# Patient Record
Sex: FEMALE | Race: WHITE | NOT HISPANIC OR LATINO
[De-identification: names, ages, dates, MRNs, and addresses within clinical notes are randomized per-mention and may not be internally consistent; named-entity substitution may affect disease eponyms.]

---

## 2017-01-09 ENCOUNTER — RX RENEWAL (OUTPATIENT)
Age: 62
End: 2017-01-09

## 2017-01-11 ENCOUNTER — RX RENEWAL (OUTPATIENT)
Age: 62
End: 2017-01-11

## 2017-01-19 ENCOUNTER — RX RENEWAL (OUTPATIENT)
Age: 62
End: 2017-01-19

## 2017-02-04 ENCOUNTER — APPOINTMENT (OUTPATIENT)
Dept: FAMILY MEDICINE | Facility: CLINIC | Age: 62
End: 2017-02-04

## 2017-02-04 VITALS
BODY MASS INDEX: 27 KG/M2 | HEART RATE: 78 BPM | WEIGHT: 172 LBS | SYSTOLIC BLOOD PRESSURE: 130 MMHG | RESPIRATION RATE: 20 BRPM | DIASTOLIC BLOOD PRESSURE: 78 MMHG | HEIGHT: 67 IN

## 2017-02-04 RX ORDER — OXYCODONE AND ACETAMINOPHEN 5; 325 MG/1; MG/1
5-325 TABLET ORAL
Qty: 12 | Refills: 0 | Status: DISCONTINUED | COMMUNITY
Start: 2016-12-12

## 2017-02-07 ENCOUNTER — OUTPATIENT (OUTPATIENT)
Dept: OUTPATIENT SERVICES | Facility: HOSPITAL | Age: 62
LOS: 1 days | End: 2017-02-07
Payer: MEDICAID

## 2017-02-07 ENCOUNTER — APPOINTMENT (OUTPATIENT)
Dept: ULTRASOUND IMAGING | Facility: HOSPITAL | Age: 62
End: 2017-02-07

## 2017-02-07 DIAGNOSIS — E89.0 POSTPROCEDURAL HYPOTHYROIDISM: ICD-10-CM

## 2017-02-07 DIAGNOSIS — E07.89 OTHER SPECIFIED DISORDERS OF THYROID: Chronic | ICD-10-CM

## 2017-02-07 PROCEDURE — 76536 US EXAM OF HEAD AND NECK: CPT

## 2017-02-21 ENCOUNTER — RX RENEWAL (OUTPATIENT)
Age: 62
End: 2017-02-21

## 2017-05-04 ENCOUNTER — RX RENEWAL (OUTPATIENT)
Age: 62
End: 2017-05-04

## 2017-05-13 ENCOUNTER — APPOINTMENT (OUTPATIENT)
Dept: FAMILY MEDICINE | Facility: CLINIC | Age: 62
End: 2017-05-13

## 2017-05-13 VITALS
HEART RATE: 76 BPM | SYSTOLIC BLOOD PRESSURE: 130 MMHG | WEIGHT: 173 LBS | BODY MASS INDEX: 27.15 KG/M2 | DIASTOLIC BLOOD PRESSURE: 78 MMHG | RESPIRATION RATE: 20 BRPM | HEIGHT: 67 IN

## 2017-05-21 ENCOUNTER — RX RENEWAL (OUTPATIENT)
Age: 62
End: 2017-05-21

## 2017-06-19 ENCOUNTER — RX RENEWAL (OUTPATIENT)
Age: 62
End: 2017-06-19

## 2017-08-19 ENCOUNTER — APPOINTMENT (OUTPATIENT)
Dept: FAMILY MEDICINE | Facility: CLINIC | Age: 62
End: 2017-08-19
Payer: MEDICAID

## 2017-08-19 VITALS
RESPIRATION RATE: 20 BRPM | SYSTOLIC BLOOD PRESSURE: 130 MMHG | HEIGHT: 67 IN | WEIGHT: 180 LBS | HEART RATE: 76 BPM | BODY MASS INDEX: 28.25 KG/M2 | DIASTOLIC BLOOD PRESSURE: 80 MMHG

## 2017-08-19 PROCEDURE — 99213 OFFICE O/P EST LOW 20 MIN: CPT

## 2017-08-26 LAB
ANION GAP SERPL CALC-SCNC: 18 MMOL/L
BUN SERPL-MCNC: 29 MG/DL
CALCIUM SERPL-MCNC: 9.8 MG/DL
CHLORIDE SERPL-SCNC: 101 MMOL/L
CO2 SERPL-SCNC: 28 MMOL/L
CREAT SERPL-MCNC: 1.02 MG/DL
GLUCOSE SERPL-MCNC: 101 MG/DL
POTASSIUM SERPL-SCNC: 3.4 MMOL/L
SODIUM SERPL-SCNC: 147 MMOL/L

## 2017-08-28 ENCOUNTER — MEDICATION RENEWAL (OUTPATIENT)
Age: 62
End: 2017-08-28

## 2017-08-28 ENCOUNTER — RX RENEWAL (OUTPATIENT)
Age: 62
End: 2017-08-28

## 2017-10-13 ENCOUNTER — APPOINTMENT (OUTPATIENT)
Dept: FAMILY MEDICINE | Facility: CLINIC | Age: 62
End: 2017-10-13
Payer: MEDICAID

## 2017-10-13 ENCOUNTER — LABORATORY RESULT (OUTPATIENT)
Age: 62
End: 2017-10-13

## 2017-10-13 VITALS
RESPIRATION RATE: 20 BRPM | DIASTOLIC BLOOD PRESSURE: 75 MMHG | HEART RATE: 78 BPM | HEIGHT: 67 IN | SYSTOLIC BLOOD PRESSURE: 128 MMHG | BODY MASS INDEX: 28.41 KG/M2 | WEIGHT: 181 LBS

## 2017-10-13 DIAGNOSIS — Z23 ENCOUNTER FOR IMMUNIZATION: ICD-10-CM

## 2017-10-13 PROCEDURE — 90688 IIV4 VACCINE SPLT 0.5 ML IM: CPT

## 2017-10-13 PROCEDURE — G0008: CPT

## 2017-10-13 PROCEDURE — 99213 OFFICE O/P EST LOW 20 MIN: CPT | Mod: 25

## 2017-10-13 PROCEDURE — 36415 COLL VENOUS BLD VENIPUNCTURE: CPT

## 2017-10-18 ENCOUNTER — FORM ENCOUNTER (OUTPATIENT)
Age: 62
End: 2017-10-18

## 2017-10-19 ENCOUNTER — APPOINTMENT (OUTPATIENT)
Dept: MAMMOGRAPHY | Facility: HOSPITAL | Age: 62
End: 2017-10-19
Payer: MEDICAID

## 2017-10-19 ENCOUNTER — OUTPATIENT (OUTPATIENT)
Dept: OUTPATIENT SERVICES | Facility: HOSPITAL | Age: 62
LOS: 1 days | End: 2017-10-19
Payer: MEDICAID

## 2017-10-19 DIAGNOSIS — Z12.31 ENCOUNTER FOR SCREENING MAMMOGRAM FOR MALIGNANT NEOPLASM OF BREAST: ICD-10-CM

## 2017-10-19 DIAGNOSIS — Z00.8 ENCOUNTER FOR OTHER GENERAL EXAMINATION: ICD-10-CM

## 2017-10-19 DIAGNOSIS — E07.89 OTHER SPECIFIED DISORDERS OF THYROID: Chronic | ICD-10-CM

## 2017-10-19 PROCEDURE — 77067 SCR MAMMO BI INCL CAD: CPT

## 2017-10-19 PROCEDURE — G0202: CPT | Mod: 26

## 2017-10-19 PROCEDURE — 77063 BREAST TOMOSYNTHESIS BI: CPT | Mod: 26

## 2017-10-19 PROCEDURE — 77063 BREAST TOMOSYNTHESIS BI: CPT

## 2017-10-22 LAB
ALBUMIN SERPL ELPH-MCNC: 4.7 G/DL
ALP BLD-CCNC: 107 U/L
ALT SERPL-CCNC: 15 U/L
ANION GAP SERPL CALC-SCNC: 17 MMOL/L
AST SERPL-CCNC: 15 U/L
BASOPHILS # BLD AUTO: 0.06 K/UL
BASOPHILS NFR BLD AUTO: 0.5 %
BILIRUB SERPL-MCNC: 0.7 MG/DL
BUN SERPL-MCNC: 20 MG/DL
CALCIUM SERPL-MCNC: 9.5 MG/DL
CHLORIDE SERPL-SCNC: 101 MMOL/L
CHOLEST SERPL-MCNC: 188 MG/DL
CHOLEST/HDLC SERPL: 3.9 RATIO
CO2 SERPL-SCNC: 27 MMOL/L
CREAT SERPL-MCNC: 1.06 MG/DL
EOSINOPHIL # BLD AUTO: 0.11 K/UL
EOSINOPHIL NFR BLD AUTO: 1 %
GLUCOSE SERPL-MCNC: 117 MG/DL
HBA1C MFR BLD HPLC: 5.9 %
HCT VFR BLD CALC: 38.7 %
HCV AB SER QL: NONREACTIVE
HCV S/CO RATIO: 0.12 S/CO
HDLC SERPL-MCNC: 48 MG/DL
HGB BLD-MCNC: 12.4 G/DL
IMM GRANULOCYTES NFR BLD AUTO: 0.8 %
LDLC SERPL CALC-MCNC: 92 MG/DL
LYMPHOCYTES # BLD AUTO: 1.76 K/UL
LYMPHOCYTES NFR BLD AUTO: 16.1 %
MAN DIFF?: NORMAL
MCHC RBC-ENTMCNC: 30.2 PG
MCHC RBC-ENTMCNC: 32 GM/DL
MCV RBC AUTO: 94.4 FL
MONOCYTES # BLD AUTO: 0.65 K/UL
MONOCYTES NFR BLD AUTO: 5.9 %
NEUTROPHILS # BLD AUTO: 8.29 K/UL
NEUTROPHILS NFR BLD AUTO: 75.7 %
PLATELET # BLD AUTO: 333 K/UL
POTASSIUM SERPL-SCNC: 3.6 MMOL/L
PROT SERPL-MCNC: 7.3 G/DL
RBC # BLD: 4.1 M/UL
RBC # FLD: 15.3 %
SODIUM SERPL-SCNC: 145 MMOL/L
T4 FREE SERPL-MCNC: 1.8 NG/DL
TRIGL SERPL-MCNC: 241 MG/DL
TSH SERPL-ACNC: 0.38 UIU/ML
WBC # FLD AUTO: 10.96 K/UL

## 2017-10-27 ENCOUNTER — RX RENEWAL (OUTPATIENT)
Age: 62
End: 2017-10-27

## 2017-11-07 ENCOUNTER — APPOINTMENT (OUTPATIENT)
Dept: FAMILY MEDICINE | Facility: CLINIC | Age: 62
End: 2017-11-07
Payer: MEDICAID

## 2017-11-07 VITALS — RESPIRATION RATE: 20 BRPM | HEART RATE: 76 BPM | SYSTOLIC BLOOD PRESSURE: 135 MMHG | DIASTOLIC BLOOD PRESSURE: 80 MMHG

## 2017-11-07 DIAGNOSIS — S80.11XA CONTUSION OF RIGHT LOWER LEG, INITIAL ENCOUNTER: ICD-10-CM

## 2017-11-07 PROCEDURE — 99213 OFFICE O/P EST LOW 20 MIN: CPT

## 2017-12-21 ENCOUNTER — RX RENEWAL (OUTPATIENT)
Age: 62
End: 2017-12-21

## 2018-01-20 ENCOUNTER — APPOINTMENT (OUTPATIENT)
Dept: FAMILY MEDICINE | Facility: CLINIC | Age: 63
End: 2018-01-20
Payer: MEDICAID

## 2018-01-20 VITALS
BODY MASS INDEX: 28.72 KG/M2 | DIASTOLIC BLOOD PRESSURE: 80 MMHG | SYSTOLIC BLOOD PRESSURE: 135 MMHG | RESPIRATION RATE: 20 BRPM | HEIGHT: 67 IN | HEART RATE: 68 BPM | WEIGHT: 183 LBS

## 2018-01-20 PROCEDURE — 36415 COLL VENOUS BLD VENIPUNCTURE: CPT

## 2018-01-20 PROCEDURE — 99214 OFFICE O/P EST MOD 30 MIN: CPT | Mod: 25

## 2018-01-22 LAB
ALBUMIN SERPL ELPH-MCNC: 4.6 G/DL
ALP BLD-CCNC: 110 U/L
ALT SERPL-CCNC: 19 U/L
ANION GAP SERPL CALC-SCNC: 16 MMOL/L
AST SERPL-CCNC: 18 U/L
BASOPHILS # BLD AUTO: 0.06 K/UL
BASOPHILS NFR BLD AUTO: 0.6 %
BILIRUB SERPL-MCNC: 0.6 MG/DL
BUN SERPL-MCNC: 31 MG/DL
CALCIUM SERPL-MCNC: 9.4 MG/DL
CHLORIDE SERPL-SCNC: 103 MMOL/L
CHOLEST SERPL-MCNC: 166 MG/DL
CHOLEST/HDLC SERPL: 3.3 RATIO
CO2 SERPL-SCNC: 27 MMOL/L
CREAT SERPL-MCNC: 1.08 MG/DL
EOSINOPHIL # BLD AUTO: 0.12 K/UL
EOSINOPHIL NFR BLD AUTO: 1.1 %
GLUCOSE SERPL-MCNC: 107 MG/DL
HBA1C MFR BLD HPLC: 6.2 %
HCT VFR BLD CALC: 43.1 %
HDLC SERPL-MCNC: 50 MG/DL
HGB BLD-MCNC: 13.7 G/DL
IMM GRANULOCYTES NFR BLD AUTO: 0.9 %
LDLC SERPL CALC-MCNC: 81 MG/DL
LYMPHOCYTES # BLD AUTO: 1.68 K/UL
LYMPHOCYTES NFR BLD AUTO: 15.6 %
MAN DIFF?: NORMAL
MCHC RBC-ENTMCNC: 29.1 PG
MCHC RBC-ENTMCNC: 31.8 GM/DL
MCV RBC AUTO: 91.5 FL
MONOCYTES # BLD AUTO: 0.69 K/UL
MONOCYTES NFR BLD AUTO: 6.4 %
NEUTROPHILS # BLD AUTO: 8.12 K/UL
NEUTROPHILS NFR BLD AUTO: 75.4 %
PLATELET # BLD AUTO: 286 K/UL
POTASSIUM SERPL-SCNC: 3.3 MMOL/L
PROT SERPL-MCNC: 7.1 G/DL
RBC # BLD: 4.71 M/UL
RBC # FLD: 16.3 %
SODIUM SERPL-SCNC: 146 MMOL/L
T4 FREE SERPL-MCNC: 1.8 NG/DL
TRIGL SERPL-MCNC: 177 MG/DL
TSH SERPL-ACNC: 0.49 UIU/ML
WBC # FLD AUTO: 10.77 K/UL

## 2018-01-23 ENCOUNTER — APPOINTMENT (OUTPATIENT)
Dept: OBGYN | Facility: CLINIC | Age: 63
End: 2018-01-23
Payer: MEDICAID

## 2018-01-23 VITALS
WEIGHT: 182.8 LBS | BODY MASS INDEX: 28.69 KG/M2 | DIASTOLIC BLOOD PRESSURE: 102 MMHG | SYSTOLIC BLOOD PRESSURE: 170 MMHG | OXYGEN SATURATION: 100 % | HEART RATE: 89 BPM | HEIGHT: 67 IN

## 2018-01-23 DIAGNOSIS — R35.0 FREQUENCY OF MICTURITION: ICD-10-CM

## 2018-01-23 PROCEDURE — 99396 PREV VISIT EST AGE 40-64: CPT

## 2018-01-25 LAB
BACTERIA UR CULT: NORMAL
HPV HIGH+LOW RISK DNA PNL CVX: NOT DETECTED

## 2018-01-26 ENCOUNTER — FORM ENCOUNTER (OUTPATIENT)
Age: 63
End: 2018-01-26

## 2018-01-27 ENCOUNTER — OUTPATIENT (OUTPATIENT)
Dept: OUTPATIENT SERVICES | Facility: HOSPITAL | Age: 63
LOS: 1 days | End: 2018-01-27
Payer: MEDICAID

## 2018-01-27 ENCOUNTER — APPOINTMENT (OUTPATIENT)
Dept: RADIOLOGY | Facility: HOSPITAL | Age: 63
End: 2018-01-27
Payer: MEDICAID

## 2018-01-27 DIAGNOSIS — Z01.419 ENCOUNTER FOR GYNECOLOGICAL EXAMINATION (GENERAL) (ROUTINE) WITHOUT ABNORMAL FINDINGS: ICD-10-CM

## 2018-01-27 DIAGNOSIS — M85.88 OTHER SPECIFIED DISORDERS OF BONE DENSITY AND STRUCTURE, OTHER SITE: ICD-10-CM

## 2018-01-27 DIAGNOSIS — E07.89 OTHER SPECIFIED DISORDERS OF THYROID: Chronic | ICD-10-CM

## 2018-01-27 PROCEDURE — 77080 DXA BONE DENSITY AXIAL: CPT | Mod: 26

## 2018-01-27 PROCEDURE — 77080 DXA BONE DENSITY AXIAL: CPT

## 2018-01-29 ENCOUNTER — APPOINTMENT (OUTPATIENT)
Dept: SURGERY | Facility: CLINIC | Age: 63
End: 2018-01-29
Payer: MEDICAID

## 2018-01-29 VITALS — WEIGHT: 183 LBS | BODY MASS INDEX: 28.72 KG/M2 | HEIGHT: 67 IN

## 2018-01-29 DIAGNOSIS — Z80.3 FAMILY HISTORY OF MALIGNANT NEOPLASM OF BREAST: ICD-10-CM

## 2018-01-29 DIAGNOSIS — Z80.2 FAMILY HISTORY OF MALIGNANT NEOPLASM OF OTHER RESPIRATORY AND INTRATHORACIC ORGANS: ICD-10-CM

## 2018-01-29 DIAGNOSIS — Z82.49 FAMILY HISTORY OF ISCHEMIC HEART DISEASE AND OTHER DISEASES OF THE CIRCULATORY SYSTEM: ICD-10-CM

## 2018-01-29 DIAGNOSIS — Z12.11 ENCOUNTER FOR SCREENING FOR MALIGNANT NEOPLASM OF COLON: ICD-10-CM

## 2018-01-29 LAB — CYTOLOGY CVX/VAG DOC THIN PREP: NORMAL

## 2018-01-29 PROCEDURE — 99203 OFFICE O/P NEW LOW 30 MIN: CPT

## 2018-02-15 ENCOUNTER — APPOINTMENT (OUTPATIENT)
Dept: ULTRASOUND IMAGING | Facility: HOSPITAL | Age: 63
End: 2018-02-15
Payer: MEDICAID

## 2018-02-15 ENCOUNTER — OUTPATIENT (OUTPATIENT)
Dept: OUTPATIENT SERVICES | Facility: HOSPITAL | Age: 63
LOS: 1 days | End: 2018-02-15
Payer: MEDICAID

## 2018-02-15 DIAGNOSIS — E89.0 POSTPROCEDURAL HYPOTHYROIDISM: ICD-10-CM

## 2018-02-15 DIAGNOSIS — E07.89 OTHER SPECIFIED DISORDERS OF THYROID: Chronic | ICD-10-CM

## 2018-02-15 PROCEDURE — 76536 US EXAM OF HEAD AND NECK: CPT

## 2018-02-15 PROCEDURE — 76536 US EXAM OF HEAD AND NECK: CPT | Mod: 26

## 2018-02-20 ENCOUNTER — OUTPATIENT (OUTPATIENT)
Dept: OUTPATIENT SERVICES | Facility: HOSPITAL | Age: 63
LOS: 1 days | End: 2018-02-20
Payer: MEDICAID

## 2018-02-20 ENCOUNTER — TRANSCRIPTION ENCOUNTER (OUTPATIENT)
Age: 63
End: 2018-02-20

## 2018-02-20 DIAGNOSIS — Z12.11 ENCOUNTER FOR SCREENING FOR MALIGNANT NEOPLASM OF COLON: ICD-10-CM

## 2018-02-20 DIAGNOSIS — K59.00 CONSTIPATION, UNSPECIFIED: ICD-10-CM

## 2018-02-20 DIAGNOSIS — K57.30 DIVERTICULOSIS OF LARGE INTESTINE WITHOUT PERFORATION OR ABSCESS WITHOUT BLEEDING: ICD-10-CM

## 2018-02-20 DIAGNOSIS — I10 ESSENTIAL (PRIMARY) HYPERTENSION: ICD-10-CM

## 2018-02-20 DIAGNOSIS — K62.89 OTHER SPECIFIED DISEASES OF ANUS AND RECTUM: ICD-10-CM

## 2018-02-20 DIAGNOSIS — Z53.09 PROCEDURE AND TREATMENT NOT CARRIED OUT BECAUSE OF OTHER CONTRAINDICATION: ICD-10-CM

## 2018-02-20 DIAGNOSIS — E07.89 OTHER SPECIFIED DISORDERS OF THYROID: Chronic | ICD-10-CM

## 2018-02-20 PROCEDURE — 45378 DIAGNOSTIC COLONOSCOPY: CPT | Mod: 33

## 2018-02-20 PROCEDURE — G0121: CPT | Mod: 74

## 2018-02-22 ENCOUNTER — RX RENEWAL (OUTPATIENT)
Age: 63
End: 2018-02-22

## 2018-02-26 ENCOUNTER — RX RENEWAL (OUTPATIENT)
Age: 63
End: 2018-02-26

## 2018-04-19 ENCOUNTER — RX RENEWAL (OUTPATIENT)
Age: 63
End: 2018-04-19

## 2018-04-28 ENCOUNTER — APPOINTMENT (OUTPATIENT)
Dept: FAMILY MEDICINE | Facility: CLINIC | Age: 63
End: 2018-04-28

## 2018-05-03 ENCOUNTER — OUTPATIENT (OUTPATIENT)
Dept: OUTPATIENT SERVICES | Facility: HOSPITAL | Age: 63
LOS: 1 days | End: 2018-05-03
Payer: MEDICAID

## 2018-05-03 DIAGNOSIS — E07.89 OTHER SPECIFIED DISORDERS OF THYROID: Chronic | ICD-10-CM

## 2018-05-03 DIAGNOSIS — R73.03 PREDIABETES: ICD-10-CM

## 2018-05-03 PROCEDURE — G0463: CPT

## 2018-05-08 ENCOUNTER — APPOINTMENT (OUTPATIENT)
Dept: FAMILY MEDICINE | Facility: CLINIC | Age: 63
End: 2018-05-08
Payer: COMMERCIAL

## 2018-05-08 VITALS
DIASTOLIC BLOOD PRESSURE: 78 MMHG | HEART RATE: 78 BPM | BODY MASS INDEX: 28.88 KG/M2 | HEIGHT: 67 IN | WEIGHT: 184 LBS | SYSTOLIC BLOOD PRESSURE: 135 MMHG | RESPIRATION RATE: 20 BRPM

## 2018-05-08 PROCEDURE — 99214 OFFICE O/P EST MOD 30 MIN: CPT | Mod: 25

## 2018-05-08 PROCEDURE — 36415 COLL VENOUS BLD VENIPUNCTURE: CPT

## 2018-05-08 RX ORDER — BLOOD SUGAR DIAGNOSTIC
STRIP MISCELLANEOUS
Qty: 100 | Refills: 0 | Status: ACTIVE | COMMUNITY
Start: 2018-04-05

## 2018-05-08 RX ORDER — BLOOD-GLUCOSE METER
W/DEVICE EACH MISCELLANEOUS
Qty: 1 | Refills: 0 | Status: ACTIVE | COMMUNITY
Start: 2018-04-05

## 2018-05-08 RX ORDER — LANCETS 33 GAUGE
EACH MISCELLANEOUS
Qty: 100 | Refills: 0 | Status: ACTIVE | COMMUNITY
Start: 2018-04-05

## 2018-05-10 LAB
25(OH)D3 SERPL-MCNC: 36.6 NG/ML
ALBUMIN SERPL ELPH-MCNC: 4.5 G/DL
ALP BLD-CCNC: 111 U/L
ALT SERPL-CCNC: 16 U/L
ANION GAP SERPL CALC-SCNC: 20 MMOL/L
AST SERPL-CCNC: 16 U/L
BASOPHILS # BLD AUTO: 0.05 K/UL
BASOPHILS NFR BLD AUTO: 0.4 %
BILIRUB SERPL-MCNC: 0.7 MG/DL
BUN SERPL-MCNC: 28 MG/DL
CALCIUM SERPL-MCNC: 9.7 MG/DL
CHLORIDE SERPL-SCNC: 102 MMOL/L
CHOLEST SERPL-MCNC: 187 MG/DL
CHOLEST/HDLC SERPL: 3.9 RATIO
CO2 SERPL-SCNC: 26 MMOL/L
CREAT SERPL-MCNC: 1.01 MG/DL
EOSINOPHIL # BLD AUTO: 0.1 K/UL
EOSINOPHIL NFR BLD AUTO: 0.9 %
FOLATE SERPL-MCNC: 13.3 NG/ML
GLUCOSE SERPL-MCNC: 117 MG/DL
HBA1C MFR BLD HPLC: 6.2 %
HCT VFR BLD CALC: 45.1 %
HDLC SERPL-MCNC: 48 MG/DL
HGB BLD-MCNC: 14.4 G/DL
IMM GRANULOCYTES NFR BLD AUTO: 0.6 %
LDLC SERPL CALC-MCNC: 84 MG/DL
LYMPHOCYTES # BLD AUTO: 1.78 K/UL
LYMPHOCYTES NFR BLD AUTO: 15.5 %
MAN DIFF?: NORMAL
MCHC RBC-ENTMCNC: 30.2 PG
MCHC RBC-ENTMCNC: 31.9 GM/DL
MCV RBC AUTO: 94.5 FL
MONOCYTES # BLD AUTO: 0.76 K/UL
MONOCYTES NFR BLD AUTO: 6.6 %
NEUTROPHILS # BLD AUTO: 8.72 K/UL
NEUTROPHILS NFR BLD AUTO: 76 %
PLATELET # BLD AUTO: 267 K/UL
POTASSIUM SERPL-SCNC: 3.3 MMOL/L
PROT SERPL-MCNC: 7.2 G/DL
RBC # BLD: 4.77 M/UL
RBC # FLD: 15.2 %
SODIUM SERPL-SCNC: 148 MMOL/L
T4 FREE SERPL-MCNC: 1.6 NG/DL
TRIGL SERPL-MCNC: 275 MG/DL
TSH SERPL-ACNC: 0.49 UIU/ML
VIT B12 SERPL-MCNC: 539 PG/ML
WBC # FLD AUTO: 11.48 K/UL

## 2018-05-17 ENCOUNTER — RX RENEWAL (OUTPATIENT)
Age: 63
End: 2018-05-17

## 2018-06-19 ENCOUNTER — RX RENEWAL (OUTPATIENT)
Age: 63
End: 2018-06-19

## 2018-08-07 ENCOUNTER — APPOINTMENT (OUTPATIENT)
Dept: OBGYN | Facility: CLINIC | Age: 63
End: 2018-08-07
Payer: COMMERCIAL

## 2018-08-07 VITALS
BODY MASS INDEX: 28.72 KG/M2 | HEIGHT: 67 IN | SYSTOLIC BLOOD PRESSURE: 162 MMHG | HEART RATE: 102 BPM | DIASTOLIC BLOOD PRESSURE: 98 MMHG | RESPIRATION RATE: 20 BRPM | WEIGHT: 183 LBS | OXYGEN SATURATION: 98 %

## 2018-08-07 DIAGNOSIS — K64.2 THIRD DEGREE HEMORRHOIDS: ICD-10-CM

## 2018-08-07 DIAGNOSIS — Z00.00 ENCOUNTER FOR GENERAL ADULT MEDICAL EXAMINATION W/OUT ABNORMAL FINDINGS: ICD-10-CM

## 2018-08-07 DIAGNOSIS — R15.9 FULL INCONTINENCE OF FECES: ICD-10-CM

## 2018-08-07 PROCEDURE — 99213 OFFICE O/P EST LOW 20 MIN: CPT

## 2018-08-17 ENCOUNTER — APPOINTMENT (OUTPATIENT)
Dept: SURGERY | Facility: CLINIC | Age: 63
End: 2018-08-17

## 2018-08-18 ENCOUNTER — APPOINTMENT (OUTPATIENT)
Dept: FAMILY MEDICINE | Facility: CLINIC | Age: 63
End: 2018-08-18
Payer: COMMERCIAL

## 2018-08-18 VITALS
RESPIRATION RATE: 20 BRPM | DIASTOLIC BLOOD PRESSURE: 80 MMHG | HEIGHT: 67 IN | SYSTOLIC BLOOD PRESSURE: 128 MMHG | WEIGHT: 177 LBS | BODY MASS INDEX: 27.78 KG/M2 | HEART RATE: 78 BPM

## 2018-08-18 PROCEDURE — 36415 COLL VENOUS BLD VENIPUNCTURE: CPT

## 2018-08-18 PROCEDURE — 99214 OFFICE O/P EST MOD 30 MIN: CPT | Mod: 25

## 2018-08-18 NOTE — HISTORY OF PRESENT ILLNESS
[de-identified] : Presents for BP check, labs, and general follow-up.  No new complaints.  States trying to watch diet and remain as active as possible.

## 2018-08-18 NOTE — PHYSICAL EXAM
[No Acute Distress] : no acute distress [No JVD] : no jugular venous distention [Supple] : supple [Thyroid Normal, No Nodules] : the thyroid was normal and there were no nodules present [No Respiratory Distress] : no respiratory distress  [Clear to Auscultation] : lungs were clear to auscultation bilaterally [No Accessory Muscle Use] : no accessory muscle use [Normal Rate] : normal rate  [Regular Rhythm] : with a regular rhythm [Normal S1, S2] : normal S1 and S2 [No Murmur] : no murmur heard [No Edema] : there was no peripheral edema [Soft] : abdomen soft [Non Tender] : non-tender [No Focal Deficits] : no focal deficits [Alert and Oriented x3] : oriented to person, place, and time

## 2018-08-18 NOTE — ASSESSMENT
[FreeTextEntry1] : Hemodynamically stable with acceptable BP and no clinical evidence of thyroid pathology\par Lab profiles sent

## 2018-08-19 LAB
ALBUMIN SERPL ELPH-MCNC: 4.4 G/DL
ALP BLD-CCNC: 113 U/L
ALT SERPL-CCNC: 19 U/L
ANION GAP SERPL CALC-SCNC: 13 MMOL/L
AST SERPL-CCNC: 14 U/L
BASOPHILS # BLD AUTO: 0.06 K/UL
BASOPHILS NFR BLD AUTO: 0.6 %
BILIRUB SERPL-MCNC: 0.7 MG/DL
BUN SERPL-MCNC: 22 MG/DL
CALCIUM SERPL-MCNC: 9.5 MG/DL
CHLORIDE SERPL-SCNC: 104 MMOL/L
CHOLEST SERPL-MCNC: 150 MG/DL
CHOLEST/HDLC SERPL: 3.3 RATIO
CO2 SERPL-SCNC: 29 MMOL/L
CREAT SERPL-MCNC: 0.98 MG/DL
EOSINOPHIL # BLD AUTO: 0.11 K/UL
EOSINOPHIL NFR BLD AUTO: 1.1 %
GLUCOSE SERPL-MCNC: 107 MG/DL
HBA1C MFR BLD HPLC: 6.3 %
HCT VFR BLD CALC: 40.6 %
HDLC SERPL-MCNC: 45 MG/DL
HGB BLD-MCNC: 12.8 G/DL
IMM GRANULOCYTES NFR BLD AUTO: 0.6 %
LDLC SERPL CALC-MCNC: 74 MG/DL
LYMPHOCYTES # BLD AUTO: 1.99 K/UL
LYMPHOCYTES NFR BLD AUTO: 20.4 %
MAN DIFF?: NORMAL
MCHC RBC-ENTMCNC: 29.8 PG
MCHC RBC-ENTMCNC: 31.5 GM/DL
MCV RBC AUTO: 94.6 FL
MONOCYTES # BLD AUTO: 0.48 K/UL
MONOCYTES NFR BLD AUTO: 4.9 %
NEUTROPHILS # BLD AUTO: 7.06 K/UL
NEUTROPHILS NFR BLD AUTO: 72.4 %
PLATELET # BLD AUTO: 267 K/UL
POTASSIUM SERPL-SCNC: 3.8 MMOL/L
PROT SERPL-MCNC: 6.8 G/DL
RBC # BLD: 4.29 M/UL
RBC # FLD: 15 %
SODIUM SERPL-SCNC: 146 MMOL/L
T4 FREE SERPL-MCNC: 2 NG/DL
TRIGL SERPL-MCNC: 156 MG/DL
TSH SERPL-ACNC: 0.12 UIU/ML
WBC # FLD AUTO: 9.76 K/UL

## 2018-09-06 ENCOUNTER — APPOINTMENT (OUTPATIENT)
Dept: FAMILY MEDICINE | Facility: CLINIC | Age: 63
End: 2018-09-06
Payer: COMMERCIAL

## 2018-09-06 VITALS
RESPIRATION RATE: 18 BRPM | HEIGHT: 67 IN | HEART RATE: 72 BPM | OXYGEN SATURATION: 98 % | SYSTOLIC BLOOD PRESSURE: 170 MMHG | DIASTOLIC BLOOD PRESSURE: 109 MMHG | TEMPERATURE: 98.6 F

## 2018-09-06 VITALS — DIASTOLIC BLOOD PRESSURE: 100 MMHG | SYSTOLIC BLOOD PRESSURE: 170 MMHG

## 2018-09-06 PROCEDURE — 99214 OFFICE O/P EST MOD 30 MIN: CPT

## 2018-09-13 ENCOUNTER — RX RENEWAL (OUTPATIENT)
Age: 63
End: 2018-09-13

## 2018-09-13 ENCOUNTER — APPOINTMENT (OUTPATIENT)
Dept: FAMILY MEDICINE | Facility: CLINIC | Age: 63
End: 2018-09-13
Payer: COMMERCIAL

## 2018-09-13 VITALS
DIASTOLIC BLOOD PRESSURE: 90 MMHG | SYSTOLIC BLOOD PRESSURE: 140 MMHG | HEIGHT: 67 IN | HEART RATE: 64 BPM | OXYGEN SATURATION: 98 % | RESPIRATION RATE: 16 BRPM | TEMPERATURE: 98 F

## 2018-09-13 PROCEDURE — 99214 OFFICE O/P EST MOD 30 MIN: CPT

## 2018-09-13 NOTE — HEALTH RISK ASSESSMENT
[] : No [No falls in past year] : Patient reported no falls in the past year [0] : 2) Feeling down, depressed, or hopeless: Not at all (0) [[id number]] : 0

## 2018-09-13 NOTE — HISTORY OF PRESENT ILLNESS
[FreeTextEntry1] : Presents for htn visit FU [de-identified] : Patient was instructed to increase morning dose of clonidine from one to two pills a day. Patient reports that she forgot, and continued to take her normal dose. Overall feels well at visit. No acute complaints at visit. Denies chest pain, LEON, or head aches.\par \par Has cardiology FU scheduled with Dr. Iverson on 9/25 and FU up with PCP with  Dr. Mckinney on 11/10

## 2018-09-13 NOTE — PLAN
[FreeTextEntry1] : Patient will take clonidine when she gets home.\par \par Patient will take clonidine 0.01 mg twice a day.\par \par FU in one week

## 2018-09-13 NOTE — PHYSICAL EXAM
[No Acute Distress] : no acute distress [Well Nourished] : well nourished [Well Developed] : well developed [Well-Appearing] : well-appearing [No JVD] : no jugular venous distention [Supple] : supple [No Lymphadenopathy] : no lymphadenopathy [Thyroid Normal, No Nodules] : the thyroid was normal and there were no nodules present [No Respiratory Distress] : no respiratory distress  [Clear to Auscultation] : lungs were clear to auscultation bilaterally [No Accessory Muscle Use] : no accessory muscle use [Normal Rate] : normal rate  [Regular Rhythm] : with a regular rhythm [Normal S1, S2] : normal S1 and S2 [No Murmur] : no murmur heard [No Carotid Bruits] : no carotid bruits [No Abdominal Bruit] : a ~M bruit was not heard ~T in the abdomen [No Varicosities] : no varicosities [Pedal Pulses Present] : the pedal pulses are present [No Edema] : there was no peripheral edema [No Extremity Clubbing/Cyanosis] : no extremity clubbing/cyanosis [No Palpable Aorta] : no palpable aorta [Soft] : abdomen soft [Non Tender] : non-tender [No HSM] : no HSM [Normal Bowel Sounds] : normal bowel sounds [Normal Gait] : normal gait [Coordination Grossly Intact] : coordination grossly intact [No Focal Deficits] : no focal deficits [Normal Affect] : the affect was normal [Normal Insight/Judgement] : insight and judgment were intact [de-identified] : conjunctival hemorrhage noted on left eye

## 2018-09-13 NOTE — REVIEW OF SYSTEMS
[Negative] : Heme/Lymph [FreeTextEntry3] : conjunctival hemorrhage note in left eye- Assessed by bhupinder vision

## 2018-09-13 NOTE — PLAN
[FreeTextEntry1] : Recommended increasing morning clonidine dose to 0.2mg.\par \par Patient reports that she prefers to keep her HTN regimen unchanged until she sees cardiologist and PCP.  \par \par

## 2018-09-13 NOTE — HISTORY OF PRESENT ILLNESS
[FreeTextEntry8] : Presents for htn reading at endocrinologist. \par \par  Dr. Hale's. endocrinologist decreased Synthroid from 150 mcg to 137 for 5 days and 150mcg for two days.\par \par Patient was hypertensive at visit and reports to office for blood pressure reading.\par \par Reports taking hypertensive medication this morning. Denies chest pain, Esquivel or head aches.\par \par Does not take Blood pressure at home \par \par Has history of anxiety. Patient notes that she has had increased  anxiety after talking to Dr. Hale. Denies pain

## 2018-09-25 ENCOUNTER — APPOINTMENT (OUTPATIENT)
Dept: CARDIOLOGY | Facility: CLINIC | Age: 63
End: 2018-09-25
Payer: COMMERCIAL

## 2018-09-25 ENCOUNTER — NON-APPOINTMENT (OUTPATIENT)
Age: 63
End: 2018-09-25

## 2018-09-25 VITALS — SYSTOLIC BLOOD PRESSURE: 140 MMHG | DIASTOLIC BLOOD PRESSURE: 90 MMHG

## 2018-09-25 VITALS — DIASTOLIC BLOOD PRESSURE: 110 MMHG | HEART RATE: 68 BPM | SYSTOLIC BLOOD PRESSURE: 160 MMHG

## 2018-09-25 VITALS
WEIGHT: 174 LBS | SYSTOLIC BLOOD PRESSURE: 189 MMHG | DIASTOLIC BLOOD PRESSURE: 111 MMHG | HEIGHT: 67 IN | HEART RATE: 64 BPM | RESPIRATION RATE: 17 BRPM | BODY MASS INDEX: 27.31 KG/M2 | OXYGEN SATURATION: 96 %

## 2018-09-25 PROCEDURE — 93000 ELECTROCARDIOGRAM COMPLETE: CPT

## 2018-09-25 PROCEDURE — 99204 OFFICE O/P NEW MOD 45 MIN: CPT

## 2018-09-25 RX ORDER — POLYETHYLENE GLYCOL 3350, SODIUM SULFATE, SODIUM CHLORIDE, POTASSIUM CHLORIDE, ASCORBIC ACID, SODIUM ASCORBATE 7.5-2.691G
100 KIT ORAL
Qty: 1 | Refills: 0 | Status: DISCONTINUED | COMMUNITY
Start: 2018-01-29 | End: 2018-09-25

## 2018-09-25 RX ORDER — POLYETHYLENE GLYCOL-3350 AND ELECTROLYTES 236; 6.74; 5.86; 2.97; 22.74 G/274.31G; G/274.31G; G/274.31G; G/274.31G; G/274.31G
236 POWDER, FOR SOLUTION ORAL
Qty: 4000 | Refills: 0 | Status: DISCONTINUED | COMMUNITY
Start: 2018-01-29 | End: 2018-09-25

## 2018-09-25 RX ORDER — POLYETHYLENE GLYCOL 3350, SODIUM SULFATE ANHYDROUS, SODIUM BICARBONATE, SODIUM CHLORIDE, POTASSIUM CHLORIDE 227.1; 21.5; 6.36; 5.53; .754 G/L; G/L; G/L; G/L; G/L
227.1 POWDER, FOR SOLUTION ORAL
Qty: 1 | Refills: 0 | Status: DISCONTINUED | COMMUNITY
Start: 2018-01-29 | End: 2018-09-25

## 2018-10-04 ENCOUNTER — APPOINTMENT (OUTPATIENT)
Dept: CARDIOLOGY | Facility: CLINIC | Age: 63
End: 2018-10-04
Payer: COMMERCIAL

## 2018-10-04 PROCEDURE — 93306 TTE W/DOPPLER COMPLETE: CPT

## 2018-10-19 ENCOUNTER — EMERGENCY (EMERGENCY)
Facility: HOSPITAL | Age: 63
LOS: 1 days | Discharge: HOME | End: 2018-10-19
Attending: INTERNAL MEDICINE | Admitting: INTERNAL MEDICINE
Payer: MEDICAID

## 2018-10-19 VITALS
OXYGEN SATURATION: 95 % | DIASTOLIC BLOOD PRESSURE: 92 MMHG | HEART RATE: 78 BPM | SYSTOLIC BLOOD PRESSURE: 156 MMHG | TEMPERATURE: 98 F | RESPIRATION RATE: 16 BRPM

## 2018-10-19 VITALS
DIASTOLIC BLOOD PRESSURE: 86 MMHG | RESPIRATION RATE: 16 BRPM | SYSTOLIC BLOOD PRESSURE: 182 MMHG | HEIGHT: 67 IN | TEMPERATURE: 97 F | HEART RATE: 82 BPM | WEIGHT: 169.98 LBS

## 2018-10-19 DIAGNOSIS — E07.89 OTHER SPECIFIED DISORDERS OF THYROID: Chronic | ICD-10-CM

## 2018-10-19 LAB
ALBUMIN SERPL ELPH-MCNC: 3.6 G/DL — SIGNIFICANT CHANGE UP (ref 3.3–5)
ALP SERPL-CCNC: 109 U/L — SIGNIFICANT CHANGE UP (ref 40–120)
ALT FLD-CCNC: 26 U/L DA — SIGNIFICANT CHANGE UP (ref 10–45)
ANION GAP SERPL CALC-SCNC: 10 MMOL/L — SIGNIFICANT CHANGE UP (ref 5–17)
AST SERPL-CCNC: 23 U/L — SIGNIFICANT CHANGE UP (ref 10–40)
BASOPHILS # BLD AUTO: 0.1 K/UL — SIGNIFICANT CHANGE UP (ref 0–0.2)
BASOPHILS NFR BLD AUTO: 0.9 % — SIGNIFICANT CHANGE UP (ref 0–2)
BILIRUB SERPL-MCNC: 0.7 MG/DL — SIGNIFICANT CHANGE UP (ref 0.2–1.2)
BUN SERPL-MCNC: 30 MG/DL — HIGH (ref 7–23)
CALCIUM SERPL-MCNC: 8.5 MG/DL — SIGNIFICANT CHANGE UP (ref 8.4–10.5)
CHLORIDE SERPL-SCNC: 105 MMOL/L — SIGNIFICANT CHANGE UP (ref 96–108)
CO2 SERPL-SCNC: 31 MMOL/L — SIGNIFICANT CHANGE UP (ref 22–31)
CREAT SERPL-MCNC: 1.26 MG/DL — SIGNIFICANT CHANGE UP (ref 0.5–1.3)
EOSINOPHIL # BLD AUTO: 0.1 K/UL — SIGNIFICANT CHANGE UP (ref 0–0.5)
EOSINOPHIL NFR BLD AUTO: 0.8 % — SIGNIFICANT CHANGE UP (ref 0–6)
GLUCOSE SERPL-MCNC: 168 MG/DL — HIGH (ref 70–99)
HCT VFR BLD CALC: 40.4 % — SIGNIFICANT CHANGE UP (ref 34.5–45)
HGB BLD-MCNC: 13.2 G/DL — SIGNIFICANT CHANGE UP (ref 11.5–15.5)
LYMPHOCYTES # BLD AUTO: 1.8 K/UL — SIGNIFICANT CHANGE UP (ref 1–3.3)
LYMPHOCYTES # BLD AUTO: 14.7 % — SIGNIFICANT CHANGE UP (ref 13–44)
MCHC RBC-ENTMCNC: 29.5 PG — SIGNIFICANT CHANGE UP (ref 27–34)
MCHC RBC-ENTMCNC: 32.6 GM/DL — SIGNIFICANT CHANGE UP (ref 32–36)
MCV RBC AUTO: 90.4 FL — SIGNIFICANT CHANGE UP (ref 80–100)
MONOCYTES # BLD AUTO: 0.6 K/UL — SIGNIFICANT CHANGE UP (ref 0–0.9)
MONOCYTES NFR BLD AUTO: 5.2 % — SIGNIFICANT CHANGE UP (ref 1–9)
NEUTROPHILS # BLD AUTO: 9.5 K/UL — HIGH (ref 1.8–7.4)
NEUTROPHILS NFR BLD AUTO: 78.4 % — HIGH (ref 43–77)
PLATELET # BLD AUTO: 284 K/UL — SIGNIFICANT CHANGE UP (ref 150–400)
POTASSIUM SERPL-MCNC: 2.7 MMOL/L — CRITICAL LOW (ref 3.5–5.3)
POTASSIUM SERPL-MCNC: 3 MMOL/L — LOW (ref 3.5–5.3)
POTASSIUM SERPL-SCNC: 2.7 MMOL/L — CRITICAL LOW (ref 3.5–5.3)
POTASSIUM SERPL-SCNC: 3 MMOL/L — LOW (ref 3.5–5.3)
PROT SERPL-MCNC: 7.1 G/DL — SIGNIFICANT CHANGE UP (ref 6–8.3)
RBC # BLD: 4.47 M/UL — SIGNIFICANT CHANGE UP (ref 3.8–5.2)
RBC # FLD: 13.5 % — SIGNIFICANT CHANGE UP (ref 10.3–14.5)
SODIUM SERPL-SCNC: 146 MMOL/L — HIGH (ref 135–145)
WBC # BLD: 12.1 K/UL — HIGH (ref 3.8–10.5)
WBC # FLD AUTO: 12.1 K/UL — HIGH (ref 3.8–10.5)

## 2018-10-19 PROCEDURE — 99284 EMERGENCY DEPT VISIT MOD MDM: CPT | Mod: 25

## 2018-10-19 PROCEDURE — 82088 ASSAY OF ALDOSTERONE: CPT

## 2018-10-19 PROCEDURE — 85027 COMPLETE CBC AUTOMATED: CPT

## 2018-10-19 PROCEDURE — 93005 ELECTROCARDIOGRAM TRACING: CPT

## 2018-10-19 PROCEDURE — 99284 EMERGENCY DEPT VISIT MOD MDM: CPT

## 2018-10-19 PROCEDURE — 96376 TX/PRO/DX INJ SAME DRUG ADON: CPT

## 2018-10-19 PROCEDURE — 84244 ASSAY OF RENIN: CPT

## 2018-10-19 PROCEDURE — 96365 THER/PROPH/DIAG IV INF INIT: CPT

## 2018-10-19 PROCEDURE — 84132 ASSAY OF SERUM POTASSIUM: CPT

## 2018-10-19 PROCEDURE — 96361 HYDRATE IV INFUSION ADD-ON: CPT

## 2018-10-19 PROCEDURE — 93010 ELECTROCARDIOGRAM REPORT: CPT

## 2018-10-19 PROCEDURE — 80053 COMPREHEN METABOLIC PANEL: CPT

## 2018-10-19 RX ORDER — POTASSIUM CHLORIDE 20 MEQ
40 PACKET (EA) ORAL ONCE
Qty: 0 | Refills: 0 | Status: COMPLETED | OUTPATIENT
Start: 2018-10-19 | End: 2018-10-19

## 2018-10-19 RX ORDER — SODIUM CHLORIDE 9 MG/ML
1000 INJECTION INTRAMUSCULAR; INTRAVENOUS; SUBCUTANEOUS ONCE
Qty: 0 | Refills: 0 | Status: COMPLETED | OUTPATIENT
Start: 2018-10-19 | End: 2018-10-19

## 2018-10-19 RX ORDER — POTASSIUM CHLORIDE 20 MEQ
10 PACKET (EA) ORAL
Qty: 0 | Refills: 0 | Status: COMPLETED | OUTPATIENT
Start: 2018-10-19 | End: 2018-10-19

## 2018-10-19 RX ADMIN — Medication 40 MILLIEQUIVALENT(S): at 15:11

## 2018-10-19 RX ADMIN — SODIUM CHLORIDE 1000 MILLILITER(S): 9 INJECTION INTRAMUSCULAR; INTRAVENOUS; SUBCUTANEOUS at 15:10

## 2018-10-19 RX ADMIN — Medication 100 MILLIEQUIVALENT(S): at 15:11

## 2018-10-19 RX ADMIN — SODIUM CHLORIDE 1000 MILLILITER(S): 9 INJECTION INTRAMUSCULAR; INTRAVENOUS; SUBCUTANEOUS at 14:10

## 2018-10-19 RX ADMIN — Medication 100 MILLIEQUIVALENT(S): at 16:13

## 2018-10-19 RX ADMIN — Medication 10 MILLIEQUIVALENT(S): at 16:20

## 2018-10-19 RX ADMIN — Medication 40 MILLIEQUIVALENT(S): at 20:27

## 2018-10-19 RX ADMIN — Medication 100 MILLIEQUIVALENT(S): at 17:20

## 2018-10-19 RX ADMIN — SODIUM CHLORIDE 1000 MILLILITER(S): 9 INJECTION INTRAMUSCULAR; INTRAVENOUS; SUBCUTANEOUS at 16:15

## 2018-10-19 RX ADMIN — SODIUM CHLORIDE 1000 MILLILITER(S): 9 INJECTION INTRAMUSCULAR; INTRAVENOUS; SUBCUTANEOUS at 15:07

## 2018-10-19 NOTE — CONSULT NOTE ADULT - SUBJECTIVE AND OBJECTIVE BOX
HPI: 63 yo F with hx papillary thyroid cancer (s/p staged thyroidectomy in  while under the care of former endocrinologist Dr. Sam Raphael, no recollection of receiving DIAZ ablation), postmenopausal since '05, HTN, dyslipidemia and prediabetes admitted to ED by me due to lab alert of hypokalemia of 2.7 discovered on routine labs this morning. Patient is asymptomatic and EKG w/out any abnormality. She reports that she was told by her PCP several years ago to eat "many bananas daily". While under my care (initial visit 9/17/15), patient has not exhibited hypokalemia. Her blood pressure has always been elevated and poorly controlled at my office visits -- although she reports that her BP is generally good at her PCP Dr. Mckinney's office. She is currently maintained on four anti-htn medications: hctz 12.5, clonidine 0.1mg, diltiazem er 240mg and fosinopril 40mg.      PAST MEDICAL & SURGICAL HISTORY:  HTN (hypertension)  HLD  Prediabetes (last HbA1c 6.4%)  H/O PTC s/p total thyroidectomy '  Postsurgical hypothyroidism  Postmenopausal      FAMILY HISTORY: father: heavy smoker,  d/t lymphoma at age 70s  mother: labile HTN, ?hx MI,  in her 80s d/t drug overdose      Social History: [place]    Outpatient Medications: fosinopril 40mg, diltiazem er 240mg, hctz 12.5mg, clonidine 0.1mg, levothyroxine 137mcg alt 150mcg, atorvastatin 20mg, ergocalciferol 50, 000 unit capsule, One Touch Ultra meter & testing supplies    MEDICATIONS  (STANDING):  potassium chloride  10 mEq/100 mL IVPB 10 milliEquivalent(s) IV Intermittent every 1 hour    MEDICATIONS  (PRN):      Allergies    No Known Allergies    Intolerances      Review of Systems:  Constitutional: No fever  Eyes: No blurry vision  Neuro: No tremors  HEENT: No pain  Cardiovascular: No chest pain, palpitations  Respiratory: No SOB, no cough  GI: No nausea, vomiting, abdominal pain  : No dysuria  Skin: no rash  Psych: no depression  Endocrine: no polyuria, polydipsia  Hem/lymph: no swelling  Osteoporosis: no fractures    ALL OTHER SYSTEMS REVIEWED AND NEGATIVE    UNABLE TO OBTAIN    PHYSICAL EXAM:  VITALS: T(C): 36.3 (10-19-18 @ 12:57)  T(F): 97.3 (10-19-18 @ 12:57), Max: 97.3 (10-19-18 @ 12:57)  HR: 82 (10-19-18 @ 12:57) (82 - 82)  BP: 182/86 (10-19-18 @ 12:57) (182/86 - 182/86)  RR:  (16 - 16)  SpO2: --  Wt(kg): --  GENERAL: NAD, well-groomed, well-developed  EYES: No proptosis, no lid lag, anicteric  HEENT:  Atraumatic, Normocephalic, moist mucous membranes  THYROID: Normal size, no palpable nodules  RESPIRATORY: Clear to auscultation bilaterally; No rales, rhonchi, wheezing, or rubs  CARDIOVASCULAR: Regular rate and rhythm; No murmurs; no peripheral edema  GI: Soft, nontender, non distended, normal bowel sounds  SKIN: Dry, intact, No rashes or lesions  MUSCULOSKELETAL: Full range of motion, normal strength  NEURO: sensation intact, extraocular movements intact, no tremor, normal reflexes  PSYCH: Alert and oriented x 3, normal affect, normal mood  CUSHING'S SIGNS: no striae                              13.2   12.1  )-----------( 284      ( 19 Oct 2018 13:35 )             40.4       10-19    146<H>  |  105  |  30<H>  ----------------------------<  168<H>  2.7<LL>   |  31  |  1.26    EGFR if : 53<L>  EGFR if non : 45<L>    Ca    8.5      10-19    TPro  7.1  /  Alb  3.6  /  TBili  0.7  /  DBili  x   /  AST  23  /  ALT  26  /  AlkPhos  109  10-19

## 2018-10-19 NOTE — ED PROVIDER NOTE - PROGRESS NOTE DETAILS
Pt receiving 2 dose of IV KCL, when done with 3 runs will repeat kcl. No complaints at this time. Pt endo seen pt at beside , labs were ordered based on her recommendations to r/o Conns syndrome. Pt receiving 3rd KCL run and will repeat K.

## 2018-10-19 NOTE — ED PROVIDER NOTE - MEDICAL DECISION MAKING DETAILS
62 yr old female with hx of thyroid ca, post operative hypothyroid, HTN, HLD, pre diabetes presents with low potassium. Pt went to private endocrinologist today for routine blood work, and K was 2.7. Pt was sent in for further eval. Pt denies any fever. chills, dizziness, chest pain or any other symptoms at this time.  Hx of low k? pt use to eat a lot of bananas.   Pt takes HCTZ for HTN.  EKG, cbc, cmp- will give po kcl and 3 runs of KCL and will repeat K

## 2018-10-19 NOTE — ED PROVIDER NOTE - ATTENDING CONTRIBUTION TO CARE
sent for low potassium   pe normal  Dr. Haider:  I have reviewed and discussed with the PA/ resident the case specifics, including the history, physical assessment, evaluation, conclusion, laboratory results, and medical plan. I agree with the contents, and conclusions. I have personally examined, and interviewed the patient.

## 2018-10-19 NOTE — ED PROVIDER NOTE - PLAN OF CARE
Follow up with your PMD within 48-72 hrs. Show copies of your reports given to you. Take all of your medications as previously prescribed. Increase your potassium intake at home- examples include bananas and yogurt. Worsening, continued or ANY new concerning symptoms return to the emergency department.

## 2018-10-19 NOTE — ED PROVIDER NOTE - CARE PLAN
Principal Discharge DX:	Hypokalemia  Assessment and plan of treatment:	Follow up with your PMD within 48-72 hrs. Show copies of your reports given to you. Take all of your medications as previously prescribed. Increase your potassium intake at home- examples include bananas and yogurt. Worsening, continued or ANY new concerning symptoms return to the emergency department.

## 2018-10-19 NOTE — CONSULT NOTE ADULT - ASSESSMENT
Asymptomatic Spontaneous Hypokalemia in setting of HTN -- difficult to control on 4 anti-htn medications    Replete K to greater than 4 with oral and IV potassium supplementation.  EKG to r/o hypoK - related abnormalities.  After potassium repletion, may discharge patient to home. Please tell patient to stop taking hydrochlorothiazide. I will start patient on aldactone and start at 25mg daily and uptitrate as needed.      Work up for Conn's syndrome as follows:  1) draw labs for plasma aldosterone level and plasma renin activity    2) Saline load test: Infuse 2L NS and then draw labs for serum aldosterone    If above tests return suggestive or confirmatory for Conn's syndrome, next step will be to order CT Adrenals -- but this can be done as an outpatient.    Case discussed at length with patient's PCP team.

## 2018-10-19 NOTE — ED PROVIDER NOTE - OBJECTIVE STATEMENT
62 yr old female with hx of thyroid ca, post operative hypothyroid, HTN, HLD, pre diabetes presents with low potassium. Pt went to private endocrinologist today for routine blood work, and K was 2.7. Pt was sent in for further eval. Pt denies any fever. chills, dizziness, chest pain or any other symptoms at this time.  Hx of low k? pt use to eat a lot of bananas.   Pt takes HCTZ for HTN.

## 2018-10-22 ENCOUNTER — FORM ENCOUNTER (OUTPATIENT)
Age: 63
End: 2018-10-22

## 2018-10-22 LAB
ALDOST SERPL-MCNC: 17.2 NG/DL — SIGNIFICANT CHANGE UP
ALDOST SERPL-MCNC: 18.2 NG/DL — SIGNIFICANT CHANGE UP

## 2018-10-23 ENCOUNTER — APPOINTMENT (OUTPATIENT)
Dept: MAMMOGRAPHY | Facility: HOSPITAL | Age: 63
End: 2018-10-23
Payer: COMMERCIAL

## 2018-10-23 ENCOUNTER — OUTPATIENT (OUTPATIENT)
Dept: OUTPATIENT SERVICES | Facility: HOSPITAL | Age: 63
LOS: 1 days | End: 2018-10-23
Payer: MEDICAID

## 2018-10-23 DIAGNOSIS — E07.89 OTHER SPECIFIED DISORDERS OF THYROID: Chronic | ICD-10-CM

## 2018-10-23 DIAGNOSIS — Z00.8 ENCOUNTER FOR OTHER GENERAL EXAMINATION: ICD-10-CM

## 2018-10-23 PROCEDURE — 77067 SCR MAMMO BI INCL CAD: CPT

## 2018-10-23 PROCEDURE — 77067 SCR MAMMO BI INCL CAD: CPT | Mod: 26

## 2018-10-23 PROCEDURE — 77063 BREAST TOMOSYNTHESIS BI: CPT

## 2018-10-23 PROCEDURE — 77063 BREAST TOMOSYNTHESIS BI: CPT | Mod: 26

## 2018-10-24 ENCOUNTER — APPOINTMENT (OUTPATIENT)
Dept: MAMMOGRAPHY | Facility: HOSPITAL | Age: 63
End: 2018-10-24

## 2018-10-24 ENCOUNTER — APPOINTMENT (OUTPATIENT)
Dept: ULTRASOUND IMAGING | Facility: HOSPITAL | Age: 63
End: 2018-10-24

## 2018-10-25 ENCOUNTER — APPOINTMENT (OUTPATIENT)
Dept: CARDIOLOGY | Facility: CLINIC | Age: 63
End: 2018-10-25
Payer: COMMERCIAL

## 2018-10-25 LAB — RENIN PLAS-CCNC: 1.45 NG/ML/HR — SIGNIFICANT CHANGE UP (ref 0.17–5.38)

## 2018-10-25 PROCEDURE — A9500: CPT

## 2018-10-25 PROCEDURE — 93015 CV STRESS TEST SUPVJ I&R: CPT

## 2018-10-25 PROCEDURE — 78452 HT MUSCLE IMAGE SPECT MULT: CPT

## 2018-10-29 ENCOUNTER — FORM ENCOUNTER (OUTPATIENT)
Age: 63
End: 2018-10-29

## 2018-10-30 ENCOUNTER — APPOINTMENT (OUTPATIENT)
Dept: MAMMOGRAPHY | Facility: HOSPITAL | Age: 63
End: 2018-10-30

## 2018-10-30 ENCOUNTER — APPOINTMENT (OUTPATIENT)
Dept: ULTRASOUND IMAGING | Facility: HOSPITAL | Age: 63
End: 2018-10-30

## 2018-10-30 ENCOUNTER — OUTPATIENT (OUTPATIENT)
Dept: OUTPATIENT SERVICES | Facility: HOSPITAL | Age: 63
LOS: 1 days | End: 2018-10-30
Payer: MEDICAID

## 2018-10-30 DIAGNOSIS — R92.8 OTHER ABNORMAL AND INCONCLUSIVE FINDINGS ON DIAGNOSTIC IMAGING OF BREAST: ICD-10-CM

## 2018-10-30 DIAGNOSIS — Z00.8 ENCOUNTER FOR OTHER GENERAL EXAMINATION: ICD-10-CM

## 2018-10-30 DIAGNOSIS — E07.89 OTHER SPECIFIED DISORDERS OF THYROID: Chronic | ICD-10-CM

## 2018-10-30 PROCEDURE — 76641 ULTRASOUND BREAST COMPLETE: CPT

## 2018-10-30 PROCEDURE — G0279: CPT

## 2018-10-30 PROCEDURE — G0279: CPT | Mod: 26

## 2018-10-30 PROCEDURE — 77065 DX MAMMO INCL CAD UNI: CPT

## 2018-10-30 PROCEDURE — 76641 ULTRASOUND BREAST COMPLETE: CPT | Mod: 26

## 2018-10-30 PROCEDURE — 77065 DX MAMMO INCL CAD UNI: CPT | Mod: 26,RT

## 2018-11-04 ENCOUNTER — FORM ENCOUNTER (OUTPATIENT)
Age: 63
End: 2018-11-04

## 2018-11-05 ENCOUNTER — RESULT REVIEW (OUTPATIENT)
Age: 63
End: 2018-11-05

## 2018-11-05 ENCOUNTER — OUTPATIENT (OUTPATIENT)
Dept: OUTPATIENT SERVICES | Facility: HOSPITAL | Age: 63
LOS: 1 days | End: 2018-11-05
Payer: MEDICAID

## 2018-11-05 DIAGNOSIS — N63.0 UNSPECIFIED LUMP IN UNSPECIFIED BREAST: ICD-10-CM

## 2018-11-05 DIAGNOSIS — E07.89 OTHER SPECIFIED DISORDERS OF THYROID: Chronic | ICD-10-CM

## 2018-11-05 DIAGNOSIS — R92.8 OTHER ABNORMAL AND INCONCLUSIVE FINDINGS ON DIAGNOSTIC IMAGING OF BREAST: ICD-10-CM

## 2018-11-05 PROCEDURE — 88305 TISSUE EXAM BY PATHOLOGIST: CPT

## 2018-11-05 PROCEDURE — 19084 BX BREAST ADD LESION US IMAG: CPT | Mod: RT

## 2018-11-05 PROCEDURE — 19083 BX BREAST 1ST LESION US IMAG: CPT | Mod: RT

## 2018-11-05 PROCEDURE — A4648: CPT

## 2018-11-05 PROCEDURE — 88360 TUMOR IMMUNOHISTOCHEM/MANUAL: CPT | Mod: 26

## 2018-11-05 PROCEDURE — 19084 BX BREAST ADD LESION US IMAG: CPT

## 2018-11-05 PROCEDURE — 19083 BX BREAST 1ST LESION US IMAG: CPT

## 2018-11-05 PROCEDURE — 88360 TUMOR IMMUNOHISTOCHEM/MANUAL: CPT

## 2018-11-05 PROCEDURE — 88305 TISSUE EXAM BY PATHOLOGIST: CPT | Mod: 26

## 2018-11-07 LAB — SURGICAL PATHOLOGY STUDY: SIGNIFICANT CHANGE UP

## 2018-11-10 ENCOUNTER — APPOINTMENT (OUTPATIENT)
Dept: FAMILY MEDICINE | Facility: CLINIC | Age: 63
End: 2018-11-10
Payer: COMMERCIAL

## 2018-11-10 VITALS
DIASTOLIC BLOOD PRESSURE: 80 MMHG | HEIGHT: 67 IN | BODY MASS INDEX: 27.15 KG/M2 | HEART RATE: 68 BPM | SYSTOLIC BLOOD PRESSURE: 140 MMHG | RESPIRATION RATE: 20 BRPM | WEIGHT: 173 LBS

## 2018-11-10 PROCEDURE — 99214 OFFICE O/P EST MOD 30 MIN: CPT | Mod: 25

## 2018-11-10 PROCEDURE — 36415 COLL VENOUS BLD VENIPUNCTURE: CPT

## 2018-11-10 NOTE — ASSESSMENT
[FreeTextEntry1] : Hemodynamically stable; feel BP acceptable for this patient\par No clinical evidence of thyroid pathology\par Lab profiles sent

## 2018-11-10 NOTE — HISTORY OF PRESENT ILLNESS
[de-identified] : Presents for BP check, labs, and general follow-up.  Also discussed recent events and reviewed pathology of recent breast biopsy - note positive for malignancy - advised consult with Heme-Onc - request placed and staff to assist.

## 2018-11-10 NOTE — PHYSICAL EXAM
[No Acute Distress] : no acute distress [No JVD] : no jugular venous distention [Supple] : supple [No Lymphadenopathy] : no lymphadenopathy [Thyroid Normal, No Nodules] : the thyroid was normal and there were no nodules present [No Respiratory Distress] : no respiratory distress  [Clear to Auscultation] : lungs were clear to auscultation bilaterally [No Accessory Muscle Use] : no accessory muscle use [Normal Rate] : normal rate  [Regular Rhythm] : with a regular rhythm [Normal S1, S2] : normal S1 and S2 [No Murmur] : no murmur heard [No Edema] : there was no peripheral edema [Soft] : abdomen soft [Non Tender] : non-tender [No Focal Deficits] : no focal deficits [Alert and Oriented x3] : oriented to person, place, and time

## 2018-11-12 LAB
ALBUMIN SERPL ELPH-MCNC: 4.8 G/DL
ALP BLD-CCNC: 96 U/L
ALT SERPL-CCNC: 18 U/L
ANION GAP SERPL CALC-SCNC: 14 MMOL/L
AST SERPL-CCNC: 15 U/L
BASOPHILS # BLD AUTO: 0.05 K/UL
BASOPHILS NFR BLD AUTO: 0.4 %
BILIRUB SERPL-MCNC: 0.4 MG/DL
BUN SERPL-MCNC: 34 MG/DL
CALCIUM SERPL-MCNC: 9.5 MG/DL
CHLORIDE SERPL-SCNC: 104 MMOL/L
CHOLEST SERPL-MCNC: 233 MG/DL
CHOLEST/HDLC SERPL: 5.3 RATIO
CO2 SERPL-SCNC: 25 MMOL/L
CREAT SERPL-MCNC: 1.13 MG/DL
EOSINOPHIL # BLD AUTO: 0.13 K/UL
EOSINOPHIL NFR BLD AUTO: 1.2 %
GLUCOSE SERPL-MCNC: 96 MG/DL
HBA1C MFR BLD HPLC: 6 %
HCT VFR BLD CALC: 43.9 %
HDLC SERPL-MCNC: 44 MG/DL
HGB BLD-MCNC: 14.2 G/DL
IMM GRANULOCYTES NFR BLD AUTO: 1.1 %
LDLC SERPL CALC-MCNC: 136 MG/DL
LYMPHOCYTES # BLD AUTO: 1.74 K/UL
LYMPHOCYTES NFR BLD AUTO: 15.6 %
MAN DIFF?: NORMAL
MCHC RBC-ENTMCNC: 30.4 PG
MCHC RBC-ENTMCNC: 32.3 GM/DL
MCV RBC AUTO: 94 FL
MONOCYTES # BLD AUTO: 0.88 K/UL
MONOCYTES NFR BLD AUTO: 7.9 %
NEUTROPHILS # BLD AUTO: 8.24 K/UL
NEUTROPHILS NFR BLD AUTO: 73.8 %
PLATELET # BLD AUTO: 269 K/UL
POTASSIUM SERPL-SCNC: 5 MMOL/L
PROT SERPL-MCNC: 7.2 G/DL
RBC # BLD: 4.67 M/UL
RBC # FLD: 16.6 %
SODIUM SERPL-SCNC: 143 MMOL/L
T3FREE SERPL-MCNC: 2 PG/ML
T4 FREE SERPL-MCNC: 1.7 NG/DL
TRIGL SERPL-MCNC: 266 MG/DL
TSH SERPL-ACNC: 0.16 UIU/ML
WBC # FLD AUTO: 11.16 K/UL

## 2018-11-13 ENCOUNTER — RX RENEWAL (OUTPATIENT)
Age: 63
End: 2018-11-13

## 2018-11-13 ENCOUNTER — APPOINTMENT (OUTPATIENT)
Dept: CT IMAGING | Facility: HOSPITAL | Age: 63
End: 2018-11-13
Payer: COMMERCIAL

## 2018-11-13 ENCOUNTER — OUTPATIENT (OUTPATIENT)
Dept: OUTPATIENT SERVICES | Facility: HOSPITAL | Age: 63
LOS: 1 days | End: 2018-11-13
Payer: MEDICAID

## 2018-11-13 DIAGNOSIS — I15.9 SECONDARY HYPERTENSION, UNSPECIFIED: ICD-10-CM

## 2018-11-13 DIAGNOSIS — E07.89 OTHER SPECIFIED DISORDERS OF THYROID: Chronic | ICD-10-CM

## 2018-11-13 PROCEDURE — 74170 CT ABD WO CNTRST FLWD CNTRST: CPT | Mod: 26

## 2018-11-13 PROCEDURE — 74170 CT ABD WO CNTRST FLWD CNTRST: CPT

## 2018-11-13 RX ORDER — ATORVASTATIN CALCIUM 20 MG/1
20 TABLET, FILM COATED ORAL
Qty: 90 | Refills: 3 | Status: ACTIVE | COMMUNITY
Start: 2018-02-22 | End: 1900-01-01

## 2018-11-14 ENCOUNTER — APPOINTMENT (OUTPATIENT)
Dept: OBGYN | Facility: CLINIC | Age: 63
End: 2018-11-14
Payer: COMMERCIAL

## 2018-11-14 PROCEDURE — 99214 OFFICE O/P EST MOD 30 MIN: CPT

## 2018-11-22 ENCOUNTER — FORM ENCOUNTER (OUTPATIENT)
Age: 63
End: 2018-11-22

## 2018-11-23 ENCOUNTER — OUTPATIENT (OUTPATIENT)
Dept: OUTPATIENT SERVICES | Facility: HOSPITAL | Age: 63
LOS: 1 days | End: 2018-11-23
Payer: MEDICAID

## 2018-11-23 ENCOUNTER — APPOINTMENT (OUTPATIENT)
Dept: MRI IMAGING | Facility: CLINIC | Age: 63
End: 2018-11-23
Payer: COMMERCIAL

## 2018-11-23 DIAGNOSIS — Z00.8 ENCOUNTER FOR OTHER GENERAL EXAMINATION: ICD-10-CM

## 2018-11-23 DIAGNOSIS — E07.89 OTHER SPECIFIED DISORDERS OF THYROID: Chronic | ICD-10-CM

## 2018-11-23 DIAGNOSIS — C50.911 MALIGNANT NEOPLASM OF UNSPECIFIED SITE OF RIGHT FEMALE BREAST: ICD-10-CM

## 2018-11-23 PROCEDURE — 77059 MRI BREAST BILATERAL: CPT | Mod: 26

## 2018-11-23 PROCEDURE — C8908: CPT

## 2018-11-23 PROCEDURE — A9585: CPT

## 2018-11-23 PROCEDURE — 0159T: CPT | Mod: 26

## 2018-11-23 PROCEDURE — C8937: CPT

## 2018-11-28 ENCOUNTER — FORM ENCOUNTER (OUTPATIENT)
Age: 63
End: 2018-11-28

## 2018-11-28 DIAGNOSIS — R92.8 OTHER ABNORMAL AND INCONCLUSIVE FINDINGS ON DIAGNOSTIC IMAGING OF BREAST: ICD-10-CM

## 2018-11-29 ENCOUNTER — OUTPATIENT (OUTPATIENT)
Dept: OUTPATIENT SERVICES | Facility: HOSPITAL | Age: 63
LOS: 1 days | End: 2018-11-29
Payer: SELF-PAY

## 2018-11-29 ENCOUNTER — APPOINTMENT (OUTPATIENT)
Dept: MRI IMAGING | Facility: CLINIC | Age: 63
End: 2018-11-29
Payer: SUBSIDIZED

## 2018-11-29 DIAGNOSIS — Z00.8 ENCOUNTER FOR OTHER GENERAL EXAMINATION: ICD-10-CM

## 2018-11-29 DIAGNOSIS — E07.89 OTHER SPECIFIED DISORDERS OF THYROID: Chronic | ICD-10-CM

## 2018-11-29 PROCEDURE — C8908: CPT

## 2018-11-29 PROCEDURE — 0159T: CPT | Mod: 26

## 2018-11-29 PROCEDURE — 77059 MRI BREAST BILATERAL: CPT | Mod: 26

## 2018-11-29 PROCEDURE — C8937: CPT

## 2018-12-05 ENCOUNTER — FORM ENCOUNTER (OUTPATIENT)
Age: 63
End: 2018-12-05

## 2018-12-06 ENCOUNTER — APPOINTMENT (OUTPATIENT)
Dept: ULTRASOUND IMAGING | Facility: HOSPITAL | Age: 63
End: 2018-12-06
Payer: COMMERCIAL

## 2018-12-06 ENCOUNTER — OUTPATIENT (OUTPATIENT)
Dept: OUTPATIENT SERVICES | Facility: HOSPITAL | Age: 63
LOS: 1 days | End: 2018-12-06
Payer: MEDICAID

## 2018-12-06 DIAGNOSIS — Z00.8 ENCOUNTER FOR OTHER GENERAL EXAMINATION: ICD-10-CM

## 2018-12-06 DIAGNOSIS — E07.89 OTHER SPECIFIED DISORDERS OF THYROID: Chronic | ICD-10-CM

## 2018-12-06 PROCEDURE — 76641 ULTRASOUND BREAST COMPLETE: CPT | Mod: 26,LT

## 2018-12-06 PROCEDURE — 76641 ULTRASOUND BREAST COMPLETE: CPT

## 2018-12-08 PROBLEM — R92.8 ABNORMAL MRI, BREAST: Status: ACTIVE | Noted: 2018-12-08

## 2018-12-20 ENCOUNTER — OUTPATIENT (OUTPATIENT)
Dept: OUTPATIENT SERVICES | Facility: HOSPITAL | Age: 63
LOS: 1 days | End: 2018-12-20
Payer: MEDICAID

## 2018-12-20 ENCOUNTER — RESULT REVIEW (OUTPATIENT)
Age: 63
End: 2018-12-20

## 2018-12-20 DIAGNOSIS — N63.0 UNSPECIFIED LUMP IN UNSPECIFIED BREAST: ICD-10-CM

## 2018-12-20 DIAGNOSIS — E07.89 OTHER SPECIFIED DISORDERS OF THYROID: Chronic | ICD-10-CM

## 2018-12-20 PROCEDURE — 88305 TISSUE EXAM BY PATHOLOGIST: CPT | Mod: 26

## 2018-12-20 PROCEDURE — 19083 BX BREAST 1ST LESION US IMAG: CPT | Mod: LT

## 2018-12-20 PROCEDURE — 19084 BX BREAST ADD LESION US IMAG: CPT

## 2018-12-20 PROCEDURE — 19084 BX BREAST ADD LESION US IMAG: CPT | Mod: LT

## 2018-12-20 PROCEDURE — 88305 TISSUE EXAM BY PATHOLOGIST: CPT

## 2018-12-20 PROCEDURE — 19083 BX BREAST 1ST LESION US IMAG: CPT

## 2018-12-20 PROCEDURE — A4648: CPT

## 2018-12-31 LAB — SURGICAL PATHOLOGY STUDY: SIGNIFICANT CHANGE UP

## 2019-01-03 ENCOUNTER — APPOINTMENT (OUTPATIENT)
Dept: INTERVENTIONAL RADIOLOGY/VASCULAR | Facility: CLINIC | Age: 64
End: 2019-01-03
Payer: COMMERCIAL

## 2019-01-03 VITALS
RESPIRATION RATE: 20 BRPM | BODY MASS INDEX: 27.31 KG/M2 | DIASTOLIC BLOOD PRESSURE: 92 MMHG | SYSTOLIC BLOOD PRESSURE: 147 MMHG | OXYGEN SATURATION: 95 % | HEIGHT: 67 IN | WEIGHT: 174 LBS | HEART RATE: 91 BPM

## 2019-01-03 PROCEDURE — 99244 OFF/OP CNSLTJ NEW/EST MOD 40: CPT

## 2019-01-07 ENCOUNTER — OUTPATIENT (OUTPATIENT)
Dept: OUTPATIENT SERVICES | Facility: HOSPITAL | Age: 64
LOS: 1 days | End: 2019-01-07
Payer: MEDICAID

## 2019-01-07 VITALS
TEMPERATURE: 98 F | SYSTOLIC BLOOD PRESSURE: 148 MMHG | HEIGHT: 67 IN | RESPIRATION RATE: 16 BRPM | HEART RATE: 75 BPM | WEIGHT: 171.96 LBS | DIASTOLIC BLOOD PRESSURE: 96 MMHG

## 2019-01-07 DIAGNOSIS — Z98.890 OTHER SPECIFIED POSTPROCEDURAL STATES: Chronic | ICD-10-CM

## 2019-01-07 DIAGNOSIS — D35.00 BENIGN NEOPLASM OF UNSPECIFIED ADRENAL GLAND: ICD-10-CM

## 2019-01-07 DIAGNOSIS — E07.89 OTHER SPECIFIED DISORDERS OF THYROID: Chronic | ICD-10-CM

## 2019-01-07 DIAGNOSIS — E27.9 DISORDER OF ADRENAL GLAND, UNSPECIFIED: ICD-10-CM

## 2019-01-07 DIAGNOSIS — I10 ESSENTIAL (PRIMARY) HYPERTENSION: ICD-10-CM

## 2019-01-07 LAB
APTT BLD: 25.2 SEC — LOW (ref 27.5–36.3)
BUN SERPL-MCNC: 31 MG/DL — HIGH (ref 7–23)
CALCIUM SERPL-MCNC: 9.5 MG/DL — SIGNIFICANT CHANGE UP (ref 8.4–10.5)
CHLORIDE SERPL-SCNC: 103 MMOL/L — SIGNIFICANT CHANGE UP (ref 98–107)
CO2 SERPL-SCNC: 26 MMOL/L — SIGNIFICANT CHANGE UP (ref 22–31)
CREAT SERPL-MCNC: 1.47 MG/DL — HIGH (ref 0.5–1.3)
GLUCOSE SERPL-MCNC: 91 MG/DL — SIGNIFICANT CHANGE UP (ref 70–99)
HCT VFR BLD CALC: 44.8 % — SIGNIFICANT CHANGE UP (ref 34.5–45)
HGB BLD-MCNC: 14.2 G/DL — SIGNIFICANT CHANGE UP (ref 11.5–15.5)
INR BLD: 1.03 — SIGNIFICANT CHANGE UP (ref 0.88–1.17)
MCHC RBC-ENTMCNC: 29.5 PG — SIGNIFICANT CHANGE UP (ref 27–34)
MCHC RBC-ENTMCNC: 31.7 % — LOW (ref 32–36)
MCV RBC AUTO: 92.9 FL — SIGNIFICANT CHANGE UP (ref 80–100)
NRBC # FLD: 0 K/UL — LOW (ref 25–125)
PLATELET # BLD AUTO: 255 K/UL — SIGNIFICANT CHANGE UP (ref 150–400)
PMV BLD: 12.2 FL — SIGNIFICANT CHANGE UP (ref 7–13)
POTASSIUM SERPL-MCNC: 4 MMOL/L — SIGNIFICANT CHANGE UP (ref 3.5–5.3)
POTASSIUM SERPL-SCNC: 4 MMOL/L — SIGNIFICANT CHANGE UP (ref 3.5–5.3)
PROTHROM AB SERPL-ACNC: 11.5 SEC — SIGNIFICANT CHANGE UP (ref 9.8–13.1)
RBC # BLD: 4.82 M/UL — SIGNIFICANT CHANGE UP (ref 3.8–5.2)
RBC # FLD: 15.5 % — HIGH (ref 10.3–14.5)
SODIUM SERPL-SCNC: 144 MMOL/L — SIGNIFICANT CHANGE UP (ref 135–145)
WBC # BLD: 11.44 K/UL — HIGH (ref 3.8–10.5)
WBC # FLD AUTO: 11.44 K/UL — HIGH (ref 3.8–10.5)

## 2019-01-07 PROCEDURE — 93010 ELECTROCARDIOGRAM REPORT: CPT

## 2019-01-07 RX ORDER — LEVOTHYROXINE SODIUM 125 MCG
0 TABLET ORAL
Qty: 0 | Refills: 0 | COMMUNITY

## 2019-01-07 RX ORDER — LEVOTHYROXINE SODIUM 125 MCG
1 TABLET ORAL
Qty: 0 | Refills: 0 | COMMUNITY

## 2019-01-07 NOTE — H&P PST ADULT - NSANTHOSAYNRD_GEN_A_CORE
No. MEKA screening performed.  STOP BANG Legend: 0-2 = LOW Risk; 3-4 = INTERMEDIATE Risk; 5-8 = HIGH Risk

## 2019-01-07 NOTE — H&P PST ADULT - PROBLEM SELECTOR PLAN 2
continue medications on the DOS continue medications on the DOS  medical consult requested scheduled blood pressure elevated 148/96 continue medications on the DOS  medical consult requested for elevated blood pressure 148/96 and EKG changes.  Patient going to see PMD today.

## 2019-01-07 NOTE — H&P PST ADULT - HISTORY OF PRESENT ILLNESS
63 yr old female with hx of thyroid ca, post operative hypothyroid, HTN, HLD presents to PST for schedule adrenal vein sampling on 1/23/19. Patients states incidental finding on diagnostic testing revealed "nodule near or around adrenal gland".

## 2019-01-07 NOTE — H&P PST ADULT - PROBLEM SELECTOR PLAN 1
Patient schedule adrenal vein sampling on 1/23/19.   Pre op instructions given and explained.  Avoid NSAIDs and OTC supplements.   Patient verbalized understanding  medical consult requested scheduled Patient schedule adrenal vein sampling on 1/23/19.   Pre op instructions given and explained.  Avoid NSAIDs and OTC supplements.   Patient verbalized understanding

## 2019-01-07 NOTE — H&P PST ADULT - ASSESSMENT
63 yr old female with hx of thyroid ca, post operative hypothyroid, HTN, HLD presents to PST for schedule adrenal vein sampling on 1/23/19.

## 2019-01-11 PROBLEM — F41.9 ANXIETY DISORDER, UNSPECIFIED: Chronic | Status: ACTIVE | Noted: 2019-01-07

## 2019-01-11 PROBLEM — E78.5 HYPERLIPIDEMIA, UNSPECIFIED: Chronic | Status: ACTIVE | Noted: 2019-01-07

## 2019-01-11 PROBLEM — E03.9 HYPOTHYROIDISM, UNSPECIFIED: Chronic | Status: ACTIVE | Noted: 2019-01-07

## 2019-01-15 ENCOUNTER — APPOINTMENT (OUTPATIENT)
Dept: FAMILY MEDICINE | Facility: CLINIC | Age: 64
End: 2019-01-15
Payer: COMMERCIAL

## 2019-01-15 VITALS
BODY MASS INDEX: 27.31 KG/M2 | SYSTOLIC BLOOD PRESSURE: 128 MMHG | WEIGHT: 174 LBS | HEIGHT: 67 IN | DIASTOLIC BLOOD PRESSURE: 85 MMHG | HEART RATE: 78 BPM | RESPIRATION RATE: 20 BRPM

## 2019-01-15 DIAGNOSIS — D35.00 BENIGN NEOPLASM OF UNSPECIFIED ADRENAL GLAND: ICD-10-CM

## 2019-01-15 PROCEDURE — 99243 OFF/OP CNSLTJ NEW/EST LOW 30: CPT

## 2019-01-15 NOTE — ASSESSMENT
[Patient Optimized for Surgery] : Patient optimized for surgery [No Further Testing Recommended] : no further testing recommended [FreeTextEntry4] : Clinically stable as of this encounter.  BP stable for this patient and is felt to be acceptable.  All PSTs acceptable - note renal function felt to be due to hydration status - pt advised to increase fluids; feel no impact on this procedure; EKG without change as compared to prior tracings and is felt to be stable for this patient

## 2019-01-15 NOTE — PHYSICAL EXAM
[No Acute Distress] : no acute distress [Normal Outer Ear/Nose] : the outer ears and nose were normal in appearance [Normal Oropharynx] : the oropharynx was normal [Normal TMs] : both tympanic membranes were normal [Normal Nasal Mucosa] : the nasal mucosa was normal [No JVD] : no jugular venous distention [Supple] : supple [No Lymphadenopathy] : no lymphadenopathy [Thyroid Normal, No Nodules] : the thyroid was normal and there were no nodules present [No Respiratory Distress] : no respiratory distress  [Clear to Auscultation] : lungs were clear to auscultation bilaterally [No Accessory Muscle Use] : no accessory muscle use [Normal Rate] : normal rate  [Regular Rhythm] : with a regular rhythm [Normal S1, S2] : normal S1 and S2 [No Murmur] : no murmur heard [No Edema] : there was no peripheral edema [Soft] : abdomen soft [Non Tender] : non-tender [Non-distended] : non-distended [No Masses] : no abdominal mass palpated [No HSM] : no HSM [Normal Bowel Sounds] : normal bowel sounds [Normal Posterior Cervical Nodes] : no posterior cervical lymphadenopathy [Normal Anterior Cervical Nodes] : no anterior cervical lymphadenopathy [No Rash] : no rash [No Skin Lesions] : no skin lesions [Normal Gait] : normal gait [Coordination Grossly Intact] : coordination grossly intact [No Focal Deficits] : no focal deficits [Alert and Oriented x3] : oriented to person, place, and time

## 2019-01-15 NOTE — HISTORY OF PRESENT ILLNESS
[No Pertinent Cardiac History] : no history of aortic stenosis, atrial fibrillation, coronary artery disease, recent myocardial infarction, or implantable device/pacemaker [No Pertinent Pulmonary History] : no history of asthma, COPD, sleep apnea, or smoking [No Adverse Anesthesia Reaction] : no adverse anesthesia reaction in self or family member [Chronic Anticoagulation] : no chronic anticoagulation [Chronic Kidney Disease] : no chronic kidney disease [Diabetes] : no diabetes [(Patient denies any chest pain, claudication, dyspnea on exertion, orthopnea, palpitations or syncope)] : Patient denies any chest pain, claudication, dyspnea on exertion, orthopnea, palpitations or syncope [FreeTextEntry1] : adrenal vein sampling [FreeTextEntry2] : 1/23/19 [FreeTextEntry3] : Dr. Fahad Brown [FreeTextEntry4] : Presents for clearance for procedure.  Denies recent illness, cough, temp elevation, urinary symptoms. [FreeTextEntry5] : Patient has H/O hypertension; S/P ca thyroid; hyperlipidemia; recent dx of breast ca

## 2019-01-15 NOTE — RESULTS/DATA
[] : results reviewed [de-identified] : acceptable [de-identified] : acceptable [de-identified] : acceptable [de-identified] : acceptable [de-identified] : see note below

## 2019-01-16 ENCOUNTER — RESULT REVIEW (OUTPATIENT)
Age: 64
End: 2019-01-16

## 2019-01-16 ENCOUNTER — APPOINTMENT (OUTPATIENT)
Dept: MRI IMAGING | Facility: IMAGING CENTER | Age: 64
End: 2019-01-16
Payer: COMMERCIAL

## 2019-01-16 ENCOUNTER — OUTPATIENT (OUTPATIENT)
Dept: OUTPATIENT SERVICES | Facility: HOSPITAL | Age: 64
LOS: 1 days | End: 2019-01-16
Payer: MEDICAID

## 2019-01-16 DIAGNOSIS — E07.89 OTHER SPECIFIED DISORDERS OF THYROID: Chronic | ICD-10-CM

## 2019-01-16 DIAGNOSIS — Z98.890 OTHER SPECIFIED POSTPROCEDURAL STATES: Chronic | ICD-10-CM

## 2019-01-16 DIAGNOSIS — Z00.8 ENCOUNTER FOR OTHER GENERAL EXAMINATION: ICD-10-CM

## 2019-01-16 PROCEDURE — 88305 TISSUE EXAM BY PATHOLOGIST: CPT | Mod: 26

## 2019-01-16 PROCEDURE — 19085 BX BREAST 1ST LESION MR IMAG: CPT

## 2019-01-16 PROCEDURE — 19086 BX BREAST ADD LESION MR IMAG: CPT | Mod: RT

## 2019-01-16 PROCEDURE — A9585: CPT

## 2019-01-16 PROCEDURE — 88305 TISSUE EXAM BY PATHOLOGIST: CPT

## 2019-01-16 PROCEDURE — 77065 DX MAMMO INCL CAD UNI: CPT

## 2019-01-16 PROCEDURE — 19086 BX BREAST ADD LESION MR IMAG: CPT

## 2019-01-16 PROCEDURE — 19085 BX BREAST 1ST LESION MR IMAG: CPT | Mod: RT

## 2019-01-16 PROCEDURE — 77065 DX MAMMO INCL CAD UNI: CPT | Mod: 26,RT

## 2019-01-18 LAB — SURGICAL PATHOLOGY STUDY: SIGNIFICANT CHANGE UP

## 2019-01-25 ENCOUNTER — OUTPATIENT (OUTPATIENT)
Dept: OUTPATIENT SERVICES | Facility: HOSPITAL | Age: 64
LOS: 1 days | End: 2019-01-25

## 2019-01-25 DIAGNOSIS — E07.89 OTHER SPECIFIED DISORDERS OF THYROID: Chronic | ICD-10-CM

## 2019-01-25 DIAGNOSIS — Z98.890 OTHER SPECIFIED POSTPROCEDURAL STATES: Chronic | ICD-10-CM

## 2019-01-25 DIAGNOSIS — D35.00 BENIGN NEOPLASM OF UNSPECIFIED ADRENAL GLAND: ICD-10-CM

## 2019-01-25 DIAGNOSIS — I10 ESSENTIAL (PRIMARY) HYPERTENSION: ICD-10-CM

## 2019-02-01 ENCOUNTER — OUTPATIENT (OUTPATIENT)
Dept: OUTPATIENT SERVICES | Facility: HOSPITAL | Age: 64
LOS: 1 days | End: 2019-02-01
Payer: MEDICAID

## 2019-02-01 DIAGNOSIS — E07.89 OTHER SPECIFIED DISORDERS OF THYROID: Chronic | ICD-10-CM

## 2019-02-01 DIAGNOSIS — Z98.890 OTHER SPECIFIED POSTPROCEDURAL STATES: Chronic | ICD-10-CM

## 2019-02-02 ENCOUNTER — LABORATORY RESULT (OUTPATIENT)
Age: 64
End: 2019-02-02

## 2019-02-02 ENCOUNTER — APPOINTMENT (OUTPATIENT)
Dept: FAMILY MEDICINE | Facility: CLINIC | Age: 64
End: 2019-02-02
Payer: COMMERCIAL

## 2019-02-02 VITALS
HEIGHT: 67 IN | WEIGHT: 173 LBS | DIASTOLIC BLOOD PRESSURE: 78 MMHG | SYSTOLIC BLOOD PRESSURE: 130 MMHG | HEART RATE: 76 BPM | BODY MASS INDEX: 27.15 KG/M2 | RESPIRATION RATE: 20 BRPM

## 2019-02-02 DIAGNOSIS — Z86.39 PERSONAL HISTORY OF OTHER ENDOCRINE, NUTRITIONAL AND METABOLIC DISEASE: ICD-10-CM

## 2019-02-02 PROCEDURE — 36415 COLL VENOUS BLD VENIPUNCTURE: CPT

## 2019-02-02 PROCEDURE — 99214 OFFICE O/P EST MOD 30 MIN: CPT | Mod: 25

## 2019-02-02 NOTE — ASSESSMENT
[FreeTextEntry1] : Hemodynamically stable with acceptable BP \par Thyroid status stable\par Lab profiles sent

## 2019-02-02 NOTE — HISTORY OF PRESENT ILLNESS
[de-identified] : Presents for BP check, labs, and general follow-up.  States feeling generally well.

## 2019-02-02 NOTE — PHYSICAL EXAM
[No Acute Distress] : no acute distress [Normal Sclera/Conjunctiva] : normal sclera/conjunctiva [PERRL] : pupils equal round and reactive to light [EOMI] : extraocular movements intact [Normal Outer Ear/Nose] : the outer ears and nose were normal in appearance [Normal Oropharynx] : the oropharynx was normal [Normal TMs] : both tympanic membranes were normal [Normal Nasal Mucosa] : the nasal mucosa was normal [No JVD] : no jugular venous distention [Supple] : supple [No Lymphadenopathy] : no lymphadenopathy [Thyroid Normal, No Nodules] : the thyroid was normal and there were no nodules present [No Respiratory Distress] : no respiratory distress  [Clear to Auscultation] : lungs were clear to auscultation bilaterally [No Accessory Muscle Use] : no accessory muscle use [Normal Rate] : normal rate  [Regular Rhythm] : with a regular rhythm [Normal S1, S2] : normal S1 and S2 [No Murmur] : no murmur heard [No Edema] : there was no peripheral edema [Soft] : abdomen soft [Non Tender] : non-tender [Normal Posterior Cervical Nodes] : no posterior cervical lymphadenopathy [Normal Anterior Cervical Nodes] : no anterior cervical lymphadenopathy [Normal Gait] : normal gait [Coordination Grossly Intact] : coordination grossly intact [No Focal Deficits] : no focal deficits [Alert and Oriented x3] : oriented to person, place, and time

## 2019-02-03 LAB
ALBUMIN SERPL ELPH-MCNC: 4.6 G/DL
ALP BLD-CCNC: 100 U/L
ALT SERPL-CCNC: 19 U/L
ANION GAP SERPL CALC-SCNC: 13 MMOL/L
AST SERPL-CCNC: 15 U/L
BASOPHILS # BLD AUTO: 0 K/UL
BASOPHILS NFR BLD AUTO: 0 %
BILIRUB SERPL-MCNC: 0.5 MG/DL
BUN SERPL-MCNC: 36 MG/DL
CALCIUM SERPL-MCNC: 9.9 MG/DL
CHLORIDE SERPL-SCNC: 105 MMOL/L
CHOLEST SERPL-MCNC: 152 MG/DL
CHOLEST/HDLC SERPL: 3.7 RATIO
CO2 SERPL-SCNC: 27 MMOL/L
CREAT SERPL-MCNC: 1.16 MG/DL
EOSINOPHIL # BLD AUTO: 0.29 K/UL
EOSINOPHIL NFR BLD AUTO: 2.6 %
GLUCOSE SERPL-MCNC: 107 MG/DL
HBA1C MFR BLD HPLC: 6.5 %
HCT VFR BLD CALC: 43.4 %
HDLC SERPL-MCNC: 41 MG/DL
HGB BLD-MCNC: 13.8 G/DL
LDLC SERPL CALC-MCNC: 81 MG/DL
LYMPHOCYTES # BLD AUTO: 2.1 K/UL
LYMPHOCYTES NFR BLD AUTO: 19.1 %
MAN DIFF?: NORMAL
MCHC RBC-ENTMCNC: 29.9 PG
MCHC RBC-ENTMCNC: 31.8 GM/DL
MCV RBC AUTO: 93.9 FL
MONOCYTES # BLD AUTO: 0.77 K/UL
MONOCYTES NFR BLD AUTO: 7 %
NEUTROPHILS # BLD AUTO: 7.85 K/UL
NEUTROPHILS NFR BLD AUTO: 70.4 %
PLATELET # BLD AUTO: 258 K/UL
POTASSIUM SERPL-SCNC: 4.4 MMOL/L
PROT SERPL-MCNC: 7 G/DL
RBC # BLD: 4.62 M/UL
RBC # FLD: 16.1 %
SODIUM SERPL-SCNC: 145 MMOL/L
T3FREE SERPL-MCNC: 2.15 PG/ML
T4 FREE SERPL-MCNC: 1.9 NG/DL
TRIGL SERPL-MCNC: 148 MG/DL
TSH SERPL-ACNC: 0.21 UIU/ML
WBC # FLD AUTO: 11.01 K/UL

## 2019-02-06 ENCOUNTER — OUTPATIENT (OUTPATIENT)
Dept: OUTPATIENT SERVICES | Facility: HOSPITAL | Age: 64
LOS: 1 days | End: 2019-02-06
Payer: MEDICAID

## 2019-02-06 VITALS
TEMPERATURE: 98 F | DIASTOLIC BLOOD PRESSURE: 68 MMHG | RESPIRATION RATE: 16 BRPM | OXYGEN SATURATION: 98 % | SYSTOLIC BLOOD PRESSURE: 128 MMHG | HEIGHT: 67 IN | HEART RATE: 86 BPM | WEIGHT: 173.94 LBS

## 2019-02-06 DIAGNOSIS — E27.9 DISORDER OF ADRENAL GLAND, UNSPECIFIED: ICD-10-CM

## 2019-02-06 DIAGNOSIS — E07.89 OTHER SPECIFIED DISORDERS OF THYROID: Chronic | ICD-10-CM

## 2019-02-06 DIAGNOSIS — Z98.890 OTHER SPECIFIED POSTPROCEDURAL STATES: Chronic | ICD-10-CM

## 2019-02-06 DIAGNOSIS — D35.00 BENIGN NEOPLASM OF UNSPECIFIED ADRENAL GLAND: ICD-10-CM

## 2019-02-06 DIAGNOSIS — I10 ESSENTIAL (PRIMARY) HYPERTENSION: ICD-10-CM

## 2019-02-06 DIAGNOSIS — E03.9 HYPOTHYROIDISM, UNSPECIFIED: ICD-10-CM

## 2019-02-06 LAB
ANION GAP SERPL CALC-SCNC: 16 MMO/L — HIGH (ref 7–14)
APTT BLD: 25.8 SEC — LOW (ref 27.5–36.3)
BUN SERPL-MCNC: 30 MG/DL — HIGH (ref 7–23)
CALCIUM SERPL-MCNC: 9.7 MG/DL — SIGNIFICANT CHANGE UP (ref 8.4–10.5)
CHLORIDE SERPL-SCNC: 102 MMOL/L — SIGNIFICANT CHANGE UP (ref 98–107)
CO2 SERPL-SCNC: 26 MMOL/L — SIGNIFICANT CHANGE UP (ref 22–31)
CREAT SERPL-MCNC: 1.26 MG/DL — SIGNIFICANT CHANGE UP (ref 0.5–1.3)
GLUCOSE SERPL-MCNC: 97 MG/DL — SIGNIFICANT CHANGE UP (ref 70–99)
HCT VFR BLD CALC: 44.7 % — SIGNIFICANT CHANGE UP (ref 34.5–45)
HGB BLD-MCNC: 14.1 G/DL — SIGNIFICANT CHANGE UP (ref 11.5–15.5)
INR BLD: 1.02 — SIGNIFICANT CHANGE UP (ref 0.88–1.17)
MCHC RBC-ENTMCNC: 30.2 PG — SIGNIFICANT CHANGE UP (ref 27–34)
MCHC RBC-ENTMCNC: 31.5 % — LOW (ref 32–36)
MCV RBC AUTO: 95.7 FL — SIGNIFICANT CHANGE UP (ref 80–100)
NRBC # FLD: 0 K/UL — LOW (ref 25–125)
PLATELET # BLD AUTO: 277 K/UL — SIGNIFICANT CHANGE UP (ref 150–400)
PMV BLD: 11.9 FL — SIGNIFICANT CHANGE UP (ref 7–13)
POTASSIUM SERPL-MCNC: 4.1 MMOL/L — SIGNIFICANT CHANGE UP (ref 3.5–5.3)
POTASSIUM SERPL-SCNC: 4.1 MMOL/L — SIGNIFICANT CHANGE UP (ref 3.5–5.3)
PROTHROM AB SERPL-ACNC: 11.3 SEC — SIGNIFICANT CHANGE UP (ref 9.8–13.1)
RBC # BLD: 4.67 M/UL — SIGNIFICANT CHANGE UP (ref 3.8–5.2)
RBC # FLD: 15.4 % — HIGH (ref 10.3–14.5)
SODIUM SERPL-SCNC: 144 MMOL/L — SIGNIFICANT CHANGE UP (ref 135–145)
WBC # BLD: 12.9 K/UL — HIGH (ref 3.8–10.5)
WBC # FLD AUTO: 12.9 K/UL — HIGH (ref 3.8–10.5)

## 2019-02-06 PROCEDURE — 93010 ELECTROCARDIOGRAM REPORT: CPT

## 2019-02-06 RX ORDER — ERGOCALCIFEROL 1.25 MG/1
1 CAPSULE ORAL
Qty: 0 | Refills: 0 | COMMUNITY

## 2019-02-06 NOTE — H&P PST ADULT - MAMMOGRAM, RESULTS OF LAST, PROFILE
abnormal - breast biopsy awaiting for results abnormal - breast biopsy positive - pt to call referred surgeon

## 2019-02-06 NOTE — H&P PST ADULT - NEGATIVE ENMT SYMPTOMS
no hearing difficulty/no throat pain/no dysphagia/no ear pain/no nasal congestion/no sinus symptoms/no tinnitus

## 2019-02-06 NOTE — H&P PST ADULT - SKIN/BREAST COMMENTS
Pt had biopsy of right breast,,,,,,,,,,, mass 1-2 months ago - bruising still evident and able to palpated mass - pt has not followed up with referred surgeon. - To call brother and alert of diagnosis - pt agreed. Pt had MRI/ biopsy of right breast mass 1 month ago - bruising still evident and able to palpated mass - pt has not followed up with referred surgeon. -call placed to PCP and notified of same

## 2019-02-06 NOTE — H&P PST ADULT - RS GEN PE MLT RESP DETAILS PC
airway patent/breath sounds equal/clear to auscultation bilaterally/respirations non-labored/normal/good air movement

## 2019-02-06 NOTE — H&P PST ADULT - NEGATIVE NEUROLOGICAL SYMPTOMS
no syncope/no paresthesias/no transient paralysis/no weakness/no focal seizures/no generalized seizures

## 2019-02-06 NOTE — H&P PST ADULT - PMH
Adrenal nodule    Anxiety    HLD (hyperlipidemia)    HLD (hyperlipidemia)    HTN (hypertension)    Hypothyroid Adrenal nodule    Anxiety    Breast CA  right  HLD (hyperlipidemia)    HLD (hyperlipidemia)    HTN (hypertension)    Hypothyroid

## 2019-02-06 NOTE — H&P PST ADULT - HISTORY OF PRESENT ILLNESS
63 yr old female with hx of thyroid ca, post operative hypothyroid, HTN, HLD presents to PST for schedule adrenal vein sampling on 1/23/19. Patients states incidental finding on diagnostic testing revealed "nodule near or around adrenal gland". Pt is a 63 yr old female with hx of thyroid ca, post operative hypothyroid, HTN, HLD presents to PST for schedule adrenal vein sampling on 1/23/19. Patients states incidental finding on diagnostic testing revealed "nodule near or around adrenal gland". Pt is poor historian and lives alone with brother in [place]. Pt had biopsy 1/19 and noted right breast ca but has not called yet for surgical referral - Notified PCP of same. Pt has difficulty with giving MH. Pt is a 63 yr old female with hx of thyroid ca, post operative hypothyroid, HTN, HLD presents to PST for schedule adrenal vein sampling on 1/23/19. Patients states incidental finding on diagnostic testing revealed "nodule near or around adrenal gland". Pt is poor historian and lives alone with brother in [place]. Pt had biopsy 1/19 and noted right breast ca but has not called yet for surgical referral - Notified PCP of same. Pt has difficulty with giving MH. Pt postponed from same surgery 1/23/19 because she ate a yogurt on the DOS -

## 2019-02-06 NOTE — H&P PST ADULT - PROBLEM SELECTOR PLAN 1
Scheduled for surgery 2/20/19   Preop instructions provided and patient verbalizes understanding.  Labs done and results pending.   Famotidine provided with instructions.   OR Booking notified of MEKA precautions Scheduled for surgery 2/20/19   Preop instructions provided and patient verbalizes understanding.  Labs done and results pending.   Famotidine provided with instructions.   OR Booking notified of MEKA precautions  Stress and Echo from 10/18 printed and in chart - Requested copy of MC received from PCP for surgery scheduled in 1/23/19 but postponed. To send to [facility]

## 2019-02-06 NOTE — H&P PST ADULT - BREASTS COMMENTS
Right breast mass with ecchymosis on underside of breast and noted mass - pt is poor historian and states biopsy 11/5/18 but has not made appointment with referred surgeon Right breast mass with ecchymosis on underside of breast and noted mass - pt is poor historian and does not know date of biopsy but EMR shows MRI/Biopsy done 1/19 - pt  has not made appointment with referred surgeon

## 2019-02-07 ENCOUNTER — APPOINTMENT (OUTPATIENT)
Dept: FAMILY MEDICINE | Facility: CLINIC | Age: 64
End: 2019-02-07
Payer: COMMERCIAL

## 2019-02-07 VITALS — RESPIRATION RATE: 20 BRPM | DIASTOLIC BLOOD PRESSURE: 70 MMHG | SYSTOLIC BLOOD PRESSURE: 125 MMHG | HEART RATE: 76 BPM

## 2019-02-07 DIAGNOSIS — N64.89 OTHER SPECIFIED DISORDERS OF BREAST: ICD-10-CM

## 2019-02-07 PROCEDURE — 99213 OFFICE O/P EST LOW 20 MIN: CPT

## 2019-02-07 NOTE — HISTORY OF PRESENT ILLNESS
[FreeTextEntry8] : Presents on acute basis at my request - received notice from PST at [facility] that pt had a "bruise and mass" R breast.  Note pt does have H/O biopsy.  Patient is poor historian and is less-than-optimally aware of all her issues.  Denies trauma to area.

## 2019-02-07 NOTE — PHYSICAL EXAM
[No Acute Distress] : no acute distress [Supple] : supple [No Respiratory Distress] : no respiratory distress  [Clear to Auscultation] : lungs were clear to auscultation bilaterally [No Accessory Muscle Use] : no accessory muscle use [Normal Rate] : normal rate  [Regular Rhythm] : with a regular rhythm [Normal S1, S2] : normal S1 and S2 [No Murmur] : no murmur heard [Enlargement Of The Right Breast] : swelling [Soft] : abdomen soft [Non Tender] : non-tender [Skin Injury 1] : Skin injury: [Tissue Injury Abrasion] : ecchymosis was noted [No Focal Deficits] : no focal deficits [Alert and Oriented x3] : oriented to person, place, and time [de-identified] : mild induration with resolving ecchymosis noted; no discrete mass palpated [de-identified] : ecchymosis as noted above

## 2019-02-11 ENCOUNTER — RX RENEWAL (OUTPATIENT)
Age: 64
End: 2019-02-11

## 2019-02-11 ENCOUNTER — APPOINTMENT (OUTPATIENT)
Dept: OBGYN | Facility: CLINIC | Age: 64
End: 2019-02-11
Payer: COMMERCIAL

## 2019-02-11 VITALS
BODY MASS INDEX: 27 KG/M2 | HEART RATE: 111 BPM | DIASTOLIC BLOOD PRESSURE: 90 MMHG | OXYGEN SATURATION: 98 % | HEIGHT: 67 IN | WEIGHT: 172 LBS | SYSTOLIC BLOOD PRESSURE: 170 MMHG

## 2019-02-11 DIAGNOSIS — D25.9 LEIOMYOMA OF UTERUS, UNSPECIFIED: ICD-10-CM

## 2019-02-11 DIAGNOSIS — Z01.419 ENCOUNTER FOR GYNECOLOGICAL EXAMINATION (GENERAL) (ROUTINE) W/OUT ABNORMAL FINDINGS: ICD-10-CM

## 2019-02-11 PROCEDURE — 99396 PREV VISIT EST AGE 40-64: CPT

## 2019-02-13 ENCOUNTER — NON-APPOINTMENT (OUTPATIENT)
Age: 64
End: 2019-02-13

## 2019-02-13 ENCOUNTER — APPOINTMENT (OUTPATIENT)
Dept: CARDIOLOGY | Facility: CLINIC | Age: 64
End: 2019-02-13
Payer: COMMERCIAL

## 2019-02-13 VITALS
HEIGHT: 67 IN | RESPIRATION RATE: 17 BRPM | HEART RATE: 94 BPM | DIASTOLIC BLOOD PRESSURE: 83 MMHG | WEIGHT: 182 LBS | BODY MASS INDEX: 28.56 KG/M2 | OXYGEN SATURATION: 97 % | SYSTOLIC BLOOD PRESSURE: 157 MMHG

## 2019-02-13 VITALS — SYSTOLIC BLOOD PRESSURE: 120 MMHG | DIASTOLIC BLOOD PRESSURE: 90 MMHG

## 2019-02-13 VITALS — HEART RATE: 76 BPM

## 2019-02-13 VITALS — SYSTOLIC BLOOD PRESSURE: 120 MMHG | DIASTOLIC BLOOD PRESSURE: 84 MMHG

## 2019-02-13 DIAGNOSIS — R94.39 ABNORMAL RESULT OF OTHER CARDIOVASCULAR FUNCTION STUDY: ICD-10-CM

## 2019-02-13 PROBLEM — C50.919 MALIGNANT NEOPLASM OF UNSPECIFIED SITE OF UNSPECIFIED FEMALE BREAST: Chronic | Status: ACTIVE | Noted: 2019-02-06

## 2019-02-13 LAB — HPV HIGH+LOW RISK DNA PNL CVX: NOT DETECTED

## 2019-02-13 PROCEDURE — 93000 ELECTROCARDIOGRAM COMPLETE: CPT

## 2019-02-13 PROCEDURE — 99215 OFFICE O/P EST HI 40 MIN: CPT

## 2019-02-13 NOTE — PHYSICAL EXAM
[Acute Distress] : no acute distress [LAD] : no lymphadenopathy [Thyroid Nodule] : no thyroid nodule [Goiter] : no goiter [Mass] : no breast mass [Nipple Discharge] : no nipple discharge [Axillary LAD] : no axillary lymphadenopathy [Tender] : non tender [Distended] : not distended [H/Smegaly] : no hepatosplenomegaly [Depressed Mood] : not depressed [Flat Affect] : affect not flat [Vulvar Atrophy] : no vulvar atrophy [Vulvitis] : no vulvitis [Labia Majora Erythema] : no erythema of the labia majora [Labia Minora Erythema] : no erythema of the labia minora [Erythema] : no erythema [Cystocele] : no cystocele [Rectocele] : no rectocele [Uterine Prolapse] : no uterine prolapse [Discharge] : had no discharge [Erosion] : had no erosions [Motion Tenderness] : there was no cervical motion tenderness [Tenderness] : nontender [Mass ___ cm] : no uterine mass was palpated [Adnexa Tenderness] : were not tender [Ovarian Mass (___ Cm)] : there were no adnexal masses [Internal Hemorrhoid] : no internal hemorrhoids [External Hemorrhoid] : no external hemorrhoids

## 2019-02-13 NOTE — PHYSICAL EXAM
[General Appearance - Well Developed] : well developed [Normal Appearance] : normal appearance [Well Groomed] : well groomed [General Appearance - Well Nourished] : well nourished [No Deformities] : no deformities [General Appearance - In No Acute Distress] : no acute distress [Normal Oral Mucosa] : normal oral mucosa [No Oral Pallor] : no oral pallor [No Oral Cyanosis] : no oral cyanosis [Normal Jugular Venous A Waves Present] : normal jugular venous A waves present [Normal Jugular Venous V Waves Present] : normal jugular venous V waves present [No Jugular Venous Dumont A Waves] : no jugular venous dumont A waves [Respiration, Rhythm And Depth] : normal respiratory rhythm and effort [Exaggerated Use Of Accessory Muscles For Inspiration] : no accessory muscle use [Auscultation Breath Sounds / Voice Sounds] : lungs were clear to auscultation bilaterally [Abdomen Soft] : soft [Abdomen Tenderness] : non-tender [Abdomen Mass (___ Cm)] : no abdominal mass palpated [Abnormal Walk] : normal gait [Gait - Sufficient For Exercise Testing] : the gait was sufficient for exercise testing [Nail Clubbing] : no clubbing of the fingernails [Cyanosis, Localized] : no localized cyanosis [Petechial Hemorrhages (___cm)] : no petechial hemorrhages [Skin Color & Pigmentation] : normal skin color and pigmentation [] : no rash [No Venous Stasis] : no venous stasis [Skin Lesions] : no skin lesions [No Skin Ulcers] : no skin ulcer [No Xanthoma] : no  xanthoma was observed [Oriented To Time, Place, And Person] : oriented to person, place, and time [Affect] : the affect was normal [Mood] : the mood was normal [No Anxiety] : not feeling anxious [Normal Rate] : normal [Normal S1] : normal S1 [Normal S2] : normal S2 [No Murmur] : no murmurs heard [2+] : left 2+ [No Abnormalities] : the abdominal aorta was not enlarged and no bruit was heard [No Pitting Edema] : no pitting edema present [S3] : no S3 [S4] : no S4 [Right Carotid Bruit] : no bruit heard over the right carotid [Left Carotid Bruit] : no bruit heard over the left carotid [Right Femoral Bruit] : no bruit heard over the right femoral artery [Left Femoral Bruit] : no bruit heard over the left femoral artery

## 2019-02-13 NOTE — HISTORY OF PRESENT ILLNESS
[Hot Flashes] : no hot flashes [Night Sweats] : no night sweats [Vaginal Itching] : no vaginal itching [Dyspareunia] : no dyspareunia [Mood Changes] : no mood changes [Headache] : no headache [Fatigue] : no fatigue [Weight Change] : no weight change [Irritability] : no irritability [Forgetfulness] : no forgetfulness [Loss of Libido] : no loss of libido [Depression] : no depression [Anxiety] : no anxiety [Sexually Active] : is not sexually active

## 2019-02-13 NOTE — REASON FOR VISIT
[Consultation] : a consultation regarding [FreeTextEntry1] : She presents for cardiac clearance for "adrenal sampling". Essentially she is being worked up for hyperaldosteronism and if indeed there is evidence of an adenoma she will ultimately need surgery. The patient was referred to me in September for a markedly abnormal EKG and underwent vasodilator myocardial perfusion imaging which was consistent with ischemia in the  inferolateral wall. At that time the patient was advised to undergo cardiac catheterization and refused. She offers no new complaints of chest discomfort shortness of breath palpitations dizziness or syncope.

## 2019-02-13 NOTE — CHIEF COMPLAINT
[FreeTextEntry1] : Check up  +  Rt breast ca S/P Rt breast Bx X 2 Seen by Dr Knight Breast surgery pending  Adrenal gland biopsy pending

## 2019-02-13 NOTE — ASSESSMENT
[FreeTextEntry1] : In summary, the patient is a 63-year-old woman with probable ischemic heart disease scheduled for a diagnostic procedure and under consideration for probable surgical procedure.\par \par Given the recent abnormal EKG which has resolved, which could possibly indicate a NSTEMI at some point, along with evidence of myocardial ischemia, I believe that cardiac catheterization should be undertaken prior to embarking on the diagnostic workup and treatment of her adenoma.\par \par I have discussed the case with the patient's primary physician Dr. Mckinney and he is in agreement.\par \par I have of course discussed the case with the patient and she is agreeable to the postponement and to undergo cardiac catheterization.

## 2019-02-16 LAB — CYTOLOGY CVX/VAG DOC THIN PREP: NORMAL

## 2019-02-20 ENCOUNTER — INPATIENT (INPATIENT)
Facility: HOSPITAL | Age: 64
LOS: 1 days | Discharge: HOME | DRG: 247 | End: 2019-02-22
Attending: INTERNAL MEDICINE | Admitting: INTERNAL MEDICINE
Payer: MEDICAID

## 2019-02-20 VITALS
OXYGEN SATURATION: 98 % | SYSTOLIC BLOOD PRESSURE: 153 MMHG | DIASTOLIC BLOOD PRESSURE: 81 MMHG | RESPIRATION RATE: 18 BRPM | HEIGHT: 67 IN | TEMPERATURE: 98 F | WEIGHT: 169.98 LBS | HEART RATE: 93 BPM

## 2019-02-20 DIAGNOSIS — E07.89 OTHER SPECIFIED DISORDERS OF THYROID: Chronic | ICD-10-CM

## 2019-02-20 DIAGNOSIS — R94.39 ABNORMAL RESULT OF OTHER CARDIOVASCULAR FUNCTION STUDY: ICD-10-CM

## 2019-02-20 DIAGNOSIS — Z98.890 OTHER SPECIFIED POSTPROCEDURAL STATES: Chronic | ICD-10-CM

## 2019-02-20 LAB
ALBUMIN SERPL ELPH-MCNC: 4.7 G/DL — SIGNIFICANT CHANGE UP (ref 3.3–5)
ALP SERPL-CCNC: 97 U/L — SIGNIFICANT CHANGE UP (ref 40–120)
ALT FLD-CCNC: 15 U/L — SIGNIFICANT CHANGE UP (ref 10–45)
ANION GAP SERPL CALC-SCNC: 16 MMOL/L — SIGNIFICANT CHANGE UP (ref 5–17)
AST SERPL-CCNC: 16 U/L — SIGNIFICANT CHANGE UP (ref 10–40)
BILIRUB SERPL-MCNC: 1.4 MG/DL — HIGH (ref 0.2–1.2)
BUN SERPL-MCNC: 28 MG/DL — HIGH (ref 7–23)
CALCIUM SERPL-MCNC: 9.9 MG/DL — SIGNIFICANT CHANGE UP (ref 8.4–10.5)
CHLORIDE SERPL-SCNC: 101 MMOL/L — SIGNIFICANT CHANGE UP (ref 96–108)
CO2 SERPL-SCNC: 23 MMOL/L — SIGNIFICANT CHANGE UP (ref 22–31)
CREAT SERPL-MCNC: 1.34 MG/DL — HIGH (ref 0.5–1.3)
GLUCOSE SERPL-MCNC: 108 MG/DL — HIGH (ref 70–99)
HCT VFR BLD CALC: 44.1 % — SIGNIFICANT CHANGE UP (ref 34.5–45)
HGB BLD-MCNC: 15.1 G/DL — SIGNIFICANT CHANGE UP (ref 11.5–15.5)
MCHC RBC-ENTMCNC: 31.5 PG — SIGNIFICANT CHANGE UP (ref 27–34)
MCHC RBC-ENTMCNC: 34.2 GM/DL — SIGNIFICANT CHANGE UP (ref 32–36)
MCV RBC AUTO: 92.1 FL — SIGNIFICANT CHANGE UP (ref 80–100)
PLATELET # BLD AUTO: 226 K/UL — SIGNIFICANT CHANGE UP (ref 150–400)
POTASSIUM SERPL-MCNC: 3.9 MMOL/L — SIGNIFICANT CHANGE UP (ref 3.5–5.3)
POTASSIUM SERPL-SCNC: 3.9 MMOL/L — SIGNIFICANT CHANGE UP (ref 3.5–5.3)
PROT SERPL-MCNC: 7.9 G/DL — SIGNIFICANT CHANGE UP (ref 6–8.3)
RBC # BLD: 4.79 M/UL — SIGNIFICANT CHANGE UP (ref 3.8–5.2)
RBC # FLD: 13.7 % — SIGNIFICANT CHANGE UP (ref 10.3–14.5)
SODIUM SERPL-SCNC: 140 MMOL/L — SIGNIFICANT CHANGE UP (ref 135–145)
WBC # BLD: 10.7 K/UL — HIGH (ref 3.8–10.5)
WBC # FLD AUTO: 10.7 K/UL — HIGH (ref 3.8–10.5)

## 2019-02-20 PROCEDURE — 76937 US GUIDE VASCULAR ACCESS: CPT | Mod: 26,GC

## 2019-02-20 PROCEDURE — 99152 MOD SED SAME PHYS/QHP 5/>YRS: CPT | Mod: GC

## 2019-02-20 PROCEDURE — 93010 ELECTROCARDIOGRAM REPORT: CPT

## 2019-02-20 PROCEDURE — 93454 CORONARY ARTERY ANGIO S&I: CPT | Mod: 26,59,GC

## 2019-02-20 PROCEDURE — 92928 PRQ TCAT PLMT NTRAC ST 1 LES: CPT | Mod: RC,GC

## 2019-02-20 RX ORDER — LEVOTHYROXINE SODIUM 125 MCG
137 TABLET ORAL
Qty: 0 | Refills: 0 | Status: DISCONTINUED | OUTPATIENT
Start: 2019-02-20 | End: 2019-02-22

## 2019-02-20 RX ORDER — DILTIAZEM HCL 120 MG
240 CAPSULE, EXT RELEASE 24 HR ORAL DAILY
Qty: 0 | Refills: 0 | Status: DISCONTINUED | OUTPATIENT
Start: 2019-02-20 | End: 2019-02-22

## 2019-02-20 RX ORDER — CLOPIDOGREL BISULFATE 75 MG/1
75 TABLET, FILM COATED ORAL DAILY
Qty: 0 | Refills: 0 | Status: DISCONTINUED | OUTPATIENT
Start: 2019-02-20 | End: 2019-02-22

## 2019-02-20 RX ORDER — LEVOTHYROXINE SODIUM 125 MCG
150 TABLET ORAL
Qty: 0 | Refills: 0 | Status: DISCONTINUED | OUTPATIENT
Start: 2019-02-20 | End: 2019-02-22

## 2019-02-20 RX ORDER — ASPIRIN/CALCIUM CARB/MAGNESIUM 324 MG
81 TABLET ORAL DAILY
Qty: 0 | Refills: 0 | Status: DISCONTINUED | OUTPATIENT
Start: 2019-02-20 | End: 2019-02-22

## 2019-02-20 RX ORDER — LISINOPRIL 2.5 MG/1
40 TABLET ORAL DAILY
Qty: 0 | Refills: 0 | Status: DISCONTINUED | OUTPATIENT
Start: 2019-02-20 | End: 2019-02-22

## 2019-02-20 RX ORDER — ATORVASTATIN CALCIUM 80 MG/1
20 TABLET, FILM COATED ORAL AT BEDTIME
Qty: 0 | Refills: 0 | Status: DISCONTINUED | OUTPATIENT
Start: 2019-02-20 | End: 2019-02-22

## 2019-02-20 RX ORDER — SPIRONOLACTONE 25 MG/1
25 TABLET, FILM COATED ORAL DAILY
Qty: 0 | Refills: 0 | Status: DISCONTINUED | OUTPATIENT
Start: 2019-02-20 | End: 2019-02-22

## 2019-02-20 RX ORDER — HYDROCHLOROTHIAZIDE 25 MG
12.5 TABLET ORAL DAILY
Qty: 0 | Refills: 0 | Status: DISCONTINUED | OUTPATIENT
Start: 2019-02-20 | End: 2019-02-22

## 2019-02-20 RX ADMIN — ATORVASTATIN CALCIUM 20 MILLIGRAM(S): 80 TABLET, FILM COATED ORAL at 22:35

## 2019-02-20 RX ADMIN — SPIRONOLACTONE 25 MILLIGRAM(S): 25 TABLET, FILM COATED ORAL at 22:42

## 2019-02-20 NOTE — CHART NOTE - NSCHARTNOTEFT_GEN_A_CORE
Patient underwent a PCI procedure and is being admitted as they are at increased risk for major adverse cardiac and vascular events if discharged due to the following high risk characteristics:      need for staged intervention    Davi Gan, NP  x [phone]

## 2019-02-20 NOTE — PROGRESS NOTE ADULT - SUBJECTIVE AND OBJECTIVE BOX
Removal of Right Femoral Sheath    Pulses in the right lower extremity are palpable. The patient was placed in the supine position. The insertion site was identified and the sutures were removed per protocol.  The 6 Lao femoral sheath was then removed. Direct pressure was applied for 20 minutes.     Monitoring of the right groin and both lower extremities including neuro-vascular checks and vital signs every 15 minutes x 4, then every 30 minutes x 2, then every 1 hour was ordered.    Complications: None    Comments:

## 2019-02-20 NOTE — H&P CARDIOLOGY - HISTORY OF PRESENT ILLNESS
Pt is a 63 yr old female poor historian with hx of thyroid CA post operative hypothyroid, HTN, HLD presented to cardiology, Dr. Cross , for cardiac clearance for adrenal sampling/workup hyperaldosteronism.  Pt. had abnormal EKG.  NST 10/2019 revealing ischemia inferolateral walls and pt was advised to proceed with cardiac cath and declined at that time. Pt. went to PST for scheduled adrenal vein sampling on 1/23/19. Patients states incidental finding on diagnostic testing revealed "nodule near or around adrenal gland". Pt is poor historian and lives alone with brother in [place]. Pt had biopsy 1/19 and noted right breast ca but has not called yet for surgical referral - Notified PCP of same. Pt has difficulty with giving MH. Pt postponed from same surgery 1/23/19 because she ate a yogurt on the DOS -   Right breast biopsy   1. Patient has a recent diagnosis of right breast for cancer for which   appropriate clinical, surgical and oncological management. Note the   suspicious enhancement around the index malignancy in the right upper   breast measuring approximately 7 cm in AP dimension per 11/30/2018   report. As such, a wide surgical excision is recommended to include the   cork shaped biopsy marker (marking site of recent discordant pathology).    2. 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    3. Patient also has a recent diagnosis of left breast intraductal   papilloma for which appropriate surgical excision is also recommended.    RECOMMENDATION:  Surgical or oncologic management Pt is a 63 yr old female poor historian with hx of thyroid CA post operative hypothyroid, HTN, HLD presented to cardiology, Dr. Cross , for cardiac clearance for adrenal sampling/workup hyperaldosteronism.  Pt. had abnormal EKG.  NST 10/2019 revealing ischemia inferolateral walls and pt was advised to proceed with cardiac cath and declined at that time. Pt. went to PST for scheduled adrenal vein sampling on 1/23/19. Patients states incidental finding on diagnostic testing revealed "nodule near or around adrenal gland".  Pt had breast biopsy 1/19/19 and noted right breast ca and was referred to breast surgeon and has not followed up. Pt. presents for further evaluation and cardiac cath.  Denies chest pain/pressure, SOB/LEON, dizziness, diaphoresis, palpitations, nausea, vomiting, peripheral edema, recent weight gain, or syncope.  Right breast biopsy 1/2019  1. Patient has a recent diagnosis of right breast for cancer for which   appropriate clinical, surgical and oncological management. Note the   suspicious enhancement around the index malignancy in the right upper   breast measuring approximately 7 cm in AP dimension per 11/30/2018   report. As such, a wide surgical excision is recommended to include the   cork shaped biopsy marker (marking site of recent discordant pathology).    2. 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    3. Patient also has a recent diagnosis of left breast intraductal   papilloma for which appropriate surgical excision is also recommended.    RECOMMENDATION:  Surgical or oncologic management

## 2019-02-20 NOTE — H&P CARDIOLOGY - PMH
Adrenal nodule    Anxiety    Breast CA  right  HLD (hyperlipidemia)    HLD (hyperlipidemia)    HTN (hypertension)    Hypothyroid Adrenal nodule    Anxiety    Breast CA  right  HLD (hyperlipidemia)    HTN (hypertension)    Hypothyroid

## 2019-02-21 ENCOUNTER — TRANSCRIPTION ENCOUNTER (OUTPATIENT)
Age: 64
End: 2019-02-21

## 2019-02-21 DIAGNOSIS — E78.5 HYPERLIPIDEMIA, UNSPECIFIED: ICD-10-CM

## 2019-02-21 DIAGNOSIS — I10 ESSENTIAL (PRIMARY) HYPERTENSION: ICD-10-CM

## 2019-02-21 DIAGNOSIS — I25.118 ATHEROSCLEROTIC HEART DISEASE OF NATIVE CORONARY ARTERY WITH OTHER FORMS OF ANGINA PECTORIS: ICD-10-CM

## 2019-02-21 LAB
ANION GAP SERPL CALC-SCNC: 16 MMOL/L — SIGNIFICANT CHANGE UP (ref 5–17)
BUN SERPL-MCNC: 26 MG/DL — HIGH (ref 7–23)
CALCIUM SERPL-MCNC: 9.5 MG/DL — SIGNIFICANT CHANGE UP (ref 8.4–10.5)
CHLORIDE SERPL-SCNC: 102 MMOL/L — SIGNIFICANT CHANGE UP (ref 96–108)
CO2 SERPL-SCNC: 23 MMOL/L — SIGNIFICANT CHANGE UP (ref 22–31)
CREAT SERPL-MCNC: 1.32 MG/DL — HIGH (ref 0.5–1.3)
GLUCOSE SERPL-MCNC: 143 MG/DL — HIGH (ref 70–99)
HCT VFR BLD CALC: 41.8 % — SIGNIFICANT CHANGE UP (ref 34.5–45)
HGB BLD-MCNC: 14.1 G/DL — SIGNIFICANT CHANGE UP (ref 11.5–15.5)
MCHC RBC-ENTMCNC: 31 PG — SIGNIFICANT CHANGE UP (ref 27–34)
MCHC RBC-ENTMCNC: 33.8 GM/DL — SIGNIFICANT CHANGE UP (ref 32–36)
MCV RBC AUTO: 91.7 FL — SIGNIFICANT CHANGE UP (ref 80–100)
PLATELET # BLD AUTO: 217 K/UL — SIGNIFICANT CHANGE UP (ref 150–400)
POTASSIUM SERPL-MCNC: 3.7 MMOL/L — SIGNIFICANT CHANGE UP (ref 3.5–5.3)
POTASSIUM SERPL-SCNC: 3.7 MMOL/L — SIGNIFICANT CHANGE UP (ref 3.5–5.3)
RBC # BLD: 4.56 M/UL — SIGNIFICANT CHANGE UP (ref 3.8–5.2)
RBC # FLD: 13.5 % — SIGNIFICANT CHANGE UP (ref 10.3–14.5)
SODIUM SERPL-SCNC: 141 MMOL/L — SIGNIFICANT CHANGE UP (ref 135–145)
WBC # BLD: 10.3 K/UL — SIGNIFICANT CHANGE UP (ref 3.8–10.5)
WBC # FLD AUTO: 10.3 K/UL — SIGNIFICANT CHANGE UP (ref 3.8–10.5)

## 2019-02-21 PROCEDURE — 93010 ELECTROCARDIOGRAM REPORT: CPT

## 2019-02-21 PROCEDURE — 99231 SBSQ HOSP IP/OBS SF/LOW 25: CPT

## 2019-02-21 PROCEDURE — 99152 MOD SED SAME PHYS/QHP 5/>YRS: CPT | Mod: GC

## 2019-02-21 PROCEDURE — 76937 US GUIDE VASCULAR ACCESS: CPT | Mod: 26,GC

## 2019-02-21 PROCEDURE — 92928 PRQ TCAT PLMT NTRAC ST 1 LES: CPT | Mod: LD,GC

## 2019-02-21 RX ORDER — ACETAMINOPHEN 500 MG
650 TABLET ORAL EVERY 6 HOURS
Qty: 0 | Refills: 0 | Status: DISCONTINUED | OUTPATIENT
Start: 2019-02-21 | End: 2019-02-22

## 2019-02-21 RX ORDER — ASPIRIN/CALCIUM CARB/MAGNESIUM 324 MG
1 TABLET ORAL
Qty: 90 | Refills: 3
Start: 2019-02-21 | End: 2020-02-15

## 2019-02-21 RX ORDER — CLOPIDOGREL BISULFATE 75 MG/1
1 TABLET, FILM COATED ORAL
Qty: 90 | Refills: 3
Start: 2019-02-21 | End: 2020-02-15

## 2019-02-21 RX ORDER — SODIUM CHLORIDE 9 MG/ML
1000 INJECTION INTRAMUSCULAR; INTRAVENOUS; SUBCUTANEOUS
Qty: 0 | Refills: 0 | Status: DISCONTINUED | OUTPATIENT
Start: 2019-02-21 | End: 2019-02-22

## 2019-02-21 RX ADMIN — Medication 650 MILLIGRAM(S): at 16:07

## 2019-02-21 RX ADMIN — Medication 12.5 MILLIGRAM(S): at 05:13

## 2019-02-21 RX ADMIN — LISINOPRIL 40 MILLIGRAM(S): 2.5 TABLET ORAL at 05:13

## 2019-02-21 RX ADMIN — ATORVASTATIN CALCIUM 20 MILLIGRAM(S): 80 TABLET, FILM COATED ORAL at 05:13

## 2019-02-21 RX ADMIN — SODIUM CHLORIDE 75 MILLILITER(S): 9 INJECTION INTRAMUSCULAR; INTRAVENOUS; SUBCUTANEOUS at 09:30

## 2019-02-21 RX ADMIN — Medication 650 MILLIGRAM(S): at 17:08

## 2019-02-21 RX ADMIN — Medication 240 MILLIGRAM(S): at 05:13

## 2019-02-21 RX ADMIN — Medication 137 MICROGRAM(S): at 05:13

## 2019-02-21 RX ADMIN — Medication 81 MILLIGRAM(S): at 05:13

## 2019-02-21 RX ADMIN — Medication 0.1 MILLIGRAM(S): at 05:14

## 2019-02-21 RX ADMIN — SPIRONOLACTONE 25 MILLIGRAM(S): 25 TABLET, FILM COATED ORAL at 17:52

## 2019-02-21 RX ADMIN — CLOPIDOGREL BISULFATE 75 MILLIGRAM(S): 75 TABLET, FILM COATED ORAL at 05:13

## 2019-02-21 NOTE — DISCHARGE NOTE ADULT - ADDITIONAL INSTRUCTIONS
No heavy lifting or pushing/pulling with procedure arm for 2 weeks. No driving for 2 days. You may shower 24 hours following the procedure but avoid baths/swimming for 1 week. Check your wrist site for bleeding and/or swelling daily following procedure and call your doctor immediately if it occurs or if you experience increased pain at the site. Follow up with your cardiologist in 1-2 weeks. You may call [facility] if you have any questions/concerns regarding your procedure [phone]. No heavy lifting, strenuous activity, bending, straining, or unnecessary stair climbing for 2 weeks. No driving for 2 days. You may shower 24 hours following the procedure but avoid baths/swimming for 1 week. Check your groin site for bleeding and/or swelling daily following procedure and call your doctor immediately if it occurs or if you experience increased pain at the site. Follow up with your cardiologist in 1-2 weeks. You may call [facility] if you have any questions/concerns regarding your procedure [phone]. DO NOT STOP ASPIRIN OR PLAVIX UNLESS INSTRUCTED BY CARDIOLOGIST

## 2019-02-21 NOTE — PROGRESS NOTE ADULT - ASSESSMENT
HPI:  Pt is a 63 yr old female poor historian with hx of thyroid CA post operative hypothyroid, HTN, HLD presented to cardiology, Dr. Cross , for cardiac clearance for adrenal sampling/workup hyperaldosteronism.  Pt. had abnormal EKG.  NST 10/2019 revealing ischemia inferolateral walls and pt was advised to proceed with cardiac cath and declined at that time. Pt. went to PST for scheduled adrenal vein sampling on 1/23/19. Patients states incidental finding on diagnostic testing revealed "nodule near or around adrenal gland".  Pt had breast biopsy 1/19/19 and noted right breast ca and was referred to breast surgeon and has not followed up. Pt. presents for further evaluation and cardiac cath.  Denies chest pain/pressure, SOB/LENO, dizziness, diaphoresis, palpitations, nausea, vomiting, peripheral edema, recent weight gain, or syncope.  Right breast biopsy 1/2019  1. Patient has a recent diagnosis of right breast for cancer for which   appropriate clinical, surgical and oncological management. Note the   suspicious enhancement around the index malignancy in the right upper   breast measuring approximately 7 cm in AP dimension per 11/30/2018   report. As such, a wide surgical excision is recommended to include the   cork shaped biopsy marker (marking site of recent discordant pathology).    2. 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    3. Patient also has a recent diagnosis of left breast intraductal   papilloma for which appropriate surgical excision is also recommended.    RECOMMENDATION:  Surgical or oncologic management (20 Feb 2019 15:36)

## 2019-02-21 NOTE — DISCHARGE NOTE ADULT - CARE PROVIDER_API CALL
Nick Iverson)  Cardiology; Internal Medicine  [address], [address]  [place] [address]  Phone: [phone]  Fax: [phone]  Follow Up Time:

## 2019-02-21 NOTE — DISCHARGE NOTE ADULT - HOSPITAL COURSE
Pt is a 63 yr old female poor historian with hx of thyroid CA post operative hypothyroid, HTN, HLD presented to cardiology, Dr. Cross , for cardiac clearance for adrenal sampling/workup hyperaldosteronism.  Pt. had abnormal EKG.  NST 10/2019 revealing ischemia inferolateral walls and pt was advised to proceed with cardiac cath and declined at that time. Pt. went to PST for scheduled adrenal vein sampling on 1/23/19. Patients states incidental finding on diagnostic testing revealed "nodule near or around adrenal gland".  Pt had breast biopsy 1/19/19 and noted right breast ca and was referred to breast surgeon and has not followed up. Pt. presents for further evaluation and cardiac cath.    s/p dRCA stent on 2/20, staged stent in Diag. Bilateral groin site

## 2019-02-21 NOTE — DISCHARGE NOTE ADULT - CARE PLAN
Principal Discharge DX:	Coronary artery disease of native artery of native heart with stable angina pectoris  Goal:	free of angina, keep stent open  Assessment and plan of treatment:	Low salt, low fat diet.   Weight management.   Take medications as prescribed.    No smoking.  Follow up appointments with your doctor(s)  as instructed.  Secondary Diagnosis:	HTN (hypertension)  Goal:	Your blood pressure will be controlled.  Assessment and plan of treatment:	Continue with your blood pressure medications; eat a heart healthy diet with low salt diet; exercise regularly (consult with your physician or cardiologist first); maintain a heart healthy weight; if you smoke - quit (A resource to help you stop smoking is the [facility] [employer] – phone number [phone].); include healthy ways to manage stress. Continue to follow with your primary care physician or cardiologist.  Secondary Diagnosis:	HLD (hyperlipidemia)  Goal:	Your LDL cholesterol will be less than 70mg/dL  Assessment and plan of treatment:	Continue with your cholesterol medications. Eat a heart healthy diet that is low in saturated fats and salt, and includes whole grains, fruits, vegetables and lean protein; exercise regularly (consult with your physician or cardiologist first); maintain a heart healthy weight; if you smoke - quit (A resource to help you stop smoking is the [facility] [employer] [facility] – phone number [phone].). Continue to follow with your primary physician or cardiologist.  Secondary Diagnosis:	Breast CA  Goal:	tumor is controlled with proper treatment  Assessment and plan of treatment:	Follow up with your breast surgeon as advised by your PCP and oncologist if recommended. Healthy diet and exercise. Principal Discharge DX:	Coronary artery disease of native artery of native heart with stable angina pectoris  Goal:	free of angina, keep stent open  Assessment and plan of treatment:	Do not stop your aspirin or Plavix unless instructed to do so by your cardiologist, they help keep your stented arteries open.   No heavy lifting, strenuous activity, bending, straining, or unnecessary stair climbing for 2 weeks. No driving for 2 days. You may shower 24 hours following the procedure but avoid baths/swimming for 1 week. Check your groin site for bleeding and/or swelling daily following procedure and call your doctor immediately if it occurs or if you experience increased pain at the site. Follow up with your cardiologist in 1-2 weeks. You may call [facility] if you have any questions/concerns regarding your procedure [phone].  Secondary Diagnosis:	HTN (hypertension)  Goal:	Your blood pressure will be controlled.  Assessment and plan of treatment:	Continue with your blood pressure medications; eat a heart healthy diet with low salt diet; exercise regularly (consult with your physician or cardiologist first); maintain a heart healthy weight; if you smoke - quit (A resource to help you stop smoking is the [facility] [employer] [facility] – phone number [phone].); include healthy ways to manage stress. Continue to follow with your primary care physician or cardiologist.  Secondary Diagnosis:	HLD (hyperlipidemia)  Goal:	Your LDL cholesterol will be less than 70mg/dL  Assessment and plan of treatment:	Continue with your cholesterol medications. Eat a heart healthy diet that is low in saturated fats and salt, and includes whole grains, fruits, vegetables and lean protein; exercise regularly (consult with your physician or cardiologist first); maintain a heart healthy weight; if you smoke - quit (A resource to help you stop smoking is the [facility] [employer] Control – phone number [phone].). Continue to follow with your primary physician or cardiologist.  Secondary Diagnosis:	Breast CA  Goal:	tumor is controlled with proper treatment  Assessment and plan of treatment:	Follow up with your breast surgeon as advised by your PCP and oncologist if recommended. Healthy diet and exercise.

## 2019-02-21 NOTE — PROGRESS NOTE ADULT - SUBJECTIVE AND OBJECTIVE BOX
Patient is a 63y old  Female who presents with a chief complaint of         Allergies    No Known Allergies    Intolerances        Medications:  aspirin enteric coated 81 milliGRAM(s) Oral daily  atorvastatin 20 milliGRAM(s) Oral at bedtime  cloNIDine 0.1 milliGRAM(s) Oral daily  clopidogrel Tablet 75 milliGRAM(s) Oral daily  diltiazem    milliGRAM(s) Oral daily  hydrochlorothiazide 12.5 milliGRAM(s) Oral daily  levothyroxine 137 MICROGram(s) Oral <User Schedule>  levothyroxine 150 MICROGram(s) Oral <User Schedule>  lisinopril 40 milliGRAM(s) Oral daily  spironolactone 25 milliGRAM(s) Oral daily      Vitals:  T(C): 36.6 (19 @ 21:45), Max: 36.7 (19 @ 15:36)  HR: 72 (19 @ 00:15) (71 - 93)  BP: 141/84 (19 @ 00:15) (124/81 - 166/90)  BP(mean): 105 (19 @ 15:36) (105 - 105)  RR: 17 (19 @ 00:15) (16 - 18)  SpO2: 97% (19 @ 00:15) (95% - 100%)  Wt(kg): --  Daily Height in cm: 170.18 (2019 15:36)    Daily Weight in k.1 (2019 15:36)  I&O's Summary        Physical Exam:  Appearance: Normal  Eyes: PERRL, EOMI  HENT: Normal oral muscosa, NC/AT  Cardiovascular: S1S2, RRR, No M/R/G, no JVD, No Lower extremity edema  Procedural Access Site: No hematoma, Non-tender to palpation, 2+ pulse, No bruit, No Ecchymosis  Respiratory: Clear to auscultation bilaterally  Gastrointestinal: Soft, Non tender, Normal Bowel Sounds  Musculoskeletal: No clubbing, No joint deformity   Neurologic: Non-focal  Lymphatic: No lymphadenopathy  Psychiatry: AAOx3, Mood & affect appropriate  Skin: No rashes, No ecchymoses, No cyanosis        141  |  102  |  26<H>  ----------------------------<  143<H>  3.7   |  23  |  1.32<H>    Ca    9.5      2019 00:08    TPro  7.9  /  Alb  4.7  /  TBili  1.4<H>  /  DBili  x   /  AST  16  /  ALT  15  /  AlkPhos  97  02-20            Lipid panel   Hgb A1c                         14.1   10.3  )-----------( 217      ( 2019 00:08 )             41.8         ECG:    Cath: one distal RCA stent    Imaging:    Interpretation of Telemetry: Patient is a 63y old  Female who presents with a chief complaint of pre-surgical work up        Allergies    No Known Allergies    Intolerances        Medications:  aspirin enteric coated 81 milliGRAM(s) Oral daily  atorvastatin 20 milliGRAM(s) Oral at bedtime  cloNIDine 0.1 milliGRAM(s) Oral daily  clopidogrel Tablet 75 milliGRAM(s) Oral daily  diltiazem    milliGRAM(s) Oral daily  hydrochlorothiazide 12.5 milliGRAM(s) Oral daily  levothyroxine 137 MICROGram(s) Oral <User Schedule>  levothyroxine 150 MICROGram(s) Oral <User Schedule>  lisinopril 40 milliGRAM(s) Oral daily  spironolactone 25 milliGRAM(s) Oral daily      Vitals:  T(C): 36.6 (19 @ 21:45), Max: 36.7 (19 @ 15:36)  HR: 72 (19 @ 00:15) (71 - 93)  BP: 141/84 (19 @ 00:15) (124/81 - 166/90)  BP(mean): 105 (19 @ 15:36) (105 - 105)  RR: 17 (19 @ 00:15) (16 - 18)  SpO2: 97% (19 @ 00:15) (95% - 100%)  Wt(kg): --  Daily Height in cm: 170.18 (2019 15:36)    Daily Weight in k.1 (2019 15:36)  I&O's Summary        Physical Exam:  Appearance: Normal  Eyes: PERRL, EOMI  HENT: Normal oral mus[place]a, NC/AT  Cardiovascular: S1S2, RRR, No M/R/G, no JVD, No Lower extremity edema  Procedural Access Site: No hematoma, Non-tender to palpation, 2+ pulse, No bruit, No Ecchymosis  Respiratory: Clear to auscultation bilaterally  Gastrointestinal: Soft, Non tender, Normal Bowel Sounds  Musculoskeletal: No clubbing, No joint deformity   Neurologic: Non-focal  Lymphatic: No lymphadenopathy  Psychiatry: AAOx3, Mood & affect appropriate  Skin: No rashes, No ecchymoses, No cyanosis        141  |  102  |  26<H>  ----------------------------<  143<H>  3.7   |  23  |  1.32<H>    Ca    9.5      2019 00:08    TPro  7.9  /  Alb  4.7  /  TBili  1.4<H>  /  DBili  x   /  AST  16  /  ALT  15  /  AlkPhos  97  02-20            Lipid panel   Hgb A1c                         14.1   10.3  )-----------( 217      ( 2019 00:08 )             41.8         ECG:    Cath: one distal RCA stent    Imaging:    Interpretation of Telemetry:

## 2019-02-21 NOTE — CHART NOTE - NSCHARTNOTEFT_GEN_A_CORE
Removal of Femoral Sheath    Pulses in the left lower extremity are palpable.  The patient was placed in the supine position. The insertion site was identified and the sutures were removed per protocol.    The __6__ [place] femoral sheath was then removed.   Direct pressure was applied for  __20____ minutes.   Complications: None, VSS, Good Hemostasis.     Monitoring of the right/left groin and both lower extremities including neuro-vascular checks and vital signs every 15 minutes x 4, then every 30 minutes x 2, then every 1 hour was ordered.    Discharge Instruction discussed with patient: ASA, Plavix/Brilinta, statin, diet, activities, access site care, follow up care, reportable signs and symptoms.     A/P   63  year old female Hx of HTN, HLD, hypothyroid, newly dx with breast cancer, s/p Stent in dRCA on 2/20 and Diag today, right radial unsuccessful access site, band removed by pt near to removal time, no hematoma or further bleeding. Left groin site benign.      Plan: continue to monitor, Continue ASA, Plavix, Statin,   discharge home in am if stable.  Pt will follow up with her cardiologist in 1-2weeks.

## 2019-02-21 NOTE — DISCHARGE NOTE ADULT - MEDICATION SUMMARY - MEDICATIONS TO TAKE
I will START or STAY ON the medications listed below when I get home from the hospital:    spironolactone 25 mg oral tablet  -- 1 tab(s) by mouth once a day  -- Indication: For Hypertension, unspecified type    Aspirin Enteric Coated 81 mg oral delayed release tablet  -- 1 tab(s) by mouth once a day  -- Indication: For Coronary artery disease of native artery of native heart with stable angina pectoris    fosinopril 40 mg oral tablet  -- 1 tab(s) by mouth once a day  -- Indication: For Hypertension, unspecified type    cloNIDine 0.1 mg oral tablet  -- 1 tab(s) by mouth once a day  -- Indication: For Hypertension, unspecified type    diltiazem 240 mg/24 hours oral capsule, extended release  -- 1 cap(s) by mouth once a day  -- Indication: For Hypertension, unspecified type    atorvastatin 20 mg oral tablet  -- 1 tab(s) by mouth once a day  -- Indication: For Hyperlipidemia, unspecified hyperlipidemia type    Plavix 75 mg oral tablet  -- 1 tab(s) by mouth once a day  -- Indication: For Coronary artery disease of native artery of native heart with stable angina pectoris    hydrochlorothiazide 12.5 mg oral capsule  -- 1 cap(s) by mouth once a day  -- Indication: For Hypertension, unspecified type    levothyroxine 137 mcg (0.137 mg) oral tablet  -- 1 tab(s) by mouth 5 times a week (M-F)  -- Indication: For Hypothyroidism    levothyroxine 150 mcg (0.15 mg) oral tablet  -- orally 2 times a week (Sat & Sunday)  -- Indication: For Hypothyroidism    Vitamin D2 50,000 intl units (1.25 mg) oral capsule  -- 1 cap(s) by mouth once a week on Mondays  -- Indication: For vitamin D defficiency

## 2019-02-21 NOTE — DISCHARGE NOTE ADULT - PLAN OF CARE
free of angina, keep stent open Low salt, low fat diet.   Weight management.   Take medications as prescribed.    No smoking.  Follow up appointments with your doctor(s)  as instructed. Your blood pressure will be controlled. Continue with your blood pressure medications; eat a heart healthy diet with low salt diet; exercise regularly (consult with your physician or cardiologist first); maintain a heart healthy weight; if you smoke - quit (A resource to help you stop smoking is the [facility] – phone number [phone].); include healthy ways to manage stress. Continue to follow with your primary care physician or cardiologist. Your LDL cholesterol will be less than 70mg/dL Continue with your cholesterol medications. Eat a heart healthy diet that is low in saturated fats and salt, and includes whole grains, fruits, vegetables and lean protein; exercise regularly (consult with your physician or cardiologist first); maintain a heart healthy weight; if you smoke - quit (A resource to help you stop smoking is the [facility] – phone number [phone].). Continue to follow with your primary physician or cardiologist. tumor is controlled with proper treatment Follow up with your breast surgeon as advised by your PCP and oncologist if recommended. Healthy diet and exercise. Do not stop your aspirin or Plavix unless instructed to do so by your cardiologist, they help keep your stented arteries open.   No heavy lifting, strenuous activity, bending, straining, or unnecessary stair climbing for 2 weeks. No driving for 2 days. You may shower 24 hours following the procedure but avoid baths/swimming for 1 week. Check your groin site for bleeding and/or swelling daily following procedure and call your doctor immediately if it occurs or if you experience increased pain at the site. Follow up with your cardiologist in 1-2 weeks. You may call [facility] if you have any questions/concerns regarding your procedure [phone].

## 2019-02-21 NOTE — DISCHARGE NOTE ADULT - PATIENT PORTAL LINK FT
You can access the [facility], offered by [facility], by registering with the following website: [email or web address]

## 2019-02-22 VITALS
RESPIRATION RATE: 17 BRPM | DIASTOLIC BLOOD PRESSURE: 74 MMHG | SYSTOLIC BLOOD PRESSURE: 109 MMHG | HEART RATE: 80 BPM | OXYGEN SATURATION: 96 % | TEMPERATURE: 99 F

## 2019-02-22 LAB
ANION GAP SERPL CALC-SCNC: 15 MMOL/L — SIGNIFICANT CHANGE UP (ref 5–17)
BUN SERPL-MCNC: 24 MG/DL — HIGH (ref 7–23)
CALCIUM SERPL-MCNC: 9.3 MG/DL — SIGNIFICANT CHANGE UP (ref 8.4–10.5)
CHLORIDE SERPL-SCNC: 104 MMOL/L — SIGNIFICANT CHANGE UP (ref 96–108)
CO2 SERPL-SCNC: 24 MMOL/L — SIGNIFICANT CHANGE UP (ref 22–31)
CREAT SERPL-MCNC: 1.39 MG/DL — HIGH (ref 0.5–1.3)
GLUCOSE SERPL-MCNC: 126 MG/DL — HIGH (ref 70–99)
HCT VFR BLD CALC: 40.1 % — SIGNIFICANT CHANGE UP (ref 34.5–45)
HGB BLD-MCNC: 14 G/DL — SIGNIFICANT CHANGE UP (ref 11.5–15.5)
MCHC RBC-ENTMCNC: 32.2 PG — SIGNIFICANT CHANGE UP (ref 27–34)
MCHC RBC-ENTMCNC: 35 GM/DL — SIGNIFICANT CHANGE UP (ref 32–36)
MCV RBC AUTO: 92.2 FL — SIGNIFICANT CHANGE UP (ref 80–100)
PLATELET # BLD AUTO: 188 K/UL — SIGNIFICANT CHANGE UP (ref 150–400)
POTASSIUM SERPL-MCNC: 3.5 MMOL/L — SIGNIFICANT CHANGE UP (ref 3.5–5.3)
POTASSIUM SERPL-SCNC: 3.5 MMOL/L — SIGNIFICANT CHANGE UP (ref 3.5–5.3)
RBC # BLD: 4.35 M/UL — SIGNIFICANT CHANGE UP (ref 3.8–5.2)
RBC # FLD: 13.8 % — SIGNIFICANT CHANGE UP (ref 10.3–14.5)
SODIUM SERPL-SCNC: 143 MMOL/L — SIGNIFICANT CHANGE UP (ref 135–145)
WBC # BLD: 11.2 K/UL — HIGH (ref 3.8–10.5)
WBC # FLD AUTO: 11.2 K/UL — HIGH (ref 3.8–10.5)

## 2019-02-22 RX ADMIN — Medication 81 MILLIGRAM(S): at 06:10

## 2019-02-22 RX ADMIN — Medication 0.1 MILLIGRAM(S): at 06:10

## 2019-02-22 RX ADMIN — Medication 137 MICROGRAM(S): at 06:10

## 2019-02-22 RX ADMIN — ATORVASTATIN CALCIUM 20 MILLIGRAM(S): 80 TABLET, FILM COATED ORAL at 06:10

## 2019-02-22 RX ADMIN — LISINOPRIL 40 MILLIGRAM(S): 2.5 TABLET ORAL at 06:10

## 2019-02-22 RX ADMIN — Medication 240 MILLIGRAM(S): at 06:10

## 2019-02-22 RX ADMIN — CLOPIDOGREL BISULFATE 75 MILLIGRAM(S): 75 TABLET, FILM COATED ORAL at 06:10

## 2019-02-22 RX ADMIN — Medication 12.5 MILLIGRAM(S): at 06:10

## 2019-02-22 NOTE — PROGRESS NOTE ADULT - ASSESSMENT
HPI:  Pt is a 63 yr old female poor historian with hx of thyroid CA post operative hypothyroid, HTN, HLD presented to cardiology, Dr. Cross , for cardiac clearance for adrenal sampling/workup hyperaldosteronism.  Pt. had abnormal EKG.  NST 10/2019 revealing ischemia inferolateral walls and pt was advised to proceed with cardiac cath and declined at that time. Pt. went to PST for scheduled adrenal vein sampling on 1/23/19. Patients states incidental finding on diagnostic testing revealed "nodule near or around adrenal gland".  Pt had breast biopsy 1/19/19 and noted right breast ca and was referred to breast surgeon and has not followed up. Pt. presents for further evaluation and cardiac cath.  Denies chest pain/pressure, SOB/LEON, dizziness, diaphoresis, palpitations, nausea, vomiting, peripheral edema, recent weight gain, or syncope.  Right breast biopsy 1/2019  1. Patient has a recent diagnosis of right breast for cancer for which   appropriate clinical, surgical and oncological management. Note the   suspicious enhancement around the index malignancy in the right upper   breast measuring approximately 7 cm in AP dimension per 11/30/2018   report. As such, a wide surgical excision is recommended to include the   cork shaped biopsy marker (marking site of recent discordant pathology).    2. 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    3. Patient also has a recent diagnosis of left breast intraductal   papilloma for which appropriate surgical excision is also recommended.    RECOMMENDATION:  Surgical or oncologic management (20 Feb 2019 15:36)

## 2019-02-22 NOTE — PROGRESS NOTE ADULT - ATTENDING COMMENTS
Jp Ovalles, [facility]    [phone]
Jp Ovalles, [facility]    [phone]
Jp Ovalles, [facility]    [phone]
Jp Ovalles, [facility]    [phone]

## 2019-02-22 NOTE — PROGRESS NOTE ADULT - SUBJECTIVE AND OBJECTIVE BOX
Patient is a 63y old  Female who presents with a chief complaint of cardiac stent (21 Feb 2019 16:47)          Allergies    No Known Allergies    Intolerances        Medications:  acetaminophen   Tablet .. 650 milliGRAM(s) Oral every 6 hours PRN  aspirin enteric coated 81 milliGRAM(s) Oral daily  atorvastatin 20 milliGRAM(s) Oral at bedtime  cloNIDine 0.1 milliGRAM(s) Oral daily  clopidogrel Tablet 75 milliGRAM(s) Oral daily  diltiazem    milliGRAM(s) Oral daily  hydrochlorothiazide 12.5 milliGRAM(s) Oral daily  levothyroxine 137 MICROGram(s) Oral <User Schedule>  levothyroxine 150 MICROGram(s) Oral <User Schedule>  lisinopril 40 milliGRAM(s) Oral daily  sodium chloride 0.9%. 1000 milliLiter(s) IV Continuous <Continuous>  spironolactone 25 milliGRAM(s) Oral daily      Vitals:  T(C): 36.9 (02-21-19 @ 21:25), Max: 36.9 (02-21-19 @ 15:10)  HR: 109 (02-21-19 @ 21:25) (72 - 109)  BP: 149/79 (02-21-19 @ 21:25) (124/72 - 171/98)  BP(mean): --  RR: 16 (02-21-19 @ 21:25) (16 - 17)  SpO2: 94% (02-21-19 @ 21:25) (93% - 100%)  Wt(kg): --  Daily     Daily   I&O's Summary    20 Feb 2019 07:01  -  21 Feb 2019 07:00  --------------------------------------------------------  IN: 360 mL / OUT: 300 mL / NET: 60 mL    21 Feb 2019 07:01  -  22 Feb 2019 01:35  --------------------------------------------------------  IN: 480 mL / OUT: 0 mL / NET: 480 mL          Physical Exam:  Appearance: Normal  Eyes: PERRL, EOMI  HENT: Normal oral mus[place]a, NC/AT  Cardiovascular: S1S2, RRR, No M/R/G, no JVD, No Lower extremity edema  Procedural Access Site: No hematoma, Non-tender to palpation, 2+ pulse, No bruit, No Ecchymosis  Respiratory: Clear to auscultation bilaterally  Gastrointestinal: Soft, Non tender, Normal Bowel Sounds  Musculoskeletal: No clubbing, No joint deformity   Neurologic: Non-focal  Lymphatic: No lymphadenopathy  Psychiatry: AAOx3, Mood & affect appropriate  Skin: No rashes, No ecchymoses, No cyanosis    02-22    143  |  104  |  24<H>  ----------------------------<  126<H>  3.5   |  24  |  1.39<H>    Ca    9.3      22 Feb 2019 00:57    TPro  7.9  /  Alb  4.7  /  TBili  1.4<H>  /  DBili  x   /  AST  16  /  ALT  15  /  AlkPhos  97  02-20            Lipid panel   Hgb A1c                         14.0   11.2  )-----------( 188      ( 22 Feb 2019 00:57 )             40.1         ECG:    Cath: stents to the distal RCA and diagonal    Imaging:    Interpretation of Telemetry:

## 2019-02-23 ENCOUNTER — EMERGENCY (EMERGENCY)
Facility: HOSPITAL | Age: 64
LOS: 1 days | Discharge: HOME | End: 2019-02-23
Attending: EMERGENCY MEDICINE | Admitting: EMERGENCY MEDICINE
Payer: MEDICAID

## 2019-02-23 VITALS
SYSTOLIC BLOOD PRESSURE: 157 MMHG | HEART RATE: 105 BPM | OXYGEN SATURATION: 97 % | DIASTOLIC BLOOD PRESSURE: 103 MMHG | HEIGHT: 67 IN | TEMPERATURE: 98 F | WEIGHT: 177.91 LBS | RESPIRATION RATE: 18 BRPM

## 2019-02-23 DIAGNOSIS — Z98.890 OTHER SPECIFIED POSTPROCEDURAL STATES: Chronic | ICD-10-CM

## 2019-02-23 DIAGNOSIS — E07.89 OTHER SPECIFIED DISORDERS OF THYROID: Chronic | ICD-10-CM

## 2019-02-23 DIAGNOSIS — M79.603 PAIN IN ARM, UNSPECIFIED: ICD-10-CM

## 2019-02-23 PROCEDURE — 99282 EMERGENCY DEPT VISIT SF MDM: CPT

## 2019-02-23 NOTE — ED PROVIDER NOTE - UPPER EXTREMITY EXAM, RIGHT
bruising noted to volar surface of distal forearm- no hematoma. (+)2 radial pulse. Hand perfusing well. Strength and sensation intact.

## 2019-02-23 NOTE — ED PROVIDER NOTE - CLINICAL SUMMARY MEDICAL DECISION MAKING FREE TEXT BOX
s/p 2 cardiac cath's through RUE and RLE presenting with bruising, no hematoma, neuro vascularly intact without compromise- has appt on march 5th with Dr. David- reassurance, return precautions discussed.

## 2019-02-23 NOTE — ED ADULT NURSE NOTE - CHIEF COMPLAINT QUOTE
I was discharged from [facility] yesterday after an angiogram and cardiac stents.  My right arm was concerning me.  Right lower arm mild bluish discoloration and pain 5/10.

## 2019-02-23 NOTE — ED PROVIDER NOTE - ATTENDING CONTRIBUTION TO CARE
Chanelle with [facility]. Patient presents with bruising to the arm. Pt had cardiac cath performed in stages. One entry point was through the right radial artery. She is here c/o bruising to the forarm. It is flat in appearance. No neurovasc compromise.  She has approaching cardio appt on March 5. No acute concerns at this time. Pt reassured. I performed a face to face bedside interview with patient regarding history of present illness, review of symptoms and past medical history. I completed an independent physical exam.  I have discussed the patient's plan of care with Physician Assistant (PA). I agree with note as stated above, having amended the EMR as needed to reflect my findings.   This includes History of Present Illness, HIV, Past Medical/Surgical/Family/Social History, Allergies and Home Medications, Review of Systems, Physical Exam, and any Progress Notes during the time I functioned as the attending physician for this patient.

## 2019-02-23 NOTE — ED ADULT NURSE NOTE - NSIMPLEMENTINTERV_GEN_ALL_ED
Implemented All Universal Safety Interventions:  [place] to call system. Call bell, personal items and telephone within reach. Instruct patient to call for assistance. Room bathroom lighting operational. Non-slip footwear when patient is off stretcher. Physically safe environment: no spills, clutter or unnecessary equipment. Stretcher in lowest position, wheels locked, appropriate side rails in place.

## 2019-02-23 NOTE — ED PROVIDER NOTE - CHPI ED SYMPTOMS NEG
no purulent drainage/no redness/no vomiting/no bleeding/no fever/no chills/no drainage/no red streaks

## 2019-02-23 NOTE — ED PROVIDER NOTE - PMH
Adrenal nodule    Anxiety    Breast CA  right  HLD (hyperlipidemia)    HTN (hypertension)    Hypothyroid

## 2019-02-23 NOTE — ED PROVIDER NOTE - OBJECTIVE STATEMENT
64 y/o F s/p cardiac cath at [facility] with Dr. Nick David presents with RUE bruising. Unsure of details of procedure- poor historian, affect off. Currently denies chest pain, shortness of breath, nausea, vomiting, weakness, numbness, tingling, fever, chills. Appears well. Ambulating without difficulty. Presenting with bruising to both the RUE and RLE groin area.     Spoke with [facility] Cardiology fellow Reece to clarify history: patient had cardiac cath done on 2/20 through the right femoral groin and then went back to the OR on 2/21 where she had another cardiac cath performed through the right radial artery. Patient had a total of 2 stents placed.

## 2019-02-23 NOTE — ED ADULT TRIAGE NOTE - CHIEF COMPLAINT QUOTE
I was discharged from [facility] yesterday after an angiogram and cardiac stents.  My right arm was concerning me.  Right lower arm mild bluish discoloration and pain 5/10.

## 2019-02-23 NOTE — ED PROVIDER NOTE - NSFOLLOWUPINSTRUCTIONS_ED_ALL_ED_FT
Follow up with your PCP within 1-2 days. Follow up with cardiologist Dr. David as scheduled on March 5th. Apply warm compresses to your arm and groin area as needed for the bruising. Take all of your medications as previously prescribed. Take Tylenol 650mg every 4-6 hours as needed for pain. Worsening, continued or ANY new concerning symptoms return to the emergency department.

## 2019-02-23 NOTE — ED ADULT NURSE NOTE - OBJECTIVE STATEMENT
63 year old female presents with right arm pain s/p angiogram 1 day ago. Describes pain as "itchy." Arm noted with bruising and discoloration. Able to move arm without difficulty. Denies SOB or chest pain.

## 2019-02-25 DIAGNOSIS — Z71.89 OTHER SPECIFIED COUNSELING: ICD-10-CM

## 2019-02-25 PROBLEM — E78.5 HYPERLIPIDEMIA, UNSPECIFIED: Chronic | Status: INACTIVE | Noted: 2019-01-07 | Resolved: 2019-02-20

## 2019-03-01 PROCEDURE — G9005: CPT

## 2019-03-05 ENCOUNTER — APPOINTMENT (OUTPATIENT)
Dept: CARDIOLOGY | Facility: CLINIC | Age: 64
End: 2019-03-05
Payer: COMMERCIAL

## 2019-03-05 ENCOUNTER — NON-APPOINTMENT (OUTPATIENT)
Age: 64
End: 2019-03-05

## 2019-03-05 VITALS
RESPIRATION RATE: 16 BRPM | HEART RATE: 87 BPM | OXYGEN SATURATION: 97 % | DIASTOLIC BLOOD PRESSURE: 75 MMHG | WEIGHT: 179 LBS | HEIGHT: 67 IN | BODY MASS INDEX: 28.09 KG/M2 | SYSTOLIC BLOOD PRESSURE: 107 MMHG

## 2019-03-05 VITALS — DIASTOLIC BLOOD PRESSURE: 80 MMHG | SYSTOLIC BLOOD PRESSURE: 104 MMHG | HEART RATE: 96 BPM

## 2019-03-05 PROCEDURE — 93000 ELECTROCARDIOGRAM COMPLETE: CPT

## 2019-03-05 PROCEDURE — 99214 OFFICE O/P EST MOD 30 MIN: CPT

## 2019-03-05 NOTE — PHYSICAL EXAM
[General Appearance - Well Developed] : well developed [Normal Appearance] : normal appearance [Well Groomed] : well groomed [General Appearance - Well Nourished] : well nourished [No Deformities] : no deformities [General Appearance - In No Acute Distress] : no acute distress [Normal Oral Mucosa] : normal oral mucosa [No Oral Pallor] : no oral pallor [No Oral Cyanosis] : no oral cyanosis [Normal Jugular Venous A Waves Present] : normal jugular venous A waves present [Normal Jugular Venous V Waves Present] : normal jugular venous V waves present [No Jugular Venous Dumont A Waves] : no jugular venous dumont A waves [Respiration, Rhythm And Depth] : normal respiratory rhythm and effort [Exaggerated Use Of Accessory Muscles For Inspiration] : no accessory muscle use [Auscultation Breath Sounds / Voice Sounds] : lungs were clear to auscultation bilaterally [Abdomen Soft] : soft [Abdomen Tenderness] : non-tender [Abdomen Mass (___ Cm)] : no abdominal mass palpated [Abnormal Walk] : normal gait [Gait - Sufficient For Exercise Testing] : the gait was sufficient for exercise testing [Nail Clubbing] : no clubbing of the fingernails [Cyanosis, Localized] : no localized cyanosis [Petechial Hemorrhages (___cm)] : no petechial hemorrhages [Skin Color & Pigmentation] : normal skin color and pigmentation [] : no rash [No Venous Stasis] : no venous stasis [Skin Lesions] : no skin lesions [No Skin Ulcers] : no skin ulcer [No Xanthoma] : no  xanthoma was observed [Oriented To Time, Place, And Person] : oriented to person, place, and time [Affect] : the affect was normal [Mood] : the mood was normal [No Anxiety] : not feeling anxious [Normal Rate] : normal [Normal S1] : normal S1 [Normal S2] : normal S2 [S3] : no S3 [S4] : no S4 [No Murmur] : no murmurs heard [Right Carotid Bruit] : no bruit heard over the right carotid [Left Carotid Bruit] : no bruit heard over the left carotid [Right Femoral Bruit] : no bruit heard over the right femoral artery [Left Femoral Bruit] : no bruit heard over the left femoral artery [2+] : left 2+ [No Abnormalities] : the abdominal aorta was not enlarged and no bruit was heard [No Pitting Edema] : no pitting edema present

## 2019-03-05 NOTE — REASON FOR VISIT
[Follow-Up - From Hospitalization] : follow-up of a recent hospitalization for [Coronary Artery Disease] : coronary artery disease [FreeTextEntry1] : Patient returns for followup. Feeling well. Offers no complaints of chest discomfort shortness of breath palpitations dizziness or syncope.She is now status post 2 stents. She offers no complaints of chest discomfort shortness of breath palpitations dizziness or syncope. She has not filled her prescription for Plavix. According to my discussions with the patient's invasive cardiologist she will only need Plavix for one months time due to the type of stent placed\par

## 2019-03-05 NOTE — ASSESSMENT
[FreeTextEntry1] : Patient:\par 1. Coronary disease now status post stenting\par 2. Hypertension well controlled\par \par Plan:\par 1. Continue current medical regimen

## 2019-03-07 ENCOUNTER — NON-APPOINTMENT (OUTPATIENT)
Age: 64
End: 2019-03-07

## 2019-03-07 ENCOUNTER — APPOINTMENT (OUTPATIENT)
Dept: CARDIOLOGY | Facility: CLINIC | Age: 64
End: 2019-03-07
Payer: COMMERCIAL

## 2019-03-07 VITALS
SYSTOLIC BLOOD PRESSURE: 119 MMHG | HEART RATE: 85 BPM | RESPIRATION RATE: 16 BRPM | DIASTOLIC BLOOD PRESSURE: 80 MMHG | HEIGHT: 67 IN | OXYGEN SATURATION: 98 % | BODY MASS INDEX: 26.68 KG/M2 | WEIGHT: 170 LBS

## 2019-03-07 VITALS — SYSTOLIC BLOOD PRESSURE: 120 MMHG | DIASTOLIC BLOOD PRESSURE: 80 MMHG

## 2019-03-07 PROCEDURE — 99214 OFFICE O/P EST MOD 30 MIN: CPT

## 2019-03-07 PROCEDURE — 93000 ELECTROCARDIOGRAM COMPLETE: CPT

## 2019-03-14 ENCOUNTER — OUTPATIENT (OUTPATIENT)
Dept: OUTPATIENT SERVICES | Facility: HOSPITAL | Age: 64
LOS: 1 days | End: 2019-03-14
Payer: MEDICAID

## 2019-03-14 VITALS
SYSTOLIC BLOOD PRESSURE: 130 MMHG | DIASTOLIC BLOOD PRESSURE: 91 MMHG | HEIGHT: 67 IN | RESPIRATION RATE: 14 BRPM | OXYGEN SATURATION: 96 % | WEIGHT: 169.76 LBS | HEART RATE: 89 BPM | TEMPERATURE: 98 F

## 2019-03-14 VITALS — WEIGHT: 169.76 LBS | HEIGHT: 67 IN

## 2019-03-14 DIAGNOSIS — E07.89 OTHER SPECIFIED DISORDERS OF THYROID: Chronic | ICD-10-CM

## 2019-03-14 DIAGNOSIS — Z98.890 OTHER SPECIFIED POSTPROCEDURAL STATES: Chronic | ICD-10-CM

## 2019-03-14 DIAGNOSIS — Z95.820 PERIPHERAL VASCULAR ANGIOPLASTY STATUS WITH IMPLANTS AND GRAFTS: Chronic | ICD-10-CM

## 2019-03-14 DIAGNOSIS — Z17.0 ESTROGEN RECEPTOR POSITIVE STATUS [ER+]: ICD-10-CM

## 2019-03-14 DIAGNOSIS — D05.01 LOBULAR CARCINOMA IN SITU OF RIGHT BREAST: ICD-10-CM

## 2019-03-14 DIAGNOSIS — D48.62 NEOPLASM OF UNCERTAIN BEHAVIOR OF LEFT BREAST: ICD-10-CM

## 2019-03-14 DIAGNOSIS — Z95.820 PERIPHERAL VASCULAR ANGIOPLASTY STATUS WITH IMPLANTS AND GRAFTS: ICD-10-CM

## 2019-03-14 DIAGNOSIS — Z01.818 ENCOUNTER FOR OTHER PREPROCEDURAL EXAMINATION: ICD-10-CM

## 2019-03-14 DIAGNOSIS — C50.411 MALIGNANT NEOPLASM OF UPPER-OUTER QUADRANT OF RIGHT FEMALE BREAST: ICD-10-CM

## 2019-03-14 DIAGNOSIS — C50.919 MALIGNANT NEOPLASM OF UNSPECIFIED SITE OF UNSPECIFIED FEMALE BREAST: ICD-10-CM

## 2019-03-14 LAB
ANION GAP SERPL CALC-SCNC: 13 MMOL/L — SIGNIFICANT CHANGE UP (ref 5–17)
BUN SERPL-MCNC: 34 MG/DL — HIGH (ref 7–23)
CALCIUM SERPL-MCNC: 9.8 MG/DL — SIGNIFICANT CHANGE UP (ref 8.4–10.5)
CHLORIDE SERPL-SCNC: 104 MMOL/L — SIGNIFICANT CHANGE UP (ref 96–108)
CO2 SERPL-SCNC: 27 MMOL/L — SIGNIFICANT CHANGE UP (ref 22–31)
CREAT SERPL-MCNC: 1.43 MG/DL — HIGH (ref 0.5–1.3)
GLUCOSE SERPL-MCNC: 110 MG/DL — HIGH (ref 70–99)
HCT VFR BLD CALC: 41.7 % — SIGNIFICANT CHANGE UP (ref 34.5–45)
HGB BLD-MCNC: 14 G/DL — SIGNIFICANT CHANGE UP (ref 11.5–15.5)
MCHC RBC-ENTMCNC: 32.7 PG — SIGNIFICANT CHANGE UP (ref 27–34)
MCHC RBC-ENTMCNC: 33.6 GM/DL — SIGNIFICANT CHANGE UP (ref 32–36)
MCV RBC AUTO: 97.3 FL — SIGNIFICANT CHANGE UP (ref 80–100)
PLATELET # BLD AUTO: 247 K/UL — SIGNIFICANT CHANGE UP (ref 150–400)
POTASSIUM SERPL-MCNC: 4.5 MMOL/L — SIGNIFICANT CHANGE UP (ref 3.5–5.3)
POTASSIUM SERPL-SCNC: 4.5 MMOL/L — SIGNIFICANT CHANGE UP (ref 3.5–5.3)
RBC # BLD: 4.28 M/UL — SIGNIFICANT CHANGE UP (ref 3.8–5.2)
RBC # FLD: 12.9 % — SIGNIFICANT CHANGE UP (ref 10.3–14.5)
SODIUM SERPL-SCNC: 144 MMOL/L — SIGNIFICANT CHANGE UP (ref 135–145)
WBC # BLD: 11.4 K/UL — HIGH (ref 3.8–10.5)
WBC # FLD AUTO: 11.4 K/UL — HIGH (ref 3.8–10.5)

## 2019-03-14 PROCEDURE — 36415 COLL VENOUS BLD VENIPUNCTURE: CPT

## 2019-03-14 PROCEDURE — G0463: CPT

## 2019-03-14 PROCEDURE — 80048 BASIC METABOLIC PNL TOTAL CA: CPT

## 2019-03-14 PROCEDURE — 85027 COMPLETE CBC AUTOMATED: CPT

## 2019-03-14 RX ORDER — LEVOTHYROXINE SODIUM 125 MCG
0 TABLET ORAL
Qty: 0 | Refills: 0 | COMMUNITY

## 2019-03-14 RX ORDER — LEVOTHYROXINE SODIUM 125 MCG
1 TABLET ORAL
Qty: 0 | Refills: 0 | COMMUNITY

## 2019-03-14 NOTE — H&P PST ADULT - NSICDXPASTMEDICALHX_GEN_ALL_CORE_FT
PAST MEDICAL HISTORY:  Anxiety     Breast CA right    Cancer of thyroid unsure of dates but prior to 2005    Cognitive impairment     Coronary artery disease     History of adrenal adenoma     HLD (hyperlipidemia)     HTN (hypertension)     Hypothyroid     Osteoarthritis     Poor historian     Type 2 diabetes mellitus     Urinary frequency

## 2019-03-14 NOTE — H&P PST ADULT - NSANTHOSAYNRD_GEN_A_CORE
neck 12 inches/No. MEKA screening performed.  STOP BANG Legend: 0-2 = LOW Risk; 3-4 = INTERMEDIATE Risk; 5-8 = HIGH Risk

## 2019-03-14 NOTE — H&P PST ADULT - RS GEN PE MLT RESP DETAILS PC
breath sounds equal/no rales/airway patent/respirations non-labored/no rhonchi/no wheezes/good air movement/clear to auscultation bilaterally

## 2019-03-14 NOTE — H&P PST ADULT - HISTORY OF PRESENT ILLNESS
62 yo female presents s/p bilateral breast biopsies in November and December positive for a malignancy on the right. During the time of her breast evaluation, she was found to have an adrenal adenoma. She was asked to get cardiac clearance for adrenal surgery and needed to undergo a cardiac catheterization after an abnormal stress test. She had a drug eluting stent  placed in the 1st diagonal for a 90% blockage.

## 2019-03-14 NOTE — H&P PST ADULT - NSICDXFAMILYHX_GEN_ALL_CORE_FT
FAMILY HISTORY:  Father  Still living? No  FHx: lymphoma, Age at diagnosis: Age Unknown    Mother  Still living? No  FHx: suicide, Age at diagnosis: Age Unknown    Sibling  Still living? Yes, Estimated age: 51-60  Family history of brain damage, Age at diagnosis: Age Unknown

## 2019-03-14 NOTE — H&P PST ADULT - NSICDXPROBLEM_GEN_ALL_CORE_FT
PROBLEM DIAGNOSES  Problem: S/P angioplasty with stent  Assessment and Plan: spoke to Dr. Iverson regarding recent drug eluting stent placement. He stated that he was in touch with the interventional cardiologist and patient is safe to stop plavix after 1 month. He will contact patient regarding stopping. Patient was also instructed to follow up with him for instructions and she verbalized an understanding. Oliva in Dr. Knight's office stated that Dr. Knight is aware of all of the above.    Problem: Breast cancer  Assessment and Plan: right breast wide resection with mammo localization (2 sites), left breast wide resection with mammo localization, right sentinel node biopsy. medical and cardiac clearance requested. instructed to stop clopidogrel and aspirin at the direction of Dr. Iverson. pepcid and surgical wash instructions reviewed and verbalized understanding. medication AM of surgery as directed

## 2019-03-15 ENCOUNTER — APPOINTMENT (OUTPATIENT)
Dept: FAMILY MEDICINE | Facility: CLINIC | Age: 64
End: 2019-03-15
Payer: COMMERCIAL

## 2019-03-15 VITALS
SYSTOLIC BLOOD PRESSURE: 126 MMHG | BODY MASS INDEX: 26.21 KG/M2 | RESPIRATION RATE: 20 BRPM | WEIGHT: 167 LBS | DIASTOLIC BLOOD PRESSURE: 80 MMHG | HEART RATE: 78 BPM | HEIGHT: 67 IN

## 2019-03-15 DIAGNOSIS — Z01.818 ENCOUNTER FOR OTHER PREPROCEDURAL EXAMINATION: ICD-10-CM

## 2019-03-15 PROBLEM — E27.9 DISORDER OF ADRENAL GLAND, UNSPECIFIED: Chronic | Status: INACTIVE | Noted: 2019-02-06 | Resolved: 2019-03-14

## 2019-03-15 PROBLEM — C73 MALIGNANT NEOPLASM OF THYROID GLAND: Chronic | Status: ACTIVE | Noted: 2019-03-14

## 2019-03-15 PROCEDURE — 99244 OFF/OP CNSLTJ NEW/EST MOD 40: CPT

## 2019-03-15 RX ORDER — METFORMIN HYDROCHLORIDE 500 MG/1
500 TABLET, FILM COATED, EXTENDED RELEASE ORAL DAILY
Refills: 0 | Status: ACTIVE | COMMUNITY
Start: 2019-03-15

## 2019-03-15 NOTE — ASSESSMENT
[Patient Optimized for Surgery] : Patient optimized for surgery [No Further Testing Recommended] : no further testing recommended [FreeTextEntry4] : Clinically stable as of this encounter.  PSTs reviewed - feel WBC not clinically significant; note BUN/Creatinine - advised patient to maintain hydration; glucose consistent with history; EKG reveals no acute changes\par This patient is well known to me for an extended period of time.  While she is intellectually challenged, she is fully functional (note she drove herself to this appointment), maintaining herself in her own apartment, and has been employed.  She is competent to sign all consents.

## 2019-03-15 NOTE — HISTORY OF PRESENT ILLNESS
[Coronary Artery Disease] : coronary artery disease [No Pertinent Pulmonary History] : no history of asthma, COPD, sleep apnea, or smoking [No Adverse Anesthesia Reaction] : no adverse anesthesia reaction in self or family member [Chronic Anticoagulation] : no chronic anticoagulation [Chronic Kidney Disease] : no chronic kidney disease [Diabetes] : no diabetes [(Patient denies any chest pain, claudication, dyspnea on exertion, orthopnea, palpitations or syncope)] : Patient denies any chest pain, claudication, dyspnea on exertion, orthopnea, palpitations or syncope [FreeTextEntry1] : bilateral breast resection [FreeTextEntry2] : 3/22/19 [FreeTextEntry3] : Dr. Knight [FreeTextEntry4] : Presents for presurgical clearance.  Denies recent illness, cough, temp elevation, urinary symptoms. [FreeTextEntry5] : Note recent coronary stent placement; controlled hypertension; carbohydrate intolerance; S/P thyroidectomy for malignancy; hyperlipidemia; adrenal adenoma

## 2019-03-15 NOTE — PHYSICAL EXAM
[No Acute Distress] : no acute distress [Normal Sclera/Conjunctiva] : normal sclera/conjunctiva [PERRL] : pupils equal round and reactive to light [EOMI] : extraocular movements intact [Normal Outer Ear/Nose] : the outer ears and nose were normal in appearance [Normal Oropharynx] : the oropharynx was normal [Normal TMs] : both tympanic membranes were normal [Normal Nasal Mucosa] : the nasal mucosa was normal [No JVD] : no jugular venous distention [Supple] : supple [No Lymphadenopathy] : no lymphadenopathy [Thyroid Normal, No Nodules] : the thyroid was normal and there were no nodules present [No Respiratory Distress] : no respiratory distress  [Clear to Auscultation] : lungs were clear to auscultation bilaterally [No Accessory Muscle Use] : no accessory muscle use [Normal Rate] : normal rate  [Regular Rhythm] : with a regular rhythm [Normal S1, S2] : normal S1 and S2 [No Edema] : there was no peripheral edema [Soft] : abdomen soft [Non Tender] : non-tender [Non-distended] : non-distended [No Masses] : no abdominal mass palpated [No HSM] : no HSM [Normal Bowel Sounds] : normal bowel sounds [Normal Posterior Cervical Nodes] : no posterior cervical lymphadenopathy [Normal Anterior Cervical Nodes] : no anterior cervical lymphadenopathy [Normal Gait] : normal gait [Coordination Grossly Intact] : coordination grossly intact [No Focal Deficits] : no focal deficits [Alert and Oriented x3] : oriented to person, place, and time [de-identified] : 1-2/6 mid systolic murmur at LSB

## 2019-03-15 NOTE — RESULTS/DATA
[] : results reviewed [de-identified] : see below [de-identified] : see below [de-identified] : no acute changes

## 2019-03-18 NOTE — ASSESSMENT
[FreeTextEntry1] : In summary, the patient is a 63-year-old woman with a history of breast cancer who is status post recent coronary stenting.\par \par There is no cardiac contraindication to breast surgery.\par \par ADDENDUM:AS PER MY DISCUSSION WITH NP AND AS PER DR. GUO'S REPORT , PATIENT MAY BE OFF PLAVIX SINCE HER DRUG ELUTING STENT ONLY REQUIRES 1 MONTH OF PLAVIX.

## 2019-03-18 NOTE — REASON FOR VISIT
[Consultation] : a consultation regarding [FreeTextEntry1] : Patient presents for cardiac clearance for breast surgery. She is uncertain what exactly the surgery entails. She states it is to be done by . She is now exactly one month status post double vessel stenting. She has no complaints of chest discomfort shortness of breath palpitations dizziness or syncope.

## 2019-03-18 NOTE — PHYSICAL EXAM
[General Appearance - Well Developed] : well developed [Normal Appearance] : normal appearance [Well Groomed] : well groomed [General Appearance - Well Nourished] : well nourished [No Deformities] : no deformities [General Appearance - In No Acute Distress] : no acute distress [Normal Oral Mucosa] : normal oral mucosa [No Oral Pallor] : no oral pallor [No Oral Cyanosis] : no oral cyanosis [Normal Jugular Venous A Waves Present] : normal jugular venous A waves present [Normal Jugular Venous V Waves Present] : normal jugular venous V waves present [No Jugular Venous Dumont A Waves] : no jugular venous dumont A waves [Respiration, Rhythm And Depth] : normal respiratory rhythm and effort [Exaggerated Use Of Accessory Muscles For Inspiration] : no accessory muscle use [Auscultation Breath Sounds / Voice Sounds] : lungs were clear to auscultation bilaterally [Abdomen Soft] : soft [Abdomen Tenderness] : non-tender [Abdomen Mass (___ Cm)] : no abdominal mass palpated [Abnormal Walk] : normal gait [Gait - Sufficient For Exercise Testing] : the gait was sufficient for exercise testing [Nail Clubbing] : no clubbing of the fingernails [Cyanosis, Localized] : no localized cyanosis [Petechial Hemorrhages (___cm)] : no petechial hemorrhages [Skin Color & Pigmentation] : normal skin color and pigmentation [] : no rash [No Venous Stasis] : no venous stasis [Skin Lesions] : no skin lesions [No Skin Ulcers] : no skin ulcer [No Xanthoma] : no  xanthoma was observed [Oriented To Time, Place, And Person] : oriented to person, place, and time [Affect] : the affect was normal [Mood] : the mood was normal [No Anxiety] : not feeling anxious [Normal Rate] : normal [Normal S1] : normal S1 [Normal S2] : normal S2 [No Murmur] : no murmurs heard [2+] : left 2+ [No Abnormalities] : the abdominal aorta was not enlarged and no bruit was heard [No Pitting Edema] : no pitting edema present [S3] : no S3 [S4] : no S4 [Right Carotid Bruit] : no bruit heard over the right carotid [Right Femoral Bruit] : no bruit heard over the right femoral artery [Left Carotid Bruit] : no bruit heard over the left carotid [Left Femoral Bruit] : no bruit heard over the left femoral artery

## 2019-03-22 ENCOUNTER — INPATIENT (INPATIENT)
Facility: HOSPITAL | Age: 64
LOS: 0 days | Discharge: HOME | DRG: 580 | End: 2019-03-23
Attending: SURGERY | Admitting: SURGERY
Payer: MEDICAID

## 2019-03-22 ENCOUNTER — RESULT REVIEW (OUTPATIENT)
Age: 64
End: 2019-03-22

## 2019-03-22 VITALS
HEIGHT: 67 IN | HEART RATE: 71 BPM | SYSTOLIC BLOOD PRESSURE: 126 MMHG | WEIGHT: 169.76 LBS | OXYGEN SATURATION: 97 % | TEMPERATURE: 97 F | DIASTOLIC BLOOD PRESSURE: 85 MMHG | RESPIRATION RATE: 16 BRPM

## 2019-03-22 DIAGNOSIS — C50.919 MALIGNANT NEOPLASM OF UNSPECIFIED SITE OF UNSPECIFIED FEMALE BREAST: ICD-10-CM

## 2019-03-22 DIAGNOSIS — E07.89 OTHER SPECIFIED DISORDERS OF THYROID: Chronic | ICD-10-CM

## 2019-03-22 DIAGNOSIS — Z98.890 OTHER SPECIFIED POSTPROCEDURAL STATES: Chronic | ICD-10-CM

## 2019-03-22 DIAGNOSIS — D48.62 NEOPLASM OF UNCERTAIN BEHAVIOR OF LEFT BREAST: ICD-10-CM

## 2019-03-22 DIAGNOSIS — D05.01 LOBULAR CARCINOMA IN SITU OF RIGHT BREAST: ICD-10-CM

## 2019-03-22 DIAGNOSIS — C50.411 MALIGNANT NEOPLASM OF UPPER-OUTER QUADRANT OF RIGHT FEMALE BREAST: ICD-10-CM

## 2019-03-22 DIAGNOSIS — Z95.820 PERIPHERAL VASCULAR ANGIOPLASTY STATUS WITH IMPLANTS AND GRAFTS: Chronic | ICD-10-CM

## 2019-03-22 DIAGNOSIS — Z17.0 ESTROGEN RECEPTOR POSITIVE STATUS [ER+]: ICD-10-CM

## 2019-03-22 LAB
GLUCOSE BLDC GLUCOMTR-MCNC: 127 MG/DL — HIGH (ref 70–99)
GLUCOSE BLDC GLUCOMTR-MCNC: 247 MG/DL — HIGH (ref 70–99)
GLUCOSE BLDC GLUCOMTR-MCNC: 91 MG/DL — SIGNIFICANT CHANGE UP (ref 70–99)

## 2019-03-22 PROCEDURE — 88334 PATH CONSLTJ SURG CYTO XM EA: CPT | Mod: 26

## 2019-03-22 PROCEDURE — 88305 TISSUE EXAM BY PATHOLOGIST: CPT | Mod: 26

## 2019-03-22 PROCEDURE — 88307 TISSUE EXAM BY PATHOLOGIST: CPT | Mod: 26

## 2019-03-22 PROCEDURE — 88333 PATH CONSLTJ SURG CYTO XM 1: CPT | Mod: 26

## 2019-03-22 RX ORDER — SODIUM CHLORIDE 9 MG/ML
1000 INJECTION, SOLUTION INTRAVENOUS
Qty: 0 | Refills: 0 | Status: DISCONTINUED | OUTPATIENT
Start: 2019-03-22 | End: 2019-03-22

## 2019-03-22 RX ORDER — GLUCAGON INJECTION, SOLUTION 0.5 MG/.1ML
1 INJECTION, SOLUTION SUBCUTANEOUS ONCE
Qty: 0 | Refills: 0 | Status: DISCONTINUED | OUTPATIENT
Start: 2019-03-22 | End: 2019-03-23

## 2019-03-22 RX ORDER — SODIUM CHLORIDE 9 MG/ML
1000 INJECTION, SOLUTION INTRAVENOUS
Qty: 0 | Refills: 0 | Status: DISCONTINUED | OUTPATIENT
Start: 2019-03-22 | End: 2019-03-23

## 2019-03-22 RX ORDER — DILTIAZEM HCL 120 MG
240 CAPSULE, EXT RELEASE 24 HR ORAL DAILY
Qty: 0 | Refills: 0 | Status: DISCONTINUED | OUTPATIENT
Start: 2019-03-22 | End: 2019-03-23

## 2019-03-22 RX ORDER — LEVOTHYROXINE SODIUM 125 MCG
137 TABLET ORAL DAILY
Qty: 0 | Refills: 0 | Status: DISCONTINUED | OUTPATIENT
Start: 2019-03-22 | End: 2019-03-23

## 2019-03-22 RX ORDER — DEXTROSE 50 % IN WATER 50 %
12.5 SYRINGE (ML) INTRAVENOUS ONCE
Qty: 0 | Refills: 0 | Status: DISCONTINUED | OUTPATIENT
Start: 2019-03-22 | End: 2019-03-23

## 2019-03-22 RX ORDER — DEXTROSE 50 % IN WATER 50 %
25 SYRINGE (ML) INTRAVENOUS ONCE
Qty: 0 | Refills: 0 | Status: DISCONTINUED | OUTPATIENT
Start: 2019-03-22 | End: 2019-03-23

## 2019-03-22 RX ORDER — OXYCODONE HYDROCHLORIDE 5 MG/1
5 TABLET ORAL EVERY 6 HOURS
Qty: 0 | Refills: 0 | Status: DISCONTINUED | OUTPATIENT
Start: 2019-03-22 | End: 2019-03-23

## 2019-03-22 RX ORDER — ONDANSETRON 8 MG/1
4 TABLET, FILM COATED ORAL ONCE
Qty: 0 | Refills: 0 | Status: DISCONTINUED | OUTPATIENT
Start: 2019-03-22 | End: 2019-03-22

## 2019-03-22 RX ORDER — OXYCODONE HYDROCHLORIDE 5 MG/1
10 TABLET ORAL EVERY 6 HOURS
Qty: 0 | Refills: 0 | Status: DISCONTINUED | OUTPATIENT
Start: 2019-03-22 | End: 2019-03-23

## 2019-03-22 RX ORDER — ATORVASTATIN CALCIUM 80 MG/1
20 TABLET, FILM COATED ORAL AT BEDTIME
Qty: 0 | Refills: 0 | Status: DISCONTINUED | OUTPATIENT
Start: 2019-03-22 | End: 2019-03-23

## 2019-03-22 RX ORDER — METFORMIN HYDROCHLORIDE 850 MG/1
1 TABLET ORAL
Qty: 0 | Refills: 0 | COMMUNITY

## 2019-03-22 RX ORDER — HYDROMORPHONE HYDROCHLORIDE 2 MG/ML
1 INJECTION INTRAMUSCULAR; INTRAVENOUS; SUBCUTANEOUS
Qty: 0 | Refills: 0 | Status: DISCONTINUED | OUTPATIENT
Start: 2019-03-22 | End: 2019-03-22

## 2019-03-22 RX ORDER — HYDROMORPHONE HYDROCHLORIDE 2 MG/ML
0.5 INJECTION INTRAMUSCULAR; INTRAVENOUS; SUBCUTANEOUS
Qty: 0 | Refills: 0 | Status: DISCONTINUED | OUTPATIENT
Start: 2019-03-22 | End: 2019-03-22

## 2019-03-22 RX ORDER — MORPHINE SULFATE 50 MG/1
2 CAPSULE, EXTENDED RELEASE ORAL EVERY 4 HOURS
Qty: 0 | Refills: 0 | Status: DISCONTINUED | OUTPATIENT
Start: 2019-03-22 | End: 2019-03-23

## 2019-03-22 RX ORDER — INSULIN LISPRO 100/ML
VIAL (ML) SUBCUTANEOUS
Qty: 0 | Refills: 0 | Status: DISCONTINUED | OUTPATIENT
Start: 2019-03-22 | End: 2019-03-23

## 2019-03-22 RX ORDER — CEFAZOLIN SODIUM 1 G
2000 VIAL (EA) INJECTION EVERY 8 HOURS
Qty: 0 | Refills: 0 | Status: COMPLETED | OUTPATIENT
Start: 2019-03-22 | End: 2019-03-23

## 2019-03-22 RX ORDER — SPIRONOLACTONE 25 MG/1
25 TABLET, FILM COATED ORAL DAILY
Qty: 0 | Refills: 0 | Status: DISCONTINUED | OUTPATIENT
Start: 2019-03-22 | End: 2019-03-23

## 2019-03-22 RX ORDER — DEXTROSE 50 % IN WATER 50 %
15 SYRINGE (ML) INTRAVENOUS ONCE
Qty: 0 | Refills: 0 | Status: DISCONTINUED | OUTPATIENT
Start: 2019-03-22 | End: 2019-03-23

## 2019-03-22 RX ORDER — METFORMIN HYDROCHLORIDE 850 MG/1
500 TABLET ORAL DAILY
Qty: 0 | Refills: 0 | Status: DISCONTINUED | OUTPATIENT
Start: 2019-03-22 | End: 2019-03-22

## 2019-03-22 RX ORDER — HYDROCHLOROTHIAZIDE 25 MG
12.5 TABLET ORAL DAILY
Qty: 0 | Refills: 0 | Status: DISCONTINUED | OUTPATIENT
Start: 2019-03-22 | End: 2019-03-23

## 2019-03-22 RX ORDER — ASPIRIN/CALCIUM CARB/MAGNESIUM 324 MG
81 TABLET ORAL DAILY
Qty: 0 | Refills: 0 | Status: DISCONTINUED | OUTPATIENT
Start: 2019-03-22 | End: 2019-03-23

## 2019-03-22 RX ORDER — DOCUSATE SODIUM 100 MG
100 CAPSULE ORAL THREE TIMES A DAY
Qty: 0 | Refills: 0 | Status: DISCONTINUED | OUTPATIENT
Start: 2019-03-22 | End: 2019-03-23

## 2019-03-22 RX ADMIN — SODIUM CHLORIDE 50 MILLILITER(S): 9 INJECTION, SOLUTION INTRAVENOUS at 10:39

## 2019-03-22 RX ADMIN — OXYCODONE HYDROCHLORIDE 5 MILLIGRAM(S): 5 TABLET ORAL at 23:05

## 2019-03-22 RX ADMIN — Medication 100 MILLIGRAM(S): at 22:03

## 2019-03-22 RX ADMIN — Medication 0.1 MILLIGRAM(S): at 22:01

## 2019-03-22 RX ADMIN — Medication 100 MILLIGRAM(S): at 22:02

## 2019-03-22 RX ADMIN — OXYCODONE HYDROCHLORIDE 5 MILLIGRAM(S): 5 TABLET ORAL at 22:06

## 2019-03-22 RX ADMIN — ATORVASTATIN CALCIUM 20 MILLIGRAM(S): 80 TABLET, FILM COATED ORAL at 22:00

## 2019-03-22 NOTE — ASU PATIENT PROFILE, ADULT - REASON FOR ADMISSION, PROFILE
"Right breast lump removed with lymph node biopsy" "Right breast lump removed with lymph node biopsy" and left breast biopsy

## 2019-03-22 NOTE — ASU DISCHARGE PLAN (ADULT/PEDIATRIC) - CARE PROVIDER_API CALL
Selina Knight)  [facility]  [address], [address]  [place] [address]  Phone: [phone]  Fax: [phone]  Follow Up Time: 1 week

## 2019-03-22 NOTE — BRIEF OPERATIVE NOTE - NSICDXBRIEFPROCEDURE_GEN_ALL_CORE_FT
PROCEDURES:  Biopsy of right axillary sentinel nodes 22-Mar-2019 10:47:37  Jazmyn Fu  Wide excision of mass of both breasts with wire guidance 22-Mar-2019 10:47:17  Jazmyn Fu

## 2019-03-22 NOTE — BRIEF OPERATIVE NOTE - OPERATION/FINDINGS
see operative report. pathology pending see operative report. pathology pending positive sentinel lymph node

## 2019-03-22 NOTE — PATIENT PROFILE ADULT - OVER THE PAST TWO WEEKS HAVE YOU FELT DOWN, DEPRESSED OR HOPELESS?
no/denies when asked question noted PNH of anxiety and depression and take meds for it prn as per patient.

## 2019-03-22 NOTE — ASU PATIENT PROFILE, ADULT - VISION (WITH CORRECTIVE LENSES IF THE PATIENT USUALLY WEARS THEM):
reading and driving  glasses/Normal vision: sees adequately in most situations; can see medication labels, newsprint

## 2019-03-22 NOTE — ASU DISCHARGE PLAN (ADULT/PEDIATRIC) - CALL YOUR DOCTOR IF YOU HAVE ANY OF THE FOLLOWING:
Numbness, tingling, color or temperature change to extremity/Pain not relieved by Medications/Fever greater than (need to indicate Fahrenheit or Celsius)/Wound/Surgical Site with redness, or foul smelling discharge or pus/Bleeding that does not stop/Swelling that gets worse

## 2019-03-22 NOTE — ASU PATIENT PROFILE, ADULT - PSH
H/O bilateral breast biopsy  November 2018 and December 2018  H/O total thyroidectomy    S/P angioplasty with stent  drug eluting stent in first diagonal on 2/21/19  S/P dilation and curettage  TOP x2

## 2019-03-23 ENCOUNTER — TRANSCRIPTION ENCOUNTER (OUTPATIENT)
Age: 64
End: 2019-03-23

## 2019-03-23 ENCOUNTER — EMERGENCY (EMERGENCY)
Facility: HOSPITAL | Age: 64
LOS: 1 days | Discharge: HOME | End: 2019-03-23
Attending: EMERGENCY MEDICINE | Admitting: EMERGENCY MEDICINE
Payer: MEDICAID

## 2019-03-23 VITALS
HEART RATE: 100 BPM | DIASTOLIC BLOOD PRESSURE: 104 MMHG | WEIGHT: 170.86 LBS | OXYGEN SATURATION: 96 % | HEIGHT: 67 IN | SYSTOLIC BLOOD PRESSURE: 155 MMHG | TEMPERATURE: 98 F | RESPIRATION RATE: 18 BRPM

## 2019-03-23 VITALS
WEIGHT: 175.49 LBS | DIASTOLIC BLOOD PRESSURE: 90 MMHG | TEMPERATURE: 98 F | HEART RATE: 81 BPM | SYSTOLIC BLOOD PRESSURE: 140 MMHG | OXYGEN SATURATION: 96 % | RESPIRATION RATE: 14 BRPM

## 2019-03-23 VITALS
OXYGEN SATURATION: 99 % | HEART RATE: 89 BPM | DIASTOLIC BLOOD PRESSURE: 98 MMHG | SYSTOLIC BLOOD PRESSURE: 159 MMHG | RESPIRATION RATE: 17 BRPM

## 2019-03-23 DIAGNOSIS — E07.89 OTHER SPECIFIED DISORDERS OF THYROID: Chronic | ICD-10-CM

## 2019-03-23 DIAGNOSIS — Z98.890 OTHER SPECIFIED POSTPROCEDURAL STATES: Chronic | ICD-10-CM

## 2019-03-23 DIAGNOSIS — Z48.03 ENCOUNTER FOR CHANGE OR REMOVAL OF DRAINS: ICD-10-CM

## 2019-03-23 DIAGNOSIS — Z95.820 PERIPHERAL VASCULAR ANGIOPLASTY STATUS WITH IMPLANTS AND GRAFTS: Chronic | ICD-10-CM

## 2019-03-23 LAB
GLUCOSE BLDC GLUCOMTR-MCNC: 149 MG/DL — HIGH (ref 70–99)
GLUCOSE BLDC GLUCOMTR-MCNC: 163 MG/DL — HIGH (ref 70–99)
HBA1C BLD-MCNC: 6.1 % — HIGH (ref 4–5.6)

## 2019-03-23 PROCEDURE — 99282 EMERGENCY DEPT VISIT SF MDM: CPT

## 2019-03-23 RX ORDER — TRAMADOL HYDROCHLORIDE 50 MG/1
1 TABLET ORAL
Qty: 18 | Refills: 0 | OUTPATIENT
Start: 2019-03-23 | End: 2019-03-25

## 2019-03-23 RX ORDER — TRAMADOL HYDROCHLORIDE 50 MG/1
1 TABLET ORAL
Qty: 18 | Refills: 0
Start: 2019-03-23 | End: 2019-03-25

## 2019-03-23 RX ADMIN — SODIUM CHLORIDE 70 MILLILITER(S): 9 INJECTION, SOLUTION INTRAVENOUS at 08:00

## 2019-03-23 RX ADMIN — Medication 137 MICROGRAM(S): at 05:19

## 2019-03-23 RX ADMIN — Medication 12.5 MILLIGRAM(S): at 05:19

## 2019-03-23 RX ADMIN — SPIRONOLACTONE 25 MILLIGRAM(S): 25 TABLET, FILM COATED ORAL at 05:20

## 2019-03-23 RX ADMIN — Medication 0.1 MILLIGRAM(S): at 05:18

## 2019-03-23 RX ADMIN — SODIUM CHLORIDE 70 MILLILITER(S): 9 INJECTION, SOLUTION INTRAVENOUS at 05:15

## 2019-03-23 RX ADMIN — Medication 240 MILLIGRAM(S): at 05:20

## 2019-03-23 RX ADMIN — Medication 100 MILLIGRAM(S): at 05:15

## 2019-03-23 RX ADMIN — Medication 1: at 12:31

## 2019-03-23 RX ADMIN — Medication 81 MILLIGRAM(S): at 12:31

## 2019-03-23 NOTE — ED PROVIDER NOTE - PHYSICAL EXAMINATION
b/l breast - incisiton site- s/p surgery c/d/- no signs of infection, DAVID drain in place, draining, with about 25 cc, no surrounding redness

## 2019-03-23 NOTE — PROGRESS NOTE ADULT - SUBJECTIVE AND OBJECTIVE BOX
Patient awake and alert; no complaints  Afebrile vital signs stable  PE: bilateral dressings intact and clean and dry; DAVID drainage serosanguinous  Imp: patient appears stable  Plan : patient may be discharged today. Patient requires home care for DAVID drain care and dry sterile dressing changes. When drainage is less than 30cc in 24 hours patient was advised to call the office for drain removal and follow up visit.

## 2019-03-23 NOTE — DISCHARGE NOTE PROVIDER - CARE PROVIDER_API CALL
Selina Knight)  [employer] [facility]  [address], [address]  [place] [address]  Phone: [phone]  Fax: [phone]  Follow Up Time: 1 week

## 2019-03-23 NOTE — ED PROVIDER NOTE - PROGRESS NOTE DETAILS
paged pt surgeon - Dr. Wilson- awaiting call back. Spoke to pt surgeon- Dr. Wilson- reports pt was discharged this morning, draining well. there will be intermittent drainage. If draining no more than 20- 30 cc in 24 hours then call surgeon. Pt understands and stable for discharge.

## 2019-03-23 NOTE — DISCHARGE NOTE NURSING/CASE MANAGEMENT/SOCIAL WORK - NSDCDPATPORTLINK_GEN_ALL_CORE
You can access the [facility] Patient Portal, offered by [facility], by registering with the following website: [email or web address]

## 2019-03-23 NOTE — ED PROVIDER NOTE - NS ED ROS FT
Except as otherwise indicated in HPI:  CONSTITUTIONAL: Neg  HEENT: neg  CV: neg  Resp: neg  GI: Neg  : Neg  MSK: Neg  SKIN: Neg  NEURO: Neg  PSYCHIATRIC: Neg  Heme/Onc: Neg

## 2019-03-23 NOTE — ED PROVIDER NOTE - OBJECTIVE STATEMENT
63 yr old female with hx of anxiety, HTN, HLD, breast cancer s/p right axillary dissection with DAVID drain placed yesterday, discharged this morning, presents concern DAVID drain is not draining. Pt denies any fever, chills, n/v/d, chest pain, sob or any other symptoms at this time.

## 2019-03-23 NOTE — ED ADULT NURSE NOTE - NSIMPLEMENTINTERV_GEN_ALL_ED
Implemented All Universal Safety Interventions:  [place] to call system. Call bell, personal items and telephone within reach. Instruct patient to call for assistance. Room bathroom lighting operational. Non-slip footwear when patient is off stretcher. Physically safe environment: no spills, clutter or unnecessary equipment. Stretcher in lowest position, wheels locked, appropriate side rails in place.

## 2019-03-23 NOTE — ED PROVIDER NOTE - PMH
Anxiety    Breast CA  right  Cancer of thyroid  unsure of dates but prior to 2005  Cognitive impairment    Coronary artery disease    History of adrenal adenoma    HLD (hyperlipidemia)    HTN (hypertension)    Hypothyroid    Osteoarthritis    Poor historian    Type 2 diabetes mellitus    Urinary frequency

## 2019-03-23 NOTE — ED PROVIDER NOTE - NSFOLLOWUPINSTRUCTIONS_ED_ALL_ED_FT
Follow up with your surgeon as scheduled.   If DAVID drain is not draining more than 20- 30 cc in 24 hours- then call your surgeon for appointment in office.  Return sooner if any signs of infection- redness, swelling, fever, chills, chest pain, sob.

## 2019-03-23 NOTE — DISCHARGE NOTE PROVIDER - NSDCCPCAREPLAN_GEN_ALL_CORE_FT
PRINCIPAL DISCHARGE DIAGNOSIS  Diagnosis: Breast cancer  Assessment and Plan of Treatment: s/p RIGHT wide breast resection with axillary dissection, LEFT excisional biopsy

## 2019-03-23 NOTE — ED ADULT NURSE NOTE - OBJECTIVE STATEMENT
Pt arrived to ER because she was concerned her DAVID drain was not draining enough after her breast surgery.  On exam pt with 30 cc serous sangonoues drainage in DAVID drain.  Pt very anxious.  Pt dressing to breast redone, pt educated on appropriate amount of drainage.  Pt denies any pain.

## 2019-03-23 NOTE — DISCHARGE NOTE PROVIDER - HOSPITAL COURSE
64 y/o female with h/o RIGHT breast cancer underwent needle localized excision of b/l breast masses and RIGHT sentinel node biopsy on 3/22/2019. Pt tolerated procedure well. Discharge on 3/23/2019 with home care for dressing and DAVID care.

## 2019-03-23 NOTE — ED PROVIDER NOTE - CLINICAL SUMMARY MEDICAL DECISION MAKING FREE TEXT BOX
63 yr old female with hx of anxiety, HTN, HLD, breast cancer s/p right axillary dissection with DAVID drain placed yesterday, discharged this morning, presents concern DAVID drain is not draining. Pt denies any fever, chills, n/v/d, chest pain, sob or any other symptoms at this time.- no signs of infection, DAVID draining well on exam, will contact surgeon and likely discharge

## 2019-03-23 NOTE — ED ADULT TRIAGE NOTE - CADM TRG TX PRIOR TO ARRIVAL
Reason For Visit  KATIE CORCORAN is an established patient here today for a consultation 2 week follow up.   :  services not used.   A chaperone is not applicable. She is unaccompanied.        Quality    Adult Wellness CI height documented, discussion of regular exercise, exercising regularly, printed information given for activities, discussion of nutritional quality of diet and patient education given about proper diet.      History of Present Illness  Patient is a 36 year old female who returns to the clinic for a follow-up.  She was seen as a new patient about 2 weeks ago.   She notes that her thrush is better, and she treated herself for the scabies.  Today patient would like to have her pap done. She thinks that her last one was done about 2-3 years ago. Patient notes that she has a history of HPV, and that she has had Cryo surgery for this in the past. Patient also notes that her Grandmother and Mother both had breast CA, and that she has had a \"whitish\" nipple discharge for years. Patient has 3 children. Patient notes regular periods, typically heavy for one day, then light for a few days. + sexually active, some discomfort with deep penetration. Patient also notes that she has stress incontinence, for which she wears pads, and she thinks she may have a \"rectocele\" because she can feel her intestines bulging into her vaginal area, and feels that she needs to \"press on them to have a BM\".      Review of Systems    Const: no fever.   ENT:. Per HPI.   Breast:. Per HPI.   :. Per HPI.       Allergies  Seasonale TABS    Current Meds   1. Clotrimazole 10 MG Mouth/Throat Lozenge; ALLOW 1 DENISE TO SLOWLY DISSOLVE   IN MOUTH ONCE DAILY;   Therapy: [id number] to (Evaluate:11Nov2018)  Requested for: 61Lsf6895; Last   Rx:[id number] Ordered   2. Nitrofurantoin Monohyd Macro 100 MG Oral Capsule; TAKE 1 CAPSULE EVERY 12   HOURS DAILY;   Therapy: 09Sbf6543 to (Evaluate:91Rss2756)  Requested for: 98Fmg1783;  Last   Rx:[id number] Ordered   3. Permethrin 5 % External Cream; MASSAGE INTO SKIN FROM HEAD TO SOLES OF   FEET. WASH OFF AFTER 8-14 HOURS;   Therapy: 40Dts7689 to (Last Rx:46Dhu2947)  Requested for: 95Odp5256 Ordered    Active Problems  Hematuria (R31.9)  Reflux gastritis (K29.60)  Scabies exposure (Z20.89)  Urinary frequency (R35.0)    Past Medical History  Dysuria (R30.0)  History of Pap smear abnormality of cervix/human papillomavirus (HPV) positive  (R87.89)    Surgical History  History of Cholecystectomy  History of Renal Lithotripsy    Family History  Mother   Family history of Alcohol abuse  Family history of cerebrovascular accident (CVA) (Z82.3)  Father   Family history of diabetes mellitus (Z83.3)  Family history of mitral valve prolapse (Z82.49)  Maternal Grandmother   Family history of malignant neoplasm of breast (Z80.3)    Vitals  Signs   Recorded: 42Buq8551 11:26AM   Weight: 226 lb 15.36 oz  BMI Calculated: 38.96  BSA Calculated: 2.06  Systolic: 126, LUE, Sitting  Diastolic: 88, LUE, Sitting  Temperature: 98.8 F  Heart Rate: 94  Respiration: 16  O2 Saturation: 95  LMP: [id number]    Physical Exam  Constitutional: alert, in no acute distress and current vital signs reviewed . obese.   Head and Face: atraumatic, no deformities, normocephalic, normal facies.   Eyes: no discharge. pupils equal, round and reactive to light and accommodation and extraocular movements were intact.   ENT: normal appearing outer ear, normal appearing nose. examination of the tympanic membrane showed normal landmarks, normal appearing external canal. no nasal discharge. normal lips. oral mucosa pink and moist, no oral lesions, tonsils not enlarged, normal appearing pharynx, normal appearing tongue.   Neck: normal appearing neck and supple neck. thyroid not enlarged.   Lymphatic: Lymph Nodes: no anterior cervical node enlargement and no posterior cervical node enlargement.   Chest: Nipple exam revealed a milky discharge, but  normal appearance. Right breast - No masses palpated in the right breast.Left breast - No masses palpated in the left breast. Right normal axillary node. Left normal axillary node.   Pulmonary: no respiratory distress, normal respiratory rate and effort and no accessory muscle use. breath sounds clear to auscultation bilaterally.   Cardiovascular: normal rate, no murmurs were heard, regular rhythm, normal S1 and normal S2. no thrill. edema was not present in the lower extremities.   Abdomen: soft, nontender, nondistended and normal bowel sounds . difficult exam due to patient habitus.   Musculoskeletal: normal gait.   Genitourinary: External genitalia: normal and normal hair distribution. Labia majora: normal. Labia minora: normal. Vagina: rectocele and cystocele, but normal. A Pap smear was performed. Bimanual exam findings include no cervical motion tenderness. difficult exam due to patient habitus. the adnexa was not tender.   Psychiatric: alert and awake, interactive and mood/affect were appropriate. judgement not impaired.   Skin, Hair, Nails: normal skin color and pigmentation and no rash.      Assessment  Encounter for preventive health examination (Z00.00)  Nipple discharge in female (N64.52)  Cervical cancer screening (Z12.4)  Well woman exam with routine gynecological exam (Z01.419)    Plan  MA FFDM DIAGNOSTIC URI W YESSY; Status:Active - Perform Order; Requested  for:62Pvf4210;   PAP TEST WITH HIGH RISK HPV; Status:Active; Requested for:64Cyv7870;   : CERVICAL  Plans:     Plan:   Diagnostic mammogram due to family history and bilateral nipple discharge  RT GYNE for questionable rectocele/cystocele  Pap done today  Discussed weight loss  Will discuss need for fasting labs when I call with results of pap.   Follow-up in the office as needed.   Medical compliance with plan discussed and risks of non-compliance reviewed.    Patient education completed on disease process, etiology & prognosis.    Patient  expresses understanding of the plan.    Refer to orders.    Return to clinic as clinically indicated as discussed with patient who verbalized understanding of & agreement with the plan.      Signatures   Electronically signed by : Patricia Rowley, ; Sep 26 2018 11:29AM CST    Electronically signed by : KELSEY SEVILLA; Sep 26 2018  4:16PM CST     none

## 2019-03-23 NOTE — ED ADULT NURSE NOTE - CHIEF COMPLAINT QUOTE
I had a surgery here a few days ago. My DAVID drain on my right breast stopped draining blood so I just want to come here to get checked out and know what I am supposed to do next.

## 2019-03-23 NOTE — ED ADULT TRIAGE NOTE - CHIEF COMPLAINT QUOTE
I had a surgery here a few days ago. My DAVID drain on my right breast stopped draining blood so I just want to come here to get checked out and know what I am supposed to do next. "I had a surgery here a few days ago. My DAVID drain on my right breast stopped draining blood so I just want to come here to get checked out and know what I am supposed to do next."

## 2019-03-24 PROBLEM — R35.0 FREQUENCY OF MICTURITION: Chronic | Status: ACTIVE | Noted: 2019-03-14

## 2019-03-24 PROBLEM — Z78.9 OTHER SPECIFIED HEALTH STATUS: Chronic | Status: ACTIVE | Noted: 2019-03-14

## 2019-03-24 PROBLEM — I25.10 ATHEROSCLEROTIC HEART DISEASE OF NATIVE CORONARY ARTERY WITHOUT ANGINA PECTORIS: Chronic | Status: ACTIVE | Noted: 2019-03-14

## 2019-03-24 PROBLEM — Z86.018 PERSONAL HISTORY OF OTHER BENIGN NEOPLASM: Chronic | Status: ACTIVE | Noted: 2019-03-14

## 2019-03-24 PROBLEM — E11.9 TYPE 2 DIABETES MELLITUS WITHOUT COMPLICATIONS: Chronic | Status: ACTIVE | Noted: 2019-03-14

## 2019-03-24 PROBLEM — R41.89 OTHER SYMPTOMS AND SIGNS INVOLVING COGNITIVE FUNCTIONS AND AWARENESS: Chronic | Status: ACTIVE | Noted: 2019-03-14

## 2019-03-24 PROBLEM — M19.90 UNSPECIFIED OSTEOARTHRITIS, UNSPECIFIED SITE: Chronic | Status: ACTIVE | Noted: 2019-03-14

## 2019-03-25 ENCOUNTER — CLINICAL ADVICE (OUTPATIENT)
Age: 64
End: 2019-03-25

## 2019-03-26 ENCOUNTER — CLINICAL ADVICE (OUTPATIENT)
Age: 64
End: 2019-03-26

## 2019-03-27 ENCOUNTER — CLINICAL ADVICE (OUTPATIENT)
Age: 64
End: 2019-03-27

## 2019-03-27 LAB — SURGICAL PATHOLOGY STUDY: SIGNIFICANT CHANGE UP

## 2019-04-05 ENCOUNTER — LABORATORY RESULT (OUTPATIENT)
Age: 64
End: 2019-04-05

## 2019-04-05 ENCOUNTER — APPOINTMENT (OUTPATIENT)
Dept: HEMATOLOGY ONCOLOGY | Facility: CLINIC | Age: 64
End: 2019-04-05
Payer: COMMERCIAL

## 2019-04-05 ENCOUNTER — OUTPATIENT (OUTPATIENT)
Dept: OUTPATIENT SERVICES | Facility: HOSPITAL | Age: 64
LOS: 1 days | Discharge: HOME | End: 2019-04-05

## 2019-04-05 ENCOUNTER — RESULT REVIEW (OUTPATIENT)
Age: 64
End: 2019-04-05

## 2019-04-05 VITALS
WEIGHT: 163.8 LBS | OXYGEN SATURATION: 99 % | RESPIRATION RATE: 20 BRPM | TEMPERATURE: 97.9 F | BODY MASS INDEX: 25.65 KG/M2 | HEART RATE: 85 BPM | SYSTOLIC BLOOD PRESSURE: 124 MMHG | DIASTOLIC BLOOD PRESSURE: 84 MMHG

## 2019-04-05 DIAGNOSIS — E07.89 OTHER SPECIFIED DISORDERS OF THYROID: Chronic | ICD-10-CM

## 2019-04-05 DIAGNOSIS — Z95.5 PRESENCE OF CORONARY ANGIOPLASTY IMPLANT AND GRAFT: ICD-10-CM

## 2019-04-05 DIAGNOSIS — Z95.820 PERIPHERAL VASCULAR ANGIOPLASTY STATUS WITH IMPLANTS AND GRAFTS: Chronic | ICD-10-CM

## 2019-04-05 DIAGNOSIS — Z98.890 OTHER SPECIFIED POSTPROCEDURAL STATES: Chronic | ICD-10-CM

## 2019-04-05 DIAGNOSIS — C50.919 MALIGNANT NEOPLASM OF UNSPECIFIED SITE OF UNSPECIFIED FEMALE BREAST: ICD-10-CM

## 2019-04-05 PROCEDURE — 99205 OFFICE O/P NEW HI 60 MIN: CPT

## 2019-04-05 NOTE — REASON FOR VISIT
[Initial Consultation] : an initial consultation [Other: _____] : [unfilled] [FreeTextEntry2] : breast cancer

## 2019-04-05 NOTE — PHYSICAL EXAM
[Normal] : normal spine exam without palpable tenderness, no kyphosis or scoliosis [de-identified] : non-toxic appearing [de-identified] : Surgical incisions clean, dry, intact. No discrete mass in either breast. No nipple discharge. [de-identified] : No gross focal motor deficits.

## 2019-04-05 NOTE — CONSULT LETTER
[Dear  ___] : Dear  [unfilled], [Consult Letter:] : I had the pleasure of evaluating your patient, [unfilled]. [Please see my note below.] : Please see my note below. [Consult Closing:] : Thank you very much for allowing me to participate in the care of this patient.  If you have any questions, please do not hesitate to contact me. [Sincerely,] : Sincerely, [DrLeanne  ___] : Dr. BINGHAM [FreeTextEntry3] : Caitlin Franco M.D. [DrLeanne ___] : Dr. BINGHAM

## 2019-04-05 NOTE — REVIEW OF SYSTEMS
[Patient Intake Form Reviewed] : Patient intake form was reviewed [Negative] : Allergic/Immunologic [de-identified] : breast wounds healing [de-identified] : poor memory

## 2019-04-05 NOTE — ASSESSMENT
[FreeTextEntry1] : Stage IIB right breast cancer (pending further EOD evaluation with PET/CT scan planned)-status post breast conservation surgery/axillary lymph node dissection. Appears to be healing well from her surgery. Discussed diagnosis/prognosis and treatment options, pending PET/CT results. \par Recommend chemotherapy (2 lymph nodes positive for metastatic disease, one 2.5 cm with extranodal extension). Discussed chemotherapy alternatives with their respective benefits, and side effects. Patient is status post coronary artery stents placement last month. Holding on Adriamycin in regimen at this time. Patient consents to Taxotere/Cytoxan-potential side effects explained including but not limited to, alopecia, nausea/vomiting, fluid retention, bone marrow toxicity. Following chemotherapy, recommend adjuvant radiation and then endocrine therapy.\par Patient was given the opportunity to ask questions. Her questions have been answered to her satisfaction at this time.\par I left a message for patient's brother Fahad to call me so that I may discuss her care plans with him.

## 2019-04-05 NOTE — HISTORY OF PRESENT ILLNESS
[Disease: _____________________] : Disease: [unfilled] [T: ___] : T[unfilled] [N: ___] : N[unfilled] [AJCC Stage: ____] : AJCC Stage: [unfilled] [de-identified] : 10/2018-Patient had her routine annual breast screening, which showed a focal area of asymmetric density noted mammographically in the right breast corresponding to a 2.7 cm, lobulated, slightly ill-defined, hypoechoic lesion with an area of echogenicity on ultrasound. Additional 4 mm irregular, hypoechoic lesion was noted in the right breast. \par 11/2018-Right breast core biopsy revealed invasive ductal carcinoma with papillary features-ER positive (100%, strong average intensity of staining), progesterone receptor positive (50% of cells with strong average intensity of staining), HER-2/yojana negative. Focus suspicious for lymphovascular invasion. Second right breast biopsy revealed stromal fibrosis.\par 11/2018-breast MRI showed the right upper outer breast irregular enhancing mass with adjacent non-mass enhancement. Grouped small masses and foci were seen inferior to the site of biopsy proven malignancy in the upper outer right breast, the largest of which measured 0.7 cm, suspicious for small satellite lesions. A 0.4 cm, area of non-mass enhancement was seen in the posterior left breast, indeterminate-biopsy favored sclerosing intraductal papilloma.\par 3/2019-Patient underwent a right breast lumpectomy (upper outer quadrant) which revealed invasive ductal carcinoma with focal papillary and mucinous features, multifocal with largest focus measuring 2.1 cm. The remaining foci ranged from less than 0.1-0.8 cm. Extensive ductal carcinoma in situ noted. Resection margins were negative. 2 sentinel lymph nodes were positive for metastatic carcinoma with the largest metastatic focus measuring approximately 2.5 cm with focal extranodal extension.6 additional lymph nodes were negative for metastatic carcinoma. Right lower inner quadrant mammo-localized wide resection revealed lobular carcinoma in situ. Left breast mammo-localized excisional biopsy revealed extensive complex sclerosing lesions, intraductal papilloma and sclerosis.\par Patient has healed well from her surgery. She sees breast surgeon Dr. Knight in followup again next week. No pulmonary/GI/ or bony complaints.\par Patient accompanied today by her friend Marisabel Juárez.\par  [de-identified] : Invasive ductal carcinoma with focal papillary and mucinous features

## 2019-04-08 ENCOUNTER — RX RENEWAL (OUTPATIENT)
Age: 64
End: 2019-04-08

## 2019-04-11 ENCOUNTER — OTHER (OUTPATIENT)
Age: 64
End: 2019-04-11

## 2019-04-16 NOTE — ED ADULT NURSE NOTE - CAS DISCH BELONGINGS RETURNED
PRE-SEDATION ASSESSMENT    CONSENT  Consent for procedure and sedation obtained: Yes    MEDICAL HISTORY  Significant medical/surgical history: No  Past Complications with Sedation/Anesthesia: No  Significant Family History: No  Smoking History: No  Alcohol/Drug abuse: No  Possible Pregnancy (LMP): No  Cardiac History: No  Respiratory History: No    PHYSICAL EXAM  History and Physical Reviewed: H&P completed today  Airway Risk History: No previous complications  Airway Anatomy : Class II  Heart : Normal  Lungs : Normal  LOC/Mental Status : Normal    OTHER FINDINGS  Reviewed current medications and allergies: Yes  Pertinent lab/diagnostic test reviewed: Yes    SEDATION RISK ASSESSMENT  Risk Status ASA: Class II - Normal patient with mild systemic disease  Plan for Sedation: Minimal Sedation  Indications for Procedure/Pre-Procedure Diagnosis and Planned Procedure: lumbar MBB  EKG Monitoring: Yes    NARRATIVE FINDINGS     
Not applicable

## 2019-04-17 ENCOUNTER — OTHER (OUTPATIENT)
Age: 64
End: 2019-04-17

## 2019-04-17 ENCOUNTER — MESSAGE (OUTPATIENT)
Age: 64
End: 2019-04-17

## 2019-04-18 ENCOUNTER — LABORATORY RESULT (OUTPATIENT)
Age: 64
End: 2019-04-18

## 2019-04-19 ENCOUNTER — APPOINTMENT (OUTPATIENT)
Dept: NUCLEAR MEDICINE | Facility: CLINIC | Age: 64
End: 2019-04-19
Payer: COMMERCIAL

## 2019-04-19 ENCOUNTER — OUTPATIENT (OUTPATIENT)
Dept: OUTPATIENT SERVICES | Facility: HOSPITAL | Age: 64
LOS: 1 days | End: 2019-04-19

## 2019-04-19 ENCOUNTER — APPOINTMENT (OUTPATIENT)
Dept: CT IMAGING | Facility: CLINIC | Age: 64
End: 2019-04-19
Payer: COMMERCIAL

## 2019-04-19 DIAGNOSIS — E07.89 OTHER SPECIFIED DISORDERS OF THYROID: Chronic | ICD-10-CM

## 2019-04-19 DIAGNOSIS — C50.911 MALIGNANT NEOPLASM OF UNSPECIFIED SITE OF RIGHT FEMALE BREAST: ICD-10-CM

## 2019-04-19 DIAGNOSIS — Z98.890 OTHER SPECIFIED POSTPROCEDURAL STATES: Chronic | ICD-10-CM

## 2019-04-19 DIAGNOSIS — Z95.820 PERIPHERAL VASCULAR ANGIOPLASTY STATUS WITH IMPLANTS AND GRAFTS: Chronic | ICD-10-CM

## 2019-04-19 PROCEDURE — 78306 BONE IMAGING WHOLE BODY: CPT | Mod: 26

## 2019-04-19 PROCEDURE — 71250 CT THORAX DX C-: CPT | Mod: 26

## 2019-04-19 PROCEDURE — 74176 CT ABD & PELVIS W/O CONTRAST: CPT | Mod: 26

## 2019-04-22 ENCOUNTER — APPOINTMENT (OUTPATIENT)
Dept: INFUSION THERAPY | Facility: HOSPITAL | Age: 64
End: 2019-04-22

## 2019-04-22 ENCOUNTER — RESULT REVIEW (OUTPATIENT)
Age: 64
End: 2019-04-22

## 2019-04-23 DIAGNOSIS — Z51.11 ENCOUNTER FOR ANTINEOPLASTIC CHEMOTHERAPY: ICD-10-CM

## 2019-04-23 DIAGNOSIS — E86.0 DEHYDRATION: ICD-10-CM

## 2019-04-23 DIAGNOSIS — Z51.89 ENCOUNTER FOR OTHER SPECIFIED AFTERCARE: ICD-10-CM

## 2019-04-23 DIAGNOSIS — R11.2 NAUSEA WITH VOMITING, UNSPECIFIED: ICD-10-CM

## 2019-04-30 ENCOUNTER — EMERGENCY (EMERGENCY)
Facility: HOSPITAL | Age: 64
LOS: 1 days | Discharge: HOME | End: 2019-04-30
Attending: INTERNAL MEDICINE | Admitting: INTERNAL MEDICINE
Payer: MEDICAID

## 2019-04-30 VITALS
HEART RATE: 96 BPM | DIASTOLIC BLOOD PRESSURE: 76 MMHG | OXYGEN SATURATION: 97 % | RESPIRATION RATE: 17 BRPM | SYSTOLIC BLOOD PRESSURE: 118 MMHG | TEMPERATURE: 98 F

## 2019-04-30 VITALS
RESPIRATION RATE: 17 BRPM | DIASTOLIC BLOOD PRESSURE: 78 MMHG | SYSTOLIC BLOOD PRESSURE: 113 MMHG | OXYGEN SATURATION: 96 % | HEIGHT: 67 IN | HEART RATE: 100 BPM | WEIGHT: 169.98 LBS | TEMPERATURE: 97 F

## 2019-04-30 DIAGNOSIS — R53.1 WEAKNESS: ICD-10-CM

## 2019-04-30 DIAGNOSIS — E07.89 OTHER SPECIFIED DISORDERS OF THYROID: Chronic | ICD-10-CM

## 2019-04-30 DIAGNOSIS — Z95.820 PERIPHERAL VASCULAR ANGIOPLASTY STATUS WITH IMPLANTS AND GRAFTS: Chronic | ICD-10-CM

## 2019-04-30 DIAGNOSIS — Z98.890 OTHER SPECIFIED POSTPROCEDURAL STATES: Chronic | ICD-10-CM

## 2019-04-30 LAB
ALBUMIN SERPL ELPH-MCNC: 2.9 G/DL — LOW (ref 3.3–5)
ALP SERPL-CCNC: 117 U/L — SIGNIFICANT CHANGE UP (ref 40–120)
ALT FLD-CCNC: 37 U/L DA — SIGNIFICANT CHANGE UP (ref 10–45)
ANION GAP SERPL CALC-SCNC: 11 MMOL/L — SIGNIFICANT CHANGE UP (ref 5–17)
AST SERPL-CCNC: 32 U/L — SIGNIFICANT CHANGE UP (ref 10–40)
BASOPHILS # BLD AUTO: 0.2 K/UL — SIGNIFICANT CHANGE UP (ref 0–0.2)
BASOPHILS NFR BLD AUTO: 0.7 % — SIGNIFICANT CHANGE UP (ref 0–2)
BILIRUB SERPL-MCNC: 0.3 MG/DL — SIGNIFICANT CHANGE UP (ref 0.2–1.2)
BUN SERPL-MCNC: 37 MG/DL — HIGH (ref 7–23)
CALCIUM SERPL-MCNC: 9.2 MG/DL — SIGNIFICANT CHANGE UP (ref 8.4–10.5)
CHLORIDE SERPL-SCNC: 102 MMOL/L — SIGNIFICANT CHANGE UP (ref 96–108)
CO2 SERPL-SCNC: 25 MMOL/L — SIGNIFICANT CHANGE UP (ref 22–31)
CREAT SERPL-MCNC: 1.99 MG/DL — HIGH (ref 0.5–1.3)
EOSINOPHIL # BLD AUTO: 0 K/UL — SIGNIFICANT CHANGE UP (ref 0–0.5)
EOSINOPHIL NFR BLD AUTO: 0.1 % — SIGNIFICANT CHANGE UP (ref 0–6)
GLUCOSE SERPL-MCNC: 116 MG/DL — HIGH (ref 70–99)
HCT VFR BLD CALC: 37.3 % — SIGNIFICANT CHANGE UP (ref 34.5–45)
HGB BLD-MCNC: 12.8 G/DL — SIGNIFICANT CHANGE UP (ref 11.5–15.5)
LYMPHOCYTES # BLD AUTO: 1.7 K/UL — SIGNIFICANT CHANGE UP (ref 1–3.3)
LYMPHOCYTES # BLD AUTO: 7.9 % — LOW (ref 13–44)
MCHC RBC-ENTMCNC: 32.5 PG — SIGNIFICANT CHANGE UP (ref 27–34)
MCHC RBC-ENTMCNC: 34.3 GM/DL — SIGNIFICANT CHANGE UP (ref 32–36)
MCV RBC AUTO: 94.7 FL — SIGNIFICANT CHANGE UP (ref 80–100)
MONOCYTES # BLD AUTO: 0.6 K/UL — SIGNIFICANT CHANGE UP (ref 0–0.9)
MONOCYTES NFR BLD AUTO: 3 % — SIGNIFICANT CHANGE UP (ref 2–14)
NEUTROPHILS # BLD AUTO: 19.3 K/UL — HIGH (ref 1.8–7.4)
NEUTROPHILS NFR BLD AUTO: 88.3 % — HIGH (ref 43–77)
PLATELET # BLD AUTO: 175 K/UL — SIGNIFICANT CHANGE UP (ref 150–400)
POTASSIUM SERPL-MCNC: 4.9 MMOL/L — SIGNIFICANT CHANGE UP (ref 3.5–5.3)
POTASSIUM SERPL-SCNC: 4.9 MMOL/L — SIGNIFICANT CHANGE UP (ref 3.5–5.3)
PROT SERPL-MCNC: 7 G/DL — SIGNIFICANT CHANGE UP (ref 6–8.3)
RBC # BLD: 3.94 M/UL — SIGNIFICANT CHANGE UP (ref 3.8–5.2)
RBC # FLD: 12.5 % — SIGNIFICANT CHANGE UP (ref 10.3–14.5)
SODIUM SERPL-SCNC: 138 MMOL/L — SIGNIFICANT CHANGE UP (ref 135–145)
WBC # BLD: 21.8 K/UL — HIGH (ref 3.8–10.5)
WBC # FLD AUTO: 21.8 K/UL — HIGH (ref 3.8–10.5)

## 2019-04-30 PROCEDURE — 99284 EMERGENCY DEPT VISIT MOD MDM: CPT

## 2019-04-30 PROCEDURE — 99284 EMERGENCY DEPT VISIT MOD MDM: CPT | Mod: 25

## 2019-04-30 PROCEDURE — 73502 X-RAY EXAM HIP UNI 2-3 VIEWS: CPT

## 2019-04-30 PROCEDURE — 73060 X-RAY EXAM OF HUMERUS: CPT | Mod: 26,RT

## 2019-04-30 PROCEDURE — 80053 COMPREHEN METABOLIC PANEL: CPT

## 2019-04-30 PROCEDURE — 96360 HYDRATION IV INFUSION INIT: CPT

## 2019-04-30 PROCEDURE — 73060 X-RAY EXAM OF HUMERUS: CPT

## 2019-04-30 PROCEDURE — 85027 COMPLETE CBC AUTOMATED: CPT

## 2019-04-30 PROCEDURE — 71046 X-RAY EXAM CHEST 2 VIEWS: CPT

## 2019-04-30 PROCEDURE — 71046 X-RAY EXAM CHEST 2 VIEWS: CPT | Mod: 26

## 2019-04-30 PROCEDURE — 36415 COLL VENOUS BLD VENIPUNCTURE: CPT

## 2019-04-30 PROCEDURE — 73502 X-RAY EXAM HIP UNI 2-3 VIEWS: CPT | Mod: 26,RT

## 2019-04-30 RX ORDER — SODIUM CHLORIDE 9 MG/ML
1000 INJECTION INTRAMUSCULAR; INTRAVENOUS; SUBCUTANEOUS ONCE
Qty: 0 | Refills: 0 | Status: COMPLETED | OUTPATIENT
Start: 2019-04-30 | End: 2019-04-30

## 2019-04-30 RX ADMIN — SODIUM CHLORIDE 2000 MILLILITER(S): 9 INJECTION INTRAMUSCULAR; INTRAVENOUS; SUBCUTANEOUS at 18:18

## 2019-04-30 RX ADMIN — SODIUM CHLORIDE 1000 MILLILITER(S): 9 INJECTION INTRAMUSCULAR; INTRAVENOUS; SUBCUTANEOUS at 19:27

## 2019-04-30 NOTE — ED ADULT NURSE NOTE - OBJECTIVE STATEMENT
pt reports she felt dizzy yesterday and fell. pt with bruises to right leg and thigh. pt complaining of hip pain 5/10. pt is a poor historian. pt denies chest pain, sob, fever or chills. pt reports she felt dizzy yesterday and fell. pt with bruises to right leg and thigh, pt thinks they might be old. pt complaining of hip and leg pain 5/10. pt is a poor historian. pt is currently on chemo for breast CA. pt denies chest pain, sob, fever or chills.

## 2019-04-30 NOTE — ED ADULT NURSE REASSESSMENT NOTE - NS ED NURSE REASSESS COMMENT FT1
Pt sitting on stretcher eating cookies. Pt denies any pain/complaints. family at bedside. Dr. Meliza raya.

## 2019-04-30 NOTE — ED PROVIDER NOTE - NSFOLLOWUPINSTRUCTIONS_ED_ALL_ED_FT
Please follow-up with your doctor(s) within the next 3 days, but see medical sooner if your symptoms persist or worsen.  Please call tomorrow for an appointment.    You were given a copy of your labs and/or imaging.  Please go-over these with your doctor(s).     If you have any worsening of symptoms or any other concerns please see your doctor or return to the ED immediately.    Please continue taking your home medications as directed.      Please use 650mg tylenol (or acetaminophen) every 6 hours. Make sure not to use more than 3500mg in any 24 hour period.

## 2019-04-30 NOTE — ED PROVIDER NOTE - PHYSICAL EXAMINATION
General:     NAD  Eyes: PERRL  Head:     NC/AT, EOMI, oral mucosa moist  Neck:     trachea midline  Lungs:     CTA b/l  CVS:     RRR  Abd:     +BS, s/nt/nd  Ext:   no deformities. FROM b/l upper and lower extremities. 2+pedal pulse b/l, scattered areas of old ecchymosis with minimal tenderness to palpation. strength 5/5 all 4 extremities  Neuro: AAOx3, no sensory/motor deficits, normal ambulation

## 2019-04-30 NOTE — ED PROVIDER NOTE - CLINICAL SUMMARY MEDICAL DECISION MAKING FREE TEXT BOX
63 yr old female with hx of anxiety, HTN, HLD, breast cancer and right axillary dissection c/o "weakness" to right lower extremity. pt had chemo 1 week ago and neulasta the day after. pt felt dizzy when she stood up quickly after her treatment and fell in her house. did not hit her head and denies LOC  able to ambulate. also c/o easy bruising since chemo. xrays show no fracture. WBC elevated from nulasta. pt not febrile. will dc home with onc f/u. Discussed with patient need to return to ED if symptoms don't continue to improve or recur or develops any new or worsening symptoms that are of concern.

## 2019-04-30 NOTE — ED PROVIDER NOTE - ATTENDING CONTRIBUTION TO CARE
· HPI Objective Statement: 63 yr old female with hx of anxiety, HTN, HLD, breast cancer and right axillary dissection c/o "weakness" to right lower extremity. pt had chemo 1 week ago and neulasta the day after. pt felt dizzy when she stood up quickly after her treatment and fell in her house. did not hit her head and denies LOC  	able to ambulate. also c/o easy bruising since chemo.   denies fever, chills, n/v/d, abd pain, chest pain, SOB, LEON, headache, dizziness      pe ecchymosuis R UL , no signs of head injury   labs wbc 21 pt rx with neulasta  xray humerus chest nl   pt felt better in ed discharged   Dr. Haider:  I have reviewed and discussed with the PA/ resident the case specifics, including the history, physical assessment, evaluation, conclusion, laboratory results, and medical plan. I agree with the contents, and conclusions. I have personally examined, and interviewed the patient.

## 2019-04-30 NOTE — ED PROVIDER NOTE - OBJECTIVE STATEMENT
63 yr old female with hx of anxiety, HTN, HLD, breast cancer and right axillary dissection c/o "weakness" to right lower extremity 63 yr old female with hx of anxiety, HTN, HLD, breast cancer and right axillary dissection c/o "weakness" to right lower extremity. pt had chemo 1 week ago and neulasta the day after. pt felt dizzy when she stood up quickly after her treatment and fell in her house. did not hit her head and denies LOC  able to ambulate. also c/o easy bruising since chemo.   denies fever, chills, n/v/d, abd pain, chest pain, SOB, LEON, headache, dizziness

## 2019-05-03 ENCOUNTER — APPOINTMENT (OUTPATIENT)
Dept: HEMATOLOGY ONCOLOGY | Facility: CLINIC | Age: 64
End: 2019-05-03
Payer: COMMERCIAL

## 2019-05-03 VITALS
RESPIRATION RATE: 16 BRPM | DIASTOLIC BLOOD PRESSURE: 78 MMHG | HEART RATE: 98 BPM | BODY MASS INDEX: 25.03 KG/M2 | SYSTOLIC BLOOD PRESSURE: 112 MMHG | WEIGHT: 159.83 LBS | TEMPERATURE: 97.6 F | OXYGEN SATURATION: 99 %

## 2019-05-03 DIAGNOSIS — N28.9 DISORDER OF KIDNEY AND URETER, UNSPECIFIED: ICD-10-CM

## 2019-05-03 PROCEDURE — 99214 OFFICE O/P EST MOD 30 MIN: CPT

## 2019-05-03 NOTE — PHYSICAL EXAM
[Normal] : affect appropriate [de-identified] : non-toxic appearing [de-identified] : No discrete mass in either breast. No nipple discharge. [de-identified] : No gross focal motor deficits.

## 2019-05-03 NOTE — ASSESSMENT
[FreeTextEntry1] : Breast cancer-status post first cycle of adjuvant Taxotere/Cytoxan chemotherapy, along with Neulasta. Patient will continue to take Decadron josemanuel-chemotherapy (potential side effect profile of Decadron reviewed).\par Patient advised to see nephrologist for renal insufficiency (recent, though predated chemotherapy). She is to increase p.o. fluid hydration as tolerated.\par Patient was given the opportunity to ask questions. Her questions have been answered to her satisfaction. Her friend Marisabel present, continues to assist patient with her care.

## 2019-05-03 NOTE — RESULTS/DATA
[FreeTextEntry1] : CT chest/abdomen/pelvis-2 mm pulmonary nodule left lower lobe. No evidence of metastatic disease in the abdomen or pelvis. Bone scan essentially normal.

## 2019-05-03 NOTE — HISTORY OF PRESENT ILLNESS
[T: ___] : T[unfilled] [Disease: _____________________] : Disease: [unfilled] [N: ___] : N[unfilled] [AJCC Stage: ____] : AJCC Stage: [unfilled] [5 - Distress Level] : Distress Level: 5 [de-identified] : 10/2018-Patient had her routine annual breast screening, which showed a focal area of asymmetric density noted mammographically in the right breast corresponding to a 2.7 cm, lobulated, slightly ill-defined, hypoechoic lesion with an area of echogenicity on ultrasound. Additional 4 mm irregular, hypoechoic lesion was noted in the right breast. \par 11/2018-Right breast core biopsy revealed invasive ductal carcinoma with papillary features-ER positive (100%, strong average intensity of staining), progesterone receptor positive (50% of cells with strong average intensity of staining), HER-2/yojana negative. Focus suspicious for lymphovascular invasion. Second right breast biopsy revealed stromal fibrosis.\par 11/2018-breast MRI showed the right upper outer breast irregular enhancing mass with adjacent non-mass enhancement. Grouped small masses and foci were seen inferior to the site of biopsy proven malignancy in the upper outer right breast, the largest of which measured 0.7 cm, suspicious for small satellite lesions. A 0.4 cm, area of non-mass enhancement was seen in the posterior left breast, indeterminate-biopsy favored sclerosing intraductal papilloma.\par 3/2019-Patient underwent a right breast lumpectomy (upper outer quadrant) which revealed invasive ductal carcinoma with focal papillary and mucinous features, multifocal with largest focus measuring 2.1 cm. The remaining foci ranged from less than 0.1-0.8 cm. Extensive ductal carcinoma in situ noted. Resection margins were negative. 2 sentinel lymph nodes were positive for metastatic carcinoma with the largest metastatic focus measuring approximately 2.5 cm with focal extranodal extension.6 additional lymph nodes were negative for metastatic carcinoma. Right lower inner quadrant mammo-localized wide resection revealed lobular carcinoma in situ. Left breast mammo-localized excisional biopsy revealed extensive complex sclerosing lesions, intraductal papilloma and sclerosis.\par Patient began Taxotere/Cytoxan chemotherapy 4/2019.\par \par  [de-identified] : Invasive ductal carcinoma with focal papillary and mucinous features [de-identified] : S/P slip and fall at home a couple of ays ago-went to ED and told evaluation unremarkable. No H/A or change in vision. No complaints of chest pain, shortness of breath, fevers. No bleeding. No N/V.\par Patient accompanied today by her friend Marisabel Shennell.

## 2019-05-08 ENCOUNTER — OUTPATIENT (OUTPATIENT)
Dept: OUTPATIENT SERVICES | Facility: HOSPITAL | Age: 64
LOS: 1 days | Discharge: HOME | End: 2019-05-08

## 2019-05-08 DIAGNOSIS — E07.89 OTHER SPECIFIED DISORDERS OF THYROID: Chronic | ICD-10-CM

## 2019-05-08 DIAGNOSIS — C50.919 MALIGNANT NEOPLASM OF UNSPECIFIED SITE OF UNSPECIFIED FEMALE BREAST: ICD-10-CM

## 2019-05-08 DIAGNOSIS — Z95.820 PERIPHERAL VASCULAR ANGIOPLASTY STATUS WITH IMPLANTS AND GRAFTS: Chronic | ICD-10-CM

## 2019-05-08 DIAGNOSIS — Z98.890 OTHER SPECIFIED POSTPROCEDURAL STATES: Chronic | ICD-10-CM

## 2019-05-13 ENCOUNTER — RX RENEWAL (OUTPATIENT)
Age: 64
End: 2019-05-13

## 2019-05-13 ENCOUNTER — LABORATORY RESULT (OUTPATIENT)
Age: 64
End: 2019-05-13

## 2019-05-13 ENCOUNTER — RESULT REVIEW (OUTPATIENT)
Age: 64
End: 2019-05-13

## 2019-05-13 ENCOUNTER — APPOINTMENT (OUTPATIENT)
Dept: INFUSION THERAPY | Facility: HOSPITAL | Age: 64
End: 2019-05-13

## 2019-05-13 LAB
BASOPHILS # BLD AUTO: 0 K/UL — SIGNIFICANT CHANGE UP (ref 0–0.2)
BASOPHILS NFR BLD AUTO: 0.1 % — SIGNIFICANT CHANGE UP (ref 0–2)
EOSINOPHIL # BLD AUTO: 0.4 K/UL — SIGNIFICANT CHANGE UP (ref 0–0.5)
EOSINOPHIL NFR BLD AUTO: 2 % — SIGNIFICANT CHANGE UP (ref 0–6)
HCT VFR BLD CALC: 34.8 % — SIGNIFICANT CHANGE UP (ref 34.5–45)
HGB BLD-MCNC: 12 G/DL — SIGNIFICANT CHANGE UP (ref 11.5–15.5)
LYMPHOCYTES # BLD AUTO: 1 K/UL — SIGNIFICANT CHANGE UP (ref 1–3.3)
LYMPHOCYTES # BLD AUTO: 5.5 % — LOW (ref 13–44)
MCHC RBC-ENTMCNC: 32.1 PG — SIGNIFICANT CHANGE UP (ref 27–34)
MCHC RBC-ENTMCNC: 34.4 G/DL — SIGNIFICANT CHANGE UP (ref 32–36)
MCV RBC AUTO: 93.1 FL — SIGNIFICANT CHANGE UP (ref 80–100)
MONOCYTES # BLD AUTO: 0.3 K/UL — SIGNIFICANT CHANGE UP (ref 0–0.9)
MONOCYTES NFR BLD AUTO: 1.5 % — LOW (ref 2–14)
NEUTROPHILS # BLD AUTO: 16.6 K/UL — HIGH (ref 1.8–7.4)
NEUTROPHILS NFR BLD AUTO: 90.9 % — HIGH (ref 43–77)
PLATELET # BLD AUTO: 390 K/UL — SIGNIFICANT CHANGE UP (ref 150–400)
RBC # BLD: 3.73 M/UL — LOW (ref 3.8–5.2)
RBC # FLD: 14.1 % — SIGNIFICANT CHANGE UP (ref 10.3–14.5)
WBC # BLD: 18.3 K/UL — HIGH (ref 3.8–10.5)
WBC # FLD AUTO: 18.3 K/UL — HIGH (ref 3.8–10.5)

## 2019-05-14 DIAGNOSIS — E86.0 DEHYDRATION: ICD-10-CM

## 2019-05-16 ENCOUNTER — APPOINTMENT (OUTPATIENT)
Dept: NEPHROLOGY | Facility: CLINIC | Age: 64
End: 2019-05-16
Payer: COMMERCIAL

## 2019-05-16 VITALS
HEIGHT: 67 IN | SYSTOLIC BLOOD PRESSURE: 118 MMHG | HEART RATE: 78 BPM | BODY MASS INDEX: 25.11 KG/M2 | WEIGHT: 160 LBS | DIASTOLIC BLOOD PRESSURE: 78 MMHG

## 2019-05-16 DIAGNOSIS — N17.9 ACUTE KIDNEY FAILURE, UNSPECIFIED: ICD-10-CM

## 2019-05-16 PROCEDURE — 99205 OFFICE O/P NEW HI 60 MIN: CPT

## 2019-05-16 RX ORDER — SPIRONOLACTONE 25 MG/1
25 TABLET ORAL
Qty: 90 | Refills: 0 | Status: ACTIVE | COMMUNITY
Start: 2018-11-01

## 2019-05-16 RX ORDER — TRAMADOL HYDROCHLORIDE 50 MG/1
50 TABLET, COATED ORAL
Qty: 20 | Refills: 0 | Status: DISCONTINUED | COMMUNITY
Start: 2019-03-21

## 2019-05-16 RX ORDER — METFORMIN HYDROCHLORIDE 500 MG/1
500 TABLET, COATED ORAL
Qty: 90 | Refills: 0 | Status: DISCONTINUED | COMMUNITY
Start: 2019-02-14

## 2019-05-16 RX ORDER — LEVOTHYROXINE SODIUM 0.14 MG/1
137 TABLET ORAL
Qty: 90 | Refills: 0 | Status: DISCONTINUED | COMMUNITY
Start: 2018-12-20

## 2019-05-16 RX ORDER — CEPHALEXIN 500 MG/1
500 CAPSULE ORAL
Qty: 28 | Refills: 0 | Status: DISCONTINUED | COMMUNITY
Start: 2019-01-17

## 2019-05-16 NOTE — PHYSICAL EXAM
[General Appearance - Alert] : alert [General Appearance - In No Acute Distress] : in no acute distress [Sclera] : the sclera and conjunctiva were normal [Outer Ear] : the ears and nose were normal in appearance [Neck Appearance] : the appearance of the neck was normal [Neck Cervical Mass (___cm)] : no neck mass was observed [Auscultation Breath Sounds / Voice Sounds] : lungs were clear to auscultation bilaterally [Heart Rate And Rhythm] : heart rate was normal and rhythm regular [Heart Sounds] : normal S1 and S2 [Murmurs] : no murmurs [Heart Sounds Pericardial Friction Rub] : no pericardial rub [Edema] : there was no peripheral edema [Bowel Sounds] : normal bowel sounds [Abdomen Tenderness] : non-tender [Abdomen Soft] : soft [No CVA Tenderness] : no ~M costovertebral angle tenderness [] : no rash [Involuntary Movements] : no involuntary movements were seen [No Focal Deficits] : no focal deficits [Oriented To Time, Place, And Person] : oriented to person, place, and time [Affect] : the affect was normal

## 2019-05-17 NOTE — HISTORY OF PRESENT ILLNESS
[FreeTextEntry1] : 63-year-old female with history of hypertension, hyperlipidemia, CAD status post stents, breast cancer here for evaluation for JOEL.  She is here with her friend.  Patient herself has some degree of cognitive issues.\par Patient is undergoing chemotherapy with Dr. Casarez.  Her creatinine has been in the 1.4 range, but more recently as high as 1.9 April 30.  She was initially continue continue her chemo regiment of Taxotere and Cytoxan.  Last chemo was 3 weeks ago.  However was put on hold until she sees nephrology for further evaluation.\par She had cardiac stents placed in March at [facility] with Dr. Ni.\par She has been having diarrhea, lightheadedness area and she evaluated for fall at [facility].\par She has been on several antihypertensive medications during this period of time.  Her blood pressure has been on the lower side.\par

## 2019-05-17 NOTE — ASSESSMENT
[FreeTextEntry1] : 63-year-old female with history of hypertension, CAD, breast cancer with JOEL.  Her JOEL is most likely secondary to hemodynamic factors related to diuretic and ACE therapy with dehydration and diarrhea.  I have advised patient to stop the fosinopril as well as HCTZ for the time being\par Patient was increase her fluid intake which she has been doing.\par She was advised to repeat her renal panel on Saturday.\par She is due to see Dr. Casarez next week at which time if her renal function improved she can go ahead with her chemo regimen.\par Her urinalysis is unremarkable.  I do not believe there is some glomerular process going on, with no proteinuria or hematuria.\par Hypertension: Blood pressure is controlled.  Currently with no orthostatic changes.\par Followup pending repeat bloodwork\par All questions were answered.

## 2019-05-17 NOTE — CONSULT LETTER
[Consult Letter:] : I had the pleasure of evaluating your patient, [unfilled]. [Dear  ___] : Dear  [unfilled], [Please see my note below.] : Please see my note below. [Consult Closing:] : Thank you very much for allowing me to participate in the care of this patient.  If you have any questions, please do not hesitate to contact me. [( Thank you for referring [unfilled] for consultation for _____ )] : Thank you for referring [unfilled] for consultation for [unfilled] [Sincerely,] : Sincerely, [FreeTextEntry3] : Samanta Terrell MD\par  [facility]\par Division of Nephrology and Hypertension

## 2019-05-20 ENCOUNTER — RESULT REVIEW (OUTPATIENT)
Age: 64
End: 2019-05-20

## 2019-05-20 ENCOUNTER — APPOINTMENT (OUTPATIENT)
Dept: INFUSION THERAPY | Facility: HOSPITAL | Age: 64
End: 2019-05-20

## 2019-05-20 ENCOUNTER — LABORATORY RESULT (OUTPATIENT)
Age: 64
End: 2019-05-20

## 2019-05-20 ENCOUNTER — APPOINTMENT (OUTPATIENT)
Dept: HEMATOLOGY ONCOLOGY | Facility: CLINIC | Age: 64
End: 2019-05-20
Payer: COMMERCIAL

## 2019-05-20 VITALS
DIASTOLIC BLOOD PRESSURE: 85 MMHG | TEMPERATURE: 97.9 F | SYSTOLIC BLOOD PRESSURE: 149 MMHG | WEIGHT: 165.12 LBS | HEART RATE: 77 BPM | BODY MASS INDEX: 25.86 KG/M2

## 2019-05-20 VITALS
SYSTOLIC BLOOD PRESSURE: 149 MMHG | HEART RATE: 77 BPM | RESPIRATION RATE: 16 BRPM | TEMPERATURE: 97.9 F | WEIGHT: 165.12 LBS | BODY MASS INDEX: 25.86 KG/M2 | OXYGEN SATURATION: 99 % | DIASTOLIC BLOOD PRESSURE: 85 MMHG

## 2019-05-20 LAB
ALBUMIN SERPL ELPH-MCNC: 4.4 G/DL
ANION GAP SERPL CALC-SCNC: 15 MMOL/L
APPEARANCE: CLEAR
BACTERIA: NEGATIVE
BASOPHILS # BLD AUTO: 0.1 K/UL — SIGNIFICANT CHANGE UP (ref 0–0.2)
BASOPHILS NFR BLD AUTO: 0.3 % — SIGNIFICANT CHANGE UP (ref 0–2)
BILIRUBIN URINE: NEGATIVE
BLOOD URINE: NEGATIVE
BUN SERPL-MCNC: 38 MG/DL
CALCIUM SERPL-MCNC: 9.3 MG/DL
CHLORIDE SERPL-SCNC: 103 MMOL/L
CO2 SERPL-SCNC: 26 MMOL/L
COLOR: YELLOW
CREAT SERPL-MCNC: 1.46 MG/DL
CREAT SPEC-SCNC: 83 MG/DL
EOSINOPHIL # BLD AUTO: 0.2 K/UL — SIGNIFICANT CHANGE UP (ref 0–0.5)
EOSINOPHIL NFR BLD AUTO: 1 % — SIGNIFICANT CHANGE UP (ref 0–6)
GLUCOSE QUALITATIVE U: NEGATIVE
GLUCOSE SERPL-MCNC: 96 MG/DL
HCT VFR BLD CALC: 38.1 % — SIGNIFICANT CHANGE UP (ref 34.5–45)
HGB BLD-MCNC: 13.4 G/DL — SIGNIFICANT CHANGE UP (ref 11.5–15.5)
HYALINE CASTS: 0 /LPF
KETONES URINE: NEGATIVE
LEUKOCYTE ESTERASE URINE: NEGATIVE
LYMPHOCYTES # BLD AUTO: 0.7 K/UL — LOW (ref 1–3.3)
LYMPHOCYTES # BLD AUTO: 4.3 % — LOW (ref 13–44)
MCHC RBC-ENTMCNC: 33.4 PG — SIGNIFICANT CHANGE UP (ref 27–34)
MCHC RBC-ENTMCNC: 35.3 G/DL — SIGNIFICANT CHANGE UP (ref 32–36)
MCV RBC AUTO: 94.7 FL — SIGNIFICANT CHANGE UP (ref 80–100)
MICROALBUMIN 24H UR DL<=1MG/L-MCNC: <1.2 MG/DL
MICROALBUMIN/CREAT 24H UR-RTO: NORMAL MG/G
MICROSCOPIC-UA: NORMAL
MONOCYTES # BLD AUTO: 0.2 K/UL — SIGNIFICANT CHANGE UP (ref 0–0.9)
MONOCYTES NFR BLD AUTO: 1.4 % — LOW (ref 2–14)
NEUTROPHILS # BLD AUTO: 15.9 K/UL — HIGH (ref 1.8–7.4)
NEUTROPHILS NFR BLD AUTO: 93 % — HIGH (ref 43–77)
NITRITE URINE: NEGATIVE
OSMOLALITY SERPL: 304 MOSM/KG
OSMOLALITY UR: 721 MOSM/KG
PH URINE: 6
PHOSPHATE SERPL-MCNC: 3.1 MG/DL
PLATELET # BLD AUTO: 246 K/UL — SIGNIFICANT CHANGE UP (ref 150–400)
POTASSIUM SERPL-SCNC: 5 MMOL/L
PROTEIN URINE: NORMAL
RBC # BLD: 4.02 M/UL — SIGNIFICANT CHANGE UP (ref 3.8–5.2)
RBC # FLD: 14.9 % — HIGH (ref 10.3–14.5)
RED BLOOD CELLS URINE: 1 /HPF
SODIUM ?TM SUB UR QN: 169 MMOL/L
SODIUM SERPL-SCNC: 144 MMOL/L
SPECIFIC GRAVITY URINE: 1.02
SQUAMOUS EPITHELIAL CELLS: 1 /HPF
URATE SERPL-MCNC: 6.5 MG/DL
UROBILINOGEN URINE: NORMAL
WBC # BLD: 17.1 K/UL — HIGH (ref 3.8–10.5)
WBC # FLD AUTO: 17.1 K/UL — HIGH (ref 3.8–10.5)
WHITE BLOOD CELLS URINE: 1 /HPF

## 2019-05-20 PROCEDURE — 99215 OFFICE O/P EST HI 40 MIN: CPT

## 2019-05-20 NOTE — PHYSICAL EXAM
[Normal] : affect appropriate [de-identified] : non-toxic appearing [de-identified] : No discrete mass in either breast. No nipple discharge. [de-identified] : No gross focal motor deficits.

## 2019-05-20 NOTE — ASSESSMENT
[FreeTextEntry1] : Breast cancer-clinically stable for chemotherapy today (potential side effects reviewed). Holding Neulasta in light of leukocytosis. Patient to decrease potassium containing foods in diet for now, and increase p.o. fluid hydration as tolerated.\par Patient's questions have been answered to her apparent satisfaction at this time. She concurs with plans of care.

## 2019-05-20 NOTE — HISTORY OF PRESENT ILLNESS
[Disease: _____________________] : Disease: [unfilled] [T: ___] : T[unfilled] [N: ___] : N[unfilled] [AJCC Stage: ____] : AJCC Stage: [unfilled] [de-identified] : 10/2018-Patient had her routine annual breast screening, which showed a focal area of asymmetric density noted mammographically in the right breast corresponding to a 2.7 cm, lobulated, slightly ill-defined, hypoechoic lesion with an area of echogenicity on ultrasound. Additional 4 mm irregular, hypoechoic lesion was noted in the right breast. \par 11/2018-Right breast core biopsy revealed invasive ductal carcinoma with papillary features-ER positive (100%, strong average intensity of staining), progesterone receptor positive (50% of cells with strong average intensity of staining), HER-2/yojana negative. Focus suspicious for lymphovascular invasion. Second right breast biopsy revealed stromal fibrosis.\par 11/2018-breast MRI showed the right upper outer breast irregular enhancing mass with adjacent non-mass enhancement. Grouped small masses and foci were seen inferior to the site of biopsy proven malignancy in the upper outer right breast, the largest of which measured 0.7 cm, suspicious for small satellite lesions. A 0.4 cm, area of non-mass enhancement was seen in the posterior left breast, indeterminate-biopsy favored sclerosing intraductal papilloma.\par 3/2019-Patient underwent a right breast lumpectomy (upper outer quadrant) which revealed invasive ductal carcinoma with focal papillary and mucinous features, multifocal with largest focus measuring 2.1 cm. The remaining foci ranged from less than 0.1-0.8 cm. Extensive ductal carcinoma in situ noted. Resection margins were negative. 2 sentinel lymph nodes were positive for metastatic carcinoma with the largest metastatic focus measuring approximately 2.5 cm with focal extranodal extension.6 additional lymph nodes were negative for metastatic carcinoma. Right lower inner quadrant mammo-localized wide resection revealed lobular carcinoma in situ. Left breast mammo-localized excisional biopsy revealed extensive complex sclerosing lesions, intraductal papilloma and sclerosis.\par Patient began Taxotere/Cytoxan chemotherapy 4/2019.\par \par  [de-identified] : Invasive ductal carcinoma with focal papillary and mucinous features [de-identified] : Diarrhea overall has improved. Has 1-2 loose bowel movements per day. No bleeding. No chest pain, shortness of breath, lightheadedness.\par Saw nephrologist (Dr. Terrell) for evaluation-blood pressure medications adjusted.\par Patient accompanied today by her friend Marisabel Juárez.

## 2019-05-21 DIAGNOSIS — Z51.11 ENCOUNTER FOR ANTINEOPLASTIC CHEMOTHERAPY: ICD-10-CM

## 2019-05-21 DIAGNOSIS — R11.2 NAUSEA WITH VOMITING, UNSPECIFIED: ICD-10-CM

## 2019-05-25 ENCOUNTER — APPOINTMENT (OUTPATIENT)
Dept: FAMILY MEDICINE | Facility: CLINIC | Age: 64
End: 2019-05-25
Payer: COMMERCIAL

## 2019-05-25 ENCOUNTER — LABORATORY RESULT (OUTPATIENT)
Age: 64
End: 2019-05-25

## 2019-05-25 VITALS
WEIGHT: 157 LBS | BODY MASS INDEX: 24.64 KG/M2 | HEART RATE: 88 BPM | SYSTOLIC BLOOD PRESSURE: 128 MMHG | RESPIRATION RATE: 20 BRPM | HEIGHT: 67 IN | DIASTOLIC BLOOD PRESSURE: 70 MMHG

## 2019-05-25 DIAGNOSIS — E73.9 LACTOSE INTOLERANCE, UNSPECIFIED: ICD-10-CM

## 2019-05-25 DIAGNOSIS — Z85.850 PERSONAL HISTORY OF MALIGNANT NEOPLASM OF THYROID: ICD-10-CM

## 2019-05-25 DIAGNOSIS — E78.00 PURE HYPERCHOLESTEROLEMIA, UNSPECIFIED: ICD-10-CM

## 2019-05-25 PROCEDURE — 36415 COLL VENOUS BLD VENIPUNCTURE: CPT

## 2019-05-25 PROCEDURE — 99214 OFFICE O/P EST MOD 30 MIN: CPT | Mod: 25

## 2019-05-25 NOTE — HISTORY OF PRESENT ILLNESS
[de-identified] : Presents for BP check, labs, and general follow-up.  In process of chemo per Dr. Casarez.  States "I'm working through this."  No specific complaints at this encounter.  Reviewed medication with patient.

## 2019-05-25 NOTE — PHYSICAL EXAM
[No Acute Distress] : no acute distress [No JVD] : no jugular venous distention [Supple] : supple [Thyroid Normal, No Nodules] : the thyroid was normal and there were no nodules present [No Lymphadenopathy] : no lymphadenopathy [Clear to Auscultation] : lungs were clear to auscultation bilaterally [No Respiratory Distress] : no respiratory distress  [No Accessory Muscle Use] : no accessory muscle use [Normal Rate] : normal rate  [Regular Rhythm] : with a regular rhythm [Normal S1, S2] : normal S1 and S2 [No Edema] : there was no peripheral edema [No Murmur] : no murmur heard [Non Tender] : non-tender [Normal Posterior Cervical Nodes] : no posterior cervical lymphadenopathy [Soft] : abdomen soft [Normal Gait] : normal gait [Coordination Grossly Intact] : coordination grossly intact [Normal Anterior Cervical Nodes] : no anterior cervical lymphadenopathy [No Focal Deficits] : no focal deficits [Alert and Oriented x3] : oriented to person, place, and time

## 2019-05-25 NOTE — ASSESSMENT
[FreeTextEntry1] : Hemodynamically stable with acceptable BP\par No gross thyroid pathology \par Lab profiles sent

## 2019-05-27 LAB
25(OH)D3 SERPL-MCNC: 28.6 NG/ML
ALBUMIN SERPL ELPH-MCNC: 4.4 G/DL
ALP BLD-CCNC: 73 U/L
ALT SERPL-CCNC: 20 U/L
ANION GAP SERPL CALC-SCNC: 15 MMOL/L
AST SERPL-CCNC: 19 U/L
BASOPHILS # BLD AUTO: 0.01 K/UL
BASOPHILS NFR BLD AUTO: 0.1 %
BILIRUB SERPL-MCNC: 0.4 MG/DL
BUN SERPL-MCNC: 43 MG/DL
CALCIUM SERPL-MCNC: 8.9 MG/DL
CHLORIDE SERPL-SCNC: 104 MMOL/L
CHOLEST SERPL-MCNC: 174 MG/DL
CHOLEST/HDLC SERPL: 3.4 RATIO
CO2 SERPL-SCNC: 22 MMOL/L
CREAT SERPL-MCNC: 1.19 MG/DL
EOSINOPHIL # BLD AUTO: 0.02 K/UL
EOSINOPHIL NFR BLD AUTO: 0.2 %
ESTIMATED AVERAGE GLUCOSE: 137 MG/DL
FOLATE SERPL-MCNC: 9 NG/ML
GLUCOSE SERPL-MCNC: 137 MG/DL
HBA1C MFR BLD HPLC: 6.4 %
HCT VFR BLD CALC: 41.4 %
HDLC SERPL-MCNC: 52 MG/DL
HGB BLD-MCNC: 12.9 G/DL
IMM GRANULOCYTES NFR BLD AUTO: 0.5 %
LDLC SERPL CALC-MCNC: 65 MG/DL
LYMPHOCYTES # BLD AUTO: 0.71 K/UL
LYMPHOCYTES NFR BLD AUTO: 7.4 %
MAN DIFF?: NORMAL
MCHC RBC-ENTMCNC: 31.2 GM/DL
MCHC RBC-ENTMCNC: 31.5 PG
MCV RBC AUTO: 101 FL
MONOCYTES # BLD AUTO: 0.05 K/UL
MONOCYTES NFR BLD AUTO: 0.5 %
NEUTROPHILS # BLD AUTO: 8.76 K/UL
NEUTROPHILS NFR BLD AUTO: 91.3 %
PLATELET # BLD AUTO: 169 K/UL
POTASSIUM SERPL-SCNC: 5 MMOL/L
PROT SERPL-MCNC: 6.5 G/DL
RBC # BLD: 4.1 M/UL
RBC # FLD: 16.9 %
SODIUM SERPL-SCNC: 140 MMOL/L
T3FREE SERPL-MCNC: 1.08 PG/ML
T4 FREE SERPL-MCNC: 1.4 NG/DL
TRIGL SERPL-MCNC: 287 MG/DL
TSH SERPL-ACNC: 1.92 UIU/ML
VIT B12 SERPL-MCNC: 1031 PG/ML
WBC # FLD AUTO: 9.6 K/UL

## 2019-06-04 ENCOUNTER — OUTPATIENT (OUTPATIENT)
Dept: OUTPATIENT SERVICES | Facility: HOSPITAL | Age: 64
LOS: 1 days | Discharge: HOME | End: 2019-06-04

## 2019-06-04 DIAGNOSIS — Z98.890 OTHER SPECIFIED POSTPROCEDURAL STATES: Chronic | ICD-10-CM

## 2019-06-04 DIAGNOSIS — C50.919 MALIGNANT NEOPLASM OF UNSPECIFIED SITE OF UNSPECIFIED FEMALE BREAST: ICD-10-CM

## 2019-06-04 DIAGNOSIS — E07.89 OTHER SPECIFIED DISORDERS OF THYROID: Chronic | ICD-10-CM

## 2019-06-04 DIAGNOSIS — Z95.820 PERIPHERAL VASCULAR ANGIOPLASTY STATUS WITH IMPLANTS AND GRAFTS: Chronic | ICD-10-CM

## 2019-06-06 ENCOUNTER — RX RENEWAL (OUTPATIENT)
Age: 64
End: 2019-06-06

## 2019-06-10 ENCOUNTER — APPOINTMENT (OUTPATIENT)
Dept: HEMATOLOGY ONCOLOGY | Facility: CLINIC | Age: 64
End: 2019-06-10
Payer: COMMERCIAL

## 2019-06-10 ENCOUNTER — RESULT REVIEW (OUTPATIENT)
Age: 64
End: 2019-06-10

## 2019-06-10 ENCOUNTER — APPOINTMENT (OUTPATIENT)
Dept: INFUSION THERAPY | Facility: HOSPITAL | Age: 64
End: 2019-06-10

## 2019-06-10 ENCOUNTER — LABORATORY RESULT (OUTPATIENT)
Age: 64
End: 2019-06-10

## 2019-06-10 VITALS
SYSTOLIC BLOOD PRESSURE: 142 MMHG | OXYGEN SATURATION: 96 % | TEMPERATURE: 98.2 F | HEART RATE: 85 BPM | WEIGHT: 162.7 LBS | BODY MASS INDEX: 25.48 KG/M2 | RESPIRATION RATE: 17 BRPM | DIASTOLIC BLOOD PRESSURE: 86 MMHG

## 2019-06-10 VITALS — HEART RATE: 97 BPM | TEMPERATURE: 98.2 F | SYSTOLIC BLOOD PRESSURE: 138 MMHG | DIASTOLIC BLOOD PRESSURE: 88 MMHG

## 2019-06-10 LAB
BASOPHILS # BLD AUTO: 0 K/UL — SIGNIFICANT CHANGE UP (ref 0–0.2)
BASOPHILS NFR BLD AUTO: 0.2 % — SIGNIFICANT CHANGE UP (ref 0–2)
EOSINOPHIL # BLD AUTO: 0.1 K/UL — SIGNIFICANT CHANGE UP (ref 0–0.5)
EOSINOPHIL NFR BLD AUTO: 0.8 % — SIGNIFICANT CHANGE UP (ref 0–6)
HCT VFR BLD CALC: 35.4 % — SIGNIFICANT CHANGE UP (ref 34.5–45)
HGB BLD-MCNC: 11.9 G/DL — SIGNIFICANT CHANGE UP (ref 11.5–15.5)
LYMPHOCYTES # BLD AUTO: 0.8 K/UL — LOW (ref 1–3.3)
LYMPHOCYTES # BLD AUTO: 5.7 % — LOW (ref 13–44)
MCHC RBC-ENTMCNC: 32.4 PG — SIGNIFICANT CHANGE UP (ref 27–34)
MCHC RBC-ENTMCNC: 33.7 G/DL — SIGNIFICANT CHANGE UP (ref 32–36)
MCV RBC AUTO: 96.2 FL — SIGNIFICANT CHANGE UP (ref 80–100)
MONOCYTES # BLD AUTO: 0.2 K/UL — SIGNIFICANT CHANGE UP (ref 0–0.9)
MONOCYTES NFR BLD AUTO: 1.2 % — LOW (ref 2–14)
NEUTROPHILS # BLD AUTO: 13.4 K/UL — HIGH (ref 1.8–7.4)
NEUTROPHILS NFR BLD AUTO: 92 % — HIGH (ref 43–77)
PLATELET # BLD AUTO: 387 K/UL — SIGNIFICANT CHANGE UP (ref 150–400)
RBC # BLD: 3.68 M/UL — LOW (ref 3.8–5.2)
RBC # FLD: 16.1 % — HIGH (ref 10.3–14.5)
WBC # BLD: 14.5 K/UL — HIGH (ref 3.8–10.5)
WBC # FLD AUTO: 14.5 K/UL — HIGH (ref 3.8–10.5)

## 2019-06-10 PROCEDURE — 99215 OFFICE O/P EST HI 40 MIN: CPT

## 2019-06-10 NOTE — ASSESSMENT
[FreeTextEntry1] : Breast cancer-clinically stable for chemotherapy today, to which patient consents-potential side effect profile reviewed.\par Patient was given the opportunity to ask questions. Her questions have been answered to her satisfaction at this time. She concurs with plans of care.

## 2019-06-10 NOTE — PHYSICAL EXAM
[Normal] : affect appropriate [de-identified] : non-toxic appearing [de-identified] : No discrete mass in either breast. No nipple discharge. [de-identified] : No gross focal motor deficits.

## 2019-06-10 NOTE — HISTORY OF PRESENT ILLNESS
[Disease: _____________________] : Disease: [unfilled] [T: ___] : T[unfilled] [N: ___] : N[unfilled] [AJCC Stage: ____] : AJCC Stage: [unfilled] [Therapy: ___] : Therapy: [unfilled] [Cycle: ___] : Cycle: [unfilled] [de-identified] : 10/2018-Patient had her routine annual breast screening, which showed a focal area of asymmetric density noted mammographically in the right breast corresponding to a 2.7 cm, lobulated, slightly ill-defined, hypoechoic lesion with an area of echogenicity on ultrasound. Additional 4 mm irregular, hypoechoic lesion was noted in the right breast. \par 11/2018-Right breast core biopsy revealed invasive ductal carcinoma with papillary features-ER positive (100%, strong average intensity of staining), progesterone receptor positive (50% of cells with strong average intensity of staining), HER-2/yojana negative. Focus suspicious for lymphovascular invasion. Second right breast biopsy revealed stromal fibrosis.\par 11/2018-breast MRI showed the right upper outer breast irregular enhancing mass with adjacent non-mass enhancement. Grouped small masses and foci were seen inferior to the site of biopsy proven malignancy in the upper outer right breast, the largest of which measured 0.7 cm, suspicious for small satellite lesions. A 0.4 cm, area of non-mass enhancement was seen in the posterior left breast, indeterminate-biopsy favored sclerosing intraductal papilloma.\par 3/2019-Patient underwent a right breast lumpectomy (upper outer quadrant) which revealed invasive ductal carcinoma with focal papillary and mucinous features, multifocal with largest focus measuring 2.1 cm. The remaining foci ranged from less than 0.1-0.8 cm. Extensive ductal carcinoma in situ noted. Resection margins were negative. 2 sentinel lymph nodes were positive for metastatic carcinoma with the largest metastatic focus measuring approximately 2.5 cm with focal extranodal extension.6 additional lymph nodes were negative for metastatic carcinoma. Right lower inner quadrant mammo-localized wide resection revealed lobular carcinoma in situ. Left breast mammo-localized excisional biopsy revealed extensive complex sclerosing lesions, intraductal papilloma and sclerosis.\par Patient began Taxotere/Cytoxan chemotherapy 4/2019.\par \par  [de-identified] : Invasive ductal carcinoma with focal papillary and mucinous features [de-identified] : Generally feeling well without new complaints. Some bowel irregularity/gasiness post chemo, which resolves on its own.\par No chest pain, shortness of breath, lightheadedness. No fevers. \par

## 2019-06-11 ENCOUNTER — RX RENEWAL (OUTPATIENT)
Age: 64
End: 2019-06-11

## 2019-06-11 DIAGNOSIS — E86.0 DEHYDRATION: ICD-10-CM

## 2019-06-11 DIAGNOSIS — Z51.11 ENCOUNTER FOR ANTINEOPLASTIC CHEMOTHERAPY: ICD-10-CM

## 2019-06-11 DIAGNOSIS — Z51.89 ENCOUNTER FOR OTHER SPECIFIED AFTERCARE: ICD-10-CM

## 2019-06-11 DIAGNOSIS — R11.2 NAUSEA WITH VOMITING, UNSPECIFIED: ICD-10-CM

## 2019-06-17 ENCOUNTER — RX RENEWAL (OUTPATIENT)
Age: 64
End: 2019-06-17

## 2019-06-18 ENCOUNTER — APPOINTMENT (OUTPATIENT)
Dept: CARDIOLOGY | Facility: CLINIC | Age: 64
End: 2019-06-18
Payer: COMMERCIAL

## 2019-06-18 ENCOUNTER — NON-APPOINTMENT (OUTPATIENT)
Age: 64
End: 2019-06-18

## 2019-06-18 VITALS
DIASTOLIC BLOOD PRESSURE: 86 MMHG | HEIGHT: 67 IN | SYSTOLIC BLOOD PRESSURE: 120 MMHG | WEIGHT: 156 LBS | OXYGEN SATURATION: 96 % | BODY MASS INDEX: 24.48 KG/M2 | HEART RATE: 110 BPM | RESPIRATION RATE: 16 BRPM

## 2019-06-18 VITALS — HEART RATE: 110 BPM

## 2019-06-18 DIAGNOSIS — I10 ESSENTIAL (PRIMARY) HYPERTENSION: ICD-10-CM

## 2019-06-18 DIAGNOSIS — I25.10 ATHEROSCLEROTIC HEART DISEASE OF NATIVE CORONARY ARTERY W/OUT ANGINA PECTORIS: ICD-10-CM

## 2019-06-18 DIAGNOSIS — R94.31 ABNORMAL ELECTROCARDIOGRAM [ECG] [EKG]: ICD-10-CM

## 2019-06-18 PROCEDURE — 93000 ELECTROCARDIOGRAM COMPLETE: CPT

## 2019-06-18 PROCEDURE — 99214 OFFICE O/P EST MOD 30 MIN: CPT

## 2019-06-18 NOTE — REASON FOR VISIT
[Follow-Up - Clinic] : a clinic follow-up of [Medication Management] : Medication management [Coronary Artery Disease] : coronary artery disease [FreeTextEntry1] : Patient returns for followup. Feeling well. Offers no complaints of chest discomfort shortness of breath palpitations dizziness or syncope.She is undergoing chemotherapy for breast cancer. She has not been taking her aspirin.\par

## 2019-06-18 NOTE — PHYSICAL EXAM
[General Appearance - Well Developed] : well developed [Normal Appearance] : normal appearance [Well Groomed] : well groomed [General Appearance - Well Nourished] : well nourished [No Deformities] : no deformities [General Appearance - In No Acute Distress] : no acute distress [Normal Oral Mucosa] : normal oral mucosa [No Oral Cyanosis] : no oral cyanosis [No Oral Pallor] : no oral pallor [Normal Jugular Venous A Waves Present] : normal jugular venous A waves present [Normal Jugular Venous V Waves Present] : normal jugular venous V waves present [No Jugular Venous Dumont A Waves] : no jugular venous dumont A waves [Respiration, Rhythm And Depth] : normal respiratory rhythm and effort [Abdomen Soft] : soft [Auscultation Breath Sounds / Voice Sounds] : lungs were clear to auscultation bilaterally [Exaggerated Use Of Accessory Muscles For Inspiration] : no accessory muscle use [Abdomen Tenderness] : non-tender [Abdomen Mass (___ Cm)] : no abdominal mass palpated [Abnormal Walk] : normal gait [Gait - Sufficient For Exercise Testing] : the gait was sufficient for exercise testing [Nail Clubbing] : no clubbing of the fingernails [Cyanosis, Localized] : no localized cyanosis [Petechial Hemorrhages (___cm)] : no petechial hemorrhages [] : no rash [Skin Color & Pigmentation] : normal skin color and pigmentation [No Skin Ulcers] : no skin ulcer [Skin Lesions] : no skin lesions [No Venous Stasis] : no venous stasis [No Xanthoma] : no  xanthoma was observed [Oriented To Time, Place, And Person] : oriented to person, place, and time [Affect] : the affect was normal [Mood] : the mood was normal [No Anxiety] : not feeling anxious [Normal S1] : normal S1 [Normal Rate] : normal [Normal S2] : normal S2 [S3] : no S3 [No Murmur] : no murmurs heard [S4] : no S4 [Right Carotid Bruit] : no bruit heard over the right carotid [Left Carotid Bruit] : no bruit heard over the left carotid [Left Femoral Bruit] : no bruit heard over the left femoral artery [Right Femoral Bruit] : no bruit heard over the right femoral artery [2+] : left 2+ [No Abnormalities] : the abdominal aorta was not enlarged and no bruit was heard [No Pitting Edema] : no pitting edema present

## 2019-06-18 NOTE — ASSESSMENT
[FreeTextEntry1] : Imp\par 1. Coronary disease status post stenting\par \par Plan:\par 1. Patient to resume aspirin.\par 2. Advised patient on compliance with medical regimen\par

## 2019-06-21 NOTE — H&P PST ADULT - [facility] (M2)
Prep Survey      Responses   Facility patient discharged from?  [facility]   Is patient eligible?  Yes   Discharge diagnosis  Anemia,Astrocytoma brain tumor, Status epilepticus, seizures, leukocytosis, MILES on CKD stage 3, Hyperkalemia    Does the patient have one of the following disease processes/diagnoses(primary or secondary)?  Other   Does the patient have Home health ordered?  No   Is there a DME ordered?  No   Prep survey completed?  Yes          Sari Heard RN        
1.91

## 2019-06-24 RX ORDER — CLOPIDOGREL BISULFATE 75 MG/1
75 TABLET, FILM COATED ORAL DAILY
Qty: 30 | Refills: 0 | Status: DISCONTINUED | COMMUNITY
Start: 2019-03-05 | End: 2019-06-18

## 2019-06-25 ENCOUNTER — RX RENEWAL (OUTPATIENT)
Age: 64
End: 2019-06-25

## 2019-06-26 ENCOUNTER — RX RENEWAL (OUTPATIENT)
Age: 64
End: 2019-06-26

## 2019-07-01 ENCOUNTER — APPOINTMENT (OUTPATIENT)
Dept: INFUSION THERAPY | Facility: HOSPITAL | Age: 64
End: 2019-07-01

## 2019-07-01 ENCOUNTER — LABORATORY RESULT (OUTPATIENT)
Age: 64
End: 2019-07-01

## 2019-07-01 ENCOUNTER — RESULT REVIEW (OUTPATIENT)
Age: 64
End: 2019-07-01

## 2019-07-01 ENCOUNTER — APPOINTMENT (OUTPATIENT)
Dept: HEMATOLOGY ONCOLOGY | Facility: CLINIC | Age: 64
End: 2019-07-01
Payer: COMMERCIAL

## 2019-07-01 VITALS — RESPIRATION RATE: 14 BRPM | HEART RATE: 80 BPM

## 2019-07-01 LAB
BASOPHILS # BLD AUTO: 0 K/UL — SIGNIFICANT CHANGE UP (ref 0–0.2)
BASOPHILS NFR BLD AUTO: 0.1 % — SIGNIFICANT CHANGE UP (ref 0–2)
EOSINOPHIL # BLD AUTO: 0 K/UL — SIGNIFICANT CHANGE UP (ref 0–0.5)
EOSINOPHIL NFR BLD AUTO: 0 % — SIGNIFICANT CHANGE UP (ref 0–6)
HCT VFR BLD CALC: 29.8 % — LOW (ref 34.5–45)
HGB BLD-MCNC: 10.1 G/DL — LOW (ref 11.5–15.5)
LYMPHOCYTES # BLD AUTO: 0.9 K/UL — LOW (ref 1–3.3)
LYMPHOCYTES # BLD AUTO: 5.4 % — LOW (ref 13–44)
MCHC RBC-ENTMCNC: 32.9 PG — SIGNIFICANT CHANGE UP (ref 27–34)
MCHC RBC-ENTMCNC: 33.8 G/DL — SIGNIFICANT CHANGE UP (ref 32–36)
MCV RBC AUTO: 97.4 FL — SIGNIFICANT CHANGE UP (ref 80–100)
MONOCYTES # BLD AUTO: 0.4 K/UL — SIGNIFICANT CHANGE UP (ref 0–0.9)
MONOCYTES NFR BLD AUTO: 2.5 % — SIGNIFICANT CHANGE UP (ref 2–14)
NEUTROPHILS # BLD AUTO: 14.7 K/UL — HIGH (ref 1.8–7.4)
NEUTROPHILS NFR BLD AUTO: 91.9 % — HIGH (ref 43–77)
PLATELET # BLD AUTO: 392 K/UL — SIGNIFICANT CHANGE UP (ref 150–400)
RBC # BLD: 3.06 M/UL — LOW (ref 3.8–5.2)
RBC # FLD: 17.1 % — HIGH (ref 10.3–14.5)
WBC # BLD: 16 K/UL — HIGH (ref 3.8–10.5)
WBC # FLD AUTO: 16 K/UL — HIGH (ref 3.8–10.5)

## 2019-07-01 PROCEDURE — 99215 OFFICE O/P EST HI 40 MIN: CPT

## 2019-07-01 NOTE — CONSULT LETTER
[Dear  ___] : Dear  [unfilled], [Courtesy Letter:] : I had the pleasure of seeing your patient, [unfilled], in my office today. [Please see my note below.] : Please see my note below. [Consult Closing:] : Thank you very much for allowing me to participate in the care of this patient.  If you have any questions, please do not hesitate to contact me. [Sincerely,] : Sincerely, [DrLeanne  ___] : Dr. BINGHAM [DrLeanne ___] : Dr. BINGHAM [FreeTextEntry3] : Caitlin Franco M.D.

## 2019-07-01 NOTE — PHYSICAL EXAM
[Normal] : affect appropriate [de-identified] : non-toxic appearing [de-identified] : No discrete mass in either breast. No nipple discharge. [de-identified] : No gross focal motor deficits.

## 2019-07-01 NOTE — ASSESSMENT
[FreeTextEntry1] : Breast cancer-clinically stable for chemotherapy today. Potential side effects reviewed.\par Suspect anemia secondary to chemo-no overt bleeding noted clinically at present. Continue to monitor CBC.\par Patient to make appointment with radiation oncology for consultation to discuss adjuvant radiation. \par Following RT, tentatively plan adjuvant endocrine therapy of which patient is aware.\par Patient was given the opportunity to ask questions. Her questions have been answered to her satisfaction at this time.

## 2019-07-01 NOTE — HISTORY OF PRESENT ILLNESS
[Disease: _____________________] : Disease: [unfilled] [T: ___] : T[unfilled] [N: ___] : N[unfilled] [AJCC Stage: ____] : AJCC Stage: [unfilled] [de-identified] : 10/2018-Patient had her routine annual breast screening, which showed a focal area of asymmetric density noted mammographically in the right breast corresponding to a 2.7 cm, lobulated, slightly ill-defined, hypoechoic lesion with an area of echogenicity on ultrasound. Additional 4 mm irregular, hypoechoic lesion was noted in the right breast. \par 11/2018-Right breast core biopsy revealed invasive ductal carcinoma with papillary features-ER positive (100%, strong average intensity of staining), progesterone receptor positive (50% of cells with strong average intensity of staining), HER-2/yojana negative. Focus suspicious for lymphovascular invasion. Second right breast biopsy revealed stromal fibrosis.\par 11/2018-breast MRI showed the right upper outer breast irregular enhancing mass with adjacent non-mass enhancement. Grouped small masses and foci were seen inferior to the site of biopsy proven malignancy in the upper outer right breast, the largest of which measured 0.7 cm, suspicious for small satellite lesions. A 0.4 cm, area of non-mass enhancement was seen in the posterior left breast, indeterminate-biopsy favored sclerosing intraductal papilloma.\par 3/2019-Patient underwent a right breast lumpectomy (upper outer quadrant) which revealed invasive ductal carcinoma with focal papillary and mucinous features, multifocal with largest focus measuring 2.1 cm. The remaining foci ranged from less than 0.1-0.8 cm. Extensive ductal carcinoma in situ noted. Resection margins were negative. 2 sentinel lymph nodes were positive for metastatic carcinoma with the largest metastatic focus measuring approximately 2.5 cm with focal extranodal extension.6 additional lymph nodes were negative for metastatic carcinoma. Right lower inner quadrant mammo-localized wide resection revealed lobular carcinoma in situ. Left breast mammo-localized excisional biopsy revealed extensive complex sclerosing lesions, intraductal papilloma and sclerosis.\par Patient began Taxotere/Cytoxan chemotherapy 4/2019, completing 7/2019.\par \par  [de-identified] : Invasive ductal carcinoma with focal papillary and mucinous features [de-identified] : Feels well. States VN came last week, though she doesn't think she needs VN at this point.\par Some bowel irregularity/gasiness post chemo, which resolves with OTC meds/prune juice.\par No chest pain, shortness of breath, lightheadedness. No fevers. \par Friend Pat present.\par

## 2019-07-10 PROBLEM — Z92.21 HISTORY OF CHEMOTHERAPY: Status: RESOLVED | Noted: 2019-07-10 | Resolved: 2019-07-10

## 2019-07-10 PROCEDURE — 88305 TISSUE EXAM BY PATHOLOGIST: CPT

## 2019-07-10 PROCEDURE — 82962 GLUCOSE BLOOD TEST: CPT

## 2019-07-10 PROCEDURE — 76098 X-RAY EXAM SURGICAL SPECIMEN: CPT

## 2019-07-10 PROCEDURE — C1889: CPT

## 2019-07-10 PROCEDURE — 19281 PERQ DEVICE BREAST 1ST IMAG: CPT

## 2019-07-10 PROCEDURE — 88307 TISSUE EXAM BY PATHOLOGIST: CPT

## 2019-07-10 PROCEDURE — 88334 PATH CONSLTJ SURG CYTO XM EA: CPT

## 2019-07-10 PROCEDURE — 83036 HEMOGLOBIN GLYCOSYLATED A1C: CPT

## 2019-07-10 PROCEDURE — 88333 PATH CONSLTJ SURG CYTO XM 1: CPT

## 2019-07-15 ENCOUNTER — APPOINTMENT (OUTPATIENT)
Dept: RADIATION ONCOLOGY | Facility: CLINIC | Age: 64
End: 2019-07-15

## 2019-07-15 DIAGNOSIS — Z92.21 PERSONAL HISTORY OF ANTINEOPLASTIC CHEMOTHERAPY: ICD-10-CM

## 2019-07-24 ENCOUNTER — INPATIENT (INPATIENT)
Facility: HOSPITAL | Age: 64
LOS: 5 days | Discharge: HOME | DRG: 871 | End: 2019-07-30
Attending: STUDENT IN AN ORGANIZED HEALTH CARE EDUCATION/TRAINING PROGRAM | Admitting: HOSPITALIST
Payer: MEDICAID

## 2019-07-24 VITALS
OXYGEN SATURATION: 95 % | HEIGHT: 67 IN | RESPIRATION RATE: 18 BRPM | TEMPERATURE: 100 F | DIASTOLIC BLOOD PRESSURE: 80 MMHG | WEIGHT: 149.91 LBS | SYSTOLIC BLOOD PRESSURE: 130 MMHG | HEART RATE: 128 BPM

## 2019-07-24 DIAGNOSIS — N39.0 URINARY TRACT INFECTION, SITE NOT SPECIFIED: ICD-10-CM

## 2019-07-24 DIAGNOSIS — E07.89 OTHER SPECIFIED DISORDERS OF THYROID: Chronic | ICD-10-CM

## 2019-07-24 DIAGNOSIS — E11.9 TYPE 2 DIABETES MELLITUS WITHOUT COMPLICATIONS: ICD-10-CM

## 2019-07-24 DIAGNOSIS — R41.82 ALTERED MENTAL STATUS, UNSPECIFIED: ICD-10-CM

## 2019-07-24 DIAGNOSIS — F41.9 ANXIETY DISORDER, UNSPECIFIED: ICD-10-CM

## 2019-07-24 DIAGNOSIS — E03.9 HYPOTHYROIDISM, UNSPECIFIED: ICD-10-CM

## 2019-07-24 DIAGNOSIS — A41.9 SEPSIS, UNSPECIFIED ORGANISM: ICD-10-CM

## 2019-07-24 DIAGNOSIS — I25.10 ATHEROSCLEROTIC HEART DISEASE OF NATIVE CORONARY ARTERY WITHOUT ANGINA PECTORIS: ICD-10-CM

## 2019-07-24 DIAGNOSIS — Z98.890 OTHER SPECIFIED POSTPROCEDURAL STATES: Chronic | ICD-10-CM

## 2019-07-24 DIAGNOSIS — E78.5 HYPERLIPIDEMIA, UNSPECIFIED: ICD-10-CM

## 2019-07-24 DIAGNOSIS — R41.89 OTHER SYMPTOMS AND SIGNS INVOLVING COGNITIVE FUNCTIONS AND AWARENESS: ICD-10-CM

## 2019-07-24 DIAGNOSIS — Z95.820 PERIPHERAL VASCULAR ANGIOPLASTY STATUS WITH IMPLANTS AND GRAFTS: Chronic | ICD-10-CM

## 2019-07-24 DIAGNOSIS — C50.919 MALIGNANT NEOPLASM OF UNSPECIFIED SITE OF UNSPECIFIED FEMALE BREAST: ICD-10-CM

## 2019-07-24 LAB
ALBUMIN SERPL ELPH-MCNC: 3.2 G/DL — LOW (ref 3.3–5)
ALP SERPL-CCNC: 66 U/L — SIGNIFICANT CHANGE UP (ref 40–120)
ALT FLD-CCNC: 13 U/L DA — SIGNIFICANT CHANGE UP (ref 10–45)
ANION GAP SERPL CALC-SCNC: 12 MMOL/L — SIGNIFICANT CHANGE UP (ref 5–17)
APPEARANCE UR: CLEAR — SIGNIFICANT CHANGE UP
APTT BLD: 21 SEC — LOW (ref 27.5–36.3)
AST SERPL-CCNC: 16 U/L — SIGNIFICANT CHANGE UP (ref 10–40)
BASOPHILS # BLD AUTO: 0.11 K/UL — SIGNIFICANT CHANGE UP (ref 0–0.2)
BASOPHILS NFR BLD AUTO: 0.6 % — SIGNIFICANT CHANGE UP (ref 0–2)
BILIRUB SERPL-MCNC: 1 MG/DL — SIGNIFICANT CHANGE UP (ref 0.2–1.2)
BILIRUB UR-MCNC: NEGATIVE — SIGNIFICANT CHANGE UP
BUN SERPL-MCNC: 37 MG/DL — HIGH (ref 7–23)
CALCIUM SERPL-MCNC: 9.2 MG/DL — SIGNIFICANT CHANGE UP (ref 8.4–10.5)
CHLORIDE SERPL-SCNC: 103 MMOL/L — SIGNIFICANT CHANGE UP (ref 96–108)
CO2 SERPL-SCNC: 23 MMOL/L — SIGNIFICANT CHANGE UP (ref 22–31)
COLOR SPEC: YELLOW — SIGNIFICANT CHANGE UP
CREAT SERPL-MCNC: 1.43 MG/DL — HIGH (ref 0.5–1.3)
DIFF PNL FLD: ABNORMAL
EOSINOPHIL # BLD AUTO: 0 K/UL — SIGNIFICANT CHANGE UP (ref 0–0.5)
EOSINOPHIL NFR BLD AUTO: 0 % — SIGNIFICANT CHANGE UP (ref 0–6)
GLUCOSE BLDC GLUCOMTR-MCNC: 105 MG/DL — HIGH (ref 70–99)
GLUCOSE BLDC GLUCOMTR-MCNC: 172 MG/DL — HIGH (ref 70–99)
GLUCOSE SERPL-MCNC: 120 MG/DL — HIGH (ref 70–99)
GLUCOSE UR QL: NEGATIVE — SIGNIFICANT CHANGE UP
HCT VFR BLD CALC: 29.7 % — LOW (ref 34.5–45)
HGB BLD-MCNC: 9.4 G/DL — LOW (ref 11.5–15.5)
IMM GRANULOCYTES NFR BLD AUTO: 1.4 % — SIGNIFICANT CHANGE UP (ref 0–1.5)
INR BLD: 1.21 RATIO — HIGH (ref 0.88–1.16)
KETONES UR-MCNC: ABNORMAL
LACTATE SERPL-SCNC: 1.9 MMOL/L — SIGNIFICANT CHANGE UP (ref 0.7–2)
LEUKOCYTE ESTERASE UR-ACNC: ABNORMAL
LYMPHOCYTES # BLD AUTO: 0.64 K/UL — LOW (ref 1–3.3)
LYMPHOCYTES # BLD AUTO: 3.6 % — LOW (ref 13–44)
MCHC RBC-ENTMCNC: 31.3 PG — SIGNIFICANT CHANGE UP (ref 27–34)
MCHC RBC-ENTMCNC: 31.6 GM/DL — LOW (ref 32–36)
MCV RBC AUTO: 99 FL — SIGNIFICANT CHANGE UP (ref 80–100)
MONOCYTES # BLD AUTO: 1.64 K/UL — HIGH (ref 0–0.9)
MONOCYTES NFR BLD AUTO: 9.3 % — SIGNIFICANT CHANGE UP (ref 2–14)
NEUTROPHILS # BLD AUTO: 15.08 K/UL — HIGH (ref 1.8–7.4)
NEUTROPHILS NFR BLD AUTO: 85.1 % — HIGH (ref 43–77)
NITRITE UR-MCNC: POSITIVE
NRBC # BLD: 0 /100 WBCS — SIGNIFICANT CHANGE UP (ref 0–0)
PH UR: 5 — SIGNIFICANT CHANGE UP (ref 5–8)
PLATELET # BLD AUTO: 267 K/UL — SIGNIFICANT CHANGE UP (ref 150–400)
POTASSIUM SERPL-MCNC: 4.5 MMOL/L — SIGNIFICANT CHANGE UP (ref 3.5–5.3)
POTASSIUM SERPL-SCNC: 4.5 MMOL/L — SIGNIFICANT CHANGE UP (ref 3.5–5.3)
PROT SERPL-MCNC: 7 G/DL — SIGNIFICANT CHANGE UP (ref 6–8.3)
PROT UR-MCNC: 500 MG/DL
PROTHROM AB SERPL-ACNC: 13.6 SEC — HIGH (ref 10–12.9)
RBC # BLD: 3 M/UL — LOW (ref 3.8–5.2)
RBC # FLD: 19.8 % — HIGH (ref 10.3–14.5)
SODIUM SERPL-SCNC: 138 MMOL/L — SIGNIFICANT CHANGE UP (ref 135–145)
SP GR SPEC: 1.02 — SIGNIFICANT CHANGE UP (ref 1.01–1.02)
UROBILINOGEN FLD QL: NEGATIVE — SIGNIFICANT CHANGE UP
WBC # BLD: 17.72 K/UL — HIGH (ref 3.8–10.5)
WBC # FLD AUTO: 17.72 K/UL — HIGH (ref 3.8–10.5)

## 2019-07-24 PROCEDURE — 76642 ULTRASOUND BREAST LIMITED: CPT | Mod: 26,RT

## 2019-07-24 PROCEDURE — 99285 EMERGENCY DEPT VISIT HI MDM: CPT

## 2019-07-24 PROCEDURE — 70450 CT HEAD/BRAIN W/O DYE: CPT | Mod: 26

## 2019-07-24 PROCEDURE — 71045 X-RAY EXAM CHEST 1 VIEW: CPT | Mod: 26

## 2019-07-24 PROCEDURE — 93010 ELECTROCARDIOGRAM REPORT: CPT

## 2019-07-24 PROCEDURE — 99223 1ST HOSP IP/OBS HIGH 75: CPT

## 2019-07-24 PROCEDURE — 74176 CT ABD & PELVIS W/O CONTRAST: CPT | Mod: 26

## 2019-07-24 PROCEDURE — 93970 EXTREMITY STUDY: CPT | Mod: 26

## 2019-07-24 RX ORDER — ERGOCALCIFEROL 1.25 MG/1
50000 CAPSULE ORAL
Refills: 0 | Status: DISCONTINUED | OUTPATIENT
Start: 2019-07-24 | End: 2019-07-30

## 2019-07-24 RX ORDER — CIPROFLOXACIN LACTATE 400MG/40ML
400 VIAL (ML) INTRAVENOUS ONCE
Refills: 0 | Status: COMPLETED | OUTPATIENT
Start: 2019-07-24 | End: 2019-07-24

## 2019-07-24 RX ORDER — INSULIN GLARGINE 100 [IU]/ML
10 INJECTION, SOLUTION SUBCUTANEOUS AT BEDTIME
Refills: 0 | Status: DISCONTINUED | OUTPATIENT
Start: 2019-07-24 | End: 2019-07-30

## 2019-07-24 RX ORDER — ACETAMINOPHEN 500 MG
650 TABLET ORAL EVERY 6 HOURS
Refills: 0 | Status: DISCONTINUED | OUTPATIENT
Start: 2019-07-24 | End: 2019-07-30

## 2019-07-24 RX ORDER — HEPARIN SODIUM 5000 [USP'U]/ML
5000 INJECTION INTRAVENOUS; SUBCUTANEOUS EVERY 8 HOURS
Refills: 0 | Status: DISCONTINUED | OUTPATIENT
Start: 2019-07-24 | End: 2019-07-25

## 2019-07-24 RX ORDER — CEFEPIME 1 G/1
1000 INJECTION, POWDER, FOR SOLUTION INTRAMUSCULAR; INTRAVENOUS EVERY 12 HOURS
Refills: 0 | Status: DISCONTINUED | OUTPATIENT
Start: 2019-07-24 | End: 2019-07-25

## 2019-07-24 RX ORDER — METRONIDAZOLE 500 MG
500 TABLET ORAL ONCE
Refills: 0 | Status: COMPLETED | OUTPATIENT
Start: 2019-07-24 | End: 2019-07-24

## 2019-07-24 RX ORDER — METFORMIN HYDROCHLORIDE 850 MG/1
1 TABLET ORAL
Qty: 0 | Refills: 0 | DISCHARGE

## 2019-07-24 RX ORDER — GLUCAGON INJECTION, SOLUTION 0.5 MG/.1ML
1 INJECTION, SOLUTION SUBCUTANEOUS ONCE
Refills: 0 | Status: DISCONTINUED | OUTPATIENT
Start: 2019-07-24 | End: 2019-07-30

## 2019-07-24 RX ORDER — ACETAMINOPHEN 500 MG
650 TABLET ORAL ONCE
Refills: 0 | Status: COMPLETED | OUTPATIENT
Start: 2019-07-24 | End: 2019-07-24

## 2019-07-24 RX ORDER — SODIUM CHLORIDE 9 MG/ML
2100 INJECTION INTRAMUSCULAR; INTRAVENOUS; SUBCUTANEOUS ONCE
Refills: 0 | Status: COMPLETED | OUTPATIENT
Start: 2019-07-24 | End: 2019-07-24

## 2019-07-24 RX ORDER — DEXTROSE 50 % IN WATER 50 %
25 SYRINGE (ML) INTRAVENOUS ONCE
Refills: 0 | Status: DISCONTINUED | OUTPATIENT
Start: 2019-07-24 | End: 2019-07-30

## 2019-07-24 RX ORDER — ATORVASTATIN CALCIUM 80 MG/1
20 TABLET, FILM COATED ORAL AT BEDTIME
Refills: 0 | Status: DISCONTINUED | OUTPATIENT
Start: 2019-07-24 | End: 2019-07-30

## 2019-07-24 RX ORDER — DEXTROSE 50 % IN WATER 50 %
12.5 SYRINGE (ML) INTRAVENOUS ONCE
Refills: 0 | Status: DISCONTINUED | OUTPATIENT
Start: 2019-07-24 | End: 2019-07-30

## 2019-07-24 RX ORDER — ASPIRIN/CALCIUM CARB/MAGNESIUM 324 MG
81 TABLET ORAL DAILY
Refills: 0 | Status: DISCONTINUED | OUTPATIENT
Start: 2019-07-24 | End: 2019-07-30

## 2019-07-24 RX ORDER — LEVOTHYROXINE SODIUM 125 MCG
1 TABLET ORAL
Qty: 0 | Refills: 0 | DISCHARGE

## 2019-07-24 RX ORDER — DEXTROSE 50 % IN WATER 50 %
15 SYRINGE (ML) INTRAVENOUS ONCE
Refills: 0 | Status: DISCONTINUED | OUTPATIENT
Start: 2019-07-24 | End: 2019-07-30

## 2019-07-24 RX ORDER — INSULIN LISPRO 100/ML
3 VIAL (ML) SUBCUTANEOUS
Refills: 0 | Status: DISCONTINUED | OUTPATIENT
Start: 2019-07-24 | End: 2019-07-30

## 2019-07-24 RX ORDER — SPIRONOLACTONE 25 MG/1
25 TABLET, FILM COATED ORAL DAILY
Refills: 0 | Status: DISCONTINUED | OUTPATIENT
Start: 2019-07-24 | End: 2019-07-30

## 2019-07-24 RX ORDER — CEFEPIME 1 G/1
2000 INJECTION, POWDER, FOR SOLUTION INTRAMUSCULAR; INTRAVENOUS ONCE
Refills: 0 | Status: COMPLETED | OUTPATIENT
Start: 2019-07-24 | End: 2019-07-24

## 2019-07-24 RX ORDER — LEVOTHYROXINE SODIUM 125 MCG
125 TABLET ORAL DAILY
Refills: 0 | Status: DISCONTINUED | OUTPATIENT
Start: 2019-07-24 | End: 2019-07-30

## 2019-07-24 RX ORDER — SODIUM CHLORIDE 9 MG/ML
1000 INJECTION, SOLUTION INTRAVENOUS
Refills: 0 | Status: DISCONTINUED | OUTPATIENT
Start: 2019-07-24 | End: 2019-07-30

## 2019-07-24 RX ADMIN — Medication 650 MILLIGRAM(S): at 22:30

## 2019-07-24 RX ADMIN — Medication 650 MILLIGRAM(S): at 12:30

## 2019-07-24 RX ADMIN — Medication 400 MILLIGRAM(S): at 12:05

## 2019-07-24 RX ADMIN — Medication 200 MILLIGRAM(S): at 11:05

## 2019-07-24 RX ADMIN — ATORVASTATIN CALCIUM 20 MILLIGRAM(S): 80 TABLET, FILM COATED ORAL at 21:05

## 2019-07-24 RX ADMIN — Medication 500 MILLIGRAM(S): at 13:36

## 2019-07-24 RX ADMIN — CEFEPIME 2000 MILLIGRAM(S): 1 INJECTION, POWDER, FOR SOLUTION INTRAMUSCULAR; INTRAVENOUS at 12:36

## 2019-07-24 RX ADMIN — SODIUM CHLORIDE 2100 MILLILITER(S): 9 INJECTION INTRAMUSCULAR; INTRAVENOUS; SUBCUTANEOUS at 12:30

## 2019-07-24 RX ADMIN — HEPARIN SODIUM 5000 UNIT(S): 5000 INJECTION INTRAVENOUS; SUBCUTANEOUS at 21:05

## 2019-07-24 RX ADMIN — INSULIN GLARGINE 10 UNIT(S): 100 INJECTION, SOLUTION SUBCUTANEOUS at 21:20

## 2019-07-24 RX ADMIN — Medication 100 MILLIGRAM(S): at 12:36

## 2019-07-24 RX ADMIN — Medication 650 MILLIGRAM(S): at 20:36

## 2019-07-24 RX ADMIN — CEFEPIME 100 MILLIGRAM(S): 1 INJECTION, POWDER, FOR SOLUTION INTRAMUSCULAR; INTRAVENOUS at 12:05

## 2019-07-24 RX ADMIN — Medication 650 MILLIGRAM(S): at 11:05

## 2019-07-24 RX ADMIN — SODIUM CHLORIDE 2100 MILLILITER(S): 9 INJECTION INTRAMUSCULAR; INTRAVENOUS; SUBCUTANEOUS at 10:55

## 2019-07-24 NOTE — H&P ADULT - ATTENDING COMMENTS
I have personally seen and examined patient on the above date.  I discussed the case with BARBRA Alvarez and I agree with findings and plan as detailed per note above, which I have amended where appropriate.    Urinary Tract Infection with metabolic encephalopathy   - cont IV antibiotics  - follow up urine cultures    Suspected Sepsis secondary to above - present on admission  No signs or symptoms of breast infection - suspected seroma based on ultrasound

## 2019-07-24 NOTE — H&P ADULT - HISTORY OF PRESENT ILLNESS
62 y/o F with h/o DM2 on metformin, HTN, currently not on any meds, CAD on asa, Hypothyroidism on synthroid, HLD on Lipitor Breast Ca (actively undergoing chemo last treatment July 1st), received a total of four sessions of chemo ( April 22, May 20, Nicole 3, July 1st) presented to ED with c/o generalized weakness, fever, chills, lower abdominal pain for few days. Pt is a poor historian. Pt has early dementia. pt's friend contributed to history. Denies chest pain, shortness of breath, numbness, tingling, headache, visual changes.  Denies recent travel, recent ABX use, known sick contacts.     PMD- Elmer (aware)  Onc- Hermelindo 62 y/o F with h/o DM2 on metformin, HTN, currently on aldactone, CAD on asa, Hypothyroidism on synthroid, HLD on Lipitor Breast Ca (actively undergoing chemo last treatment July 1st), received a total of four sessions of chemo ( April 22, May 20, Nicole 3, July 1st) presented to ED with c/o generalized weakness, fever, chills, lower abdominal pain for few days. Pt is a poor historian. Pt has early dementia. pt's friend contributed to history. Denies chest pain, shortness of breath, numbness, tingling, headache, visual changes.  Denies recent travel, recent ABX use, known sick contacts.     PMD- Elmer (aware)  Onc- [place]

## 2019-07-24 NOTE — H&P ADULT - NSHPLABSRESULTS_GEN_ALL_CORE
ALLERGIES:  No Known Allergies    MEDICATIONS  (STANDING):  aspirin enteric coated 81 milliGRAM(s) Oral daily  atorvastatin 20 milliGRAM(s) Oral at bedtime  dextrose 5%. 1000 milliLiter(s) (50 mL/Hr) IV Continuous <Continuous>  dextrose 50% Injectable 12.5 Gram(s) IV Push once  dextrose 50% Injectable 25 Gram(s) IV Push once  dextrose 50% Injectable 25 Gram(s) IV Push once  ergocalciferol 87926 Unit(s) Oral every week  heparin  Injectable 5000 Unit(s) SubCutaneous every 8 hours  insulin glargine Injectable (LANTUS) 10 Unit(s) SubCutaneous at bedtime  insulin lispro Injectable (HumaLOG) 3 Unit(s) SubCutaneous three times a day before meals  levothyroxine 125 MICROGram(s) Oral daily  spironolactone 25 milliGRAM(s) Oral daily    MEDICATIONS  (PRN):  acetaminophen   Tablet .. 650 milliGRAM(s) Oral every 6 hours PRN Temp greater or equal to 38C (100.4F), Moderate Pain (4 - 6)  dextrose 40% Gel 15 Gram(s) Oral once PRN Blood Glucose LESS THAN 70 milliGRAM(s)/deciliter  glucagon  Injectable 1 milliGRAM(s) IntraMuscular once PRN Glucose LESS THAN 70 milligrams/deciliter    Vital Signs Last 24 Hrs  T(F): 98.3 (2019 17:28), Max: 104 (2019 10:36)  HR: 98 (2019 17:28) (94 - 128)  BP: 152/78 (2019 17:28) (124/63 - 152/78)  RR: 18 (2019 17:28) (17 - 22)  SpO2: 98% (2019 17:28) (94% - 98%)  I&O's Summary    BMI (kg/m2): 23.5 (19 @ 17:28)    LABS:                        9.4    17.72 )-----------( 267      ( 2019 10:45 )             29.7           138  |  103  |  37  ----------------------------<  120  4.5   |  23  |  1.43    Ca    9.2      2019 10:45    TPro  7.0  /  Alb  3.2  /  TBili  1.0  /  DBili  x   /  AST  16  /  ALT  13  /  AlkPhos  66       eGFR if Non African American: 39 mL/min/1.73M2 (19 @ 10:45)  eGFR if African American: 45 mL/min/1.73M2 (19 @ 10:45)    PT/INR - ( 2019 10:45 )   PT: 13.6 sec;   INR: 1.21 ratio         PTT - ( 2019 10:45 )  PTT:21.0 sec   Lactate, Blood: 1.9 mmol/L ( @ 10:45)      POCT Blood Glucose.: 105 mg/dL (2019 17:55)  POCT Blood Glucose.: 142 mg/dL (2019 10:25)    Urinalysis Basic - ( 2019 10:45 )    Color: Yellow / Appearance: Clear / S.020 / pH: x  Gluc: x / Ketone: Trace  / Bili: Negative / Urobili: Negative   Blood: x / Protein: 500 mg/dL / Nitrite: Positive   Leuk Esterase: Small / RBC: 5-10 /HPF / WBC 11-25 /HPF   Sq Epi: x / Non Sq Epi: Neg.-Few / Bacteria: Negative /HPF      RADIOLOGY & ADDITIONAL TESTS: CT abd/pelv: Large right breast collection, decreased in size compared to the prior   study. Superinfection cannot be excluded. Right breast skin thickening. Normal appendix. Enlarged, fibroid uterus.    US right breast:  At the 11:00 position, 8 to 10 cm from the nipple, there is a 5.5 cm complex fluid collection containing simple fluid and   echogenic material. The patient did not complain of tenderness during the examination.    Impression: Right breast complex fluid collection likely indicating seroma or chronic blood products after prior surgery. Breast abscess is a less likely possibility. Clinical correlation is suggested.    CXR:   IMPRESSION: Slight atelectasis has appeared at left base.        Care Discussed with Consultants/Other Providers: Dr. Vásquez

## 2019-07-24 NOTE — H&P ADULT - PROBLEM SELECTOR PLAN 2
metabolic encephalopathy 2/2 UTI. improving diabetes type 2  c/w diabetic diet  c/w FS  c/w insulin correction scale. will hold metformin for now

## 2019-07-24 NOTE — H&P ADULT - PROBLEM SELECTOR PLAN 4
UA +  CXR: no obvious PNA. will check procal. repeat CXR in 24-48hrs  CT abd/pelv concern for seroma vs abscess in right breast  US right breast indicated that fluid in right breast is more likely seroma than an acute abscess.  No breast tenderness in physical exam. no open drainage noted (ED RN was present as chaperone)   c/w cefepime for now  May need ID consult pt is sort of mentally challenged since childhood per pt's friend, pat  supportive care

## 2019-07-24 NOTE — H&P ADULT - NSHPPHYSICALEXAM_GEN_ALL_CORE
alert, oriented x3. periods of confusion. NAD. VSS  ENT: MMM  Neck: Supple, No JVD  HEENT: PEERLA  CV: S1, S2. no new murmur noted. no pedal edema  Lungs: clear to auscultation   ABD: (+) tenderness in lower abdomen. BS x4. no CVA tenderness  : urinary incontinence, on diaper.  Extremity: muscle strength 4/5 in RLE, 5/5 in LLE. (pt states that this is chronic, not acute). less sensation on light touch on right LE than LLE (also chronic). sensation intact in other parts of body.  (+) right calf tenderness alert, oriented x3. periods of confusion. NAD. VSS  ENT: MMM  Neck: Supple, No JVD  HEENT: PEERLA  CV: S1, S2. no new murmur noted. no pedal edema  Lungs: clear to auscultation   Breast: No breast tenderness. no open drainage noted (ED RN was present as chaperone)   ABD: (+) tenderness in lower abdomen. BS x4. no CVA tenderness  : urinary incontinence, on diaper.  Extremity: muscle strength 4/5 in RLE, 5/5 in LLE. (pt states that this is chronic, not acute). less sensation on light touch on right LE than LLE (also chronic). sensation intact in other parts of body.  (+) right calf tenderness

## 2019-07-24 NOTE — H&P ADULT - NSICDXFAMILYHX_GEN_ALL_CORE_FT
FAMILY HISTORY:  FHx: lymphoma  FHx: suicide    Sibling  Still living? Yes, Estimated age: 51-60  Family history of brain damage, Age at diagnosis: Age Unknown

## 2019-07-24 NOTE — H&P ADULT - NSHPOUTPATIENTPROVIDERS_GEN_ALL_CORE
PMD Dr. Mckinney. (aware of admission)  Dr. Frank (heme-onc)  Dr. Landry (GYN) PMD Dr. Mckinney. (aware of admission)  Dr. Casarez (heme-onc)  Dr. Landry (GYN)

## 2019-07-24 NOTE — ED PROVIDER NOTE - CARE PLAN
Principal Discharge DX:	UTI (urinary tract infection)  Secondary Diagnosis:	Altered mental status, unspecified Principal Discharge DX:	UTI (urinary tract infection)  Secondary Diagnosis:	Altered mental status, unspecified  Secondary Diagnosis:	Sepsis, due to unspecified organism

## 2019-07-24 NOTE — PATIENT PROFILE ADULT - OVER THE PAST TWO WEEKS HAVE YOU FELT DOWN, DEPRESSED OR HOPELESS?
denies when asked question noted PNH of anxiety and depression and take meds for it prn as per patient./no

## 2019-07-24 NOTE — H&P ADULT - PROBLEM SELECTOR PLAN 5
pt is sort of mentally challenged since childhood per pt's friend, pat  supportive care c/w asa  tele monitoring  may need tte

## 2019-07-24 NOTE — ED ADULT TRIAGE NOTE - CHIEF COMPLAINT QUOTE
Pt BIB EMS from home with c/o right leg weakness and inability to ambulate this AM. Pt states weakness has been going on for weeks. Pt also c/o right foot pain x3-4 weeks. Pt hx includes breast cancer, diabetes, and HTN. Pt blood sugar en route 153.

## 2019-07-24 NOTE — ED ADULT NURSE NOTE - ED STAT RN HANDOFF DETAILS
Report given to DAMEON Chamberlain. RN made aware of sepsis protocol initiated. RN made aware pt right pink banded. IV in left AC clean, intact, and flushed without difficulty. Report given to DAMEON Chamberlain. RN made aware of sepsis protocol initiated. RN made aware of pt hx of diabetes and breast CA. Pt right pink banded. IV in left AC clean, intact, and flushed without difficulty.

## 2019-07-24 NOTE — H&P ADULT - ASSESSMENT
62 y/o F presented to ED with c/o generalized weakness, fever, chills, lower abdominal pain for few days.

## 2019-07-24 NOTE — ED PROVIDER NOTE - OBJECTIVE STATEMENT
64 y/o F with h/o DM, HTN, Hypothyroidism, HLD, Breast Ca (actively undergoing chemo last treatment early July) pw RLE and RLQ abdominal pain upon awakening this am with generalized malaise. No known fever, nausea, vomiting. Initially mentally altered, after fluids was able to give more of a history. Denies chest pain, shortness of breath, numbness, tingling, headache, visual changes.  Denies recent travel, recent ABX use, known sick contacts.   PMD- Shinder 62 y/o F with h/o DM, HTN, Hypothyroidism, HLD, Breast Ca (actively undergoing chemo last treatment early July) pw RLE and RLQ abdominal pain upon awakening this am with generalized malaise. No known fever, nausea, vomiting. Initially mentally altered, after fluids was able to give more of a history. Denies chest pain, shortness of breath, numbness, tingling, headache, visual changes.  Denies recent travel, recent ABX use, known sick contacts.   PMD- Shinder  Onc- [place]

## 2019-07-24 NOTE — H&P ADULT - PROBLEM SELECTOR PLAN 3
diabetes 2  diabetic diet  c/w insulin correction scale. will hold metformin for now UA +  CXR: no obvious PNA. will check procal. repeat CXR in 24-48hrs  CT abd/pelv concern for seroma vs abscess in right breast  US right breast indicated that fluid in right breast is more likely seroma than an acute abscess.  no breast tenderness in physical exam  c/w cefepime for now  May need ID consult

## 2019-07-24 NOTE — ED PROVIDER NOTE - PROGRESS NOTE DETAILS
BARBRA Tucker- patient mentation improved s/p fluids and ABX. UA (+) for inflection. CT Ab/pelvis noticing incidental fluid collection right breast. Pt s/p right breast lumpectomy 2 months ago. Incision noted right outer quad at 11'oclock- no fluctuance, no induration, no surrounding erythema- will check breast US. PA Tiberio- US revealing fluid collection likely a seroma or blood collection related to surgical procedure not likely abscess, which when clinically correlated is not likely an abscess. (+) UA with rectal temp 104. Case discussed with hospitalist Dr. Longoria- admit accepted.

## 2019-07-24 NOTE — H&P ADULT - PROBLEM SELECTOR PLAN 1
c/w cefepime for now  f/up ucx and tailer abx based on sensitivity  f/up blood cx metabolic encephalopathy 2/2 UTI. improving

## 2019-07-24 NOTE — ED ADULT NURSE NOTE - OBJECTIVE STATEMENT
Pt arrived to the ER c/o weakness. Pt states that last night she was lethargic and woke up this morning feeling weak and unable to ambulate. Pt denies any pain at this time. Pt denies any fevers, nausea, vomiting, or diarrhea.

## 2019-07-24 NOTE — ED PROVIDER NOTE - SKIN BREAST RIGHT
incision noted outer quadrant 11pm- no surrounding erythema, no drainage, no induration, no fluctuance/no tenderness/no masses/lumps

## 2019-07-24 NOTE — ED ADULT NURSE NOTE - NSIMPLEMENTINTERV_GEN_ALL_ED
Implemented All Universal Safety Interventions:  [place] to call system. Call bell, personal items and telephone within reach. Instruct patient to call for assistance. Room bathroom lighting operational. Non-slip footwear when patient is off stretcher. Physically safe environment: no spills, clutter or unnecessary equipment. Stretcher in lowest position, wheels locked, appropriate side rails in place. Implemented All Fall Risk Interventions:  [place] to call system. Call bell, personal items and telephone within reach. Instruct patient to call for assistance. Room bathroom lighting operational. Non-slip footwear when patient is off stretcher. Physically safe environment: no spills, clutter or unnecessary equipment. Stretcher in lowest position, wheels locked, appropriate side rails in place. Provide visual cue, wrist band, yellow gown, etc. Monitor gait and stability. Monitor for mental status changes and reorient to person, place, and time. Review medications for side effects contributing to fall risk. Reinforce activity limits and safety measures with patient and family.

## 2019-07-24 NOTE — H&P ADULT - NSICDXPASTSURGICALHX_GEN_ALL_CORE_FT
PAST SURGICAL HISTORY:  H/O bilateral breast biopsy November 2018 and December 2018    H/O total thyroidectomy     S/P angioplasty with stent drug eluting stent in first diagonal on 2/21/19    S/P dilation and curettage TOP x2

## 2019-07-24 NOTE — ED PROVIDER NOTE - CLINICAL SUMMARY MEDICAL DECISION MAKING FREE TEXT BOX
Dr. Richmond: 63F h/o DM, HTN, hypothyroidism, HLD, breast ca on chemo (last chemo early july last year), p/w 1 day of generalized weakness, feeling unwell. Today woke up with RLE weakness and inability to bear weight. Also c/o RLQ pain, subjective fevers. No nausea, vomiting. On exam pt is awake and alert but little slow to respond, NIHSS 0, dry mucosa, +RLQ TTP, no CVAT. Pt with last known nml yesterday with NIHSS 0, CT brain neg. Sepsis from UTI, will also get CT a/p

## 2019-07-24 NOTE — ED PROVIDER NOTE - ATTENDING CONTRIBUTION TO CARE
Dr. Richmond: I performed a face to face bedside interview with patient regarding history of present illness, review of symptoms and past medical history. I completed an independent physical exam.  I have discussed patient's plan of care with PA.   I agree with note as stated above, having amended the EMR as needed to reflect my findings.   This includes HISTORY OF PRESENT ILLNESS, HIV, PAST MEDICAL/SURGICAL/FAMILY/SOCIAL HISTORY, ALLERGIES AND HOME MEDICATIONS, REVIEW OF SYSTEMS, PHYSICAL EXAM, and any PROGRESS NOTES during the time I functioned as the attending physician for this patient.    see mdm

## 2019-07-24 NOTE — ED ADULT NURSE NOTE - CHPI ED NUR SYMPTOMS NEG
no confusion/no blurred vision/no vomiting/no dizziness/no nausea/no change in level of consciousness/no loss of consciousness

## 2019-07-24 NOTE — H&P ADULT - NSICDXPASTMEDICALHX_GEN_ALL_CORE_FT
PAST MEDICAL HISTORY:  Anxiety     Breast CA right    Cancer of thyroid unsure of dates but prior to 2005    Cognitive impairment     Coronary artery disease     Diabetes     History of adrenal adenoma     HLD (hyperlipidemia)     HTN (hypertension)     Hypothyroid     Osteoarthritis     Poor historian     Type 2 diabetes mellitus     Urinary frequency

## 2019-07-24 NOTE — ED ADULT NURSE NOTE - PMH
Anxiety    Breast CA  right  Cancer of thyroid  unsure of dates but prior to 2005  Cognitive impairment    Coronary artery disease    Diabetes    History of adrenal adenoma    HLD (hyperlipidemia)    HTN (hypertension)    Hypothyroid    Osteoarthritis    Poor historian    Type 2 diabetes mellitus    Urinary frequency

## 2019-07-24 NOTE — ED PROVIDER NOTE - NEUROLOGICAL LEVEL OF CONSCIOUSNESS
alert/follows commands/initially altered, able to answer questions and follow more commands s/p fluids and ABX

## 2019-07-25 LAB
-  K. PNEUMONIAE GROUP: SIGNIFICANT CHANGE UP
ALBUMIN SERPL ELPH-MCNC: 2.8 G/DL — LOW (ref 3.3–5)
ALP SERPL-CCNC: 56 U/L — SIGNIFICANT CHANGE UP (ref 40–120)
ALT FLD-CCNC: 17 U/L DA — SIGNIFICANT CHANGE UP (ref 10–45)
ANION GAP SERPL CALC-SCNC: 9 MMOL/L — SIGNIFICANT CHANGE UP (ref 5–17)
AST SERPL-CCNC: 15 U/L — SIGNIFICANT CHANGE UP (ref 10–40)
BILIRUB SERPL-MCNC: 1.1 MG/DL — SIGNIFICANT CHANGE UP (ref 0.2–1.2)
BUN SERPL-MCNC: 24 MG/DL — HIGH (ref 7–23)
CALCIUM SERPL-MCNC: 9 MG/DL — SIGNIFICANT CHANGE UP (ref 8.4–10.5)
CHLORIDE SERPL-SCNC: 105 MMOL/L — SIGNIFICANT CHANGE UP (ref 96–108)
CO2 SERPL-SCNC: 26 MMOL/L — SIGNIFICANT CHANGE UP (ref 22–31)
CREAT SERPL-MCNC: 1.16 MG/DL — SIGNIFICANT CHANGE UP (ref 0.5–1.3)
GLUCOSE BLDC GLUCOMTR-MCNC: 108 MG/DL — HIGH (ref 70–99)
GLUCOSE BLDC GLUCOMTR-MCNC: 128 MG/DL — HIGH (ref 70–99)
GLUCOSE BLDC GLUCOMTR-MCNC: 132 MG/DL — HIGH (ref 70–99)
GLUCOSE BLDC GLUCOMTR-MCNC: 133 MG/DL — HIGH (ref 70–99)
GLUCOSE SERPL-MCNC: 98 MG/DL — SIGNIFICANT CHANGE UP (ref 70–99)
GRAM STN FLD: SIGNIFICANT CHANGE UP
HBA1C BLD-MCNC: 5.4 % — SIGNIFICANT CHANGE UP (ref 4–5.6)
HCT VFR BLD CALC: 30.1 % — LOW (ref 34.5–45)
HCV AB S/CO SERPL IA: 0.08 S/CO — SIGNIFICANT CHANGE UP (ref 0–0.99)
HCV AB SERPL-IMP: SIGNIFICANT CHANGE UP
HGB BLD-MCNC: 9.3 G/DL — LOW (ref 11.5–15.5)
MCHC RBC-ENTMCNC: 30.9 GM/DL — LOW (ref 32–36)
MCHC RBC-ENTMCNC: 31.5 PG — SIGNIFICANT CHANGE UP (ref 27–34)
MCV RBC AUTO: 102 FL — HIGH (ref 80–100)
METHOD TYPE: SIGNIFICANT CHANGE UP
NRBC # BLD: 0 /100 WBCS — SIGNIFICANT CHANGE UP (ref 0–0)
NT-PROBNP SERPL-SCNC: 688 PG/ML — HIGH (ref 0–300)
PLATELET # BLD AUTO: 235 K/UL — SIGNIFICANT CHANGE UP (ref 150–400)
POTASSIUM SERPL-MCNC: 4.4 MMOL/L — SIGNIFICANT CHANGE UP (ref 3.5–5.3)
POTASSIUM SERPL-SCNC: 4.4 MMOL/L — SIGNIFICANT CHANGE UP (ref 3.5–5.3)
PROCALCITONIN SERPL-MCNC: 0.67 NG/ML — HIGH
PROT SERPL-MCNC: 6.7 G/DL — SIGNIFICANT CHANGE UP (ref 6–8.3)
RBC # BLD: 2.95 M/UL — LOW (ref 3.8–5.2)
RBC # FLD: 19.7 % — HIGH (ref 10.3–14.5)
SODIUM SERPL-SCNC: 140 MMOL/L — SIGNIFICANT CHANGE UP (ref 135–145)
SPECIMEN SOURCE: SIGNIFICANT CHANGE UP
SPECIMEN SOURCE: SIGNIFICANT CHANGE UP
WBC # BLD: 16 K/UL — HIGH (ref 3.8–10.5)
WBC # FLD AUTO: 16 K/UL — HIGH (ref 3.8–10.5)

## 2019-07-25 PROCEDURE — 99233 SBSQ HOSP IP/OBS HIGH 50: CPT

## 2019-07-25 PROCEDURE — 93307 TTE W/O DOPPLER COMPLETE: CPT | Mod: 26

## 2019-07-25 RX ORDER — INSULIN LISPRO 100/ML
VIAL (ML) SUBCUTANEOUS AT BEDTIME
Refills: 0 | Status: DISCONTINUED | OUTPATIENT
Start: 2019-07-25 | End: 2019-07-30

## 2019-07-25 RX ORDER — INSULIN LISPRO 100/ML
VIAL (ML) SUBCUTANEOUS
Refills: 0 | Status: DISCONTINUED | OUTPATIENT
Start: 2019-07-25 | End: 2019-07-30

## 2019-07-25 RX ORDER — CEFTRIAXONE 500 MG/1
1000 INJECTION, POWDER, FOR SOLUTION INTRAMUSCULAR; INTRAVENOUS EVERY 24 HOURS
Refills: 0 | Status: DISCONTINUED | OUTPATIENT
Start: 2019-07-25 | End: 2019-07-29

## 2019-07-25 RX ORDER — ENOXAPARIN SODIUM 100 MG/ML
40 INJECTION SUBCUTANEOUS DAILY
Refills: 0 | Status: DISCONTINUED | OUTPATIENT
Start: 2019-07-25 | End: 2019-07-30

## 2019-07-25 RX ADMIN — INSULIN GLARGINE 10 UNIT(S): 100 INJECTION, SOLUTION SUBCUTANEOUS at 21:41

## 2019-07-25 RX ADMIN — SPIRONOLACTONE 25 MILLIGRAM(S): 25 TABLET, FILM COATED ORAL at 05:33

## 2019-07-25 RX ADMIN — Medication 3 UNIT(S): at 17:14

## 2019-07-25 RX ADMIN — CEFTRIAXONE 100 MILLIGRAM(S): 500 INJECTION, POWDER, FOR SOLUTION INTRAMUSCULAR; INTRAVENOUS at 17:31

## 2019-07-25 RX ADMIN — HEPARIN SODIUM 5000 UNIT(S): 5000 INJECTION INTRAVENOUS; SUBCUTANEOUS at 05:34

## 2019-07-25 RX ADMIN — CEFEPIME 100 MILLIGRAM(S): 1 INJECTION, POWDER, FOR SOLUTION INTRAMUSCULAR; INTRAVENOUS at 05:35

## 2019-07-25 RX ADMIN — Medication 650 MILLIGRAM(S): at 17:15

## 2019-07-25 RX ADMIN — ATORVASTATIN CALCIUM 20 MILLIGRAM(S): 80 TABLET, FILM COATED ORAL at 21:41

## 2019-07-25 RX ADMIN — Medication 125 MICROGRAM(S): at 05:33

## 2019-07-25 RX ADMIN — ENOXAPARIN SODIUM 40 MILLIGRAM(S): 100 INJECTION SUBCUTANEOUS at 13:08

## 2019-07-25 RX ADMIN — Medication 81 MILLIGRAM(S): at 13:08

## 2019-07-25 RX ADMIN — Medication 650 MILLIGRAM(S): at 17:39

## 2019-07-25 RX ADMIN — Medication 3 UNIT(S): at 08:51

## 2019-07-25 RX ADMIN — Medication 3 UNIT(S): at 12:23

## 2019-07-25 NOTE — PROGRESS NOTE ADULT - SUBJECTIVE AND OBJECTIVE BOX
Patient is a 63y old  Female who presents with a chief complaint of fever, chills, sob, generalized malaise at home. (2019 17:42)      Patient seen and examined at bedside. No overnight events reported.     ALLERGIES:  No Known Allergies    MEDICATIONS  (STANDING):  aspirin enteric coated 81 milliGRAM(s) Oral daily  atorvastatin 20 milliGRAM(s) Oral at bedtime  cefepime   IVPB 1000 milliGRAM(s) IV Intermittent every 12 hours  dextrose 5%. 1000 milliLiter(s) (50 mL/Hr) IV Continuous <Continuous>  dextrose 50% Injectable 12.5 Gram(s) IV Push once  dextrose 50% Injectable 25 Gram(s) IV Push once  dextrose 50% Injectable 25 Gram(s) IV Push once  ergocalciferol 11763 Unit(s) Oral every week  heparin  Injectable 5000 Unit(s) SubCutaneous every 8 hours  insulin glargine Injectable (LANTUS) 10 Unit(s) SubCutaneous at bedtime  insulin lispro Injectable (HumaLOG) 3 Unit(s) SubCutaneous three times a day before meals  levothyroxine 125 MICROGram(s) Oral daily  spironolactone 25 milliGRAM(s) Oral daily    MEDICATIONS  (PRN):  acetaminophen   Tablet .. 650 milliGRAM(s) Oral every 6 hours PRN Temp greater or equal to 38C (100.4F), Moderate Pain (4 - 6)  dextrose 40% Gel 15 Gram(s) Oral once PRN Blood Glucose LESS THAN 70 milliGRAM(s)/deciliter  glucagon  Injectable 1 milliGRAM(s) IntraMuscular once PRN Glucose LESS THAN 70 milligrams/deciliter    Vital Signs Last 24 Hrs  T(F): 98.3 (2019 05:08), Max: 102 (2019 20:31)  HR: 86 (2019 05:08) (80 - 117)  BP: 143/77 (2019 05:08) (124/63 - 152/78)  RR: 16 (2019 05:08) (16 - 22)  SpO2: 96% (2019 05:08) (94% - 98%)  I&O's Summary    2019 07:01  -  2019 07:00  --------------------------------------------------------  IN: 50 mL / OUT: 0 mL / NET: 50 mL      PHYSICAL EXAM:  General: NAD, A/O x 3  ENT: MMM, no thrush  Neck: Supple, No JVD  Lungs: Right lower lobe crackles noted, good air entry, non-labored breathing  Cardio: RRR, S1/S2, No murmur  Breast Exam: Bilateral breasts examined, no palpable masses, no drainage - Chaperoned by DAMEON Meneses   Abdomen: Soft, Nontender, Nondistended; Bowel sounds present  Extremities: No calf tenderness, No pitting edema    LABS:                        9.3    16.00 )-----------( 235      ( 2019 05:05 )             30.1         140  |  105  |  24  ----------------------------<  98  4.4   |  26  |  1.16    Ca    9.0      2019 05:05    TPro  6.7  /  Alb  2.8  /  TBili  1.1  /  DBili  x   /  AST  15  /  ALT  17  /  AlkPhos  56          eGFR if Non African American: 50 mL/min/1.73M2 (19 @ 05:05)  eGFR if African American: 58 mL/min/1.73M2 (19 @ 05:05)    PT/INR - ( 2019 10:45 )   PT: 13.6 sec;   INR: 1.21 ratio         PTT - ( 2019 10:45 )  PTT:21.0 sec  Lactate, Blood: 1.9 mmol/L ( @ 10:45)    Procalcitonin, Serum: 0.67 ng/mL (19 @ 05:05)      POCT Blood Glucose.: 108 mg/dL (2019 07:52)  POCT Blood Glucose.: 172 mg/dL (2019 20:41)  POCT Blood Glucose.: 105 mg/dL (2019 17:55)      Urinalysis Basic - ( 2019 10:45 )    Color: Yellow / Appearance: Clear / S.020 / pH: x  Gluc: x / Ketone: Trace  / Bili: Negative / Urobili: Negative   Blood: x / Protein: 500 mg/dL / Nitrite: Positive   Leuk Esterase: Small / RBC: 5-10 /HPF / WBC 11-25 /HPF   Sq Epi: x / Non Sq Epi: Neg.-Few / Bacteria: Negative /HPF        Culture - Blood (collected 2019 10:45)  Source: .Blood Blood-Peripheral  Gram Stain (2019 08:28):    Growth in aerobic bottle:    Gram Negative Rods    Growth in anaerobic bottle:    Gram Negative Rods  Preliminary Report (2019 08:29):    Growth in aerobic bottle:    Gram Negative Rods    Growth in anaerobic bottle:    Gram Negative Rods    "Due to technical problems, Proteus sp. will Not be reported as part of    the BCID panel until further notice"    ***Blood Panel PCR results on this specimen are available    approximately 3 hours after the Gram stain result.***    Gram stain, PCR, and/or culture results may not always    correspond due to difference in methodologies.    ************************************************************    This PCR assay was performed using [employer].    The following targets are tested for: Enterococcus,    vancomycin resistant enterococci, Listeria monocytogenes,    coagulase negative staphylococci, S. aureus,    methicillin resistant S. aureus, Streptococcus agalactiae    (Group B), S. pneumoniae, S. pyogenes (Group A),    Acinetobacter baumannii, Enterobacter cloacae, E. coli,    Klebsiella oxytoca, K. pneumoniae, Proteus sp.,    Serratia marcescens, Haemophilus influenzae,    Neisseria meningitidis, Pseudomonas aeruginosa, Candida    albicans, C. glabrata, C krusei, C parapsilosis,    C. tropicalis and the KPC resistance gene.  Organism: Blood Culture PCR (2019 06:50)  Organism: Blood Culture PCR (2019 06:50)      -  Klebsiella pneumoniae: Detec      Method Type: PCR    Culture - Blood (collected 2019 10:45)  Source: .Blood Blood-Peripheral  Gram Stain (2019 09:02):    Growth in aerobic bottle: Gram Negative Rods    Growth in anaerobic bottle: Gram Negative Rods  Preliminary Report (2019 09:02):    Growth in aerobic bottle: Gram Negative Rods    Growth in anaerobic bottle: Gram Negative Rods      RADIOLOGY & ADDITIONAL TESTS:  < from: US Duplex Venous Lower Ext Complete, Bilateral (19 @ 19:36) >  No evidence of deep venous thrombosis in either lower extremity.    < end of copied text >    < from: US Breast Limited, Right (19 @ 14:18) >  Impression: Right breast complex fluid collection likely indicating   seroma or chronic blood products after prior surgery. Breast abscess is a   less likely possibility. Clinical correlation is suggested.    < end of copied text >    < from: CT Abdomen and Pelvis No Cont (19 @ 11:46) >  Large right breast collection, decreased in size compared to the prior   study. Superinfection cannot be excluded. Right breast skin thickening.    Normal appendix.    Enlarged, fibroid uterus.    < end of copied text >    < from: Xray Chest 1 View AP/PA (19 @ 11:24) >  IMPRESSION: Slight atelectasis has appeared at left base.    < end of copied text >    < from: 12 Lead ECG (19 @ 10:36) >  Diagnosis Line Sinus tachycardia  Moderate voltage criteria for LVH, may be normal variant  Counterclockwise rotation  T wave abnormality, consider lateral ischemia  Abnormal ECG  When compared with ECG of 19-OCT-2018 13:11,  Vent. rate has increased BY  46 BPM    < end of copied text >    Care Discussed with Consultants/Other Providers: Dr. Morales, ID

## 2019-07-25 NOTE — PROGRESS NOTE ADULT - ASSESSMENT
63F with h/o DM2 on metformin, HTN, currently not on any meds, CAD on asa, Hypothyroidism on synthroid, HLD on Lipitor, Breast Ca (actively undergoing chemo last treatment July 1st), chronic neurocognitive impairment since childhood (as noted in chart review, prior notes in EMR) presented to ED on 7/24 with c/o generalized weakness, fever, chills, lower abdominal pain for few days, found to have Klebsiella bacteremia secondary to UTI, meeting sepsis criteria on admission    #Sepsis secondary to Klebsiella bacteremia secondary to UTI  -ID consulted  -c/w cefepime for now  -Await sensitivities  -Repeat blood cx in AM    #DM2 on insulin and metformin  -hold Metformin while inpatient   -c/w insulin with ISS  -add FS coverage  -A1C pending    #Right breast fluid collection  -likely postoperative seroma  -await Urine Cx - if matches blood cx, then less concerning for infectious etiology of the breast collection    #CAD  -ASA, statin, no longer on plavix (stopped a few months ago)    #HTN  -on aldactone at home (unclear as to why, ? HTN, not clearly evident from outpatient note review either)    #Hypothyroidism  -synthroid    #Breast cancer  s/p multiple cycles of chemo, last on 7/1  Oncology consulted  Persistent leukocytosis, unclear if related to chemo, plus component of infection    #DVT ppx: Change HSQ to Lovenox due to malignancy

## 2019-07-25 NOTE — PHYSICAL THERAPY INITIAL EVALUATION ADULT - ADDITIONAL COMMENTS
pt lives alone in apartment, [address], elevator inside. pt Independent w/ambulation and most ADL's but does not drive. pt's friends assist w/shopping and appts

## 2019-07-26 ENCOUNTER — OTHER (OUTPATIENT)
Age: 64
End: 2019-07-26

## 2019-07-26 DIAGNOSIS — C50.911 MALIGNANT NEOPLASM OF UNSPECIFIED SITE OF RIGHT FEMALE BREAST: ICD-10-CM

## 2019-07-26 DIAGNOSIS — I10 ESSENTIAL (PRIMARY) HYPERTENSION: ICD-10-CM

## 2019-07-26 DIAGNOSIS — A41.9 SEPSIS, UNSPECIFIED ORGANISM: ICD-10-CM

## 2019-07-26 LAB
-  AMIKACIN: SIGNIFICANT CHANGE UP
-  AMPICILLIN/SULBACTAM: SIGNIFICANT CHANGE UP
-  AMPICILLIN: SIGNIFICANT CHANGE UP
-  AZTREONAM: SIGNIFICANT CHANGE UP
-  CEFEPIME: SIGNIFICANT CHANGE UP
-  CEFOXITIN: SIGNIFICANT CHANGE UP
-  CEFTRIAXONE: SIGNIFICANT CHANGE UP
-  CIPROFLOXACIN: SIGNIFICANT CHANGE UP
-  ERTAPENEM: SIGNIFICANT CHANGE UP
-  GENTAMICIN: SIGNIFICANT CHANGE UP
-  IMIPENEM: SIGNIFICANT CHANGE UP
-  LEVOFLOXACIN: SIGNIFICANT CHANGE UP
-  MEROPENEM: SIGNIFICANT CHANGE UP
-  NITROFURANTOIN: SIGNIFICANT CHANGE UP
-  PIPERACILLIN/TAZOBACTAM: SIGNIFICANT CHANGE UP
-  TIGECYCLINE: SIGNIFICANT CHANGE UP
-  TOBRAMYCIN: SIGNIFICANT CHANGE UP
-  TRIMETHOPRIM/SULFAMETHOXAZOLE: SIGNIFICANT CHANGE UP
ANION GAP SERPL CALC-SCNC: 12 MMOL/L — SIGNIFICANT CHANGE UP (ref 5–17)
BASOPHILS # BLD AUTO: 0.07 K/UL — SIGNIFICANT CHANGE UP (ref 0–0.2)
BASOPHILS NFR BLD AUTO: 0.8 % — SIGNIFICANT CHANGE UP (ref 0–2)
BUN SERPL-MCNC: 24 MG/DL — HIGH (ref 7–23)
CALCIUM SERPL-MCNC: 8.5 MG/DL — SIGNIFICANT CHANGE UP (ref 8.4–10.5)
CHLORIDE SERPL-SCNC: 104 MMOL/L — SIGNIFICANT CHANGE UP (ref 96–108)
CO2 SERPL-SCNC: 22 MMOL/L — SIGNIFICANT CHANGE UP (ref 22–31)
CREAT SERPL-MCNC: 1.08 MG/DL — SIGNIFICANT CHANGE UP (ref 0.5–1.3)
CULTURE RESULTS: SIGNIFICANT CHANGE UP
EOSINOPHIL # BLD AUTO: 0.03 K/UL — SIGNIFICANT CHANGE UP (ref 0–0.5)
EOSINOPHIL NFR BLD AUTO: 0.4 % — SIGNIFICANT CHANGE UP (ref 0–6)
GLUCOSE BLDC GLUCOMTR-MCNC: 104 MG/DL — HIGH (ref 70–99)
GLUCOSE BLDC GLUCOMTR-MCNC: 131 MG/DL — HIGH (ref 70–99)
GLUCOSE BLDC GLUCOMTR-MCNC: 140 MG/DL — HIGH (ref 70–99)
GLUCOSE BLDC GLUCOMTR-MCNC: 151 MG/DL — HIGH (ref 70–99)
GLUCOSE SERPL-MCNC: 92 MG/DL — SIGNIFICANT CHANGE UP (ref 70–99)
HCT VFR BLD CALC: 27.8 % — LOW (ref 34.5–45)
HGB BLD-MCNC: 8.7 G/DL — LOW (ref 11.5–15.5)
IMM GRANULOCYTES NFR BLD AUTO: 1.7 % — HIGH (ref 0–1.5)
LYMPHOCYTES # BLD AUTO: 0.6 K/UL — LOW (ref 1–3.3)
LYMPHOCYTES # BLD AUTO: 7.2 % — LOW (ref 13–44)
MAGNESIUM SERPL-MCNC: 1.9 MG/DL — SIGNIFICANT CHANGE UP (ref 1.6–2.6)
MCHC RBC-ENTMCNC: 30.7 PG — SIGNIFICANT CHANGE UP (ref 27–34)
MCHC RBC-ENTMCNC: 31.3 GM/DL — LOW (ref 32–36)
MCV RBC AUTO: 98.2 FL — SIGNIFICANT CHANGE UP (ref 80–100)
METHOD TYPE: SIGNIFICANT CHANGE UP
MONOCYTES # BLD AUTO: 0.71 K/UL — SIGNIFICANT CHANGE UP (ref 0–0.9)
MONOCYTES NFR BLD AUTO: 8.5 % — SIGNIFICANT CHANGE UP (ref 2–14)
NEUTROPHILS # BLD AUTO: 6.82 K/UL — SIGNIFICANT CHANGE UP (ref 1.8–7.4)
NEUTROPHILS NFR BLD AUTO: 81.4 % — HIGH (ref 43–77)
NRBC # BLD: 0 /100 WBCS — SIGNIFICANT CHANGE UP (ref 0–0)
ORGANISM # SPEC MICROSCOPIC CNT: SIGNIFICANT CHANGE UP
ORGANISM # SPEC MICROSCOPIC CNT: SIGNIFICANT CHANGE UP
PLATELET # BLD AUTO: 227 K/UL — SIGNIFICANT CHANGE UP (ref 150–400)
POTASSIUM SERPL-MCNC: 4 MMOL/L — SIGNIFICANT CHANGE UP (ref 3.5–5.3)
POTASSIUM SERPL-SCNC: 4 MMOL/L — SIGNIFICANT CHANGE UP (ref 3.5–5.3)
RBC # BLD: 2.83 M/UL — LOW (ref 3.8–5.2)
RBC # FLD: 19.3 % — HIGH (ref 10.3–14.5)
SODIUM SERPL-SCNC: 138 MMOL/L — SIGNIFICANT CHANGE UP (ref 135–145)
SPECIMEN SOURCE: SIGNIFICANT CHANGE UP
WBC # BLD: 8.37 K/UL — SIGNIFICANT CHANGE UP (ref 3.8–10.5)
WBC # FLD AUTO: 8.37 K/UL — SIGNIFICANT CHANGE UP (ref 3.8–10.5)

## 2019-07-26 PROCEDURE — 99233 SBSQ HOSP IP/OBS HIGH 50: CPT

## 2019-07-26 PROCEDURE — 99223 1ST HOSP IP/OBS HIGH 75: CPT

## 2019-07-26 RX ADMIN — Medication 650 MILLIGRAM(S): at 22:10

## 2019-07-26 RX ADMIN — ATORVASTATIN CALCIUM 20 MILLIGRAM(S): 80 TABLET, FILM COATED ORAL at 21:19

## 2019-07-26 RX ADMIN — INSULIN GLARGINE 10 UNIT(S): 100 INJECTION, SOLUTION SUBCUTANEOUS at 21:19

## 2019-07-26 RX ADMIN — Medication 3 UNIT(S): at 17:04

## 2019-07-26 RX ADMIN — CEFTRIAXONE 100 MILLIGRAM(S): 500 INJECTION, POWDER, FOR SOLUTION INTRAMUSCULAR; INTRAVENOUS at 18:20

## 2019-07-26 RX ADMIN — Medication 3 UNIT(S): at 12:38

## 2019-07-26 RX ADMIN — ENOXAPARIN SODIUM 40 MILLIGRAM(S): 100 INJECTION SUBCUTANEOUS at 14:23

## 2019-07-26 RX ADMIN — Medication 81 MILLIGRAM(S): at 14:23

## 2019-07-26 RX ADMIN — Medication 650 MILLIGRAM(S): at 21:18

## 2019-07-26 RX ADMIN — Medication 125 MICROGRAM(S): at 05:29

## 2019-07-26 RX ADMIN — Medication 2: at 17:04

## 2019-07-26 RX ADMIN — SPIRONOLACTONE 25 MILLIGRAM(S): 25 TABLET, FILM COATED ORAL at 05:29

## 2019-07-26 NOTE — PROGRESS NOTE ADULT - ASSESSMENT
63F with h/o DM2 on metformin, HTN, currently not on any meds, CAD on asa, Hypothyroidism on synthroid, HLD on Lipitor, Breast Ca (actively undergoing chemo last treatment July 1st), chronic neurocognitive impairment since childhood (as noted in chart review, prior notes in EMR) presented to ED on 7/24 with c/o generalized weakness, fever, chills, lower abdominal pain for few days, found to have Klebsiella bacteremia secondary to UTI, meeting sepsis criteria on admission      #DVT ppx: Change HSQ to Lovenox due to malignancy

## 2019-07-26 NOTE — PROGRESS NOTE ADULT - SUBJECTIVE AND OBJECTIVE BOX
CC: f/u for kleb bacteremia    Patient reports feels much better, no breast pain    REVIEW OF SYSTEMS:  All other review of systems negative (Comprehensive ROS)    Antimicrobials Day #  :3  cefTRIAXone   IVPB 1000 milliGRAM(s) IV Intermittent every 24 hours    Other Medications Reviewed    T(F): 98.1 (07-26-19 @ 15:34), Max: 99.2 (07-26-19 @ 09:02)  HR: 107 (07-26-19 @ 15:34)  BP: 157/91 (07-26-19 @ 15:34)  RR: 16 (07-26-19 @ 15:34)  SpO2: 97% (07-26-19 @ 15:34)  Wt(kg): --    PHYSICAL EXAM:  General: alert, no acute distress  Eyes:  anicteric, no conjunctival injection, no discharge  Oropharynx: no lesions or injection 	  Neck: supple, without adenopathy  Lungs: clear to auscultation  Heart: regular rate and rhythm; no murmur, rubs or gallops  Abdomen: soft, nondistended, nontender, without mass or organomegaly  Skin: no lesions  Extremities: no clubbing, cyanosis, or edema  Neurologic: alert, oriented, moves all extremities  breasts with no induration or redness  LAB RESULTS:                        8.7    8.37  )-----------( 227      ( 26 Jul 2019 05:10 )             27.8     07-26    138  |  104  |  24<H>  ----------------------------<  92  4.0   |  22  |  1.08    Ca    8.5      26 Jul 2019 05:10  Mg     1.9     07-26    TPro  6.7  /  Alb  2.8<L>  /  TBili  1.1  /  DBili  x   /  AST  15  /  ALT  17  /  AlkPhos  56  07-25    LIVER FUNCTIONS - ( 25 Jul 2019 05:05 )  Alb: 2.8 g/dL / Pro: 6.7 g/dL / ALK PHOS: 56 U/L / ALT: 17 U/L DA / AST: 15 U/L / GGT: x             MICROBIOLOGY:  RECENT CULTURES:  07-24 @ 10:45 .Blood Blood-Peripheral Blood Culture PCR    Growth in aerobic and anaerobic bottles: Gram Negative Rods  See previous culture [id number]    Growth in aerobic bottle: Gram Negative Rods  Growth in anaerobic bottle: Gram Negative Rods        RADIOLOGY REVIEWED:    < from: CT Abdomen and Pelvis No Cont (07.24.19 @ 11:46) >  XAM:  CT ABDOMEN AND PELVIS      PROCEDURE DATE:  07/24/2019        INTERPRETATION:  CLINICAL INFORMATION: Right lower quadrant pain anterior    COMPARISON: CT of the abdomen pelvis dated 04/19/2019.    PROCEDURE:   CT of the Abdomen and Pelvis was performed without intravenous contrast.   Intravenous contrast: None.  Oral contrast: None.  Sagittal and coronal reformats were performed.    FINDINGS:    LOWER CHEST: Lobulated rim-enhancing fluid collection within the   visualized right breast measuring 2.1 x 4.7 x 7.7 cm with right breast   skin thickening. Coronary artery stent.    LIVER: Within normal limits.  BILE DUCTS: Normal caliber.  GALLBLADDER: Within normal limits.  SPLEEN: Within normal limits.  PANCREAS: Within normal limits.  ADRENALS: Indeterminant 3 cm left adrenal nodule, unchanged in size   compared to the prior study.  KIDNEYS/URETERS: Punctate nonobstructing calculus in the lower pole of   the right kidney. 6 cm cyst in the lower pole of the left kidney.    BLADDER: Underdistended.  REPRODUCTIVE ORGANS: Enlarged, myomatous uterus.    BOWEL: No bowel obstruction. Appendix is normal. 8mm lymph node within   the right perirectal fat.  PERITONEUM: No ascites.  VESSELS: Atherosclerotic changes of the aorta and branchingvessels.  RETROPERITONEUM: No lymphadenopathy.    ABDOMINAL WALL: Within normal limits.  BONES: Within normal limits.    IMPRESSION:     Large right breast collection, decreased in size compared to the prior   study. Superinfection cannot be excluded. Right breast skin thickening.    Normal appendix.    Enlarged, fibroid uterus.      < end of copied text >  < from: US Breast Limited, Right (07.24.19 @ 14:18) >  EXAM:  US BREAST LIMITED RT      PROCEDURE DATE:  07/24/2019        INTERPRETATION:  Right breast ultrasound    History: Fluid collection. The patient had breast surgery 4 months ago.    Comparison: CT examination performed the same day.    Technique: Multiple sagittal and transverse images of the right breast   were obtained.    Interpretation: At the 11:00 position, 8 to 10 cm from the nipple, there   is a 5.5 cm complex fluid collection containing simple fluid and   echogenic material.     The patient did not complain of tenderness during the examination.    Impression: Right breast complex fluid collection likely indicating   seroma or chronic blood products after prior surgery. Breast abscess is a   less likely possibility. Clinical correlation is suggested.    Thank you for the courtesy of this referral.      < end of copied text >            Assessment: Patient with met breast ca, admitted with sepsis from klebsiella bactermia, presume uti. She has a collection in the right breast but the breast is totally nontender and soft so highly doubt it is infected.   She is responding to ceftriaxone  Plan:  continue ceftriaxone   await further culture data

## 2019-07-26 NOTE — PROGRESS NOTE ADULT - PROBLEM SELECTOR PLAN 1
-secondary to Klebsiella bacteremia secondary to UTI  -ID consulted  -cefepime down graded to ceftriaxone 7/25  -Await sensitivities  -wbc reduced significantly

## 2019-07-26 NOTE — PROGRESS NOTE ADULT - SUBJECTIVE AND OBJECTIVE BOX
Patient is a 63y old  Female who presents with a chief complaint of fever, chills, sob, generalized malaise at home. (2019 10:12)      Patient seen and examined at bedside.    ALLERGIES:  No Known Allergies    MEDICATIONS:  acetaminophen   Tablet .. 650 milliGRAM(s) Oral every 6 hours PRN  aspirin enteric coated 81 milliGRAM(s) Oral daily  atorvastatin 20 milliGRAM(s) Oral at bedtime  cefTRIAXone   IVPB 1000 milliGRAM(s) IV Intermittent every 24 hours  dextrose 40% Gel 15 Gram(s) Oral once PRN  dextrose 5%. 1000 milliLiter(s) IV Continuous <Continuous>  dextrose 50% Injectable 12.5 Gram(s) IV Push once  dextrose 50% Injectable 25 Gram(s) IV Push once  dextrose 50% Injectable 25 Gram(s) IV Push once  enoxaparin Injectable 40 milliGRAM(s) SubCutaneous daily  ergocalciferol 74262 Unit(s) Oral every week  glucagon  Injectable 1 milliGRAM(s) IntraMuscular once PRN  insulin glargine Injectable (LANTUS) 10 Unit(s) SubCutaneous at bedtime  insulin lispro (HumaLOG) corrective regimen sliding scale   SubCutaneous three times a day before meals  insulin lispro (HumaLOG) corrective regimen sliding scale   SubCutaneous at bedtime  insulin lispro Injectable (HumaLOG) 3 Unit(s) SubCutaneous three times a day before meals  levothyroxine 125 MICROGram(s) Oral daily  spironolactone 25 milliGRAM(s) Oral daily    Vital Signs Last 24 Hrs  T(F): 98.1 (2019 15:34), Max: 99.2 (2019 09:02)  HR: 107 (2019 15:34) (90 - 107)  BP: 157/91 (2019 15:34) (135/94 - 162/93)  RR: 16 (2019 15:34) (14 - 16)  SpO2: 97% (2019 15:34) (97% - 98%)  I&O's Summary    2019 07:01  -  2019 07:00  --------------------------------------------------------  IN: 250 mL / OUT: 0 mL / NET: 250 mL    2019 07:01  -  2019 16:31  --------------------------------------------------------  IN: 300 mL / OUT: 2 mL / NET: 298 mL        PHYSICAL EXAM:  General: NAD, A/O x 3, has difficulty concentrating and making plans   ENT: MMM  Neck: Supple, No JVD  Lungs: Clear to auscultation bilaterally  Cardio: RRR, S1/S2, No murmurs  Abdomen: Soft, Nontender, Nondistended; Bowel sounds present  Extremities: No cyanosis, No edema    LABS:                        8.7    8.37  )-----------( 227      ( 2019 05:10 )             27.8         138  |  104  |  24  ----------------------------<  92  4.0   |  22  |  1.08    Ca    8.5      2019 05:10  Mg     1.9         TPro  6.7  /  Alb  2.8  /  TBili  1.1  /  DBili  x   /  AST  15  /  ALT  17  /  AlkPhos  56      eGFR if Non African American: 54 mL/min/1.73M2 (19 @ 05:10)  eGFR if : 63 mL/min/1.73M2 (19 @ 05:10)    PT/INR - ( 2019 10:45 )   PT: 13.6 sec;   INR: 1.21 ratio         PTT - ( 2019 10:45 )  PTT:21.0 sec  Lactate, Blood: 1.9 mmol/L ( @ 10:45)                      POCT Blood Glucose.: 140 mg/dL (2019 12:37)  POCT Blood Glucose.: 104 mg/dL (2019 08:31)  POCT Blood Glucose.: 133 mg/dL (2019 20:53)  POCT Blood Glucose.: 128 mg/dL (2019 17:11)     [id number] 5.4    Urinalysis Basic - ( 2019 10:45 )    Color: Yellow / Appearance: Clear / S.020 / pH: x  Gluc: x / Ketone: Trace  / Bili: Negative / Urobili: Negative   Blood: x / Protein: 500 mg/dL / Nitrite: Positive   Leuk Esterase: Small / RBC: 5-10 /HPF / WBC 11-25 /HPF   Sq Epi: x / Non Sq Epi: Neg.-Few / Bacteria: Negative /HPF        Culture - Urine (collected 2019 10:45)  Source: .Urine Clean Catch (Midstream)  Preliminary Report (2019 19:34):    >100,000 CFU/ml Gram Negative Rods    Culture - Blood (collected 2019 10:45)  Source: .Blood Blood-Peripheral  Gram Stain (2019 08:28):    Growth in aerobic bottle:    Gram Negative Rods    Growth in anaerobic bottle:    Gram Negative Rods  Preliminary Report (2019 10:16):    Growth in aerobic and anaerobic bottles:    Gram Negative Rods Identification and susceptibility to follow.    "Due to technical problems, Proteus sp. will Not be reported as part of    the BCID panel until further notice"    ***Blood Panel PCR results on this specimen are available    approximately 3 hours after the Gram stain result.***    Gram stain, PCR, and/or culture results may not always    correspond due to difference in methodologies.    ************************************************************    This PCR assay was performed using [employer].    The following targets are tested for: Enterococcus,    vancomycin resistant enterococci, Listeria monocytogenes,    coagulase negative staphylococci, S. aureus,    methicillin resistant S. aureus, Streptococcus agalactiae    (Group B), S. pneumoniae, S. pyogenes (Group A),    Acinetobacter baumannii, Enterobacter cloacae, E. coli,    Klebsiella oxytoca, K. pneumoniae, Proteus sp.,    Serratia marcescens, Haemophilus influenzae,    Neisseria meningitidis, Pseudomonas aeruginosa, Candida    albicans, C. glabrata, C krusei, C parapsilosis,    C. tropicalis and the KPC resistance gene.  Organism: Blood Culture PCR (2019 06:50)  Organism: Blood Culture PCR (2019 06:50)      -  Klebsiella pneumoniae: Detec      Method Type: PCR    Culture - Blood (collected 2019 10:45)  Source: .Blood Blood-Peripheral  Gram Stain (2019 09:02):    Growth in aerobic bottle: Gram Negative Rods    Growth in anaerobic bottle: Gram Negative Rods  Preliminary Report (2019 11:47):    Growth in aerobic and anaerobic bottles: Gram Negative Rods    See previous culture [id number]        RADIOLOGY & ADDITIONAL TESTS:    Care Discussed with Consultants/Other Providers:

## 2019-07-26 NOTE — CONSULT NOTE ADULT - PROBLEM SELECTOR RECOMMENDATION 9
Patient with recent right breast cancer, status post adjuvant chemotherapy (no further chemotherapy planned). Following treatment of infectious process/medical stabilization, plan adjuvant radiation in ambulatory/endocrine therapy.  Supportive H/O care in hospital.  DVT prophylaxis.

## 2019-07-26 NOTE — PROVIDER CONTACT NOTE (OTHER) - RECOMMENDATIONS
Blood pressure medication to decrease BP, notified pt has no current orders for medication to address blood pressure

## 2019-07-26 NOTE — CONSULT NOTE ADULT - PROBLEM SELECTOR PROBLEM 1
Malignant neoplasm of right breast in female, estrogen receptor positive, unspecified site of breast

## 2019-07-27 DIAGNOSIS — N17.9 ACUTE KIDNEY FAILURE, UNSPECIFIED: ICD-10-CM

## 2019-07-27 DIAGNOSIS — R78.81 BACTEREMIA: ICD-10-CM

## 2019-07-27 LAB
-  AMIKACIN: SIGNIFICANT CHANGE UP
-  AMPICILLIN/SULBACTAM: SIGNIFICANT CHANGE UP
-  AMPICILLIN: SIGNIFICANT CHANGE UP
-  AZTREONAM: SIGNIFICANT CHANGE UP
-  CEFEPIME: SIGNIFICANT CHANGE UP
-  CEFOXITIN: SIGNIFICANT CHANGE UP
-  CEFTRIAXONE: SIGNIFICANT CHANGE UP
-  CIPROFLOXACIN: SIGNIFICANT CHANGE UP
-  ERTAPENEM: SIGNIFICANT CHANGE UP
-  GENTAMICIN: SIGNIFICANT CHANGE UP
-  IMIPENEM: SIGNIFICANT CHANGE UP
-  LEVOFLOXACIN: SIGNIFICANT CHANGE UP
-  MEROPENEM: SIGNIFICANT CHANGE UP
-  PIPERACILLIN/TAZOBACTAM: SIGNIFICANT CHANGE UP
-  TOBRAMYCIN: SIGNIFICANT CHANGE UP
-  TRIMETHOPRIM/SULFAMETHOXAZOLE: SIGNIFICANT CHANGE UP
ANION GAP SERPL CALC-SCNC: 10 MMOL/L — SIGNIFICANT CHANGE UP (ref 5–17)
BUN SERPL-MCNC: 19 MG/DL — SIGNIFICANT CHANGE UP (ref 7–23)
CALCIUM SERPL-MCNC: 8.5 MG/DL — SIGNIFICANT CHANGE UP (ref 8.4–10.5)
CHLORIDE SERPL-SCNC: 106 MMOL/L — SIGNIFICANT CHANGE UP (ref 96–108)
CO2 SERPL-SCNC: 26 MMOL/L — SIGNIFICANT CHANGE UP (ref 22–31)
CREAT SERPL-MCNC: 1.04 MG/DL — SIGNIFICANT CHANGE UP (ref 0.5–1.3)
CULTURE RESULTS: SIGNIFICANT CHANGE UP
CULTURE RESULTS: SIGNIFICANT CHANGE UP
GLUCOSE BLDC GLUCOMTR-MCNC: 112 MG/DL — HIGH (ref 70–99)
GLUCOSE BLDC GLUCOMTR-MCNC: 115 MG/DL — HIGH (ref 70–99)
GLUCOSE BLDC GLUCOMTR-MCNC: 127 MG/DL — HIGH (ref 70–99)
GLUCOSE BLDC GLUCOMTR-MCNC: 140 MG/DL — HIGH (ref 70–99)
GLUCOSE SERPL-MCNC: 102 MG/DL — HIGH (ref 70–99)
HCT VFR BLD CALC: 29.3 % — LOW (ref 34.5–45)
HGB BLD-MCNC: 9.1 G/DL — LOW (ref 11.5–15.5)
MCHC RBC-ENTMCNC: 30.4 PG — SIGNIFICANT CHANGE UP (ref 27–34)
MCHC RBC-ENTMCNC: 31.1 GM/DL — LOW (ref 32–36)
MCV RBC AUTO: 98 FL — SIGNIFICANT CHANGE UP (ref 80–100)
METHOD TYPE: SIGNIFICANT CHANGE UP
NRBC # BLD: 0 /100 WBCS — SIGNIFICANT CHANGE UP (ref 0–0)
ORGANISM # SPEC MICROSCOPIC CNT: SIGNIFICANT CHANGE UP
PLATELET # BLD AUTO: 263 K/UL — SIGNIFICANT CHANGE UP (ref 150–400)
POTASSIUM SERPL-MCNC: 4.2 MMOL/L — SIGNIFICANT CHANGE UP (ref 3.5–5.3)
POTASSIUM SERPL-SCNC: 4.2 MMOL/L — SIGNIFICANT CHANGE UP (ref 3.5–5.3)
RBC # BLD: 2.99 M/UL — LOW (ref 3.8–5.2)
RBC # FLD: 18.7 % — HIGH (ref 10.3–14.5)
SODIUM SERPL-SCNC: 142 MMOL/L — SIGNIFICANT CHANGE UP (ref 135–145)
SPECIMEN SOURCE: SIGNIFICANT CHANGE UP
SPECIMEN SOURCE: SIGNIFICANT CHANGE UP
WBC # BLD: 5.95 K/UL — SIGNIFICANT CHANGE UP (ref 3.8–10.5)
WBC # FLD AUTO: 5.95 K/UL — SIGNIFICANT CHANGE UP (ref 3.8–10.5)

## 2019-07-27 PROCEDURE — 99233 SBSQ HOSP IP/OBS HIGH 50: CPT

## 2019-07-27 RX ADMIN — Medication 81 MILLIGRAM(S): at 11:32

## 2019-07-27 RX ADMIN — Medication 3 UNIT(S): at 11:30

## 2019-07-27 RX ADMIN — Medication 0.1 MILLIGRAM(S): at 06:12

## 2019-07-27 RX ADMIN — INSULIN GLARGINE 10 UNIT(S): 100 INJECTION, SOLUTION SUBCUTANEOUS at 21:24

## 2019-07-27 RX ADMIN — CEFTRIAXONE 100 MILLIGRAM(S): 500 INJECTION, POWDER, FOR SOLUTION INTRAMUSCULAR; INTRAVENOUS at 16:31

## 2019-07-27 RX ADMIN — SPIRONOLACTONE 25 MILLIGRAM(S): 25 TABLET, FILM COATED ORAL at 05:55

## 2019-07-27 RX ADMIN — ATORVASTATIN CALCIUM 20 MILLIGRAM(S): 80 TABLET, FILM COATED ORAL at 21:24

## 2019-07-27 RX ADMIN — Medication 3 UNIT(S): at 17:11

## 2019-07-27 RX ADMIN — Medication 0.1 MILLIGRAM(S): at 17:12

## 2019-07-27 RX ADMIN — ENOXAPARIN SODIUM 40 MILLIGRAM(S): 100 INJECTION SUBCUTANEOUS at 11:32

## 2019-07-27 RX ADMIN — Medication 125 MICROGRAM(S): at 05:55

## 2019-07-27 RX ADMIN — Medication 3 UNIT(S): at 07:23

## 2019-07-27 NOTE — PROGRESS NOTE ADULT - SUBJECTIVE AND OBJECTIVE BOX
CC: f/u for Klebsiella bacteremia    Patient reports feeling better in general, confused at times    REVIEW OF SYSTEMS:  All other review of systems negative (Comprehensive ROS)    Antimicrobials Day # 4   cefTRIAXone   IVPB 1000 milliGRAM(s) IV Intermittent every 24 hours    Other Medications Reviewed    T(F): 97.9 (07-27-19 @ 09:42), Max: 100.4 (07-26-19 @ 21:14)  HR: 81 (07-27-19 @ 09:42)  BP: 135/86 (07-27-19 @ 09:42)  RR: 16 (07-27-19 @ 09:42)  SpO2: 98% (07-27-19 @ 09:42)  Wt(kg): --    PHYSICAL EXAM:  General: alert, no acute distress  Eyes:  anicteric, no conjunctival injection, no discharge  Oropharynx: no lesions or injection 	  Neck: supple, without adenopathy  Lungs: clear to auscultation  Heart: regular rate and rhythm; no murmur, rubs or gallops  Abdomen: soft, nondistended, nontender, without mass or organomegaly  Skin: no lesions  Extremities: no clubbing, cyanosis, or edema  Neurologic: moves all extremities    LAB RESULTS:                        9.1    5.95  )-----------( 263      ( 27 Jul 2019 06:53 )             29.3     07-27    142  |  106  |  19  ----------------------------<  102<H>  4.2   |  26  |  1.04    Ca    8.5      27 Jul 2019 06:53  Mg     1.9     07-26    MICROBIOLOGY:  RECENT CULTURES:  07-24 @ 10:45 .Blood Blood-Peripheral Blood Culture PCR  Klebsiella pneumoniae    Growth in aerobic and anaerobic bottles: Gram Negative Rods  See previous culture [id number]    Culture - Urine (07.24.19 @ 10:45)    >100,000 CFU/ml Klebsiella pneumoniae     RADIOLOGY REVIEWED:  CT Abdomen and Pelvis No Cont (07.24.19 @ 11:46) >  Large right breast collection, decreased in size compared to the prior   study. Superinfection cannot be excluded. Right breast skin thickening.    Normal appendix.    Enlarged, fibroid uterus.

## 2019-07-27 NOTE — PROGRESS NOTE ADULT - SUBJECTIVE AND OBJECTIVE BOX
Patient is a 63y old  Female who presents with a chief complaint of fever, chills, sob, generalized malaise at home. (26 Jul 2019 16:58)    Patient seen and examined at bedside.  offers no new complaints. feeling much better.     ALLERGIES:  No Known Allergies    MEDICATIONS  (STANDING):  aspirin enteric coated 81 milliGRAM(s) Oral daily  atorvastatin 20 milliGRAM(s) Oral at bedtime  cefTRIAXone   IVPB 1000 milliGRAM(s) IV Intermittent every 24 hours  cloNIDine 0.1 milliGRAM(s) Oral two times a day  dextrose 5%. 1000 milliLiter(s) (50 mL/Hr) IV Continuous <Continuous>  dextrose 50% Injectable 12.5 Gram(s) IV Push once  dextrose 50% Injectable 25 Gram(s) IV Push once  dextrose 50% Injectable 25 Gram(s) IV Push once  enoxaparin Injectable 40 milliGRAM(s) SubCutaneous daily  ergocalciferol 18659 Unit(s) Oral every week  insulin glargine Injectable (LANTUS) 10 Unit(s) SubCutaneous at bedtime  insulin lispro (HumaLOG) corrective regimen sliding scale   SubCutaneous three times a day before meals  insulin lispro (HumaLOG) corrective regimen sliding scale   SubCutaneous at bedtime  insulin lispro Injectable (HumaLOG) 3 Unit(s) SubCutaneous three times a day before meals  levothyroxine 125 MICROGram(s) Oral daily  spironolactone 25 milliGRAM(s) Oral daily    MEDICATIONS  (PRN):  acetaminophen   Tablet .. 650 milliGRAM(s) Oral every 6 hours PRN Temp greater or equal to 38C (100.4F), Moderate Pain (4 - 6)  dextrose 40% Gel 15 Gram(s) Oral once PRN Blood Glucose LESS THAN 70 milliGRAM(s)/deciliter  glucagon  Injectable 1 milliGRAM(s) IntraMuscular once PRN Glucose LESS THAN 70 milligrams/deciliter    Vital Signs Last 24 Hrs  T(F): 97.9 (27 Jul 2019 09:42), Max: 100.4 (26 Jul 2019 21:14)  HR: 81 (27 Jul 2019 09:42) (78 - 107)  BP: 135/86 (27 Jul 2019 09:42) (135/86 - 168/106)  RR: 16 (27 Jul 2019 09:42) (16 - 19)  SpO2: 98% (27 Jul 2019 09:42) (97% - 98%)  I&O's Summary    26 Jul 2019 07:01  -  27 Jul 2019 07:00  --------------------------------------------------------  IN: 600 mL / OUT: 2 mL / NET: 598 mL      BMI (kg/m2): 23.5 (07-24-19 @ 17:28)  PHYSICAL EXAM:  General: NAD, A/O x 3  ENT: MMM  Neck: Supple, No JVD  Lungs: Clear to auscultation bilaterally  Cardio: RRR, S1/S2, No murmurs  Abdomen: Soft, Nontender, Nondistended; Bowel sounds present  Extremities: No calf tenderness, No pitting edema    LABS:                        9.1    5.95  )-----------( 263      ( 27 Jul 2019 06:53 )             29.3       07-27    142  |  106  |  19  ----------------------------<  102  4.2   |  26  |  1.04    Ca    8.5      27 Jul 2019 06:53  Mg     1.9     07-26    TPro  6.7  /  Alb  2.8  /  TBili  1.1  /  DBili  x   /  AST  15  /  ALT  17  /  AlkPhos  56  07-25     eGFR if Non African American: 57 mL/min/1.73M2 (07-27-19 @ 06:53)  eGFR if African American: 66 mL/min/1.73M2 (07-27-19 @ 06:53)      POCT Blood Glucose.: 115 mg/dL (27 Jul 2019 11:23)  POCT Blood Glucose.: 112 mg/dL (27 Jul 2019 07:21)  POCT Blood Glucose.: 131 mg/dL (26 Jul 2019 21:13)  POCT Blood Glucose.: 151 mg/dL (26 Jul 2019 17:02)  POCT Blood Glucose.: 140 mg/dL (26 Jul 2019 12:37)    07-25 [id number] 5.4    Culture - Urine (collected 24 Jul 2019 10:45)  Source: .Urine Clean Catch (Midstream)  Final Report (26 Jul 2019 21:08):    >100,000 CFU/ml Klebsiella pneumoniae  Organism: Klebsiella pneumoniae (26 Jul 2019 21:08)  Organism: Klebsiella pneumoniae (26 Jul 2019 21:08)      -  Amikacin: S <=16      -  Ampicillin: R >16 These ampicillin results predict results for amoxicillin      -  Ampicillin/Sulbactam: S <=8/4 Enterobacter, Citrobacter, and Serratia may develop resistance during prolonged therapy (3-4 days)      -  Aztreonam: S <=4      -  Cefepime: S <=4      -  Cefoxitin: S <=8      -  Ceftriaxone: S <=1 Enterobacter, Citrobacter, and Serratia may develop resistance during prolonged therapy      -  Ciprofloxacin: S <=1      -  Ertapenem: S <=1      -  Gentamicin: S <=4      -  Imipenem: S <=1      -  Levofloxacin: S <=2      -  Meropenem: S <=1      -  Nitrofurantoin: I 64 Should not be used to treat pyelonephritis      -  Piperacillin/Tazobactam: S <=16      -  Tigecycline: S <=2      -  Tobramycin: S <=4      -  Trimethoprim/Sulfamethoxazole: S <=2/38      Method Type: ARELI    Culture - Blood (collected 24 Jul 2019 10:45)  Source: .Blood Blood-Peripheral  Gram Stain (25 Jul 2019 08:28):    Growth in aerobic bottle:    Gram Negative Rods    Growth in anaerobic bottle:    Gram Negative Rods  Final Report (27 Jul 2019 09:34):    Growth in aerobic and anaerobic bottles: Klebsiella pneumoniae    "Due to technical problems, Proteus sp. will Not be reported as part of    the BCID panel until further notice"    ***Blood Panel PCR results on this specimen are available    approximately 3 hours after the Gram stain result.***    Gram stain, PCR, and/or culture results may not always    correspond due to difference in methodologies.    ************************************************************    This PCR assay was performed using e-channel.    The following targets are tested for: Enterococcus,    vancomycin resistant enterococci, Listeria monocytogenes,    coagulase negative staphylococci, S. aureus,    methicillin resistant S. aureus, Streptococcus agalactiae    (Group B), S. pneumoniae, S. pyogenes (Group A),    Acinetobacter baumannii, Enterobacter cloacae, E. coli,    Klebsiella oxytoca, K. pneumoniae, Proteus sp.,    Serratia marcescens, Haemophilus influenzae,    Neisseria meningitidis, Pseudomonas aeruginosa, Candida    albicans, C. glabrata, C krusei, C parapsilosis,    C. tropicalis and the KPC resistance gene.  Organism: Blood Culture PCR  Klebsiella pneumoniae (27 Jul 2019 09:34)  Organism: Klebsiella pneumoniae (27 Jul 2019 09:34)      -  Amikacin: S <=16      -  Ampicillin: R >16 These ampicillin results predict results for amoxicillin      -  Ampicillin/Sulbactam: S <=8/4 Enterobacter, Citrobacter, and Serratia may develop resistance during prolonged therapy (3-4 days)      -  Aztreonam: S <=4      -  Cefepime: S <=4      -  Cefoxitin: S <=8      -  Ceftriaxone: S <=1 Enterobacter, Citrobacter, and Serratia may develop resistance during prolonged therapy      -  Ciprofloxacin: S <=1      -  Ertapenem: S <=1      -  Gentamicin: S <=4      -  Imipenem: S <=1      -  Levofloxacin: S <=2      -  Meropenem: S <=1      -  Piperacillin/Tazobactam: S <=16      -  Tobramycin: S <=4      -  Trimethoprim/Sulfamethoxazole: S <=2/38      Method Type: ARELI  Organism: Blood Culture PCR (27 Jul 2019 09:34)      -  Klebsiella pneumoniae: Detec      Method Type: PCR    Culture - Blood (collected 24 Jul 2019 10:45)  Source: .Blood Blood-Peripheral  Gram Stain (25 Jul 2019 09:02): Growth in aerobic bottle: Gram Negative Rods. Growth in anaerobic bottle: Gram Negative Rods  Preliminary Report (26 Jul 2019 11:47): Growth in aerobic and anaerobic bottles: Gram Negative Rods. See previous culture [id number]      RADIOLOGY & ADDITIONAL TESTS: none    Care Discussed with Consultants/Other Providers: Dr. pardo

## 2019-07-27 NOTE — PROGRESS NOTE ADULT - PROBLEM SELECTOR PLAN 1
c/w ceftriaxone 1g. please, consider to increase to 2g daily for bacteremia  ID following  pt is improving clinically  TTE w/o vegetation  UA +, Ucx grew klebsiella, sensitive to ceftriaxone   PT recs PAUL, CM working on placement  please, place midline when PAUL placement confirmed as pt may need another 5-10 days of IV abx. (abx dose and duration per ID) c/w ceftriaxone 1g daily per ID  ID following. (abx dose and duration per ID)  pt is improving clinically. wbc wnl. pt is afebrile.   TTE w/o vegetation  UA +, Ucx grew klebsiella, sensitive to ceftriaxone   PT recs SULEMA MILLER working on placement

## 2019-07-27 NOTE — PROGRESS NOTE ADULT - PROBLEM SELECTOR PLAN 3
c/w clonidine, aldactone c/w clonidine, aldactone  BP is much controlled today c/w clonidine, aldactone  BP is much controlled today  called pt's pharmacy: [facility]. pt picked clonidine on July 2nd,  Lisinopril on 6/16, Diltiazem on 6/16 and HCTZ on 4/13  cardiology consulted. will see her tomorrow.

## 2019-07-27 NOTE — PROGRESS NOTE ADULT - ASSESSMENT
62 yo F, DM, early dementia, R breast ca, ongoing chemo, here with severe sepsis and Klebsiella bacteremia.    Pyuria, and >100k Kleb- UTI is source; previously tender L flank.    No support for R breast infection  Remains afebrile, WBC normal    Plan:  Continue Ceftriaxone IV for now  Several po options, eg, Ceftin, can switch soon  Follow temps and CBC/diff

## 2019-07-28 DIAGNOSIS — E03.9 HYPOTHYROIDISM, UNSPECIFIED: ICD-10-CM

## 2019-07-28 DIAGNOSIS — I10 ESSENTIAL (PRIMARY) HYPERTENSION: ICD-10-CM

## 2019-07-28 DIAGNOSIS — A41.9 SEPSIS, UNSPECIFIED ORGANISM: ICD-10-CM

## 2019-07-28 DIAGNOSIS — R78.81 BACTEREMIA: ICD-10-CM

## 2019-07-28 DIAGNOSIS — C50.911 MALIGNANT NEOPLASM OF UNSPECIFIED SITE OF RIGHT FEMALE BREAST: ICD-10-CM

## 2019-07-28 DIAGNOSIS — E11.69 TYPE 2 DIABETES MELLITUS WITH OTHER SPECIFIED COMPLICATION: ICD-10-CM

## 2019-07-28 LAB
ALBUMIN SERPL ELPH-MCNC: 2.7 G/DL — LOW (ref 3.3–5)
ALP SERPL-CCNC: 58 U/L — SIGNIFICANT CHANGE UP (ref 40–120)
ALT FLD-CCNC: 20 U/L DA — SIGNIFICANT CHANGE UP (ref 10–45)
ANION GAP SERPL CALC-SCNC: 8 MMOL/L — SIGNIFICANT CHANGE UP (ref 5–17)
AST SERPL-CCNC: 25 U/L — SIGNIFICANT CHANGE UP (ref 10–40)
BILIRUB SERPL-MCNC: 0.2 MG/DL — SIGNIFICANT CHANGE UP (ref 0.2–1.2)
BUN SERPL-MCNC: 22 MG/DL — SIGNIFICANT CHANGE UP (ref 7–23)
CALCIUM SERPL-MCNC: 8.4 MG/DL — SIGNIFICANT CHANGE UP (ref 8.4–10.5)
CHLORIDE SERPL-SCNC: 106 MMOL/L — SIGNIFICANT CHANGE UP (ref 96–108)
CO2 SERPL-SCNC: 27 MMOL/L — SIGNIFICANT CHANGE UP (ref 22–31)
CREAT SERPL-MCNC: 1.04 MG/DL — SIGNIFICANT CHANGE UP (ref 0.5–1.3)
GLUCOSE BLDC GLUCOMTR-MCNC: 102 MG/DL — HIGH (ref 70–99)
GLUCOSE BLDC GLUCOMTR-MCNC: 108 MG/DL — HIGH (ref 70–99)
GLUCOSE BLDC GLUCOMTR-MCNC: 118 MG/DL — HIGH (ref 70–99)
GLUCOSE BLDC GLUCOMTR-MCNC: 155 MG/DL — HIGH (ref 70–99)
GLUCOSE SERPL-MCNC: 100 MG/DL — HIGH (ref 70–99)
HCT VFR BLD CALC: 28.9 % — LOW (ref 34.5–45)
HGB BLD-MCNC: 9 G/DL — LOW (ref 11.5–15.5)
MCHC RBC-ENTMCNC: 31.1 GM/DL — LOW (ref 32–36)
MCHC RBC-ENTMCNC: 31.1 PG — SIGNIFICANT CHANGE UP (ref 27–34)
MCV RBC AUTO: 100 FL — SIGNIFICANT CHANGE UP (ref 80–100)
NRBC # BLD: 0 /100 WBCS — SIGNIFICANT CHANGE UP (ref 0–0)
PLATELET # BLD AUTO: 269 K/UL — SIGNIFICANT CHANGE UP (ref 150–400)
POTASSIUM SERPL-MCNC: 4.4 MMOL/L — SIGNIFICANT CHANGE UP (ref 3.5–5.3)
POTASSIUM SERPL-SCNC: 4.4 MMOL/L — SIGNIFICANT CHANGE UP (ref 3.5–5.3)
PROT SERPL-MCNC: 6.5 G/DL — SIGNIFICANT CHANGE UP (ref 6–8.3)
RBC # BLD: 2.89 M/UL — LOW (ref 3.8–5.2)
RBC # FLD: 18.8 % — HIGH (ref 10.3–14.5)
SODIUM SERPL-SCNC: 141 MMOL/L — SIGNIFICANT CHANGE UP (ref 135–145)
WBC # BLD: 6.63 K/UL — SIGNIFICANT CHANGE UP (ref 3.8–10.5)
WBC # FLD AUTO: 6.63 K/UL — SIGNIFICANT CHANGE UP (ref 3.8–10.5)

## 2019-07-28 PROCEDURE — 99222 1ST HOSP IP/OBS MODERATE 55: CPT

## 2019-07-28 PROCEDURE — 99233 SBSQ HOSP IP/OBS HIGH 50: CPT

## 2019-07-28 RX ORDER — DILTIAZEM HCL 120 MG
240 CAPSULE, EXT RELEASE 24 HR ORAL DAILY
Refills: 0 | Status: DISCONTINUED | OUTPATIENT
Start: 2019-07-28 | End: 2019-07-30

## 2019-07-28 RX ADMIN — CEFTRIAXONE 100 MILLIGRAM(S): 500 INJECTION, POWDER, FOR SOLUTION INTRAMUSCULAR; INTRAVENOUS at 16:50

## 2019-07-28 RX ADMIN — SPIRONOLACTONE 25 MILLIGRAM(S): 25 TABLET, FILM COATED ORAL at 05:06

## 2019-07-28 RX ADMIN — Medication 0.1 MILLIGRAM(S): at 17:02

## 2019-07-28 RX ADMIN — ATORVASTATIN CALCIUM 20 MILLIGRAM(S): 80 TABLET, FILM COATED ORAL at 21:13

## 2019-07-28 RX ADMIN — Medication 0.1 MILLIGRAM(S): at 05:06

## 2019-07-28 RX ADMIN — ENOXAPARIN SODIUM 40 MILLIGRAM(S): 100 INJECTION SUBCUTANEOUS at 15:12

## 2019-07-28 RX ADMIN — Medication 3 UNIT(S): at 16:50

## 2019-07-28 RX ADMIN — Medication 81 MILLIGRAM(S): at 12:25

## 2019-07-28 RX ADMIN — Medication 125 MICROGRAM(S): at 05:06

## 2019-07-28 RX ADMIN — INSULIN GLARGINE 10 UNIT(S): 100 INJECTION, SOLUTION SUBCUTANEOUS at 21:13

## 2019-07-28 RX ADMIN — Medication 3 UNIT(S): at 12:26

## 2019-07-28 NOTE — CONSULT NOTE ADULT - ASSESSMENT
Imp  patient admitted for sepsis  suggest resuming pre morbid anti hypertensive regimen as detailed above
64 yo F, DM, early dementia, R breast ca, ongoing chemo, here with severe sepsis and Klebsiella bacteremia.  Rapid growth.  Pt a poor informant, but with pyuria, most concerned abt UTI; tender L flank.  No clues to alternative source as yet.  No pneumonia.  Not sure we're faced with secondary infection of R breast collection.  Now afebrile, WBC on decline.    Plan:  Will switch to Ceftriaxone IV  Await final Micro data, including urine Cx results  Follow temps and CBC/diff   Check post void residual  D/w Dr Wilkinson
63-year-old female with history of breast cancer, diabetes, hypertension, and coronary artery disease, who was admitted with weakness/fever/chills. Being treated for GNR bacteremia/urosepsis.  Patient is status post recent diagnosis of Stage IIB right breast cancer (T2N1a), ER positive/MN positive/HER-2/yojaan negative. She is status post 4 cycles of Taxotere/Cytoxan adjuvant chemotherapy-last dose given 7/1/19, along with Neulasta Onpro.

## 2019-07-28 NOTE — CONSULT NOTE ADULT - REASON FOR ADMISSION
fever, chills, sob, generalized malaise at home.

## 2019-07-28 NOTE — PROGRESS NOTE ADULT - ASSESSMENT
63 female with DM, early dementia, Right breast malignancy on chemo admitted with severe sepsis and klebsiella bacteremia likely from UTI

## 2019-07-28 NOTE — PROGRESS NOTE ADULT - PROBLEM SELECTOR PLAN 7
continue accuchecks with correction insulin continue accuchecks with correction insulin  currently well controlled

## 2019-07-28 NOTE — CONSULT NOTE ADULT - SUBJECTIVE AND OBJECTIVE BOX
HPI:   Patient is a 63y female with DM, HTN, CAD, early dementia, and breast ca on chemo, presented to ED last night with weakness, fever, chills, lower abdom pain.  She is calm and cooperative, but confused at times.  She can't recall recent events, denies focal Sxs at present.  Febrile to 102, tachycardia, leukocytosis, elevated Creat, severe sepsis on arrival- covered with Cipro/Flagyl, now Cefepime.  No port in place.    REVIEW OF SYSTEMS:  All other review of systems negative (Comprehensive ROS)    PAST MEDICAL & SURGICAL HISTORY:  Diabetes  History of adrenal adenoma  Urinary frequency  Type 2 diabetes mellitus  Osteoarthritis  Cognitive impairment  Coronary artery disease  Cancer of thyroid: unsure of dates but prior to   Poor historian  Breast CA: right  Hypothyroid  HLD (hyperlipidemia)  Anxiety  HTN (hypertension)  S/P dilation and curettage: TOP x2  S/P angioplasty with stent: drug eluting stent in first diagonal on 19  H/O bilateral breast biopsy: 2018 and 2018  H/O total thyroidectomy      Allergies  No Known Allergies    Antimicrobials Day # 1  cefepime   IVPB 1000 milliGRAM(s) IV Intermittent every 12 hours    Other Medications:  acetaminophen   Tablet .. 650 milliGRAM(s) Oral every 6 hours PRN  aspirin enteric coated 81 milliGRAM(s) Oral daily  atorvastatin 20 milliGRAM(s) Oral at bedtime  dextrose 40% Gel 15 Gram(s) Oral once PRN  dextrose 5%. 1000 milliLiter(s) IV Continuous <Continuous>  dextrose 50% Injectable 12.5 Gram(s) IV Push once  dextrose 50% Injectable 25 Gram(s) IV Push once  dextrose 50% Injectable 25 Gram(s) IV Push once  enoxaparin Injectable 40 milliGRAM(s) SubCutaneous daily  ergocalciferol 62518 Unit(s) Oral every week  glucagon  Injectable 1 milliGRAM(s) IntraMuscular once PRN  insulin glargine Injectable (LANTUS) 10 Unit(s) SubCutaneous at bedtime  insulin lispro (HumaLOG) corrective regimen sliding scale   SubCutaneous three times a day before meals  insulin lispro (HumaLOG) corrective regimen sliding scale   SubCutaneous at bedtime  insulin lispro Injectable (HumaLOG) 3 Unit(s) SubCutaneous three times a day before meals  levothyroxine 125 MICROGram(s) Oral daily  spironolactone 25 milliGRAM(s) Oral daily      FAMILY HISTORY:  Family history of brain damage (Sibling): at birth  FHx: lymphoma  FHx: suicide      SOCIAL HISTORY:  Smoking: no    ETOH: no      Single     T(F): 101.1 (19 @ 15:49), Max: 102 (19 @ 20:31)  HR: 106 (19 @ 15:49)  BP: 142/91 (19 @ 15:49)  RR: 16 (19 @ 15:49)  SpO2: 95% (19 @ 15:49)  Wt(kg): --    PHYSICAL EXAM:  General: alert, no acute distress, alopecia  Eyes: anicteric, no conjunctival injection, no discharge  Oropharynx: no lesions or injection 	  Neck: supple, without adenopathy  Lungs: clear to auscultation  Heart: regular rate and rhythm; no murmur, rubs or gallops  Abdomen: soft, nondistended, L flank mildly tender, without mass or organomegaly  Skin: no lesions  Extremities: no edema  Neurologic: alert, moves all extremities    LAB RESULTS:                        9.3    16.00 )-----------( 235      ( 2019 05:05 )             30.1         140  |  105  |  24<H>  ----------------------------<  98  4.4   |  26  |  1.16    Ca    9.0      2019 05:05    TPro  6.7  /  Alb  2.8<L>  /  TBili  1.1  /  DBili  x   /  AST  15  /  ALT  17  /  AlkPhos  56          Urinalysis Basic - ( 2019 10:45 )    Color: Yellow / Appearance: Clear / S.020 / pH: x  Gluc: x / Ketone: Trace  / Bili: Negative / Urobili: Negative   Blood: x / Protein: 500 mg/dL / Nitrite: Positive   Leuk Esterase: Small / RBC: 5-10 /HPF / WBC 11-25 /HPF   Sq Epi: x / Non Sq Epi: Neg.-Few / Bacteria: Negative /HPF        MICROBIOLOGY:  RECENT CULTURES:  Culture - Blood (19 @ 10:45)    Gram Stain:   Growth in aerobic bottle:  Gram Negative Rods  Growth in anaerobic bottle:  Gram Negative Rods    -  Klebsiella pneumoniae: Detec      Culture - Blood (19 @ 10:45)    Gram Stain:   Growth in aerobic bottle: Gram Negative Rods  Growth in anaerobic bottle: Gram Negative Rods        RADIOLOGY REVIEWED:  CT Abdomen and Pelvis No Cont (19 @ 11:46) >  Large right breast collection, decreased in size compared to the prior   study. Superinfection cannot be excluded. Right breast skin thickening.    Normal appendix.    Enlarged, fibroid uterus.
YOSI CORRAL  63y  Female  Admitting: JIMMY Longoria    HPI:  63-year-old female with history of breast cancer, diabetes, hypertension, and coronary artery disease, who was admitted with weakness/fever/chills. Being treated for GNR bacteremia/urosepsis.  Patient is status post recent diagnosis of Stage IIB right breast cancer (T2N1a), ER positive/WA positive/HER-2/yojana negative. She is status post 4 cycles of Taxotere/Cytoxan adjuvant chemotherapy-last dose given 7/1/19, along with Neulasta Onpro.     PMD- Elmer ([place])   Onc [place] (24 Jul 2019 17:42)    PAST MEDICAL & SURGICAL HISTORY:  Diabetes  History of adrenal adenoma  Urinary frequency  Type 2 diabetes mellitus  Osteoarthritis  Cognitive impairment  Coronary artery disease  Cancer of thyroid: unsure of dates but prior to 2005  Poor historian  Breast CA: right  Hypothyroid  HLD (hyperlipidemia)  Anxiety  HTN (hypertension)  S/P dilation and curettage: TOP x2  S/P angioplasty with stent: drug eluting stent in first diagonal on 2/21/19  H/O bilateral breast biopsy: November 2018 and December 2018  H/O total thyroidectomy    HEALTH ISSUES - PROBLEM Dx:  Anxiety: Anxiety  HLD (hyperlipidemia): HLD (hyperlipidemia)  Hypothyroid: Hypothyroid  Breast CA: Breast CA  Coronary artery disease: Coronary artery disease  Cognitive impairment: Cognitive impairment  Sepsis, due to unspecified organism: Sepsis, due to unspecified organism  Diabetes: Diabetes  Altered mental status, unspecified: Altered mental status, unspecified  UTI (urinary tract infection): UTI (urinary tract infection)    MEDICATIONS  (STANDING):  aspirin enteric coated 81 milliGRAM(s) Oral daily  atorvastatin 20 milliGRAM(s) Oral at bedtime  cefTRIAXone   IVPB 1000 milliGRAM(s) IV Intermittent every 24 hours  dextrose 5%. 1000 milliLiter(s) (50 mL/Hr) IV Continuous <Continuous>  dextrose 50% Injectable 12.5 Gram(s) IV Push once  dextrose 50% Injectable 25 Gram(s) IV Push once  dextrose 50% Injectable 25 Gram(s) IV Push once  enoxaparin Injectable 40 milliGRAM(s) SubCutaneous daily  ergocalciferol 63129 Unit(s) Oral every week  insulin glargine Injectable (LANTUS) 10 Unit(s) SubCutaneous at bedtime  insulin lispro (HumaLOG) corrective regimen sliding scale   SubCutaneous three times a day before meals  insulin lispro (HumaLOG) corrective regimen sliding scale   SubCutaneous at bedtime  insulin lispro Injectable (HumaLOG) 3 Unit(s) SubCutaneous three times a day before meals  levothyroxine 125 MICROGram(s) Oral daily  spironolactone 25 milliGRAM(s) Oral daily    MEDICATIONS  (PRN):  acetaminophen   Tablet .. 650 milliGRAM(s) Oral every 6 hours PRN Temp greater or equal to 38C (100.4F), Moderate Pain (4 - 6)  dextrose 40% Gel 15 Gram(s) Oral once PRN Blood Glucose LESS THAN 70 milliGRAM(s)/deciliter  glucagon  Injectable 1 milliGRAM(s) IntraMuscular once PRN Glucose LESS THAN 70 milligrams/deciliter    Allergies    No Known Allergies    FAMILY HISTORY:  Family history of brain damage (Sibling): at birth  FHx: lymphoma-father; breast cancer-mother    SOCIAL HISTORY: No EtOH, no tobacco    REVIEW OF SYSTEMS:    CONSTITUTIONAL: +weakness  EYES/ENT: No visual changes;  No vertigo or throat pain   NECK: No pain or stiffness  RESPIRATORY: No cough, wheezing, hemoptysis; No shortness of breath  CARDIOVASCULAR: No chest pain or palpitations  GASTROINTESTINAL: No nausea, vomiting, or hematemesis; No diarrhea or constipation. No melena or hematochezia.  GENITOURINARY: No hematuria  NEUROLOGICAL: Numbness resolved.  SKIN: No itching, burning, rashes, or lesions   All other review of systems is negative unless indicated above.    T(F): 99.2 (07-26-19 @ 09:02), Max: 101.1 (07-25-19 @ 15:49)  HR: 106 (07-26-19 @ 09:02)  BP: 135/94 (07-26-19 @ 09:02)  RR: 14 (07-26-19 @ 09:02)  SpO2: 98% (07-26-19 @ 09:02)    GENERAL: NAD, well-developed  HEAD:  Atraumatic, Normocephalic; +alopecia  EYES: EOMI, PERRLA, conjunctiva and sclera clear  NECK: Supple, No JVD  Oropharynx-no ulcers  CHEST/LUNG: Clear to auscultation ant.  HEART: Regular rate and rhythm; No murmurs, rubs, or gallops  ABDOMEN: Soft, Nontender, Bowel sounds present, unable to appreciate hepatosplenomegaly  EXTREMITIES:  no calf tenderness  NEUROLOGY: awake, alert  SKIN: No rashes or lesions    Labs:             8.7    8.37  )-----------( 227      ( 07-26 @ 05:10 )             27.8                9.3    16.00 )-----------( 235      ( 07-25 @ 05:05 )             30.1                9.4    17.72 )-----------( 267      ( 07-24 @ 10:45 )             29.7       07-26    138  |  104  |  24<H>  ----------------------------<  92  4.0   |  22  |  1.08    Ca    8.5      26 Jul 2019 05:10  Mg     1.9     07-26    TPro  6.7  /  Alb  2.8<L>  /  TBili  1.1  /  DBili  x   /  AST  15  /  ALT  17  /  AlkPhos  56  07-25    Magnesium, Serum: 1.9 mg/dL [1.6 - 2.6] (07-26 @ 05:10)    PT/INR - ( 24 Jul 2019 10:45 )   PT: 13.6 sec;   INR: 1.21 ratio    PTT - ( 24 Jul 2019 10:45 )  PTT:21.0 sec    Culture - Urine (collected 24 Jul 2019 10:45)  Source: .Urine Clean Catch (Midstream)  Preliminary Report (25 Jul 2019 19:34):    >100,000 CFU/ml Gram Negative Rods    Culture - Blood (collected 24 Jul 2019 10:45)  Source: .Blood Blood-Peripheral  Gram Stain (25 Jul 2019 08:28):    Growth in aerobic bottle:    Gram Negative Rods    Growth in anaerobic bottle:    Gram Negative Rods  Preliminary Report (25 Jul 2019 08:29):    Growth in aerobic bottle:    Gram Negative Rods    Growth in anaerobic bottle:    Gram Negative Rods    "Due to technical problems, Proteus sp. will Not be reported as part of    the BCID panel until further notice"    ***Blood Panel PCR results on this specimen are available    approximately 3 hours after the Gram stain result.***    Gram stain, PCR, and/or culture results may not always    correspond due to difference in methodologies.    ************************************************************    This PCR assay was performed using [employer].    The following targets are tested for: Enterococcus,    vancomycin resistant enterococci, Listeria monocytogenes,    coagulase negative staphylococci, S. aureus,    methicillin resistant S. aureus, Streptococcus agalactiae    (Group B), S. pneumoniae, S. pyogenes (Group A),    Acinetobacter baumannii, Enterobacter cloacae, E. coli,    Klebsiella oxytoca, K. pneumoniae, Proteus sp.,    Serratia marcescens, Haemophilus influenzae,    Neisseria meningitidis, Pseudomonas aeruginosa, Candida    albicans, C. glabrata, C krusei, C parapsilosis,    C. tropicalis and the KPC resistance gene.  Organism: Blood Culture PCR (25 Jul 2019 06:50)  Organism: Blood Culture PCR (25 Jul 2019 06:50)    Culture - Blood (collected 24 Jul 2019 10:45)  Source: .Blood Blood-Peripheral  Gram Stain (25 Jul 2019 09:02):    Growth in aerobic bottle: Gram Negative Rods    Growth in anaerobic bottle: Gram Negative Rods  Preliminary Report (25 Jul 2019 09:02):    Growth in aerobic bottle: Gram Negative Rods    Growth in anaerobic bottle: Gram Negative Rods    Radiology and additional tests:  < from: US Duplex Venous Lower Ext Complete, Bilateral (07.24.19 @ 19:36) >  EXAM:  US DPLX LWR EXT VEINS COMPL BI      PROCEDURE DATE:  07/24/2019        INTERPRETATION:  CLINICAL INFORMATION: Dyspnea    COMPARISON: None available.    TECHNIQUE: Duplex sonography of the BILATERAL LOWER extremity veins with   color and spectral Doppler, with and without compression.      FINDINGS:    There is normal compressibility of the bilateral common femoral, femoral   and popliteal veins.     Doppler examination shows normal spontaneous and phasic flow.    No calf vein thrombosis is detected.    IMPRESSION:     No evidence of deep venous thrombosis in either lower extremity.    TRINITY MALAVE M.D., ATTENDING RADIOLOGIST  This document has been electronically signed. Jul 24 2019  7:38PM    < end of copied text >  < from: CT Abdomen and Pelvis No Cont (07.24.19 @ 11:46) >    EXAM:  CT ABDOMEN AND PELVIS      PROCEDURE DATE:  07/24/2019        INTERPRETATION:  CLINICAL INFORMATION: Right lower quadrant pain anterior    COMPARISON: CT of the abdomen pelvis dated 04/19/2019.    PROCEDURE:   CT of the Abdomen and Pelvis was performed without intravenous contrast.   Intravenous contrast: None.  Oral contrast: None.  Sagittal and coronal reformats were performed.    FINDINGS:    LOWER CHEST: Lobulated rim-enhancing fluid collection within the   visualized right breast measuring 2.1 x 4.7 x 7.7 cm with right breast   skin thickening. Coronary artery stent.    LIVER: Within normal limits.  BILE DUCTS: Normal caliber.  GALLBLADDER: Within normal limits.  SPLEEN: Within normal limits.  PANCREAS: Within normal limits.  ADRENALS: Indeterminant 3 cm left adrenal nodule, unchanged in size   compared to the prior study.  KIDNEYS/URETERS: Punctate nonobstructing calculus in the lower pole of   the right kidney. 6 cm cyst in the lower pole of the left kidney.    BLADDER: Underdistended.  REPRODUCTIVE ORGANS: Enlarged, myomatous uterus.    BOWEL: No bowel obstruction. Appendix is normal. 8mm lymph node within   the right perirectal fat.  PERITONEUM: No ascites.  VESSELS: Atherosclerotic changes of the aorta and branchingvessels.  RETROPERITONEUM: No lymphadenopathy.    ABDOMINAL WALL: Within normal limits.  BONES: Within normal limits.    IMPRESSION:     Large right breast collection, decreased in size compared to the prior   study. Superinfection cannot be excluded. Right breast skin thickening.    Normal appendix.    Enlarged, fibroid uterus.    OMEGA RODRIGUEZ   This document has been electronically signed. Jul 24 2019 12:26PM        < end of copied text >  < from: Xray Chest 1 View AP/PA (07.24.19 @ 11:24) >  EXAM:  XR CHEST AP OR PA 1V      PROCEDURE DATE:  07/24/2019        INTERPRETATION:  AP erect chest on July 24, 2019 at 11:04 AM. Patient has   sepsis and fever.    Poor inspiration crowds the chest.    The heart is magnified by technique.    Present film shows slight atelectasis at left base new since April 30   this year.    Otherwise studies are similar.    IMPRESSION: Slight atelectasis has appeared at left base.      VIVIANA CARDOZO M.D., ATTENDING RADIOLOGIST  This document has been electronically signed. Jul 24 2019 11:26AM    < end of copied text >
Chief Complaint: fever chills    HPI:63 yr old woman with hx of cad, hyn adrenal issues breast ca on chemo admitted for sepsis. i am consulted for htn. last saw pt several months ago at [facility] time her antihypertensive regimen included clonidine 0.1 mg in am and 0.2 mg in pm cardizem cd 240 mg per day and aldactone 25 mg per day. pt has no compalins of cp sob palps dizziness or syncope    PMH:   Diabetes  History of adrenal adenoma  Urinary frequency  Type 2 diabetes mellitus  Osteoarthritis  Cognitive impairment  Coronary artery disease  Cancer of thyroid  Poor historian  Breast CA  Adrenal nodule  Hypothyroid  HLD (hyperlipidemia)  Anxiety  HLD (hyperlipidemia)  HTN (hypertension)    PSH:   S/P dilation and curettage  S/P angioplasty with stent  H/O bilateral breast biopsy  H/O total thyroidectomy    Family History:  FAMILY HISTORY:  Family history of brain damage (Sibling): at birth  FHx: lymphoma  FHx: suicide  CAD (coronary artery disease)      Social History:  Smoking:no  Alcohol:no  Drugs:no    Allergies:  No Known Allergies      Medications:  acetaminophen   Tablet .. 650 milliGRAM(s) Oral every 6 hours PRN  aspirin enteric coated 81 milliGRAM(s) Oral daily  atorvastatin 20 milliGRAM(s) Oral at bedtime  cefTRIAXone   IVPB 1000 milliGRAM(s) IV Intermittent every 24 hours  cloNIDine 0.1 milliGRAM(s) Oral two times a day  dextrose 40% Gel 15 Gram(s) Oral once PRN  dextrose 5%. 1000 milliLiter(s) IV Continuous <Continuous>  dextrose 50% Injectable 12.5 Gram(s) IV Push once  dextrose 50% Injectable 25 Gram(s) IV Push once  dextrose 50% Injectable 25 Gram(s) IV Push once  enoxaparin Injectable 40 milliGRAM(s) SubCutaneous daily  ergocalciferol 51385 Unit(s) Oral every week  glucagon  Injectable 1 milliGRAM(s) IntraMuscular once PRN  insulin glargine Injectable (LANTUS) 10 Unit(s) SubCutaneous at bedtime  insulin lispro (HumaLOG) corrective regimen sliding scale   SubCutaneous three times a day before meals  insulin lispro (HumaLOG) corrective regimen sliding scale   SubCutaneous at bedtime  insulin lispro Injectable (HumaLOG) 3 Unit(s) SubCutaneous three times a day before meals  levothyroxine 125 MICROGram(s) Oral daily  spironolactone 25 milliGRAM(s) Oral daily      REVIEW OF SYSTEMS:  CONSTITUTIONAL: No fever, weight loss, or fatigue  EYES: No eye pain, visual disturbances, or discharge  ENMT:  No difficulty hearing, tinnitus, vertigo; No sinus or throat pain  NECK: No pain or stiffness  BREASTS: No pain, masses, or nipple discharge  RESPIRATORY: No cough, wheezing, chills or hemoptysis; No shortness of breath  CARDIOVASCULAR: No chest pain, palpitations, dizziness, or leg swelling  GASTROINTESTINAL: No abdominal or epigastric pain. No nausea, vomiting, or hematemesis; No diarrhea or constipation. No melena or hematochezia.  GENITOURINARY: No dysuria, frequency, hematuria, or incontinence  NEUROLOGICAL: No headaches, memory loss, loss of strength, numbness, or tremors  SKIN: No itching, burning, rashes, or lesions   LYMPH NODES: No enlarged glands  ENDOCRINE: No heat or cold intolerance; No hair loss  MUSCULOSKELETAL: No joint pain or swelling; No muscle, back, or extremity pain  PSYCHIATRIC: No depression, anxiety, mood swings, or difficulty sleeping  HEME/LYMPH: No easy bruising, or bleeding gums  ALLERY AND IMMUNOLOGIC: No hives or eczema    Physical Exam:  T(C): 36.4 (07-28-19 @ 08:13), Max: 36.7 (07-27-19 @ 20:04)  HR: 79 (07-28-19 @ 08:13) (79 - 100)  BP: 135/92 (07-28-19 @ 08:13) (135/92 - 154/97)  RR: 16 (07-28-19 @ 08:13) (16 - 18)  SpO2: 97% (07-28-19 @ 08:13) (96% - 99%)  Wt(kg): --    GENERAL: NAD, well-groomed, well-developed  HEAD:  Atraumatic, Normocephalic  EYES: EOMI, conjunctiva and sclera clear  ENT: Moist mucous membranes,  NECK: Supple, No JVD, no bruits  CHEST/LUNG: Clear to percussion bilaterally; No rales, rhonchi, wheezing, or rubs  HEART: Regular rate and rhythm; No murmurs, rubs, or gallops PMI non displaced.  ABDOMEN: Soft, Nontender, Nondistended; Bowel sounds present  EXTREMITIES:  2+ Peripheral Pulses, No clubbing, cyanosis, or edema  SKIN: No rashes or lesions  NERVOUS SYSTEM:  Alert & Oriented X3, Good concentration; Motor Strength 5/5 B/L upper and lower extremities; DTRs 2+ intact and symmetric    Cardiovascular Diagnostic Testing:  ECG:    Labs:                        9.0    6.63  )-----------( 269      ( 28 Jul 2019 05:50 )             28.9     07-28    141  |  106  |  22  ----------------------------<  100<H>  4.4   |  27  |  1.04    Ca    8.4      28 Jul 2019 05:50    TPro  6.5  /  Alb  2.7<L>  /  TBili  0.2  /  DBili  x   /  AST  25  /  ALT  20  /  AlkPhos  58  07-28          Serum Pro-Brain Natriuretic Peptide: 688 pg/mL (07-25 @ 05:05)      Hemoglobin A1C, Whole Blood: 5.4 % (07-25 @ 05:05)        Imaging:  cxr-atelectasis left base

## 2019-07-28 NOTE — PROGRESS NOTE ADULT - PROBLEM SELECTOR PLAN 1
severe sepsis and kelbsiella bacteremia, pyuria ucx > 100k klebsiella id following previously tender left flank no support for right breast infection, afebrile wbc normal, tte no vegetation, plan to continue iv rocephin for now, change to po and dc planning to [facility] pending id

## 2019-07-28 NOTE — PROGRESS NOTE ADULT - SUBJECTIVE AND OBJECTIVE BOX
Patient is a 63y old  Female who presents with a chief complaint of fever, chills, sob, generalized malaise at home. (27 Jul 2019 11:51)  No acute complaints          Patient seen and examined at bedside.    ALLERGIES:  No Known Allergies        Vital Signs Last 24 Hrs  T(F): 97.6 (28 Jul 2019 08:13), Max: 98.1 (27 Jul 2019 20:04)  HR: 79 (28 Jul 2019 08:13) (79 - 100)  BP: 135/92 (28 Jul 2019 08:13) (135/86 - 154/97)  RR: 16 (28 Jul 2019 08:13) (16 - 18)  SpO2: 97% (28 Jul 2019 08:13) (96% - 99%)  I&O's Summary    27 Jul 2019 07:01  -  28 Jul 2019 07:00  --------------------------------------------------------  IN: 120 mL / OUT: 2 mL / NET: 118 mL      MEDICATIONS:  acetaminophen   Tablet .. 650 milliGRAM(s) Oral every 6 hours PRN  aspirin enteric coated 81 milliGRAM(s) Oral daily  atorvastatin 20 milliGRAM(s) Oral at bedtime  cefTRIAXone   IVPB 1000 milliGRAM(s) IV Intermittent every 24 hours  cloNIDine 0.1 milliGRAM(s) Oral two times a day  dextrose 40% Gel 15 Gram(s) Oral once PRN  dextrose 5%. 1000 milliLiter(s) IV Continuous <Continuous>  dextrose 50% Injectable 12.5 Gram(s) IV Push once  dextrose 50% Injectable 25 Gram(s) IV Push once  dextrose 50% Injectable 25 Gram(s) IV Push once  enoxaparin Injectable 40 milliGRAM(s) SubCutaneous daily  ergocalciferol 31672 Unit(s) Oral every week  glucagon  Injectable 1 milliGRAM(s) IntraMuscular once PRN  insulin glargine Injectable (LANTUS) 10 Unit(s) SubCutaneous at bedtime  insulin lispro (HumaLOG) corrective regimen sliding scale   SubCutaneous three times a day before meals  insulin lispro (HumaLOG) corrective regimen sliding scale   SubCutaneous at bedtime  insulin lispro Injectable (HumaLOG) 3 Unit(s) SubCutaneous three times a day before meals  levothyroxine 125 MICROGram(s) Oral daily  spironolactone 25 milliGRAM(s) Oral daily      PHYSICAL EXAM:  General: NAD, A/O x 3  ENT: MMM  Neck: Supple, No JVD  Lungs: Clear to auscultation bilaterally, good air entry  Cardio: RRR, S1/S2, No murmurs  Abdomen: Soft, Nontender, Nondistended; Bowel sounds present  Extremities: No cyanosis, No edema    LABS:                        9.0    6.63  )-----------( 269      ( 28 Jul 2019 05:50 )             28.9     07-28    141  |  106  |  22  ----------------------------<  100  4.4   |  27  |  1.04    Ca    8.4      28 Jul 2019 05:50  Mg     1.9     07-26    TPro  6.5  /  Alb  2.7  /  TBili  0.2  /  DBili  x   /  AST  25  /  ALT  20  /  AlkPhos  58  07-28    eGFR if Non African American: 57 mL/min/1.73M2 (07-28-19 @ 05:50)  eGFR if African American: 66 mL/min/1.73M2 (07-28-19 @ 05:50)                          POCT Blood Glucose.: 102 mg/dL (28 Jul 2019 08:08)  POCT Blood Glucose.: 127 mg/dL (27 Jul 2019 21:21)  POCT Blood Glucose.: 140 mg/dL (27 Jul 2019 17:08)  POCT Blood Glucose.: 115 mg/dL (27 Jul 2019 11:23)    07-25 [id number] 5.4        Culture - Urine (collected 24 Jul 2019 10:45)  Source: .Urine Clean Catch (Midstream)  Final Report (26 Jul 2019 21:08):    >100,000 CFU/ml Klebsiella pneumoniae  Organism: Klebsiella pneumoniae (26 Jul 2019 21:08)  Organism: Klebsiella pneumoniae (26 Jul 2019 21:08)      -  Amikacin: S <=16      -  Ampicillin: R >16 These ampicillin results predict results for amoxicillin      -  Ampicillin/Sulbactam: S <=8/4 Enterobacter, Citrobacter, and Serratia may develop resistance during prolonged therapy (3-4 days)      -  Aztreonam: S <=4      -  Cefepime: S <=4      -  Cefoxitin: S <=8      -  Ceftriaxone: S <=1 Enterobacter, Citrobacter, and Serratia may develop resistance during prolonged therapy      -  Ciprofloxacin: S <=1      -  Ertapenem: S <=1      -  Gentamicin: S <=4      -  Imipenem: S <=1      -  Levofloxacin: S <=2      -  Meropenem: S <=1      -  Nitrofurantoin: I 64 Should not be used to treat pyelonephritis      -  Piperacillin/Tazobactam: S <=16      -  Tigecycline: S <=2      -  Tobramycin: S <=4      -  Trimethoprim/Sulfamethoxazole: S <=2/38      Method Type: ARELI    Culture - Blood (collected 24 Jul 2019 10:45)  Source: .Blood Blood-Peripheral  Gram Stain (25 Jul 2019 08:28):    Growth in aerobic bottle:    Gram Negative Rods    Growth in anaerobic bottle:    Gram Negative Rods  Final Report (27 Jul 2019 09:34):    Growth in aerobic and anaerobic bottles: Klebsiella pneumoniae    "Due to technical problems, Proteus sp. will Not be reported as part of    the BC[facility] panel until further notice"    ***Blood Panel PCR results on this specimen are available    approximately 3 hours after the Gram stain result.***    Gram stain, PCR, and/or culture results may not always    correspond due to difference in methodologies.    ************************************************************    This PCR assay was performed using [employer].    The following targets are tested for: Enterococcus,    vancomycin resistant enterococci, Listeria monocytogenes,    coagulase negative staphylococci, S. aureus,    methicillin resistant S. aureus, Streptococcus agalactiae    (Group B), S. pneumoniae, S. pyogenes (Group A),    Acinetobacter baumannii, Enterobacter cloacae, E. coli,    Klebsiella oxytoca, K. pneumoniae, Proteus sp.,    Serratia marcescens, Haemophilus influenzae,    Neisseria meningitidis, Pseudomonas aeruginosa, Candida    albicans, C. glabrata, C krusei, C parapsilosis,    C. tropicalis and the KPC resistance gene.  Organism: Blood Culture PCR  Klebsiella pneumoniae (27 Jul 2019 09:34)  Organism: Klebsiella pneumoniae (27 Jul 2019 09:34)      -  Amikacin: S <=16      -  Ampicillin: R >16 These ampicillin results predict results for amoxicillin      -  Ampicillin/Sulbactam: S <=8/4 Enterobacter, Citrobacter, and Serratia may develop resistance during prolonged therapy (3-4 days)      -  Aztreonam: S <=4      -  Cefepime: S <=4      -  Cefoxitin: S <=8      -  Ceftriaxone: S <=1 Enterobacter, Citrobacter, and Serratia may develop resistance during prolonged therapy      -  Ciprofloxacin: S <=1      -  Ertapenem: S <=1      -  Gentamicin: S <=4      -  Imipenem: S <=1      -  Levofloxacin: S <=2      -  Meropenem: S <=1      -  Piperacillin/Tazobactam: S <=16      -  Tobramycin: S <=4      -  Trimethoprim/Sulfamethoxazole: S <=2/38      Method Type: ARELI  Organism: Blood Culture PCR (27 Jul 2019 09:34)      -  Klebsiella pneumoniae: Detec      Method Type: PCR    Culture - Blood (collected 24 Jul 2019 10:45)  Source: .Blood Blood-Peripheral  Gram Stain (25 Jul 2019 09:02):    Growth in aerobic bottle: Gram Negative Rods    Growth in anaerobic bottle: Gram Negative Rods  Final Report (27 Jul 2019 14:23):    Growth in aerobic and anaerobic bottles: Klebsiella pneumoniae    See previous culture [id number]        RADIOLOGY & ADDITIONAL TESTS:    Care Discussed with Consultants/Other Providers:

## 2019-07-28 NOTE — PROGRESS NOTE ADULT - ATTENDING COMMENTS
I have personally seen and examined patient on the above date.  I discussed the case with  Everett and I agree with findings and plan as detailed per note above, which I have amended where appropriate.
I have personally seen and examined patient on the above date.  I discussed the case with Leanne Angelito and I agree with findings and plan as detailed per note above, which I have amended where appropriate.

## 2019-07-28 NOTE — PROGRESS NOTE ADULT - PROBLEM SELECTOR PLAN 5
continue clonidine, aldactone, cards following, continue to monitor bp closely continues to be elevated   appreciate cards rec  changes made per cards rec

## 2019-07-29 LAB
ALBUMIN SERPL ELPH-MCNC: 2.8 G/DL — LOW (ref 3.3–5)
ALP SERPL-CCNC: 62 U/L — SIGNIFICANT CHANGE UP (ref 40–120)
ALT FLD-CCNC: 56 U/L DA — HIGH (ref 10–45)
ANION GAP SERPL CALC-SCNC: 13 MMOL/L — SIGNIFICANT CHANGE UP (ref 5–17)
AST SERPL-CCNC: 58 U/L — HIGH (ref 10–40)
BILIRUB SERPL-MCNC: 0.3 MG/DL — SIGNIFICANT CHANGE UP (ref 0.2–1.2)
BUN SERPL-MCNC: 23 MG/DL — SIGNIFICANT CHANGE UP (ref 7–23)
CALCIUM SERPL-MCNC: 8.7 MG/DL — SIGNIFICANT CHANGE UP (ref 8.4–10.5)
CHLORIDE SERPL-SCNC: 106 MMOL/L — SIGNIFICANT CHANGE UP (ref 96–108)
CO2 SERPL-SCNC: 23 MMOL/L — SIGNIFICANT CHANGE UP (ref 22–31)
CREAT SERPL-MCNC: 1.06 MG/DL — SIGNIFICANT CHANGE UP (ref 0.5–1.3)
GLUCOSE BLDC GLUCOMTR-MCNC: 102 MG/DL — HIGH (ref 70–99)
GLUCOSE BLDC GLUCOMTR-MCNC: 106 MG/DL — HIGH (ref 70–99)
GLUCOSE BLDC GLUCOMTR-MCNC: 112 MG/DL — HIGH (ref 70–99)
GLUCOSE BLDC GLUCOMTR-MCNC: 140 MG/DL — HIGH (ref 70–99)
GLUCOSE SERPL-MCNC: 92 MG/DL — SIGNIFICANT CHANGE UP (ref 70–99)
HCT VFR BLD CALC: 30.2 % — LOW (ref 34.5–45)
HGB BLD-MCNC: 9.2 G/DL — LOW (ref 11.5–15.5)
MCHC RBC-ENTMCNC: 30.4 PG — SIGNIFICANT CHANGE UP (ref 27–34)
MCHC RBC-ENTMCNC: 30.5 GM/DL — LOW (ref 32–36)
MCV RBC AUTO: 99.7 FL — SIGNIFICANT CHANGE UP (ref 80–100)
NRBC # BLD: 0 /100 WBCS — SIGNIFICANT CHANGE UP (ref 0–0)
PLATELET # BLD AUTO: 312 K/UL — SIGNIFICANT CHANGE UP (ref 150–400)
POTASSIUM SERPL-MCNC: 4.5 MMOL/L — SIGNIFICANT CHANGE UP (ref 3.5–5.3)
POTASSIUM SERPL-SCNC: 4.5 MMOL/L — SIGNIFICANT CHANGE UP (ref 3.5–5.3)
PROT SERPL-MCNC: 6.6 G/DL — SIGNIFICANT CHANGE UP (ref 6–8.3)
RBC # BLD: 3.03 M/UL — LOW (ref 3.8–5.2)
RBC # FLD: 19.1 % — HIGH (ref 10.3–14.5)
SODIUM SERPL-SCNC: 142 MMOL/L — SIGNIFICANT CHANGE UP (ref 135–145)
WBC # BLD: 8.83 K/UL — SIGNIFICANT CHANGE UP (ref 3.8–10.5)
WBC # FLD AUTO: 8.83 K/UL — SIGNIFICANT CHANGE UP (ref 3.8–10.5)

## 2019-07-29 PROCEDURE — 99233 SBSQ HOSP IP/OBS HIGH 50: CPT

## 2019-07-29 PROCEDURE — 99232 SBSQ HOSP IP/OBS MODERATE 35: CPT

## 2019-07-29 RX ORDER — CEFUROXIME AXETIL 250 MG
500 TABLET ORAL EVERY 12 HOURS
Refills: 0 | Status: DISCONTINUED | OUTPATIENT
Start: 2019-07-29 | End: 2019-07-30

## 2019-07-29 RX ADMIN — INSULIN GLARGINE 10 UNIT(S): 100 INJECTION, SOLUTION SUBCUTANEOUS at 21:36

## 2019-07-29 RX ADMIN — Medication 125 MICROGRAM(S): at 04:53

## 2019-07-29 RX ADMIN — Medication 0.2 MILLIGRAM(S): at 21:36

## 2019-07-29 RX ADMIN — ENOXAPARIN SODIUM 40 MILLIGRAM(S): 100 INJECTION SUBCUTANEOUS at 12:38

## 2019-07-29 RX ADMIN — Medication 3 UNIT(S): at 12:38

## 2019-07-29 RX ADMIN — Medication 240 MILLIGRAM(S): at 04:53

## 2019-07-29 RX ADMIN — Medication 0.1 MILLIGRAM(S): at 08:21

## 2019-07-29 RX ADMIN — Medication 81 MILLIGRAM(S): at 12:02

## 2019-07-29 RX ADMIN — SPIRONOLACTONE 25 MILLIGRAM(S): 25 TABLET, FILM COATED ORAL at 04:53

## 2019-07-29 RX ADMIN — Medication 500 MILLIGRAM(S): at 17:34

## 2019-07-29 RX ADMIN — ATORVASTATIN CALCIUM 20 MILLIGRAM(S): 80 TABLET, FILM COATED ORAL at 21:36

## 2019-07-29 NOTE — PROGRESS NOTE ADULT - SUBJECTIVE AND OBJECTIVE BOX
Follow up for htn  SUBJ: patient offers no complaints  PMH  Diabetes  History of adrenal adenoma  Urinary frequency  Type 2 diabetes mellitus  Osteoarthritis  Cognitive impairment  Coronary artery disease  Cancer of thyroid  Poor historian  Breast CA  Adrenal nodule  Hypothyroid  HLD (hyperlipidemia)  Anxiety  HLD (hyperlipidemia)  HTN (hypertension)      MEDICATIONS  (STANDING):  aspirin enteric coated 81 milliGRAM(s) Oral daily  atorvastatin 20 milliGRAM(s) Oral at bedtime  cefuroxime   Tablet 500 milliGRAM(s) Oral every 12 hours  cloNIDine  Oral Tab/Cap - Peds 0.1 milliGRAM(s) Oral with breakfast  cloNIDine  Oral Tab/Cap - Peds 0.2 milliGRAM(s) Oral at bedtime  dextrose 5%. 1000 milliLiter(s) (50 mL/Hr) IV Continuous <Continuous>  dextrose 50% Injectable 12.5 Gram(s) IV Push once  dextrose 50% Injectable 25 Gram(s) IV Push once  dextrose 50% Injectable 25 Gram(s) IV Push once  diltiazem    milliGRAM(s) Oral daily  enoxaparin Injectable 40 milliGRAM(s) SubCutaneous daily  ergocalciferol 82977 Unit(s) Oral every week  insulin glargine Injectable (LANTUS) 10 Unit(s) SubCutaneous at bedtime  insulin lispro (HumaLOG) corrective regimen sliding scale   SubCutaneous three times a day before meals  insulin lispro (HumaLOG) corrective regimen sliding scale   SubCutaneous at bedtime  insulin lispro Injectable (HumaLOG) 3 Unit(s) SubCutaneous three times a day before meals  levothyroxine 125 MICROGram(s) Oral daily  spironolactone 25 milliGRAM(s) Oral daily    MEDICATIONS  (PRN):  acetaminophen   Tablet .. 650 milliGRAM(s) Oral every 6 hours PRN Temp greater or equal to 38C (100.4F), Moderate Pain (4 - 6)  dextrose 40% Gel 15 Gram(s) Oral once PRN Blood Glucose LESS THAN 70 milliGRAM(s)/deciliter  glucagon  Injectable 1 milliGRAM(s) IntraMuscular once PRN Glucose LESS THAN 70 milligrams/deciliter        PHYSICAL EXAM:  Vital Signs Last 24 Hrs  T(C): 36.9 (29 Jul 2019 06:15), Max: 36.9 (28 Jul 2019 16:47)  T(F): 98.4 (29 Jul 2019 06:15), Max: 98.5 (28 Jul 2019 16:47)  HR: 78 (29 Jul 2019 08:22) (78 - 93)  BP: 169/97 (29 Jul 2019 08:22) (134/90 - 169/100)  BP(mean): --  RR: 16 (29 Jul 2019 06:15) (15 - 16)  SpO2: 98% (29 Jul 2019 06:15) (95% - 98%)    GENERAL: NAD, well-groomed, well-developed  HEAD:  Atraumatic, Normocephalic  EYES: EOMI, PERRLA, conjunctiva and sclera clear  ENT: Moist mucous membranes,  NECK: Supple, No JVD, no bruits  CHEST/LUNG: Clear to percussion bilaterally; No rales, rhonchi, wheezing, or rubs  HEART: Regular rate and rhythm; No murmurs, rubs, or gallops PMI non displaced.  ABDOMEN: Soft, Nontender, Nondistended; Bowel sounds present  EXTREMITIES:  2+ Peripheral Pulses, No clubbing, cyanosis, or edema  SKIN: No rashes or lesions  NERVOUS SYSTEM:  Alert & Oriented X3, Good concentration; Motor Strength 5/5 B/L upper and lower extremities; DTRs 2+ intact and symmetric      TELEMETRY:off monitor    ECG:    LABS:                        9.2    8.83  )-----------( 312      ( 29 Jul 2019 06:15 )             30.2     07-29    142  |  106  |  23  ----------------------------<  92  4.5   |  23  |  1.06    Ca    8.7      29 Jul 2019 06:15    TPro  6.6  /  Alb  2.8<L>  /  TBili  0.3  /  DBili  x   /  AST  58<H>  /  ALT  56<H>  /  AlkPhos  62  07-29            I&O's Summary    29 Jul 2019 07:01  -  29 Jul 2019 16:07  --------------------------------------------------------  IN: 700 mL / OUT: 0 mL / NET: 700 mL      BNP    RADIOLOGY & ADDITIONAL STUDIES:    ECHO:

## 2019-07-29 NOTE — PROGRESS NOTE ADULT - ASSESSMENT
Imp    Patient with hx of hypertension and cad still with hypertension    suggest  add bblocker metoprolol succinate 25 mg per day to patients regimen

## 2019-07-29 NOTE — PROGRESS NOTE ADULT - PROBLEM SELECTOR PLAN 1
Patient with recent right breast cancer, status post adjuvant chemotherapy (no further chemotherapy planned). Following treatment of infectious process/medical stabilization, plan adjuvant radiation in ambulatory/endocrine therapy.    Recommend routine DVT prophylaxis on lovenox.

## 2019-07-29 NOTE — PROGRESS NOTE ADULT - PROBLEM SELECTOR PLAN 1
severe sepsis and kelbsiella bacteremia, pyuria ucx > 100k klebsiella id following   no support for right breast infection, afebrile wbc normal, tte no vegetation,   Plan to switch to po ceftin (ID to clear) and d/.c to [facility] pending

## 2019-07-29 NOTE — PROGRESS NOTE ADULT - SUBJECTIVE AND OBJECTIVE BOX
Patient is a 63y old  Female who presents with a chief complaint of fever, chills, sob, generalized malaise at home. (28 Jul 2019 14:48)      Patient seen and examined at bedside, no events overnight, in no acute distress.     ALLERGIES:  No Known Allergies    MEDICATIONS:  acetaminophen   Tablet .. 650 milliGRAM(s) Oral every 6 hours PRN  aspirin enteric coated 81 milliGRAM(s) Oral daily  atorvastatin 20 milliGRAM(s) Oral at bedtime  cefTRIAXone   IVPB 1000 milliGRAM(s) IV Intermittent every 24 hours  cloNIDine  Oral Tab/Cap - Peds 0.1 milliGRAM(s) Oral with breakfast  cloNIDine  Oral Tab/Cap - Peds 0.2 milliGRAM(s) Oral at bedtime  dextrose 40% Gel 15 Gram(s) Oral once PRN  dextrose 5%. 1000 milliLiter(s) IV Continuous <Continuous>  dextrose 50% Injectable 12.5 Gram(s) IV Push once  dextrose 50% Injectable 25 Gram(s) IV Push once  dextrose 50% Injectable 25 Gram(s) IV Push once  diltiazem    milliGRAM(s) Oral daily  enoxaparin Injectable 40 milliGRAM(s) SubCutaneous daily  ergocalciferol 08516 Unit(s) Oral every week  glucagon  Injectable 1 milliGRAM(s) IntraMuscular once PRN  insulin glargine Injectable (LANTUS) 10 Unit(s) SubCutaneous at bedtime  insulin lispro (HumaLOG) corrective regimen sliding scale   SubCutaneous three times a day before meals  insulin lispro (HumaLOG) corrective regimen sliding scale   SubCutaneous at bedtime  insulin lispro Injectable (HumaLOG) 3 Unit(s) SubCutaneous three times a day before meals  levothyroxine 125 MICROGram(s) Oral daily  spironolactone 25 milliGRAM(s) Oral daily    Vital Signs Last 24 Hrs  T(C): 36.9 (29 Jul 2019 06:15), Max: 36.9 (28 Jul 2019 16:47)  T(F): 98.4 (29 Jul 2019 06:15), Max: 98.5 (28 Jul 2019 16:47)  HR: 78 (29 Jul 2019 08:22) (78 - 93)  BP: 169/97 (29 Jul 2019 08:22) (134/90 - 169/100)  BP(mean): --  RR: 16 (29 Jul 2019 06:15) (15 - 16)  SpO2: 98% (29 Jul 2019 06:15) (95% - 98%)  I&O's Summary        PAST MEDICAL & SURGICAL HISTORY:  Diabetes  History of adrenal adenoma  Urinary frequency  Type 2 diabetes mellitus  Osteoarthritis  Cognitive impairment  Coronary artery disease  Cancer of thyroid: unsure of dates but prior to 2005  Poor historian  Breast CA: right  Hypothyroid  HLD (hyperlipidemia)  Anxiety  HTN (hypertension)  S/P dilation and curettage: TOP x2  S/P angioplasty with stent: drug eluting stent in first diagonal on 2/21/19  H/O bilateral breast biopsy: November 2018 and December 2018  H/O total thyroidectomy      Home Medications:  aspirin 81 mg oral tablet: 1 tab(s) orally once a day (24 Jul 2019 17:18)  atorvastatin 20 mg oral tablet: 1 tab(s) orally once a day (10 Sandro 2019 08:43)  levothyroxine 125 mcg (0.125 mg) oral tablet: 1 tab(s) orally once a day (24 Jul 2019 17:18)  metFORMIN 500 mg oral tablet, extended release: 1 tab(s) orally once a day (24 Jul 2019 17:17)  spironolactone 25 mg oral tablet: 1 tab(s) orally once a day (10 Sandro 2019 08:43)  Tylenol: 1 tab(s) orally , As Needed (10 Sandro 2019 08:43)  Vitamin D2 50,000 intl units (1.25 mg) oral capsule: 1 cap(s) orally once a week on Mondays (10 Asndro 2019 08:43)      PHYSICAL EXAM:  General: NAD, A/O x3  ENT: MMM  Neck: Supple, No JVD  Lungs: Clear to percussion bilaterally  Cardio: RRR, S1/S2, No murmurs  Abdomen: Soft, Nontender, Nondistended; Bowel sounds present  Extremities: No clubbing, cyanosis, or edema    LABS:                        9.2    8.83  )-----------( 312      ( 29 Jul 2019 06:15 )             30.2     07-29    142  |  106  |  23  ----------------------------<  92  4.5   |  23  |  1.06    Ca    8.7      29 Jul 2019 06:15    TPro  6.6  /  Alb  2.8  /  TBili  0.3  /  DBili  x   /  AST  58  /  ALT  56  /  AlkPhos  62  07-29      POCT Blood Glucose.: 102 mg/dL (29 Jul 2019 07:48)  POCT Blood Glucose.: 155 mg/dL (28 Jul 2019 21:10)  POCT Blood Glucose.: 108 mg/dL (28 Jul 2019 16:37)  POCT Blood Glucose.: 118 mg/dL (28 Jul 2019 11:47)    07-25 [id number] 5.4    RADIOLOGY & ADDITIONAL TESTS: < from: US Duplex Venous Lower Ext Complete, Bilateral (07.24.19 @ 19:36) >  IMPRESSION:     No evidence of deep venous thrombosis in either lower extremity.    < end of copied text >      Care Discussed with Consultants/Other Providers: Hospitalist

## 2019-07-29 NOTE — PROGRESS NOTE ADULT - SUBJECTIVE AND OBJECTIVE BOX
63-year-old female with history of breast cancer, diabetes, hypertension, and coronary artery disease, who was admitted with weakness/fever/chills. Being treated for GNR bacteremia/urosepsis.  Patient is status post recent diagnosis of Stage IIB right breast cancer (T2N1a), ER positive/TX positive/HER-2/yojana negative. She is status post 4 cycles of Taxotere/Cytoxan adjuvant chemotherapy-last dose given 7/1/19, along with Neulasta Onpro. Patient seeing Dr. Franco at [facility].    Today patient feels better, shes concerned about confusions over her follow up. Received some sort of phone call to confirm the appointments but she believes that they were on the wrong day. 63-year-old female with history of breast cancer, diabetes, hypertension, and coronary artery disease, who was admitted with weakness/fever/chills. Being treated for GNR bacteremia/urosepsis.  Patient is status post recent diagnosis of Stage IIB right breast cancer (T2N1a), ER positive/OH positive/HER-2/yojana negative. She is status post 4 cycles of Taxotere/Cytoxan adjuvant chemotherapy-last dose given 7/1/19, along with Neulasta Onpro. Patient seeing Dr. Franco at [facility].    Today patient feels better, shes concerned about confusions over her follow up. Received some sort of phone call to confirm the appointments but she believes that they were on the wrong day.                            9.2    8.83  )-----------( 312      ( 29 Jul 2019 06:15 )             30.2   07-29    142  |  106  |  23  ----------------------------<  92  4.5   |  23  |  1.06    Ca    8.7      29 Jul 2019 06:15    TPro  6.6  /  Alb  2.8<L>  /  TBili  0.3  /  DBili  x   /  AST  58<H>  /  ALT  56<H>  /  AlkPhos  62  07-29      MEDICATIONS  (STANDING):  aspirin enteric coated 81 milliGRAM(s) Oral daily  atorvastatin 20 milliGRAM(s) Oral at bedtime  cefuroxime   Tablet 500 milliGRAM(s) Oral every 12 hours  cloNIDine  Oral Tab/Cap - Peds 0.1 milliGRAM(s) Oral with breakfast  cloNIDine  Oral Tab/Cap - Peds 0.2 milliGRAM(s) Oral at bedtime  dextrose 5%. 1000 milliLiter(s) (50 mL/Hr) IV Continuous <Continuous>  dextrose 50% Injectable 12.5 Gram(s) IV Push once  dextrose 50% Injectable 25 Gram(s) IV Push once  dextrose 50% Injectable 25 Gram(s) IV Push once  diltiazem    milliGRAM(s) Oral daily  enoxaparin Injectable 40 milliGRAM(s) SubCutaneous daily  ergocalciferol 89698 Unit(s) Oral every week  insulin glargine Injectable (LANTUS) 10 Unit(s) SubCutaneous at bedtime  insulin lispro (HumaLOG) corrective regimen sliding scale   SubCutaneous three times a day before meals  insulin lispro (HumaLOG) corrective regimen sliding scale   SubCutaneous at bedtime  insulin lispro Injectable (HumaLOG) 3 Unit(s) SubCutaneous three times a day before meals  levothyroxine 125 MICROGram(s) Oral daily  spironolactone 25 milliGRAM(s) Oral daily    MEDICATIONS  (PRN):  acetaminophen   Tablet .. 650 milliGRAM(s) Oral every 6 hours PRN Temp greater or equal to 38C (100.4F), Moderate Pain (4 - 6)  dextrose 40% Gel 15 Gram(s) Oral once PRN Blood Glucose LESS THAN 70 milliGRAM(s)/deciliter  glucagon  Injectable 1 milliGRAM(s) IntraMuscular once PRN Glucose LESS THAN 70 milligrams/deciliter

## 2019-07-29 NOTE — PROGRESS NOTE ADULT - ASSESSMENT
This is a 63 year old woman with a history of Stage IIB T2N1a, ER/TN + Her-2/yojana negative breast cancer s/p 4 cycles of taxotere/cytoxan.  Patient in [facility] for treatment of GNR bacteremia/urosepsis.  Patient has some confusion over her follow up appointments  To clarify, I checked the outpatient schedule  She has several follow ups as follows:    08/12/2019 02:45 PM Dr. Caitlin Bill-Hermelindo - Oncology   08/07/2019 01:00 PM Dr. Rachel Schwartz - Radiation  08/06/2019 01:00 PM Dr. Nick Landry - GYN.  Please put into discharge summary upon discharge.

## 2019-07-29 NOTE — PROGRESS NOTE ADULT - PROBLEM SELECTOR PLAN 5
improved, however sill elevated  meds adjusted, clonidine dose increased as per cardiology recs, will monitor

## 2019-07-29 NOTE — PROGRESS NOTE ADULT - SUBJECTIVE AND OBJECTIVE BOX
CC: f/u for  klebsiella bacteremia from uti  Patient reports feels well    REVIEW OF SYSTEMS:  All other review of systems negative (Comprehensive ROS)    Antimicrobials Day #  :6/14  cefuroxime   Tablet 500 milliGRAM(s) Oral every 12 hours    Other Medications Reviewed    T(F): 98.4 (07-29-19 @ 06:15), Max: 98.5 (07-28-19 @ 16:47)  HR: 78 (07-29-19 @ 08:22)  BP: 169/97 (07-29-19 @ 08:22)  RR: 16 (07-29-19 @ 06:15)  SpO2: 98% (07-29-19 @ 06:15)  Wt(kg): --    PHYSICAL EXAM:  General: alert, no acute distress  Eyes:  anicteric, no conjunctival injection, no discharge  Oropharynx: no lesions or injection 	  Neck: supple, without adenopathy  Lungs: clear to auscultation  Heart: regular rate and rhythm; no murmur, rubs or gallops  Abdomen: soft, nondistended, nontender, without mass or organomegaly  Skin: no lesions  Extremities: no clubbing, cyanosis, or edema  Neurologic: alert, oriented, moves all extremities    LAB RESULTS:                        9.2    8.83  )-----------( 312      ( 29 Jul 2019 06:15 )             30.2     07-29    142  |  106  |  23  ----------------------------<  92  4.5   |  23  |  1.06    Ca    8.7      29 Jul 2019 06:15    TPro  6.6  /  Alb  2.8<L>  /  TBili  0.3  /  DBili  x   /  AST  58<H>  /  ALT  56<H>  /  AlkPhos  62  07-29    LIVER FUNCTIONS - ( 29 Jul 2019 06:15 )  Alb: 2.8 g/dL / Pro: 6.6 g/dL / ALK PHOS: 62 U/L / ALT: 56 U/L DA / AST: 58 U/L / GGT: x             MICROBIOLOGY:  RECENT CULTURES:  Culture - Urine (07.24.19 @ 10:45)    -  Trimethoprim/Sulfamethoxazole: S <=2/38    -  Amikacin: S <=16    -  Ampicillin: R >16 These ampicillin results predict results for amoxicillin    -  Ampicillin/Sulbactam: S <=8/4 Enterobacter, Citrobacter, and Serratia may develop resistance during prolonged therapy (3-4 days)    -  Aztreonam: S <=4    -  Cefepime: S <=4    -  Cefoxitin: S <=8    -  Ceftriaxone: S <=1 Enterobacter, Citrobacter, and Serratia may develop resistance during prolonged therapy    -  Ciprofloxacin: S <=1    -  Ertapenem: S <=1    -  Gentamicin: S <=4    -  Imipenem: S <=1    -  Levofloxacin: S <=2    -  Meropenem: S <=1    -  Nitrofurantoin: I 64 Should not be used to treat pyelonephritis    -  Piperacillin/Tazobactam: S <=16    -  Tigecycline: S <=2    -  Tobramycin: S <=4    Specimen Source: .Urine Clean Catch (Midstream)    Culture Results:   >100,000 CFU/ml Klebsiella pneumoniae    Organism Identification: Klebsiella pneumoniae    Organism: Klebsiella pneumoniae    Method Type: ARELI        RADIOLOGY REVIEWED:      < from: CT Abdomen and Pelvis No Cont (07.24.19 @ 11:46) >  IMPRESSION:     Large right breast collection, decreased in size compared to the prior   study. Superinfection cannot be excluded. Right breast skin thickening.    Normal appendix.    Enlarged, fibroid uterus.      < end of copied text >    Assessment:  Patient with metastatic breast ca on chemo admitted with bacteremia from gu source, doing well at present. Clinically breast not infected  Plan: 8 more days of antibiotics, can change to po ceftin  no ID objection to discharge to rehab

## 2019-07-30 ENCOUNTER — TRANSCRIPTION ENCOUNTER (OUTPATIENT)
Age: 64
End: 2019-07-30

## 2019-07-30 VITALS
TEMPERATURE: 98 F | RESPIRATION RATE: 16 BRPM | OXYGEN SATURATION: 99 % | SYSTOLIC BLOOD PRESSURE: 150 MMHG | HEART RATE: 81 BPM | DIASTOLIC BLOOD PRESSURE: 84 MMHG

## 2019-07-30 LAB
ANION GAP SERPL CALC-SCNC: 11 MMOL/L — SIGNIFICANT CHANGE UP (ref 5–17)
BUN SERPL-MCNC: 31 MG/DL — HIGH (ref 7–23)
CALCIUM SERPL-MCNC: 8.8 MG/DL — SIGNIFICANT CHANGE UP (ref 8.4–10.5)
CHLORIDE SERPL-SCNC: 106 MMOL/L — SIGNIFICANT CHANGE UP (ref 96–108)
CO2 SERPL-SCNC: 24 MMOL/L — SIGNIFICANT CHANGE UP (ref 22–31)
CREAT SERPL-MCNC: 1.16 MG/DL — SIGNIFICANT CHANGE UP (ref 0.5–1.3)
GLUCOSE BLDC GLUCOMTR-MCNC: 108 MG/DL — HIGH (ref 70–99)
GLUCOSE BLDC GLUCOMTR-MCNC: 110 MG/DL — HIGH (ref 70–99)
GLUCOSE BLDC GLUCOMTR-MCNC: 121 MG/DL — HIGH (ref 70–99)
GLUCOSE SERPL-MCNC: 113 MG/DL — HIGH (ref 70–99)
HCT VFR BLD CALC: 29.5 % — LOW (ref 34.5–45)
HGB BLD-MCNC: 9.1 G/DL — LOW (ref 11.5–15.5)
MCHC RBC-ENTMCNC: 30.8 GM/DL — LOW (ref 32–36)
MCHC RBC-ENTMCNC: 31.3 PG — SIGNIFICANT CHANGE UP (ref 27–34)
MCV RBC AUTO: 101.4 FL — HIGH (ref 80–100)
NRBC # BLD: 0 /100 WBCS — SIGNIFICANT CHANGE UP (ref 0–0)
PLATELET # BLD AUTO: 346 K/UL — SIGNIFICANT CHANGE UP (ref 150–400)
POTASSIUM SERPL-MCNC: 4.3 MMOL/L — SIGNIFICANT CHANGE UP (ref 3.5–5.3)
POTASSIUM SERPL-SCNC: 4.3 MMOL/L — SIGNIFICANT CHANGE UP (ref 3.5–5.3)
RBC # BLD: 2.91 M/UL — LOW (ref 3.8–5.2)
RBC # FLD: 19.4 % — HIGH (ref 10.3–14.5)
SODIUM SERPL-SCNC: 141 MMOL/L — SIGNIFICANT CHANGE UP (ref 135–145)
WBC # BLD: 11.26 K/UL — HIGH (ref 3.8–10.5)
WBC # FLD AUTO: 11.26 K/UL — HIGH (ref 3.8–10.5)

## 2019-07-30 PROCEDURE — 93005 ELECTROCARDIOGRAM TRACING: CPT

## 2019-07-30 PROCEDURE — 96365 THER/PROPH/DIAG IV INF INIT: CPT

## 2019-07-30 PROCEDURE — 70450 CT HEAD/BRAIN W/O DYE: CPT

## 2019-07-30 PROCEDURE — 87040 BLOOD CULTURE FOR BACTERIA: CPT

## 2019-07-30 PROCEDURE — 93970 EXTREMITY STUDY: CPT

## 2019-07-30 PROCEDURE — 74176 CT ABD & PELVIS W/O CONTRAST: CPT

## 2019-07-30 PROCEDURE — 97162 PT EVAL MOD COMPLEX 30 MIN: CPT

## 2019-07-30 PROCEDURE — 99285 EMERGENCY DEPT VISIT HI MDM: CPT

## 2019-07-30 PROCEDURE — 97116 GAIT TRAINING THERAPY: CPT

## 2019-07-30 PROCEDURE — 86803 HEPATITIS C AB TEST: CPT

## 2019-07-30 PROCEDURE — 87086 URINE CULTURE/COLONY COUNT: CPT

## 2019-07-30 PROCEDURE — 82962 GLUCOSE BLOOD TEST: CPT

## 2019-07-30 PROCEDURE — 83735 ASSAY OF MAGNESIUM: CPT

## 2019-07-30 PROCEDURE — 93307 TTE W/O DOPPLER COMPLETE: CPT

## 2019-07-30 PROCEDURE — 80053 COMPREHEN METABOLIC PANEL: CPT

## 2019-07-30 PROCEDURE — 36415 COLL VENOUS BLD VENIPUNCTURE: CPT

## 2019-07-30 PROCEDURE — 81001 URINALYSIS AUTO W/SCOPE: CPT

## 2019-07-30 PROCEDURE — 71045 X-RAY EXAM CHEST 1 VIEW: CPT

## 2019-07-30 PROCEDURE — 83880 ASSAY OF NATRIURETIC PEPTIDE: CPT

## 2019-07-30 PROCEDURE — 87186 SC STD MICRODIL/AGAR DIL: CPT

## 2019-07-30 PROCEDURE — 87150 DNA/RNA AMPLIFIED PROBE: CPT

## 2019-07-30 PROCEDURE — 85610 PROTHROMBIN TIME: CPT

## 2019-07-30 PROCEDURE — 83036 HEMOGLOBIN GLYCOSYLATED A1C: CPT

## 2019-07-30 PROCEDURE — 85730 THROMBOPLASTIN TIME PARTIAL: CPT

## 2019-07-30 PROCEDURE — 96367 TX/PROPH/DG ADDL SEQ IV INF: CPT

## 2019-07-30 PROCEDURE — 80048 BASIC METABOLIC PNL TOTAL CA: CPT

## 2019-07-30 PROCEDURE — 99239 HOSP IP/OBS DSCHRG MGMT >30: CPT

## 2019-07-30 PROCEDURE — 84145 PROCALCITONIN (PCT): CPT

## 2019-07-30 PROCEDURE — 83605 ASSAY OF LACTIC ACID: CPT

## 2019-07-30 PROCEDURE — 85027 COMPLETE CBC AUTOMATED: CPT

## 2019-07-30 PROCEDURE — 76642 ULTRASOUND BREAST LIMITED: CPT

## 2019-07-30 RX ORDER — CEFUROXIME AXETIL 250 MG
1 TABLET ORAL
Qty: 0 | Refills: 0 | DISCHARGE
Start: 2019-07-30

## 2019-07-30 RX ORDER — DILTIAZEM HCL 120 MG
1 CAPSULE, EXT RELEASE 24 HR ORAL
Qty: 0 | Refills: 0 | DISCHARGE
Start: 2019-07-30

## 2019-07-30 RX ADMIN — Medication 3 UNIT(S): at 11:13

## 2019-07-30 RX ADMIN — Medication 3 UNIT(S): at 07:47

## 2019-07-30 RX ADMIN — Medication 125 MICROGRAM(S): at 05:13

## 2019-07-30 RX ADMIN — ENOXAPARIN SODIUM 40 MILLIGRAM(S): 100 INJECTION SUBCUTANEOUS at 11:13

## 2019-07-30 RX ADMIN — Medication 3 UNIT(S): at 16:25

## 2019-07-30 RX ADMIN — Medication 240 MILLIGRAM(S): at 05:14

## 2019-07-30 RX ADMIN — Medication 500 MILLIGRAM(S): at 05:13

## 2019-07-30 RX ADMIN — SPIRONOLACTONE 25 MILLIGRAM(S): 25 TABLET, FILM COATED ORAL at 05:14

## 2019-07-30 RX ADMIN — Medication 0.1 MILLIGRAM(S): at 07:47

## 2019-07-30 RX ADMIN — Medication 500 MILLIGRAM(S): at 16:25

## 2019-07-30 RX ADMIN — Medication 81 MILLIGRAM(S): at 11:13

## 2019-07-30 NOTE — PROGRESS NOTE ADULT - PROBLEM SELECTOR PROBLEM 4
Diabetes
Hypothyroid
JOEL (acute kidney injury)

## 2019-07-30 NOTE — PROGRESS NOTE ADULT - PROBLEM SELECTOR PROBLEM 7
Type 2 diabetes mellitus with other specified complication, unspecified whether long term insulin use
HLD (hyperlipidemia)
Type 2 diabetes mellitus with other specified complication, unspecified whether long term insulin use
Type 2 diabetes mellitus with other specified complication, unspecified whether long term insulin use

## 2019-07-30 NOTE — PROGRESS NOTE ADULT - PROBLEM SELECTOR PLAN 4
-hold Metformin while inpatient   -c/w insulin with ISS  -add FS coverage  -A1C pending
Creatinine wnl afeter IV hydration  avoid nephrotoxic drugs, monitor creatinine
Creatinine wnl after IV hydration  avoid nephrotoxic drugs, monitor creatinine
c/w synthroid
improved with ivf, monitor bmp

## 2019-07-30 NOTE — PROGRESS NOTE ADULT - PROBLEM SELECTOR PROBLEM 6
Hypothyroidism, unspecified type
Breast CA
HTN (hypertension)
Hypothyroidism, unspecified type
Hypothyroidism, unspecified type

## 2019-07-30 NOTE — DISCHARGE NOTE NURSING/CASE MANAGEMENT/SOCIAL WORK - NSDCDPATPORTLINK_GEN_ALL_CORE
You can access the [facility] Portal, offered by [facility], by registering with the following website: [email or web address]

## 2019-07-30 NOTE — PROGRESS NOTE ADULT - PROBLEM SELECTOR PROBLEM 3
HTN (hypertension)
Malignant neoplasm of right breast in female, estrogen receptor positive, unspecified site of breast
Malignant neoplasm of right female breast, unspecified estrogen receptor status, unspecified site of breast

## 2019-07-30 NOTE — PROGRESS NOTE ADULT - PROBLEM SELECTOR PLAN 1
severe sepsis and kelbsiella bacteremia, pyuria ucx > 100k klebsiella    no support for right breast infection, afebrile wbc normal, tte no vegetation  Abx switched to po ceftin for 8 more days, as per ID recs.  D/C planning to [facility] pending auth

## 2019-07-30 NOTE — PROGRESS NOTE ADULT - REASON FOR ADMISSION
fever, chills, sob, generalized malaise at home.

## 2019-07-30 NOTE — DISCHARGE NOTE PROVIDER - NSDCFUSCHEDAPPT_GEN_ALL_CORE_FT
YOSI CORRAL ; 08/06/2019 ; NPP [address] [facility]  YOSI CORRAL ; 08/07/2019 ; NPP Rad Med [address] [facility]  YOSI CORRAL ; 08/12/2019 ; NPP Nena [facility]  YOSI CORRAL ; 08/31/2019 ; NPP [address] YOSI CORRAL ; 08/06/2019 ; NPP [address] [facility]  YOSI CORRAL ; 08/07/2019 ; NPP Rad Med [address] [facility]  YOSI CORRAL ; 08/12/2019 ; NPP Nena [facility]  YOSI CORRAL ; 08/31/2019 ; NPP [address] YOSI CORRAL ; 08/06/2019 ; NPP [address] [facility]  YOSI CORRAL ; 08/07/2019 ; NPP Rad Med [address] [facility]  YOSI CORRAL ; 08/12/2019 ; NPP Nena [facility]  YOSI CORRAL ; 08/31/2019 ; NPP [address]

## 2019-07-30 NOTE — PROGRESS NOTE ADULT - SUBJECTIVE AND OBJECTIVE BOX
Patient is a 63y old  Female who presents with a chief complaint of fever, chills, sob, generalized malaise at home. (29 Jul 2019 17:53)      Patient seen and examined at bedside, no events overnight, in no acute distress.     ALLERGIES:  No Known Allergies    MEDICATIONS:  acetaminophen   Tablet .. 650 milliGRAM(s) Oral every 6 hours PRN  aspirin enteric coated 81 milliGRAM(s) Oral daily  atorvastatin 20 milliGRAM(s) Oral at bedtime  cefuroxime   Tablet 500 milliGRAM(s) Oral every 12 hours  cloNIDine  Oral Tab/Cap - Peds 0.1 milliGRAM(s) Oral with breakfast  cloNIDine  Oral Tab/Cap - Peds 0.2 milliGRAM(s) Oral at bedtime  dextrose 40% Gel 15 Gram(s) Oral once PRN  dextrose 5%. 1000 milliLiter(s) IV Continuous <Continuous>  dextrose 50% Injectable 12.5 Gram(s) IV Push once  dextrose 50% Injectable 25 Gram(s) IV Push once  dextrose 50% Injectable 25 Gram(s) IV Push once  diltiazem    milliGRAM(s) Oral daily  enoxaparin Injectable 40 milliGRAM(s) SubCutaneous daily  ergocalciferol 85720 Unit(s) Oral every week  glucagon  Injectable 1 milliGRAM(s) IntraMuscular once PRN  insulin glargine Injectable (LANTUS) 10 Unit(s) SubCutaneous at bedtime  insulin lispro (HumaLOG) corrective regimen sliding scale   SubCutaneous three times a day before meals  insulin lispro (HumaLOG) corrective regimen sliding scale   SubCutaneous at bedtime  insulin lispro Injectable (HumaLOG) 3 Unit(s) SubCutaneous three times a day before meals  levothyroxine 125 MICROGram(s) Oral daily  spironolactone 25 milliGRAM(s) Oral daily    Vital Signs Last 24 Hrs  T(C): 36.4 (30 Jul 2019 10:14), Max: 36.7 (29 Jul 2019 21:26)  T(F): 97.5 (30 Jul 2019 10:14), Max: 98 (29 Jul 2019 21:26)  HR: 74 (30 Jul 2019 10:14) (74 - 95)  BP: 126/77 (30 Jul 2019 10:14) (126/77 - 155/92)  BP(mean): --  RR: 16 (30 Jul 2019 10:14) (15 - 17)  SpO2: 99% (30 Jul 2019 10:14) (94% - 99%)  I&O's Summary    29 Jul 2019 07:01  -  30 Jul 2019 07:00  --------------------------------------------------------  IN: 700 mL / OUT: 0 mL / NET: 700 mL          PAST MEDICAL & SURGICAL HISTORY:  Diabetes  History of adrenal adenoma  Urinary frequency  Type 2 diabetes mellitus  Osteoarthritis  Cognitive impairment  Coronary artery disease  Cancer of thyroid: unsure of dates but prior to 2005  Poor historian  Breast CA: right  Hypothyroid  HLD (hyperlipidemia)  Anxiety  HTN (hypertension)  S/P dilation and curettage: TOP x2  S/P angioplasty with stent: drug eluting stent in first diagonal on 2/21/19  H/O bilateral breast biopsy: November 2018 and December 2018  H/O total thyroidectomy      Home Medications:  aspirin 81 mg oral tablet: 1 tab(s) orally once a day (24 Jul 2019 17:18)  atorvastatin 20 mg oral tablet: 1 tab(s) orally once a day (10 Sandro 2019 08:43)  levothyroxine 125 mcg (0.125 mg) oral tablet: 1 tab(s) orally once a day (24 Jul 2019 17:18)  metFORMIN 500 mg oral tablet, extended release: 1 tab(s) orally once a day (24 Jul 2019 17:17)  spironolactone 25 mg oral tablet: 1 tab(s) orally once a day (10 Sandro 2019 08:43)  Tylenol: 1 tab(s) orally , As Needed (10 Sandro 2019 08:43)  Vitamin D2 50,000 intl units (1.25 mg) oral capsule: 1 cap(s) orally once a week on Mondays (10 Sandro 2019 08:43)      PHYSICAL EXAM:  General: NAD, A/O x3  ENT: MMM  Neck: Supple, No JVD  Lungs: Clear to percussion bilaterally  Cardio: RRR, S1/S2, No murmurs  Abdomen: Soft, Nontender, Nondistended; Bowel sounds present  Extremities: No clubbing, cyanosis, or edema    LABS:                        9.1    11.26 )-----------( 346      ( 30 Jul 2019 05:45 )             29.5     07-30    141  |  106  |  31  ----------------------------<  113  4.3   |  24  |  1.16    Ca    8.8      30 Jul 2019 05:45    TPro  6.6  /  Alb  2.8  /  TBili  0.3  /  DBili  x   /  AST  58  /  ALT  56  /  AlkPhos  62  07-29    POCT Blood Glucose.: 121 mg/dL (30 Jul 2019 11:15)  POCT Blood Glucose.: 108 mg/dL (30 Jul 2019 07:48)  POCT Blood Glucose.: 140 mg/dL (29 Jul 2019 21:30)  POCT Blood Glucose.: 106 mg/dL (29 Jul 2019 17:00)  POCT Blood Glucose.: 112 mg/dL (29 Jul 2019 11:51)    07-25 [id number] 5.4    RADIOLOGY & ADDITIONAL TESTS: no new tests     Care Discussed with Consultants/Other Providers: Hospitalist

## 2019-07-30 NOTE — PROGRESS NOTE ADULT - PROBLEM SELECTOR PLAN 6
continue synthroid
c/w supportive care  seen and eval by pt's private onc on this adm. following
continue synthroid
continue synthroid
on aldactone will continue to monitor

## 2019-07-30 NOTE — PROGRESS NOTE ADULT - PROBLEM SELECTOR PROBLEM 1
Bacteremia due to Klebsiella pneumoniae
Septicemia
Severe sepsis
Severe sepsis
Malignant neoplasm of right female breast, unspecified estrogen receptor status, unspecified site of breast
Severe sepsis

## 2019-07-30 NOTE — PROGRESS NOTE ADULT - PROBLEM SELECTOR PLAN 2
improved w/IV hydration
positive for klebsiella  continue with cefriaxone  appreciate ID consult
ucx with kelbsiella bacteremia,   afebrile, no leukocytosis,  Abx switched to po ceftin for 8 more days, as per ID recs.  D/C planning to [facility] pending auth
ucx with kelbsiella bacteremia,   afebrile, no leukocytosis,  Plan to switch to po ceftin (ID to clear) and d/.c to [facility] - auth pending
ucx with kelbsiella bacteremia, id following, antimicrobial management as per id

## 2019-07-30 NOTE — DISCHARGE NOTE PROVIDER - CARE PROVIDER_API CALL
Mikhail Mckinney)  Medicine  [address], [address]  [place] [address]  Phone: [phone]  Fax: [phone]  Follow Up Time:     Caitlin Castro)  Internal Medicine; Medical Oncology  [address] [facility], [place] [address]  Phone: [phone]  Fax: [phone]  Follow Up Time: Mikhail Mckinney)  Medicine  [facility]  [address], [address]  [place] [address]  Phone: [phone]  Fax: [phone]  Follow Up Time:     Caitlin Castro)  Internal Medicine; Medical Oncology  [address] [facility], [place] [address]  Phone: [phone]  Fax: [phone]  Follow Up Time:     Jose Brennan ()  Internal Medicine  [address] [facility], [address]  [place] [address]  Phone: [phone]  Fax: [phone]  Follow Up Time:

## 2019-07-30 NOTE — DISCHARGE NOTE PROVIDER - HOSPITAL COURSE
63 female with DM, early dementia, Right breast malignancy on chemo admitted with severe sepsis and klebsiella bacteremia likely from UTI        Problem/Plan - 1:    ·  Problem: Severe sepsis.  Plan: severe sepsis and kelbsiella bacteremia, pyuria ucx > 100k klebsiella      no support for right breast infection, afebrile wbc normal, tte no vegetation    Abx switched to po ceftin for 7 more days, as per ID recs.    Stable for discharge to [facility] today,          Problem/Plan - 2:    ·  Problem: Bacteremia.  Plan: ucx with kelbsiella bacteremia,     afebrile, no leukocytosis,    Abx switched to po ceftin for 7 more days, as per ID recs.    D/C planning to [facility] pending auth.         Problem/Plan - 3:    ·  Problem: Malignant neoplasm of right female breast, unspecified estrogen receptor status, unspecified site of breast.  Plan: pt currently on chemotherapy, continue supportive care, plan for outpatient oncology follow up.         Problem/Plan - 4:    ·  Problem: JOEL (acute kidney injury).  Plan: Creatinine wnl after IV hydration    avoid nephrotoxic drugs, monitor creatinine.         Problem/Plan - 5:    ·  Problem: Essential hypertension.  Plan: controlled after adjusting dose of clonidine.         Problem/Plan - 6:    Problem: Hypothyroidism, unspecified type. Plan: continue synthroid.        Problem/Plan - 7:    ·  Problem: Type 2 diabetes mellitus with other specified complication, unspecified whether long term insulin use.  Plan: continue accuchecks and resume metformin at home dose 63 female with T2DM, early dementia, Right breast malignancy on chemo admitted with severe sepsis and klebsiella bacteremia likely from UTI.    Pt. treated with IV ABx, switched to po ceftin for 7 more days, as per ID recs. JOEL noted on admission which was resolved after IV hydration.     [facility] NP called and gave report on the phone to PMD Dr. Brennan - who covers [place] [facility].     Message left on Selina's cell (pt's friend) regarding discharge.

## 2019-07-30 NOTE — DISCHARGE NOTE PROVIDER - PROVIDER TOKENS
PROVIDER:[TOKEN:[[id number]]],PROVIDER:[TOKEN:[[id number]]] PROVIDER:[TOKEN:[[id number]]],PROVIDER:[TOKEN:[[id number]]],PROVIDER:[TOKEN:[[id number]]]

## 2019-07-30 NOTE — PROGRESS NOTE ADULT - PROVIDER SPECIALTY LIST ADULT
Hospitalist
Infectious Disease
Internal Medicine
Cardiology
Hospitalist
Infectious Disease
Hospitalist
Heme/Onc

## 2019-07-30 NOTE — DISCHARGE NOTE PROVIDER - CARE PROVIDERS DIRECT ADDRESSES
,[email or web address]

## 2019-07-30 NOTE — DISCHARGE NOTE PROVIDER - NSDCCPCAREPLAN_GEN_ALL_CORE_FT
PRINCIPAL DISCHARGE DIAGNOSIS  Diagnosis: Bacteremia  Assessment and Plan of Treatment: Klebsiella bacteremia, afebrile, no leukocytosis, cont. ceftin po for 7 more days      SECONDARY DISCHARGE DIAGNOSES  Diagnosis: Breast CA  Assessment and Plan of Treatment: on chemo, follow up outpatient with heme-onc - Dr. Casarez    Diagnosis: T2DM (type 2 diabetes mellitus)  Assessment and Plan of Treatment: resume metformin at home dose    Diagnosis: Hypothyroidism  Assessment and Plan of Treatment: cont, synthroid    Diagnosis: HTN (hypertension)  Assessment and Plan of Treatment: controlled on current meds    Diagnosis: JOEL (acute kidney injury)  Assessment and Plan of Treatment: Resolved, avoid nephrotoxic drugs, monitor creatinine PRINCIPAL DISCHARGE DIAGNOSIS  Diagnosis: Bacteremia  Assessment and Plan of Treatment: Klebsiella bacteremia, afebrile, cont. ceftin po for 7 more days      SECONDARY DISCHARGE DIAGNOSES  Diagnosis: Breast CA  Assessment and Plan of Treatment: on chemo, follow up outpatient with heme-onc - Dr. Casarez    Diagnosis: T2DM (type 2 diabetes mellitus)  Assessment and Plan of Treatment: resume metformin at home dose    Diagnosis: Hypothyroidism  Assessment and Plan of Treatment: cont, synthroid    Diagnosis: HTN (hypertension)  Assessment and Plan of Treatment: controlled on current meds    Diagnosis: JOEL (acute kidney injury)  Assessment and Plan of Treatment: Resolved, avoid nephrotoxic drugs, monitor creatinine

## 2019-07-31 ENCOUNTER — INBOUND DOCUMENT (OUTPATIENT)
Age: 64
End: 2019-07-31

## 2019-08-05 ENCOUNTER — OUTPATIENT (OUTPATIENT)
Dept: OUTPATIENT SERVICES | Facility: HOSPITAL | Age: 64
LOS: 1 days | Discharge: HOME | End: 2019-08-05
Payer: MEDICAID

## 2019-08-05 DIAGNOSIS — Z95.820 PERIPHERAL VASCULAR ANGIOPLASTY STATUS WITH IMPLANTS AND GRAFTS: Chronic | ICD-10-CM

## 2019-08-05 DIAGNOSIS — Z98.890 OTHER SPECIFIED POSTPROCEDURAL STATES: Chronic | ICD-10-CM

## 2019-08-05 DIAGNOSIS — E07.89 OTHER SPECIFIED DISORDERS OF THYROID: Chronic | ICD-10-CM

## 2019-08-05 PROBLEM — E11.9 TYPE 2 DIABETES MELLITUS WITHOUT COMPLICATIONS: Chronic | Status: ACTIVE | Noted: 2019-07-24

## 2019-08-06 ENCOUNTER — APPOINTMENT (OUTPATIENT)
Dept: OBGYN | Facility: CLINIC | Age: 64
End: 2019-08-06
Payer: COMMERCIAL

## 2019-08-06 VITALS
HEART RATE: 78 BPM | SYSTOLIC BLOOD PRESSURE: 120 MMHG | OXYGEN SATURATION: 96 % | WEIGHT: 152 LBS | DIASTOLIC BLOOD PRESSURE: 80 MMHG | HEIGHT: 67 IN | BODY MASS INDEX: 23.86 KG/M2

## 2019-08-06 DIAGNOSIS — Z87.440 PERSONAL HISTORY OF URINARY (TRACT) INFECTIONS: ICD-10-CM

## 2019-08-06 PROCEDURE — 99214 OFFICE O/P EST MOD 30 MIN: CPT

## 2019-08-06 NOTE — CHIEF COMPLAINT
[Urgent Visit] : Urgent Visit [FreeTextEntry1] : Possible UTI + sweating  Lives at [facility] +urinary frequency, chronic CUrrently doing RT at [facility]  S/P Chemo  Hosp at  Hosp 7/24 to 7/30 and Tx'd for Klebsiella bacteremia.  Discharge home on Ceftin 500 BID X 7 days

## 2019-08-07 ENCOUNTER — APPOINTMENT (OUTPATIENT)
Dept: RADIATION ONCOLOGY | Facility: CLINIC | Age: 64
End: 2019-08-07
Payer: COMMERCIAL

## 2019-08-07 VITALS
HEART RATE: 82 BPM | SYSTOLIC BLOOD PRESSURE: 154 MMHG | RESPIRATION RATE: 16 BRPM | DIASTOLIC BLOOD PRESSURE: 90 MMHG | TEMPERATURE: 99.68 F | OXYGEN SATURATION: 98 %

## 2019-08-07 VITALS — WEIGHT: 160.39 LBS | BODY MASS INDEX: 25.12 KG/M2

## 2019-08-07 DIAGNOSIS — E11.9 TYPE 2 DIABETES MELLITUS W/OUT COMPLICATIONS: ICD-10-CM

## 2019-08-07 PROCEDURE — 99204 OFFICE O/P NEW MOD 45 MIN: CPT | Mod: 25,GC

## 2019-08-07 RX ORDER — ASPIRIN 81 MG/1
81 TABLET ORAL DAILY
Refills: 0 | Status: ACTIVE | COMMUNITY
Start: 2019-08-07

## 2019-08-07 RX ORDER — HYDROCHLOROTHIAZIDE 12.5 MG/1
12.5 TABLET ORAL DAILY
Qty: 90 | Refills: 0 | Status: DISCONTINUED | COMMUNITY
Start: 2018-09-25 | End: 2019-08-07

## 2019-08-07 RX ORDER — CHOLECALCIFEROL (VITAMIN D3) 125 MCG
TABLET ORAL
Refills: 0 | Status: ACTIVE | COMMUNITY
Start: 2019-08-07

## 2019-08-07 RX ORDER — HYDROCORTISONE 25 MG/G
2.5 CREAM TOPICAL 4 TIMES DAILY
Qty: 2 | Refills: 4 | Status: DISCONTINUED | COMMUNITY
Start: 2018-08-07 | End: 2019-08-07

## 2019-08-07 RX ORDER — LEVOTHYROXINE SODIUM 125 UG/1
125 TABLET ORAL
Refills: 0 | Status: ACTIVE | COMMUNITY

## 2019-08-07 RX ORDER — DEXAMETHASONE 4 MG/1
4 TABLET ORAL
Qty: 48 | Refills: 0 | Status: DISCONTINUED | COMMUNITY
Start: 2019-06-11 | End: 2019-08-07

## 2019-08-07 RX ORDER — ERGOCALCIFEROL 1.25 MG/1
1.25 MG CAPSULE, LIQUID FILLED ORAL
Qty: 4 | Refills: 0 | Status: DISCONTINUED | COMMUNITY
Start: 2017-12-19 | End: 2019-08-07

## 2019-08-07 RX ORDER — DEXAMETHASONE 4 MG/1
4 TABLET ORAL
Qty: 48 | Refills: 0 | Status: DISCONTINUED | COMMUNITY
Start: 2019-04-05 | End: 2019-08-07

## 2019-08-07 RX ORDER — CLONIDINE HYDROCHLORIDE 0.2 MG/1
0.2 TABLET ORAL
Refills: 0 | Status: ACTIVE | COMMUNITY

## 2019-08-07 RX ORDER — PROCHLORPERAZINE MALEATE 10 MG/1
10 TABLET ORAL
Qty: 30 | Refills: 1 | Status: DISCONTINUED | COMMUNITY
Start: 2019-04-05 | End: 2019-08-07

## 2019-08-07 RX ORDER — DOCETAXEL 80 MG/4ML
80 INJECTION, SOLUTION, CONCENTRATE INTRAVENOUS
Refills: 0 | Status: DISCONTINUED | COMMUNITY
Start: 2019-05-20 | End: 2019-08-07

## 2019-08-08 ENCOUNTER — OUTPATIENT (OUTPATIENT)
Dept: OUTPATIENT SERVICES | Facility: HOSPITAL | Age: 64
LOS: 1 days | Discharge: HOME | End: 2019-08-08

## 2019-08-08 DIAGNOSIS — E07.89 OTHER SPECIFIED DISORDERS OF THYROID: Chronic | ICD-10-CM

## 2019-08-08 DIAGNOSIS — Z98.890 OTHER SPECIFIED POSTPROCEDURAL STATES: Chronic | ICD-10-CM

## 2019-08-08 DIAGNOSIS — C50.919 MALIGNANT NEOPLASM OF UNSPECIFIED SITE OF UNSPECIFIED FEMALE BREAST: ICD-10-CM

## 2019-08-08 DIAGNOSIS — Z95.820 PERIPHERAL VASCULAR ANGIOPLASTY STATUS WITH IMPLANTS AND GRAFTS: Chronic | ICD-10-CM

## 2019-08-08 LAB
BACTERIA UR CULT: NORMAL
BILIRUB UR QL STRIP: NORMAL
CLARITY UR: NORMAL
COLLECTION METHOD: NORMAL
GLUCOSE UR-MCNC: NEGATIVE
HCG UR QL: 0.2 EU/DL
HGB UR QL STRIP.AUTO: NEGATIVE
KETONES UR-MCNC: NORMAL
LEUKOCYTE ESTERASE UR QL STRIP: NEGATIVE
NITRITE UR QL STRIP: NEGATIVE
PH UR STRIP: 5
PROT UR STRIP-MCNC: NORMAL
SP GR UR STRIP: 1.03

## 2019-08-10 NOTE — VITALS
[70: Cares for self; unalbe to carry on normal activity or do active work.] : 70: Cares for self; unable to carry on normal activity or do active work. [Maximal Pain Intensity: 0/10] : 0/10 [5 - Distress Level] : Distress Level: 5 [Least Pain Intensity: 0/10] : 0/10

## 2019-08-12 ENCOUNTER — APPOINTMENT (OUTPATIENT)
Dept: HEMATOLOGY ONCOLOGY | Facility: CLINIC | Age: 64
End: 2019-08-12
Payer: MEDICAID

## 2019-08-12 VITALS
DIASTOLIC BLOOD PRESSURE: 89 MMHG | SYSTOLIC BLOOD PRESSURE: 145 MMHG | HEART RATE: 80 BPM | WEIGHT: 163.14 LBS | TEMPERATURE: 209.48 F | OXYGEN SATURATION: 97 % | RESPIRATION RATE: 16 BRPM | BODY MASS INDEX: 25.55 KG/M2

## 2019-08-12 DIAGNOSIS — Z92.89 PERSONAL HISTORY OF OTHER MEDICAL TREATMENT: ICD-10-CM

## 2019-08-12 DIAGNOSIS — C50.911 MALIGNANT NEOPLASM OF UNSPECIFIED SITE OF RIGHT FEMALE BREAST: ICD-10-CM

## 2019-08-12 PROCEDURE — 99214 OFFICE O/P EST MOD 30 MIN: CPT

## 2019-08-12 NOTE — HISTORY OF PRESENT ILLNESS
[T: ___] : T[unfilled] [Disease: _____________________] : Disease: [unfilled] [N: ___] : N[unfilled] [AJCC Stage: ____] : AJCC Stage: [unfilled] [de-identified] : 10/2018-Patient had her routine annual breast screening, which showed a focal area of asymmetric density noted mammographically in the right breast corresponding to a 2.7 cm, lobulated, slightly ill-defined, hypoechoic lesion with an area of echogenicity on ultrasound. Additional 4 mm irregular, hypoechoic lesion was noted in the right breast. \par 11/2018-Right breast core biopsy revealed invasive ductal carcinoma with papillary features-ER positive (100%, strong average intensity of staining), progesterone receptor positive (50% of cells with strong average intensity of staining), HER-2/yojana negative. Focus suspicious for lymphovascular invasion. Second right breast biopsy revealed stromal fibrosis.\par 11/2018-breast MRI showed the right upper outer breast irregular enhancing mass with adjacent non-mass enhancement. Grouped small masses and foci were seen inferior to the site of biopsy proven malignancy in the upper outer right breast, the largest of which measured 0.7 cm, suspicious for small satellite lesions. A 0.4 cm, area of non-mass enhancement was seen in the posterior left breast, indeterminate-biopsy favored sclerosing intraductal papilloma.\par 3/2019-Patient underwent a right breast lumpectomy (upper outer quadrant) which revealed invasive ductal carcinoma with focal papillary and mucinous features, multifocal with largest focus measuring 2.1 cm. The remaining foci ranged from less than 0.1-0.8 cm. Extensive ductal carcinoma in situ noted. Resection margins were negative. 2 sentinel lymph nodes were positive for metastatic carcinoma with the largest metastatic focus measuring approximately 2.5 cm with focal extranodal extension.6 additional lymph nodes were negative for metastatic carcinoma. Right lower inner quadrant mammo-localized wide resection revealed lobular carcinoma in situ. Left breast mammo-localized excisional biopsy revealed extensive complex sclerosing lesions, intraductal papilloma and sclerosis.\par Patient began Taxotere/Cytoxan chemotherapy 4/2019, completing 7/2019.\par \par  [de-identified] : Invasive ductal carcinoma with focal papillary and mucinous features [de-identified] : S/P hospitalizatioon for urosepsis-now feeling much better in this regard. No fevers.\par In [facility] rehab. facility in [place]- #[phone] ([facility])-working on placement for patient as she has difficulty caring for herself at home at this point.\par Saw RT MD and has radiation planning in progress (6 weeks planned).\par No chest pain, shortness of breath, lightheadedness.  \par Friend Pat present.\par

## 2019-08-12 NOTE — ASSESSMENT
[FreeTextEntry1] : Breast cancer-clinically CATERINA, status post adjuvant chemotherapy. Patient now proceeding with adjuvant radiation. Following radiation, plan adjuvant endocrine therapy-favoring aromatase inhibitor in this postmenopausal female-potential side effects reviewed including but not limited to arthralgias, osteoporosis.\par Patient's questions have been answered to her satisfaction at this time.

## 2019-08-12 NOTE — PHYSICAL EXAM
[Normal] : full range of motion and no deformities appreciated [de-identified] : non-toxic appearing [de-identified] : No discrete mass in either breast. No nipple discharge. [de-identified] : No gross focal motor deficits.

## 2019-08-12 NOTE — OB HISTORY
[Post-Menopause, No Sxs] : post-menopausal, currently without symptoms [Menopause Age: ____] : age at menopause was [unfilled] [___] :  Induced: [unfilled]

## 2019-08-19 ENCOUNTER — EMERGENCY (EMERGENCY)
Facility: HOSPITAL | Age: 64
LOS: 1 days | Discharge: NURSING, REHAB OR LONG-TERM CARE | End: 2019-08-19
Attending: EMERGENCY MEDICINE | Admitting: EMERGENCY MEDICINE
Payer: MEDICAID

## 2019-08-19 VITALS
HEART RATE: 76 BPM | RESPIRATION RATE: 18 BRPM | SYSTOLIC BLOOD PRESSURE: 127 MMHG | OXYGEN SATURATION: 97 % | DIASTOLIC BLOOD PRESSURE: 80 MMHG | TEMPERATURE: 99 F

## 2019-08-19 VITALS
RESPIRATION RATE: 16 BRPM | SYSTOLIC BLOOD PRESSURE: 159 MMHG | TEMPERATURE: 99 F | HEART RATE: 78 BPM | OXYGEN SATURATION: 96 % | DIASTOLIC BLOOD PRESSURE: 92 MMHG

## 2019-08-19 DIAGNOSIS — Z98.890 OTHER SPECIFIED POSTPROCEDURAL STATES: Chronic | ICD-10-CM

## 2019-08-19 DIAGNOSIS — E07.89 OTHER SPECIFIED DISORDERS OF THYROID: Chronic | ICD-10-CM

## 2019-08-19 DIAGNOSIS — Z95.820 PERIPHERAL VASCULAR ANGIOPLASTY STATUS WITH IMPLANTS AND GRAFTS: Chronic | ICD-10-CM

## 2019-08-19 PROCEDURE — 73080 X-RAY EXAM OF ELBOW: CPT

## 2019-08-19 PROCEDURE — 73110 X-RAY EXAM OF WRIST: CPT | Mod: 26,LT

## 2019-08-19 PROCEDURE — 73110 X-RAY EXAM OF WRIST: CPT

## 2019-08-19 PROCEDURE — 99285 EMERGENCY DEPT VISIT HI MDM: CPT | Mod: 25

## 2019-08-19 PROCEDURE — 99283 EMERGENCY DEPT VISIT LOW MDM: CPT

## 2019-08-19 PROCEDURE — 73130 X-RAY EXAM OF HAND: CPT | Mod: 26,LT

## 2019-08-19 PROCEDURE — 73080 X-RAY EXAM OF ELBOW: CPT | Mod: 26,LT

## 2019-08-19 PROCEDURE — 25605 CLTX DST RDL FX/EPHYS SEP W/: CPT | Mod: LT

## 2019-08-19 PROCEDURE — 73130 X-RAY EXAM OF HAND: CPT

## 2019-08-19 PROCEDURE — 73110 X-RAY EXAM OF WRIST: CPT | Mod: 26,LT,77

## 2019-08-19 RX ORDER — ACETAMINOPHEN 500 MG
650 TABLET ORAL ONCE
Refills: 0 | Status: COMPLETED | OUTPATIENT
Start: 2019-08-19 | End: 2019-08-19

## 2019-08-19 RX ADMIN — Medication 650 MILLIGRAM(S): at 17:55

## 2019-08-19 RX ADMIN — Medication 650 MILLIGRAM(S): at 16:55

## 2019-08-19 NOTE — ED PROVIDER NOTE - PROGRESS NOTE DETAILS
Mitesh COPPOLA for ED attending, Dr. Lewis : 64 y/o female with a PMHx of presents to the ED BIBA from [facility] c/o left wrist pain. Today tripped and fell when she went to go to the bathroom, landing on her left wrist. Denies head injury, LOC, abd pain, arm pain, leg pain, neck pain, back pain, numbness, weakness.   PE: wd, wn, nad, nc/at, PERRL, mmm, s1, s2, rrr, nl pulses, abd soft, nontender, nondistended, no cvat, neck and back nontender, LE nontender, FROM, RUE FROM, LUE shoulder, elbow nontender, wrist tenderness with deformity, decreased ROM, hand and finger, nontender, distal neurovasc intact, cap refill <2 sec all fingers. _ colles fx, Dr Jn ayala paged Mitesh COPPOLA for ED attending, Dr. Lewis : 62 y/o female with a PMHx of presents to the ED BIBA from [facility] ER c/o left wrist pain. Today tripped and fell when she went to go to the bathroom, landing on her left wrist. Denies head injury, LOC, abd pain, arm pain, leg pain, neck pain, back pain, numbness, weakness.   PE: wd, wn, nad, nc/at, PERRL, mmm, s1, s2, rrr, nl pulses, abd soft, nontender, nondistended, no cvat, neck and back nontender, LE's nontender, FROM, RUE nontender, FROM, LUE shoulder, elbow nontender, wrist tenderness with deformity, decreased ROM, hand and finger, nontender, distal neurovasc intact, cap refill <2 sec all fingers. skin intact Dr Ragsdale to come reduce fx Fx reduced by Dr Ragsdale, post reduction films improved, pt to follow up in 2 weeks

## 2019-08-19 NOTE — ED PROVIDER NOTE - PHYSICAL EXAMINATION
L wrist - swollen and deformed, ecchymotic, tender medial and lateral wrist  NO tenderness proximal ulna and radius  no bony tenderness of digits  + radial pulses  + sensation intact all digits, palmar and dorsal surfaces

## 2019-08-19 NOTE — ED ADULT NURSE REASSESSMENT NOTE - NS ED NURSE REASSESS COMMENT FT1
1740- ost reduction film done
1991- Dr Reid- ortho- in attendance
Report received at change of shift, AAOx3, s/p fall today, denies hitting head, left arm splint in place, pt able to move fingers, denies numbness or tingling, skin warm and dry. pt discharged, awaiting EMS transport.

## 2019-08-19 NOTE — ED PROVIDER NOTE - MUSCULOSKELETAL [+], MLM
JOINT PAIN/L wrist pain, no neck pain, no back pain, no chest pain, no hip pain, no leg pain, no ankle pain

## 2019-08-19 NOTE — ED PROVIDER NOTE - CARE PROVIDER_API CALL
Antonio Ragsdale (DO)  Orthopaedic Surgery  [address] [place] [address]  Phone: [phone]  Fax: [phone]  Follow Up Time:

## 2019-08-19 NOTE — ED PROVIDER NOTE - CLINICAL SUMMARY MEDICAL DECISION MAKING FREE TEXT BOX
64yo F with mechanical fall, likely wrist fx, will get xrays, splint and ortho follow up. Pain currently controlled except with palpation. neurovascularly intact

## 2019-08-19 NOTE — ED PROVIDER NOTE - OBJECTIVE STATEMENT
64yo F from [facility] p/w left wrist pain after mechanical fall. Pt usually ambulates with walker or wheelchair, this morning was walking to  bathroom without assistance and had mechanical fall. Denies LOC, denies feeling lightheaded, dizzy, chest pain, SOB prior to fall. Landed on left wrist. denies head injury, denies neck pain. Currently only pain in left wrist but improved after tylenol. HAd xr in rehab facility showing fracture of distal radius and ulna

## 2019-08-19 NOTE — ED ADULT NURSE NOTE - OBJECTIVE STATEMENT
Presents to ED via amb from [facility]. Pt states she fell this morning going to the bathroom. Denies LOC, nausea, dizziness. C/O pain in left wrist. Had film done at NH which revealed fx wrist. Denies motor or sensory deficit in digits.

## 2019-08-19 NOTE — ED PROVIDER NOTE - NSFOLLOWUPINSTRUCTIONS_ED_ALL_ED_FT
You were seen for wrist fracture  PLease follow up with your primary care doctor in 1-2 days and with orthopedics Dr Ragsdale in 2 weeks.  Keep arm elevated. Take tylenol 650mg every 4 hours as needed for pain  Return to ED for new or worsening symptoms      Fracture    A fracture is a break in one of your bones. This can occur from a variety of injuries, especially traumatic ones. Symptoms include pain, bruising, or swelling. Do not use the injured limb. If a fracture is in one of the bones below your waist, do not put weight on that limb unless instructed to do so by your healthcare provider. Crutches or a cane may have been provided. A splint or cast may have been applied by your health care provider. Make sure to keep it dry and follow up with an orthopedist as instructed.    SEEK IMMEDIATE MEDICAL CARE IF YOU HAVE ANY OF THE FOLLOWING SYMPTOMS: numbness, tingling, increasing pain, or weakness in any part of the injured limb.

## 2019-08-31 ENCOUNTER — APPOINTMENT (OUTPATIENT)
Dept: FAMILY MEDICINE | Facility: CLINIC | Age: 64
End: 2019-08-31

## 2019-09-09 ENCOUNTER — OTHER (OUTPATIENT)
Age: 64
End: 2019-09-09

## 2019-09-09 PROCEDURE — 77263 THER RADIOLOGY TX PLNG CPLX: CPT

## 2019-09-09 NOTE — REVIEW OF SYSTEMS
[Patient Intake Form Reviewed] : Patient intake form was reviewed [Fatigue] : fatigue [SOB on Exertion] : shortness of breath during exertion [Joint Pain] : joint pain [Gait Disturbance] : gait disturbance [Anxiety] : anxiety [Depression] : depression [Negative] : Heme/Lymph [Fever] : no fever [Chills] : no chills [Night Sweats] : no night sweats [Recent Change In Weight] : ~T no recent weight change [Chest Pain] : no chest pain [Hot Flashes] : no hot flashes [Palpitations] : no palpitations [FreeTextEntry5] : cardiac stents [FreeTextEntry9] : hx arthritis [de-identified] : peripheral neuropathy [de-identified] : healed right breast incision
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
## 2019-09-09 NOTE — PHYSICAL EXAM
[Sclera] : the sclera and conjunctiva were normal [Outer Ear] : the ears and nose were normal in appearance [Abdomen Soft] : soft [Extraocular Movements] : extraocular movements were intact [Breast Abnormal Lactation (Galactorrhea)] : no nipple discharge [Breast Palpation Mass] : no palpable masses [Abdomen Tenderness] : non-tender [] : no hepato-splenomegaly [No Focal Deficits] : no focal deficits [Normal] : no palpable adenopathy [Oriented To Time, Place, And Person] : oriented to person, place, and time [de-identified] : perseverance. [de-identified] : healed lumpecotmy scar and axillary surgery scar in right breast

## 2019-09-11 ENCOUNTER — CLINICAL ADVICE (OUTPATIENT)
Age: 64
End: 2019-09-11

## 2019-09-12 PROCEDURE — 77290 THER RAD SIMULAJ FIELD CPLX: CPT | Mod: 26

## 2019-09-12 PROCEDURE — 77333 RADIATION TREATMENT AID(S): CPT | Mod: 26

## 2019-09-24 PROCEDURE — 77295 3-D RADIOTHERAPY PLAN: CPT | Mod: 26

## 2019-09-24 PROCEDURE — 77334 RADIATION TREATMENT AID(S): CPT | Mod: 26

## 2019-09-24 PROCEDURE — 77300 RADIATION THERAPY DOSE PLAN: CPT | Mod: 26

## 2019-09-27 ENCOUNTER — OTHER (OUTPATIENT)
Age: 64
End: 2019-09-27

## 2019-09-30 PROCEDURE — 77280 THER RAD SIMULAJ FIELD SMPL: CPT | Mod: 26

## 2019-10-07 ENCOUNTER — OUTPATIENT (OUTPATIENT)
Dept: OUTPATIENT SERVICES | Facility: HOSPITAL | Age: 64
LOS: 1 days | Discharge: HOME | End: 2019-10-07

## 2019-10-07 DIAGNOSIS — Z98.890 OTHER SPECIFIED POSTPROCEDURAL STATES: Chronic | ICD-10-CM

## 2019-10-07 DIAGNOSIS — Z95.820 PERIPHERAL VASCULAR ANGIOPLASTY STATUS WITH IMPLANTS AND GRAFTS: Chronic | ICD-10-CM

## 2019-10-07 DIAGNOSIS — C50.919 MALIGNANT NEOPLASM OF UNSPECIFIED SITE OF UNSPECIFIED FEMALE BREAST: ICD-10-CM

## 2019-10-07 DIAGNOSIS — E07.89 OTHER SPECIFIED DISORDERS OF THYROID: Chronic | ICD-10-CM

## 2019-10-07 PROCEDURE — 77427 RADIATION TX MANAGEMENT X5: CPT

## 2019-10-07 NOTE — REVIEW OF SYSTEMS
[Fatigue: Grade 1 - Fatigue relieved by rest] : Fatigue: Grade 1 - Fatigue relieved by rest [Breast Pain: Grade 0] : Breast Pain: Grade 0 [Dermatitis Radiation: Grade 1 - Faint erythema or dry desquamation] : Dermatitis Radiation: Grade 1 - Faint erythema or dry desquamation

## 2019-10-07 NOTE — VITALS
Prior Authorization Retail Medication Request    Medication/Dose: Fluticasone-Salmeterol 232-14 MCG/ACT  ICD code (if different than what is on RX):    Previously Tried and Failed:    Rationale:      Insurance Name:  Medicaid [place]  Insurance ID:  [id number]      Pharmacy Information (if different than what is on RX)  Name:  [facility]  Phone:  [phone]    Prior authorization started in [facility].   KEY:[id number]    Katlin Alexis CMA on 5/22/2019 at 2:12 PM     [Maximal Pain Intensity: 0/10] : 0/10 [Least Pain Intensity: 0/10] : 0/10 [70: Cares for self; unalbe to carry on normal activity or do active work.] : 70: Cares for self; unable to carry on normal activity or do active work.

## 2019-10-09 ENCOUNTER — APPOINTMENT (OUTPATIENT)
Dept: HEMATOLOGY ONCOLOGY | Facility: CLINIC | Age: 64
End: 2019-10-09

## 2019-10-13 NOTE — PHYSICAL EXAM
[General Appearance - In No Acute Distress] : in no acute distress [General Appearance - Alert] : alert [Oriented To Time, Place, And Person] : oriented to person, place, and time [de-identified] : mild erythema to treated  site

## 2019-10-13 NOTE — DISEASE MANAGEMENT
[Pathological] : TNM Stage: p [IIB] : IIB [TTNM] : 2 [NTNM] : 1a [MTNM] : 0 [de-identified] : Right breast/ right regional nodes

## 2019-10-13 NOTE — HISTORY OF PRESENT ILLNESS
[FreeTextEntry1] : Patient is a 62 yo woman with Stage IIB [id number] invasive ductal carcinoma of right breast, s/p lumpectomy w negative margins but 2 positive sentinel nodes largest measuring 2.5 cm, ER/CO + HER2-. She completed 4 cycles of CT in July 2019.\par \par 10/7/19- 5/30 fx\par Notes slight pinkness treated breast. Denies pain.

## 2019-10-13 NOTE — REASON FOR VISIT
[Breast Cancer] : breast cancer [Other: _____] : [unfilled] [Routine On-Treatment] : a routine on-treatment visit for

## 2019-10-14 PROCEDURE — 77427 RADIATION TX MANAGEMENT X5: CPT

## 2019-10-14 NOTE — REASON FOR VISIT
[Routine On-Treatment] : a routine on-treatment visit for [Breast Cancer] : breast cancer [Other: _____] : [unfilled]

## 2019-10-16 ENCOUNTER — OTHER (OUTPATIENT)
Age: 64
End: 2019-10-16

## 2019-10-16 NOTE — DISCUSSION/SUMMARY
[Cancer Type / Location / Histology Subtype: ________] : Cancer Type / Location / Histology Subtype: [unfilled] [Diagnosis Date (year): ____] : Diagnosis Date (year): [unfilled] [II] : II [Surgery] : Surgery: Yes [Surgery Date(s) (year): ____] : Surgery Date(s) (year): [unfilled] [Surgical Procedure / Location / Findings: _________] : Surgical Procedure / Location / Findings: [unfilled] [Radiation] : Radiation: Yes [End Date (year): ____] : End Date (year): [unfilled] [Systemic Therapy (chemotherapy, hormonal therapy, other)] : Systemic Therapy (chemotherapy, hormonal therapy, other): Yes [Genetic / hereditary risk factor(s) or predisposing conditions: __________] : Genetic / hereditary risk factor(s) or predisposing conditions: [unfilled] [Need for onging (adjuvant) treatment for cancer?] : Need for onging (adjuvant) treatment for cancer? Yes [Primary care physician] : primary care physician [Colonoscopy] : colonoscopy [Mammogram] : Mammogram [Skin Checks] : skin checks [PAP Test] : PAP test [Bone Density Test] : bone density test [Cholesterol Test] : cholesterol test [Annual Flu Shot] : annual flu shot [Other: _____] : [unfilled] [Anxiety and Depression] : anxiety and depression [Cognitive Function] : cognitive function [Sexual Function] : sexual function [Emotional and mental health] : Emotional and mental health [Memory or Concentration Loss] : Memory or concentration loss [Fatigue] : Fatigue [Insurance] : Insurance [Weight Changes] : Weight changes [Alcohol use] : Alcohol use [Weigh Management (loss / gain)] : Weight management (loss / gain) [Diet] : Diet [Sun screen use] : Sun screen use [Physical activity] : Physical activity [Path to Wellness Survivorship Program] : Path to Wellness Survivorship Program [Bridge to Survivorship Breast Cancer] : Bridge to Survivorship Breast Cancer [Psycho-social Emotional Support ([place])] : Psycho-social Emotional Support  (please call SW at [phone]) [Nutritional and Wellness Counseling ([place])] : Nutritional and Wellness Counseling (please call Nutrition office at [phone]) [[facility] Breast Cancer Hotline] : [facility] Breast Cancer Hotline [Cancer Care] : Cancer Care [American Cancer Society] : the American Cancer Society [Body Area Treated: _________] : Body Area Treated: [unfilled] [FreeTextEntry1] : Taxotere/Cytoxan q 3 weeks x 4 cycles   4/2019  - 7/2019 [FreeTextEntry2] : anastrazole (aromatase inhibitor)\par Potential side effects include: joint pain, vaginal dryness, hot flashes, sleep disturbance,mood changes,\par thinning of hair and bones, increased blood pressure and cholesterol level, fatigue

## 2019-10-20 NOTE — DISEASE MANAGEMENT
[Pathological] : TNM Stage: p [IIB] : IIB [TTNM] : 2 [NTNM] : 1a [MTNM] : 0 [de-identified] : 2000 [de-identified] : 1655 [de-identified] : Right breast/ right regional nodes

## 2019-10-20 NOTE — PHYSICAL EXAM
[General Appearance - Alert] : alert [General Appearance - In No Acute Distress] : in no acute distress [Oriented To Time, Place, And Person] : oriented to person, place, and time [de-identified] : mild erythema to treated  site

## 2019-10-20 NOTE — HISTORY OF PRESENT ILLNESS
[FreeTextEntry1] : Patient is a 62 yo woman with Stage IIB [id number] invasive ductal carcinoma of right breast, s/p lumpectomy w negative margins but 2 positive sentinel nodes largest measuring 2.5 cm, ER/IN + HER2-. She completed 4 cycles of CT in July 2019.\par \par 10/14/19 -  Pt's skin on right breast with mild erythema .  Pt applies Aquaphor 2x a day. No complaints of pain noted.\par \par 10/7/19- 5/30 fx\par Notes slight pinkness treated breast. Denies pain.

## 2019-10-20 NOTE — REVIEW OF SYSTEMS
[Fatigue: Grade 1 - Fatigue relieved by rest] : Fatigue: Grade 1 - Fatigue relieved by rest [Breast Pain: Grade 0] : Breast Pain: Grade 0 [Dermatitis Radiation: Grade 1 - Faint erythema or dry desquamation] : Dermatitis Radiation: Grade 1 - Faint erythema or dry desquamation [Skin Induration: Grade 0] : Skin Induration: Grade 0 [Skin Hyperpigmentation: Grade 0] : Skin Hyperpigmentation: Grade 0

## 2019-10-22 PROCEDURE — 77427 RADIATION TX MANAGEMENT X5: CPT

## 2019-10-28 ENCOUNTER — OTHER (OUTPATIENT)
Age: 64
End: 2019-10-28

## 2019-10-28 NOTE — REVIEW OF SYSTEMS
[Fatigue: Grade 1 - Fatigue relieved by rest] : Fatigue: Grade 1 - Fatigue relieved by rest [Breast Pain: Grade 0] : Breast Pain: Grade 0 [Dermatitis Radiation: Grade 1 - Faint erythema or dry desquamation] : Dermatitis Radiation: Grade 1 - Faint erythema or dry desquamation [Pruritus: Grade 0] : Pruritus: Grade 0

## 2019-10-29 PROCEDURE — 77427 RADIATION TX MANAGEMENT X5: CPT

## 2019-10-30 PROCEDURE — 77280 THER RAD SIMULAJ FIELD SMPL: CPT | Mod: 26

## 2019-10-31 PROCEDURE — C1725: CPT

## 2019-10-31 PROCEDURE — C1874: CPT

## 2019-10-31 PROCEDURE — C9600: CPT | Mod: LD

## 2019-10-31 PROCEDURE — C1769: CPT

## 2019-10-31 PROCEDURE — 85027 COMPLETE CBC AUTOMATED: CPT

## 2019-10-31 PROCEDURE — 93005 ELECTROCARDIOGRAM TRACING: CPT

## 2019-10-31 PROCEDURE — 93454 CORONARY ARTERY ANGIO S&I: CPT | Mod: 59

## 2019-10-31 PROCEDURE — 80048 BASIC METABOLIC PNL TOTAL CA: CPT

## 2019-10-31 PROCEDURE — 80053 COMPREHEN METABOLIC PANEL: CPT

## 2019-10-31 PROCEDURE — C1887: CPT

## 2019-10-31 PROCEDURE — 99153 MOD SED SAME PHYS/QHP EA: CPT

## 2019-10-31 PROCEDURE — C1894: CPT

## 2019-10-31 PROCEDURE — 99152 MOD SED SAME PHYS/QHP 5/>YRS: CPT

## 2019-10-31 PROCEDURE — 76937 US GUIDE VASCULAR ACCESS: CPT

## 2019-11-03 NOTE — DISEASE MANAGEMENT
[Pathological] : TNM Stage: p [IIB] : IIB [TTNM] : 2 [NTNM] : 1a [MTNM] : 0 [de-identified] : 2000 [de-identified] : 9726 [de-identified] : Right breast/ right regional nodes

## 2019-11-03 NOTE — PHYSICAL EXAM
[General Appearance - Alert] : alert [General Appearance - In No Acute Distress] : in no acute distress [Oriented To Time, Place, And Person] : oriented to person, place, and time [de-identified] : moderate erythema and swelling to treated  site

## 2019-11-03 NOTE — PHYSICAL EXAM
[General Appearance - Alert] : alert [Oriented To Time, Place, And Person] : oriented to person, place, and time [General Appearance - In No Acute Distress] : in no acute distress [de-identified] : moderate erythema and swelling to treated  site

## 2019-11-03 NOTE — REVIEW OF SYSTEMS
[Fatigue: Grade 1 - Fatigue relieved by rest] : Fatigue: Grade 1 - Fatigue relieved by rest [Breast Pain: Grade 0] : Breast Pain: Grade 0 [Pruritus: Grade 0] : Pruritus: Grade 0 [Dermatitis Radiation: Grade 1 - Faint erythema or dry desquamation] : Dermatitis Radiation: Grade 1 - Faint erythema or dry desquamation [Skin Hyperpigmentation: Grade 0] : Skin Hyperpigmentation: Grade 0 [Skin Induration: Grade 0] : Skin Induration: Grade 0

## 2019-11-03 NOTE — HISTORY OF PRESENT ILLNESS
[FreeTextEntry1] : Patient is a 62 yo woman with Stage IIB [id number] invasive ductal carcinoma of right breast, s/p lumpectomy w negative margins but 2 positive sentinel nodes largest measuring 2.5 cm, ER/UT + HER2-. She completed 4 cycles of CT in July 2019.\par \par 10/28/19- 20/30 fx\par Notes pinkness of treated breast.  Denies pain. Aquaphor applied TID per RN at rehab facility([facility]). She states that she has an appointment for cast removal tomorrow.\par \par 10/22/19- 16/30 fx\par No new complaints.  Using aquaphor with relief.\par \par 10/14/19 -  Pt's skin on right breast with mild erythema .  Pt applies Aquaphor 2x a day. No complaints of pain noted.\par \par 10/7/19- 5/30 fx\par Notes slight pinkness treated breast. Denies pain.

## 2019-11-03 NOTE — HISTORY OF PRESENT ILLNESS
[FreeTextEntry1] : Patient is a 62 yo woman with Stage IIB [id number] invasive ductal carcinoma of right breast, s/p lumpectomy w negative margins but 2 positive sentinel nodes largest measuring 2.5 cm, ER/[place] + HER2-. She completed 4 cycles of CT in July 2019.\par \par 10/22/19- 16/30 fx\par No new complaints.  Using aquaphor with relief.\par \par 10/14/19 -  Pt's skin on right breast with mild erythema .  Pt applies Aquaphor 2x a day. No complaints of pain noted.\par \par 10/7/19- 5/30 fx\par Notes slight pinkness treated breast. Denies pain.

## 2019-11-04 RX ORDER — SILVER SULFADIAZINE 10 MG/G
1 CREAM TOPICAL TWICE DAILY
Qty: 1 | Refills: 1 | Status: ACTIVE | OUTPATIENT
Start: 2019-11-04

## 2019-11-04 NOTE — REVIEW OF SYSTEMS
[Fatigue: Grade 1 - Fatigue relieved by rest] : Fatigue: Grade 1 - Fatigue relieved by rest [Breast Pain: Grade 0] : Breast Pain: Grade 0 [Pruritus: Grade 0] : Pruritus: Grade 0 [Skin Hyperpigmentation: Grade 0] : Skin Hyperpigmentation: Grade 0 [Skin Induration: Grade 0] : Skin Induration: Grade 0 [Dermatitis Radiation: Grade 1 - Faint erythema or dry desquamation] : Dermatitis Radiation: Grade 1 - Faint erythema or dry desquamation

## 2019-11-05 PROCEDURE — 77427 RADIATION TX MANAGEMENT X5: CPT

## 2019-11-10 NOTE — DISEASE MANAGEMENT
[Pathological] : TNM Stage: p [IIB] : IIB [TTNM] : 2 [NTNM] : 1a [MTNM] : 0 [de-identified] : 9510 [de-identified] : 1183 [de-identified] : Right breast/ right regional nodes

## 2019-11-10 NOTE — HISTORY OF PRESENT ILLNESS
[FreeTextEntry1] : Patient is a 64 yo woman with Stage IIB [id number] invasive ductal carcinoma of right breast, s/p lumpectomy w negative margins but 2 positive sentinel nodes largest measuring 2.5 cm, ER/MS + HER2-. She completed 4 cycles of CT in July 2019.\par \par 11/4/19 - Pt's skin on right breast with moderate erythema.  Pt has small area of moist desquamation in area of inframammary fold with some discomfort.  Pt applys Aquaphor 2x a day.  \par \par 10/28/19- 20/30 fx\par Notes pinkness of treated breast.  Denies pain. Aquaphor applied TID per RN at rehab facility([facility]). She states that she has an appointment for cast removal tomorrow.\par \par 10/22/19- 16/30 fx\par No new complaints.  Using aquaphor with relief.\par \par 10/14/19 -  Pt's skin on right breast with mild erythema .  Pt applies Aquaphor 2x a day. No complaints of pain noted.\par \par 10/7/19- 5/30 fx\par Notes slight pinkness treated breast. Denies pain.

## 2019-11-10 NOTE — PHYSICAL EXAM
[General Appearance - Alert] : alert [General Appearance - In No Acute Distress] : in no acute distress [Oriented To Time, Place, And Person] : oriented to person, place, and time [Breast Palpation Mass] : no palpable masses [de-identified] : moderate erythema and swelling to treated  site; peeling in IMF

## 2019-11-11 PROCEDURE — 77427 RADIATION TX MANAGEMENT X5: CPT

## 2019-11-17 NOTE — VITALS
[80: Normal activity with effort; some signs or symptoms of disease.] : 80: Normal activity with effort; some signs or symptoms of disease.  [ECOG Performance Status: 2 - Ambulatory and capable of all self care but unable to carry out any work activities] : Performance Status: 2 - Ambulatory and capable of all self care but unable to carry out any work activities. Up and about more than 50% of waking hours [Maximal Pain Intensity: 1/10] : 1/10 [Least Pain Intensity: 0/10] : 0/10

## 2019-11-17 NOTE — PHYSICAL EXAM
[General Appearance - Alert] : alert [General Appearance - In No Acute Distress] : in no acute distress [Oriented To Time, Place, And Person] : oriented to person, place, and time [] : no respiratory distress [de-identified] : moderate erythema and swelling to treated  site; healed skin inframammary fold

## 2019-11-17 NOTE — DISEASE MANAGEMENT
[Pathological] : TNM Stage: p [IIB] : IIB [TTNM] : 2 [NTNM] : 1a [MTNM] : 0 [de-identified] : 3165 [de-identified] : 8224 [de-identified] : Right breast/ right regional nodes

## 2019-11-17 NOTE — HISTORY OF PRESENT ILLNESS
[FreeTextEntry1] : Patient is a 64 yo woman with Stage IIB [id number] invasive ductal carcinoma of right breast, s/p lumpectomy w negative margins but 2 positive sentinel nodes largest measuring 2.5 cm, ER/VT + HER2-. She completed 4 cycles of CT in July 2019.\par \par 11/11/19- 30/30 fx\par Notes skin healing in inframammary fold. States that she is using aquaphor and silvadene to treated area.\par \par 11/4/19 - Pt's skin on right breast with moderate erythema.  Pt has small area of moist desquamation in area of inframammary fold with some discomfort.  Pt applys Aquaphor 2x a day.  \par \par 10/28/19- 20/30 fx\par Notes pinkness of treated breast.  Denies pain. Aquaphor applied TID per RN at rehab facility([facility]). She states that she has an appointment for cast removal tomorrow.\par \par 10/22/19- 16/30 fx\par No new complaints.  Using aquaphor with relief.\par \par 10/14/19 -  Pt's skin on right breast with mild erythema .  Pt applies Aquaphor 2x a day. No complaints of pain noted.\par \par 10/7/19- 5/30 fx\par Notes slight pinkness treated breast. Denies pain.

## 2019-11-17 NOTE — REVIEW OF SYSTEMS
[Fatigue: Grade 1 - Fatigue relieved by rest] : Fatigue: Grade 1 - Fatigue relieved by rest [Breast Pain: Grade 0] : Breast Pain: Grade 0 [Pruritus: Grade 0] : Pruritus: Grade 0 [Dermatitis Radiation: Grade 1 - Faint erythema or dry desquamation] : Dermatitis Radiation: Grade 1 - Faint erythema or dry desquamation [Skin Hyperpigmentation: Grade 1 - Hyperpigmentation covering <10% BSA; no psychosocial impact] : Skin Hyperpigmentation: Grade 1 - Hyperpigmentation covering <10% BSA; no psychosocial impact [Skin Induration: Grade 0] : Skin Induration: Grade 0

## 2019-12-02 ENCOUNTER — OUTPATIENT (OUTPATIENT)
Dept: OUTPATIENT SERVICES | Facility: HOSPITAL | Age: 64
LOS: 1 days | Discharge: HOME | End: 2019-12-02

## 2019-12-02 DIAGNOSIS — E07.89 OTHER SPECIFIED DISORDERS OF THYROID: Chronic | ICD-10-CM

## 2019-12-02 DIAGNOSIS — Z98.890 OTHER SPECIFIED POSTPROCEDURAL STATES: Chronic | ICD-10-CM

## 2019-12-02 DIAGNOSIS — C50.919 MALIGNANT NEOPLASM OF UNSPECIFIED SITE OF UNSPECIFIED FEMALE BREAST: ICD-10-CM

## 2019-12-02 DIAGNOSIS — Z95.820 PERIPHERAL VASCULAR ANGIOPLASTY STATUS WITH IMPLANTS AND GRAFTS: Chronic | ICD-10-CM

## 2019-12-09 ENCOUNTER — APPOINTMENT (OUTPATIENT)
Dept: HEMATOLOGY ONCOLOGY | Facility: CLINIC | Age: 64
End: 2019-12-09
Payer: MEDICARE

## 2019-12-09 VITALS
OXYGEN SATURATION: 96 % | DIASTOLIC BLOOD PRESSURE: 95 MMHG | TEMPERATURE: 98.1 F | RESPIRATION RATE: 18 BRPM | SYSTOLIC BLOOD PRESSURE: 152 MMHG | HEART RATE: 78 BPM | BODY MASS INDEX: 28.87 KG/M2 | WEIGHT: 184.3 LBS

## 2019-12-09 DIAGNOSIS — D64.9 ANEMIA, UNSPECIFIED: ICD-10-CM

## 2019-12-09 PROCEDURE — 99214 OFFICE O/P EST MOD 30 MIN: CPT

## 2019-12-09 RX ORDER — ANASTROZOLE TABLETS 1 MG/1
1 TABLET ORAL DAILY
Qty: 1 | Refills: 0 | Status: ACTIVE | COMMUNITY
Start: 2019-12-09 | End: 1900-01-01

## 2019-12-09 NOTE — ASSESSMENT
[FreeTextEntry1] : Breast cancer-clinically CATER[place], status post surgery/adjuvant chemotherapy/adjuvant radiation. Discussed endocrine therapy pros/cons. Patient consents to Arimidex-potential side effects explained including but not limited to, arthralgia/osteoporosis. Patient stated she will have her bone densitometry updated through her gynecologist who has ordered her bone densitometry in the past.\par Patient was given the opportunity to ask questions. Her questions have been answered to her satisfaction at this time.

## 2019-12-09 NOTE — CONSULT LETTER
[Dear  ___] : Dear  [unfilled], [Courtesy Letter:] : I had the pleasure of seeing your patient, [unfilled], in my office today. [Please see my note below.] : Please see my note below. [Consult Closing:] : Thank you very much for allowing me to participate in the care of this patient.  If you have any questions, please do not hesitate to contact me. [Sincerely,] : Sincerely, [DrLeanne  ___] : Dr. BIGNHAM [DrLeanne ___] : Dr. BINGHAM [FreeTextEntry3] : Caitlin Franco M.D.

## 2019-12-09 NOTE — REVIEW OF SYSTEMS
[Negative] : Allergic/Immunologic [Fainting] : no fainting [FreeTextEntry2] : eating well at facility resulting in weight gain

## 2019-12-09 NOTE — PHYSICAL EXAM
[Normal] : affect appropriate [de-identified] : non-toxic appearing [de-identified] : No discrete mass in either breast. No nipple discharge. [de-identified] : No gross focal motor deficits.

## 2019-12-09 NOTE — HISTORY OF PRESENT ILLNESS
[Disease: _____________________] : Disease: [unfilled] [T: ___] : T[unfilled] [N: ___] : N[unfilled] [AJCC Stage: ____] : AJCC Stage: [unfilled] [de-identified] : 10/2018-Patient had her routine annual breast screening, which showed a focal area of asymmetric density noted mammographically in the right breast corresponding to a 2.7 cm, lobulated, slightly ill-defined, hypoechoic lesion with an area of echogenicity on ultrasound. Additional 4 mm irregular, hypoechoic lesion was noted in the right breast. \par 11/2018-Right breast core biopsy revealed invasive ductal carcinoma with papillary features-ER positive (100%, strong average intensity of staining), progesterone receptor positive (50% of cells with strong average intensity of staining), HER-2/yojana negative. Focus suspicious for lymphovascular invasion. Second right breast biopsy revealed stromal fibrosis.\par 11/2018-breast MRI showed the right upper outer breast irregular enhancing mass with adjacent non-mass enhancement. Grouped small masses and foci were seen inferior to the site of biopsy proven malignancy in the upper outer right breast, the largest of which measured 0.7 cm, suspicious for small satellite lesions. A 0.4 cm, area of non-mass enhancement was seen in the posterior left breast, indeterminate-biopsy favored sclerosing intraductal papilloma.\par 3/2019-Patient underwent a right breast lumpectomy (upper outer quadrant) which revealed invasive ductal carcinoma with focal papillary and mucinous features, multifocal with largest focus measuring 2.1 cm. The remaining foci ranged from less than 0.1-0.8 cm. Extensive ductal carcinoma in situ noted. Resection margins were negative. 2 sentinel lymph nodes were positive for metastatic carcinoma with the largest metastatic focus measuring approximately 2.5 cm with focal extranodal extension.6 additional lymph nodes were negative for metastatic carcinoma. Right lower inner quadrant mammo-localized wide resection revealed lobular carcinoma in situ. Left breast mammo-localized excisional biopsy revealed extensive complex sclerosing lesions, intraductal papilloma and sclerosis.\par Patient completed Taxotere/Cytoxan chemotherapy 7/2019.\par Completed RT 11/2019.\par 12/2019-Beginning Arimidex.\par  [de-identified] : Completed RT last month.\par Remains in [facility] rehab. facility in [place]-feels she is doing well there. Eating well.\par No chest pain, shortness of breath, lightheadedness.  \par Friend Pat present.\par  [de-identified] : Invasive ductal carcinoma with focal papillary and mucinous features

## 2019-12-12 DIAGNOSIS — Z79.811 LONG TERM (CURRENT) USE OF AROMATASE INHIBITORS: ICD-10-CM

## 2019-12-12 DIAGNOSIS — M85.80 OTHER SPECIFIED DISORDERS OF BONE DENSITY AND STRUCTURE, UNSPECIFIED SITE: ICD-10-CM

## 2020-02-12 ENCOUNTER — FORM ENCOUNTER (OUTPATIENT)
Age: 65
End: 2020-02-12

## 2020-02-13 ENCOUNTER — APPOINTMENT (OUTPATIENT)
Dept: RADIOLOGY | Facility: HOSPITAL | Age: 65
End: 2020-02-13
Payer: MEDICARE

## 2020-02-13 ENCOUNTER — OUTPATIENT (OUTPATIENT)
Dept: OUTPATIENT SERVICES | Facility: HOSPITAL | Age: 65
LOS: 1 days | End: 2020-02-13
Payer: MEDICARE

## 2020-02-13 DIAGNOSIS — Z79.811 LONG TERM (CURRENT) USE OF AROMATASE INHIBITORS: ICD-10-CM

## 2020-02-13 DIAGNOSIS — Z98.890 OTHER SPECIFIED POSTPROCEDURAL STATES: Chronic | ICD-10-CM

## 2020-02-13 DIAGNOSIS — Z95.820 PERIPHERAL VASCULAR ANGIOPLASTY STATUS WITH IMPLANTS AND GRAFTS: Chronic | ICD-10-CM

## 2020-02-13 DIAGNOSIS — E07.89 OTHER SPECIFIED DISORDERS OF THYROID: Chronic | ICD-10-CM

## 2020-02-13 PROCEDURE — 77080 DXA BONE DENSITY AXIAL: CPT | Mod: 26

## 2020-02-13 PROCEDURE — 77080 DXA BONE DENSITY AXIAL: CPT

## 2020-03-06 ENCOUNTER — OUTPATIENT (OUTPATIENT)
Dept: OUTPATIENT SERVICES | Facility: HOSPITAL | Age: 65
LOS: 1 days | Discharge: HOME | End: 2020-03-06

## 2020-03-06 DIAGNOSIS — E07.89 OTHER SPECIFIED DISORDERS OF THYROID: Chronic | ICD-10-CM

## 2020-03-06 DIAGNOSIS — Z98.890 OTHER SPECIFIED POSTPROCEDURAL STATES: Chronic | ICD-10-CM

## 2020-03-06 DIAGNOSIS — C50.919 MALIGNANT NEOPLASM OF UNSPECIFIED SITE OF UNSPECIFIED FEMALE BREAST: ICD-10-CM

## 2020-03-06 DIAGNOSIS — Z95.820 PERIPHERAL VASCULAR ANGIOPLASTY STATUS WITH IMPLANTS AND GRAFTS: Chronic | ICD-10-CM

## 2020-03-10 ENCOUNTER — APPOINTMENT (OUTPATIENT)
Dept: HEMATOLOGY ONCOLOGY | Facility: CLINIC | Age: 65
End: 2020-03-10

## 2020-03-31 NOTE — CONSULT NOTE ADULT - CONSULT REQUESTED DATE/TIME
Mri dept [facility] calling had questions about appointment for pt that was scheduled please call [phone]  
26-Jul-2019 10:12
25-Jul-2019 15:51
28-Jul-2019 14:48

## 2020-04-07 ENCOUNTER — APPOINTMENT (OUTPATIENT)
Dept: RADIATION ONCOLOGY | Facility: CLINIC | Age: 65
End: 2020-04-07
Payer: SELF-PAY

## 2020-04-07 DIAGNOSIS — C50.919 MALIGNANT NEOPLASM OF UNSPECIFIED SITE OF UNSPECIFIED FEMALE BREAST: ICD-10-CM

## 2020-04-07 PROCEDURE — 99024 POSTOP FOLLOW-UP VISIT: CPT

## 2020-04-07 NOTE — PHYSICAL EXAM
[General Appearance - Alert] : alert [Oriented To Time, Place, And Person] : oriented to person, place, and time

## 2020-04-07 NOTE — REVIEW OF SYSTEMS
[Fatigue: Grade 0] : Fatigue: Grade 0 [Dermatitis Radiation: Grade 0] : Dermatitis Radiation: Grade 0 [Breast Pain: Grade 0] : Breast Pain: Grade 0

## 2020-04-07 NOTE — REASON FOR VISIT
[Breast Cancer] : breast cancer [Other: _____] : [unfilled] [Post-Treatment Evaluation] : post-treatment evaluation for

## 2020-04-12 PROBLEM — C50.919 BREAST CANCER: Status: ACTIVE | Noted: 2018-11-10

## 2020-04-13 NOTE — HISTORY OF PRESENT ILLNESS
[Home] : at home, [unfilled] , at the time of the visit. [Other Location: e.g. Home (Enter Location, City,State)___] : at [unfilled] [Patient] : the patient [Self] : self [FreeTextEntry4] : Dr.May Schwartz, Vani Eden, NP [FreeTextEntry1] : Verbal consent given by patient for telehealth telephone encounter on 4/7/ 2020 at approximately 2:55 PM.\par \par 63 yo woman with Stage IIB [id number] invasive ductal carcinoma of right breast, s/p lumpectomy w negative margins but 2 positive sentinel nodes largest measuring 2.5 cm, ER/MA + HER2-. She completed 4 cycles of CT in July 2019.\par She received RT treatment to a dose of 6,000 cGy to the right breast from 10/1/2019 - 11/11/2019. She tolerated her radiation well with expected acute side effects.  \par \par She saw Dr. Casarez on 12/9/2019 and was started on anastrazole.\par \par She presents for a PTE via Telehealth telephone encounter.\par Today she notes that she feels well. Denies pain or skin irritation of treated area. She has not followed up with Dr. Yap to date.\par

## 2020-04-13 NOTE — HISTORY OF PRESENT ILLNESS
[Home] : at home, [unfilled] , at the time of the visit. [Other Location: e.g. Home (Enter Location, City,State)___] : at [unfilled] [Patient] : the patient [Self] : self [FreeTextEntry4] : Dr.May Schwartz, Vani Eden, NP [FreeTextEntry1] : Verbal consent given by patient for telehealth telephone encounter on 4/7/ 2020 at approximately 2:55 PM.\par \par 63 yo woman with Stage IIB [id number] invasive ductal carcinoma of right breast, s/p lumpectomy w negative margins but 2 positive sentinel nodes largest measuring 2.5 cm, ER/TX + HER2-. She completed 4 cycles of CT in July 2019.\par She received RT treatment to a dose of 6,000 cGy to the right breast from 10/1/2019 - 11/11/2019. She tolerated her radiation well with expected acute side effects.  \par \par She saw Dr. Casarez on 12/9/2019 and was started on anastrazole.\par \par She presents for a PTE via Telehealth telephone encounter.\par Today she notes that she feels well. Denies pain or skin irritation of treated area. She has not followed up with Dr. Yap to date.\par

## 2020-10-02 NOTE — ED PROVIDER NOTE - ATTENDING CONTRIBUTION TO CARE
no
Dr. Calles: I performed a face to face bedside interview with patient regarding history of present illness, review of symptoms and past medical history. I completed an independent physical exam.  I have discussed patient's plan of care with PA.   I agree with note as stated above, having amended the EMR as needed to reflect my findings.   This includes HISTORY OF PRESENT ILLNESS, HIV, PAST MEDICAL/SURGICAL/FAMILY/SOCIAL HISTORY, ALLERGIES AND HOME MEDICATIONS, REVIEW OF SYSTEMS, PHYSICAL EXAM, and any PROGRESS NOTES during the time I functioned as the attending physician for this patient.    dr calles: 63 yr old female with hx of anxiety, HTN, HLD, breast cancer s/p right axillary dissection with DAVID drain placed yesterday, discharged this morning, presents concern DAVID drain is not draining. Pt denies any fever, chills, n/v/d, chest pain, sob or any other symptoms at this time.- no signs of infection, DAVID draining well on exam, will contact surgeon and likely discharge

## 2020-10-03 ENCOUNTER — EMERGENCY (EMERGENCY)
Facility: HOSPITAL | Age: 65
LOS: 1 days | Discharge: NURSING, REHAB OR LONG-TERM CARE | End: 2020-10-03
Attending: EMERGENCY MEDICINE | Admitting: EMERGENCY MEDICINE
Payer: MEDICARE

## 2020-10-03 VITALS
DIASTOLIC BLOOD PRESSURE: 65 MMHG | HEIGHT: 67 IN | TEMPERATURE: 98 F | SYSTOLIC BLOOD PRESSURE: 105 MMHG | OXYGEN SATURATION: 99 % | HEART RATE: 68 BPM | RESPIRATION RATE: 14 BRPM | WEIGHT: 205.91 LBS

## 2020-10-03 VITALS
TEMPERATURE: 97 F | OXYGEN SATURATION: 98 % | HEART RATE: 79 BPM | RESPIRATION RATE: 16 BRPM | DIASTOLIC BLOOD PRESSURE: 80 MMHG | SYSTOLIC BLOOD PRESSURE: 149 MMHG

## 2020-10-03 DIAGNOSIS — Z98.890 OTHER SPECIFIED POSTPROCEDURAL STATES: Chronic | ICD-10-CM

## 2020-10-03 DIAGNOSIS — Z95.820 PERIPHERAL VASCULAR ANGIOPLASTY STATUS WITH IMPLANTS AND GRAFTS: Chronic | ICD-10-CM

## 2020-10-03 DIAGNOSIS — E07.89 OTHER SPECIFIED DISORDERS OF THYROID: Chronic | ICD-10-CM

## 2020-10-03 LAB — SARS-COV-2 RNA SPEC QL NAA+PROBE: SIGNIFICANT CHANGE UP

## 2020-10-03 PROCEDURE — 70450 CT HEAD/BRAIN W/O DYE: CPT

## 2020-10-03 PROCEDURE — 99284 EMERGENCY DEPT VISIT MOD MDM: CPT | Mod: 25

## 2020-10-03 PROCEDURE — 70486 CT MAXILLOFACIAL W/O DYE: CPT

## 2020-10-03 PROCEDURE — U0003: CPT

## 2020-10-03 PROCEDURE — 12011 RPR F/E/E/N/L/M 2.5 CM/<: CPT

## 2020-10-03 PROCEDURE — 99285 EMERGENCY DEPT VISIT HI MDM: CPT

## 2020-10-03 PROCEDURE — 70450 CT HEAD/BRAIN W/O DYE: CPT | Mod: 26

## 2020-10-03 PROCEDURE — 72125 CT NECK SPINE W/O DYE: CPT | Mod: 26

## 2020-10-03 PROCEDURE — 90715 TDAP VACCINE 7 YRS/> IM: CPT

## 2020-10-03 PROCEDURE — 72125 CT NECK SPINE W/O DYE: CPT

## 2020-10-03 PROCEDURE — 70486 CT MAXILLOFACIAL W/O DYE: CPT | Mod: 26

## 2020-10-03 PROCEDURE — 90471 IMMUNIZATION ADMIN: CPT

## 2020-10-03 RX ORDER — TETANUS TOXOID, REDUCED DIPHTHERIA TOXOID AND ACELLULAR PERTUSSIS VACCINE, ADSORBED 5; 2.5; 8; 8; 2.5 [IU]/.5ML; [IU]/.5ML; UG/.5ML; UG/.5ML; UG/.5ML
0.5 SUSPENSION INTRAMUSCULAR ONCE
Refills: 0 | Status: COMPLETED | OUTPATIENT
Start: 2020-10-03 | End: 2020-10-03

## 2020-10-03 RX ORDER — ACETAMINOPHEN 500 MG
650 TABLET ORAL ONCE
Refills: 0 | Status: COMPLETED | OUTPATIENT
Start: 2020-10-03 | End: 2020-10-03

## 2020-10-03 RX ADMIN — Medication 1 TABLET(S): at 20:06

## 2020-10-03 RX ADMIN — Medication 650 MILLIGRAM(S): at 15:42

## 2020-10-03 RX ADMIN — TETANUS TOXOID, REDUCED DIPHTHERIA TOXOID AND ACELLULAR PERTUSSIS VACCINE, ADSORBED 0.5 MILLILITER(S): 5; 2.5; 8; 8; 2.5 SUSPENSION INTRAMUSCULAR at 15:40

## 2020-10-03 RX ADMIN — Medication 650 MILLIGRAM(S): at 16:45

## 2020-10-03 NOTE — ED ADULT NURSE NOTE - OBJECTIVE STATEMENT
Tripped on the edge of the couch in the rec room at [facility]. Fell onto her face, laceration to bridge of nose , was wearing her glasses.  Hematoma noted to mid forehead. Denies LOC. Bleeding minimally.

## 2020-10-03 NOTE — ED PROVIDER NOTE - PHYSICAL EXAMINATION
Constitutional: Awake, Alert, non-toxic. NAD. Well appearing, well nourished.   HEAD: Normocephalic, atraumatic.   EYES: PERRL, EOM intact, conjunctiva and sclera are clear bilaterally. No raccoon eyes.   ENT: No louie sign. No rhinorrhea, normal pharynx, patent, no tonsillar exudate or enlargement, mucous membranes pink/moist, no erythema, no drooling or stridor. (+) nasal bone TTP, swelling, ecchymosis, (+) nasal bone 1.5 cm vertical laceration.   NECK: Supple, non-tender  BACK: No midline or paraspinal TTP of cervical/thoracic/lumbar spine, FROM. No ecchymosis or hematomas.   CARDIOVASCULAR: Normal S1, S2; regular rate and rhythm.  RESPIRATORY: Normal respiratory effort; breath sounds CTAB, no wheezes, rhonchi, or rales. Speaking in full sentences. No accessory muscle use.   ABDOMEN: Soft; non-tender, non-distended.  EXTREMITIES: Full passive and active ROM in all extremities; non-tender to palpation; distal pulses palpable and symmetric, no edema, no crepitus or step off  SKIN: Warm, dry; good skin turgor, no apparent lesions or rashes, no ecchymosis, brisk capillary refill.  NEURO: A&O x3. Sensory and motor functions are grossly intact. Speech is normal. Appearance and judgement seem appropriate for gender and age. No neurological deficits. Neurovascular sensation intact motor function 5/5 of upper and lower extremities, CN II-XII grossly intact, no ataxia, absent romberg and pronator drift, intact cerebellar function. Speech clear, without articulation or word-finding difficulties. Eyes- PERRL bilaterally. EOMs in tact. No nystagmus. No facial droop. Constitutional: Awake, Alert, non-toxic. NAD. Well appearing, well nourished.   HEAD: Normocephalic, atraumatic.   EYES: PERRL, EOM intact, conjunctiva and sclera are clear bilaterally. No raccoon eyes.   ENT: No louie sign. No rhinorrhea, normal pharynx, patent, no tonsillar exudate or enlargement, mucous membranes pink/moist, no erythema, no drooling or stridor. (+) nasal bone TTP, swelling, ecchymosis, (+) nasal bone 1.5 cm vertical laceration. no septal hematoma  NECK: Supple, non-tender  BACK: No midline or paraspinal TTP of cervical/thoracic/lumbar spine, FROM. No ecchymosis or hematomas.   CARDIOVASCULAR: Normal S1, S2; regular rate and rhythm.  RESPIRATORY: Normal respiratory effort; breath sounds CTAB, no wheezes, rhonchi, or rales. Speaking in full sentences. No accessory muscle use.   ABDOMEN: Soft; non-tender, non-distended.  EXTREMITIES: Full passive and active ROM in all extremities; non-tender to palpation; distal pulses palpable and symmetric, no edema, no crepitus or step off  SKIN: Warm, dry; good skin turgor, no apparent lesions or rashes, no ecchymosis, brisk capillary refill.  NEURO: A&O x3. Sensory and motor functions are grossly intact. Speech is normal. Appearance and judgement seem appropriate for gender and age. No neurological deficits. Neurovascular sensation intact motor function 5/5 of upper and lower extremities, CN II-XII grossly intact, no ataxia, absent romberg and pronator drift, intact cerebellar function. Speech clear, without articulation or word-finding difficulties. Eyes- PERRL bilaterally. EOMs in tact. No nystagmus. No facial droop.

## 2020-10-03 NOTE — ED PROVIDER NOTE - CLINICAL SUMMARY MEDICAL DECISION MAKING FREE TEXT BOX
BIBA due to mechanical fall. BIBA due to mechanical fall. injury to nasal bone. plan includes ct head r/o CVA, Ct cervival/maxillofacial r/o fx, adacel, re-assess

## 2020-10-03 NOTE — ED PROVIDER NOTE - PATIENT PORTAL LINK FT
You can access the FollowMyHealth Patient Portal offered by [facility] by registering at the following website: [email or web address]. By joining [email or web address]’s FollowMyHealth portal, you will also be able to view your health information using other applications (apps) compatible with our system.

## 2020-10-03 NOTE — ED CLERICAL - CLERICAL COMMENTS
I called at 1522 for the CT Tech I called at 1522 for the CT Tech. I called at 1958 for pickup it will be 60-90 minutes I called at 1522 for the CT Tech. I called at 1958 for pickup it will be 60-90 minutes. Per Shania at [phone] they should be here within the hour

## 2020-10-03 NOTE — ED PROVIDER NOTE - NSFOLLOWUPCLINICS_GEN_ALL_ED_FT
[facility] - ENT  Otolaryngology (ENT)  [address] [place] [address]  Phone: [phone]  Fax:   Follow Up Time: 1-3 Days

## 2020-10-03 NOTE — ED ADULT NURSE REASSESSMENT NOTE - NS ED NURSE REASSESS COMMENT FT1
Antibiotic given as ordered. Patient Covid negative and to return to her Assisted Living. Arrangements made for transport.

## 2020-10-03 NOTE — ED PROVIDER NOTE - NSFOLLOWUPINSTRUCTIONS_ED_ALL_ED_FT
You have an open nasal bone fx. You should take antibiotics that were prescribed. You should follow up with ENT. Return for any signs of infection.     Nasal Fracture      A fracture is a break in a bone. A nasal fracture is a broken nose. Minor breaks do not need treatment. They often heal on their own in about one month. Serious breaks may need treatment. Sometimes surgery is needed.      What are the causes?  This condition is usually caused by a direct hit to the nose (blunt injury). This often occurs from:  •Playing a contact sport.       •Being in a car accident.      •Falling.       •Getting punched.        What are the signs or symptoms?    •Pain.       •Swelling of the nose.       •Bleeding from the nose.       •Bruising around the nose or bruising around the eyes (black eyes).      •The nose having a crooked shape.        How is this treated?  Treatment depends on how bad the injury is.  •Minor breaks often do not need treatment.    •For more serious breaks that have caused bones to move out of position, treatment may involve one of these:  •Moving the bones back into position without surgery. Your doctor may be able to do this in his or her office after you are given medicine to numb the nose area (local anesthetic).      •Surgery. If needed, this will be done after the swelling is gone.          Follow these instructions at home:    Activity     •Return to your normal activities as told by your doctor. Ask your doctor what activities are safe for you.      • Do not play contact sports for 3–4 weeks or as told by your doctor.        General instructions                 •If told, put ice on the injured area:  •Put ice in a plastic bag.      •Place a towel between your skin and the bag.      •Leave the ice on for 20 minutes, 2–3 times a day.        •Take over-the-counter and prescription medicines only as told by your doctor.      •If your nose bleeds, sit up while you gently squeeze your nose shut for 10 minutes.      •Try to not blow your nose.      •Keep all follow-up visits as told by your doctor. This is important.        Contact a doctor if:    •You have more pain or very bad pain.      •You keep having nosebleeds.      •The shape of your nose does not return to normal after 5 days.      •You have pus coming out of your nose.        Get help right away if:    •Your nose bleeds for more than 20 minutes.      •You have clear fluid draining out of your nose.      •You have a swelling on the inside of your nose that does not get better.      •You have trouble moving your eyes.      •You keep throwing up (vomiting).        Summary    •A nasal fracture is a broken nose.      •Symptoms include pain, swelling, and bruising.      •Minor breaks often do not require treatment. More serious breaks may require surgery or other treatments.      •If your nose bleeds, sit up while you gently squeeze your nose shut for 10 minutes.      This information is not intended to replace advice given to you by your health care provider. Make sure you discuss any questions you have with your health care provider. You have an open nasal bone fx. You should take antibiotics that were prescribed. You should follow up with ENT. Return for any signs of infection. You should have sutures removed in 5-7 days.     Nasal Fracture      A fracture is a break in a bone. A nasal fracture is a broken nose. Minor breaks do not need treatment. They often heal on their own in about one month. Serious breaks may need treatment. Sometimes surgery is needed.      What are the causes?  This condition is usually caused by a direct hit to the nose (blunt injury). This often occurs from:  •Playing a contact sport.       •Being in a car accident.      •Falling.       •Getting punched.        What are the signs or symptoms?    •Pain.       •Swelling of the nose.       •Bleeding from the nose.       •Bruising around the nose or bruising around the eyes (black eyes).      •The nose having a crooked shape.        How is this treated?  Treatment depends on how bad the injury is.  •Minor breaks often do not need treatment.    •For more serious breaks that have caused bones to move out of position, treatment may involve one of these:  •Moving the bones back into position without surgery. Your doctor may be able to do this in his or her office after you are given medicine to numb the nose area (local anesthetic).      •Surgery. If needed, this will be done after the swelling is gone.          Follow these instructions at home:    Activity     •Return to your normal activities as told by your doctor. Ask your doctor what activities are safe for you.      • Do not play contact sports for 3–4 weeks or as told by your doctor.        General instructions                 •If told, put ice on the injured area:  •Put ice in a plastic bag.      •Place a towel between your skin and the bag.      •Leave the ice on for 20 minutes, 2–3 times a day.        •Take over-the-counter and prescription medicines only as told by your doctor.      •If your nose bleeds, sit up while you gently squeeze your nose shut for 10 minutes.      •Try to not blow your nose.      •Keep all follow-up visits as told by your doctor. This is important.        Contact a doctor if:    •You have more pain or very bad pain.      •You keep having nosebleeds.      •The shape of your nose does not return to normal after 5 days.      •You have pus coming out of your nose.        Get help right away if:    •Your nose bleeds for more than 20 minutes.      •You have clear fluid draining out of your nose.      •You have a swelling on the inside of your nose that does not get better.      •You have trouble moving your eyes.      •You keep throwing up (vomiting).        Summary    •A nasal fracture is a broken nose.      •Symptoms include pain, swelling, and bruising.      •Minor breaks often do not require treatment. More serious breaks may require surgery or other treatments.      •If your nose bleeds, sit up while you gently squeeze your nose shut for 10 minutes.      This information is not intended to replace advice given to you by your health care provider. Make sure you discuss any questions you have with your health care provider. You have an open nasal bone fx. You should take antibiotics that were prescribed. You should follow up with ENT. Return for any signs of infection. You should have sutures removed in 5-7 days. Dose of Augmentin provided in ED. You should follow up with ENT specialist as soon as possible.     Nasal Fracture      A fracture is a break in a bone. A nasal fracture is a broken nose. Minor breaks do not need treatment. They often heal on their own in about one month. Serious breaks may need treatment. Sometimes surgery is needed.      What are the causes?  This condition is usually caused by a direct hit to the nose (blunt injury). This often occurs from:  •Playing a contact sport.       •Being in a car accident.      •Falling.       •Getting punched.        What are the signs or symptoms?    •Pain.       •Swelling of the nose.       •Bleeding from the nose.       •Bruising around the nose or bruising around the eyes (black eyes).      •The nose having a crooked shape.        How is this treated?  Treatment depends on how bad the injury is.  •Minor breaks often do not need treatment.    •For more serious breaks that have caused bones to move out of position, treatment may involve one of these:  •Moving the bones back into position without surgery. Your doctor may be able to do this in his or her office after you are given medicine to numb the nose area (local anesthetic).      •Surgery. If needed, this will be done after the swelling is gone.          Follow these instructions at home:    Activity     •Return to your normal activities as told by your doctor. Ask your doctor what activities are safe for you.      • Do not play contact sports for 3–4 weeks or as told by your doctor.        General instructions                 •If told, put ice on the injured area:  •Put ice in a plastic bag.      •Place a towel between your skin and the bag.      •Leave the ice on for 20 minutes, 2–3 times a day.        •Take over-the-counter and prescription medicines only as told by your doctor.      •If your nose bleeds, sit up while you gently squeeze your nose shut for 10 minutes.      •Try to not blow your nose.      •Keep all follow-up visits as told by your doctor. This is important.        Contact a doctor if:    •You have more pain or very bad pain.      •You keep having nosebleeds.      •The shape of your nose does not return to normal after 5 days.      •You have pus coming out of your nose.        Get help right away if:    •Your nose bleeds for more than 20 minutes.      •You have clear fluid draining out of your nose.      •You have a swelling on the inside of your nose that does not get better.      •You have trouble moving your eyes.      •You keep throwing up (vomiting).        Summary    •A nasal fracture is a broken nose.      •Symptoms include pain, swelling, and bruising.      •Minor breaks often do not require treatment. More serious breaks may require surgery or other treatments.      •If your nose bleeds, sit up while you gently squeeze your nose shut for 10 minutes.      This information is not intended to replace advice given to you by your health care provider. Make sure you discuss any questions you have with your health care provider.

## 2020-10-03 NOTE — ED PROVIDER NOTE - PROGRESS NOTE DETAILS
Mitesh TORREZ for ED attending, Dr. Gaspar: 63 y/o F with PMHx of DM2, CAD, thyroid CA, breast CA, HLD, HTN,  presents to the ED BIBEMS from [facility] s/p mechanical trip and fall over a couch. Pt landed hitting her face. Notes +laceration and +pain to bridge of nose. No LOC, tooth injury, or fever. NKDA.   Physical Exam: CONST: VSS, no distress. HENMT: +2 cm laceration to bridge of nose with TTP, no bony deformity. EYES: PERRL, EOMI. MSK: Neck supple, nontender. Extremities non traumatic, full ROM intact. NEURO: A&Ox3, no focal deficits.  Impression: Mechanical fall with nose laceration. Plan: CT brain and facial bones, suturing, and wound care instructions. lac repaired, 3 sutures. Tolerated Well. pending covid test for dc

## 2020-10-13 ENCOUNTER — APPOINTMENT (OUTPATIENT)
Dept: OTOLARYNGOLOGY | Facility: CLINIC | Age: 65
End: 2020-10-13
Payer: MEDICARE

## 2020-10-13 ENCOUNTER — APPOINTMENT (OUTPATIENT)
Dept: RADIATION ONCOLOGY | Facility: CLINIC | Age: 65
End: 2020-10-13

## 2020-10-13 VITALS
SYSTOLIC BLOOD PRESSURE: 186 MMHG | WEIGHT: 205 LBS | DIASTOLIC BLOOD PRESSURE: 108 MMHG | HEART RATE: 83 BPM | HEIGHT: 67 IN | TEMPERATURE: 98 F | BODY MASS INDEX: 32.18 KG/M2

## 2020-10-13 DIAGNOSIS — S00.33XA CONTUSION OF NOSE, INITIAL ENCOUNTER: ICD-10-CM

## 2020-10-13 DIAGNOSIS — S02.2XXA FRACTURE OF NASAL BONES, INITIAL ENCOUNTER FOR CLOSED FRACTURE: ICD-10-CM

## 2020-10-13 PROCEDURE — 99204 OFFICE O/P NEW MOD 45 MIN: CPT

## 2020-10-13 RX ORDER — AMLODIPINE BESYLATE 10 MG/1
10 TABLET ORAL
Refills: 0 | Status: ACTIVE | COMMUNITY

## 2020-10-13 RX ORDER — LOSARTAN POTASSIUM 100 MG/1
100 TABLET, FILM COATED ORAL
Refills: 0 | Status: ACTIVE | COMMUNITY

## 2020-10-13 RX ORDER — SENNOSIDES 8.6 MG TABLETS 8.6 MG/1
TABLET ORAL
Refills: 0 | Status: ACTIVE | COMMUNITY

## 2020-10-13 RX ORDER — CARVEDILOL 25 MG/1
25 TABLET, FILM COATED ORAL
Refills: 0 | Status: ACTIVE | COMMUNITY

## 2020-10-13 RX ORDER — TORSEMIDE 10 MG/1
10 TABLET ORAL
Refills: 0 | Status: ACTIVE | COMMUNITY

## 2020-10-13 RX ORDER — LEVOTHYROXINE SODIUM 0.12 MG/1
125 TABLET ORAL
Refills: 0 | Status: ACTIVE | COMMUNITY

## 2020-10-13 RX ORDER — HYDRALAZINE HYDROCHLORIDE 50 MG/1
50 TABLET ORAL
Refills: 0 | Status: ACTIVE | COMMUNITY

## 2020-10-13 NOTE — ASSESSMENT
[FreeTextEntry1] : Patient approx 10 days after fall with fracture of nose noted on imaging at hospital.  Doing well now and no breathing issues.  Nose straight with some mild edema of nasal dorsum.  No intranasal problems. Recommended conservative care and follow up as necessary

## 2020-10-13 NOTE — HISTORY OF PRESENT ILLNESS
[de-identified] : Fell on face - 10 days ago.  Did have nosebleed.   Went to [facility] .  Had ct and told of nasal fracture.  No problems breathing thru nose - no further bleeding.  ? unsure if nose is changed. Noted swelling of nose

## 2020-10-13 NOTE — PHYSICAL EXAM
[Midline] : trachea located in midline position [Normal] : no rashes [de-identified] : nose straight. sl swollen nasal dorsum - no septal hematoma  [de-identified] : No palpable facial fracture - nose straight with mild dorsum swelling - no intranasal issue and no septal hematoma

## 2020-11-18 ENCOUNTER — EMERGENCY (EMERGENCY)
Facility: HOSPITAL | Age: 65
LOS: 1 days | Discharge: NURSING, REHAB OR LONG-TERM CARE | End: 2020-11-18
Attending: EMERGENCY MEDICINE | Admitting: EMERGENCY MEDICINE
Payer: MEDICARE

## 2020-11-18 VITALS
TEMPERATURE: 98 F | DIASTOLIC BLOOD PRESSURE: 78 MMHG | WEIGHT: 220.02 LBS | RESPIRATION RATE: 18 BRPM | HEIGHT: 67 IN | OXYGEN SATURATION: 95 % | SYSTOLIC BLOOD PRESSURE: 115 MMHG | HEART RATE: 68 BPM

## 2020-11-18 VITALS
SYSTOLIC BLOOD PRESSURE: 143 MMHG | TEMPERATURE: 98 F | DIASTOLIC BLOOD PRESSURE: 91 MMHG | HEART RATE: 79 BPM | RESPIRATION RATE: 18 BRPM | OXYGEN SATURATION: 95 %

## 2020-11-18 DIAGNOSIS — E07.89 OTHER SPECIFIED DISORDERS OF THYROID: Chronic | ICD-10-CM

## 2020-11-18 DIAGNOSIS — Z98.890 OTHER SPECIFIED POSTPROCEDURAL STATES: Chronic | ICD-10-CM

## 2020-11-18 DIAGNOSIS — Z95.820 PERIPHERAL VASCULAR ANGIOPLASTY STATUS WITH IMPLANTS AND GRAFTS: Chronic | ICD-10-CM

## 2020-11-18 LAB — SARS-COV-2 RNA SPEC QL NAA+PROBE: SIGNIFICANT CHANGE UP

## 2020-11-18 PROCEDURE — 70486 CT MAXILLOFACIAL W/O DYE: CPT

## 2020-11-18 PROCEDURE — 72125 CT NECK SPINE W/O DYE: CPT

## 2020-11-18 PROCEDURE — 70450 CT HEAD/BRAIN W/O DYE: CPT

## 2020-11-18 PROCEDURE — 99284 EMERGENCY DEPT VISIT MOD MDM: CPT | Mod: 25

## 2020-11-18 PROCEDURE — 70486 CT MAXILLOFACIAL W/O DYE: CPT | Mod: 26

## 2020-11-18 PROCEDURE — 90471 IMMUNIZATION ADMIN: CPT

## 2020-11-18 PROCEDURE — 72125 CT NECK SPINE W/O DYE: CPT | Mod: 26

## 2020-11-18 PROCEDURE — 12011 RPR F/E/E/N/L/M 2.5 CM/<: CPT

## 2020-11-18 PROCEDURE — U0003: CPT

## 2020-11-18 PROCEDURE — 90715 TDAP VACCINE 7 YRS/> IM: CPT

## 2020-11-18 PROCEDURE — 70450 CT HEAD/BRAIN W/O DYE: CPT | Mod: 26

## 2020-11-18 PROCEDURE — 99284 EMERGENCY DEPT VISIT MOD MDM: CPT

## 2020-11-18 RX ORDER — ACETAMINOPHEN 500 MG
650 TABLET ORAL ONCE
Refills: 0 | Status: COMPLETED | OUTPATIENT
Start: 2020-11-18 | End: 2020-11-18

## 2020-11-18 RX ORDER — SPIRONOLACTONE 25 MG/1
1 TABLET, FILM COATED ORAL
Qty: 0 | Refills: 0 | DISCHARGE

## 2020-11-18 RX ORDER — TETANUS TOXOID, REDUCED DIPHTHERIA TOXOID AND ACELLULAR PERTUSSIS VACCINE, ADSORBED 5; 2.5; 8; 8; 2.5 [IU]/.5ML; [IU]/.5ML; UG/.5ML; UG/.5ML; UG/.5ML
0.5 SUSPENSION INTRAMUSCULAR ONCE
Refills: 0 | Status: COMPLETED | OUTPATIENT
Start: 2020-11-18 | End: 2020-11-18

## 2020-11-18 RX ORDER — ERGOCALCIFEROL 1.25 MG/1
1 CAPSULE ORAL
Qty: 0 | Refills: 0 | DISCHARGE

## 2020-11-18 RX ORDER — ACETAMINOPHEN 500 MG
1 TABLET ORAL
Qty: 0 | Refills: 0 | DISCHARGE

## 2020-11-18 RX ADMIN — Medication 650 MILLIGRAM(S): at 13:48

## 2020-11-18 RX ADMIN — Medication 650 MILLIGRAM(S): at 13:08

## 2020-11-18 RX ADMIN — TETANUS TOXOID, REDUCED DIPHTHERIA TOXOID AND ACELLULAR PERTUSSIS VACCINE, ADSORBED 0.5 MILLILITER(S): 5; 2.5; 8; 8; 2.5 SUSPENSION INTRAMUSCULAR at 13:08

## 2020-11-18 RX ADMIN — Medication 1 TABLET(S): at 17:37

## 2020-11-18 NOTE — ED ADULT TRIAGE NOTE - CHIEF COMPLAINT QUOTE
" I bent over to reach for something, but I kept going and hit my face on the floor, my nose is still bleeding "

## 2020-11-18 NOTE — ED PROVIDER NOTE - EYES, MLM
Clear bilaterally, pupils equal, round and reactive to light. Clear bilaterally, pupils equal, round and reactive to light. no entrapment

## 2020-11-18 NOTE — ED ADULT NURSE NOTE - OBJECTIVE STATEMENT
63 yo female BIBA from [facility], with bloody nose and laceration to right orbital area minimal bleeding noted. Pt reports reaching over to get the phone in the recreational area when she fell face forward to the floor, denies LOC, denies any dizziness, new onset weakness, nausea , vomiting at this time, reports 5/10 on pain scale. Will medicate pt for pain as per MD orders and continue to monitor pt . 63 yo female [facility], with bloody nose and laceration to right orbital area minimal bleeding noted. Pt reports reaching over to get the phone in the recreational area when she fell face forward to the floor, denies LOC, denies any dizziness, new onset weakness, nausea , vomiting at this time, reports 5/10 on pain scale. Will medicate pt for pain as per MD orders and continue to monitor pt .

## 2020-11-18 NOTE — ED PROVIDER NOTE - PATIENT PORTAL LINK FT
You can access the FollowMyHealth Patient Portal offered by [facility] by registering at the following website: [email or web address]. By joining [employer]’s FollowMyHealth portal, you will also be able to view your health information using other applications (apps) compatible with our system.

## 2020-11-18 NOTE — ED PROVIDER NOTE - ENMT, MLM
Airway patent, +nasal tenderness over bridge of nose. no hematoma. no louie sign or raccoon eyes. Airway patent, +nasal tenderness over bridge of nose. mild tenderness to inferior orbit of right eye. no hematoma

## 2020-11-18 NOTE — ED ADULT NURSE NOTE - NSIMPLEMENTINTERV_GEN_ALL_ED
Implemented All Fall Risk Interventions:  [place] to call system. Call bell, personal items and telephone within reach. Instruct patient to call for assistance. Room bathroom lighting operational. Non-slip footwear when patient is off stretcher. Physically safe environment: no spills, clutter or unnecessary equipment. Stretcher in lowest position, wheels locked, appropriate side rails in place. Provide visual cue, wrist band, yellow gown, etc. Monitor gait and stability. Monitor for mental status changes and reorient to person, place, and time. Review medications for side effects contributing to fall risk. Reinforce activity limits and safety measures with patient and family.

## 2020-11-18 NOTE — ED PROVIDER NOTE - MUSCULOSKELETAL, MLM
mild soft tissue tenderness to right upper trap. no vert tenderness or step off deformities. no ext tenderness. full ROM of all ext

## 2020-11-18 NOTE — ED PROVIDER NOTE - CARE PROVIDER_API CALL
Olayinka Jean-Baptiste)  Plastic Surgery  [address] [place] [address], [address]  [place], [place] [address]  Phone: [phone]  Fax: [phone]  Follow Up Time: 1-3 Days

## 2020-11-18 NOTE — ED PROVIDER NOTE - CLINICAL SUMMARY MEDICAL DECISION MAKING FREE TEXT BOX
mild HA and facial pain after fall PTA. was leaning forward and "felt going". denies any dizziness or chest pain prior to fall. will CT head, cervical spine, and maxillofacial bones to assess for fx or ICH. updates tetanus. repair lac. Tylenol for pain. will need to be swabbed for covid before returning to work

## 2020-11-18 NOTE — ED PROVIDER NOTE - NSFOLLOWUPINSTRUCTIONS_ED_ALL_ED_FT
keep laceration clean and dry  tylenol as needed for pain  augmentin twice a day, recommend to take with probiotic   follow up with Dr. Ponce 2 days in office

## 2020-11-18 NOTE — ED PROVIDER NOTE - PROGRESS NOTE DETAILS
Dr. Lewis spoke with Dr. Ponce. will see patient in office in 2 days. will start patient on augmentin. resting comfortably. eating and drinking. covid pending to return to assisted living Reevaluated patient at bedside.  resting comfortably. no complaints at this time.   Discussed the results of all diagnostic testing in ED and copies of all reports given.   An opportunity to ask questions was given.  Discussed the importance of prompt, close medical follow-up.  Patient will return with any changes, concerns or persistent / worsening symptoms.  Understanding of all instructions verbalized.

## 2020-11-18 NOTE — ED PROVIDER NOTE - CARE PLAN
Principal Discharge DX:	Orbital wall fracture  Secondary Diagnosis:	Facial laceration, initial encounter  Secondary Diagnosis:	Fall, initial encounter

## 2020-11-18 NOTE — ED PROVIDER NOTE - OBJECTIVE STATEMENT
64 year old female with history of HLD, HTN, DM, hypothyroid, OA, hypokalemia, and cognitive impairment presents with nasal pain, nasal bleeding, and HA after fall at home PTA. was leaning over to  phone and "felt going" and hit face on floor. no LOC. +bleeding to face and nose. does not take any blood thinners. slight upper right neck pain. no nausea, vomiting, or blurred vision. no chest or abd pain. unsure of tetanus status   Resident at [facility]  PCP Loida Potter 64 year old female with history of HLD, HTN, DM, hypothyroid, OA, hypokalemia, and cognitive impairment presents with nasal pain, nasal bleeding, and HA after fall at home PTA. was leaning over to  phone and "kept going" and hit face on floor. no LOC. +bleeding to face and nose. does not take any blood thinners. slight upper right neck pain. no nausea, vomiting, or blurred vision. no chest or abd pain. unsure of tetanus status   Resident at [facility]  PCP Loida Potter

## 2020-12-16 PROBLEM — Z12.11 ENCOUNTER FOR SCREENING COLONOSCOPY: Status: RESOLVED | Noted: 2018-01-29 | Resolved: 2020-12-16

## 2020-12-19 ENCOUNTER — EMERGENCY (EMERGENCY)
Facility: HOSPITAL | Age: 65
LOS: 1 days | Discharge: HOME | End: 2020-12-19
Attending: EMERGENCY MEDICINE | Admitting: EMERGENCY MEDICINE
Payer: MEDICARE

## 2020-12-19 VITALS
TEMPERATURE: 98 F | OXYGEN SATURATION: 97 % | HEIGHT: 67 IN | WEIGHT: 210.1 LBS | RESPIRATION RATE: 17 BRPM | SYSTOLIC BLOOD PRESSURE: 145 MMHG | HEART RATE: 80 BPM | DIASTOLIC BLOOD PRESSURE: 91 MMHG

## 2020-12-19 DIAGNOSIS — Z95.820 PERIPHERAL VASCULAR ANGIOPLASTY STATUS WITH IMPLANTS AND GRAFTS: Chronic | ICD-10-CM

## 2020-12-19 DIAGNOSIS — S05.11XA CONTUSION OF EYEBALL AND ORBITAL TISSUES, RIGHT EYE, INITIAL ENCOUNTER: ICD-10-CM

## 2020-12-19 DIAGNOSIS — Z98.890 OTHER SPECIFIED POSTPROCEDURAL STATES: Chronic | ICD-10-CM

## 2020-12-19 DIAGNOSIS — E07.89 OTHER SPECIFIED DISORDERS OF THYROID: Chronic | ICD-10-CM

## 2020-12-19 PROCEDURE — 70450 CT HEAD/BRAIN W/O DYE: CPT

## 2020-12-19 PROCEDURE — 99284 EMERGENCY DEPT VISIT MOD MDM: CPT | Mod: 25

## 2020-12-19 PROCEDURE — 70450 CT HEAD/BRAIN W/O DYE: CPT | Mod: 26,MA

## 2020-12-19 PROCEDURE — 99284 EMERGENCY DEPT VISIT MOD MDM: CPT

## 2020-12-19 PROCEDURE — 70486 CT MAXILLOFACIAL W/O DYE: CPT

## 2020-12-19 PROCEDURE — 70486 CT MAXILLOFACIAL W/O DYE: CPT | Mod: 26,MA

## 2020-12-19 RX ORDER — HYDRALAZINE HCL 50 MG
0 TABLET ORAL
Qty: 0 | Refills: 0 | DISCHARGE

## 2020-12-19 RX ORDER — ACETAMINOPHEN 500 MG
975 TABLET ORAL ONCE
Refills: 0 | Status: COMPLETED | OUTPATIENT
Start: 2020-12-19 | End: 2020-12-19

## 2020-12-19 RX ADMIN — Medication 975 MILLIGRAM(S): at 23:01

## 2020-12-19 NOTE — ED ADULT NURSE NOTE - OBJECTIVE STATEMENT
Pt BIB EMS from [facility] s/p fall with ecchymosis to right cheek.  Pt states "I had dropped my books and was reaching behind the drawer to pick them up when I fell."  Pt is AAOX3 upon arrival, speaking in full clear sentences.  Currently on ASA, denies any LOC.  Noted to have ecchymosis to right temple and below right eye.  Endorses pain to right cheek with palpation.  Freely moves all extremities.

## 2020-12-19 NOTE — ED ADULT TRIAGE NOTE - CHIEF COMPLAINT QUOTE
Pt BIBA from an Assisted Living facility , pt stated she was picking something up and lost her balance and fell on her face, denies LOC, laceration below right eye area, c/o nose pain, on ASa

## 2020-12-20 VITALS
DIASTOLIC BLOOD PRESSURE: 98 MMHG | OXYGEN SATURATION: 98 % | SYSTOLIC BLOOD PRESSURE: 158 MMHG | TEMPERATURE: 98 F | HEART RATE: 72 BPM | RESPIRATION RATE: 18 BRPM

## 2020-12-20 NOTE — ED PROVIDER NOTE - PATIENT PORTAL LINK FT
You can access the FollowMyHealth Patient Portal offered by [facility] by registering at the following website: [email or web address]. By joining [employer]’s FollowMyHealth portal, you will also be able to view your health information using other applications (apps) compatible with our system.

## 2020-12-20 NOTE — ED PROVIDER NOTE - CLINICAL SUMMARY MEDICAL DECISION MAKING FREE TEXT BOX
Pt BIB EMS from [facility] s/p fall with ecchymosis to right face. Pt states she was going to put two word search books in the drawer. Instead, they fell behind the drawer. She had to remove the drawer to get to it. She states the drawer is heavy and in trying to get it out, she fell forward, hitting her face. + nose bleed, spontaneously resolved. No LOC. she lives with others. She is ambulatory but came in via EMS. She needs  a walker to ambulate. She doesn't have it with her.  CT max fac demonstrates widening of the inferior orbital rim. + inferior rectus injury as well. Plan for tylneol PRN.

## 2020-12-20 NOTE — ED PROVIDER NOTE - CARE PLAN
Principal Discharge DX:	Closed fracture of right orbital floor, initial encounter  Secondary Diagnosis:	Head injury, closed, initial encounter

## 2020-12-20 NOTE — ED PROVIDER NOTE - OBJECTIVE STATEMENT
Pt BIB EMS from [facility] s/p fall with ecchymosis to right face. Pt states she was going to put two word search books in the drawer. Instead, they fell behind the drawer. She had to remove the drawer to get to it. She states the drawer is heavy and in trying to get it out, she fell forward, hitting her face. + nose bleed, spontaneously resolved. No LOC. she lives with others. She is ambulatory but came in via EMS. She needs  a walker to ambulate. She doesn't have it with her.

## 2020-12-20 NOTE — ED PROVIDER NOTE - CARE PROVIDER_API CALL
Demetrius Deleon  OPHTHALMOLOGY  [address] [place] [id number]  Phone: [phone]  Fax: [phone]  Follow Up Time:     Stevenson Workman  OPHTHALMOLOGY  [address] [place] [id number]  Phone: [phone]  Fax: [phone]  Follow Up Time:

## 2020-12-20 NOTE — ED PROVIDER NOTE - NSFOLLOWUPINSTRUCTIONS_ED_ALL_ED_FT
There is worsening of your fracture of the bottom of the right eye. You have no problems with movement of the eyes. They move together. You do not have a double vision. You have an appointment with facial surgery specialist as you have indicated to us. Please confirm your appointment. It is imperative that you be sure to follow up with them or with an eye specialist. If you need one, please see recommended doctors. Call for appointment. Worsening, continued or ANY new concerning symptoms return to the emergency department. You should expect increased bruising and possible swelling around the eye. Our examination today did not show any scratches to the eye ball or sign of eye ball injury. There is worsening of your fracture of the bottom of the right eye. You have no problems with movement of the eyes. They move together. You do not have a double vision. You have an appointment with facial surgery specialist as you have indicated to us. Please confirm your appointment.      Please be aware that the bruising will get worst. Take Augmentin (antibiotic) 875mg twice per day for 7 days. NO NOSE BLOWING.     It is imperative that you be sure to follow up with them but also with an eye specialist. We have discussed with an eye specialist Dr Cerna and have confirmed an appointment for:    MONDAY DECEMBER 21, 2020 at 11:30AM at [address] [facility] [place].       Worsening, continued or ANY new concerning symptoms return to the emergency department. You should expect increased bruising and possible swelling around the eye. Our examination today did not show any scratches to the eye ball or sign of eye ball injury.

## 2020-12-20 NOTE — ED PROVIDER NOTE - PHYSICAL EXAMINATION
Small bruising to the right inferior orbital rim.   Eye: right eye without corneal abrasion. No tear drop pupil. No sign of corneal injury.   EOM intact. Normal conjugate gaze.   MSK: ROM Intact.

## 2020-12-20 NOTE — ED ADULT NURSE REASSESSMENT NOTE - NS ED NURSE REASSESS COMMENT FT1
Pt vitals stable, denies pain at this time. Pt requesting ambulance for transport back to facility due to only able to ambulate with walker & pt does not currently have it with her. Currently awaiting ambulance for transport.

## 2020-12-20 NOTE — ED PROVIDER NOTE - NSFOLLOWUPCLINICS_GEN_ALL_ED_FT
[facility]  Ophthalmology  [address] [place] [address] [address]  [place] [address]  Phone: [phone]  Fax:   Follow Up Time:

## 2021-01-01 ENCOUNTER — TRANSCRIPTION ENCOUNTER (OUTPATIENT)
Age: 66
End: 2021-01-01

## 2021-01-01 ENCOUNTER — INPATIENT (INPATIENT)
Facility: HOSPITAL | Age: 66
LOS: 5 days | Discharge: NURSING, REHAB OR LONG-TERM CARE | DRG: 871 | End: 2021-10-12
Attending: INTERNAL MEDICINE | Admitting: INTERNAL MEDICINE
Payer: MEDICARE

## 2021-01-01 ENCOUNTER — EMERGENCY (EMERGENCY)
Facility: HOSPITAL | Age: 66
LOS: 1 days | Discharge: NURSING, REHAB OR LONG-TERM CARE | End: 2021-01-01
Attending: EMERGENCY MEDICINE | Admitting: EMERGENCY MEDICINE
Payer: MEDICARE

## 2021-01-01 ENCOUNTER — INPATIENT (INPATIENT)
Facility: HOSPITAL | Age: 66
LOS: 8 days | Discharge: NURSING, REHAB OR LONG-TERM CARE | DRG: 871 | End: 2021-09-15
Attending: INTERNAL MEDICINE | Admitting: INTERNAL MEDICINE
Payer: MEDICARE

## 2021-01-01 ENCOUNTER — EMERGENCY (EMERGENCY)
Facility: HOSPITAL | Age: 66
LOS: 1 days | Discharge: HOME | End: 2021-01-01
Attending: EMERGENCY MEDICINE | Admitting: EMERGENCY MEDICINE
Payer: MEDICARE

## 2021-01-01 VITALS
DIASTOLIC BLOOD PRESSURE: 74 MMHG | HEART RATE: 72 BPM | RESPIRATION RATE: 18 BRPM | TEMPERATURE: 98 F | SYSTOLIC BLOOD PRESSURE: 128 MMHG | OXYGEN SATURATION: 93 %

## 2021-01-01 VITALS
HEART RATE: 95 BPM | DIASTOLIC BLOOD PRESSURE: 83 MMHG | SYSTOLIC BLOOD PRESSURE: 159 MMHG | OXYGEN SATURATION: 93 % | RESPIRATION RATE: 16 BRPM | TEMPERATURE: 98 F

## 2021-01-01 VITALS
RESPIRATION RATE: 20 BRPM | OXYGEN SATURATION: 96 % | SYSTOLIC BLOOD PRESSURE: 110 MMHG | TEMPERATURE: 98 F | DIASTOLIC BLOOD PRESSURE: 69 MMHG | HEIGHT: 67 IN | WEIGHT: 199.96 LBS | HEART RATE: 80 BPM

## 2021-01-01 VITALS
SYSTOLIC BLOOD PRESSURE: 150 MMHG | OXYGEN SATURATION: 96 % | DIASTOLIC BLOOD PRESSURE: 83 MMHG | WEIGHT: 218.48 LBS | RESPIRATION RATE: 24 BRPM | TEMPERATURE: 99 F | HEART RATE: 96 BPM | HEIGHT: 67 IN

## 2021-01-01 VITALS
WEIGHT: 205.03 LBS | HEART RATE: 107 BPM | TEMPERATURE: 101 F | OXYGEN SATURATION: 89 % | HEIGHT: 67 IN | RESPIRATION RATE: 22 BRPM | DIASTOLIC BLOOD PRESSURE: 102 MMHG | SYSTOLIC BLOOD PRESSURE: 197 MMHG

## 2021-01-01 VITALS
OXYGEN SATURATION: 96 % | RESPIRATION RATE: 18 BRPM | HEART RATE: 80 BPM | SYSTOLIC BLOOD PRESSURE: 137 MMHG | TEMPERATURE: 98 F | DIASTOLIC BLOOD PRESSURE: 79 MMHG

## 2021-01-01 VITALS
TEMPERATURE: 98 F | SYSTOLIC BLOOD PRESSURE: 150 MMHG | HEIGHT: 67 IN | OXYGEN SATURATION: 99 % | RESPIRATION RATE: 18 BRPM | HEART RATE: 83 BPM | DIASTOLIC BLOOD PRESSURE: 83 MMHG | WEIGHT: 218.04 LBS

## 2021-01-01 VITALS
HEART RATE: 80 BPM | OXYGEN SATURATION: 99 % | RESPIRATION RATE: 18 BRPM | SYSTOLIC BLOOD PRESSURE: 144 MMHG | DIASTOLIC BLOOD PRESSURE: 80 MMHG

## 2021-01-01 DIAGNOSIS — E07.89 OTHER SPECIFIED DISORDERS OF THYROID: Chronic | ICD-10-CM

## 2021-01-01 DIAGNOSIS — Z95.820 PERIPHERAL VASCULAR ANGIOPLASTY STATUS WITH IMPLANTS AND GRAFTS: Chronic | ICD-10-CM

## 2021-01-01 DIAGNOSIS — Z98.890 OTHER SPECIFIED POSTPROCEDURAL STATES: Chronic | ICD-10-CM

## 2021-01-01 DIAGNOSIS — R41.89 OTHER SYMPTOMS AND SIGNS INVOLVING COGNITIVE FUNCTIONS AND AWARENESS: ICD-10-CM

## 2021-01-01 DIAGNOSIS — E03.9 HYPOTHYROIDISM, UNSPECIFIED: ICD-10-CM

## 2021-01-01 DIAGNOSIS — E11.9 TYPE 2 DIABETES MELLITUS WITHOUT COMPLICATIONS: ICD-10-CM

## 2021-01-01 DIAGNOSIS — J96.01 ACUTE RESPIRATORY FAILURE WITH HYPOXIA: ICD-10-CM

## 2021-01-01 DIAGNOSIS — J18.9 PNEUMONIA, UNSPECIFIED ORGANISM: ICD-10-CM

## 2021-01-01 DIAGNOSIS — C50.919 MALIGNANT NEOPLASM OF UNSPECIFIED SITE OF UNSPECIFIED FEMALE BREAST: ICD-10-CM

## 2021-01-01 DIAGNOSIS — I50.33 ACUTE ON CHRONIC DIASTOLIC (CONGESTIVE) HEART FAILURE: ICD-10-CM

## 2021-01-01 DIAGNOSIS — A41.51 SEPSIS DUE TO ESCHERICHIA COLI [E. COLI]: ICD-10-CM

## 2021-01-01 DIAGNOSIS — N39.0 URINARY TRACT INFECTION, SITE NOT SPECIFIED: ICD-10-CM

## 2021-01-01 DIAGNOSIS — I50.23 ACUTE ON CHRONIC SYSTOLIC (CONGESTIVE) HEART FAILURE: ICD-10-CM

## 2021-01-01 DIAGNOSIS — I10 ESSENTIAL (PRIMARY) HYPERTENSION: ICD-10-CM

## 2021-01-01 LAB
-  AMIKACIN: SIGNIFICANT CHANGE UP
-  AMOXICILLIN/CLAVULANIC ACID: SIGNIFICANT CHANGE UP
-  AMOXICILLIN/CLAVULANIC ACID: SIGNIFICANT CHANGE UP
-  AMPICILLIN/SULBACTAM: SIGNIFICANT CHANGE UP
-  AMPICILLIN: SIGNIFICANT CHANGE UP
-  AZTREONAM: SIGNIFICANT CHANGE UP
-  CEFAZOLIN: SIGNIFICANT CHANGE UP
-  CEFEPIME: SIGNIFICANT CHANGE UP
-  CEFOXITIN: SIGNIFICANT CHANGE UP
-  CEFTRIAXONE: SIGNIFICANT CHANGE UP
-  CIPROFLOXACIN: SIGNIFICANT CHANGE UP
-  ERTAPENEM: SIGNIFICANT CHANGE UP
-  GENTAMICIN: SIGNIFICANT CHANGE UP
-  IMIPENEM: SIGNIFICANT CHANGE UP
-  LEVOFLOXACIN: SIGNIFICANT CHANGE UP
-  MEROPENEM: SIGNIFICANT CHANGE UP
-  NITROFURANTOIN: SIGNIFICANT CHANGE UP
-  NITROFURANTOIN: SIGNIFICANT CHANGE UP
-  PIPERACILLIN/TAZOBACTAM: SIGNIFICANT CHANGE UP
-  TIGECYCLINE: SIGNIFICANT CHANGE UP
-  TIGECYCLINE: SIGNIFICANT CHANGE UP
-  TOBRAMYCIN: SIGNIFICANT CHANGE UP
-  TRIMETHOPRIM/SULFAMETHOXAZOLE: SIGNIFICANT CHANGE UP
A1C WITH ESTIMATED AVERAGE GLUCOSE RESULT: 6.4 % — HIGH (ref 4–5.6)
ALBUMIN SERPL ELPH-MCNC: 2.5 G/DL — LOW (ref 3.3–5)
ALBUMIN SERPL ELPH-MCNC: 2.6 G/DL — LOW (ref 3.3–5)
ALBUMIN SERPL ELPH-MCNC: 2.7 G/DL — LOW (ref 3.3–5)
ALBUMIN SERPL ELPH-MCNC: 2.7 G/DL — LOW (ref 3.3–5)
ALBUMIN SERPL ELPH-MCNC: 2.8 G/DL — LOW (ref 3.3–5)
ALBUMIN SERPL ELPH-MCNC: 2.8 G/DL — LOW (ref 3.3–5)
ALBUMIN SERPL ELPH-MCNC: 2.9 G/DL — LOW (ref 3.3–5)
ALBUMIN SERPL ELPH-MCNC: 2.9 G/DL — LOW (ref 3.3–5)
ALBUMIN SERPL ELPH-MCNC: 3 G/DL — LOW (ref 3.3–5)
ALBUMIN SERPL ELPH-MCNC: 3.3 G/DL — SIGNIFICANT CHANGE UP (ref 3.3–5)
ALP SERPL-CCNC: 54 U/L — SIGNIFICANT CHANGE UP (ref 30–120)
ALP SERPL-CCNC: 58 U/L — SIGNIFICANT CHANGE UP (ref 30–120)
ALP SERPL-CCNC: 60 U/L — SIGNIFICANT CHANGE UP (ref 30–120)
ALP SERPL-CCNC: 61 U/L — SIGNIFICANT CHANGE UP (ref 30–120)
ALP SERPL-CCNC: 67 U/L — SIGNIFICANT CHANGE UP (ref 30–120)
ALP SERPL-CCNC: 68 U/L — SIGNIFICANT CHANGE UP (ref 30–120)
ALP SERPL-CCNC: 68 U/L — SIGNIFICANT CHANGE UP (ref 30–120)
ALP SERPL-CCNC: 71 U/L — SIGNIFICANT CHANGE UP (ref 30–120)
ALP SERPL-CCNC: 72 U/L — SIGNIFICANT CHANGE UP (ref 30–120)
ALP SERPL-CCNC: 73 U/L — SIGNIFICANT CHANGE UP (ref 30–120)
ALP SERPL-CCNC: 74 U/L — SIGNIFICANT CHANGE UP (ref 30–120)
ALP SERPL-CCNC: 84 U/L — SIGNIFICANT CHANGE UP (ref 30–120)
ALT FLD-CCNC: 17 U/L DA — SIGNIFICANT CHANGE UP (ref 10–60)
ALT FLD-CCNC: 19 U/L DA — SIGNIFICANT CHANGE UP (ref 10–60)
ALT FLD-CCNC: 20 U/L DA — SIGNIFICANT CHANGE UP (ref 10–60)
ALT FLD-CCNC: 20 U/L DA — SIGNIFICANT CHANGE UP (ref 10–60)
ALT FLD-CCNC: 22 U/L DA — SIGNIFICANT CHANGE UP (ref 10–60)
ALT FLD-CCNC: 25 U/L DA — SIGNIFICANT CHANGE UP (ref 10–60)
ALT FLD-CCNC: 26 U/L DA — SIGNIFICANT CHANGE UP (ref 10–60)
ALT FLD-CCNC: 27 U/L DA — SIGNIFICANT CHANGE UP (ref 10–60)
ALT FLD-CCNC: 34 U/L DA — SIGNIFICANT CHANGE UP (ref 10–60)
ALT FLD-CCNC: 35 U/L DA — SIGNIFICANT CHANGE UP (ref 10–60)
ALT FLD-CCNC: 45 U/L DA — SIGNIFICANT CHANGE UP (ref 10–60)
ALT FLD-CCNC: 46 U/L DA — SIGNIFICANT CHANGE UP (ref 10–60)
ANION GAP SERPL CALC-SCNC: 11 MMOL/L — SIGNIFICANT CHANGE UP (ref 5–17)
ANION GAP SERPL CALC-SCNC: 11 MMOL/L — SIGNIFICANT CHANGE UP (ref 5–17)
ANION GAP SERPL CALC-SCNC: 5 MMOL/L — SIGNIFICANT CHANGE UP (ref 5–17)
ANION GAP SERPL CALC-SCNC: 5 MMOL/L — SIGNIFICANT CHANGE UP (ref 5–17)
ANION GAP SERPL CALC-SCNC: 6 MMOL/L — SIGNIFICANT CHANGE UP (ref 5–17)
ANION GAP SERPL CALC-SCNC: 8 MMOL/L — SIGNIFICANT CHANGE UP (ref 5–17)
ANION GAP SERPL CALC-SCNC: 9 MMOL/L — SIGNIFICANT CHANGE UP (ref 5–17)
ANION GAP SERPL CALC-SCNC: 9 MMOL/L — SIGNIFICANT CHANGE UP (ref 5–17)
APPEARANCE UR: CLEAR — SIGNIFICANT CHANGE UP
APPEARANCE UR: CLEAR — SIGNIFICANT CHANGE UP
APTT BLD: 23.1 SEC — LOW (ref 27.5–35.5)
APTT BLD: 25.3 SEC — LOW (ref 27.5–35.5)
AST SERPL-CCNC: 11 U/L — SIGNIFICANT CHANGE UP (ref 10–40)
AST SERPL-CCNC: 11 U/L — SIGNIFICANT CHANGE UP (ref 10–40)
AST SERPL-CCNC: 13 U/L — SIGNIFICANT CHANGE UP (ref 10–40)
AST SERPL-CCNC: 15 U/L — SIGNIFICANT CHANGE UP (ref 10–40)
AST SERPL-CCNC: 15 U/L — SIGNIFICANT CHANGE UP (ref 10–40)
AST SERPL-CCNC: 16 U/L — SIGNIFICANT CHANGE UP (ref 10–40)
AST SERPL-CCNC: 16 U/L — SIGNIFICANT CHANGE UP (ref 10–40)
AST SERPL-CCNC: 17 U/L — SIGNIFICANT CHANGE UP (ref 10–40)
AST SERPL-CCNC: 17 U/L — SIGNIFICANT CHANGE UP (ref 10–40)
AST SERPL-CCNC: 19 U/L — SIGNIFICANT CHANGE UP (ref 10–40)
AST SERPL-CCNC: 25 U/L — SIGNIFICANT CHANGE UP (ref 10–40)
AST SERPL-CCNC: 32 U/L — SIGNIFICANT CHANGE UP (ref 10–40)
BACTERIA # UR AUTO: ABNORMAL
BASE EXCESS BLDV CALC-SCNC: 5 MMOL/L — HIGH (ref -2–3)
BASOPHILS # BLD AUTO: 0.03 K/UL — SIGNIFICANT CHANGE UP (ref 0–0.2)
BASOPHILS # BLD AUTO: 0.04 K/UL — SIGNIFICANT CHANGE UP (ref 0–0.2)
BASOPHILS # BLD AUTO: 0.04 K/UL — SIGNIFICANT CHANGE UP (ref 0–0.2)
BASOPHILS # BLD AUTO: 0.05 K/UL — SIGNIFICANT CHANGE UP (ref 0–0.2)
BASOPHILS # BLD AUTO: 0.05 K/UL — SIGNIFICANT CHANGE UP (ref 0–0.2)
BASOPHILS # BLD AUTO: 0.06 K/UL — SIGNIFICANT CHANGE UP (ref 0–0.2)
BASOPHILS # BLD AUTO: 0.06 K/UL — SIGNIFICANT CHANGE UP (ref 0–0.2)
BASOPHILS # BLD AUTO: 0.07 K/UL — SIGNIFICANT CHANGE UP (ref 0–0.2)
BASOPHILS # BLD AUTO: 0.07 K/UL — SIGNIFICANT CHANGE UP (ref 0–0.2)
BASOPHILS # BLD AUTO: 0.08 K/UL — SIGNIFICANT CHANGE UP (ref 0–0.2)
BASOPHILS # BLD AUTO: 0.09 K/UL — SIGNIFICANT CHANGE UP (ref 0–0.2)
BASOPHILS # BLD AUTO: 0.09 K/UL — SIGNIFICANT CHANGE UP (ref 0–0.2)
BASOPHILS NFR BLD AUTO: 0.3 % — SIGNIFICANT CHANGE UP (ref 0–2)
BASOPHILS NFR BLD AUTO: 0.4 % — SIGNIFICANT CHANGE UP (ref 0–2)
BASOPHILS NFR BLD AUTO: 0.7 % — SIGNIFICANT CHANGE UP (ref 0–2)
BASOPHILS NFR BLD AUTO: 0.7 % — SIGNIFICANT CHANGE UP (ref 0–2)
BASOPHILS NFR BLD AUTO: 0.8 % — SIGNIFICANT CHANGE UP (ref 0–2)
BASOPHILS NFR BLD AUTO: 1 % — SIGNIFICANT CHANGE UP (ref 0–2)
BILIRUB SERPL-MCNC: 0.4 MG/DL — SIGNIFICANT CHANGE UP (ref 0.2–1.2)
BILIRUB SERPL-MCNC: 0.5 MG/DL — SIGNIFICANT CHANGE UP (ref 0.2–1.2)
BILIRUB SERPL-MCNC: 0.5 MG/DL — SIGNIFICANT CHANGE UP (ref 0.2–1.2)
BILIRUB SERPL-MCNC: 0.6 MG/DL — SIGNIFICANT CHANGE UP (ref 0.2–1.2)
BILIRUB SERPL-MCNC: 0.6 MG/DL — SIGNIFICANT CHANGE UP (ref 0.2–1.2)
BILIRUB SERPL-MCNC: 0.7 MG/DL — SIGNIFICANT CHANGE UP (ref 0.2–1.2)
BILIRUB SERPL-MCNC: 0.8 MG/DL — SIGNIFICANT CHANGE UP (ref 0.2–1.2)
BILIRUB SERPL-MCNC: 0.9 MG/DL — SIGNIFICANT CHANGE UP (ref 0.2–1.2)
BILIRUB SERPL-MCNC: 0.9 MG/DL — SIGNIFICANT CHANGE UP (ref 0.2–1.2)
BILIRUB SERPL-MCNC: 1 MG/DL — SIGNIFICANT CHANGE UP (ref 0.2–1.2)
BILIRUB SERPL-MCNC: 1.3 MG/DL — HIGH (ref 0.2–1.2)
BILIRUB SERPL-MCNC: 1.6 MG/DL — HIGH (ref 0.2–1.2)
BILIRUB UR-MCNC: NEGATIVE — SIGNIFICANT CHANGE UP
BILIRUB UR-MCNC: NEGATIVE — SIGNIFICANT CHANGE UP
BUN SERPL-MCNC: 16 MG/DL — SIGNIFICANT CHANGE UP (ref 7–23)
BUN SERPL-MCNC: 17 MG/DL — SIGNIFICANT CHANGE UP (ref 7–23)
BUN SERPL-MCNC: 17 MG/DL — SIGNIFICANT CHANGE UP (ref 7–23)
BUN SERPL-MCNC: 18 MG/DL — SIGNIFICANT CHANGE UP (ref 7–23)
BUN SERPL-MCNC: 19 MG/DL — SIGNIFICANT CHANGE UP (ref 7–23)
BUN SERPL-MCNC: 21 MG/DL — SIGNIFICANT CHANGE UP (ref 7–23)
BUN SERPL-MCNC: 22 MG/DL — SIGNIFICANT CHANGE UP (ref 7–23)
BUN SERPL-MCNC: 23 MG/DL — SIGNIFICANT CHANGE UP (ref 7–23)
BUN SERPL-MCNC: 24 MG/DL — HIGH (ref 7–23)
BUN SERPL-MCNC: 24 MG/DL — HIGH (ref 7–23)
CALCIUM SERPL-MCNC: 8 MG/DL — LOW (ref 8.4–10.5)
CALCIUM SERPL-MCNC: 8.1 MG/DL — LOW (ref 8.4–10.5)
CALCIUM SERPL-MCNC: 8.1 MG/DL — LOW (ref 8.4–10.5)
CALCIUM SERPL-MCNC: 8.2 MG/DL — LOW (ref 8.4–10.5)
CALCIUM SERPL-MCNC: 8.2 MG/DL — LOW (ref 8.4–10.5)
CALCIUM SERPL-MCNC: 8.3 MG/DL — LOW (ref 8.4–10.5)
CALCIUM SERPL-MCNC: 8.3 MG/DL — LOW (ref 8.4–10.5)
CALCIUM SERPL-MCNC: 8.4 MG/DL — SIGNIFICANT CHANGE UP (ref 8.4–10.5)
CALCIUM SERPL-MCNC: 8.4 MG/DL — SIGNIFICANT CHANGE UP (ref 8.4–10.5)
CALCIUM SERPL-MCNC: 8.5 MG/DL — SIGNIFICANT CHANGE UP (ref 8.4–10.5)
CALCIUM SERPL-MCNC: 8.6 MG/DL — SIGNIFICANT CHANGE UP (ref 8.4–10.5)
CALCIUM SERPL-MCNC: 8.6 MG/DL — SIGNIFICANT CHANGE UP (ref 8.4–10.5)
CHLORIDE SERPL-SCNC: 103 MMOL/L — SIGNIFICANT CHANGE UP (ref 96–108)
CHLORIDE SERPL-SCNC: 104 MMOL/L — SIGNIFICANT CHANGE UP (ref 96–108)
CHLORIDE SERPL-SCNC: 105 MMOL/L — SIGNIFICANT CHANGE UP (ref 96–108)
CHLORIDE SERPL-SCNC: 106 MMOL/L — SIGNIFICANT CHANGE UP (ref 96–108)
CO2 SERPL-SCNC: 28 MMOL/L — SIGNIFICANT CHANGE UP (ref 22–31)
CO2 SERPL-SCNC: 29 MMOL/L — SIGNIFICANT CHANGE UP (ref 22–31)
CO2 SERPL-SCNC: 30 MMOL/L — SIGNIFICANT CHANGE UP (ref 22–31)
CO2 SERPL-SCNC: 31 MMOL/L — SIGNIFICANT CHANGE UP (ref 22–31)
CO2 SERPL-SCNC: 32 MMOL/L — HIGH (ref 22–31)
CO2 SERPL-SCNC: 33 MMOL/L — HIGH (ref 22–31)
CO2 SERPL-SCNC: 34 MMOL/L — HIGH (ref 22–31)
COLOR SPEC: YELLOW — SIGNIFICANT CHANGE UP
COLOR SPEC: YELLOW — SIGNIFICANT CHANGE UP
COVID-19 SPIKE DOMAIN AB INTERP: POSITIVE
COVID-19 SPIKE DOMAIN AB INTERP: POSITIVE
COVID-19 SPIKE DOMAIN ANTIBODY RESULT: 0.94 U/ML — HIGH
COVID-19 SPIKE DOMAIN ANTIBODY RESULT: 1.37 U/ML — HIGH
CREAT SERPL-MCNC: 0.87 MG/DL — SIGNIFICANT CHANGE UP (ref 0.5–1.3)
CREAT SERPL-MCNC: 0.88 MG/DL — SIGNIFICANT CHANGE UP (ref 0.5–1.3)
CREAT SERPL-MCNC: 0.88 MG/DL — SIGNIFICANT CHANGE UP (ref 0.5–1.3)
CREAT SERPL-MCNC: 0.9 MG/DL — SIGNIFICANT CHANGE UP (ref 0.5–1.3)
CREAT SERPL-MCNC: 0.9 MG/DL — SIGNIFICANT CHANGE UP (ref 0.5–1.3)
CREAT SERPL-MCNC: 0.94 MG/DL — SIGNIFICANT CHANGE UP (ref 0.5–1.3)
CREAT SERPL-MCNC: 0.96 MG/DL — SIGNIFICANT CHANGE UP (ref 0.5–1.3)
CREAT SERPL-MCNC: 1 MG/DL — SIGNIFICANT CHANGE UP (ref 0.5–1.3)
CREAT SERPL-MCNC: 1.02 MG/DL — SIGNIFICANT CHANGE UP (ref 0.5–1.3)
CREAT SERPL-MCNC: 1.04 MG/DL — SIGNIFICANT CHANGE UP (ref 0.5–1.3)
CREAT SERPL-MCNC: 1.07 MG/DL — SIGNIFICANT CHANGE UP (ref 0.5–1.3)
CREAT SERPL-MCNC: 1.08 MG/DL — SIGNIFICANT CHANGE UP (ref 0.5–1.3)
CREAT SERPL-MCNC: 1.13 MG/DL — SIGNIFICANT CHANGE UP (ref 0.5–1.3)
CREAT SERPL-MCNC: 1.38 MG/DL — HIGH (ref 0.5–1.3)
CRP SERPL-MCNC: 106 MG/L — HIGH
CRP SERPL-MCNC: 6 MG/L — HIGH
CRP SERPL-MCNC: 97 MG/L — HIGH
CULTURE RESULTS: SIGNIFICANT CHANGE UP
D DIMER BLD IA.RAPID-MCNC: 2107 NG/ML DDU — HIGH
DIFF PNL FLD: ABNORMAL
DIFF PNL FLD: NEGATIVE — SIGNIFICANT CHANGE UP
E COLI DNA BLD POS QL NAA+NON-PROBE: SIGNIFICANT CHANGE UP
E COLI DNA BLD POS QL NAA+NON-PROBE: SIGNIFICANT CHANGE UP
EOSINOPHIL # BLD AUTO: 0 K/UL — SIGNIFICANT CHANGE UP (ref 0–0.5)
EOSINOPHIL # BLD AUTO: 0.03 K/UL — SIGNIFICANT CHANGE UP (ref 0–0.5)
EOSINOPHIL # BLD AUTO: 0.04 K/UL — SIGNIFICANT CHANGE UP (ref 0–0.5)
EOSINOPHIL # BLD AUTO: 0.06 K/UL — SIGNIFICANT CHANGE UP (ref 0–0.5)
EOSINOPHIL # BLD AUTO: 0.06 K/UL — SIGNIFICANT CHANGE UP (ref 0–0.5)
EOSINOPHIL # BLD AUTO: 0.07 K/UL — SIGNIFICANT CHANGE UP (ref 0–0.5)
EOSINOPHIL # BLD AUTO: 0.07 K/UL — SIGNIFICANT CHANGE UP (ref 0–0.5)
EOSINOPHIL # BLD AUTO: 0.08 K/UL — SIGNIFICANT CHANGE UP (ref 0–0.5)
EOSINOPHIL # BLD AUTO: 0.08 K/UL — SIGNIFICANT CHANGE UP (ref 0–0.5)
EOSINOPHIL # BLD AUTO: 0.09 K/UL — SIGNIFICANT CHANGE UP (ref 0–0.5)
EOSINOPHIL # BLD AUTO: 0.11 K/UL — SIGNIFICANT CHANGE UP (ref 0–0.5)
EOSINOPHIL # BLD AUTO: 0.12 K/UL — SIGNIFICANT CHANGE UP (ref 0–0.5)
EOSINOPHIL NFR BLD AUTO: 0 % — SIGNIFICANT CHANGE UP (ref 0–6)
EOSINOPHIL NFR BLD AUTO: 0.2 % — SIGNIFICANT CHANGE UP (ref 0–6)
EOSINOPHIL NFR BLD AUTO: 0.3 % — SIGNIFICANT CHANGE UP (ref 0–6)
EOSINOPHIL NFR BLD AUTO: 0.5 % — SIGNIFICANT CHANGE UP (ref 0–6)
EOSINOPHIL NFR BLD AUTO: 0.7 % — SIGNIFICANT CHANGE UP (ref 0–6)
EOSINOPHIL NFR BLD AUTO: 0.8 % — SIGNIFICANT CHANGE UP (ref 0–6)
EOSINOPHIL NFR BLD AUTO: 0.9 % — SIGNIFICANT CHANGE UP (ref 0–6)
EOSINOPHIL NFR BLD AUTO: 0.9 % — SIGNIFICANT CHANGE UP (ref 0–6)
EOSINOPHIL NFR BLD AUTO: 1 % — SIGNIFICANT CHANGE UP (ref 0–6)
EOSINOPHIL NFR BLD AUTO: 1.2 % — SIGNIFICANT CHANGE UP (ref 0–6)
EOSINOPHIL NFR BLD AUTO: 1.3 % — SIGNIFICANT CHANGE UP (ref 0–6)
EOSINOPHIL NFR BLD AUTO: 1.4 % — SIGNIFICANT CHANGE UP (ref 0–6)
EPI CELLS # UR: SIGNIFICANT CHANGE UP
ERYTHROCYTE [SEDIMENTATION RATE] IN BLOOD: 94 MM/HR — HIGH (ref 0–20)
ESTIMATED AVERAGE GLUCOSE: 137 MG/DL — HIGH (ref 68–114)
FERRITIN SERPL-MCNC: 90 NG/ML — SIGNIFICANT CHANGE UP (ref 15–150)
FOLATE SERPL-MCNC: 10.2 NG/ML — SIGNIFICANT CHANGE UP
GAS PNL BLDV: SIGNIFICANT CHANGE UP
GLUCOSE BLDC GLUCOMTR-MCNC: 108 MG/DL — HIGH (ref 70–99)
GLUCOSE BLDC GLUCOMTR-MCNC: 109 MG/DL — HIGH (ref 70–99)
GLUCOSE BLDC GLUCOMTR-MCNC: 110 MG/DL — HIGH (ref 70–99)
GLUCOSE BLDC GLUCOMTR-MCNC: 113 MG/DL — HIGH (ref 70–99)
GLUCOSE BLDC GLUCOMTR-MCNC: 115 MG/DL — HIGH (ref 70–99)
GLUCOSE BLDC GLUCOMTR-MCNC: 116 MG/DL — HIGH (ref 70–99)
GLUCOSE BLDC GLUCOMTR-MCNC: 119 MG/DL — HIGH (ref 70–99)
GLUCOSE BLDC GLUCOMTR-MCNC: 120 MG/DL — HIGH (ref 70–99)
GLUCOSE BLDC GLUCOMTR-MCNC: 122 MG/DL — HIGH (ref 70–99)
GLUCOSE BLDC GLUCOMTR-MCNC: 123 MG/DL — HIGH (ref 70–99)
GLUCOSE BLDC GLUCOMTR-MCNC: 124 MG/DL — HIGH (ref 70–99)
GLUCOSE BLDC GLUCOMTR-MCNC: 125 MG/DL — HIGH (ref 70–99)
GLUCOSE BLDC GLUCOMTR-MCNC: 126 MG/DL — HIGH (ref 70–99)
GLUCOSE BLDC GLUCOMTR-MCNC: 127 MG/DL — HIGH (ref 70–99)
GLUCOSE BLDC GLUCOMTR-MCNC: 128 MG/DL — HIGH (ref 70–99)
GLUCOSE BLDC GLUCOMTR-MCNC: 129 MG/DL — HIGH (ref 70–99)
GLUCOSE BLDC GLUCOMTR-MCNC: 130 MG/DL — HIGH (ref 70–99)
GLUCOSE BLDC GLUCOMTR-MCNC: 131 MG/DL — HIGH (ref 70–99)
GLUCOSE BLDC GLUCOMTR-MCNC: 133 MG/DL — HIGH (ref 70–99)
GLUCOSE BLDC GLUCOMTR-MCNC: 135 MG/DL — HIGH (ref 70–99)
GLUCOSE BLDC GLUCOMTR-MCNC: 135 MG/DL — HIGH (ref 70–99)
GLUCOSE BLDC GLUCOMTR-MCNC: 136 MG/DL — HIGH (ref 70–99)
GLUCOSE BLDC GLUCOMTR-MCNC: 136 MG/DL — HIGH (ref 70–99)
GLUCOSE BLDC GLUCOMTR-MCNC: 138 MG/DL — HIGH (ref 70–99)
GLUCOSE BLDC GLUCOMTR-MCNC: 140 MG/DL — HIGH (ref 70–99)
GLUCOSE BLDC GLUCOMTR-MCNC: 140 MG/DL — HIGH (ref 70–99)
GLUCOSE BLDC GLUCOMTR-MCNC: 141 MG/DL — HIGH (ref 70–99)
GLUCOSE BLDC GLUCOMTR-MCNC: 142 MG/DL — HIGH (ref 70–99)
GLUCOSE BLDC GLUCOMTR-MCNC: 142 MG/DL — HIGH (ref 70–99)
GLUCOSE BLDC GLUCOMTR-MCNC: 144 MG/DL — HIGH (ref 70–99)
GLUCOSE BLDC GLUCOMTR-MCNC: 145 MG/DL — HIGH (ref 70–99)
GLUCOSE BLDC GLUCOMTR-MCNC: 147 MG/DL — HIGH (ref 70–99)
GLUCOSE BLDC GLUCOMTR-MCNC: 149 MG/DL — HIGH (ref 70–99)
GLUCOSE BLDC GLUCOMTR-MCNC: 149 MG/DL — HIGH (ref 70–99)
GLUCOSE BLDC GLUCOMTR-MCNC: 150 MG/DL — HIGH (ref 70–99)
GLUCOSE BLDC GLUCOMTR-MCNC: 152 MG/DL — HIGH (ref 70–99)
GLUCOSE BLDC GLUCOMTR-MCNC: 157 MG/DL — HIGH (ref 70–99)
GLUCOSE BLDC GLUCOMTR-MCNC: 162 MG/DL — HIGH (ref 70–99)
GLUCOSE BLDC GLUCOMTR-MCNC: 167 MG/DL — HIGH (ref 70–99)
GLUCOSE BLDC GLUCOMTR-MCNC: 169 MG/DL — HIGH (ref 70–99)
GLUCOSE BLDC GLUCOMTR-MCNC: 182 MG/DL — HIGH (ref 70–99)
GLUCOSE BLDC GLUCOMTR-MCNC: 97 MG/DL — SIGNIFICANT CHANGE UP (ref 70–99)
GLUCOSE SERPL-MCNC: 123 MG/DL — HIGH (ref 70–99)
GLUCOSE SERPL-MCNC: 123 MG/DL — HIGH (ref 70–99)
GLUCOSE SERPL-MCNC: 124 MG/DL — HIGH (ref 70–99)
GLUCOSE SERPL-MCNC: 125 MG/DL — HIGH (ref 70–99)
GLUCOSE SERPL-MCNC: 126 MG/DL — HIGH (ref 70–99)
GLUCOSE SERPL-MCNC: 130 MG/DL — HIGH (ref 70–99)
GLUCOSE SERPL-MCNC: 131 MG/DL — HIGH (ref 70–99)
GLUCOSE SERPL-MCNC: 132 MG/DL — HIGH (ref 70–99)
GLUCOSE SERPL-MCNC: 133 MG/DL — HIGH (ref 70–99)
GLUCOSE SERPL-MCNC: 134 MG/DL — HIGH (ref 70–99)
GLUCOSE SERPL-MCNC: 136 MG/DL — HIGH (ref 70–99)
GLUCOSE SERPL-MCNC: 139 MG/DL — HIGH (ref 70–99)
GLUCOSE SERPL-MCNC: 160 MG/DL — HIGH (ref 70–99)
GLUCOSE SERPL-MCNC: 169 MG/DL — HIGH (ref 70–99)
GLUCOSE UR QL: NEGATIVE MG/DL — SIGNIFICANT CHANGE UP
GLUCOSE UR QL: NEGATIVE MG/DL — SIGNIFICANT CHANGE UP
GRAM STN FLD: SIGNIFICANT CHANGE UP
HCT VFR BLD CALC: 32.5 % — LOW (ref 34.5–45)
HCT VFR BLD CALC: 33.1 % — LOW (ref 34.5–45)
HCT VFR BLD CALC: 33.1 % — LOW (ref 34.5–45)
HCT VFR BLD CALC: 33.2 % — LOW (ref 34.5–45)
HCT VFR BLD CALC: 33.2 % — LOW (ref 34.5–45)
HCT VFR BLD CALC: 33.4 % — LOW (ref 34.5–45)
HCT VFR BLD CALC: 33.5 % — LOW (ref 34.5–45)
HCT VFR BLD CALC: 33.8 % — LOW (ref 34.5–45)
HCT VFR BLD CALC: 34.6 % — SIGNIFICANT CHANGE UP (ref 34.5–45)
HCT VFR BLD CALC: 34.7 % — SIGNIFICANT CHANGE UP (ref 34.5–45)
HCT VFR BLD CALC: 35.7 % — SIGNIFICANT CHANGE UP (ref 34.5–45)
HCT VFR BLD CALC: 36 % — SIGNIFICANT CHANGE UP (ref 34.5–45)
HCT VFR BLD CALC: 36.1 % — SIGNIFICANT CHANGE UP (ref 34.5–45)
HCT VFR BLD CALC: 37 % — SIGNIFICANT CHANGE UP (ref 34.5–45)
HGB BLD-MCNC: 10.1 G/DL — LOW (ref 11.5–15.5)
HGB BLD-MCNC: 10.2 G/DL — LOW (ref 11.5–15.5)
HGB BLD-MCNC: 10.2 G/DL — LOW (ref 11.5–15.5)
HGB BLD-MCNC: 10.3 G/DL — LOW (ref 11.5–15.5)
HGB BLD-MCNC: 10.4 G/DL — LOW (ref 11.5–15.5)
HGB BLD-MCNC: 10.4 G/DL — LOW (ref 11.5–15.5)
HGB BLD-MCNC: 10.8 G/DL — LOW (ref 11.5–15.5)
HGB BLD-MCNC: 10.8 G/DL — LOW (ref 11.5–15.5)
HGB BLD-MCNC: 11.1 G/DL — LOW (ref 11.5–15.5)
HGB BLD-MCNC: 11.7 G/DL — SIGNIFICANT CHANGE UP (ref 11.5–15.5)
IMM GRANULOCYTES NFR BLD AUTO: 1 % — SIGNIFICANT CHANGE UP (ref 0–1.5)
IMM GRANULOCYTES NFR BLD AUTO: 1.1 % — SIGNIFICANT CHANGE UP (ref 0–1.5)
IMM GRANULOCYTES NFR BLD AUTO: 1.2 % — SIGNIFICANT CHANGE UP (ref 0–1.5)
IMM GRANULOCYTES NFR BLD AUTO: 1.3 % — SIGNIFICANT CHANGE UP (ref 0–1.5)
IMM GRANULOCYTES NFR BLD AUTO: 1.4 % — SIGNIFICANT CHANGE UP (ref 0–1.5)
IMM GRANULOCYTES NFR BLD AUTO: 1.7 % — HIGH (ref 0–1.5)
IMM GRANULOCYTES NFR BLD AUTO: 2 % — HIGH (ref 0–1.5)
IMM GRANULOCYTES NFR BLD AUTO: 3 % — HIGH (ref 0–1.5)
IMM GRANULOCYTES NFR BLD AUTO: 3.3 % — HIGH (ref 0–1.5)
IMM GRANULOCYTES NFR BLD AUTO: 3.6 % — HIGH (ref 0–1.5)
IMM GRANULOCYTES NFR BLD AUTO: 5.2 % — HIGH (ref 0–1.5)
INR BLD: 0.99 RATIO — SIGNIFICANT CHANGE UP (ref 0.88–1.16)
INR BLD: 1.18 RATIO — HIGH (ref 0.88–1.16)
IRON SATN MFR SERPL: 15 % — SIGNIFICANT CHANGE UP (ref 14–50)
IRON SATN MFR SERPL: 35 UG/DL — SIGNIFICANT CHANGE UP (ref 30–160)
KETONES UR-MCNC: NEGATIVE — SIGNIFICANT CHANGE UP
KETONES UR-MCNC: NEGATIVE — SIGNIFICANT CHANGE UP
LACTATE SERPL-SCNC: 1.4 MMOL/L — SIGNIFICANT CHANGE UP (ref 0.7–2)
LACTATE SERPL-SCNC: 1.6 MMOL/L — SIGNIFICANT CHANGE UP (ref 0.7–2)
LEGIONELLA AG UR QL: NEGATIVE — SIGNIFICANT CHANGE UP
LEUKOCYTE ESTERASE UR-ACNC: NEGATIVE — SIGNIFICANT CHANGE UP
LEUKOCYTE ESTERASE UR-ACNC: NEGATIVE — SIGNIFICANT CHANGE UP
LYMPHOCYTES # BLD AUTO: 0.29 K/UL — LOW (ref 1–3.3)
LYMPHOCYTES # BLD AUTO: 0.33 K/UL — LOW (ref 1–3.3)
LYMPHOCYTES # BLD AUTO: 0.4 K/UL — LOW (ref 1–3.3)
LYMPHOCYTES # BLD AUTO: 0.44 K/UL — LOW (ref 1–3.3)
LYMPHOCYTES # BLD AUTO: 0.45 K/UL — LOW (ref 1–3.3)
LYMPHOCYTES # BLD AUTO: 0.45 K/UL — LOW (ref 1–3.3)
LYMPHOCYTES # BLD AUTO: 0.53 K/UL — LOW (ref 1–3.3)
LYMPHOCYTES # BLD AUTO: 0.54 K/UL — LOW (ref 1–3.3)
LYMPHOCYTES # BLD AUTO: 0.55 K/UL — LOW (ref 1–3.3)
LYMPHOCYTES # BLD AUTO: 0.57 K/UL — LOW (ref 1–3.3)
LYMPHOCYTES # BLD AUTO: 0.57 K/UL — LOW (ref 1–3.3)
LYMPHOCYTES # BLD AUTO: 0.61 K/UL — LOW (ref 1–3.3)
LYMPHOCYTES # BLD AUTO: 2.5 % — LOW (ref 13–44)
LYMPHOCYTES # BLD AUTO: 2.5 % — LOW (ref 13–44)
LYMPHOCYTES # BLD AUTO: 2.7 % — LOW (ref 13–44)
LYMPHOCYTES # BLD AUTO: 5 % — LOW (ref 13–44)
LYMPHOCYTES # BLD AUTO: 5.5 % — LOW (ref 13–44)
LYMPHOCYTES # BLD AUTO: 5.8 % — LOW (ref 13–44)
LYMPHOCYTES # BLD AUTO: 6.2 % — LOW (ref 13–44)
LYMPHOCYTES # BLD AUTO: 6.4 % — LOW (ref 13–44)
LYMPHOCYTES # BLD AUTO: 6.5 % — LOW (ref 13–44)
LYMPHOCYTES # BLD AUTO: 6.6 % — LOW (ref 13–44)
LYMPHOCYTES # BLD AUTO: 7 % — LOW (ref 13–44)
LYMPHOCYTES # BLD AUTO: 7.4 % — LOW (ref 13–44)
LYMPHOCYTES # SPEC AUTO: 6 % — HIGH (ref 0–0)
MAGNESIUM SERPL-MCNC: 2.4 MG/DL — SIGNIFICANT CHANGE UP (ref 1.6–2.6)
MANUAL SMEAR VERIFICATION: SIGNIFICANT CHANGE UP
MCHC RBC-ENTMCNC: 29.1 PG — SIGNIFICANT CHANGE UP (ref 27–34)
MCHC RBC-ENTMCNC: 29.3 PG — SIGNIFICANT CHANGE UP (ref 27–34)
MCHC RBC-ENTMCNC: 29.4 PG — SIGNIFICANT CHANGE UP (ref 27–34)
MCHC RBC-ENTMCNC: 29.4 PG — SIGNIFICANT CHANGE UP (ref 27–34)
MCHC RBC-ENTMCNC: 29.5 PG — SIGNIFICANT CHANGE UP (ref 27–34)
MCHC RBC-ENTMCNC: 29.6 PG — SIGNIFICANT CHANGE UP (ref 27–34)
MCHC RBC-ENTMCNC: 29.7 PG — SIGNIFICANT CHANGE UP (ref 27–34)
MCHC RBC-ENTMCNC: 29.8 PG — SIGNIFICANT CHANGE UP (ref 27–34)
MCHC RBC-ENTMCNC: 29.9 PG — SIGNIFICANT CHANGE UP (ref 27–34)
MCHC RBC-ENTMCNC: 30.1 PG — SIGNIFICANT CHANGE UP (ref 27–34)
MCHC RBC-ENTMCNC: 30.1 PG — SIGNIFICANT CHANGE UP (ref 27–34)
MCHC RBC-ENTMCNC: 30.3 PG — SIGNIFICANT CHANGE UP (ref 27–34)
MCHC RBC-ENTMCNC: 30.5 GM/DL — LOW (ref 32–36)
MCHC RBC-ENTMCNC: 30.7 GM/DL — LOW (ref 32–36)
MCHC RBC-ENTMCNC: 30.7 GM/DL — LOW (ref 32–36)
MCHC RBC-ENTMCNC: 30.8 GM/DL — LOW (ref 32–36)
MCHC RBC-ENTMCNC: 31 GM/DL — LOW (ref 32–36)
MCHC RBC-ENTMCNC: 31 GM/DL — LOW (ref 32–36)
MCHC RBC-ENTMCNC: 31.1 GM/DL — LOW (ref 32–36)
MCHC RBC-ENTMCNC: 31.2 GM/DL — LOW (ref 32–36)
MCHC RBC-ENTMCNC: 31.4 GM/DL — LOW (ref 32–36)
MCHC RBC-ENTMCNC: 31.6 GM/DL — LOW (ref 32–36)
MCV RBC AUTO: 94.8 FL — SIGNIFICANT CHANGE UP (ref 80–100)
MCV RBC AUTO: 94.9 FL — SIGNIFICANT CHANGE UP (ref 80–100)
MCV RBC AUTO: 95.2 FL — SIGNIFICANT CHANGE UP (ref 80–100)
MCV RBC AUTO: 95.5 FL — SIGNIFICANT CHANGE UP (ref 80–100)
MCV RBC AUTO: 95.6 FL — SIGNIFICANT CHANGE UP (ref 80–100)
MCV RBC AUTO: 95.8 FL — SIGNIFICANT CHANGE UP (ref 80–100)
MCV RBC AUTO: 95.9 FL — SIGNIFICANT CHANGE UP (ref 80–100)
MCV RBC AUTO: 95.9 FL — SIGNIFICANT CHANGE UP (ref 80–100)
MCV RBC AUTO: 96 FL — SIGNIFICANT CHANGE UP (ref 80–100)
MCV RBC AUTO: 96.2 FL — SIGNIFICANT CHANGE UP (ref 80–100)
MCV RBC AUTO: 96.2 FL — SIGNIFICANT CHANGE UP (ref 80–100)
MCV RBC AUTO: 96.4 FL — SIGNIFICANT CHANGE UP (ref 80–100)
MCV RBC AUTO: 96.5 FL — SIGNIFICANT CHANGE UP (ref 80–100)
MCV RBC AUTO: 97.7 FL — SIGNIFICANT CHANGE UP (ref 80–100)
METHOD TYPE: SIGNIFICANT CHANGE UP
MONOCYTES # BLD AUTO: 0.48 K/UL — SIGNIFICANT CHANGE UP (ref 0–0.9)
MONOCYTES # BLD AUTO: 0.57 K/UL — SIGNIFICANT CHANGE UP (ref 0–0.9)
MONOCYTES # BLD AUTO: 0.57 K/UL — SIGNIFICANT CHANGE UP (ref 0–0.9)
MONOCYTES # BLD AUTO: 0.59 K/UL — SIGNIFICANT CHANGE UP (ref 0–0.9)
MONOCYTES # BLD AUTO: 0.6 K/UL — SIGNIFICANT CHANGE UP (ref 0–0.9)
MONOCYTES # BLD AUTO: 0.61 K/UL — SIGNIFICANT CHANGE UP (ref 0–0.9)
MONOCYTES # BLD AUTO: 0.68 K/UL — SIGNIFICANT CHANGE UP (ref 0–0.9)
MONOCYTES # BLD AUTO: 0.69 K/UL — SIGNIFICANT CHANGE UP (ref 0–0.9)
MONOCYTES # BLD AUTO: 0.77 K/UL — SIGNIFICANT CHANGE UP (ref 0–0.9)
MONOCYTES # BLD AUTO: 0.79 K/UL — SIGNIFICANT CHANGE UP (ref 0–0.9)
MONOCYTES # BLD AUTO: 0.98 K/UL — HIGH (ref 0–0.9)
MONOCYTES # BLD AUTO: 1.38 K/UL — HIGH (ref 0–0.9)
MONOCYTES NFR BLD AUTO: 10 % — SIGNIFICANT CHANGE UP (ref 2–14)
MONOCYTES NFR BLD AUTO: 4.9 % — SIGNIFICANT CHANGE UP (ref 2–14)
MONOCYTES NFR BLD AUTO: 5.6 % — SIGNIFICANT CHANGE UP (ref 2–14)
MONOCYTES NFR BLD AUTO: 6.3 % — SIGNIFICANT CHANGE UP (ref 2–14)
MONOCYTES NFR BLD AUTO: 6.6 % — SIGNIFICANT CHANGE UP (ref 2–14)
MONOCYTES NFR BLD AUTO: 7.4 % — SIGNIFICANT CHANGE UP (ref 2–14)
MONOCYTES NFR BLD AUTO: 7.5 % — SIGNIFICANT CHANGE UP (ref 2–14)
MONOCYTES NFR BLD AUTO: 8 % — SIGNIFICANT CHANGE UP (ref 2–14)
MONOCYTES NFR BLD AUTO: 8.3 % — SIGNIFICANT CHANGE UP (ref 2–14)
MONOCYTES NFR BLD AUTO: 8.4 % — SIGNIFICANT CHANGE UP (ref 2–14)
MONOCYTES NFR BLD AUTO: 8.9 % — SIGNIFICANT CHANGE UP (ref 2–14)
MONOCYTES NFR BLD AUTO: 9.4 % — SIGNIFICANT CHANGE UP (ref 2–14)
NEUTROPHILS # BLD AUTO: 10.52 K/UL — HIGH (ref 1.8–7.4)
NEUTROPHILS # BLD AUTO: 11.65 K/UL — HIGH (ref 1.8–7.4)
NEUTROPHILS # BLD AUTO: 12.58 K/UL — HIGH (ref 1.8–7.4)
NEUTROPHILS # BLD AUTO: 5.67 K/UL — SIGNIFICANT CHANGE UP (ref 1.8–7.4)
NEUTROPHILS # BLD AUTO: 5.8 K/UL — SIGNIFICANT CHANGE UP (ref 1.8–7.4)
NEUTROPHILS # BLD AUTO: 6.25 K/UL — SIGNIFICANT CHANGE UP (ref 1.8–7.4)
NEUTROPHILS # BLD AUTO: 6.92 K/UL — SIGNIFICANT CHANGE UP (ref 1.8–7.4)
NEUTROPHILS # BLD AUTO: 6.97 K/UL — SIGNIFICANT CHANGE UP (ref 1.8–7.4)
NEUTROPHILS # BLD AUTO: 7.09 K/UL — SIGNIFICANT CHANGE UP (ref 1.8–7.4)
NEUTROPHILS # BLD AUTO: 7.31 K/UL — SIGNIFICANT CHANGE UP (ref 1.8–7.4)
NEUTROPHILS # BLD AUTO: 7.44 K/UL — HIGH (ref 1.8–7.4)
NEUTROPHILS # BLD AUTO: 7.63 K/UL — HIGH (ref 1.8–7.4)
NEUTROPHILS NFR BLD AUTO: 67 % — SIGNIFICANT CHANGE UP (ref 43–77)
NEUTROPHILS NFR BLD AUTO: 80.7 % — HIGH (ref 43–77)
NEUTROPHILS NFR BLD AUTO: 80.8 % — HIGH (ref 43–77)
NEUTROPHILS NFR BLD AUTO: 80.8 % — HIGH (ref 43–77)
NEUTROPHILS NFR BLD AUTO: 81.4 % — HIGH (ref 43–77)
NEUTROPHILS NFR BLD AUTO: 81.8 % — HIGH (ref 43–77)
NEUTROPHILS NFR BLD AUTO: 81.8 % — HIGH (ref 43–77)
NEUTROPHILS NFR BLD AUTO: 82.8 % — HIGH (ref 43–77)
NEUTROPHILS NFR BLD AUTO: 84.1 % — HIGH (ref 43–77)
NEUTROPHILS NFR BLD AUTO: 86 % — HIGH (ref 43–77)
NEUTROPHILS NFR BLD AUTO: 88.5 % — HIGH (ref 43–77)
NEUTROPHILS NFR BLD AUTO: 89.8 % — HIGH (ref 43–77)
NEUTS BAND # BLD: 7 % — SIGNIFICANT CHANGE UP (ref 0–8)
NITRITE UR-MCNC: NEGATIVE — SIGNIFICANT CHANGE UP
NITRITE UR-MCNC: POSITIVE
NRBC # BLD: 0 /100 WBCS — SIGNIFICANT CHANGE UP (ref 0–0)
NRBC # BLD: 0 — SIGNIFICANT CHANGE UP
NRBC # BLD: SIGNIFICANT CHANGE UP /100 WBCS (ref 0–0)
NT-PROBNP SERPL-SCNC: 1172 PG/ML — HIGH (ref 0–125)
NT-PROBNP SERPL-SCNC: 1644 PG/ML — HIGH (ref 0–125)
ORGANISM # SPEC MICROSCOPIC CNT: SIGNIFICANT CHANGE UP
PCO2 BLDV: 43 MMHG — HIGH (ref 39–42)
PH BLDV: 7.44 — HIGH (ref 7.32–7.43)
PH UR: 6 — SIGNIFICANT CHANGE UP (ref 5–8)
PH UR: 8 — SIGNIFICANT CHANGE UP (ref 5–8)
PLAT MORPH BLD: NORMAL — SIGNIFICANT CHANGE UP
PLATELET # BLD AUTO: 173 K/UL — SIGNIFICANT CHANGE UP (ref 150–400)
PLATELET # BLD AUTO: 183 K/UL — SIGNIFICANT CHANGE UP (ref 150–400)
PLATELET # BLD AUTO: 184 K/UL — SIGNIFICANT CHANGE UP (ref 150–400)
PLATELET # BLD AUTO: 191 K/UL — SIGNIFICANT CHANGE UP (ref 150–400)
PLATELET # BLD AUTO: 202 K/UL — SIGNIFICANT CHANGE UP (ref 150–400)
PLATELET # BLD AUTO: 205 K/UL — SIGNIFICANT CHANGE UP (ref 150–400)
PLATELET # BLD AUTO: 207 K/UL — SIGNIFICANT CHANGE UP (ref 150–400)
PLATELET # BLD AUTO: 211 K/UL — SIGNIFICANT CHANGE UP (ref 150–400)
PLATELET # BLD AUTO: 213 K/UL — SIGNIFICANT CHANGE UP (ref 150–400)
PLATELET # BLD AUTO: 228 K/UL — SIGNIFICANT CHANGE UP (ref 150–400)
PLATELET # BLD AUTO: 231 K/UL — SIGNIFICANT CHANGE UP (ref 150–400)
PLATELET # BLD AUTO: 237 K/UL — SIGNIFICANT CHANGE UP (ref 150–400)
PLATELET # BLD AUTO: 277 K/UL — SIGNIFICANT CHANGE UP (ref 150–400)
PLATELET # BLD AUTO: 293 K/UL — SIGNIFICANT CHANGE UP (ref 150–400)
PO2 BLDV: 46 MMHG — HIGH (ref 25–45)
POTASSIUM SERPL-MCNC: 2.7 MMOL/L — CRITICAL LOW (ref 3.5–5.3)
POTASSIUM SERPL-MCNC: 3.1 MMOL/L — LOW (ref 3.5–5.3)
POTASSIUM SERPL-MCNC: 3.2 MMOL/L — LOW (ref 3.5–5.3)
POTASSIUM SERPL-MCNC: 3.3 MMOL/L — LOW (ref 3.5–5.3)
POTASSIUM SERPL-MCNC: 3.4 MMOL/L — LOW (ref 3.5–5.3)
POTASSIUM SERPL-MCNC: 3.5 MMOL/L — SIGNIFICANT CHANGE UP (ref 3.5–5.3)
POTASSIUM SERPL-MCNC: 3.6 MMOL/L — SIGNIFICANT CHANGE UP (ref 3.5–5.3)
POTASSIUM SERPL-MCNC: 3.7 MMOL/L — SIGNIFICANT CHANGE UP (ref 3.5–5.3)
POTASSIUM SERPL-SCNC: 2.7 MMOL/L — CRITICAL LOW (ref 3.5–5.3)
POTASSIUM SERPL-SCNC: 3.1 MMOL/L — LOW (ref 3.5–5.3)
POTASSIUM SERPL-SCNC: 3.2 MMOL/L — LOW (ref 3.5–5.3)
POTASSIUM SERPL-SCNC: 3.3 MMOL/L — LOW (ref 3.5–5.3)
POTASSIUM SERPL-SCNC: 3.4 MMOL/L — LOW (ref 3.5–5.3)
POTASSIUM SERPL-SCNC: 3.5 MMOL/L — SIGNIFICANT CHANGE UP (ref 3.5–5.3)
POTASSIUM SERPL-SCNC: 3.6 MMOL/L — SIGNIFICANT CHANGE UP (ref 3.5–5.3)
POTASSIUM SERPL-SCNC: 3.7 MMOL/L — SIGNIFICANT CHANGE UP (ref 3.5–5.3)
PROCALCITONIN SERPL-MCNC: 0.21 NG/ML — HIGH (ref 0.02–0.1)
PROCALCITONIN SERPL-MCNC: 13.6 NG/ML — HIGH (ref 0.02–0.1)
PROT SERPL-MCNC: 5.6 G/DL — LOW (ref 6–8.3)
PROT SERPL-MCNC: 5.7 G/DL — LOW (ref 6–8.3)
PROT SERPL-MCNC: 5.8 G/DL — LOW (ref 6–8.3)
PROT SERPL-MCNC: 6 G/DL — SIGNIFICANT CHANGE UP (ref 6–8.3)
PROT SERPL-MCNC: 6.1 G/DL — SIGNIFICANT CHANGE UP (ref 6–8.3)
PROT SERPL-MCNC: 6.1 G/DL — SIGNIFICANT CHANGE UP (ref 6–8.3)
PROT SERPL-MCNC: 6.2 G/DL — SIGNIFICANT CHANGE UP (ref 6–8.3)
PROT SERPL-MCNC: 6.3 G/DL — SIGNIFICANT CHANGE UP (ref 6–8.3)
PROT SERPL-MCNC: 6.4 G/DL — SIGNIFICANT CHANGE UP (ref 6–8.3)
PROT SERPL-MCNC: 6.5 G/DL — SIGNIFICANT CHANGE UP (ref 6–8.3)
PROT SERPL-MCNC: 6.5 G/DL — SIGNIFICANT CHANGE UP (ref 6–8.3)
PROT SERPL-MCNC: 6.6 G/DL — SIGNIFICANT CHANGE UP (ref 6–8.3)
PROT UR-MCNC: 30 MG/DL
PROT UR-MCNC: NEGATIVE MG/DL — SIGNIFICANT CHANGE UP
PROTHROM AB SERPL-ACNC: 12 SEC — SIGNIFICANT CHANGE UP (ref 10.6–13.6)
PROTHROM AB SERPL-ACNC: 14.2 SEC — HIGH (ref 10.6–13.6)
RAPID RVP RESULT: SIGNIFICANT CHANGE UP
RAPID RVP RESULT: SIGNIFICANT CHANGE UP
RBC # BLD: 3.42 M/UL — LOW (ref 3.8–5.2)
RBC # BLD: 3.43 M/UL — LOW (ref 3.8–5.2)
RBC # BLD: 3.44 M/UL — LOW (ref 3.8–5.2)
RBC # BLD: 3.45 M/UL — LOW (ref 3.8–5.2)
RBC # BLD: 3.5 M/UL — LOW (ref 3.8–5.2)
RBC # BLD: 3.52 M/UL — LOW (ref 3.8–5.2)
RBC # BLD: 3.54 M/UL — LOW (ref 3.8–5.2)
RBC # BLD: 3.59 M/UL — LOW (ref 3.8–5.2)
RBC # BLD: 3.63 M/UL — LOW (ref 3.8–5.2)
RBC # BLD: 3.71 M/UL — LOW (ref 3.8–5.2)
RBC # BLD: 3.75 M/UL — LOW (ref 3.8–5.2)
RBC # BLD: 3.77 M/UL — LOW (ref 3.8–5.2)
RBC # BLD: 3.86 M/UL — SIGNIFICANT CHANGE UP (ref 3.8–5.2)
RBC # FLD: 15.9 % — HIGH (ref 10.3–14.5)
RBC # FLD: 16 % — HIGH (ref 10.3–14.5)
RBC # FLD: 16.1 % — HIGH (ref 10.3–14.5)
RBC # FLD: 16.2 % — HIGH (ref 10.3–14.5)
RBC # FLD: 16.2 % — HIGH (ref 10.3–14.5)
RBC # FLD: 16.4 % — HIGH (ref 10.3–14.5)
RBC # FLD: 16.5 % — HIGH (ref 10.3–14.5)
RBC # FLD: 16.5 % — HIGH (ref 10.3–14.5)
RBC BLD AUTO: NORMAL — SIGNIFICANT CHANGE UP
RBC CASTS # UR COMP ASSIST: SIGNIFICANT CHANGE UP /HPF (ref 0–4)
RETICS #: 83.2 K/UL — SIGNIFICANT CHANGE UP (ref 25–125)
RETICS/RBC NFR: 2.4 % — SIGNIFICANT CHANGE UP (ref 0.5–2.5)
S PNEUM AG UR QL: NEGATIVE — SIGNIFICANT CHANGE UP
SAO2 % BLDV: 79 % — SIGNIFICANT CHANGE UP (ref 67–88)
SARS-COV-2 IGG+IGM SERPL QL IA: 0.94 U/ML — HIGH
SARS-COV-2 IGG+IGM SERPL QL IA: 1.37 U/ML — HIGH
SARS-COV-2 IGG+IGM SERPL QL IA: POSITIVE
SARS-COV-2 IGG+IGM SERPL QL IA: POSITIVE
SARS-COV-2 RNA SPEC QL NAA+PROBE: SIGNIFICANT CHANGE UP
SODIUM SERPL-SCNC: 143 MMOL/L — SIGNIFICANT CHANGE UP (ref 135–145)
SODIUM SERPL-SCNC: 144 MMOL/L — SIGNIFICANT CHANGE UP (ref 135–145)
SODIUM SERPL-SCNC: 145 MMOL/L — SIGNIFICANT CHANGE UP (ref 135–145)
SP GR SPEC: 1.01 — SIGNIFICANT CHANGE UP (ref 1.01–1.02)
SP GR SPEC: 1.01 — SIGNIFICANT CHANGE UP (ref 1.01–1.02)
SPECIMEN SOURCE: SIGNIFICANT CHANGE UP
T3 SERPL-MCNC: 53 NG/DL — LOW (ref 80–200)
T4 AB SER-ACNC: 6.5 UG/DL — SIGNIFICANT CHANGE UP (ref 4.6–12)
TIBC SERPL-MCNC: 240 UG/DL — SIGNIFICANT CHANGE UP (ref 220–430)
TROPONIN I SERPL-MCNC: 0.02 NG/ML — SIGNIFICANT CHANGE UP (ref 0.02–0.06)
TROPONIN I SERPL-MCNC: 0.03 NG/ML — SIGNIFICANT CHANGE UP (ref 0.02–0.06)
TSH SERPL-MCNC: 0.7 UIU/ML — SIGNIFICANT CHANGE UP (ref 0.27–4.2)
UIBC SERPL-MCNC: 205 UG/DL — SIGNIFICANT CHANGE UP (ref 110–370)
UROBILINOGEN FLD QL: NEGATIVE MG/DL — SIGNIFICANT CHANGE UP
UROBILINOGEN FLD QL: NEGATIVE MG/DL — SIGNIFICANT CHANGE UP
VARIANT LYMPHS # BLD: 4 % — SIGNIFICANT CHANGE UP (ref 0–6)
VIT B12 SERPL-MCNC: 237 PG/ML — SIGNIFICANT CHANGE UP (ref 232–1245)
WBC # BLD: 11.72 K/UL — HIGH (ref 3.8–10.5)
WBC # BLD: 13.17 K/UL — HIGH (ref 3.8–10.5)
WBC # BLD: 14.64 K/UL — HIGH (ref 3.8–10.5)
WBC # BLD: 7.17 K/UL — SIGNIFICANT CHANGE UP (ref 3.8–10.5)
WBC # BLD: 7.49 K/UL — SIGNIFICANT CHANGE UP (ref 3.8–10.5)
WBC # BLD: 7.66 K/UL — SIGNIFICANT CHANGE UP (ref 3.8–10.5)
WBC # BLD: 7.73 K/UL — SIGNIFICANT CHANGE UP (ref 3.8–10.5)
WBC # BLD: 8.21 K/UL — SIGNIFICANT CHANGE UP (ref 3.8–10.5)
WBC # BLD: 8.22 K/UL — SIGNIFICANT CHANGE UP (ref 3.8–10.5)
WBC # BLD: 8.63 K/UL — SIGNIFICANT CHANGE UP (ref 3.8–10.5)
WBC # BLD: 8.67 K/UL — SIGNIFICANT CHANGE UP (ref 3.8–10.5)
WBC # BLD: 8.83 K/UL — SIGNIFICANT CHANGE UP (ref 3.8–10.5)
WBC # BLD: 9.14 K/UL — SIGNIFICANT CHANGE UP (ref 3.8–10.5)
WBC # BLD: 9.33 K/UL — SIGNIFICANT CHANGE UP (ref 3.8–10.5)
WBC # FLD AUTO: 11.72 K/UL — HIGH (ref 3.8–10.5)
WBC # FLD AUTO: 13.17 K/UL — HIGH (ref 3.8–10.5)
WBC # FLD AUTO: 14.64 K/UL — HIGH (ref 3.8–10.5)
WBC # FLD AUTO: 7.17 K/UL — SIGNIFICANT CHANGE UP (ref 3.8–10.5)
WBC # FLD AUTO: 7.49 K/UL — SIGNIFICANT CHANGE UP (ref 3.8–10.5)
WBC # FLD AUTO: 7.66 K/UL — SIGNIFICANT CHANGE UP (ref 3.8–10.5)
WBC # FLD AUTO: 7.73 K/UL — SIGNIFICANT CHANGE UP (ref 3.8–10.5)
WBC # FLD AUTO: 8.21 K/UL — SIGNIFICANT CHANGE UP (ref 3.8–10.5)
WBC # FLD AUTO: 8.22 K/UL — SIGNIFICANT CHANGE UP (ref 3.8–10.5)
WBC # FLD AUTO: 8.63 K/UL — SIGNIFICANT CHANGE UP (ref 3.8–10.5)
WBC # FLD AUTO: 8.67 K/UL — SIGNIFICANT CHANGE UP (ref 3.8–10.5)
WBC # FLD AUTO: 8.83 K/UL — SIGNIFICANT CHANGE UP (ref 3.8–10.5)
WBC # FLD AUTO: 9.14 K/UL — SIGNIFICANT CHANGE UP (ref 3.8–10.5)
WBC # FLD AUTO: 9.33 K/UL — SIGNIFICANT CHANGE UP (ref 3.8–10.5)
WBC UR QL: SIGNIFICANT CHANGE UP

## 2021-01-01 PROCEDURE — 70450 CT HEAD/BRAIN W/O DYE: CPT | Mod: 26,MA

## 2021-01-01 PROCEDURE — 87040 BLOOD CULTURE FOR BACTERIA: CPT

## 2021-01-01 PROCEDURE — 74177 CT ABD & PELVIS W/CONTRAST: CPT | Mod: 26,MA

## 2021-01-01 PROCEDURE — 85730 THROMBOPLASTIN TIME PARTIAL: CPT

## 2021-01-01 PROCEDURE — 80053 COMPREHEN METABOLIC PANEL: CPT

## 2021-01-01 PROCEDURE — 85025 COMPLETE CBC W/AUTO DIFF WBC: CPT

## 2021-01-01 PROCEDURE — 93005 ELECTROCARDIOGRAM TRACING: CPT

## 2021-01-01 PROCEDURE — 82803 BLOOD GASES ANY COMBINATION: CPT

## 2021-01-01 PROCEDURE — 82607 VITAMIN B-12: CPT

## 2021-01-01 PROCEDURE — 71045 X-RAY EXAM CHEST 1 VIEW: CPT | Mod: 26

## 2021-01-01 PROCEDURE — 84480 ASSAY TRIIODOTHYRONINE (T3): CPT

## 2021-01-01 PROCEDURE — 83550 IRON BINDING TEST: CPT

## 2021-01-01 PROCEDURE — 99284 EMERGENCY DEPT VISIT MOD MDM: CPT

## 2021-01-01 PROCEDURE — 82962 GLUCOSE BLOOD TEST: CPT

## 2021-01-01 PROCEDURE — U0005: CPT

## 2021-01-01 PROCEDURE — 84145 PROCALCITONIN (PCT): CPT

## 2021-01-01 PROCEDURE — U0003: CPT

## 2021-01-01 PROCEDURE — 93010 ELECTROCARDIOGRAM REPORT: CPT

## 2021-01-01 PROCEDURE — 84436 ASSAY OF TOTAL THYROXINE: CPT

## 2021-01-01 PROCEDURE — 0225U NFCT DS DNA&RNA 21 SARSCOV2: CPT

## 2021-01-01 PROCEDURE — 96374 THER/PROPH/DIAG INJ IV PUSH: CPT

## 2021-01-01 PROCEDURE — 71045 X-RAY EXAM CHEST 1 VIEW: CPT

## 2021-01-01 PROCEDURE — 87449 NOS EACH ORGANISM AG IA: CPT

## 2021-01-01 PROCEDURE — 90471 IMMUNIZATION ADMIN: CPT

## 2021-01-01 PROCEDURE — 97110 THERAPEUTIC EXERCISES: CPT

## 2021-01-01 PROCEDURE — 86769 SARS-COV-2 COVID-19 ANTIBODY: CPT

## 2021-01-01 PROCEDURE — 84443 ASSAY THYROID STIM HORMONE: CPT

## 2021-01-01 PROCEDURE — 83036 HEMOGLOBIN GLYCOSYLATED A1C: CPT

## 2021-01-01 PROCEDURE — 97116 GAIT TRAINING THERAPY: CPT

## 2021-01-01 PROCEDURE — 70450 CT HEAD/BRAIN W/O DYE: CPT | Mod: MG

## 2021-01-01 PROCEDURE — 97161 PT EVAL LOW COMPLEX 20 MIN: CPT

## 2021-01-01 PROCEDURE — 83735 ASSAY OF MAGNESIUM: CPT

## 2021-01-01 PROCEDURE — 74176 CT ABD & PELVIS W/O CONTRAST: CPT | Mod: 26

## 2021-01-01 PROCEDURE — 36415 COLL VENOUS BLD VENIPUNCTURE: CPT

## 2021-01-01 PROCEDURE — 71275 CT ANGIOGRAPHY CHEST: CPT

## 2021-01-01 PROCEDURE — 85045 AUTOMATED RETICULOCYTE COUNT: CPT

## 2021-01-01 PROCEDURE — 99284 EMERGENCY DEPT VISIT MOD MDM: CPT | Mod: 25

## 2021-01-01 PROCEDURE — 92610 EVALUATE SWALLOWING FUNCTION: CPT

## 2021-01-01 PROCEDURE — 83605 ASSAY OF LACTIC ACID: CPT

## 2021-01-01 PROCEDURE — 96375 TX/PRO/DX INJ NEW DRUG ADDON: CPT

## 2021-01-01 PROCEDURE — 86140 C-REACTIVE PROTEIN: CPT

## 2021-01-01 PROCEDURE — 71275 CT ANGIOGRAPHY CHEST: CPT | Mod: 26,MA

## 2021-01-01 PROCEDURE — 70450 CT HEAD/BRAIN W/O DYE: CPT

## 2021-01-01 PROCEDURE — 83880 ASSAY OF NATRIURETIC PEPTIDE: CPT

## 2021-01-01 PROCEDURE — 71250 CT THORAX DX C-: CPT

## 2021-01-01 PROCEDURE — 87150 DNA/RNA AMPLIFIED PROBE: CPT

## 2021-01-01 PROCEDURE — 81001 URINALYSIS AUTO W/SCOPE: CPT

## 2021-01-01 PROCEDURE — 85027 COMPLETE CBC AUTOMATED: CPT

## 2021-01-01 PROCEDURE — 99285 EMERGENCY DEPT VISIT HI MDM: CPT

## 2021-01-01 PROCEDURE — 12001 RPR S/N/AX/GEN/TRNK 2.5CM/<: CPT

## 2021-01-01 PROCEDURE — 70450 CT HEAD/BRAIN W/O DYE: CPT | Mod: 26,MG

## 2021-01-01 PROCEDURE — 82728 ASSAY OF FERRITIN: CPT

## 2021-01-01 PROCEDURE — 96365 THER/PROPH/DIAG IV INF INIT: CPT

## 2021-01-01 PROCEDURE — 74177 CT ABD & PELVIS W/CONTRAST: CPT

## 2021-01-01 PROCEDURE — G1004: CPT

## 2021-01-01 PROCEDURE — 87086 URINE CULTURE/COLONY COUNT: CPT

## 2021-01-01 PROCEDURE — 85652 RBC SED RATE AUTOMATED: CPT

## 2021-01-01 PROCEDURE — 99285 EMERGENCY DEPT VISIT HI MDM: CPT | Mod: 25

## 2021-01-01 PROCEDURE — 72125 CT NECK SPINE W/O DYE: CPT | Mod: 26,MG

## 2021-01-01 PROCEDURE — 87077 CULTURE AEROBIC IDENTIFY: CPT

## 2021-01-01 PROCEDURE — 87186 SC STD MICRODIL/AGAR DIL: CPT

## 2021-01-01 PROCEDURE — 84484 ASSAY OF TROPONIN QUANT: CPT

## 2021-01-01 PROCEDURE — 82746 ASSAY OF FOLIC ACID SERUM: CPT

## 2021-01-01 PROCEDURE — 73562 X-RAY EXAM OF KNEE 3: CPT

## 2021-01-01 PROCEDURE — 85610 PROTHROMBIN TIME: CPT

## 2021-01-01 PROCEDURE — 85379 FIBRIN DEGRADATION QUANT: CPT

## 2021-01-01 PROCEDURE — 92526 ORAL FUNCTION THERAPY: CPT

## 2021-01-01 PROCEDURE — 93306 TTE W/DOPPLER COMPLETE: CPT | Mod: 26

## 2021-01-01 PROCEDURE — 71250 CT THORAX DX C-: CPT | Mod: 26

## 2021-01-01 PROCEDURE — 94640 AIRWAY INHALATION TREATMENT: CPT

## 2021-01-01 PROCEDURE — 72125 CT NECK SPINE W/O DYE: CPT | Mod: MG

## 2021-01-01 PROCEDURE — 83540 ASSAY OF IRON: CPT

## 2021-01-01 PROCEDURE — 87635 SARS-COV-2 COVID-19 AMP PRB: CPT

## 2021-01-01 PROCEDURE — 73562 X-RAY EXAM OF KNEE 3: CPT | Mod: 26,RT

## 2021-01-01 PROCEDURE — 90715 TDAP VACCINE 7 YRS/> IM: CPT

## 2021-01-01 PROCEDURE — 93306 TTE W/DOPPLER COMPLETE: CPT

## 2021-01-01 PROCEDURE — 74176 CT ABD & PELVIS W/O CONTRAST: CPT

## 2021-01-01 RX ORDER — POTASSIUM CHLORIDE 20 MEQ
40 PACKET (EA) ORAL EVERY 4 HOURS
Refills: 0 | Status: COMPLETED | OUTPATIENT
Start: 2021-01-01 | End: 2021-01-01

## 2021-01-01 RX ORDER — DEXTROSE 50 % IN WATER 50 %
15 SYRINGE (ML) INTRAVENOUS ONCE
Refills: 0 | Status: DISCONTINUED | OUTPATIENT
Start: 2021-01-01 | End: 2021-01-01

## 2021-01-01 RX ORDER — ATORVASTATIN CALCIUM 80 MG/1
20 TABLET, FILM COATED ORAL AT BEDTIME
Refills: 0 | Status: DISCONTINUED | OUTPATIENT
Start: 2021-01-01 | End: 2021-01-01

## 2021-01-01 RX ORDER — PREGABALIN 225 MG/1
1 CAPSULE ORAL
Qty: 30 | Refills: 0
Start: 2021-01-01 | End: 2021-01-01

## 2021-01-01 RX ORDER — ALBUTEROL 90 UG/1
2 AEROSOL, METERED ORAL EVERY 6 HOURS
Refills: 0 | Status: DISCONTINUED | OUTPATIENT
Start: 2021-01-01 | End: 2021-01-01

## 2021-01-01 RX ORDER — METFORMIN HYDROCHLORIDE 850 MG/1
500 TABLET ORAL
Refills: 0 | Status: DISCONTINUED | OUTPATIENT
Start: 2021-01-01 | End: 2021-01-01

## 2021-01-01 RX ORDER — PHENAZOPYRIDINE HCL 100 MG
100 TABLET ORAL EVERY 8 HOURS
Refills: 0 | Status: DISCONTINUED | OUTPATIENT
Start: 2021-01-01 | End: 2021-01-01

## 2021-01-01 RX ORDER — ANASTROZOLE 1 MG/1
1 TABLET ORAL DAILY
Refills: 0 | Status: DISCONTINUED | OUTPATIENT
Start: 2021-01-01 | End: 2021-01-01

## 2021-01-01 RX ORDER — PIPERACILLIN AND TAZOBACTAM 4; .5 G/20ML; G/20ML
3.38 INJECTION, POWDER, LYOPHILIZED, FOR SOLUTION INTRAVENOUS EVERY 8 HOURS
Refills: 0 | Status: DISCONTINUED | OUTPATIENT
Start: 2021-01-01 | End: 2021-01-01

## 2021-01-01 RX ORDER — IRON SUCROSE 20 MG/ML
100 INJECTION, SOLUTION INTRAVENOUS EVERY 24 HOURS
Refills: 0 | Status: COMPLETED | OUTPATIENT
Start: 2021-01-01 | End: 2021-01-01

## 2021-01-01 RX ORDER — ACETAMINOPHEN 500 MG
650 TABLET ORAL ONCE
Refills: 0 | Status: COMPLETED | OUTPATIENT
Start: 2021-01-01 | End: 2021-01-01

## 2021-01-01 RX ORDER — VANCOMYCIN HCL 1 G
1500 VIAL (EA) INTRAVENOUS EVERY 12 HOURS
Refills: 0 | Status: COMPLETED | OUTPATIENT
Start: 2021-01-01 | End: 2021-01-01

## 2021-01-01 RX ORDER — INSULIN LISPRO 100/ML
VIAL (ML) SUBCUTANEOUS
Refills: 0 | Status: DISCONTINUED | OUTPATIENT
Start: 2021-01-01 | End: 2021-01-01

## 2021-01-01 RX ORDER — DEXTROSE 50 % IN WATER 50 %
12.5 SYRINGE (ML) INTRAVENOUS ONCE
Refills: 0 | Status: DISCONTINUED | OUTPATIENT
Start: 2021-01-01 | End: 2021-01-01

## 2021-01-01 RX ORDER — ASPIRIN/CALCIUM CARB/MAGNESIUM 324 MG
81 TABLET ORAL DAILY
Refills: 0 | Status: DISCONTINUED | OUTPATIENT
Start: 2021-01-01 | End: 2021-01-01

## 2021-01-01 RX ORDER — TRAMADOL HYDROCHLORIDE 50 MG/1
50 TABLET ORAL EVERY 6 HOURS
Refills: 0 | Status: DISCONTINUED | OUTPATIENT
Start: 2021-01-01 | End: 2021-01-01

## 2021-01-01 RX ORDER — ANASTROZOLE 1 MG/1
1 TABLET ORAL
Qty: 0 | Refills: 0 | DISCHARGE

## 2021-01-01 RX ORDER — FERROUS SULFATE 325(65) MG
1 TABLET ORAL
Qty: 30 | Refills: 0
Start: 2021-01-01 | End: 2021-01-01

## 2021-01-01 RX ORDER — CEFTRIAXONE 500 MG/1
1000 INJECTION, POWDER, FOR SOLUTION INTRAMUSCULAR; INTRAVENOUS EVERY 24 HOURS
Refills: 0 | Status: COMPLETED | OUTPATIENT
Start: 2021-01-01 | End: 2021-01-01

## 2021-01-01 RX ORDER — HYDRALAZINE HCL 50 MG
0 TABLET ORAL
Qty: 0 | Refills: 0 | DISCHARGE

## 2021-01-01 RX ORDER — FUROSEMIDE 40 MG
80 TABLET ORAL DAILY
Refills: 0 | Status: DISCONTINUED | OUTPATIENT
Start: 2021-01-01 | End: 2021-01-01

## 2021-01-01 RX ORDER — VANCOMYCIN HCL 1 G
1000 VIAL (EA) INTRAVENOUS ONCE
Refills: 0 | Status: COMPLETED | OUTPATIENT
Start: 2021-01-01 | End: 2021-01-01

## 2021-01-01 RX ORDER — METFORMIN HYDROCHLORIDE 850 MG/1
1 TABLET ORAL
Qty: 0 | Refills: 0 | DISCHARGE

## 2021-01-01 RX ORDER — CARVEDILOL PHOSPHATE 80 MG/1
25 CAPSULE, EXTENDED RELEASE ORAL EVERY 12 HOURS
Refills: 0 | Status: DISCONTINUED | OUTPATIENT
Start: 2021-01-01 | End: 2021-01-01

## 2021-01-01 RX ORDER — INSULIN LISPRO 100/ML
VIAL (ML) SUBCUTANEOUS AT BEDTIME
Refills: 0 | Status: DISCONTINUED | OUTPATIENT
Start: 2021-01-01 | End: 2021-01-01

## 2021-01-01 RX ORDER — POTASSIUM CHLORIDE 20 MEQ
40 PACKET (EA) ORAL ONCE
Refills: 0 | Status: COMPLETED | OUTPATIENT
Start: 2021-01-01 | End: 2021-01-01

## 2021-01-01 RX ORDER — FUROSEMIDE 40 MG
40 TABLET ORAL DAILY
Refills: 0 | Status: DISCONTINUED | OUTPATIENT
Start: 2021-01-01 | End: 2021-01-01

## 2021-01-01 RX ORDER — ALBUTEROL 90 UG/1
2 AEROSOL, METERED ORAL
Qty: 2 | Refills: 0
Start: 2021-01-01 | End: 2021-01-01

## 2021-01-01 RX ORDER — SODIUM CHLORIDE 9 MG/ML
1000 INJECTION, SOLUTION INTRAVENOUS
Refills: 0 | Status: DISCONTINUED | OUTPATIENT
Start: 2021-01-01 | End: 2021-01-01

## 2021-01-01 RX ORDER — PHENAZOPYRIDINE HCL 100 MG
1 TABLET ORAL
Qty: 30 | Refills: 0
Start: 2021-01-01 | End: 2021-01-01

## 2021-01-01 RX ORDER — ACETAMINOPHEN 500 MG
650 TABLET ORAL EVERY 6 HOURS
Refills: 0 | Status: DISCONTINUED | OUTPATIENT
Start: 2021-01-01 | End: 2021-01-01

## 2021-01-01 RX ORDER — PREGABALIN 225 MG/1
1000 CAPSULE ORAL DAILY
Refills: 0 | Status: DISCONTINUED | OUTPATIENT
Start: 2021-01-01 | End: 2021-01-01

## 2021-01-01 RX ORDER — LEVOTHYROXINE SODIUM 125 MCG
150 TABLET ORAL DAILY
Refills: 0 | Status: DISCONTINUED | OUTPATIENT
Start: 2021-01-01 | End: 2021-01-01

## 2021-01-01 RX ORDER — HYDRALAZINE HCL 50 MG
100 TABLET ORAL THREE TIMES A DAY
Refills: 0 | Status: DISCONTINUED | OUTPATIENT
Start: 2021-01-01 | End: 2021-01-01

## 2021-01-01 RX ORDER — DEXTROSE 50 % IN WATER 50 %
25 SYRINGE (ML) INTRAVENOUS ONCE
Refills: 0 | Status: DISCONTINUED | OUTPATIENT
Start: 2021-01-01 | End: 2021-01-01

## 2021-01-01 RX ORDER — IPRATROPIUM/ALBUTEROL SULFATE 18-103MCG
3 AEROSOL WITH ADAPTER (GRAM) INHALATION EVERY 6 HOURS
Refills: 0 | Status: DISCONTINUED | OUTPATIENT
Start: 2021-01-01 | End: 2021-01-01

## 2021-01-01 RX ORDER — CEFUROXIME AXETIL 250 MG
1 TABLET ORAL
Qty: 6 | Refills: 0
Start: 2021-01-01 | End: 2021-01-01

## 2021-01-01 RX ORDER — POTASSIUM CHLORIDE 20 MEQ
0 PACKET (EA) ORAL
Qty: 0 | Refills: 0 | DISCHARGE

## 2021-01-01 RX ORDER — GLUCAGON INJECTION, SOLUTION 0.5 MG/.1ML
1 INJECTION, SOLUTION SUBCUTANEOUS ONCE
Refills: 0 | Status: DISCONTINUED | OUTPATIENT
Start: 2021-01-01 | End: 2021-01-01

## 2021-01-01 RX ORDER — CARVEDILOL PHOSPHATE 80 MG/1
1 CAPSULE, EXTENDED RELEASE ORAL
Qty: 0 | Refills: 0 | DISCHARGE

## 2021-01-01 RX ORDER — SENNA PLUS 8.6 MG/1
2 TABLET ORAL
Qty: 0 | Refills: 0 | DISCHARGE

## 2021-01-01 RX ORDER — CEPHALEXIN 500 MG
500 CAPSULE ORAL
Refills: 0 | Status: DISCONTINUED | OUTPATIENT
Start: 2021-01-01 | End: 2021-01-01

## 2021-01-01 RX ORDER — LACTOBACILLUS ACIDOPHILUS 100MM CELL
1 CAPSULE ORAL
Qty: 0 | Refills: 0 | DISCHARGE

## 2021-01-01 RX ORDER — ENOXAPARIN SODIUM 100 MG/ML
40 INJECTION SUBCUTANEOUS DAILY
Refills: 0 | Status: DISCONTINUED | OUTPATIENT
Start: 2021-01-01 | End: 2021-01-01

## 2021-01-01 RX ORDER — LACTOBACILLUS ACIDOPHILUS 100MM CELL
1 CAPSULE ORAL THREE TIMES A DAY
Refills: 0 | Status: DISCONTINUED | OUTPATIENT
Start: 2021-01-01 | End: 2021-01-01

## 2021-01-01 RX ORDER — AMLODIPINE BESYLATE 2.5 MG/1
10 TABLET ORAL DAILY
Refills: 0 | Status: DISCONTINUED | OUTPATIENT
Start: 2021-01-01 | End: 2021-01-01

## 2021-01-01 RX ORDER — MORPHINE SULFATE 50 MG/1
2 CAPSULE, EXTENDED RELEASE ORAL EVERY 6 HOURS
Refills: 0 | Status: DISCONTINUED | OUTPATIENT
Start: 2021-01-01 | End: 2021-01-01

## 2021-01-01 RX ORDER — LOSARTAN POTASSIUM 100 MG/1
100 TABLET, FILM COATED ORAL DAILY
Refills: 0 | Status: DISCONTINUED | OUTPATIENT
Start: 2021-01-01 | End: 2021-01-01

## 2021-01-01 RX ORDER — PIPERACILLIN AND TAZOBACTAM 4; .5 G/20ML; G/20ML
3.38 INJECTION, POWDER, LYOPHILIZED, FOR SOLUTION INTRAVENOUS ONCE
Refills: 0 | Status: COMPLETED | OUTPATIENT
Start: 2021-01-01 | End: 2021-01-01

## 2021-01-01 RX ORDER — FUROSEMIDE 40 MG
1 TABLET ORAL
Qty: 0 | Refills: 0 | DISCHARGE

## 2021-01-01 RX ORDER — LOSARTAN POTASSIUM 100 MG/1
1 TABLET, FILM COATED ORAL
Qty: 0 | Refills: 0 | DISCHARGE

## 2021-01-01 RX ORDER — TETANUS TOXOID, REDUCED DIPHTHERIA TOXOID AND ACELLULAR PERTUSSIS VACCINE, ADSORBED 5; 2.5; 8; 8; 2.5 [IU]/.5ML; [IU]/.5ML; UG/.5ML; UG/.5ML; UG/.5ML
0.5 SUSPENSION INTRAMUSCULAR ONCE
Refills: 0 | Status: COMPLETED | OUTPATIENT
Start: 2021-01-01 | End: 2021-01-01

## 2021-01-01 RX ORDER — CEFUROXIME AXETIL 250 MG
500 TABLET ORAL EVERY 12 HOURS
Refills: 0 | Status: DISCONTINUED | OUTPATIENT
Start: 2021-01-01 | End: 2021-01-01

## 2021-01-01 RX ORDER — LACTOBACILLUS ACIDOPHILUS 100MM CELL
1 CAPSULE ORAL
Qty: 20 | Refills: 0
Start: 2021-01-01 | End: 2021-01-01

## 2021-01-01 RX ORDER — POTASSIUM CHLORIDE 20 MEQ
20 PACKET (EA) ORAL
Refills: 0 | Status: COMPLETED | OUTPATIENT
Start: 2021-01-01 | End: 2021-01-01

## 2021-01-01 RX ORDER — POTASSIUM CHLORIDE 20 MEQ
10 PACKET (EA) ORAL
Refills: 0 | Status: COMPLETED | OUTPATIENT
Start: 2021-01-01 | End: 2021-01-01

## 2021-01-01 RX ORDER — POTASSIUM CHLORIDE 20 MEQ
20 PACKET (EA) ORAL DAILY
Refills: 0 | Status: DISCONTINUED | OUTPATIENT
Start: 2021-01-01 | End: 2021-01-01

## 2021-01-01 RX ORDER — AMLODIPINE BESYLATE 2.5 MG/1
1 TABLET ORAL
Qty: 0 | Refills: 0 | DISCHARGE

## 2021-01-01 RX ORDER — SENNA PLUS 8.6 MG/1
2 TABLET ORAL AT BEDTIME
Refills: 0 | Status: DISCONTINUED | OUTPATIENT
Start: 2021-01-01 | End: 2021-01-01

## 2021-01-01 RX ORDER — ASPIRIN/CALCIUM CARB/MAGNESIUM 324 MG
1 TABLET ORAL
Qty: 0 | Refills: 0 | DISCHARGE

## 2021-01-01 RX ORDER — POTASSIUM CHLORIDE 20 MEQ
20 PACKET (EA) ORAL ONCE
Refills: 0 | Status: COMPLETED | OUTPATIENT
Start: 2021-01-01 | End: 2021-01-01

## 2021-01-01 RX ORDER — AMLODIPINE BESYLATE 2.5 MG/1
1 TABLET ORAL
Qty: 15 | Refills: 0
Start: 2021-01-01 | End: 2021-01-01

## 2021-01-01 RX ORDER — CEFTRIAXONE 500 MG/1
1000 INJECTION, POWDER, FOR SOLUTION INTRAMUSCULAR; INTRAVENOUS ONCE
Refills: 0 | Status: COMPLETED | OUTPATIENT
Start: 2021-01-01 | End: 2021-01-01

## 2021-01-01 RX ORDER — ATORVASTATIN CALCIUM 80 MG/1
1 TABLET, FILM COATED ORAL
Qty: 0 | Refills: 0 | DISCHARGE

## 2021-01-01 RX ORDER — CEPHALEXIN 500 MG
1 CAPSULE ORAL
Qty: 32 | Refills: 0
Start: 2021-01-01 | End: 2021-01-01

## 2021-01-01 RX ORDER — PHENAZOPYRIDINE HCL 100 MG
200 TABLET ORAL ONCE
Refills: 0 | Status: COMPLETED | OUTPATIENT
Start: 2021-01-01 | End: 2021-01-01

## 2021-01-01 RX ADMIN — Medication 1 TABLET(S): at 14:09

## 2021-01-01 RX ADMIN — Medication 20 MILLIEQUIVALENT(S): at 05:13

## 2021-01-01 RX ADMIN — Medication 100 MILLIGRAM(S): at 05:07

## 2021-01-01 RX ADMIN — Medication 1 TABLET(S): at 05:49

## 2021-01-01 RX ADMIN — Medication 150 MICROGRAM(S): at 05:35

## 2021-01-01 RX ADMIN — Medication 40 MILLIGRAM(S): at 05:26

## 2021-01-01 RX ADMIN — Medication 20 MILLIEQUIVALENT(S): at 11:57

## 2021-01-01 RX ADMIN — Medication 1 TABLET(S): at 22:02

## 2021-01-01 RX ADMIN — METFORMIN HYDROCHLORIDE 500 MILLIGRAM(S): 850 TABLET ORAL at 17:31

## 2021-01-01 RX ADMIN — Medication 1 TABLET(S): at 07:05

## 2021-01-01 RX ADMIN — Medication 100 MILLIGRAM(S): at 06:09

## 2021-01-01 RX ADMIN — Medication 40 MILLIGRAM(S): at 13:05

## 2021-01-01 RX ADMIN — Medication 500 MILLIGRAM(S): at 17:33

## 2021-01-01 RX ADMIN — ANASTROZOLE 1 MILLIGRAM(S): 1 TABLET ORAL at 12:55

## 2021-01-01 RX ADMIN — Medication 100 MILLIGRAM(S): at 21:53

## 2021-01-01 RX ADMIN — Medication 1 TABLET(S): at 21:54

## 2021-01-01 RX ADMIN — LOSARTAN POTASSIUM 100 MILLIGRAM(S): 100 TABLET, FILM COATED ORAL at 05:33

## 2021-01-01 RX ADMIN — ENOXAPARIN SODIUM 40 MILLIGRAM(S): 100 INJECTION SUBCUTANEOUS at 11:28

## 2021-01-01 RX ADMIN — Medication 1 TABLET(S): at 21:10

## 2021-01-01 RX ADMIN — CARVEDILOL PHOSPHATE 25 MILLIGRAM(S): 80 CAPSULE, EXTENDED RELEASE ORAL at 17:47

## 2021-01-01 RX ADMIN — Medication 650 MILLIGRAM(S): at 17:35

## 2021-01-01 RX ADMIN — ENOXAPARIN SODIUM 40 MILLIGRAM(S): 100 INJECTION SUBCUTANEOUS at 12:47

## 2021-01-01 RX ADMIN — Medication 0.2 MILLIGRAM(S): at 06:20

## 2021-01-01 RX ADMIN — AMLODIPINE BESYLATE 10 MILLIGRAM(S): 2.5 TABLET ORAL at 11:04

## 2021-01-01 RX ADMIN — METFORMIN HYDROCHLORIDE 500 MILLIGRAM(S): 850 TABLET ORAL at 17:47

## 2021-01-01 RX ADMIN — Medication 150 MICROGRAM(S): at 05:49

## 2021-01-01 RX ADMIN — CARVEDILOL PHOSPHATE 25 MILLIGRAM(S): 80 CAPSULE, EXTENDED RELEASE ORAL at 05:21

## 2021-01-01 RX ADMIN — Medication 150 MICROGRAM(S): at 05:21

## 2021-01-01 RX ADMIN — Medication 0.2 MILLIGRAM(S): at 13:57

## 2021-01-01 RX ADMIN — CARVEDILOL PHOSPHATE 25 MILLIGRAM(S): 80 CAPSULE, EXTENDED RELEASE ORAL at 17:29

## 2021-01-01 RX ADMIN — Medication 150 MICROGRAM(S): at 07:05

## 2021-01-01 RX ADMIN — CEFTRIAXONE 100 MILLIGRAM(S): 500 INJECTION, POWDER, FOR SOLUTION INTRAMUSCULAR; INTRAVENOUS at 17:26

## 2021-01-01 RX ADMIN — AMLODIPINE BESYLATE 10 MILLIGRAM(S): 2.5 TABLET ORAL at 06:12

## 2021-01-01 RX ADMIN — Medication 1 TABLET(S): at 21:04

## 2021-01-01 RX ADMIN — Medication 100 MILLIGRAM(S): at 13:33

## 2021-01-01 RX ADMIN — ANASTROZOLE 1 MILLIGRAM(S): 1 TABLET ORAL at 12:47

## 2021-01-01 RX ADMIN — SENNA PLUS 2 TABLET(S): 8.6 TABLET ORAL at 21:22

## 2021-01-01 RX ADMIN — Medication 100 MILLIGRAM(S): at 06:20

## 2021-01-01 RX ADMIN — METFORMIN HYDROCHLORIDE 500 MILLIGRAM(S): 850 TABLET ORAL at 18:06

## 2021-01-01 RX ADMIN — Medication 40 MILLIEQUIVALENT(S): at 14:32

## 2021-01-01 RX ADMIN — Medication 1 TABLET(S): at 13:35

## 2021-01-01 RX ADMIN — Medication 3 MILLILITER(S): at 19:38

## 2021-01-01 RX ADMIN — ATORVASTATIN CALCIUM 20 MILLIGRAM(S): 80 TABLET, FILM COATED ORAL at 21:21

## 2021-01-01 RX ADMIN — PIPERACILLIN AND TAZOBACTAM 25 GRAM(S): 4; .5 INJECTION, POWDER, LYOPHILIZED, FOR SOLUTION INTRAVENOUS at 11:28

## 2021-01-01 RX ADMIN — PREGABALIN 1000 MICROGRAM(S): 225 CAPSULE ORAL at 11:23

## 2021-01-01 RX ADMIN — AMLODIPINE BESYLATE 10 MILLIGRAM(S): 2.5 TABLET ORAL at 17:16

## 2021-01-01 RX ADMIN — Medication 100 MILLIGRAM(S): at 21:18

## 2021-01-01 RX ADMIN — ENOXAPARIN SODIUM 40 MILLIGRAM(S): 100 INJECTION SUBCUTANEOUS at 12:22

## 2021-01-01 RX ADMIN — Medication 100 MILLIGRAM(S): at 13:59

## 2021-01-01 RX ADMIN — Medication 1 TABLET(S): at 05:39

## 2021-01-01 RX ADMIN — ENOXAPARIN SODIUM 40 MILLIGRAM(S): 100 INJECTION SUBCUTANEOUS at 11:57

## 2021-01-01 RX ADMIN — Medication 0.2 MILLIGRAM(S): at 13:52

## 2021-01-01 RX ADMIN — Medication 100 MILLIGRAM(S): at 14:23

## 2021-01-01 RX ADMIN — METFORMIN HYDROCHLORIDE 500 MILLIGRAM(S): 850 TABLET ORAL at 05:26

## 2021-01-01 RX ADMIN — Medication 150 MICROGRAM(S): at 05:05

## 2021-01-01 RX ADMIN — Medication 20 MILLIEQUIVALENT(S): at 14:21

## 2021-01-01 RX ADMIN — Medication 100 MILLIGRAM(S): at 13:52

## 2021-01-01 RX ADMIN — Medication 100 MILLIGRAM(S): at 05:33

## 2021-01-01 RX ADMIN — Medication 100 MILLIGRAM(S): at 13:57

## 2021-01-01 RX ADMIN — Medication 1 TABLET(S): at 05:20

## 2021-01-01 RX ADMIN — Medication 3 MILLILITER(S): at 13:34

## 2021-01-01 RX ADMIN — Medication 1: at 07:53

## 2021-01-01 RX ADMIN — Medication 150 MICROGRAM(S): at 06:20

## 2021-01-01 RX ADMIN — Medication 150 MICROGRAM(S): at 06:09

## 2021-01-01 RX ADMIN — Medication 100 MILLIGRAM(S): at 22:02

## 2021-01-01 RX ADMIN — AMLODIPINE BESYLATE 10 MILLIGRAM(S): 2.5 TABLET ORAL at 06:20

## 2021-01-01 RX ADMIN — Medication 0.2 MILLIGRAM(S): at 05:33

## 2021-01-01 RX ADMIN — CARVEDILOL PHOSPHATE 25 MILLIGRAM(S): 80 CAPSULE, EXTENDED RELEASE ORAL at 18:06

## 2021-01-01 RX ADMIN — METFORMIN HYDROCHLORIDE 500 MILLIGRAM(S): 850 TABLET ORAL at 06:59

## 2021-01-01 RX ADMIN — Medication 100 MILLIGRAM(S): at 05:21

## 2021-01-01 RX ADMIN — CARVEDILOL PHOSPHATE 25 MILLIGRAM(S): 80 CAPSULE, EXTENDED RELEASE ORAL at 05:53

## 2021-01-01 RX ADMIN — Medication 100 MILLIGRAM(S): at 05:22

## 2021-01-01 RX ADMIN — Medication 0.2 MILLIGRAM(S): at 14:22

## 2021-01-01 RX ADMIN — CARVEDILOL PHOSPHATE 25 MILLIGRAM(S): 80 CAPSULE, EXTENDED RELEASE ORAL at 18:32

## 2021-01-01 RX ADMIN — Medication 0.2 MILLIGRAM(S): at 13:59

## 2021-01-01 RX ADMIN — ENOXAPARIN SODIUM 40 MILLIGRAM(S): 100 INJECTION SUBCUTANEOUS at 11:37

## 2021-01-01 RX ADMIN — Medication 0.2 MILLIGRAM(S): at 22:07

## 2021-01-01 RX ADMIN — Medication 3 MILLILITER(S): at 13:23

## 2021-01-01 RX ADMIN — SENNA PLUS 2 TABLET(S): 8.6 TABLET ORAL at 21:10

## 2021-01-01 RX ADMIN — Medication 3 MILLILITER(S): at 19:42

## 2021-01-01 RX ADMIN — ATORVASTATIN CALCIUM 20 MILLIGRAM(S): 80 TABLET, FILM COATED ORAL at 21:44

## 2021-01-01 RX ADMIN — CARVEDILOL PHOSPHATE 25 MILLIGRAM(S): 80 CAPSULE, EXTENDED RELEASE ORAL at 05:06

## 2021-01-01 RX ADMIN — LOSARTAN POTASSIUM 100 MILLIGRAM(S): 100 TABLET, FILM COATED ORAL at 05:53

## 2021-01-01 RX ADMIN — PIPERACILLIN AND TAZOBACTAM 25 GRAM(S): 4; .5 INJECTION, POWDER, LYOPHILIZED, FOR SOLUTION INTRAVENOUS at 17:30

## 2021-01-01 RX ADMIN — Medication 0.2 MILLIGRAM(S): at 21:18

## 2021-01-01 RX ADMIN — Medication 40 MILLIEQUIVALENT(S): at 11:25

## 2021-01-01 RX ADMIN — CEFTRIAXONE 100 MILLIGRAM(S): 500 INJECTION, POWDER, FOR SOLUTION INTRAMUSCULAR; INTRAVENOUS at 08:28

## 2021-01-01 RX ADMIN — Medication 100 MILLIGRAM(S): at 13:09

## 2021-01-01 RX ADMIN — Medication 40 MILLIGRAM(S): at 05:07

## 2021-01-01 RX ADMIN — Medication 0.2 MILLIGRAM(S): at 05:05

## 2021-01-01 RX ADMIN — ANASTROZOLE 1 MILLIGRAM(S): 1 TABLET ORAL at 11:36

## 2021-01-01 RX ADMIN — Medication 0.2 MILLIGRAM(S): at 13:16

## 2021-01-01 RX ADMIN — PIPERACILLIN AND TAZOBACTAM 200 GRAM(S): 4; .5 INJECTION, POWDER, LYOPHILIZED, FOR SOLUTION INTRAVENOUS at 01:08

## 2021-01-01 RX ADMIN — PREGABALIN 1000 MICROGRAM(S): 225 CAPSULE ORAL at 22:06

## 2021-01-01 RX ADMIN — CEFTRIAXONE 100 MILLIGRAM(S): 500 INJECTION, POWDER, FOR SOLUTION INTRAMUSCULAR; INTRAVENOUS at 08:48

## 2021-01-01 RX ADMIN — SENNA PLUS 2 TABLET(S): 8.6 TABLET ORAL at 21:44

## 2021-01-01 RX ADMIN — CARVEDILOL PHOSPHATE 25 MILLIGRAM(S): 80 CAPSULE, EXTENDED RELEASE ORAL at 05:07

## 2021-01-01 RX ADMIN — Medication 20 MILLIEQUIVALENT(S): at 12:30

## 2021-01-01 RX ADMIN — Medication 1: at 16:47

## 2021-01-01 RX ADMIN — Medication 1 TABLET(S): at 13:05

## 2021-01-01 RX ADMIN — CARVEDILOL PHOSPHATE 25 MILLIGRAM(S): 80 CAPSULE, EXTENDED RELEASE ORAL at 17:25

## 2021-01-01 RX ADMIN — ATORVASTATIN CALCIUM 20 MILLIGRAM(S): 80 TABLET, FILM COATED ORAL at 21:36

## 2021-01-01 RX ADMIN — Medication 0.2 MILLIGRAM(S): at 22:02

## 2021-01-01 RX ADMIN — Medication 1 TABLET(S): at 14:26

## 2021-01-01 RX ADMIN — Medication 1 TABLET(S): at 05:07

## 2021-01-01 RX ADMIN — ATORVASTATIN CALCIUM 20 MILLIGRAM(S): 80 TABLET, FILM COATED ORAL at 21:23

## 2021-01-01 RX ADMIN — Medication 40 MILLIGRAM(S): at 05:21

## 2021-01-01 RX ADMIN — ATORVASTATIN CALCIUM 20 MILLIGRAM(S): 80 TABLET, FILM COATED ORAL at 21:53

## 2021-01-01 RX ADMIN — Medication 40 MILLIEQUIVALENT(S): at 14:50

## 2021-01-01 RX ADMIN — CARVEDILOL PHOSPHATE 25 MILLIGRAM(S): 80 CAPSULE, EXTENDED RELEASE ORAL at 17:30

## 2021-01-01 RX ADMIN — Medication 100 MILLIGRAM(S): at 05:49

## 2021-01-01 RX ADMIN — Medication 0.2 MILLIGRAM(S): at 05:22

## 2021-01-01 RX ADMIN — METFORMIN HYDROCHLORIDE 500 MILLIGRAM(S): 850 TABLET ORAL at 17:29

## 2021-01-01 RX ADMIN — Medication 0.2 MILLIGRAM(S): at 14:09

## 2021-01-01 RX ADMIN — LOSARTAN POTASSIUM 100 MILLIGRAM(S): 100 TABLET, FILM COATED ORAL at 06:20

## 2021-01-01 RX ADMIN — Medication 0.2 MILLIGRAM(S): at 05:39

## 2021-01-01 RX ADMIN — METFORMIN HYDROCHLORIDE 500 MILLIGRAM(S): 850 TABLET ORAL at 05:07

## 2021-01-01 RX ADMIN — Medication 3 MILLILITER(S): at 06:43

## 2021-01-01 RX ADMIN — Medication 81 MILLIGRAM(S): at 11:37

## 2021-01-01 RX ADMIN — CARVEDILOL PHOSPHATE 25 MILLIGRAM(S): 80 CAPSULE, EXTENDED RELEASE ORAL at 06:10

## 2021-01-01 RX ADMIN — CEFTRIAXONE 100 MILLIGRAM(S): 500 INJECTION, POWDER, FOR SOLUTION INTRAMUSCULAR; INTRAVENOUS at 17:42

## 2021-01-01 RX ADMIN — Medication 0.2 MILLIGRAM(S): at 21:23

## 2021-01-01 RX ADMIN — PREGABALIN 1000 MICROGRAM(S): 225 CAPSULE ORAL at 11:37

## 2021-01-01 RX ADMIN — Medication 3 MILLILITER(S): at 19:53

## 2021-01-01 RX ADMIN — Medication 0.2 MILLIGRAM(S): at 16:33

## 2021-01-01 RX ADMIN — PIPERACILLIN AND TAZOBACTAM 25 GRAM(S): 4; .5 INJECTION, POWDER, LYOPHILIZED, FOR SOLUTION INTRAVENOUS at 09:31

## 2021-01-01 RX ADMIN — Medication 100 MILLIGRAM(S): at 05:20

## 2021-01-01 RX ADMIN — Medication 80 MILLIGRAM(S): at 21:44

## 2021-01-01 RX ADMIN — ATORVASTATIN CALCIUM 20 MILLIGRAM(S): 80 TABLET, FILM COATED ORAL at 21:10

## 2021-01-01 RX ADMIN — Medication 100 MILLIGRAM(S): at 13:35

## 2021-01-01 RX ADMIN — AMLODIPINE BESYLATE 10 MILLIGRAM(S): 2.5 TABLET ORAL at 05:53

## 2021-01-01 RX ADMIN — Medication 100 MILLIGRAM(S): at 14:09

## 2021-01-01 RX ADMIN — Medication 100 MILLIGRAM(S): at 05:26

## 2021-01-01 RX ADMIN — CEFTRIAXONE 100 MILLIGRAM(S): 500 INJECTION, POWDER, FOR SOLUTION INTRAMUSCULAR; INTRAVENOUS at 02:12

## 2021-01-01 RX ADMIN — METFORMIN HYDROCHLORIDE 500 MILLIGRAM(S): 850 TABLET ORAL at 17:41

## 2021-01-01 RX ADMIN — Medication 1 TABLET(S): at 13:33

## 2021-01-01 RX ADMIN — ANASTROZOLE 1 MILLIGRAM(S): 1 TABLET ORAL at 12:49

## 2021-01-01 RX ADMIN — SENNA PLUS 2 TABLET(S): 8.6 TABLET ORAL at 21:24

## 2021-01-01 RX ADMIN — Medication 150 MICROGRAM(S): at 05:22

## 2021-01-01 RX ADMIN — ANASTROZOLE 1 MILLIGRAM(S): 1 TABLET ORAL at 11:57

## 2021-01-01 RX ADMIN — Medication 0.2 MILLIGRAM(S): at 21:24

## 2021-01-01 RX ADMIN — TETANUS TOXOID, REDUCED DIPHTHERIA TOXOID AND ACELLULAR PERTUSSIS VACCINE, ADSORBED 0.5 MILLILITER(S): 5; 2.5; 8; 8; 2.5 SUSPENSION INTRAMUSCULAR at 19:21

## 2021-01-01 RX ADMIN — Medication 200 MILLIGRAM(S): at 11:04

## 2021-01-01 RX ADMIN — Medication 81 MILLIGRAM(S): at 12:57

## 2021-01-01 RX ADMIN — Medication 20 MILLIEQUIVALENT(S): at 13:09

## 2021-01-01 RX ADMIN — METFORMIN HYDROCHLORIDE 500 MILLIGRAM(S): 850 TABLET ORAL at 07:27

## 2021-01-01 RX ADMIN — Medication 81 MILLIGRAM(S): at 12:22

## 2021-01-01 RX ADMIN — Medication 1 TABLET(S): at 14:22

## 2021-01-01 RX ADMIN — Medication 81 MILLIGRAM(S): at 12:30

## 2021-01-01 RX ADMIN — Medication 1 TABLET(S): at 13:51

## 2021-01-01 RX ADMIN — CARVEDILOL PHOSPHATE 25 MILLIGRAM(S): 80 CAPSULE, EXTENDED RELEASE ORAL at 05:39

## 2021-01-01 RX ADMIN — ATORVASTATIN CALCIUM 20 MILLIGRAM(S): 80 TABLET, FILM COATED ORAL at 21:05

## 2021-01-01 RX ADMIN — Medication 1 TABLET(S): at 06:09

## 2021-01-01 RX ADMIN — Medication 0.2 MILLIGRAM(S): at 07:03

## 2021-01-01 RX ADMIN — Medication 500 MILLIGRAM(S): at 11:26

## 2021-01-01 RX ADMIN — Medication 0.2 MILLIGRAM(S): at 05:08

## 2021-01-01 RX ADMIN — Medication 500 MILLIGRAM(S): at 05:21

## 2021-01-01 RX ADMIN — Medication 500 MILLIGRAM(S): at 06:09

## 2021-01-01 RX ADMIN — METFORMIN HYDROCHLORIDE 500 MILLIGRAM(S): 850 TABLET ORAL at 08:38

## 2021-01-01 RX ADMIN — Medication 1 TABLET(S): at 05:33

## 2021-01-01 RX ADMIN — Medication 100 MILLIGRAM(S): at 13:16

## 2021-01-01 RX ADMIN — Medication 3 MILLILITER(S): at 19:03

## 2021-01-01 RX ADMIN — CARVEDILOL PHOSPHATE 25 MILLIGRAM(S): 80 CAPSULE, EXTENDED RELEASE ORAL at 17:26

## 2021-01-01 RX ADMIN — CARVEDILOL PHOSPHATE 25 MILLIGRAM(S): 80 CAPSULE, EXTENDED RELEASE ORAL at 05:49

## 2021-01-01 RX ADMIN — Medication 40 MILLIGRAM(S): at 06:20

## 2021-01-01 RX ADMIN — ENOXAPARIN SODIUM 40 MILLIGRAM(S): 100 INJECTION SUBCUTANEOUS at 12:30

## 2021-01-01 RX ADMIN — Medication 81 MILLIGRAM(S): at 11:57

## 2021-01-01 RX ADMIN — METFORMIN HYDROCHLORIDE 500 MILLIGRAM(S): 850 TABLET ORAL at 17:25

## 2021-01-01 RX ADMIN — Medication 40 MILLIGRAM(S): at 06:09

## 2021-01-01 RX ADMIN — Medication 3 MILLILITER(S): at 01:20

## 2021-01-01 RX ADMIN — Medication 100 MILLIGRAM(S): at 14:27

## 2021-01-01 RX ADMIN — ATORVASTATIN CALCIUM 20 MILLIGRAM(S): 80 TABLET, FILM COATED ORAL at 22:07

## 2021-01-01 RX ADMIN — Medication 40 MILLIGRAM(S): at 06:59

## 2021-01-01 RX ADMIN — Medication 100 MILLIGRAM(S): at 14:26

## 2021-01-01 RX ADMIN — ATORVASTATIN CALCIUM 20 MILLIGRAM(S): 80 TABLET, FILM COATED ORAL at 22:02

## 2021-01-01 RX ADMIN — Medication 1 TABLET(S): at 15:01

## 2021-01-01 RX ADMIN — IRON SUCROSE 100 MILLIGRAM(S): 20 INJECTION, SOLUTION INTRAVENOUS at 17:47

## 2021-01-01 RX ADMIN — PIPERACILLIN AND TAZOBACTAM 25 GRAM(S): 4; .5 INJECTION, POWDER, LYOPHILIZED, FOR SOLUTION INTRAVENOUS at 17:14

## 2021-01-01 RX ADMIN — Medication 3 MILLILITER(S): at 07:11

## 2021-01-01 RX ADMIN — Medication 1 TABLET(S): at 14:02

## 2021-01-01 RX ADMIN — CARVEDILOL PHOSPHATE 25 MILLIGRAM(S): 80 CAPSULE, EXTENDED RELEASE ORAL at 17:41

## 2021-01-01 RX ADMIN — Medication 3 MILLILITER(S): at 06:38

## 2021-01-01 RX ADMIN — PIPERACILLIN AND TAZOBACTAM 25 GRAM(S): 4; .5 INJECTION, POWDER, LYOPHILIZED, FOR SOLUTION INTRAVENOUS at 00:01

## 2021-01-01 RX ADMIN — ANASTROZOLE 1 MILLIGRAM(S): 1 TABLET ORAL at 13:52

## 2021-01-01 RX ADMIN — Medication 100 MILLIEQUIVALENT(S): at 12:31

## 2021-01-01 RX ADMIN — Medication 1: at 12:30

## 2021-01-01 RX ADMIN — Medication 1 TABLET(S): at 21:45

## 2021-01-01 RX ADMIN — CARVEDILOL PHOSPHATE 25 MILLIGRAM(S): 80 CAPSULE, EXTENDED RELEASE ORAL at 17:33

## 2021-01-01 RX ADMIN — LOSARTAN POTASSIUM 100 MILLIGRAM(S): 100 TABLET, FILM COATED ORAL at 05:05

## 2021-01-01 RX ADMIN — PREGABALIN 1000 MICROGRAM(S): 225 CAPSULE ORAL at 12:48

## 2021-01-01 RX ADMIN — METFORMIN HYDROCHLORIDE 500 MILLIGRAM(S): 850 TABLET ORAL at 05:49

## 2021-01-01 RX ADMIN — Medication 40 MILLIEQUIVALENT(S): at 12:22

## 2021-01-01 RX ADMIN — CEFTRIAXONE 100 MILLIGRAM(S): 500 INJECTION, POWDER, FOR SOLUTION INTRAMUSCULAR; INTRAVENOUS at 08:38

## 2021-01-01 RX ADMIN — Medication 1 TABLET(S): at 22:07

## 2021-01-01 RX ADMIN — Medication 1 TABLET(S): at 05:05

## 2021-01-01 RX ADMIN — Medication 0.2 MILLIGRAM(S): at 13:35

## 2021-01-01 RX ADMIN — Medication 100 MILLIGRAM(S): at 06:10

## 2021-01-01 RX ADMIN — Medication 1 TABLET(S): at 05:53

## 2021-01-01 RX ADMIN — LOSARTAN POTASSIUM 100 MILLIGRAM(S): 100 TABLET, FILM COATED ORAL at 05:22

## 2021-01-01 RX ADMIN — Medication 100 MILLIGRAM(S): at 21:45

## 2021-01-01 RX ADMIN — METFORMIN HYDROCHLORIDE 500 MILLIGRAM(S): 850 TABLET ORAL at 17:26

## 2021-01-01 RX ADMIN — Medication 150 MICROGRAM(S): at 05:20

## 2021-01-01 RX ADMIN — Medication 250 MILLIGRAM(S): at 01:45

## 2021-01-01 RX ADMIN — Medication 100 MILLIGRAM(S): at 15:30

## 2021-01-01 RX ADMIN — Medication 0.2 MILLIGRAM(S): at 21:56

## 2021-01-01 RX ADMIN — Medication 0.2 MILLIGRAM(S): at 13:09

## 2021-01-01 RX ADMIN — Medication 81 MILLIGRAM(S): at 13:08

## 2021-01-01 RX ADMIN — ATORVASTATIN CALCIUM 20 MILLIGRAM(S): 80 TABLET, FILM COATED ORAL at 21:19

## 2021-01-01 RX ADMIN — AMLODIPINE BESYLATE 10 MILLIGRAM(S): 2.5 TABLET ORAL at 05:22

## 2021-01-01 RX ADMIN — CEFTRIAXONE 100 MILLIGRAM(S): 500 INJECTION, POWDER, FOR SOLUTION INTRAMUSCULAR; INTRAVENOUS at 18:07

## 2021-01-01 RX ADMIN — ANASTROZOLE 1 MILLIGRAM(S): 1 TABLET ORAL at 14:09

## 2021-01-01 RX ADMIN — Medication 3 MILLILITER(S): at 14:35

## 2021-01-01 RX ADMIN — Medication 100 MILLIGRAM(S): at 21:05

## 2021-01-01 RX ADMIN — METFORMIN HYDROCHLORIDE 500 MILLIGRAM(S): 850 TABLET ORAL at 07:53

## 2021-01-01 RX ADMIN — LOSARTAN POTASSIUM 100 MILLIGRAM(S): 100 TABLET, FILM COATED ORAL at 06:08

## 2021-01-01 RX ADMIN — Medication 0.2 MILLIGRAM(S): at 06:09

## 2021-01-01 RX ADMIN — CARVEDILOL PHOSPHATE 25 MILLIGRAM(S): 80 CAPSULE, EXTENDED RELEASE ORAL at 05:34

## 2021-01-01 RX ADMIN — Medication 100 MILLIGRAM(S): at 05:08

## 2021-01-01 RX ADMIN — Medication 150 MICROGRAM(S): at 11:57

## 2021-01-01 RX ADMIN — Medication 3 MILLILITER(S): at 01:00

## 2021-01-01 RX ADMIN — Medication 3 MILLILITER(S): at 15:26

## 2021-01-01 RX ADMIN — ENOXAPARIN SODIUM 40 MILLIGRAM(S): 100 INJECTION SUBCUTANEOUS at 13:10

## 2021-01-01 RX ADMIN — LOSARTAN POTASSIUM 100 MILLIGRAM(S): 100 TABLET, FILM COATED ORAL at 05:07

## 2021-01-01 RX ADMIN — Medication 100 MILLIGRAM(S): at 21:21

## 2021-01-01 RX ADMIN — CEFTRIAXONE 100 MILLIGRAM(S): 500 INJECTION, POWDER, FOR SOLUTION INTRAMUSCULAR; INTRAVENOUS at 18:06

## 2021-01-01 RX ADMIN — METFORMIN HYDROCHLORIDE 500 MILLIGRAM(S): 850 TABLET ORAL at 18:08

## 2021-01-01 RX ADMIN — ANASTROZOLE 1 MILLIGRAM(S): 1 TABLET ORAL at 12:07

## 2021-01-01 RX ADMIN — Medication 100 MILLIEQUIVALENT(S): at 14:04

## 2021-01-01 RX ADMIN — Medication 40 MILLIGRAM(S): at 05:53

## 2021-01-01 RX ADMIN — Medication 1 TABLET(S): at 05:22

## 2021-01-01 RX ADMIN — Medication 1 TABLET(S): at 21:21

## 2021-01-01 RX ADMIN — Medication 150 MICROGRAM(S): at 05:08

## 2021-01-01 RX ADMIN — Medication 1 TABLET(S): at 06:10

## 2021-01-01 RX ADMIN — Medication 1 TABLET(S): at 14:34

## 2021-01-01 RX ADMIN — Medication 1 TABLET(S): at 13:57

## 2021-01-01 RX ADMIN — Medication 1 TABLET(S): at 21:36

## 2021-01-01 RX ADMIN — CEFTRIAXONE 100 MILLIGRAM(S): 500 INJECTION, POWDER, FOR SOLUTION INTRAMUSCULAR; INTRAVENOUS at 17:29

## 2021-01-01 RX ADMIN — Medication 81 MILLIGRAM(S): at 12:48

## 2021-01-01 RX ADMIN — CARVEDILOL PHOSPHATE 25 MILLIGRAM(S): 80 CAPSULE, EXTENDED RELEASE ORAL at 07:04

## 2021-01-01 RX ADMIN — Medication 3 MILLILITER(S): at 19:56

## 2021-01-01 RX ADMIN — Medication 0.2 MILLIGRAM(S): at 21:05

## 2021-01-01 RX ADMIN — Medication 0.2 MILLIGRAM(S): at 13:05

## 2021-01-01 RX ADMIN — METFORMIN HYDROCHLORIDE 500 MILLIGRAM(S): 850 TABLET ORAL at 08:14

## 2021-01-01 RX ADMIN — LOSARTAN POTASSIUM 100 MILLIGRAM(S): 100 TABLET, FILM COATED ORAL at 05:49

## 2021-01-01 RX ADMIN — Medication 100 MILLIGRAM(S): at 14:34

## 2021-01-01 RX ADMIN — SENNA PLUS 2 TABLET(S): 8.6 TABLET ORAL at 21:23

## 2021-01-01 RX ADMIN — LOSARTAN POTASSIUM 100 MILLIGRAM(S): 100 TABLET, FILM COATED ORAL at 05:26

## 2021-01-01 RX ADMIN — Medication 100 MILLIGRAM(S): at 13:05

## 2021-01-01 RX ADMIN — ATORVASTATIN CALCIUM 20 MILLIGRAM(S): 80 TABLET, FILM COATED ORAL at 21:55

## 2021-01-01 RX ADMIN — Medication 0.2 MILLIGRAM(S): at 05:53

## 2021-01-01 RX ADMIN — Medication 0.2 MILLIGRAM(S): at 05:21

## 2021-01-01 RX ADMIN — LOSARTAN POTASSIUM 100 MILLIGRAM(S): 100 TABLET, FILM COATED ORAL at 05:20

## 2021-01-01 RX ADMIN — Medication 0.2 MILLIGRAM(S): at 21:36

## 2021-01-01 RX ADMIN — Medication 0.2 MILLIGRAM(S): at 21:55

## 2021-01-01 RX ADMIN — Medication 650 MILLIGRAM(S): at 18:35

## 2021-01-01 RX ADMIN — Medication 81 MILLIGRAM(S): at 11:38

## 2021-01-01 RX ADMIN — ANASTROZOLE 1 MILLIGRAM(S): 1 TABLET ORAL at 12:11

## 2021-01-01 RX ADMIN — CARVEDILOL PHOSPHATE 25 MILLIGRAM(S): 80 CAPSULE, EXTENDED RELEASE ORAL at 06:20

## 2021-01-01 RX ADMIN — PIPERACILLIN AND TAZOBACTAM 3.38 GRAM(S): 4; .5 INJECTION, POWDER, LYOPHILIZED, FOR SOLUTION INTRAVENOUS at 01:39

## 2021-01-01 RX ADMIN — Medication 40 MILLIEQUIVALENT(S): at 21:49

## 2021-01-01 RX ADMIN — Medication 3 MILLILITER(S): at 06:48

## 2021-01-01 RX ADMIN — ANASTROZOLE 1 MILLIGRAM(S): 1 TABLET ORAL at 12:22

## 2021-01-01 RX ADMIN — ENOXAPARIN SODIUM 40 MILLIGRAM(S): 100 INJECTION SUBCUTANEOUS at 12:11

## 2021-01-01 RX ADMIN — ANASTROZOLE 1 MILLIGRAM(S): 1 TABLET ORAL at 12:17

## 2021-01-01 RX ADMIN — METFORMIN HYDROCHLORIDE 500 MILLIGRAM(S): 850 TABLET ORAL at 06:09

## 2021-01-01 RX ADMIN — Medication 500 MILLIGRAM(S): at 17:25

## 2021-01-01 RX ADMIN — Medication 81 MILLIGRAM(S): at 12:11

## 2021-01-01 RX ADMIN — PREGABALIN 1000 MICROGRAM(S): 225 CAPSULE ORAL at 12:00

## 2021-01-01 RX ADMIN — AMLODIPINE BESYLATE 10 MILLIGRAM(S): 2.5 TABLET ORAL at 07:04

## 2021-01-01 RX ADMIN — Medication 3 MILLILITER(S): at 14:19

## 2021-01-01 RX ADMIN — Medication 100 MILLIEQUIVALENT(S): at 14:10

## 2021-01-01 RX ADMIN — LOSARTAN POTASSIUM 100 MILLIGRAM(S): 100 TABLET, FILM COATED ORAL at 07:04

## 2021-01-01 RX ADMIN — IRON SUCROSE 100 MILLIGRAM(S): 20 INJECTION, SOLUTION INTRAVENOUS at 17:33

## 2021-01-01 RX ADMIN — Medication 40 MILLIGRAM(S): at 06:10

## 2021-01-01 RX ADMIN — Medication 1 TABLET(S): at 06:20

## 2021-01-01 RX ADMIN — LOSARTAN POTASSIUM 100 MILLIGRAM(S): 100 TABLET, FILM COATED ORAL at 05:39

## 2021-01-01 RX ADMIN — ANASTROZOLE 1 MILLIGRAM(S): 1 TABLET ORAL at 11:23

## 2021-01-01 RX ADMIN — METFORMIN HYDROCHLORIDE 500 MILLIGRAM(S): 850 TABLET ORAL at 17:14

## 2021-01-01 RX ADMIN — Medication 100 MILLIGRAM(S): at 05:53

## 2021-01-01 RX ADMIN — METFORMIN HYDROCHLORIDE 500 MILLIGRAM(S): 850 TABLET ORAL at 17:20

## 2021-01-01 RX ADMIN — Medication 0.2 MILLIGRAM(S): at 21:10

## 2021-01-01 RX ADMIN — Medication 0.2 MILLIGRAM(S): at 15:05

## 2021-01-01 RX ADMIN — Medication 100 MILLIGRAM(S): at 21:36

## 2021-01-01 RX ADMIN — Medication 81 MILLIGRAM(S): at 12:17

## 2021-01-01 RX ADMIN — Medication 100 MILLIGRAM(S): at 21:23

## 2021-01-01 RX ADMIN — Medication 40 MILLIEQUIVALENT(S): at 12:06

## 2021-01-01 RX ADMIN — ATORVASTATIN CALCIUM 20 MILLIGRAM(S): 80 TABLET, FILM COATED ORAL at 21:24

## 2021-01-01 RX ADMIN — Medication 0.2 MILLIGRAM(S): at 06:10

## 2021-01-01 RX ADMIN — Medication 0.2 MILLIGRAM(S): at 14:33

## 2021-01-01 RX ADMIN — Medication 100 MILLIGRAM(S): at 21:57

## 2021-01-01 RX ADMIN — Medication 40 MILLIGRAM(S): at 05:39

## 2021-01-01 RX ADMIN — Medication 0.2 MILLIGRAM(S): at 13:33

## 2021-01-01 RX ADMIN — METFORMIN HYDROCHLORIDE 500 MILLIGRAM(S): 850 TABLET ORAL at 05:05

## 2021-01-01 RX ADMIN — Medication 100 MILLIGRAM(S): at 21:54

## 2021-01-01 RX ADMIN — Medication 20 MILLIEQUIVALENT(S): at 13:33

## 2021-01-01 RX ADMIN — ENOXAPARIN SODIUM 40 MILLIGRAM(S): 100 INJECTION SUBCUTANEOUS at 11:38

## 2021-01-01 RX ADMIN — METFORMIN HYDROCHLORIDE 500 MILLIGRAM(S): 850 TABLET ORAL at 15:58

## 2021-01-01 RX ADMIN — Medication 40 MILLIGRAM(S): at 05:34

## 2021-01-01 RX ADMIN — Medication 650 MILLIGRAM(S): at 00:04

## 2021-01-01 RX ADMIN — Medication 81 MILLIGRAM(S): at 11:23

## 2021-01-01 RX ADMIN — CARVEDILOL PHOSPHATE 25 MILLIGRAM(S): 80 CAPSULE, EXTENDED RELEASE ORAL at 17:16

## 2021-01-01 RX ADMIN — Medication 3 MILLILITER(S): at 14:26

## 2021-01-01 RX ADMIN — Medication 81 MILLIGRAM(S): at 12:07

## 2021-01-01 RX ADMIN — SENNA PLUS 2 TABLET(S): 8.6 TABLET ORAL at 21:04

## 2021-01-01 RX ADMIN — Medication 100 MILLIGRAM(S): at 07:05

## 2021-01-01 RX ADMIN — Medication 1 TABLET(S): at 05:21

## 2021-01-01 RX ADMIN — Medication 0.2 MILLIGRAM(S): at 05:27

## 2021-01-01 RX ADMIN — Medication 100 MILLIGRAM(S): at 05:39

## 2021-01-01 RX ADMIN — LOSARTAN POTASSIUM 100 MILLIGRAM(S): 100 TABLET, FILM COATED ORAL at 11:04

## 2021-01-01 RX ADMIN — Medication 20 MILLIEQUIVALENT(S): at 19:04

## 2021-01-01 RX ADMIN — Medication 100 MILLIGRAM(S): at 14:50

## 2021-01-01 RX ADMIN — Medication 0.2 MILLIGRAM(S): at 14:50

## 2021-01-01 RX ADMIN — SENNA PLUS 2 TABLET(S): 8.6 TABLET ORAL at 21:57

## 2021-01-01 RX ADMIN — Medication 20 MILLIEQUIVALENT(S): at 11:37

## 2021-01-01 RX ADMIN — Medication 300 MILLIGRAM(S): at 12:47

## 2021-01-01 RX ADMIN — Medication 0.2 MILLIGRAM(S): at 05:49

## 2021-01-01 RX ADMIN — ENOXAPARIN SODIUM 40 MILLIGRAM(S): 100 INJECTION SUBCUTANEOUS at 12:17

## 2021-01-01 RX ADMIN — CARVEDILOL PHOSPHATE 25 MILLIGRAM(S): 80 CAPSULE, EXTENDED RELEASE ORAL at 05:22

## 2021-01-01 RX ADMIN — IRON SUCROSE 100 MILLIGRAM(S): 20 INJECTION, SOLUTION INTRAVENOUS at 16:35

## 2021-01-01 RX ADMIN — Medication 500 MILLIGRAM(S): at 23:57

## 2021-01-01 RX ADMIN — Medication 1: at 06:52

## 2021-01-01 RX ADMIN — Medication 0.2 MILLIGRAM(S): at 21:21

## 2021-01-01 RX ADMIN — Medication 81 MILLIGRAM(S): at 11:25

## 2021-01-01 RX ADMIN — ANASTROZOLE 1 MILLIGRAM(S): 1 TABLET ORAL at 13:08

## 2021-01-01 RX ADMIN — Medication 1 TABLET(S): at 21:24

## 2021-01-01 RX ADMIN — Medication 40 MILLIGRAM(S): at 05:22

## 2021-01-01 RX ADMIN — Medication 150 MICROGRAM(S): at 05:27

## 2021-01-01 RX ADMIN — Medication 20 MILLIEQUIVALENT(S): at 16:35

## 2021-01-01 RX ADMIN — METFORMIN HYDROCHLORIDE 500 MILLIGRAM(S): 850 TABLET ORAL at 07:02

## 2021-01-01 RX ADMIN — Medication 100 MILLIGRAM(S): at 15:05

## 2021-01-01 RX ADMIN — Medication 100 MILLIGRAM(S): at 22:06

## 2021-01-01 RX ADMIN — Medication 81 MILLIGRAM(S): at 13:31

## 2021-01-01 RX ADMIN — CARVEDILOL PHOSPHATE 25 MILLIGRAM(S): 80 CAPSULE, EXTENDED RELEASE ORAL at 06:08

## 2021-01-01 RX ADMIN — Medication 100 MILLIGRAM(S): at 07:03

## 2021-01-01 RX ADMIN — CEFTRIAXONE 100 MILLIGRAM(S): 500 INJECTION, POWDER, FOR SOLUTION INTRAMUSCULAR; INTRAVENOUS at 08:10

## 2021-01-01 RX ADMIN — Medication 1 TABLET(S): at 15:05

## 2021-01-01 RX ADMIN — ATORVASTATIN CALCIUM 20 MILLIGRAM(S): 80 TABLET, FILM COATED ORAL at 21:57

## 2021-01-01 RX ADMIN — Medication 3 MILLILITER(S): at 19:39

## 2021-01-01 RX ADMIN — Medication 150 MICROGRAM(S): at 06:10

## 2021-01-01 RX ADMIN — Medication 1 TABLET(S): at 15:02

## 2021-01-01 RX ADMIN — METFORMIN HYDROCHLORIDE 500 MILLIGRAM(S): 850 TABLET ORAL at 06:30

## 2021-01-01 RX ADMIN — LOSARTAN POTASSIUM 100 MILLIGRAM(S): 100 TABLET, FILM COATED ORAL at 06:12

## 2021-01-01 RX ADMIN — PIPERACILLIN AND TAZOBACTAM 25 GRAM(S): 4; .5 INJECTION, POWDER, LYOPHILIZED, FOR SOLUTION INTRAVENOUS at 02:04

## 2021-01-01 RX ADMIN — ANASTROZOLE 1 MILLIGRAM(S): 1 TABLET ORAL at 11:28

## 2021-01-01 RX ADMIN — Medication 1 TABLET(S): at 21:57

## 2021-01-01 RX ADMIN — Medication 150 MICROGRAM(S): at 05:39

## 2021-01-01 RX ADMIN — ENOXAPARIN SODIUM 40 MILLIGRAM(S): 100 INJECTION SUBCUTANEOUS at 12:07

## 2021-01-01 RX ADMIN — METFORMIN HYDROCHLORIDE 500 MILLIGRAM(S): 850 TABLET ORAL at 16:35

## 2021-01-01 RX ADMIN — Medication 0.2 MILLIGRAM(S): at 14:26

## 2021-01-01 RX ADMIN — Medication 100 MILLIGRAM(S): at 21:24

## 2021-01-01 RX ADMIN — Medication 40 MILLIGRAM(S): at 08:52

## 2021-01-01 RX ADMIN — Medication 500 MILLIGRAM(S): at 17:16

## 2021-01-01 RX ADMIN — Medication 150 MICROGRAM(S): at 05:53

## 2021-01-01 RX ADMIN — Medication 500 MILLIGRAM(S): at 12:48

## 2021-01-01 RX ADMIN — ENOXAPARIN SODIUM 40 MILLIGRAM(S): 100 INJECTION SUBCUTANEOUS at 13:08

## 2021-01-01 RX ADMIN — Medication 40 MILLIGRAM(S): at 05:49

## 2021-01-01 RX ADMIN — ENOXAPARIN SODIUM 40 MILLIGRAM(S): 100 INJECTION SUBCUTANEOUS at 11:25

## 2021-01-01 RX ADMIN — CARVEDILOL PHOSPHATE 25 MILLIGRAM(S): 80 CAPSULE, EXTENDED RELEASE ORAL at 17:14

## 2021-01-01 RX ADMIN — Medication 3 MILLILITER(S): at 07:01

## 2021-01-01 RX ADMIN — PIPERACILLIN AND TAZOBACTAM 25 GRAM(S): 4; .5 INJECTION, POWDER, LYOPHILIZED, FOR SOLUTION INTRAVENOUS at 10:15

## 2021-01-01 RX ADMIN — CEFTRIAXONE 100 MILLIGRAM(S): 500 INJECTION, POWDER, FOR SOLUTION INTRAMUSCULAR; INTRAVENOUS at 09:00

## 2021-01-01 RX ADMIN — Medication 20 MILLIEQUIVALENT(S): at 15:58

## 2021-01-01 RX ADMIN — Medication 100 MILLIGRAM(S): at 21:10

## 2021-01-01 RX ADMIN — METFORMIN HYDROCHLORIDE 500 MILLIGRAM(S): 850 TABLET ORAL at 05:21

## 2021-01-01 RX ADMIN — Medication 100 MILLIGRAM(S): at 21:56

## 2021-01-01 RX ADMIN — Medication 81 MILLIGRAM(S): at 11:28

## 2021-01-01 RX ADMIN — Medication 500 MILLIGRAM(S): at 05:07

## 2021-01-01 RX ADMIN — ANASTROZOLE 1 MILLIGRAM(S): 1 TABLET ORAL at 11:38

## 2021-01-01 RX ADMIN — ENOXAPARIN SODIUM 40 MILLIGRAM(S): 100 INJECTION SUBCUTANEOUS at 13:21

## 2021-01-01 RX ADMIN — Medication 0.2 MILLIGRAM(S): at 21:44

## 2021-01-01 RX ADMIN — ENOXAPARIN SODIUM 40 MILLIGRAM(S): 100 INJECTION SUBCUTANEOUS at 11:23

## 2021-01-01 RX ADMIN — Medication 1 TABLET(S): at 21:18

## 2021-01-01 RX ADMIN — Medication 1: at 12:25

## 2021-01-01 RX ADMIN — CARVEDILOL PHOSPHATE 25 MILLIGRAM(S): 80 CAPSULE, EXTENDED RELEASE ORAL at 05:26

## 2021-01-01 RX ADMIN — METFORMIN HYDROCHLORIDE 500 MILLIGRAM(S): 850 TABLET ORAL at 05:39

## 2021-01-01 RX ADMIN — Medication 3 MILLILITER(S): at 07:42

## 2021-01-01 RX ADMIN — CARVEDILOL PHOSPHATE 25 MILLIGRAM(S): 80 CAPSULE, EXTENDED RELEASE ORAL at 18:07

## 2021-01-01 RX ADMIN — Medication 0.2 MILLIGRAM(S): at 21:53

## 2021-01-01 RX ADMIN — Medication 100 MILLIGRAM(S): at 13:08

## 2021-01-01 RX ADMIN — Medication 20 MILLIEQUIVALENT(S): at 12:48

## 2021-01-01 RX ADMIN — Medication 3 MILLILITER(S): at 07:27

## 2021-01-01 RX ADMIN — Medication 1 TABLET(S): at 17:19

## 2021-01-01 RX ADMIN — METFORMIN HYDROCHLORIDE 500 MILLIGRAM(S): 850 TABLET ORAL at 17:23

## 2021-01-01 RX ADMIN — Medication 1 TABLET(S): at 21:23

## 2021-01-01 RX ADMIN — Medication 1 TABLET(S): at 05:26

## 2021-01-05 ENCOUNTER — APPOINTMENT (OUTPATIENT)
Dept: OTOLARYNGOLOGY | Facility: CLINIC | Age: 66
End: 2021-01-05
Payer: MEDICARE

## 2021-01-20 ENCOUNTER — APPOINTMENT (OUTPATIENT)
Dept: OTOLARYNGOLOGY | Facility: CLINIC | Age: 66
End: 2021-01-20
Payer: MEDICARE

## 2021-01-20 VITALS
HEIGHT: 67 IN | SYSTOLIC BLOOD PRESSURE: 159 MMHG | WEIGHT: 205 LBS | HEART RATE: 78 BPM | TEMPERATURE: 97.9 F | DIASTOLIC BLOOD PRESSURE: 89 MMHG | BODY MASS INDEX: 32.18 KG/M2

## 2021-01-20 DIAGNOSIS — S02.85XS: ICD-10-CM

## 2021-01-20 PROCEDURE — 99213 OFFICE O/P EST LOW 20 MIN: CPT

## 2021-01-20 NOTE — PHYSICAL EXAM
[Midline] : trachea located in midline position [Normal] : extraocular movements are normal [de-identified] : no periorbital ecchymosis or edema

## 2021-01-20 NOTE — REASON FOR VISIT
[Subsequent Evaluation] : a subsequent evaluation for [FreeTextEntry2] : s/p ED visit on 12/20/2020 for closed orbital wall fracture

## 2021-01-20 NOTE — END OF VISIT
[FreeTextEntry3] : I personally saw and examined YOSI CORRAL in detail.  I spoke to CORRINE Diaz regarding the assessment and plan of care. I performed the procedures and relevant physical exam.  I have reviewed the above assessment and plan of care and I agree.  I have made changes to the body of the note wherever necessary and appropriate.

## 2021-01-20 NOTE — HISTORY OF PRESENT ILLNESS
[de-identified] : Patient is a 65 year old female who presents s/p fall on 12/20/2020 with closed fracture of right orbital floor. She denies any headaches, changes in vision, and nosebleeds. She reports the numbness is slowly resolving. \par \par She initially had a fall in November which resulted in a small orbital floor fracture on the right.  She had another fall in December (as above) which demonstrated a widening of the previous orbital floor fracture from 4 mm to 7 mm (seen on CT in PACS).  She has some generalized blurry vision, but not new since the accident.  No diplopia.  No periorbital ecchymosis/edema

## 2021-01-20 NOTE — ASSESSMENT
[FreeTextEntry1] : Patient is a 65 year old female who presents s/p ED visit for closed orbital wall fracture, sequelae. Physical examination is notable for extraocular motility and gaze are intact.  She denies diplopia but does appear to me to have some enophthalmos but I suspect this is baseline.  Her orbital floor fracture widened between the two falls. I think it is best she is evaluate by oculoplastics to determine if any intervention is needed.  \par \par Plan\par - referral to Dr. Edna Mcintyre\par - f/u with me as needed.

## 2021-01-20 NOTE — CONSULT LETTER
[Dear  ___] : Dear  [unfilled], [Consult Letter:] : I had the pleasure of evaluating your patient, [unfilled]. [Please see my note below.] : Please see my note below. [Consult Closing:] : Thank you very much for allowing me to participate in the care of this patient.  If you have any questions, please do not hesitate to contact me. [Sincerely,] : Sincerely, [FreeTextEntry3] : Sincerely, \par \par Fariha Zapata MD\par Otolaryngology- Facial Plastics \par [address] [facility] [address]\[place] [address]\par (P) - [phone]\par (F) - [phone]\par \par

## 2021-02-03 ENCOUNTER — INPATIENT (INPATIENT)
Facility: HOSPITAL | Age: 66
LOS: 4 days | Discharge: NURSING, REHAB OR LONG-TERM CARE | DRG: 872 | End: 2021-02-08
Attending: STUDENT IN AN ORGANIZED HEALTH CARE EDUCATION/TRAINING PROGRAM | Admitting: HOSPITALIST
Payer: MEDICARE

## 2021-02-03 VITALS
OXYGEN SATURATION: 94 % | WEIGHT: 179.9 LBS | HEIGHT: 67 IN | TEMPERATURE: 99 F | RESPIRATION RATE: 16 BRPM | DIASTOLIC BLOOD PRESSURE: 81 MMHG | HEART RATE: 97 BPM | SYSTOLIC BLOOD PRESSURE: 134 MMHG

## 2021-02-03 DIAGNOSIS — E07.89 OTHER SPECIFIED DISORDERS OF THYROID: Chronic | ICD-10-CM

## 2021-02-03 DIAGNOSIS — Z98.890 OTHER SPECIFIED POSTPROCEDURAL STATES: Chronic | ICD-10-CM

## 2021-02-03 DIAGNOSIS — Z95.820 PERIPHERAL VASCULAR ANGIOPLASTY STATUS WITH IMPLANTS AND GRAFTS: Chronic | ICD-10-CM

## 2021-02-03 DIAGNOSIS — S09.90XA UNSPECIFIED INJURY OF HEAD, INITIAL ENCOUNTER: ICD-10-CM

## 2021-02-03 LAB
ALBUMIN SERPL ELPH-MCNC: 3.4 G/DL — SIGNIFICANT CHANGE UP (ref 3.3–5)
ALP SERPL-CCNC: 74 U/L — SIGNIFICANT CHANGE UP (ref 40–120)
ALT FLD-CCNC: 31 U/L — SIGNIFICANT CHANGE UP (ref 10–45)
ANION GAP SERPL CALC-SCNC: 7 MMOL/L — SIGNIFICANT CHANGE UP (ref 5–17)
APPEARANCE UR: ABNORMAL
APPEARANCE UR: CLEAR — SIGNIFICANT CHANGE UP
AST SERPL-CCNC: 20 U/L — SIGNIFICANT CHANGE UP (ref 10–40)
BASOPHILS # BLD AUTO: 0 K/UL — SIGNIFICANT CHANGE UP (ref 0–0.2)
BASOPHILS NFR BLD AUTO: 0 % — SIGNIFICANT CHANGE UP (ref 0–2)
BILIRUB SERPL-MCNC: 1 MG/DL — SIGNIFICANT CHANGE UP (ref 0.2–1.2)
BILIRUB UR-MCNC: NEGATIVE — SIGNIFICANT CHANGE UP
BILIRUB UR-MCNC: NEGATIVE — SIGNIFICANT CHANGE UP
BUN SERPL-MCNC: 24 MG/DL — HIGH (ref 7–23)
CALCIUM SERPL-MCNC: 8.5 MG/DL — SIGNIFICANT CHANGE UP (ref 8.4–10.5)
CHLORIDE SERPL-SCNC: 108 MMOL/L — SIGNIFICANT CHANGE UP (ref 96–108)
CK SERPL-CCNC: 280 U/L — HIGH (ref 25–170)
CO2 SERPL-SCNC: 32 MMOL/L — HIGH (ref 22–31)
COLOR SPEC: YELLOW — SIGNIFICANT CHANGE UP
COLOR SPEC: YELLOW — SIGNIFICANT CHANGE UP
CREAT SERPL-MCNC: 1.42 MG/DL — HIGH (ref 0.5–1.3)
DIFF PNL FLD: ABNORMAL
DIFF PNL FLD: ABNORMAL
EOSINOPHIL # BLD AUTO: 0 K/UL — SIGNIFICANT CHANGE UP (ref 0–0.5)
EOSINOPHIL NFR BLD AUTO: 0 % — SIGNIFICANT CHANGE UP (ref 0–6)
GLUCOSE BLDC GLUCOMTR-MCNC: 115 MG/DL — HIGH (ref 70–99)
GLUCOSE BLDC GLUCOMTR-MCNC: 119 MG/DL — HIGH (ref 70–99)
GLUCOSE SERPL-MCNC: 186 MG/DL — HIGH (ref 70–99)
GLUCOSE UR QL: 50 MG/DL
GLUCOSE UR QL: 50 MG/DL
HCT VFR BLD CALC: 36 % — SIGNIFICANT CHANGE UP (ref 34.5–45)
HGB BLD-MCNC: 11.8 G/DL — SIGNIFICANT CHANGE UP (ref 11.5–15.5)
INR BLD: 1.09 RATIO — SIGNIFICANT CHANGE UP (ref 0.88–1.16)
KETONES UR-MCNC: NEGATIVE — SIGNIFICANT CHANGE UP
KETONES UR-MCNC: NEGATIVE — SIGNIFICANT CHANGE UP
LACTATE SERPL-SCNC: 1.2 MMOL/L — SIGNIFICANT CHANGE UP (ref 0.7–2)
LACTATE SERPL-SCNC: 2.4 MMOL/L — HIGH (ref 0.7–2)
LEUKOCYTE ESTERASE UR-ACNC: ABNORMAL
LEUKOCYTE ESTERASE UR-ACNC: ABNORMAL
LYMPHOCYTES # BLD AUTO: 0.51 K/UL — LOW (ref 1–3.3)
LYMPHOCYTES # BLD AUTO: 3 % — LOW (ref 13–44)
MCHC RBC-ENTMCNC: 32.8 GM/DL — SIGNIFICANT CHANGE UP (ref 32–36)
MCHC RBC-ENTMCNC: 32.9 PG — SIGNIFICANT CHANGE UP (ref 27–34)
MCV RBC AUTO: 100.3 FL — HIGH (ref 80–100)
MONOCYTES # BLD AUTO: 0.51 K/UL — SIGNIFICANT CHANGE UP (ref 0–0.9)
MONOCYTES NFR BLD AUTO: 3 % — SIGNIFICANT CHANGE UP (ref 2–14)
NEUTROPHILS # BLD AUTO: 15.75 K/UL — HIGH (ref 1.8–7.4)
NEUTROPHILS NFR BLD AUTO: 77 % — SIGNIFICANT CHANGE UP (ref 43–77)
NITRITE UR-MCNC: POSITIVE
NITRITE UR-MCNC: POSITIVE
PH UR: 7 — SIGNIFICANT CHANGE UP (ref 5–8)
PH UR: 7 — SIGNIFICANT CHANGE UP (ref 5–8)
PLATELET # BLD AUTO: 186 K/UL — SIGNIFICANT CHANGE UP (ref 150–400)
POTASSIUM SERPL-MCNC: 3.5 MMOL/L — SIGNIFICANT CHANGE UP (ref 3.5–5.3)
POTASSIUM SERPL-SCNC: 3.5 MMOL/L — SIGNIFICANT CHANGE UP (ref 3.5–5.3)
PROT SERPL-MCNC: 6.3 G/DL — SIGNIFICANT CHANGE UP (ref 6–8.3)
PROT UR-MCNC: 100
PROT UR-MCNC: 100
PROTHROM AB SERPL-ACNC: 13.1 SEC — SIGNIFICANT CHANGE UP (ref 10.6–13.6)
RBC # BLD: 3.59 M/UL — LOW (ref 3.8–5.2)
RBC # FLD: 16 % — HIGH (ref 10.3–14.5)
SARS-COV-2 RNA SPEC QL NAA+PROBE: SIGNIFICANT CHANGE UP
SODIUM SERPL-SCNC: 147 MMOL/L — HIGH (ref 135–145)
SP GR SPEC: 1 — LOW (ref 1.01–1.02)
SP GR SPEC: 1 — LOW (ref 1.01–1.02)
TROPONIN I SERPL-MCNC: 0.03 NG/ML — SIGNIFICANT CHANGE UP (ref 0.02–0.06)
UROBILINOGEN FLD QL: NEGATIVE — SIGNIFICANT CHANGE UP
UROBILINOGEN FLD QL: NEGATIVE — SIGNIFICANT CHANGE UP
WBC # BLD: 17.12 K/UL — HIGH (ref 3.8–10.5)
WBC # FLD AUTO: 17.12 K/UL — HIGH (ref 3.8–10.5)

## 2021-02-03 PROCEDURE — 99223 1ST HOSP IP/OBS HIGH 75: CPT

## 2021-02-03 PROCEDURE — 72170 X-RAY EXAM OF PELVIS: CPT | Mod: 26

## 2021-02-03 PROCEDURE — 71045 X-RAY EXAM CHEST 1 VIEW: CPT | Mod: 26

## 2021-02-03 PROCEDURE — 70450 CT HEAD/BRAIN W/O DYE: CPT | Mod: 26,MA

## 2021-02-03 PROCEDURE — 93010 ELECTROCARDIOGRAM REPORT: CPT

## 2021-02-03 PROCEDURE — 99285 EMERGENCY DEPT VISIT HI MDM: CPT

## 2021-02-03 RX ORDER — CEFTRIAXONE 500 MG/1
1000 INJECTION, POWDER, FOR SOLUTION INTRAMUSCULAR; INTRAVENOUS EVERY 24 HOURS
Refills: 0 | Status: COMPLETED | OUTPATIENT
Start: 2021-02-04 | End: 2021-02-06

## 2021-02-03 RX ORDER — SODIUM CHLORIDE 9 MG/ML
2000 INJECTION INTRAMUSCULAR; INTRAVENOUS; SUBCUTANEOUS ONCE
Refills: 0 | Status: COMPLETED | OUTPATIENT
Start: 2021-02-03 | End: 2021-02-03

## 2021-02-03 RX ORDER — SENNA PLUS 8.6 MG/1
2 TABLET ORAL AT BEDTIME
Refills: 0 | Status: DISCONTINUED | OUTPATIENT
Start: 2021-02-03 | End: 2021-02-08

## 2021-02-03 RX ORDER — CARVEDILOL PHOSPHATE 80 MG/1
25 CAPSULE, EXTENDED RELEASE ORAL EVERY 12 HOURS
Refills: 0 | Status: DISCONTINUED | OUTPATIENT
Start: 2021-02-03 | End: 2021-02-08

## 2021-02-03 RX ORDER — HEPARIN SODIUM 5000 [USP'U]/ML
5000 INJECTION INTRAVENOUS; SUBCUTANEOUS EVERY 8 HOURS
Refills: 0 | Status: DISCONTINUED | OUTPATIENT
Start: 2021-02-03 | End: 2021-02-08

## 2021-02-03 RX ORDER — DEXTROSE 50 % IN WATER 50 %
15 SYRINGE (ML) INTRAVENOUS ONCE
Refills: 0 | Status: DISCONTINUED | OUTPATIENT
Start: 2021-02-03 | End: 2021-02-08

## 2021-02-03 RX ORDER — ATORVASTATIN CALCIUM 80 MG/1
20 TABLET, FILM COATED ORAL AT BEDTIME
Refills: 0 | Status: DISCONTINUED | OUTPATIENT
Start: 2021-02-03 | End: 2021-02-08

## 2021-02-03 RX ORDER — INSULIN LISPRO 100/ML
VIAL (ML) SUBCUTANEOUS AT BEDTIME
Refills: 0 | Status: DISCONTINUED | OUTPATIENT
Start: 2021-02-03 | End: 2021-02-05

## 2021-02-03 RX ORDER — HYDRALAZINE HCL 50 MG
50 TABLET ORAL THREE TIMES A DAY
Refills: 0 | Status: DISCONTINUED | OUTPATIENT
Start: 2021-02-03 | End: 2021-02-08

## 2021-02-03 RX ORDER — DEXTROSE 50 % IN WATER 50 %
25 SYRINGE (ML) INTRAVENOUS ONCE
Refills: 0 | Status: DISCONTINUED | OUTPATIENT
Start: 2021-02-03 | End: 2021-02-08

## 2021-02-03 RX ORDER — DEXTROSE 50 % IN WATER 50 %
12.5 SYRINGE (ML) INTRAVENOUS ONCE
Refills: 0 | Status: DISCONTINUED | OUTPATIENT
Start: 2021-02-03 | End: 2021-02-08

## 2021-02-03 RX ORDER — ACETAMINOPHEN 500 MG
650 TABLET ORAL ONCE
Refills: 0 | Status: COMPLETED | OUTPATIENT
Start: 2021-02-03 | End: 2021-02-03

## 2021-02-03 RX ORDER — GLUCAGON INJECTION, SOLUTION 0.5 MG/.1ML
1 INJECTION, SOLUTION SUBCUTANEOUS ONCE
Refills: 0 | Status: DISCONTINUED | OUTPATIENT
Start: 2021-02-03 | End: 2021-02-08

## 2021-02-03 RX ORDER — ASPIRIN/CALCIUM CARB/MAGNESIUM 324 MG
81 TABLET ORAL DAILY
Refills: 0 | Status: DISCONTINUED | OUTPATIENT
Start: 2021-02-04 | End: 2021-02-08

## 2021-02-03 RX ORDER — LIDOCAINE 4 G/100G
1 CREAM TOPICAL DAILY
Refills: 0 | Status: DISCONTINUED | OUTPATIENT
Start: 2021-02-03 | End: 2021-02-08

## 2021-02-03 RX ORDER — SODIUM CHLORIDE 9 MG/ML
1000 INJECTION, SOLUTION INTRAVENOUS
Refills: 0 | Status: DISCONTINUED | OUTPATIENT
Start: 2021-02-03 | End: 2021-02-08

## 2021-02-03 RX ORDER — ANASTROZOLE 1 MG/1
1 TABLET ORAL DAILY
Refills: 0 | Status: DISCONTINUED | OUTPATIENT
Start: 2021-02-04 | End: 2021-02-08

## 2021-02-03 RX ORDER — POTASSIUM CHLORIDE 20 MEQ
20 PACKET (EA) ORAL DAILY
Refills: 0 | Status: DISCONTINUED | OUTPATIENT
Start: 2021-02-03 | End: 2021-02-08

## 2021-02-03 RX ORDER — SODIUM CHLORIDE 9 MG/ML
500 INJECTION INTRAMUSCULAR; INTRAVENOUS; SUBCUTANEOUS ONCE
Refills: 0 | Status: DISCONTINUED | OUTPATIENT
Start: 2021-02-03 | End: 2021-02-08

## 2021-02-03 RX ORDER — LEVOTHYROXINE SODIUM 125 MCG
150 TABLET ORAL DAILY
Refills: 0 | Status: DISCONTINUED | OUTPATIENT
Start: 2021-02-03 | End: 2021-02-08

## 2021-02-03 RX ORDER — INSULIN LISPRO 100/ML
VIAL (ML) SUBCUTANEOUS
Refills: 0 | Status: DISCONTINUED | OUTPATIENT
Start: 2021-02-03 | End: 2021-02-05

## 2021-02-03 RX ORDER — AMLODIPINE BESYLATE 2.5 MG/1
10 TABLET ORAL DAILY
Refills: 0 | Status: DISCONTINUED | OUTPATIENT
Start: 2021-02-04 | End: 2021-02-08

## 2021-02-03 RX ORDER — CEFTRIAXONE 500 MG/1
1000 INJECTION, POWDER, FOR SOLUTION INTRAMUSCULAR; INTRAVENOUS ONCE
Refills: 0 | Status: COMPLETED | OUTPATIENT
Start: 2021-02-03 | End: 2021-02-03

## 2021-02-03 RX ORDER — SODIUM CHLORIDE 9 MG/ML
1000 INJECTION INTRAMUSCULAR; INTRAVENOUS; SUBCUTANEOUS ONCE
Refills: 0 | Status: DISCONTINUED | OUTPATIENT
Start: 2021-02-03 | End: 2021-02-03

## 2021-02-03 RX ORDER — ACETAMINOPHEN 500 MG
650 TABLET ORAL EVERY 6 HOURS
Refills: 0 | Status: DISCONTINUED | OUTPATIENT
Start: 2021-02-03 | End: 2021-02-08

## 2021-02-03 RX ORDER — LOSARTAN POTASSIUM 100 MG/1
100 TABLET, FILM COATED ORAL DAILY
Refills: 0 | Status: DISCONTINUED | OUTPATIENT
Start: 2021-02-04 | End: 2021-02-08

## 2021-02-03 RX ADMIN — ATORVASTATIN CALCIUM 20 MILLIGRAM(S): 80 TABLET, FILM COATED ORAL at 21:38

## 2021-02-03 RX ADMIN — Medication 650 MILLIGRAM(S): at 15:58

## 2021-02-03 RX ADMIN — SODIUM CHLORIDE 2000 MILLILITER(S): 9 INJECTION INTRAMUSCULAR; INTRAVENOUS; SUBCUTANEOUS at 11:11

## 2021-02-03 RX ADMIN — HEPARIN SODIUM 5000 UNIT(S): 5000 INJECTION INTRAVENOUS; SUBCUTANEOUS at 21:38

## 2021-02-03 RX ADMIN — Medication 650 MILLIGRAM(S): at 12:23

## 2021-02-03 RX ADMIN — CARVEDILOL PHOSPHATE 25 MILLIGRAM(S): 80 CAPSULE, EXTENDED RELEASE ORAL at 17:09

## 2021-02-03 RX ADMIN — Medication 0.2 MILLIGRAM(S): at 21:38

## 2021-02-03 RX ADMIN — CEFTRIAXONE 100 MILLIGRAM(S): 500 INJECTION, POWDER, FOR SOLUTION INTRAMUSCULAR; INTRAVENOUS at 12:01

## 2021-02-03 RX ADMIN — Medication 50 MILLIGRAM(S): at 21:38

## 2021-02-03 NOTE — ED PROVIDER NOTE - CARE PLAN
Principal Discharge DX:	CHI (closed head injury), initial encounter  Secondary Diagnosis:	Generalized weakness   Principal Discharge DX:	CHI (closed head injury), initial encounter  Secondary Diagnosis:	Generalized weakness  Secondary Diagnosis:	Dehydration  Secondary Diagnosis:	JOEL (acute kidney injury)   Principal Discharge DX:	CHI (closed head injury), initial encounter  Secondary Diagnosis:	Generalized weakness  Secondary Diagnosis:	Dehydration  Secondary Diagnosis:	JOEL (acute kidney injury)  Secondary Diagnosis:	UTI (urinary tract infection)   Principal Discharge DX:	CHI (closed head injury), initial encounter  Secondary Diagnosis:	Generalized weakness  Secondary Diagnosis:	Dehydration  Secondary Diagnosis:	JOEL (acute kidney injury)  Secondary Diagnosis:	UTI (urinary tract infection)  Secondary Diagnosis:	Sepsis  Secondary Diagnosis:	Bandemia

## 2021-02-03 NOTE — H&P ADULT - ATTENDING COMMENTS
Home Medications	  amLODIPine 10 mg oral tablet 1 tab(s) orally once a day	  aspirin 81 mg oral tablet 1 tab(s) orally once a day	  atorvastatin 20 mg oral tablet 1 tab(s) orally once a day	  Augmentin 875 mg-125 mg oral tablet 1 tab(s) orally every 12 hours 	  carvedilol 25 mg oral tablet 1 tab(s) orally 2 times a day	  cloNIDine 0.2 mg oral tablet 1 tab(s) orally 3 times a day	  furosemide 40 mg oral tablet 1 tab(s) orally once a day	  hydrALAZINE 50 mg oral tablet orally 3 times a day	  levothyroxine 125 mcg (0.125 mg) oral tablet 1 tab(s) orally once a day	  losartan 100 mg oral tablet 1 tab(s) orally once a day	  metFORMIN 500 mg oral tablet, extended release 1 tab(s) orally once a day	  potassium chloride 20 mEq oral tablet, extended release orally 4 times a day	  Senna 8.6 mg oral tablet 2 tab(s) orally once a day (at bedtime)	   torsemide 10 mg oral tablet 1 tab(s) orally once a day

## 2021-02-03 NOTE — H&P ADULT - NSHPREVIEWOFSYSTEMS_GEN_ALL_CORE
CONSTITUTIONAL: No fever, weight loss, or fatigue  EYES: No eye pain, visual disturbances, or discharge  ENMT:  No difficulty hearing, tinnitus, vertigo; No sinus or throat pain  NECK: No pain or stiffness  RESPIRATORY: No cough, wheezing, chills or hemoptysis; No shortness of breath  CARDIOVASCULAR: No chest pain, palpitations, dizziness, or leg swelling  GASTROINTESTINAL: No abdominal or epigastric pain. No nausea, vomiting, or hematemesis; No diarrhea or constipation. No melena or hematochezia.  GENITOURINARY: No dysuria, frequency, hematuria, or incontinence  NEUROLOGICAL: No headaches, memory loss, loss of strength, numbness, or tremors  SKIN: No itching, burning, rashes, or lesions   MUSCULOSKELETAL: Upper back ppain; No joint pain or swelling; No muscle, extremity pain  PSYCHIATRIC: No depression, anxiety, mood swings, or difficulty sleeping  ALLERGY AND IMMUNOLOGIC: No hives or eczema    ALL ROS REVIEWED AND NORMAL EXCEPT AS STATED ABOVE

## 2021-02-03 NOTE — ED PROVIDER NOTE - ATTENDING CONTRIBUTION TO CARE
Dr. Richmond: I performed a face to face bedside interview with patient regarding history of present illness, review of symptoms and past medical history. I completed an independent physical exam.  I have discussed patient's plan of care with PA.   I agree with note as stated above, having amended the EMR as needed to reflect my findings.   This includes HISTORY OF PRESENT ILLNESS, HIV, PAST MEDICAL/SURGICAL/FAMILY/SOCIAL HISTORY, ALLERGIES AND HOME MEDICATIONS, REVIEW OF SYSTEMS, PHYSICAL EXAM, and any PROGRESS NOTES during the time I functioned as the attending physician for this patient.    Dr. Richmond: This H&P has been written by myself in its entirety

## 2021-02-03 NOTE — ED PROVIDER NOTE - CLINICAL SUMMARY MEDICAL DECISION MAKING FREE TEXT BOX
Pt with 3 falls, including head injury. Pt appears well and atraumatic, will get CT head, basic labs, reassess Pt with 3 falls, including head injury. Pt appears well and atraumatic, will get CT head, basic labs, reassess  Pt's WBC returned elevated - rectal temp checked and found to be 100.6. Sepsis fluids and abx ordered at this time, as well as sepsis labs. Will admit

## 2021-02-03 NOTE — ED ADULT TRIAGE NOTE - CHIEF COMPLAINT QUOTE
As per EMS, pt fall x2 at assisted living. Pt states her legs gave out and she couldn't stand up. Pt hit her head, but denies LOC. Now c/o posterior head pain. Pt takes ASA daily.

## 2021-02-03 NOTE — ED PROVIDER NOTE - SECONDARY DIAGNOSIS.
Generalized weakness Dehydration JOEL (acute kidney injury) UTI (urinary tract infection) Sepsis Bandemia

## 2021-02-03 NOTE — ED PROVIDER NOTE - OBJECTIVE STATEMENT
Dr. Richmond: 65F PMHx as below sent from assisted living [address] [facility] for generalized weakness and 2 falls. Usually ambulates with walker. During one of the falls pt hit her head, no LOC, unlikely prolonged down time. The other 2 times pt lowered herself to the floor. [facility]  for Assisted Living requesting a work up with labs for the falls.  Pt has had 1st dose of covid vaccine.

## 2021-02-03 NOTE — H&P ADULT - NSHPLABSRESULTS_GEN_ALL_CORE
LABS:                        11.8   17.12 )-----------( 186      ( 2021 10:13 )             36.0     -    147<H>  |  108  |  24<H>  ----------------------------<  186<H>  3.5   |  32<H>  |  1.42<H>    Ca    8.5      2021 10:13    TPro  6.3  /  Alb  3.4  /  TBili  1.0  /  DBili  x   /  AST  20  /  ALT  31  /  AlkPhos  74  02-03    PT/INR - ( 2021 11:38 )   PT: 13.1 sec;   INR: 1.09 ratio           Urinalysis Basic - ( 2021 11:38 )    Color: Yellow / Appearance: Clear / S.005 / pH: x  Gluc: x / Ketone: Negative  / Bili: Negative / Urobili: Negative   Blood: x / Protein: 100 / Nitrite: Positive   Leuk Esterase: Moderate / RBC: 5-10 /HPF / WBC 26-50 /HPF   Sq Epi: x / Non Sq Epi: Neg.-Few / Bacteria: Many /HPF       CAPILLARY BLOOD GLUCOSE      POCT Blood Glucose.: 115 mg/dL (2021 13:20)        Urinalysis Basic - ( 2021 11:38 )    Color: Yellow / Appearance: Clear / S.005 / pH: x  Gluc: x / Ketone: Negative  / Bili: Negative / Urobili: Negative   Blood: x / Protein: 100 / Nitrite: Positive   Leuk Esterase: Moderate / RBC: 5-10 /HPF / WBC 26-50 /HPF   Sq Epi: x / Non Sq Epi: Neg.-Few / Bacteria: Many /HPF        RADIOLOGY & ADDITIONAL TESTS:  Xray Pelvis (21)  Evaluation of the sacrum is limited due to osteopenia and overlying bowel gas.    Xray Chest (21)  No evidence for lobar consolidation or pulmonary edema. No pleural effusion. No pneumothorax. No mediastinal shift. No acute osseous fractures.      CT Head No Cont (21)  Mild right parietal scalp soft tissue swelling.  No acute intracranial hemorrhage or acute territorial infarction.      EKG - NSR, TWI I, aVL, V6,V6; qTC 417 (Last EKG in 2019 reviewed without TWIs)    Care Discussed with Consultants/Other Providers [ x] YES  [ ] NO  Imaging Personally Reviewed:  [X ] YES  [ ] NO

## 2021-02-03 NOTE — H&P ADULT - HISTORY OF PRESENT ILLNESS
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 Patient noted to be weak and have fallen 3 times today then sent to ED. She has had falls before but not so many in a day. Patient fell and hit her head at one point during one of the falls. She was c/o a headache and back pain. She received the COVID vaccine last week. She uses a walker to ambulate.

## 2021-02-03 NOTE — H&P ADULT - NSHPPHYSICALEXAM_GEN_ALL_CORE
T(C): 38.1 (02-03-21 @ 11:08), Max: 38.1 (02-03-21 @ 11:08)  HR: 104 (02-03-21 @ 14:00) (97 - 104)  BP: 177/87 (02-03-21 @ 14:00) (134/81 - 180/84)  RR: 19 (02-03-21 @ 14:00) (16 - 20)  SpO2: 92% (02-03-21 @ 14:00) (92% - 94%)  Wt(kg): --Vital Signs Last 24 Hrs  T(C): 38.1 (03 Feb 2021 11:08), Max: 38.1 (03 Feb 2021 11:08)  T(F): 100.6 (03 Feb 2021 11:08), Max: 100.6 (03 Feb 2021 11:08)  HR: 104 (03 Feb 2021 14:00) (97 - 104)  BP: 177/87 (03 Feb 2021 14:00) (134/81 - 180/84)  BP(mean): 117 (03 Feb 2021 14:00) (11 - 117)  RR: 19 (03 Feb 2021 14:00) (16 - 20)  SpO2: 92% (03 Feb 2021 14:00) (92% - 94%)    PHYSICAL EXAM:  GENERAL: NAD  HENT:  Mild swelling over right side of head, Normocephalic; No tonsillar erythema, exudates, or enlargement; Moist mucous membranes;   EYES: EOMI, PERRLA, conjunctiva and sclera clear, no lid-lag  NECK: Supple, No JVD, Normal thyroid  NERVOUS SYSTEM:  CN II - XII intact; Sensation intact  CHEST/LUNG: Clear to percussion bilaterally; No rales, rhonchi, wheezing, or rubs; normal respiratory effort, no intercostal retractions  HEART: Regular rate and rhythm; No murmurs, rubs, or gallops  ABDOMEN: Soft, Nontender, Nondistended; Bowel sounds present; No HSM  MUSCULOSKELETAL/EXTREMITIES: 1+ B/L pitting edema; 2+ Peripheral Pulses, No clubbing, cyanosis, or peripheral edema; No digital cyanosis  SKIN: No rashes or lesions; normal texture and temperature  PSYCH: Appropriate affect, Alert & Oriented x 2-3

## 2021-02-03 NOTE — ED ADULT NURSE NOTE - OBJECTIVE STATEMENT
As per EMS, pt fall x2 at assisted living. Pt states her legs gave out and she couldn't stand up. Pt hit her head, but denies LOC. Now c/o posterior head pain. Pt on ASA

## 2021-02-03 NOTE — H&P ADULT - ASSESSMENT
[facility] - fall  and hit head    HCP/POA - Fahad Lowe [phone] ([place])  Alternative Gissel Carrasquillo ([phone])  Living WIll for DNR/DNI [facility] - fall  and hit head    HCP/POA - Fahad Lowe [phone] ([place])  Alternative Gissel Carrasquillo ([phone])  Living WIll for DNR/DNI    Fahad Updated, and  PCP notified   65 female with history of DM, HTN, ?early dementia, cognitive impairment, Right breast malignancy on chemo sent from [facility] after 3 falls today. Admitted for falls like due to UTI      #Sepsis due to UTI (POA)  #Multiple Falls  - CT head neg for acute pathology  - UA + for infection  - Leukocytosis, tachycardic to 104, Tmax 100.6  - Given IVFs and Ceftriaxone in the ED  - continue with Ceftriaxone IV  - will hold on further IVFs due to LE swelling  - f/u BCx x 2 and UCx  - PT eval (HCP seems to think she may not need walker and may trip over it)    #Possible Acute Kidney Injury  #Hypernatremia  - given IVFs in ED  -spoke to PCP and patient has hypernatremia and elevated Cr at vaseline  - Monitor BUN/Creatinine levels daily.  - Avoid nephrotoxic medications    #Hypokalemia  - documents on potassium 20meq 4 times per day  - keeping 20meq daily and monitor lab work  - replete and monitor electrolytes as needed    #Macrocytosis  - f/u anemia profile    #DM type II  - holding metformin during admissions  - f/u A1c  - continue ISS    #HTN - uncontrolled  - EKG - with TWIs  - continue amlodipine  - continue aspirin  - continue carvedilol  - continue clonidine  - continue lasix  - continue hydralazine  - continue losartan  - f/u repeat EKGs  - f/u TTE    #HLD  -continue atorvastatin 20 mg oral tablet 1 tab(s) orally once a day	    #Hypothyroidism  - continue synthroid  - f/u TSH    #History of Right Breast Cancer  - continue anastrozole   - outpatient f/u    #Cognitive Impairment  - patient needs to be reoriented when agitated  - continue to monitor    CODE STATUS - DNR/DNI    HCP/POA - Fahad Lowe [phone] ([place])  Alternative Gissel Carrasquillo ([phone])  Living Will for DNR/DNI in Chart as well as HCP and POA    Fahad Updated, and PCP notified at [phone]   65 female with history of DM, HTN, ?early dementia, cognitive impairment, Right breast malignancy on chemo sent from [facility] after 3 falls today. Admitted for falls like due to UTI      #Sepsis due to UTI (POA)  #Multiple Falls  - CT head neg for acute pathology  - UA + for infection  - Leukocytosis, tachycardic to 104, Tmax 100.6  - Given IVFs and Ceftriaxone in the ED  - continue with Ceftriaxone IV  - will hold on further IVFs due to LE swelling  - f/u BCx x 2 and UCx  - PT eval (HCP seems to think she may not need walker and may trip over it)    #Possible Acute Kidney Injury  #Hypernatremia  - given IVFs in ED  -spoke to PCP and patient has hypernatremia and elevated Cr at vaseline  - Monitor BUN/Creatinine levels daily.  - Avoid nephrotoxic medications    #History Hypokalemia  - documents on potassium 20meq 4 times per day  - keeping 20meq daily and monitor lab work  - replete and monitor electrolytes as needed    #Macrocytosis  - f/u anemia profile    #DM type II  - holding metformin during admissions  - f/u A1c  - continue ISS    #HTN - uncontrolled  - EKG - with TWIs  - continue amlodipine  - continue aspirin  - continue carvedilol  - continue clonidine  - continue lasix  - continue hydralazine  - continue losartan  - f/u repeat EKGs  - f/u TTE    #HLD  -continue atorvastatin 20 mg oral tablet 1 tab(s) orally once a day	    #Hypothyroidism  - continue synthroid  - f/u TSH    #History of Right Breast Cancer  - continue anastrozole   - outpatient f/u    #Cognitive Impairment  - patient needs to be reoriented when agitated  - continue to monitor    CODE STATUS - DNR/DNI    HCP/POA - Fahad Lowe [phone] ([place])  Alternative Gissel Carrasquillo ([phone])  Living Will for DNR/DNI in Chart as well as HCP and POA    Fahad Updated, and PCP notified at [phone]    IMPROVE VTE Individual Risk Assessment    RISK                                                                Points    [  ] Previous VTE                                                  3    [  ] Thrombophilia                                               2    [  ] Lower limb paralysis                                      2        (unable to hold up >15 seconds)      [ x ] Current Cancer                                              2         (within 6 months)    [  ] Immobilization > 24 hrs                                1    [  ] ICU/CCU stay > 24 hours                              1    [ x ] Age > 60                                                      1    IMPROVE VTE Score ___3______    IMPROVE Score 2-3: At risk, pharmacologic VTE prophylaxis is indicated for most patients (in the absence of a contraindication) 65 female with history of DM, HTN, ?early dementia, cognitive impairment, Right breast malignancy on chemo sent from [facility] after 3 falls today. Admitted for falls like due to UTI      #Sepsis due to UTI (POA)  #Multiple Falls  - CT head neg for acute pathology  - UA + for infection  - Leukocytosis, tachycardic to 104, Tmax 100.6  - Given IVFs and Ceftriaxone in the ED  - continue with Ceftriaxone IV  - will hold on further IVFs due to LE swelling  - f/u BCx x 2 and UCx  - PT eval (HCP seems to think she may not need walker and may trip over it)    #Possible Acute Kidney Injury on CKD 3b  #Hypernatremia  - given IVFs in ED  -spoke to PCP and patient has hypernatremia and elevated Cr at vaseline  - Monitor BUN/Creatinine levels daily.  - Avoid nephrotoxic medications    #History Hypokalemia  - documents on potassium 20meq 4 times per day  - keeping 20meq daily and monitor lab work  - replete and monitor electrolytes as needed    #Macrocytosis  - f/u anemia profile    #DM type II  - holding metformin during admissions  - f/u A1c  - continue ISS    #HTN - uncontrolled  - EKG - with TWIs  - continue amlodipine  - continue aspirin  - continue carvedilol  - continue clonidine  - continue lasix  - continue hydralazine  - continue losartan  - f/u repeat EKGs  - f/u TTE    #HLD  -continue atorvastatin 20 mg oral tablet 1 tab(s) orally once a day	    #Hypothyroidism  - continue synthroid  - f/u TSH    #History of Right Breast Cancer  - continue anastrozole   - outpatient f/u    #Cognitive Impairment  - patient needs to be reoriented when agitated  - continue to monitor    CODE STATUS - DNR/DNI    HCP/POA - Fahad Lowe [phone] ([place])  Alternative Gissel Carrasquillo ([phone])  Living Will for DNR/DNI in Chart as well as HCP and POA    Fahad Updated, and PCP notified at [phone]    IMPROVE VTE Individual Risk Assessment    RISK                                                                Points    [  ] Previous VTE                                                  3    [  ] Thrombophilia                                               2    [  ] Lower limb paralysis                                      2        (unable to hold up >15 seconds)      [ x ] Current Cancer                                              2         (within 6 months)    [  ] Immobilization > 24 hrs                                1    [  ] ICU/CCU stay > 24 hours                              1    [ x ] Age > 60                                                      1    IMPROVE VTE Score ___3______    IMPROVE Score 2-3: At risk, pharmacologic VTE prophylaxis is indicated for most patients (in the absence of a contraindication) 65 female with history of DM, HTN, ?early dementia, cognitive impairment, Right breast malignancy on chemo sent from [facility] after 3 falls today. Admitted for falls like due to UTI      #Sepsis due to UTI (POA)  #Multiple Falls  - CT head neg for acute pathology  - UA + for infection  - Leukocytosis, tachycardic to 104, Tmax 100.6  - Given IVFs and Ceftriaxone in the ED  - continue with Ceftriaxone IV  - will hold on further IVFs due to LE swelling  - f/u BCx x 2 and UCx  - PT eval (HCP seems to think she may not need walker and may trip over it)    #Possible Acute Kidney Injury on CKD 3b  #Chronic Hypernatremia  - given IVFs in ED  -spoke to PCP and patient has hypernatremia and elevated Cr at vaseline  - Monitor BUN/Creatinine levels daily.  - Avoid nephrotoxic medications    #History Hypokalemia  - documents on potassium 20meq 4 times per day  - keeping 20meq daily and monitor lab work  - replete and monitor electrolytes as needed    #Macrocytosis  - f/u anemia profile    #DM type II  - holding metformin during admissions  - f/u A1c  - continue ISS    #HTN - uncontrolled  - EKG - with TWIs  - continue amlodipine  - continue aspirin  - continue carvedilol  - continue clonidine  - continue lasix  - continue hydralazine  - continue losartan  - f/u repeat EKGs  - f/u TTE    #HLD  -continue atorvastatin 20 mg oral tablet 1 tab(s) orally once a day	    #Hypothyroidism  - continue synthroid  - f/u TSH    #History of Right Breast Cancer  - continue anastrozole   - outpatient f/u    #Cognitive Impairment  - patient needs to be reoriented when agitated  - continue to monitor    CODE STATUS - DNR/DNI    HCP/POA - Fahad Lowe [phone] ([place])  Alternative Giss[facility] Carrasquillo ([phone])  Living Will for DNR/DNI in Chart as well as HCP and POA    Fahad Updated, and PCP notified at [phone]    IMPROVE VTE Individual Risk Assessment    RISK                                                                Points    [  ] Previous VTE                                                  3    [  ] Thrombophilia                                               2    [  ] Lower limb paralysis                                      2        (unable to hold up >15 seconds)      [ x ] Current Cancer                                              2         (within 6 months)    [  ] Immobilization > 24 hrs                                1    [  ] ICU/CCU stay > 24 hours                              1    [ x ] Age > 60                                                      1    IMPROVE VTE Score ___3______    IMPROVE Score 2-3: At risk, pharmacologic VTE prophylaxis is indicated for most patients (in the absence of a contraindication)

## 2021-02-03 NOTE — ED PROVIDER NOTE - PROGRESS NOTE DETAILS
BARBRA Salinas: 66 yo female with h/o breast cancer, thryoid cancer, CAD, DM, HTN, HLD, hypothryoidism, DM BIBA from [address] [facility] s/p fall x 2. Associated with generalized weakness. + head trauma, no LOC. Patient states she was brushing her teeth, her legs gave out and fell. PE: neuro exam unremarkable. no signs of trauma. A/P: ekg, labs, trop, CT head, cxr, reassess.

## 2021-02-03 NOTE — ED ADULT NURSE NOTE - NSIMPLEMENTINTERV_GEN_ALL_ED
Implemented All Fall with Harm Risk Interventions:  [place] to call system. Call bell, personal items and telephone within reach. Instruct patient to call for assistance. Room bathroom lighting operational. Non-slip footwear when patient is off stretcher. Physically safe environment: no spills, clutter or unnecessary equipment. Stretcher in lowest position, wheels locked, appropriate side rails in place. Provide visual cue, wrist band, yellow gown, etc. Monitor gait and stability. Monitor for mental status changes and reorient to person, place, and time. Review medications for side effects contributing to fall risk. Reinforce activity limits and safety measures with patient and family. Provide visual clues: red socks.

## 2021-02-04 LAB
A1C WITH ESTIMATED AVERAGE GLUCOSE RESULT: 6.3 % — HIGH (ref 4–5.6)
ALBUMIN SERPL ELPH-MCNC: 3 G/DL — LOW (ref 3.3–5)
ALP SERPL-CCNC: 75 U/L — SIGNIFICANT CHANGE UP (ref 40–120)
ALT FLD-CCNC: 25 U/L — SIGNIFICANT CHANGE UP (ref 10–45)
ANION GAP SERPL CALC-SCNC: 8 MMOL/L — SIGNIFICANT CHANGE UP (ref 5–17)
ANION GAP SERPL CALC-SCNC: 9 MMOL/L — SIGNIFICANT CHANGE UP (ref 5–17)
AST SERPL-CCNC: 24 U/L — SIGNIFICANT CHANGE UP (ref 10–40)
BILIRUB SERPL-MCNC: 0.8 MG/DL — SIGNIFICANT CHANGE UP (ref 0.2–1.2)
BUN SERPL-MCNC: 16 MG/DL — SIGNIFICANT CHANGE UP (ref 7–23)
BUN SERPL-MCNC: 23 MG/DL — SIGNIFICANT CHANGE UP (ref 7–23)
CALCIUM SERPL-MCNC: 8.6 MG/DL — SIGNIFICANT CHANGE UP (ref 8.4–10.5)
CALCIUM SERPL-MCNC: 8.9 MG/DL — SIGNIFICANT CHANGE UP (ref 8.4–10.5)
CHLORIDE SERPL-SCNC: 108 MMOL/L — SIGNIFICANT CHANGE UP (ref 96–108)
CHLORIDE SERPL-SCNC: 109 MMOL/L — HIGH (ref 96–108)
CHOLEST SERPL-MCNC: 126 MG/DL — SIGNIFICANT CHANGE UP
CK SERPL-CCNC: 323 U/L — HIGH (ref 25–170)
CO2 SERPL-SCNC: 29 MMOL/L — SIGNIFICANT CHANGE UP (ref 22–31)
CO2 SERPL-SCNC: 30 MMOL/L — SIGNIFICANT CHANGE UP (ref 22–31)
CREAT SERPL-MCNC: 0.94 MG/DL — SIGNIFICANT CHANGE UP (ref 0.5–1.3)
CREAT SERPL-MCNC: 1.34 MG/DL — HIGH (ref 0.5–1.3)
E COLI DNA BLD POS QL NAA+NON-PROBE: SIGNIFICANT CHANGE UP
ESTIMATED AVERAGE GLUCOSE: 134 MG/DL — HIGH (ref 68–114)
FERRITIN SERPL-MCNC: 135 NG/ML — SIGNIFICANT CHANGE UP (ref 15–150)
FOLATE SERPL-MCNC: 11.9 NG/ML — SIGNIFICANT CHANGE UP
GLUCOSE BLDC GLUCOMTR-MCNC: 125 MG/DL — HIGH (ref 70–99)
GLUCOSE BLDC GLUCOMTR-MCNC: 131 MG/DL — HIGH (ref 70–99)
GLUCOSE BLDC GLUCOMTR-MCNC: 141 MG/DL — HIGH (ref 70–99)
GLUCOSE BLDC GLUCOMTR-MCNC: 142 MG/DL — HIGH (ref 70–99)
GLUCOSE SERPL-MCNC: 112 MG/DL — HIGH (ref 70–99)
GLUCOSE SERPL-MCNC: 141 MG/DL — HIGH (ref 70–99)
GRAM STN FLD: SIGNIFICANT CHANGE UP
GRAM STN FLD: SIGNIFICANT CHANGE UP
HCT VFR BLD CALC: 37.5 % — SIGNIFICANT CHANGE UP (ref 34.5–45)
HDLC SERPL-MCNC: 41 MG/DL — LOW
HGB BLD-MCNC: 11.7 G/DL — SIGNIFICANT CHANGE UP (ref 11.5–15.5)
IRON SATN MFR SERPL: 17 UG/DL — LOW (ref 30–160)
IRON SATN MFR SERPL: 7 % — LOW (ref 14–50)
LDH SERPL L TO P-CCNC: 338 U/L — HIGH (ref 50–242)
LIPID PNL WITH DIRECT LDL SERPL: 55 MG/DL — SIGNIFICANT CHANGE UP
MAGNESIUM SERPL-MCNC: 2.3 MG/DL — SIGNIFICANT CHANGE UP (ref 1.6–2.6)
MAGNESIUM SERPL-MCNC: 2.3 MG/DL — SIGNIFICANT CHANGE UP (ref 1.6–2.6)
MCHC RBC-ENTMCNC: 31.1 PG — SIGNIFICANT CHANGE UP (ref 27–34)
MCHC RBC-ENTMCNC: 31.2 GM/DL — LOW (ref 32–36)
MCV RBC AUTO: 99.7 FL — SIGNIFICANT CHANGE UP (ref 80–100)
METHOD TYPE: SIGNIFICANT CHANGE UP
NON HDL CHOLESTEROL: 85 MG/DL — SIGNIFICANT CHANGE UP
NRBC # BLD: 0 /100 WBCS — SIGNIFICANT CHANGE UP (ref 0–0)
PHOSPHATE SERPL-MCNC: 2.9 MG/DL — SIGNIFICANT CHANGE UP (ref 2.5–4.5)
PLATELET # BLD AUTO: 155 K/UL — SIGNIFICANT CHANGE UP (ref 150–400)
POTASSIUM SERPL-MCNC: 2.8 MMOL/L — CRITICAL LOW (ref 3.5–5.3)
POTASSIUM SERPL-MCNC: 3.8 MMOL/L — SIGNIFICANT CHANGE UP (ref 3.5–5.3)
POTASSIUM SERPL-SCNC: 2.8 MMOL/L — CRITICAL LOW (ref 3.5–5.3)
POTASSIUM SERPL-SCNC: 3.8 MMOL/L — SIGNIFICANT CHANGE UP (ref 3.5–5.3)
PROT SERPL-MCNC: 6.4 G/DL — SIGNIFICANT CHANGE UP (ref 6–8.3)
RBC # BLD: 3.76 M/UL — LOW (ref 3.8–5.2)
RBC # FLD: 16.4 % — HIGH (ref 10.3–14.5)
SARS-COV-2 IGG SERPL QL IA: NEGATIVE — SIGNIFICANT CHANGE UP
SARS-COV-2 IGM SERPL IA-ACNC: <0.1 INDEX — SIGNIFICANT CHANGE UP
SODIUM SERPL-SCNC: 145 MMOL/L — SIGNIFICANT CHANGE UP (ref 135–145)
SODIUM SERPL-SCNC: 148 MMOL/L — HIGH (ref 135–145)
SPECIMEN SOURCE: SIGNIFICANT CHANGE UP
SPECIMEN SOURCE: SIGNIFICANT CHANGE UP
TIBC SERPL-MCNC: 245 UG/DL — SIGNIFICANT CHANGE UP (ref 220–430)
TRIGL SERPL-MCNC: 151 MG/DL — HIGH
TSH SERPL-MCNC: 4.22 UIU/ML — HIGH (ref 0.27–4.2)
UIBC SERPL-MCNC: 228 UG/DL — SIGNIFICANT CHANGE UP (ref 110–370)
VIT B12 SERPL-MCNC: 208 PG/ML — LOW (ref 232–1245)
WBC # BLD: 15.31 K/UL — HIGH (ref 3.8–10.5)
WBC # FLD AUTO: 15.31 K/UL — HIGH (ref 3.8–10.5)

## 2021-02-04 PROCEDURE — 99222 1ST HOSP IP/OBS MODERATE 55: CPT

## 2021-02-04 PROCEDURE — 99233 SBSQ HOSP IP/OBS HIGH 50: CPT

## 2021-02-04 PROCEDURE — 93306 TTE W/DOPPLER COMPLETE: CPT | Mod: 26

## 2021-02-04 PROCEDURE — 93010 ELECTROCARDIOGRAM REPORT: CPT

## 2021-02-04 RX ORDER — PREGABALIN 225 MG/1
1000 CAPSULE ORAL DAILY
Refills: 0 | Status: DISCONTINUED | OUTPATIENT
Start: 2021-02-04 | End: 2021-02-08

## 2021-02-04 RX ORDER — POTASSIUM CHLORIDE 20 MEQ
40 PACKET (EA) ORAL ONCE
Refills: 0 | Status: COMPLETED | OUTPATIENT
Start: 2021-02-04 | End: 2021-02-04

## 2021-02-04 RX ORDER — POTASSIUM CHLORIDE 20 MEQ
10 PACKET (EA) ORAL ONCE
Refills: 0 | Status: COMPLETED | OUTPATIENT
Start: 2021-02-04 | End: 2021-02-04

## 2021-02-04 RX ADMIN — Medication 0.2 MILLIGRAM(S): at 21:06

## 2021-02-04 RX ADMIN — HEPARIN SODIUM 5000 UNIT(S): 5000 INJECTION INTRAVENOUS; SUBCUTANEOUS at 06:20

## 2021-02-04 RX ADMIN — CEFTRIAXONE 100 MILLIGRAM(S): 500 INJECTION, POWDER, FOR SOLUTION INTRAMUSCULAR; INTRAVENOUS at 11:33

## 2021-02-04 RX ADMIN — Medication 40 MILLIEQUIVALENT(S): at 10:32

## 2021-02-04 RX ADMIN — Medication 50 MILLIGRAM(S): at 06:20

## 2021-02-04 RX ADMIN — Medication 50 MILLIGRAM(S): at 15:45

## 2021-02-04 RX ADMIN — Medication 81 MILLIGRAM(S): at 11:34

## 2021-02-04 RX ADMIN — ANASTROZOLE 1 MILLIGRAM(S): 1 TABLET ORAL at 11:34

## 2021-02-04 RX ADMIN — HEPARIN SODIUM 5000 UNIT(S): 5000 INJECTION INTRAVENOUS; SUBCUTANEOUS at 21:06

## 2021-02-04 RX ADMIN — CARVEDILOL PHOSPHATE 25 MILLIGRAM(S): 80 CAPSULE, EXTENDED RELEASE ORAL at 06:20

## 2021-02-04 RX ADMIN — Medication 100 MILLIEQUIVALENT(S): at 10:08

## 2021-02-04 RX ADMIN — Medication 150 MICROGRAM(S): at 06:20

## 2021-02-04 RX ADMIN — Medication 50 MILLIGRAM(S): at 21:06

## 2021-02-04 RX ADMIN — Medication 650 MILLIGRAM(S): at 15:45

## 2021-02-04 RX ADMIN — ATORVASTATIN CALCIUM 20 MILLIGRAM(S): 80 TABLET, FILM COATED ORAL at 21:06

## 2021-02-04 RX ADMIN — Medication 0.2 MILLIGRAM(S): at 06:20

## 2021-02-04 RX ADMIN — Medication 0.2 MILLIGRAM(S): at 15:45

## 2021-02-04 RX ADMIN — HEPARIN SODIUM 5000 UNIT(S): 5000 INJECTION INTRAVENOUS; SUBCUTANEOUS at 15:45

## 2021-02-04 RX ADMIN — CARVEDILOL PHOSPHATE 25 MILLIGRAM(S): 80 CAPSULE, EXTENDED RELEASE ORAL at 17:06

## 2021-02-04 RX ADMIN — LOSARTAN POTASSIUM 100 MILLIGRAM(S): 100 TABLET, FILM COATED ORAL at 06:20

## 2021-02-04 RX ADMIN — AMLODIPINE BESYLATE 10 MILLIGRAM(S): 2.5 TABLET ORAL at 06:20

## 2021-02-04 NOTE — PROGRESS NOTE ADULT - SUBJECTIVE AND OBJECTIVE BOX
Dr. Sigala Hospitalist Progress Note  YOSI CORRAL [id number]    Patient is a 65y old  Female who presents with a chief complaint of Falls (2021 12:19)    Interval: patient seen and evaluated at bedside. no overnight events.     HPI:  65 female with history of DM, HTN, HLD, ?early dementia, cognitive impairment, Right breast malignancy on chemo sent from [facility] after 3 falls today. Patient noted to be weak and have fallen 3 times today then sent to ED. She has had falls before but not so many in a day. Patient fell and hit her head at one point during one of the falls. She was c/o a headache and back pain. She received the COVID vaccine last week. She uses a walker to ambulate. (2021 12:19)      ROS:  CONSTITUTIONAL:  No distress.no fever/chills/fatigue/weight loss  HEENT:  Eyes:  No diplopia or blurred vision.   CARDIOVASCULAR:  No pressure, squeezing, tightness, heaviness or aching about the chest; no palpitations. + b/l leg swelling, no orthopnea or PND  RESPIRATORY:  no SOB. no wheezing.no cough or sputum.  No hemoptysis  GI: no nausea, no vomiting, no diarrhea, no constipation. No hematochezia or melena  EXT:No joint pain or joint swelling or redness  SKIN: no skin break or ulcer. No cellulitis.   CNS: No headaches. No weakness.no numbness. No depression or anxiety. No SI    T(C): 37.3 (21 @ 20:04), Max: 38.1 (21 @ 11:08)  HR: 98 (21 @ 20:04) (98 - 104)  BP: 142/88 (21 @ 20:04) (142/88 - 177/87)  RR: 20 (21 @ 20:04) (19 - 22)  SpO2: 92% (21 @ 20:04) (91% - 93%)    21 @ 07:01  -  21 @ 07:00  --------------------------------------------------------  IN: 360 mL / OUT: 0 mL / NET: 360 mL    CAPILLARY BLOOD GLUCOSE      POCT Blood Glucose.: 125 mg/dL (2021 07:25)  POCT Blood Glucose.: 119 mg/dL (2021 16:03)  POCT Blood Glucose.: 115 mg/dL (2021 13:20)      Physical Exam:  GENERAL: Not in distress. Alert    HEENT:  mild swelling over right posterior head. Normocephalic and atraumatic. PEARLA,EOMI  NECK: Supple.  No JVD.    CARDIOVASCULAR: RRR S1, S2. No murmur/rubs/gallop  LUNGS: BLAE+, no rales, no wheezing, no rhonchi.    ABDOMEN: ND. Soft,  NT, no guarding / rebound / rigidity. BS normoactive. No CVA tenderness.    BACK: No spine tenderness.  EXTREMITIES: no cyanosis, no clubbing, +1 b/l pitting edema  SKIN: no rash. No skin break or ulcer. No cellulitis.  NEUROLOGIC: AAO*3.strength is symmetric, sensation intact, speech fluent.    PSYCHIATRIC: Calm.  No agitation.    Labs                        11.7   15.31 )-----------( 155      ( 2021 06:10 )             37.5     02-04    148<H>  |  109<H>  |  16  ----------------------------<  112<H>  2.8<LL>   |  30  |  0.94    Ca    8.6      2021 06:10  Phos  2.9     02-04  Mg     2.3     02-04    TPro  6.4  /  Alb  3.0<L>  /  TBili  0.8  /  DBili  x   /  AST  24  /  ALT  25  /  AlkPhos  75  02-04   Urinalysis Basic - ( 2021 11:38 )    Color: Yellow / Appearance: Clear / S.005 / pH: x  Gluc: x / Ketone: Negative  / Bili: Negative / Urobili: Negative   Blood: x / Protein: 100 / Nitrite: Positive   Leuk Esterase: Moderate / RBC: 5-10 /HPF / WBC 26-50 /HPF   Sq Epi: x / Non Sq Epi: Neg.-Few / Bacteria: Many /HPF    PT/INR - ( 2021 11:38 )   PT: 13.1 sec;   INR: 1.09 ratio           MEDICATIONS  (STANDING):  amLODIPine   Tablet 10 milliGRAM(s) Oral daily  anastrozole 1 milliGRAM(s) Oral daily  aspirin  chewable 81 milliGRAM(s) Oral daily  atorvastatin 20 milliGRAM(s) Oral at bedtime  carvedilol 25 milliGRAM(s) Oral every 12 hours  cefTRIAXone   IVPB 1000 milliGRAM(s) IV Intermittent every 24 hours  cloNIDine 0.2 milliGRAM(s) Oral three times a day  dextrose 40% Gel 15 Gram(s) Oral once  dextrose 5%. 1000 milliLiter(s) (50 mL/Hr) IV Continuous <Continuous>  dextrose 5%. 1000 milliLiter(s) (100 mL/Hr) IV Continuous <Continuous>  dextrose 50% Injectable 25 Gram(s) IV Push once  dextrose 50% Injectable 12.5 Gram(s) IV Push once  dextrose 50% Injectable 25 Gram(s) IV Push once  glucagon  Injectable 1 milliGRAM(s) IntraMuscular once  heparin   Injectable 5000 Unit(s) SubCutaneous every 8 hours  hydrALAZINE 50 milliGRAM(s) Oral three times a day  insulin lispro (ADMELOG) corrective regimen sliding scale   SubCutaneous three times a day before meals  insulin lispro (ADMELOG) corrective regimen sliding scale   SubCutaneous at bedtime  levothyroxine 150 MICROGram(s) Oral daily  lidocaine   Patch 1 Patch Transdermal daily  losartan 100 milliGRAM(s) Oral daily  potassium chloride    Tablet ER 20 milliEquivalent(s) Oral daily  sodium chloride 0.9% Bolus 500 milliLiter(s) IV Bolus once    MEDICATIONS  (PRN):  acetaminophen   Tablet .. 650 milliGRAM(s) Oral every 6 hours PRN Temp greater or equal to 38C (100.4F), Mild Pain (1 - 3)  senna 2 Tablet(s) Oral at bedtime PRN Constipation          Culture - Blood (collected 21 @ 11:38)  Source: .Blood Blood-Peripheral  Gram Stain (21 @ 02:21):    Growth in aerobic and anaerobic bottles: Gram Negative Rods  Preliminary Report (21 @ 02:22):    Growth in aerobic and anaerobic bottles: Gram Negative Rods    Culture - Blood (collected 21 @ 11:38)  Source: .Blood Blood-Peripheral  Gram Stain (21 @ 02:21):    Growth in aerobic and anaerobic bottles: Gram Negative Rods  Preliminary Report (21 @ 02:21):    Growth in aerobic and anaerobic bottles: Gram Negative Rods    ***Blood Panel PCR results on this specimen are available    approximately 3 hours after the Gram stain result.***    Gram stain, PCR, and/or culture results may not always    correspond due todifference in methodologies.    ************************************************************    This PCR assay was performed by multiplex PCR. This    Assay tests for 66 bacterial and resistance gene targets.    Please refer to the [facility] testdirectory    at [email or web address] for details.  Organism: Blood Culture PCR (21 @ 04:22)  Organism: Blood Culture PCR (21 @ 04:22)      -  Escherichia coli: Detec      Method Type: PCR      Dr. Sigala Hospitalist Progress Note  YOSI CORRAL [id number]    Patient is a 65y old  Female who presents with a chief complaint of Falls (2021 12:19)    Interval: patient seen and evaluated at bedside. no overnight events. denied complaints    HPI:  65 female with history of DM, HTN, HLD, ?early dementia, cognitive impairment, Right breast malignancy on chemo sent from [facility] after 3 falls today. Patient noted to be weak and have fallen 3 times today then sent to ED. She has had falls before but not so many in a day. Patient fell and hit her head at one point during one of the falls. She was c/o a headache and back pain. She received the COVID vaccine last week. She uses a walker to ambulate. (2021 12:19)      ROS:  CONSTITUTIONAL:  No distress.no fever/chills/fatigue/weight loss  HEENT:  Eyes:  No diplopia or blurred vision.   CARDIOVASCULAR:  No pressure, squeezing, tightness, heaviness or aching about the chest; no palpitations. + b/l leg swelling, no orthopnea or PND  RESPIRATORY:  no SOB. no wheezing.no cough or sputum.  No hemoptysis  GI: no abd pain, no nausea, no vomiting, no diarrhea, no constipation. No hematochezia or melena  EXT:No leg or calf pain  SKIN: no skin break or ulcer. No rash   CNS: No headaches. No weakness. no numbness. No depression or anxiety. No SI    T(C): 37.3 (21 @ 20:04), Max: 38.1 (21 @ 11:08)  HR: 98 (21 @ 20:04) (98 - 104)  BP: 142/88 (21 @ 20:04) (142/88 - 177/87)  RR: 20 (21 @ 20:04) (19 - 22)  SpO2: 92% (21 @ 20:04) (91% - 93%)    21 @ 07:01  -  21 @ 07:00  --------------------------------------------------------  IN: 360 mL / OUT: 0 mL / NET: 360 mL    CAPILLARY BLOOD GLUCOSE      POCT Blood Glucose.: 125 mg/dL (2021 07:25)  POCT Blood Glucose.: 119 mg/dL (2021 16:03)  POCT Blood Glucose.: 115 mg/dL (2021 13:20)      Physical Exam:  GENERAL: Not in distress. Alert    HEENT:  mild swelling over right posterior head. Normocephalic and atraumatic. PEARLA,EOMI  NECK: Supple.  No JVD.    CARDIOVASCULAR: RRR S1, S2. No murmur/rubs/gallop  LUNGS: BLAE+, no rales, no wheezing, no rhonchi.    ABDOMEN: ND. Soft,  NT, no guarding / rebound / rigidity. BS normoactive. No CVA tenderness.    EXTREMITIES: no cyanosis, no clubbing, +1 b/l pitting edema  SKIN: no rash.   NEUROLOGIC: AAO*3.strength is symmetric, sensation intact, speech fluent.    PSYCHIATRIC: Calm.  No agitation.    Labs                        11.7   15.31 )-----------( 155      ( 2021 06:10 )             37.5     02-04    148<H>  |  109<H>  |  16  ----------------------------<  112<H>  2.8<LL>   |  30  |  0.94    Ca    8.6      2021 06:10  Phos  2.9     02-04  Mg     2.3     02-04    TPro  6.4  /  Alb  3.0<L>  /  TBili  0.8  /  DBili  x   /  AST  24  /  ALT  25  /  AlkPhos  75  02-04   Urinalysis Basic - ( 2021 11:38 )    Color: Yellow / Appearance: Clear / S.005 / pH: x  Gluc: x / Ketone: Negative  / Bili: Negative / Urobili: Negative   Blood: x / Protein: 100 / Nitrite: Positive   Leuk Esterase: Moderate / RBC: 5-10 /HPF / WBC 26-50 /HPF   Sq Epi: x / Non Sq Epi: Neg.-Few / Bacteria: Many /HPF    PT/INR - ( 2021 11:38 )   PT: 13.1 sec;   INR: 1.09 ratio           MEDICATIONS  (STANDING):  amLODIPine   Tablet 10 milliGRAM(s) Oral daily  anastrozole 1 milliGRAM(s) Oral daily  aspirin  chewable 81 milliGRAM(s) Oral daily  atorvastatin 20 milliGRAM(s) Oral at bedtime  carvedilol 25 milliGRAM(s) Oral every 12 hours  cefTRIAXone   IVPB 1000 milliGRAM(s) IV Intermittent every 24 hours  cloNIDine 0.2 milliGRAM(s) Oral three times a day  dextrose 40% Gel 15 Gram(s) Oral once  dextrose 5%. 1000 milliLiter(s) (50 mL/Hr) IV Continuous <Continuous>  dextrose 5%. 1000 milliLiter(s) (100 mL/Hr) IV Continuous <Continuous>  dextrose 50% Injectable 25 Gram(s) IV Push once  dextrose 50% Injectable 12.5 Gram(s) IV Push once  dextrose 50% Injectable 25 Gram(s) IV Push once  glucagon  Injectable 1 milliGRAM(s) IntraMuscular once  heparin   Injectable 5000 Unit(s) SubCutaneous every 8 hours  hydrALAZINE 50 milliGRAM(s) Oral three times a day  insulin lispro (ADMELOG) corrective regimen sliding scale   SubCutaneous three times a day before meals  insulin lispro (ADMELOG) corrective regimen sliding scale   SubCutaneous at bedtime  levothyroxine 150 MICROGram(s) Oral daily  lidocaine   Patch 1 Patch Transdermal daily  losartan 100 milliGRAM(s) Oral daily  potassium chloride    Tablet ER 20 milliEquivalent(s) Oral daily  sodium chloride 0.9% Bolus 500 milliLiter(s) IV Bolus once    MEDICATIONS  (PRN):  acetaminophen   Tablet .. 650 milliGRAM(s) Oral every 6 hours PRN Temp greater or equal to 38C (100.4F), Mild Pain (1 - 3)  senna 2 Tablet(s) Oral at bedtime PRN Constipation    Culture - Urine (21 @ 11:38)    Specimen Source: .Urine Clean Catch (Midstream)    Culture Results:   >100,000 CFU/ml Gram Negative Rods    Culture - Blood (collected 21 @ 11:38)  Source: .Blood Blood-Peripheral  Gram Stain (21 @ 02:21):    Growth in aerobic and anaerobic bottles: Gram Negative Rods  Preliminary Report (21 @ 02:22):    Growth in aerobic and anaerobic bottles: Gram Negative Rods    Culture - Blood (collected 21 @ 11:38)  Source: .Blood Blood-Peripheral  Gram Stain (21 @ 02:21):    Growth in aerobic and anaerobic bottles: Gram Negative Rods  Preliminary Report (21 @ 02:21):    Growth in aerobic and anaerobic bottles: Gram Negative Rods    ***Blood Panel PCR results on this specimen are available    approximately 3 hours after the Gram stain result.***    Gram stain, PCR, and/or culture results may not always    correspond due todifference in methodologies.    ************************************************************    This PCR assay was performed by multiplex PCR. This    Assay tests for 66 bacterial and resistance gene targets.    Please refer to the [facility] testdirectory    at [email or web address] for details.  Organism: Blood Culture PCR (21 @ 04:22)  Organism: Blood Culture PCR (21 @ 04:22)      -  Escherichia coli: Detec      Method Type: PCR

## 2021-02-04 NOTE — PROVIDER CONTACT NOTE (CRITICAL VALUE NOTIFICATION) - SITUATION
blood culture 2sets collected on feb.3. 2021   results, growth in aerobic and anaerobic bottles gram negative rods.

## 2021-02-04 NOTE — CONSULT NOTE ADULT - SUBJECTIVE AND OBJECTIVE BOX
Chief Complaint:   65 female with history of DM, HTN, HLD, ?early dementia, cognitive impairment, Right breast malignancy on chemo sent from [facility] after 3 falls today. Patient noted to be weak and have fallen 3 times today then sent to ED. She has had falls before but not so many in a day. Patient fell and hit her head at one point during one of the falls. She was c/o a headache and back pain. She received the COVID vaccine last week. She uses a walker to ambulate.     HPI:    PMH:   Diabetes    History of adrenal adenoma    Urinary frequency    Type 2 diabetes mellitus    Osteoarthritis    Cognitive impairment    Coronary artery disease    Cancer of thyroid    Poor historian    Breast CA    Adrenal nodule    Hypothyroid    HLD (hyperlipidemia)    Anxiety    HLD (hyperlipidemia)    HTN (hypertension)      PSH:   S/P dilation and curettage    S/P angioplasty with stent 2/19    H/O bilateral breast biopsy    H/O total thyroidectomy      Family History:  FAMILY HISTORY:  Family history of brain damage (Sibling)  at birth    FHx: lymphoma    FHx: suicide    CAD (coronary artery disease)        Social History:  Smoking:  Alcohol:  Drugs:    Allergies:  No Known Allergies      Medications:  acetaminophen   Tablet .. 650 milliGRAM(s) Oral every 6 hours PRN  amLODIPine   Tablet 10 milliGRAM(s) Oral daily  anastrozole 1 milliGRAM(s) Oral daily  aspirin  chewable 81 milliGRAM(s) Oral daily  atorvastatin 20 milliGRAM(s) Oral at bedtime  carvedilol 25 milliGRAM(s) Oral every 12 hours  cefTRIAXone   IVPB 1000 milliGRAM(s) IV Intermittent every 24 hours  cloNIDine 0.2 milliGRAM(s) Oral three times a day  cyanocobalamin 1000 MICROGram(s) Oral daily  dextrose 40% Gel 15 Gram(s) Oral once  dextrose 5%. 1000 milliLiter(s) IV Continuous <Continuous>  dextrose 5%. 1000 milliLiter(s) IV Continuous <Continuous>  dextrose 50% Injectable 25 Gram(s) IV Push once  dextrose 50% Injectable 12.5 Gram(s) IV Push once  dextrose 50% Injectable 25 Gram(s) IV Push once  glucagon  Injectable 1 milliGRAM(s) IntraMuscular once  heparin   Injectable 5000 Unit(s) SubCutaneous every 8 hours  hydrALAZINE 50 milliGRAM(s) Oral three times a day  insulin lispro (ADMELOG) corrective regimen sliding scale   SubCutaneous three times a day before meals  insulin lispro (ADMELOG) corrective regimen sliding scale   SubCutaneous at bedtime  levothyroxine 150 MICROGram(s) Oral daily  lidocaine   Patch 1 Patch Transdermal daily  losartan 100 milliGRAM(s) Oral daily  potassium chloride    Tablet ER 20 milliEquivalent(s) Oral daily  senna 2 Tablet(s) Oral at bedtime PRN  sodium chloride 0.9% Bolus 500 milliLiter(s) IV Bolus once      REVIEW OF SYSTEMS:  CONSTITUTIONAL: No fever, weight loss, or fatigue  EYES: No eye pain, visual disturbances, or discharge  ENMT:  No difficulty hearing, tinnitus, vertigo; No sinus or throat pain  NECK: No pain or stiffness  BREASTS: No pain, masses, or nipple discharge  RESPIRATORY: No cough, wheezing, chills or hemoptysis; No shortness of breath  CARDIOVASCULAR: No chest pain, palpitations, dizziness, or leg swelling  GASTROINTESTINAL: No abdominal or epigastric pain. No nausea, vomiting, or hematemesis; No diarrhea or constipation. No melena or hematochezia.  GENITOURINARY: No dysuria, frequency, hematuria, or incontinence  NEUROLOGICAL: No headaches, memory loss, loss of strength, numbness, or tremors  SKIN: No itching, burning, rashes, or lesions   LYMPH NODES: No enlarged glands  ENDOCRINE: No heat or cold intolerance; No hair loss  MUSCULOSKELETAL: No joint pain or swelling; No muscle, back, or extremity pain  PSYCHIATRIC: No depression, anxiety, mood swings, or difficulty sleeping  HEME/LYMPH: No easy bruising, or bleeding gums  ALLERY AND IMMUNOLOGIC: No hives or eczema    Physical Exam:  T(C): 37.3 (02-03-21 @ 20:04), Max: 38.1 (02-03-21 @ 15:47)  HR: 98 (02-03-21 @ 20:04) (98 - 99)  BP: 142/88 (02-03-21 @ 20:04) (142/88 - 150/90)  RR: 20 (02-03-21 @ 20:04) (20 - 22)  SpO2: 92% (02-03-21 @ 20:04) (91% - 92%)  Wt(kg): --    GENERAL: NAD, elderly woman  HEAD:  Atraumatic, Normocephalic  EYES: EOMI, conjunctiva and sclera clear  ENT: Moist mucous membranes,  NECK: Supple, No JVD, no bruits  CHEST/LUNG: Clear to percussion bilaterally; No rales, rhonchi, wheezing, or rubs  HEART: Regular rate and rhythm; No murmurs, rubs, or gallops PMI non displaced.  ABDOMEN: Soft, Nontender, Nondistended; Bowel sounds present  EXTREMITIES:  2+ Peripheral Pulses, No clubbing, cyanosis, or edema  SKIN: No rashes or lesions  NERVOUS SYSTEM:  Cranial Nerves II-XII intact     Cardiovascular Diagnostic Testing:  ECG:      < from: 12 Lead ECG (02.04.21 @ 08:17) >  Diagnosis Line Normal sinus rhythm  Left ventricular hypertrophy with repolarization abnormality  Poor R wave progression  Artifact is present.   Abnormal ECG  When compared with ECG of 03-FEB-2021 10:23,  No significant change was found    Confirmed by ROJAS BARNEY MD ([id number]) on 2/4/2021 8:34:20 AM    < end of copied text >    ECHO:    < from: TTE Echo Complete w/o Contrast w/ Doppler (02.04.21 @ 09:20) >  Summary:   1. Left ventricular ejection fraction, by visual estimation, is 65 to 70%.   2. Normal global left ventricular systolic function.   3. Moderately increased LV wall thickness.   4. Normal left ventricular internal cavity size.   5. Spectral Doppler shows impaired relaxation pattern of left ventricular myocardial filling (Grade I diastolic dysfunction).   6. Small pericardial effusion.   7. Degenerative mitral valve.   8. Mild mitral annular calcification.   9. Mild mitral valve regurgitation.  10. Mild thickening and calcification of the anterior and posterior mitral valve leaflets.  11. Mild tricuspid regurgitation.  12. Sclerotic aortic valve with normal opening.  13. Estimated pulmonary artery systolic pressure is 35.4 mmHg assuming a right atrial pressure of 10 mmHg, which is consistent with borderline pulmonary hypertension.  14. 2.3 m/sec subaortic gradient hocm is a consideratin however absent systolic obliteration or obvious CAROLE or a reverse dagger configuration of the left ventricular outflow tract, this is unclear.    [id number] Rojas Barney , Electronically signed on 2/4/2021 at 11:15:29 AM        *** Final ***      ROJAS BARNEY   This document has been electronically signed. Feb 4 2021  9:20AM    < end of copied text >      Labs:                        11.7   15.31 )-----------( 155      ( 04 Feb 2021 06:10 )             37.5     02-04    148<H>  |  109<H>  |  16  ----------------------------<  112<H>  2.8<LL>   |  30  |  0.94    Ca    8.6      04 Feb 2021 06:10  Phos  2.9     02-04  Mg     2.3     02-04    TPro  6.4  /  Alb  3.0<L>  /  TBili  0.8  /  DBili  x   /  AST  24  /  ALT  25  /  AlkPhos  75  02-04    PT/INR - ( 03 Feb 2021 11:38 )   PT: 13.1 sec;   INR: 1.09 ratio         CARDIAC MARKERS ( 04 Feb 2021 06:10 )  x     / x     / 323 U/L / x     / x      CARDIAC MARKERS ( 03 Feb 2021 10:13 )  .034 ng/mL / x     / 280 U/L / x     / x          Thyroid Stimulating Hormone, Serum: 4.22 uIU/mL (02-04 @ 06:10)      Imaging:

## 2021-02-04 NOTE — PHYSICAL THERAPY INITIAL EVALUATION ADULT - WORK/LEISURE ACTIVITY, REHAB EVAL
needs device and assist Cheek Interpolation Flap Text: A decision was made to reconstruct the defect utilizing an interpolation axial flap and a staged reconstruction.  A telfa template was made of the defect.  This telfa template was then used to outline the Cheek Interpolation flap.  The donor area for the pedicle flap was then injected with anesthesia.  The flap was excised through the skin and subcutaneous tissue down to the layer of the underlying musculature.  The interpolation flap was carefully excised within this deep plane to maintain its blood supply.  The edges of the donor site were undermined.   The donor site was closed in a primary fashion.  The pedicle was then rotated into position and sutured.  Once the tube was sutured into place, adequate blood supply was confirmed with blanching and refill.  The pedicle was then wrapped with xeroform gauze and dressed appropriately with a telfa and gauze bandage to ensure continued blood supply and protect the attached pedicle.

## 2021-02-04 NOTE — CONSULT NOTE ADULT - ATTENDING COMMENTS
subaortic gradient  the finding of a subaortic gradient is of questionable significance. HOCM is a consideration given hypertrophic given moderate increased wall thickness however other corroborating features are absent such as systolic obliteration definite CAROLE  or reverse dagger configuration to  the left ventricular outflow tract to suggest a dynamic obstruction. would treat medical illness. cardiac mr would be a consideration if patient deemed a candidate given ongoing medical illness and organic brain syndrome. would likely recommend medical therapy even if HOCM was diagnosed. halter may be helpful to screen for arrythmia.    cardiac stent  continue asa 81

## 2021-02-04 NOTE — PROGRESS NOTE ADULT - ASSESSMENT
65 female with history of DM, HTN, ?early dementia, cognitive impairment, Right breast malignancy on chemo sent from [facility] after 3 falls today. Admitted for falls like due to UTI      #Sepsis due to UTI (POA)  - UA + for infection  - f/u UCx  - Leukocytosis improving  - blood cultures show gram negative rods. will continue with Ceftriaxone IV  - elevated LDH  - will continue to hold on IVFs due to LE swelling    #Multiple Falls  - CT head neg for acute pathology  - PT eval (HCP seems to think she may not need walker and may trip over it)    #Possible Acute Kidney Injury on CKD 3b - improved   #Chronic Hypernatremia  - patient has hypernatremia and elevated Cr at baseline  - Avoid nephrotoxic medications    #History Hypokalemia  - K 2.8 in labs. replacement ordered    #Macrocytosis  - f/u anemia profile    #DM type II  - holding metformin during admissions  - f/u A1c  - continue ISS    #HTN - uncontrolled  - continue amlodipine  - continue aspirin  - continue carvedilol  - continue clonidine  - continue lasix  - continue hydralazine  - continue losartan  - elevated CK. will monitor  - f/u repeat EKGs  - f/u TTE  - EKG unchanged from prior    #HLD  -continue atorvastatin 20 mg oral tablet 1 tab(s) orally once a day	    #Hypothyroidism  - continue synthroid  - f/u TSH    #History of Right Breast Cancer  - continue anastrozole   - outpatient f/u    #Cognitive Impairment  - patient needs to be reoriented when agitated  - continue to monitor    CODE STATUS - DNR/DNI    HCP/POA - Fahad Lowe [phone] ([place])  Alternative Gissel Carrasquillo ([phone])  Living Will for DNR/DNI in Chart as well as HCP and POA    Fahad Updated, and PCP notified at [phone]    IMPROVE VTE Individual Risk Assessment    RISK                                                                Points    [  ] Previous VTE                                                  3    [  ] Thrombophilia                                               2    [  ] Lower limb paralysis                                      2        (unable to hold up >15 seconds)      [ x ] Current Cancer                                              2         (within 6 months)    [  ] Immobilization > 24 hrs                                1    [  ] ICU/CCU stay > 24 hours                              1    [ x ] Age > 60                                                      1    IMPROVE VTE Score ___3______    IMPROVE Score 2-3: At risk, pharmacologic VTE prophylaxis is indicated for most patients (in the absence of a contraindication) 65 female with history of DM, HTN, ?early dementia, cognitive impairment, Right breast malignancy on chemo sent from [facility] after 3 falls today. Admitted for falls like due to UTI      #Sepsis present on admission: now resolved  # UTI   # Gram negative Bacteremia  - UA + for infection  - UCx: GNR  - BC: GNR  - Leukocytosis improving  -  continue with Ceftriaxone IV  - elevated LDH  - off IVF  - f/u sensitivities and adjust Abx.     #Multiple Falls  - CT head neg for acute pathology  - PT eval (HCP seems to think she may not need walker and may trip over it)  - Echo: ?HOCM    # JOEL improved  #Chronic Hypernatremia  - patient has hypernatremia and elevated Cr at baseline  - Avoid nephrotoxic medications  - hydrate    #History Hypokalemia  - K 2.8 in labs. replacement ordered  - repeat BMP in pm and am    #Macrocytosis  - low B12, supplement.   - normal folate.  - Tsh 4.2 needs full panel    #DM type II  - holding metformin during admissions  - A1c 6.3  - continue ISS    #HTN - uncontrolled  - continue amlodipine  - continue aspirin  - continue carvedilol  - continue clonidine  - continue lasix  - continue hydralazine  - continue losartan  - elevated CK. will monitor  - TTE: normal EF. ?HOCM  - EKG unchanged from prior  - cardiology cx    #HLD  -continue atorvastatin 20 mg oral tablet 1 tab(s) orally once a day	    #Hypothyroidism  - continue synthroid  - TSH slightly up.   - repeat TSH by PCP in 4-6 weeks    #History of Right Breast Cancer  - continue anastrozole   - outpatient f/u    #Cognitive Impairment  - patient needs to be reoriented when agitated  - continue to monitor    CODE STATUS - DNR/DNI    HCP/POA - Fahad Lowe [phone] ([place])  Alternative Gissel Carrasquillo ([phone])  Living Will for DNR/DNI in Chart as well as HCP and POA    Fahad Updated, and PCP notified at [phone]

## 2021-02-05 LAB
-  AMIKACIN: SIGNIFICANT CHANGE UP
-  AMIKACIN: SIGNIFICANT CHANGE UP
-  AMOXICILLIN/CLAVULANIC ACID: SIGNIFICANT CHANGE UP
-  AMPICILLIN/SULBACTAM: SIGNIFICANT CHANGE UP
-  AMPICILLIN/SULBACTAM: SIGNIFICANT CHANGE UP
-  AMPICILLIN: SIGNIFICANT CHANGE UP
-  AMPICILLIN: SIGNIFICANT CHANGE UP
-  AZTREONAM: SIGNIFICANT CHANGE UP
-  AZTREONAM: SIGNIFICANT CHANGE UP
-  CEFAZOLIN: SIGNIFICANT CHANGE UP
-  CEFAZOLIN: SIGNIFICANT CHANGE UP
-  CEFEPIME: SIGNIFICANT CHANGE UP
-  CEFEPIME: SIGNIFICANT CHANGE UP
-  CEFOXITIN: SIGNIFICANT CHANGE UP
-  CEFOXITIN: SIGNIFICANT CHANGE UP
-  CEFTRIAXONE: SIGNIFICANT CHANGE UP
-  CEFTRIAXONE: SIGNIFICANT CHANGE UP
-  CIPROFLOXACIN: SIGNIFICANT CHANGE UP
-  CIPROFLOXACIN: SIGNIFICANT CHANGE UP
-  ERTAPENEM: SIGNIFICANT CHANGE UP
-  ERTAPENEM: SIGNIFICANT CHANGE UP
-  GENTAMICIN: SIGNIFICANT CHANGE UP
-  GENTAMICIN: SIGNIFICANT CHANGE UP
-  IMIPENEM: SIGNIFICANT CHANGE UP
-  IMIPENEM: SIGNIFICANT CHANGE UP
-  LEVOFLOXACIN: SIGNIFICANT CHANGE UP
-  LEVOFLOXACIN: SIGNIFICANT CHANGE UP
-  MEROPENEM: SIGNIFICANT CHANGE UP
-  MEROPENEM: SIGNIFICANT CHANGE UP
-  NITROFURANTOIN: SIGNIFICANT CHANGE UP
-  PIPERACILLIN/TAZOBACTAM: SIGNIFICANT CHANGE UP
-  PIPERACILLIN/TAZOBACTAM: SIGNIFICANT CHANGE UP
-  TIGECYCLINE: SIGNIFICANT CHANGE UP
-  TOBRAMYCIN: SIGNIFICANT CHANGE UP
-  TOBRAMYCIN: SIGNIFICANT CHANGE UP
-  TRIMETHOPRIM/SULFAMETHOXAZOLE: SIGNIFICANT CHANGE UP
-  TRIMETHOPRIM/SULFAMETHOXAZOLE: SIGNIFICANT CHANGE UP
ANION GAP SERPL CALC-SCNC: 10 MMOL/L — SIGNIFICANT CHANGE UP (ref 5–17)
BASOPHILS # BLD AUTO: 0.04 K/UL — SIGNIFICANT CHANGE UP (ref 0–0.2)
BASOPHILS NFR BLD AUTO: 0.4 % — SIGNIFICANT CHANGE UP (ref 0–2)
BUN SERPL-MCNC: 19 MG/DL — SIGNIFICANT CHANGE UP (ref 7–23)
CALCIUM SERPL-MCNC: 8 MG/DL — LOW (ref 8.4–10.5)
CHLORIDE SERPL-SCNC: 108 MMOL/L — SIGNIFICANT CHANGE UP (ref 96–108)
CO2 SERPL-SCNC: 27 MMOL/L — SIGNIFICANT CHANGE UP (ref 22–31)
CREAT SERPL-MCNC: 1.22 MG/DL — SIGNIFICANT CHANGE UP (ref 0.5–1.3)
CULTURE RESULTS: SIGNIFICANT CHANGE UP
EOSINOPHIL # BLD AUTO: 0.14 K/UL — SIGNIFICANT CHANGE UP (ref 0–0.5)
EOSINOPHIL NFR BLD AUTO: 1.6 % — SIGNIFICANT CHANGE UP (ref 0–6)
GLUCOSE BLDC GLUCOMTR-MCNC: 146 MG/DL — HIGH (ref 70–99)
GLUCOSE SERPL-MCNC: 121 MG/DL — HIGH (ref 70–99)
HCT VFR BLD CALC: 34.3 % — LOW (ref 34.5–45)
HGB BLD-MCNC: 10.8 G/DL — LOW (ref 11.5–15.5)
IMM GRANULOCYTES NFR BLD AUTO: 1.1 % — SIGNIFICANT CHANGE UP (ref 0–1.5)
LYMPHOCYTES # BLD AUTO: 0.35 K/UL — LOW (ref 1–3.3)
LYMPHOCYTES # BLD AUTO: 3.9 % — LOW (ref 13–44)
MAGNESIUM SERPL-MCNC: 2.1 MG/DL — SIGNIFICANT CHANGE UP (ref 1.6–2.6)
MCHC RBC-ENTMCNC: 31.5 GM/DL — LOW (ref 32–36)
MCHC RBC-ENTMCNC: 31.5 PG — SIGNIFICANT CHANGE UP (ref 27–34)
MCV RBC AUTO: 100 FL — SIGNIFICANT CHANGE UP (ref 80–100)
METHOD TYPE: SIGNIFICANT CHANGE UP
METHOD TYPE: SIGNIFICANT CHANGE UP
MONOCYTES # BLD AUTO: 0.62 K/UL — SIGNIFICANT CHANGE UP (ref 0–0.9)
MONOCYTES NFR BLD AUTO: 6.9 % — SIGNIFICANT CHANGE UP (ref 2–14)
NEUTROPHILS # BLD AUTO: 7.78 K/UL — HIGH (ref 1.8–7.4)
NEUTROPHILS NFR BLD AUTO: 86.1 % — HIGH (ref 43–77)
NRBC # BLD: 0 /100 WBCS — SIGNIFICANT CHANGE UP (ref 0–0)
ORGANISM # SPEC MICROSCOPIC CNT: SIGNIFICANT CHANGE UP
PLATELET # BLD AUTO: 137 K/UL — LOW (ref 150–400)
POTASSIUM SERPL-MCNC: 3.4 MMOL/L — LOW (ref 3.5–5.3)
POTASSIUM SERPL-SCNC: 3.4 MMOL/L — LOW (ref 3.5–5.3)
RBC # BLD: 3.43 M/UL — LOW (ref 3.8–5.2)
RBC # FLD: 16.1 % — HIGH (ref 10.3–14.5)
SODIUM SERPL-SCNC: 145 MMOL/L — SIGNIFICANT CHANGE UP (ref 135–145)
SPECIMEN SOURCE: SIGNIFICANT CHANGE UP
WBC # BLD: 9.03 K/UL — SIGNIFICANT CHANGE UP (ref 3.8–10.5)
WBC # FLD AUTO: 9.03 K/UL — SIGNIFICANT CHANGE UP (ref 3.8–10.5)

## 2021-02-05 PROCEDURE — 99233 SBSQ HOSP IP/OBS HIGH 50: CPT

## 2021-02-05 PROCEDURE — 93227 XTRNL ECG REC<48 HR R&I: CPT

## 2021-02-05 RX ORDER — POTASSIUM CHLORIDE 20 MEQ
20 PACKET (EA) ORAL ONCE
Refills: 0 | Status: COMPLETED | OUTPATIENT
Start: 2021-02-05 | End: 2021-02-05

## 2021-02-05 RX ORDER — FUROSEMIDE 40 MG
40 TABLET ORAL DAILY
Refills: 0 | Status: DISCONTINUED | OUTPATIENT
Start: 2021-02-05 | End: 2021-02-08

## 2021-02-05 RX ADMIN — HEPARIN SODIUM 5000 UNIT(S): 5000 INJECTION INTRAVENOUS; SUBCUTANEOUS at 15:06

## 2021-02-05 RX ADMIN — Medication 50 MILLIGRAM(S): at 06:00

## 2021-02-05 RX ADMIN — Medication 0.2 MILLIGRAM(S): at 06:00

## 2021-02-05 RX ADMIN — Medication 50 MILLIGRAM(S): at 15:06

## 2021-02-05 RX ADMIN — ANASTROZOLE 1 MILLIGRAM(S): 1 TABLET ORAL at 12:44

## 2021-02-05 RX ADMIN — Medication 81 MILLIGRAM(S): at 12:44

## 2021-02-05 RX ADMIN — ATORVASTATIN CALCIUM 20 MILLIGRAM(S): 80 TABLET, FILM COATED ORAL at 20:34

## 2021-02-05 RX ADMIN — Medication 0.2 MILLIGRAM(S): at 15:22

## 2021-02-05 RX ADMIN — CEFTRIAXONE 100 MILLIGRAM(S): 500 INJECTION, POWDER, FOR SOLUTION INTRAMUSCULAR; INTRAVENOUS at 12:44

## 2021-02-05 RX ADMIN — Medication 40 MILLIGRAM(S): at 15:07

## 2021-02-05 RX ADMIN — Medication 0.2 MILLIGRAM(S): at 20:34

## 2021-02-05 RX ADMIN — Medication 50 MILLIGRAM(S): at 20:34

## 2021-02-05 RX ADMIN — PREGABALIN 1000 MICROGRAM(S): 225 CAPSULE ORAL at 12:43

## 2021-02-05 RX ADMIN — HEPARIN SODIUM 5000 UNIT(S): 5000 INJECTION INTRAVENOUS; SUBCUTANEOUS at 06:01

## 2021-02-05 RX ADMIN — HEPARIN SODIUM 5000 UNIT(S): 5000 INJECTION INTRAVENOUS; SUBCUTANEOUS at 20:34

## 2021-02-05 RX ADMIN — Medication 150 MICROGRAM(S): at 06:00

## 2021-02-05 RX ADMIN — Medication 20 MILLIEQUIVALENT(S): at 15:07

## 2021-02-05 RX ADMIN — CARVEDILOL PHOSPHATE 25 MILLIGRAM(S): 80 CAPSULE, EXTENDED RELEASE ORAL at 06:00

## 2021-02-05 RX ADMIN — Medication 20 MILLIEQUIVALENT(S): at 12:43

## 2021-02-05 RX ADMIN — AMLODIPINE BESYLATE 10 MILLIGRAM(S): 2.5 TABLET ORAL at 06:00

## 2021-02-05 RX ADMIN — LOSARTAN POTASSIUM 100 MILLIGRAM(S): 100 TABLET, FILM COATED ORAL at 06:00

## 2021-02-05 NOTE — PROGRESS NOTE ADULT - SUBJECTIVE AND OBJECTIVE BOX
Dr. Sigala Hospitalist Progress Note  [place] [id number]    Patient is a 65y old  Female who presents with a chief complaint of Falls (2021 12:19)    Interval: patient seen and evaluated at bedside. no overnight events. denied complaints    HPI:  65 female with history of DM, HTN, HLD, ?early dementia, cognitive impairment, Right breast malignancy on chemo sent from [facility] after 3 falls today. Patient noted to be weak and have fallen 3 times today then sent to ED. She has had falls before but not so many in a day. Patient fell and hit her head at one point during one of the falls. She was c/o a headache and back pain. She received the COVID vaccine last week. She uses a walker to ambulate. (2021 12:19)      ROS:  CONSTITUTIONAL:  No distress.no fever/chills  CARDIOVASCULAR:  No pressure, squeezing, tightness, heaviness or aching about the chest; no palpitations. + b/l leg swelling, no orthopnoea  RESPIRATORY:  no SOB. no wheezing.no cough or sputum.  No hemoptysis  GI: no abd pain, no nausea, no vomiting, no diarrhea, no constipation. No hematochezia or melena  EXT:No leg or calf pain  SKIN: no skin break or ulcer. No rash   CNS: No headaches. No weakness. no numbness. No depression or anxiety. No SI    MEDICATIONS  (STANDING):  amLODIPine   Tablet 10 milliGRAM(s) Oral daily  anastrozole 1 milliGRAM(s) Oral daily  aspirin  chewable 81 milliGRAM(s) Oral daily  atorvastatin 20 milliGRAM(s) Oral at bedtime  carvedilol 25 milliGRAM(s) Oral every 12 hours  cefTRIAXone   IVPB 1000 milliGRAM(s) IV Intermittent every 24 hours  cloNIDine 0.2 milliGRAM(s) Oral three times a day  cyanocobalamin 1000 MICROGram(s) Oral daily  dextrose 40% Gel 15 Gram(s) Oral once  dextrose 5%. 1000 milliLiter(s) (50 mL/Hr) IV Continuous <Continuous>  dextrose 5%. 1000 milliLiter(s) (100 mL/Hr) IV Continuous <Continuous>  dextrose 50% Injectable 25 Gram(s) IV Push once  dextrose 50% Injectable 12.5 Gram(s) IV Push once  dextrose 50% Injectable 25 Gram(s) IV Push once  glucagon  Injectable 1 milliGRAM(s) IntraMuscular once  heparin   Injectable 5000 Unit(s) SubCutaneous every 8 hours  hydrALAZINE 50 milliGRAM(s) Oral three times a day  levothyroxine 150 MICROGram(s) Oral daily  lidocaine   Patch 1 Patch Transdermal daily  losartan 100 milliGRAM(s) Oral daily  potassium chloride    Tablet ER 20 milliEquivalent(s) Oral daily  sodium chloride 0.9% Bolus 500 milliLiter(s) IV Bolus once    MEDICATIONS  (PRN):  acetaminophen   Tablet .. 650 milliGRAM(s) Oral every 6 hours PRN Temp greater or equal to 38C (100.4F), Mild Pain (1 - 3)  senna 2 Tablet(s) Oral at bedtime PRN Constipation      Vital Signs Last 24 Hrs  T(C): 37.3 (2021 08:30), Max: 38.4 (2021 15:37)  T(F): 99.1 (2021 08:30), Max: 101.1 (2021 15:37)  HR: 71 (2021 08:30) (71 - 93)  BP: 120/75 (2021 08:30) (120/75 - 161/88)  BP(mean): --  RR: 22 (2021 08:30) (18 - 25)  SpO2: 92% (2021 08:30) (92% - 93%)    CAPILLARY BLOOD GLUCOSE      POCT Blood Glucose.: 146 mg/dL (2021 08:17)  POCT Blood Glucose.: 142 mg/dL (2021 21:05)  POCT Blood Glucose.: 131 mg/dL (2021 17:11)  POCT Blood Glucose.: 141 mg/dL (2021 11:39)      Physical Exam:  GENERAL: Not in distress. Alert . morbid obesity  HEENT:  MMM  NECK: Supple.  No JVD.    CARDIOVASCULAR: RRR S1, S2. No murmur/rubs/gallop  LUNGS: BLAE+, no rales, no wheezing, no rhonchi.    ABDOMEN: ND. Soft,  NT, no guarding / rebound / rigidity.   EXTREMITIES: no cyanosis, no clubbing, 1+ b/l pitting edema  SKIN: no rash.   NEUROLOGIC: AAO*3. grossly intact  PSYCHIATRIC: Calm.  No agitation.    Labs                                   10.8   9.03  )-----------( 137      ( 2021 06:44 )             34.3           145  |  108  |  19  ----------------------------<  121<H>  3.4<L>   |  27  |  1.22    Ca    8.0<L>      2021 06:44  Phos  2.9       Mg     2.1         TPro  6.4  /  Alb  3.0<L>  /  TBili  0.8  /  DBili  x   /  AST  24  /  ALT  25  /  AlkPhos  75             Urinalysis Basic - ( 2021 11:38 )    Color: Yellow / Appearance: Clear / S.005 / pH: x  Gluc: x / Ketone: Negative  / Bili: Negative / Urobili: Negative   Blood: x / Protein: 100 / Nitrite: Positive   Leuk Esterase: Moderate / RBC: 5-10 /HPF / WBC 26-50 /HPF   Sq Epi: x / Non Sq Epi: Neg.-Few / Bacteria: Many /HPF    PT/INR - ( 2021 11:38 )   PT: 13.1 sec;   INR: 1.09 ratio           Culture - Urine (21 @ 11:38)    Specimen Source: .Urine Clean Catch (Midstream)    Culture Results:   >100,000 CFU/ml Gram Negative Rods    Culture - Blood (collected 21 @ 11:38)  Source: .Blood Blood-Peripheral  Gram Stain (21 @ 02:21):    Growth in aerobic and anaerobic bottles: Gram Negative Rods  Preliminary Report (21 @ 02:22):    Growth in aerobic and anaerobic bottles: Gram Negative Rods    Culture - Blood (collected 21 @ 11:38)  Source: .Blood Blood-Peripheral  Gram Stain (21 @ 02:21):    Growth in aerobic and anaerobic bottles: Gram Negative Rods  Preliminary Report (21 @ 02:21):    Growth in aerobic and anaerobic bottles: Gram Negative Rods    ***Blood Panel PCR results on this specimen are available    approximately 3 hours after the Gram stain result.***    Gram stain, PCR, and/or culture results may not always    correspond due todifference in methodologies.    ************************************************************    This PCR assay was performed by multiplex PCR. This    Assay tests for 66 bacterial and resistance gene targets.    Please refer to the "[employer] testdirectory    at [email or web address] for details.  Organism: Blood Culture PCR (21 @ 04:22)  Organism: Blood Culture PCR (21 @ 04:22)      -  Escherichia coli: Detec      Method Type: PCR      < from: TTE Echo Complete w/o Contrast w/ Doppler (21 @ 09:20) >  Summary:   1. Left ventricular ejection fraction, by visual estimation, is 65 to 70%.   2. Normal global left ventricular systolic function.   3. Moderately increased LV wall thickness.   4. Normal left ventricular internal cavity size.   5. Spectral Doppler shows impaired relaxation pattern of left ventricular myocardial filling (Grade I diastolic dysfunction).   6. Small pericardial effusion.   7. Degenerative mitral valve.   8. Mild mitral annular calcification.   9. Mild mitral valve regurgitation.  10. Mild thickening and calcification of the anterior and posterior mitral valve leaflets.  11. Mild tricuspid regurgitation.  12. Sclerotic aortic valve with normal opening.  13. Estimated pulmonary artery systolic pressure is 35.4 mmHg assuming a right atrial pressure of 10 mmHg, which is consistent with borderline pulmonary hypertension.  14. 2.3 m/sec subaortic gradient hocm is a consideratin however absent systolic obliteration or obvious CAROLE or a reverse dagger configuration of the left ventricular outflow tract, this is unclear.    [id number] Ricardo Beavers , Electronically signed on 2021 at 11:15:29 AM    < end of copied text >

## 2021-02-05 NOTE — PROGRESS NOTE ADULT - ASSESSMENT
65 female with history of DM, HTN, ?early dementia, cognitive impairment, Right breast malignancy on chemo sent from [facility] after 3 falls today. Admitted for falls like due to UTI      #Sepsis present on admission: now resolved  # UTI   # Gram negative Bacteremia  - UA + for infection  - UCx: E.Coli  - BC: E.coli  - Leukocytosis improved  -  continue with Ceftriaxone IV  - elevated LDH  - off IVF  - f/u sensitivities and adjust Abx.     #Multiple Falls  - CT head neg for acute pathology  - PT eval (HCP seems to think she may not need walker and may trip over it)  - Echo: ?HOCM  - cardio consulted    # Abnormal Echo  - cardio suggested less likely HOCM. may need Holter to r/o arrythmia.  - will place ot tele monitor while inhouse for now    # JOEL likely pre-renal  #Chronic Hypernatremia  - patient has hypernatremia and elevated Cr at baseline  - Avoid nephrotoxic medications  - Encouraged PO hydration  - on lasix 40 and losartan. hold if worsening creatinine    # Hypokalemia  - replacement ordered  - added KCL 20 daily  - likely due to lasix  - repeat BMP in am  - normal Mg  - on losartan    #Macrocytosis  - low B12, supplement.   - normal folate.  - Tsh 4.2     #DM type II  - holding metformin during admissions  - A1c 6.3  - continue ISS    #HTN - uncontrolled  - continue amlodipine  - continue aspirin  - continue carvedilol  - continue clonidine  - continue lasix  - continue hydralazine  - continue losartan  - elevated CK. will monitor  - TTE: normal EF. ?HOCM  - EKG unchanged from prior    #HLD  -continue atorvastatin 20 mg oral tablet 1 tab(s) orally once a day	    #Hypothyroidism  - continue synthroid  - TSH slightly up.   - repeat TSH by PCP in 4-6 weeks    #History of Right Breast Cancer  - continue anastrozole   - outpatient f/u    #Cognitive Impairment  - patient needs to be reoriented when agitated  - continue to monitor    CODE STATUS - DNR/DNI    HCP/POA - Fahad Lowe [phone] ([place])  Alternative Gissel Carrasquillo ([phone])  Living Will for DNR/DNI in Chart as well as HCP and POA    Fahad was Updated,     65 female with history of DM, HTN, ?early dementia, cognitive impairment, Right breast malignancy on chemo sent from [facility] after 3 falls today. Admitted for falls like due to UTI      #Sepsis present on admission: now resolved  # UTI   # Gram negative Bacteremia  - UA + for infection  - UCx: E.Coli  - BC: E.coli  _ repeat Blood cx sent today  - Leukocytosis improved  -  continue with Ceftriaxone IV  - elevated LDH  - off IVF  - f/u sensitivities and adjust Abx.     #Multiple Falls  - CT head neg for acute pathology  - PT eval (HCP seems to think she may not need walker and may trip over it)  - Echo: ?HOCM  - cardio consulted    # Abnormal Echo  - cardio suggested less likely HOCM. may need Holter to r/o arrythmia.  - will place ot tele monitor while inhouse for now    # JOEL likely pre-renal  #Chronic Hypernatremia  - patient has hypernatremia and elevated Cr at baseline  - Avoid nephrotoxic medications  - Encouraged PO hydration  - on lasix 40 and losartan. hold if worsening creatinine    # Hypokalemia  - replacement ordered  - added KCL 20 daily  - likely due to lasix  - repeat BMP in am  - normal Mg  - on losartan    #Macrocytosis  - low B12, supplement.   - normal folate.  - Tsh 4.2     #DM type II  - holding metformin during admissions  - A1c 6.3  - continue ISS    #HTN - uncontrolled  - continue amlodipine  - continue aspirin  - continue carvedilol  - continue clonidine  - continue lasix  - continue hydralazine  - continue losartan  - elevated CK. will monitor  - TTE: normal EF. ?HOCM  - EKG unchanged from prior    #HLD  -continue atorvastatin 20 mg oral tablet 1 tab(s) orally once a day	    #Hypothyroidism  - continue synthroid  - TSH slightly up.   - repeat TSH by PCP in 4-6 weeks    #History of Right Breast Cancer  - continue anastrozole   - outpatient f/u    #Cognitive Impairment  - patient needs to be reoriented when agitated  - continue to monitor    CODE STATUS - DNR/DNI    Dispo: pending clinical course. possible early next week    HCP/POA - Fahad Lowe [phone] ([place])  Alternative Gissel Carrasquillo [phone])  Living Will for DNR/DNI in Chart as well as HCP and POA    Fahad was Updated, He was requesting daily update

## 2021-02-06 LAB
ANION GAP SERPL CALC-SCNC: 9 MMOL/L — SIGNIFICANT CHANGE UP (ref 5–17)
BUN SERPL-MCNC: 21 MG/DL — SIGNIFICANT CHANGE UP (ref 7–23)
CALCIUM SERPL-MCNC: 8.3 MG/DL — LOW (ref 8.4–10.5)
CHLORIDE SERPL-SCNC: 109 MMOL/L — HIGH (ref 96–108)
CO2 SERPL-SCNC: 29 MMOL/L — SIGNIFICANT CHANGE UP (ref 22–31)
CREAT SERPL-MCNC: 1.44 MG/DL — HIGH (ref 0.5–1.3)
GLUCOSE SERPL-MCNC: 178 MG/DL — HIGH (ref 70–99)
HCT VFR BLD CALC: 36.5 % — SIGNIFICANT CHANGE UP (ref 34.5–45)
HGB BLD-MCNC: 11.5 G/DL — SIGNIFICANT CHANGE UP (ref 11.5–15.5)
MAGNESIUM SERPL-MCNC: 2.3 MG/DL — SIGNIFICANT CHANGE UP (ref 1.6–2.6)
MCHC RBC-ENTMCNC: 31.5 GM/DL — LOW (ref 32–36)
MCHC RBC-ENTMCNC: 31.8 PG — SIGNIFICANT CHANGE UP (ref 27–34)
MCV RBC AUTO: 100.8 FL — HIGH (ref 80–100)
NRBC # BLD: 0 /100 WBCS — SIGNIFICANT CHANGE UP (ref 0–0)
PLATELET # BLD AUTO: 177 K/UL — SIGNIFICANT CHANGE UP (ref 150–400)
POTASSIUM SERPL-MCNC: 3.4 MMOL/L — LOW (ref 3.5–5.3)
POTASSIUM SERPL-SCNC: 3.4 MMOL/L — LOW (ref 3.5–5.3)
RBC # BLD: 3.62 M/UL — LOW (ref 3.8–5.2)
RBC # FLD: 15.8 % — HIGH (ref 10.3–14.5)
SODIUM SERPL-SCNC: 147 MMOL/L — HIGH (ref 135–145)
WBC # BLD: 6.63 K/UL — SIGNIFICANT CHANGE UP (ref 3.8–10.5)
WBC # FLD AUTO: 6.63 K/UL — SIGNIFICANT CHANGE UP (ref 3.8–10.5)

## 2021-02-06 PROCEDURE — 99232 SBSQ HOSP IP/OBS MODERATE 35: CPT

## 2021-02-06 RX ORDER — CEFTRIAXONE 500 MG/1
1000 INJECTION, POWDER, FOR SOLUTION INTRAMUSCULAR; INTRAVENOUS EVERY 24 HOURS
Refills: 0 | Status: DISCONTINUED | OUTPATIENT
Start: 2021-02-06 | End: 2021-02-06

## 2021-02-06 RX ORDER — CEFTRIAXONE 500 MG/1
1000 INJECTION, POWDER, FOR SOLUTION INTRAMUSCULAR; INTRAVENOUS EVERY 24 HOURS
Refills: 0 | Status: DISCONTINUED | OUTPATIENT
Start: 2021-02-07 | End: 2021-02-08

## 2021-02-06 RX ORDER — POLYETHYLENE GLYCOL 3350 17 G/17G
17 POWDER, FOR SOLUTION ORAL DAILY
Refills: 0 | Status: DISCONTINUED | OUTPATIENT
Start: 2021-02-06 | End: 2021-02-08

## 2021-02-06 RX ADMIN — SENNA PLUS 2 TABLET(S): 8.6 TABLET ORAL at 20:41

## 2021-02-06 RX ADMIN — Medication 81 MILLIGRAM(S): at 12:17

## 2021-02-06 RX ADMIN — HEPARIN SODIUM 5000 UNIT(S): 5000 INJECTION INTRAVENOUS; SUBCUTANEOUS at 05:18

## 2021-02-06 RX ADMIN — AMLODIPINE BESYLATE 10 MILLIGRAM(S): 2.5 TABLET ORAL at 05:18

## 2021-02-06 RX ADMIN — ATORVASTATIN CALCIUM 20 MILLIGRAM(S): 80 TABLET, FILM COATED ORAL at 20:35

## 2021-02-06 RX ADMIN — Medication 0.2 MILLIGRAM(S): at 13:48

## 2021-02-06 RX ADMIN — Medication 50 MILLIGRAM(S): at 20:35

## 2021-02-06 RX ADMIN — Medication 50 MILLIGRAM(S): at 05:18

## 2021-02-06 RX ADMIN — Medication 0.2 MILLIGRAM(S): at 20:35

## 2021-02-06 RX ADMIN — Medication 20 MILLIEQUIVALENT(S): at 12:17

## 2021-02-06 RX ADMIN — CARVEDILOL PHOSPHATE 25 MILLIGRAM(S): 80 CAPSULE, EXTENDED RELEASE ORAL at 19:08

## 2021-02-06 RX ADMIN — Medication 150 MICROGRAM(S): at 05:18

## 2021-02-06 RX ADMIN — CARVEDILOL PHOSPHATE 25 MILLIGRAM(S): 80 CAPSULE, EXTENDED RELEASE ORAL at 05:18

## 2021-02-06 RX ADMIN — Medication 50 MILLIGRAM(S): at 13:48

## 2021-02-06 RX ADMIN — ANASTROZOLE 1 MILLIGRAM(S): 1 TABLET ORAL at 12:17

## 2021-02-06 RX ADMIN — CEFTRIAXONE 100 MILLIGRAM(S): 500 INJECTION, POWDER, FOR SOLUTION INTRAMUSCULAR; INTRAVENOUS at 12:17

## 2021-02-06 RX ADMIN — HEPARIN SODIUM 5000 UNIT(S): 5000 INJECTION INTRAVENOUS; SUBCUTANEOUS at 20:35

## 2021-02-06 RX ADMIN — Medication 0.2 MILLIGRAM(S): at 05:18

## 2021-02-06 RX ADMIN — LOSARTAN POTASSIUM 100 MILLIGRAM(S): 100 TABLET, FILM COATED ORAL at 05:18

## 2021-02-06 RX ADMIN — PREGABALIN 1000 MICROGRAM(S): 225 CAPSULE ORAL at 12:17

## 2021-02-06 RX ADMIN — Medication 40 MILLIGRAM(S): at 06:05

## 2021-02-06 RX ADMIN — HEPARIN SODIUM 5000 UNIT(S): 5000 INJECTION INTRAVENOUS; SUBCUTANEOUS at 13:48

## 2021-02-06 NOTE — PROGRESS NOTE ADULT - SUBJECTIVE AND OBJECTIVE BOX
Patient is a 65y old  Female who presents with a chief complaint of Falls (2021 11:37)      Patient seen and examined at bedside. Noted with BP 160s/100s in AM, asymptomatic, improved after AM meds    ALLERGIES:  No Known Allergies    MEDICATIONS  (STANDING):  amLODIPine   Tablet 10 milliGRAM(s) Oral daily  anastrozole 1 milliGRAM(s) Oral daily  aspirin  chewable 81 milliGRAM(s) Oral daily  atorvastatin 20 milliGRAM(s) Oral at bedtime  carvedilol 25 milliGRAM(s) Oral every 12 hours  cefTRIAXone   IVPB 1000 milliGRAM(s) IV Intermittent every 24 hours  cloNIDine 0.2 milliGRAM(s) Oral three times a day  cyanocobalamin 1000 MICROGram(s) Oral daily  dextrose 40% Gel 15 Gram(s) Oral once  dextrose 5%. 1000 milliLiter(s) (50 mL/Hr) IV Continuous <Continuous>  dextrose 5%. 1000 milliLiter(s) (100 mL/Hr) IV Continuous <Continuous>  dextrose 50% Injectable 25 Gram(s) IV Push once  dextrose 50% Injectable 12.5 Gram(s) IV Push once  dextrose 50% Injectable 25 Gram(s) IV Push once  furosemide    Tablet 40 milliGRAM(s) Oral daily  glucagon  Injectable 1 milliGRAM(s) IntraMuscular once  heparin   Injectable 5000 Unit(s) SubCutaneous every 8 hours  hydrALAZINE 50 milliGRAM(s) Oral three times a day  levothyroxine 150 MICROGram(s) Oral daily  lidocaine   Patch 1 Patch Transdermal daily  losartan 100 milliGRAM(s) Oral daily  potassium chloride    Tablet ER 20 milliEquivalent(s) Oral daily  sodium chloride 0.9% Bolus 500 milliLiter(s) IV Bolus once    MEDICATIONS  (PRN):  acetaminophen   Tablet .. 650 milliGRAM(s) Oral every 6 hours PRN Temp greater or equal to 38C (100.4F), Mild Pain (1 - 3)  senna 2 Tablet(s) Oral at bedtime PRN Constipation    Vital Signs Last 24 Hrs  T(F): 98.2 (2021 05:00), Max: 99.5 (2021 16:29)  HR: 71 (2021 05:00) (71 - 83)  BP: 161/100 (2021 05:00) (120/75 - 161/100)  RR: 20 (2021 05:00) (20 - 23)  SpO2: 93% (2021 05:00) (92% - 94%)  I&O's Summary    2021 07:01  -  2021 07:00  --------------------------------------------------------  IN: 500 mL / OUT: 0 mL / NET: 500 mL      BMI (kg/m2): 32.9 (21 @ 15:47)  PHYSICAL EXAM:  General: NAD, A/O x 3  ENT: MMM, no scleral icterus  Neck: Supple, No JVD  Lungs: Clear to auscultation bilaterally, no wheezes, rales, rhonchi  Cardio: RRR, S1/S2, No murmurs  Abdomen: Soft, Nontender, Nondistended; Bowel sounds present  Extremities: No calf tenderness, No pitting edema    LABS:                        10.8   9.03  )-----------( 137      ( 2021 06:44 )             34.3       02-05    145  |  108  |  19  ----------------------------<  121  3.4   |  27  |  1.22    Ca    8.0      2021 06:44  Phos  2.9     -  Mg     2.1     -05    TPro  6.4  /  Alb  3.0  /  TBili  0.8  /  DBili  x   /  AST  24  /  ALT  25  /  AlkPhos  75  02-04     eGFR if African American: 53 mL/min/1.73M2 (21 @ 06:44)  eGFR if Non African American: 46 mL/min/1.73M2 (21 @ 06:44)    PT/INR - ( 2021 11:38 )   PT: 13.1 sec;   INR: 1.09 ratio            Lactate, Blood: 1.2 mmol/L ( @ 16:00)  Lactate, Blood: 2.4 mmol/L ( @ 11:38)    CARDIAC MARKERS ( 2021 06:10 )  x     / x     / 323 U/L / x     / x      CARDIAC MARKERS ( 2021 10:13 )  .034 ng/mL / x     / 280 U/L / x     / x          02-04 Chol 126 mg/dL LDL -- HDL 41 mg/dL Trig 151 mg/dL  TSH 4.22   TSH with FT4 reflex --  Total T3 --              POCT Blood Glucose.: 146 mg/dL (2021 08:17)      Urinalysis Basic - ( 2021 11:38 )    Color: Yellow / Appearance: Clear / S.005 / pH: x  Gluc: x / Ketone: Negative  / Bili: Negative / Urobili: Negative   Blood: x / Protein: 100 / Nitrite: Positive   Leuk Esterase: Moderate / RBC: 5-10 /HPF / WBC 26-50 /HPF   Sq Epi: x / Non Sq Epi: Neg.-Few / Bacteria: Many /HPF        Culture - Urine (collected 2021 11:38)  Source: .Urine Clean Catch (Midstream)  Final Report (2021 09:28):    >100,000 CFU/ml Escherichia coli  Organism: Escherichia coli (2021 09:28)  Organism: Escherichia coli (2021 09:28)      -  Amikacin: S <=16      -  Amoxicillin/Clavulanic Acid: S <=8/4      -  Ampicillin: S <=8 These ampicillin results predict results for amoxicillin      -  Ampicillin/Sulbactam: S <=4/2 Enterobacter, Citrobacter, and Serratia may develop resistance during prolonged therapy (3-4 days)      -  Aztreonam: S <=4      -  Cefazolin: S <=2 ([id number]) For uncomplicated UTI with K. pneumoniae, E. coli, or P. mirablis: ARELI <=16 is sensitive and ARELI >=32 is resistant. This also predicts results for oral agents cefaclor, cefdinir, cefpodoxime, cefprozil, cefuroxime axetil, cephalexin and locarbef for uncomplicated UTI. Note that some isolates may be susceptible to these agents while testing resistant to cefazolin.      -  Cefepime: S <=2      -  Cefoxitin: S <=8      -  Ceftriaxone: S <=1 Enterobacter, Citrobacter, and Serratia may develop resistance during prolonged therapy      -  Ciprofloxacin: S <=0.25      -  Ertapenem: S <=0.5      -  Gentamicin: S <=2      -  Imipenem: S <=1      -  Levofloxacin: S <=0.5      -  Meropenem: S <=1      -  Nitrofurantoin: S <=32 Should not be used to treat pyelonephritis      -  Piperacillin/Tazobactam: S <=8      -  Tigecycline: S <=2      -  Tobramycin: S <=2      -  Trimethoprim/Sulfamethoxazole: S <=0.5/9.5      Method Type: ARELI    Culture - Blood (collected 2021 11:38)  Source: .Blood Blood-Peripheral  Gram Stain (2021 02:21):    Growth in aerobic and anaerobic bottles: Gram Negative Rods  Final Report (2021 16:24):    Growth in aerobic and anaerobic bottles: Escherichia coli    See previous culture [id number]    Culture - Blood (collected 2021 11:38)  Source: .Blood Blood-Peripheral  Gram Stain (2021 02:21):    Growth in aerobic and anaerobic bottles: Gram Negative Rods  Final Report (2021 16:10):    Growth in aerobic and anaerobic bottles: Escherichia coli    ***Blood Panel PCR results on this specimen are available    approximately 3 hours after the Gram stain result.***    Gram stain, PCR, and/or culture results may not always    correspond due to difference in methodologies.    ************************************************************    This PCR assay was performed by multiplex PCR. This    Assay tests for 66 bacterial and resistance gene targets.    Please refer to the [facility] test directory    at [email or web address] for details.  Organism: Blood Culture PCR  Escherichia coli (2021 16:10)  Organism: Escherichia coli (2021 16:10)      -  Amikacin: S <=16      -  Ampicillin: S <=8 These ampicillin results predict results for amoxicillin      -  Ampicillin/Sulbactam: S <=4/2 Enterobacter, Citrobacter, and Serratia may develop resistance during prolonged therapy (3-4 days)      -  Aztreonam: S <=4      -  Cefazolin: S <=2 Enterobacter, Citrobacter, and Serratia may develop resistance during prolonged therapy (3-4 days)      -  Cefepime: S <=2      -  Cefoxitin: S <=8      -  Ceftriaxone: S <=1 Enterobacter, Citrobacter, and Serratia may develop resistance during prolonged therapy      -  Ciprofloxacin: S <=0.25      -  Ertapenem: S <=0.5      -  Gentamicin: S <=2      -  Imipenem: S <=1      -  Levofloxacin: S <=0.5      -  Meropenem: S <=1      -  Piperacillin/Tazobactam: S <=8      -  Tobramycin: S <=2      -  Trimethoprim/Sulfamethoxazole: S <=0.5/9.5      Method Type: ARELI  Organism: Blood Culture PCR (2021 16:10)      -  Escherichia coli: Detec      Method Type: PCR        RADIOLOGY & ADDITIONAL TESTS:    Care Discussed with Consultants/Other Providers:    Patient is a 65y old  Female who presents with a chief complaint of Falls (2021 11:37)      Patient seen and examined at bedside. Noted with BP 160s/100s in AM, asymptomatic, improved after AM meds. No acute overnight events. denies headache, fever, chills, cp, sob, n/v, abd pain    ALLERGIES:  No Known Allergies    MEDICATIONS  (STANDING):  amLODIPine   Tablet 10 milliGRAM(s) Oral daily  anastrozole 1 milliGRAM(s) Oral daily  aspirin  chewable 81 milliGRAM(s) Oral daily  atorvastatin 20 milliGRAM(s) Oral at bedtime  carvedilol 25 milliGRAM(s) Oral every 12 hours  cefTRIAXone   IVPB 1000 milliGRAM(s) IV Intermittent every 24 hours  cloNIDine 0.2 milliGRAM(s) Oral three times a day  cyanocobalamin 1000 MICROGram(s) Oral daily  dextrose 40% Gel 15 Gram(s) Oral once  dextrose 5%. 1000 milliLiter(s) (50 mL/Hr) IV Continuous <Continuous>  dextrose 5%. 1000 milliLiter(s) (100 mL/Hr) IV Continuous <Continuous>  dextrose 50% Injectable 25 Gram(s) IV Push once  dextrose 50% Injectable 12.5 Gram(s) IV Push once  dextrose 50% Injectable 25 Gram(s) IV Push once  furosemide    Tablet 40 milliGRAM(s) Oral daily  glucagon  Injectable 1 milliGRAM(s) IntraMuscular once  heparin   Injectable 5000 Unit(s) SubCutaneous every 8 hours  hydrALAZINE 50 milliGRAM(s) Oral three times a day  levothyroxine 150 MICROGram(s) Oral daily  lidocaine   Patch 1 Patch Transdermal daily  losartan 100 milliGRAM(s) Oral daily  potassium chloride    Tablet ER 20 milliEquivalent(s) Oral daily  sodium chloride 0.9% Bolus 500 milliLiter(s) IV Bolus once    MEDICATIONS  (PRN):  acetaminophen   Tablet .. 650 milliGRAM(s) Oral every 6 hours PRN Temp greater or equal to 38C (100.4F), Mild Pain (1 - 3)  senna 2 Tablet(s) Oral at bedtime PRN Constipation    Vital Signs Last 24 Hrs  T(F): 98.2 (2021 05:00), Max: 99.5 (2021 16:29)  HR: 71 (2021 05:00) (71 - 83)  BP: 161/100 (2021 05:00) (120/75 - 161/100)  RR: 20 (2021 05:00) (20 - 23)  SpO2: 93% (2021 05:00) (92% - 94%)  I&O's Summary    2021 07:01  -  2021 07:00  --------------------------------------------------------  IN: 500 mL / OUT: 0 mL / NET: 500 mL      BMI (kg/m2): 32.9 (21 @ 15:47)  PHYSICAL EXAM:  General: NAD, A/O x2-3, obese  ENT: MMM, no scleral icterus  Neck: Supple, No JVD  Lungs: Respirations unlabored. Clear to auscultation bilaterally, no wheezes, rales, rhonchi  Cardio: RRR, S1/S2, No murmurs  Abdomen: Soft, Nontender, Nondistended; Bowel sounds present  Extremities: No calf tenderness, No pitting edema b/l    LABS:                        10.8   9.03  )-----------( 137      ( 2021 06:44 )             34.3       02-05    145  |  108  |  19  ----------------------------<  121  3.4   |  27  |  1.22    Ca    8.0      2021 06:44  Phos  2.9     02-04  Mg     2.1     -05    TPro  6.4  /  Alb  3.0  /  TBili  0.8  /  DBili  x   /  AST  24  /  ALT  25  /  AlkPhos  75  02-04     eGFR if African American: 53 mL/min/1.73M2 (21 @ 06:44)  eGFR if Non African American: 46 mL/min/1.73M2 (21 @ 06:44)    PT/INR - ( 2021 11:38 )   PT: 13.1 sec;   INR: 1.09 ratio            Lactate, Blood: 1.2 mmol/L ( @ 16:00)  Lactate, Blood: 2.4 mmol/L ( @ 11:38)    CARDIAC MARKERS ( 2021 06:10 )  x     / x     / 323 U/L / x     / x      CARDIAC MARKERS ( 2021 10:13 )  .034 ng/mL / x     / 280 U/L / x     / x          02-04 Chol 126 mg/dL LDL -- HDL 41 mg/dL Trig 151 mg/dL  TSH 4.22   TSH with FT4 reflex --  Total T3 --              POCT Blood Glucose.: 146 mg/dL (2021 08:17)      Urinalysis Basic - ( 2021 11:38 )    Color: Yellow / Appearance: Clear / S.005 / pH: x  Gluc: x / Ketone: Negative  / Bili: Negative / Urobili: Negative   Blood: x / Protein: 100 / Nitrite: Positive   Leuk Esterase: Moderate / RBC: 5-10 /HPF / WBC 26-50 /HPF   Sq Epi: x / Non Sq Epi: Neg.-Few / Bacteria: Many /HPF        Culture - Urine (collected 2021 11:38)  Source: .Urine Clean Catch (Midstream)  Final Report (2021 09:28):    >100,000 CFU/ml Escherichia coli  Organism: Escherichia coli (2021 09:28)  Organism: Escherichia coli (2021 09:28)      -  Amikacin: S <=16      -  Amoxicillin/Clavulanic Acid: S <=8/4      -  Ampicillin: S <=8 These ampicillin results predict results for amoxicillin      -  Ampicillin/Sulbactam: S <=4/2 Enterobacter, Citrobacter, and Serratia may develop resistance during prolonged therapy (3-4 days)      -  Aztreonam: S <=4      -  Cefazolin: S <=2 (MIC_CL_COM_ENTERIC_CEFAZU) For uncomplicated UTI with K. pneumoniae, E. coli, or P. mirablis: ARELI <=16 is sensitive and ARELI >=32 is resistant. This also predicts results for oral agents cefaclor, cefdinir, cefpodoxime, cefprozil, cefuroxime axetil, cephalexin and locarbef for uncomplicated UTI. Note that some isolates may be susceptible to these agents while testing resistant to cefazolin.      -  Cefepime: S <=2      -  Cefoxitin: S <=8      -  Ceftriaxone: S <=1 Enterobacter, Citrobacter, and Serratia may develop resistance during prolonged therapy      -  Ciprofloxacin: S <=0.25      -  Ertapenem: S <=0.5      -  Gentamicin: S <=2      -  Imipenem: S <=1      -  Levofloxacin: S <=0.5      -  Meropenem: S <=1      -  Nitrofurantoin: S <=32 Should not be used to treat pyelonephritis      -  Piperacillin/Tazobactam: S <=8      -  Tigecycline: S <=2      -  Tobramycin: S <=2      -  Trimethoprim/Sulfamethoxazole: S <=0.5/9.5      Method Type: ARELI    Culture - Blood (collected 2021 11:38)  Source: .Blood Blood-Peripheral  Gram Stain (2021 02:21):    Growth in aerobic and anaerobic bottles: Gram Negative Rods  Final Report (2021 16:24):    Growth in aerobic and anaerobic bottles: Escherichia coli    See previous culture [id number]    Culture - Blood (collected 2021 11:38)  Source: .Blood Blood-Peripheral  Gram Stain (2021 02:21):    Growth in aerobic and anaerobic bottles: Gram Negative Rods  Final Report (2021 16:10):    Growth in aerobic and anaerobic bottles: Escherichia coli    ***Blood Panel PCR results on this specimen are available    approximately 3 hours after the Gram stain result.***    Gram stain, PCR, and/or culture results may not always    correspond due to difference in methodologies.    ************************************************************    This PCR assay was performed by multiplex PCR. This    Assay tests for 66 bacterial and resistance gene targets.    Please refer to the [facility] [employer] test directory    at [email or web address] for details.  Organism: Blood Culture PCR  Escherichia coli (2021 16:10)  Organism: Escherichia coli (2021 16:10)      -  Amikacin: S <=16      -  Ampicillin: S <=8 These ampicillin results predict results for amoxicillin      -  Ampicillin/Sulbactam: S <=4/2 Enterobacter, Citrobacter, and Serratia may develop resistance during prolonged therapy (3-4 days)      -  Aztreonam: S <=4      -  Cefazolin: S <=2 Enterobacter, Citrobacter, and Serratia may develop resistance during prolonged therapy (3-4 days)      -  Cefepime: S <=2      -  Cefoxitin: S <=8      -  Ceftriaxone: S <=1 Enterobacter, Citrobacter, and Serratia may develop resistance during prolonged therapy      -  Ciprofloxacin: S <=0.25      -  Ertapenem: S <=0.5      -  Gentamicin: S <=2      -  Imipenem: S <=1      -  Levofloxacin: S <=0.5      -  Meropenem: S <=1      -  Piperacillin/Tazobactam: S <=8      -  Tobramycin: S <=2      -  Trimethoprim/Sulfamethoxazole: S <=0.5/9.5      Method Type: ARELI  Organism: Blood Culture PCR (2021 16:10)      -  Escherichia coli: Detec      Method Type: PCR    RADIOLOGY & ADDITIONAL TESTS: reviewed    Care Discussed with Consultants/Other Providers: yes

## 2021-02-06 NOTE — PROGRESS NOTE ADULT - ASSESSMENT
65 female with history of DM, HTN, ?early dementia, cognitive impairment, Right breast malignancy on chemo sent from [facility] after 3 falls today. Admitted for falls like due to UTI      #Sepsis present on admission: now resolved  #Bacteremia 2/2 E.Coli UTI - resolved    -Afebrile, with elevated BP this AM -   -Urine, blood cx with e. Coli  -Repeat blood cx 2/5 in process  -Continue with Ceftriaxone IV  -elevated LDH  -Off IVF    #Multiple Falls  -CT head neg for acute pathology  -PT eval (HCP seems to think she may not need walker and may trip over it)  -Echo: ?HOCM    #Abnormal Echo  -Cardio suggested less likely HOCM. may need Holter to r/o arrythmia.  -Continue tele monitoring     #JOEL likely pre-renal  #Chronic Hypernatremia  -Avoid nephrotoxic medications  -Encouraged PO hydration  -On lasix 40 and losartan. hold if worsening creatinine    #Hypokalemia  -Likely due to lasix, dehydration  -Added KCL 20 daily  -AM BMP, mag pending  -Cont losartan    #Macrocytosis  -Low B12, supplement.   -Normal folate.  -TSH 4.2     #DM type II - HbA1c 6.3   -Home metformin held - A1c 6.3  -Continue FS, ISS    #HTN - uncontrolled  -Continue amlodipine 10mg qd  - continue aspirin  -Carvedilol 25mg q12h  -Clonidine 0.2mg TID  -Lasix 40mg qd, KCl 20mg qd  -Hydralazine 50mg TID  -Losartan 100mg qd  - elevated CK. will monitor  - TTE: normal EF. ?HOCM  - EKG unchanged from prior    #HLD  -Atorvastatin 20mg qhs    #Hypothyroidism  -Synthroid 150mcg qd  -TSH slightly up.   -repeat TSH by PCP in 4-6 weeks    #History of Right Breast Cancer  -continue anastrozole   -outpatient f/u    #Cognitive Impairment  -High risk for delirium, redirect/reorient frequently, avoid sedatives  -Continue to monitor    #DVT ppx - HSQ    CODE STATUS - DNR/DNI  Dispo: pending clinical course. possible early next week    HCP/POA - Fahad Lowe [phone] ([place])  Alternative Gissel Carrasquillo ([phone])  Living Will for DNR/DNI in Chart as well as HCP and POA    Fahad was Updated, He was requesting daily update     66 y/o female with history of T2DM, HTN, ?early dementia, cognitive impairment, Right breast malignancy on chemo sent from [facility] s/p 3 falls. Admitted for falls like due to UTI    #Sepsis present on admission - now resolved  #Bacteremia 2/2 E.Coli UTI - resolved    -T99.5 (2/5), BP wnl with meds   -Urine, blood cx with e. Coli  -Repeat blood cx 2/5 NGTD  -Continue with Ceftriaxone IV (2/3- )  -elevated LDH  -Off IVF    #Multiple Falls  -CT head neg for acute pathology  -PT eval (HCP seems to think she may not need walker and may trip over it)    #Abnormal Echo  -Cardio suggested less likely HOCM. may need Holter to r/o arrythmia.  -Continue tele monitoring  -Cont ASA 81mg qd    #JOEL likely pre-renal - improved  #Chronic Hypernatremia  -Avoid nephrotoxic medications  -Encouraged PO hydration  -On lasix 40qd and losartan. hold if worsening creatinine    #Hypokalemia  -Likely due to lasix, dehydration  -Added KCL 20 daily  -AM BMP, mag pending  -Cont losartan    #Macrocytosis  -Low B12, supplement.   -Normal folate.  -TSH 4.2     #DM type II - HbA1c 6.3   -Home metformin held - A1c 6.3  -Continue FS, ISS    #HTN - uncontrolled  -Continue amlodipine 10mg qd, -Losartan 100mg qd  -Carvedilol 25mg q12h  -Clonidine 0.2mg TID  -Lasix 40mg qd, KCl 20mg qd  -Hydralazine 50mg TID    #HLD  -Atorvastatin 20mg qhs    #Hypothyroidism  -Synthroid 150mcg qd  -TSH slightly up.   -repeat TSH by PCP in 4-6 weeks    #History of Right Breast Cancer  -Anastrozole   -Outpatient f/u    #Cognitive Impairment  -High risk for delirium, redirect/reorient frequently, avoid sedatives  -Continue to monitor    #DVT ppx - HSQ    CODE STATUS - DNR/DNI  Dispo: pending clinical course. possible early next week    HCP/POA - Fahad Lowe [phone] ([place])  Alternative Gissel Carrasquillo ([phone])  Living Will for DNR/DNI in Chart as well as HCP and POA    Fahad called 2/6 - no answer, VM left - requests daily update

## 2021-02-07 LAB
ANION GAP SERPL CALC-SCNC: 11 MMOL/L — SIGNIFICANT CHANGE UP (ref 5–17)
BUN SERPL-MCNC: 23 MG/DL — SIGNIFICANT CHANGE UP (ref 7–23)
CALCIUM SERPL-MCNC: 8.3 MG/DL — LOW (ref 8.4–10.5)
CHLORIDE SERPL-SCNC: 109 MMOL/L — HIGH (ref 96–108)
CO2 SERPL-SCNC: 29 MMOL/L — SIGNIFICANT CHANGE UP (ref 22–31)
CREAT SERPL-MCNC: 1.15 MG/DL — SIGNIFICANT CHANGE UP (ref 0.5–1.3)
GLUCOSE SERPL-MCNC: 137 MG/DL — HIGH (ref 70–99)
HCT VFR BLD CALC: 35.3 % — SIGNIFICANT CHANGE UP (ref 34.5–45)
HGB BLD-MCNC: 11.4 G/DL — LOW (ref 11.5–15.5)
MCHC RBC-ENTMCNC: 32.3 GM/DL — SIGNIFICANT CHANGE UP (ref 32–36)
MCHC RBC-ENTMCNC: 32.3 PG — SIGNIFICANT CHANGE UP (ref 27–34)
MCV RBC AUTO: 100 FL — SIGNIFICANT CHANGE UP (ref 80–100)
NRBC # BLD: 0 /100 WBCS — SIGNIFICANT CHANGE UP (ref 0–0)
PLATELET # BLD AUTO: 182 K/UL — SIGNIFICANT CHANGE UP (ref 150–400)
POTASSIUM SERPL-MCNC: 3.2 MMOL/L — LOW (ref 3.5–5.3)
POTASSIUM SERPL-SCNC: 3.2 MMOL/L — LOW (ref 3.5–5.3)
RBC # BLD: 3.53 M/UL — LOW (ref 3.8–5.2)
RBC # FLD: 15.7 % — HIGH (ref 10.3–14.5)
SARS-COV-2 RNA SPEC QL NAA+PROBE: SIGNIFICANT CHANGE UP
SODIUM SERPL-SCNC: 149 MMOL/L — HIGH (ref 135–145)
WBC # BLD: 6.74 K/UL — SIGNIFICANT CHANGE UP (ref 3.8–10.5)
WBC # FLD AUTO: 6.74 K/UL — SIGNIFICANT CHANGE UP (ref 3.8–10.5)

## 2021-02-07 PROCEDURE — 99232 SBSQ HOSP IP/OBS MODERATE 35: CPT

## 2021-02-07 RX ORDER — POTASSIUM CHLORIDE 20 MEQ
40 PACKET (EA) ORAL EVERY 4 HOURS
Refills: 0 | Status: COMPLETED | OUTPATIENT
Start: 2021-02-07 | End: 2021-02-07

## 2021-02-07 RX ADMIN — Medication 40 MILLIEQUIVALENT(S): at 12:55

## 2021-02-07 RX ADMIN — Medication 50 MILLIGRAM(S): at 05:11

## 2021-02-07 RX ADMIN — AMLODIPINE BESYLATE 10 MILLIGRAM(S): 2.5 TABLET ORAL at 05:11

## 2021-02-07 RX ADMIN — POLYETHYLENE GLYCOL 3350 17 GRAM(S): 17 POWDER, FOR SOLUTION ORAL at 12:54

## 2021-02-07 RX ADMIN — Medication 50 MILLIGRAM(S): at 22:02

## 2021-02-07 RX ADMIN — Medication 0.2 MILLIGRAM(S): at 05:11

## 2021-02-07 RX ADMIN — HEPARIN SODIUM 5000 UNIT(S): 5000 INJECTION INTRAVENOUS; SUBCUTANEOUS at 22:02

## 2021-02-07 RX ADMIN — Medication 50 MILLIGRAM(S): at 12:54

## 2021-02-07 RX ADMIN — ATORVASTATIN CALCIUM 20 MILLIGRAM(S): 80 TABLET, FILM COATED ORAL at 22:01

## 2021-02-07 RX ADMIN — Medication 20 MILLIEQUIVALENT(S): at 12:54

## 2021-02-07 RX ADMIN — LIDOCAINE 1 PATCH: 4 CREAM TOPICAL at 12:54

## 2021-02-07 RX ADMIN — ANASTROZOLE 1 MILLIGRAM(S): 1 TABLET ORAL at 12:53

## 2021-02-07 RX ADMIN — Medication 40 MILLIGRAM(S): at 05:11

## 2021-02-07 RX ADMIN — CEFTRIAXONE 100 MILLIGRAM(S): 500 INJECTION, POWDER, FOR SOLUTION INTRAMUSCULAR; INTRAVENOUS at 16:31

## 2021-02-07 RX ADMIN — CARVEDILOL PHOSPHATE 25 MILLIGRAM(S): 80 CAPSULE, EXTENDED RELEASE ORAL at 16:31

## 2021-02-07 RX ADMIN — Medication 150 MICROGRAM(S): at 05:11

## 2021-02-07 RX ADMIN — Medication 40 MILLIEQUIVALENT(S): at 16:31

## 2021-02-07 RX ADMIN — LIDOCAINE 1 PATCH: 4 CREAM TOPICAL at 19:21

## 2021-02-07 RX ADMIN — LIDOCAINE 1 PATCH: 4 CREAM TOPICAL at 23:04

## 2021-02-07 RX ADMIN — HEPARIN SODIUM 5000 UNIT(S): 5000 INJECTION INTRAVENOUS; SUBCUTANEOUS at 05:12

## 2021-02-07 RX ADMIN — Medication 0.2 MILLIGRAM(S): at 12:54

## 2021-02-07 RX ADMIN — LOSARTAN POTASSIUM 100 MILLIGRAM(S): 100 TABLET, FILM COATED ORAL at 05:12

## 2021-02-07 RX ADMIN — PREGABALIN 1000 MICROGRAM(S): 225 CAPSULE ORAL at 12:54

## 2021-02-07 RX ADMIN — HEPARIN SODIUM 5000 UNIT(S): 5000 INJECTION INTRAVENOUS; SUBCUTANEOUS at 12:53

## 2021-02-07 RX ADMIN — CARVEDILOL PHOSPHATE 25 MILLIGRAM(S): 80 CAPSULE, EXTENDED RELEASE ORAL at 05:11

## 2021-02-07 RX ADMIN — Medication 81 MILLIGRAM(S): at 12:53

## 2021-02-07 RX ADMIN — Medication 0.2 MILLIGRAM(S): at 22:01

## 2021-02-07 NOTE — PROGRESS NOTE ADULT - SUBJECTIVE AND OBJECTIVE BOX
Patient is a 65y old  Female who presents with a chief complaint of Falls (06 Feb 2021 07:55)      Patient seen and examined at bedside.    ALLERGIES:  No Known Allergies    MEDICATIONS  (STANDING):  amLODIPine   Tablet 10 milliGRAM(s) Oral daily  anastrozole 1 milliGRAM(s) Oral daily  aspirin  chewable 81 milliGRAM(s) Oral daily  atorvastatin 20 milliGRAM(s) Oral at bedtime  carvedilol 25 milliGRAM(s) Oral every 12 hours  cefTRIAXone   IVPB 1000 milliGRAM(s) IV Intermittent every 24 hours  cloNIDine 0.2 milliGRAM(s) Oral three times a day  cyanocobalamin 1000 MICROGram(s) Oral daily  dextrose 40% Gel 15 Gram(s) Oral once  dextrose 5%. 1000 milliLiter(s) (50 mL/Hr) IV Continuous <Continuous>  dextrose 5%. 1000 milliLiter(s) (100 mL/Hr) IV Continuous <Continuous>  dextrose 50% Injectable 25 Gram(s) IV Push once  dextrose 50% Injectable 12.5 Gram(s) IV Push once  dextrose 50% Injectable 25 Gram(s) IV Push once  furosemide    Tablet 40 milliGRAM(s) Oral daily  glucagon  Injectable 1 milliGRAM(s) IntraMuscular once  heparin   Injectable 5000 Unit(s) SubCutaneous every 8 hours  hydrALAZINE 50 milliGRAM(s) Oral three times a day  levothyroxine 150 MICROGram(s) Oral daily  lidocaine   Patch 1 Patch Transdermal daily  losartan 100 milliGRAM(s) Oral daily  polyethylene glycol 3350 17 Gram(s) Oral daily  potassium chloride    Tablet ER 20 milliEquivalent(s) Oral daily  sodium chloride 0.9% Bolus 500 milliLiter(s) IV Bolus once    MEDICATIONS  (PRN):  acetaminophen   Tablet .. 650 milliGRAM(s) Oral every 6 hours PRN Temp greater or equal to 38C (100.4F), Mild Pain (1 - 3)  senna 2 Tablet(s) Oral at bedtime PRN Constipation    Vital Signs Last 24 Hrs  T(F): 98 (07 Feb 2021 04:48), Max: 98 (06 Feb 2021 20:32)  HR: 73 (07 Feb 2021 04:48) (67 - 78)  BP: 147/89 (07 Feb 2021 04:48) (121/75 - 148/87)  RR: 19 (07 Feb 2021 04:48) (18 - 20)  SpO2: 93% (07 Feb 2021 04:48) (92% - 93%)  I&O's Summary    BMI (kg/m2): 32.9 (02-03-21 @ 15:47)  PHYSICAL EXAM:  General: NAD, A/O x 3  ENT: MMM, no scleral icterus  Neck: Supple, No JVD  Lungs: Clear to auscultation bilaterally, no wheezes, rales, rhonchi  Cardio: RRR, S1/S2, No murmurs  Abdomen: Soft, Nontender, Nondistended; Bowel sounds present  Extremities: No calf tenderness, No pitting edema    LABS:                        11.4   6.74  )-----------( 182      ( 07 Feb 2021 07:30 )             35.3       02-06    147  |  109  |  21  ----------------------------<  178  3.4   |  29  |  1.44    Ca    8.3      06 Feb 2021 14:45  Mg     2.3     02-06       eGFR if Non African American: 38 mL/min/1.73M2 (02-06-21 @ 14:45)  eGFR if : 44 mL/min/1.73M2 (02-06-21 @ 14:45)             02-04 Chol 126 mg/dL LDL -- HDL 41 mg/dL Trig 151 mg/dL                      Culture - Blood (collected 05 Feb 2021 06:42)  Source: .Blood Blood-Peripheral  Preliminary Report (06 Feb 2021 09:01):    No growth to date.    Culture - Blood (collected 05 Feb 2021 06:42)  Source: .Blood Blood-Peripheral  Preliminary Report (06 Feb 2021 09:01):    No growth to date.    Culture - Urine (collected 03 Feb 2021 11:38)  Source: .Urine Clean Catch (Midstream)  Final Report (05 Feb 2021 09:28):    >100,000 CFU/ml Escherichia coli  Organism: Escherichia coli (05 Feb 2021 09:28)  Organism: Escherichia coli (05 Feb 2021 09:28)      -  Amikacin: S <=16      -  Amoxicillin/Clavulanic Acid: S <=8/4      -  Ampicillin: S <=8 These ampicillin results predict results for amoxicillin      -  Ampicillin/Sulbactam: S <=4/2 Enterobacter, Citrobacter, and Serratia may develop resistance during prolonged therapy (3-4 days)      -  Aztreonam: S <=4      -  Cefazolin: S <=2 (MIC_CL_COM_ENTERIC_CEFAZU) For uncomplicated UTI with K. pneumoniae, E. coli, or P. mirablis: ARELI <=16 is sensitive and ARELI >=32 is resistant. This also predicts results for oral agents cefaclor, cefdinir, cefpodoxime, cefprozil, cefuroxime axetil, cephalexin and locarbef for uncomplicated UTI. Note that some isolates may be susceptible to these agents while testing resistant to cefazolin.      -  Cefepime: S <=2      -  Cefoxitin: S <=8      -  Ceftriaxone: S <=1 Enterobacter, Citrobacter, and Serratia may develop resistance during prolonged therapy      -  Ciprofloxacin: S <=0.25      -  Ertapenem: S <=0.5      -  Gentamicin: S <=2      -  Imipenem: S <=1      -  Levofloxacin: S <=0.5      -  Meropenem: S <=1      -  Nitrofurantoin: S <=32 Should not be used to treat pyelonephritis      -  Piperacillin/Tazobactam: S <=8      -  Tigecycline: S <=2      -  Tobramycin: S <=2      -  Trimethoprim/Sulfamethoxazole: S <=0.5/9.5      Method Type: ARELI    Culture - Blood (collected 03 Feb 2021 11:38)  Source: .Blood Blood-Peripheral  Gram Stain (04 Feb 2021 02:21):    Growth in aerobic and anaerobic bottles: Gram Negative Rods  Final Report (05 Feb 2021 16:24):    Growth in aerobic and anaerobic bottles: Escherichia coli    See previous culture [id number]    Culture - Blood (collected 03 Feb 2021 11:38)  Source: .Blood Blood-Peripheral  Gram Stain (04 Feb 2021 02:21):    Growth in aerobic and anaerobic bottles: Gram Negative Rods  Final Report (05 Feb 2021 16:10):    Growth in aerobic and anaerobic bottles: Escherichia coli    ***Blood Panel PCR results on this specimen are available    approximately 3 hours after the Gram stain result.***    Gram stain, PCR, and/or culture results may not always    correspond due to difference in methodologies.    ************************************************************    This PCR assay was performed by multiplex PCR. This    Assay tests for 66 bacterial and resistance gene targets.    Please refer to the [facility] test directory    at [email or web address] for details.  Organism: Blood Culture PCR  Escherichia coli (05 Feb 2021 16:10)  Organism: Escherichia coli (05 Feb 2021 16:10)      -  Amikacin: S <=16      -  Ampicillin: S <=8 These ampicillin results predict results for amoxicillin      -  Ampicillin/Sulbactam: S <=4/2 Enterobacter, Citrobacter, and Serratia may develop resistance during prolonged therapy (3-4 days)      -  Aztreonam: S <=4      -  Cefazolin: S <=2 Enterobacter, Citrobacter, and Serratia may develop resistance during prolonged therapy (3-4 days)      -  Cefepime: S <=2      -  Cefoxitin: S <=8      -  Ceftriaxone: S <=1 Enterobacter, Citrobacter, and Serratia may develop resistance during prolonged therapy      -  Ciprofloxacin: S <=0.25      -  Ertapenem: S <=0.5      -  Gentamicin: S <=2      -  Imipenem: S <=1      -  Levofloxacin: S <=0.5      -  Meropenem: S <=1      -  Piperacillin/Tazobactam: S <=8      -  Tobramycin: S <=2      -  Trimethoprim/Sulfamethoxazole: S <=0.5/9.5      Method Type: ARELI  Organism: Blood Culture PCR (05 Feb 2021 16:10)      -  Escherichia coli: Detec      Method Type: PCR        RADIOLOGY & ADDITIONAL TESTS:    Care Discussed with Consultants/Other Providers:    Patient is a 65y old  Female who presents with a chief complaint of Falls (06 Feb 2021 07:55)      Patient seen and examined at bedside. No acute overnight events. denies headache, fever, chills, cp, sob, n/v, abd pain, worsening numbness/tingling in the extremities.      ALLERGIES:  No Known Allergies    MEDICATIONS  (STANDING):  amLODIPine   Tablet 10 milliGRAM(s) Oral daily  anastrozole 1 milliGRAM(s) Oral daily  aspirin  chewable 81 milliGRAM(s) Oral daily  atorvastatin 20 milliGRAM(s) Oral at bedtime  carvedilol 25 milliGRAM(s) Oral every 12 hours  cefTRIAXone   IVPB 1000 milliGRAM(s) IV Intermittent every 24 hours  cloNIDine 0.2 milliGRAM(s) Oral three times a day  cyanocobalamin 1000 MICROGram(s) Oral daily  dextrose 40% Gel 15 Gram(s) Oral once  dextrose 5%. 1000 milliLiter(s) (50 mL/Hr) IV Continuous <Continuous>  dextrose 5%. 1000 milliLiter(s) (100 mL/Hr) IV Continuous <Continuous>  dextrose 50% Injectable 25 Gram(s) IV Push once  dextrose 50% Injectable 12.5 Gram(s) IV Push once  dextrose 50% Injectable 25 Gram(s) IV Push once  furosemide    Tablet 40 milliGRAM(s) Oral daily  glucagon  Injectable 1 milliGRAM(s) IntraMuscular once  heparin   Injectable 5000 Unit(s) SubCutaneous every 8 hours  hydrALAZINE 50 milliGRAM(s) Oral three times a day  levothyroxine 150 MICROGram(s) Oral daily  lidocaine   Patch 1 Patch Transdermal daily  losartan 100 milliGRAM(s) Oral daily  polyethylene glycol 3350 17 Gram(s) Oral daily  potassium chloride    Tablet ER 20 milliEquivalent(s) Oral daily  sodium chloride 0.9% Bolus 500 milliLiter(s) IV Bolus once    MEDICATIONS  (PRN):  acetaminophen   Tablet .. 650 milliGRAM(s) Oral every 6 hours PRN Temp greater or equal to 38C (100.4F), Mild Pain (1 - 3)  senna 2 Tablet(s) Oral at bedtime PRN Constipation    Vital Signs Last 24 Hrs  T(F): 98 (07 Feb 2021 04:48), Max: 98 (06 Feb 2021 20:32)  HR: 73 (07 Feb 2021 04:48) (67 - 78)  BP: 147/89 (07 Feb 2021 04:48) (121/75 - 148/87)  RR: 19 (07 Feb 2021 04:48) (18 - 20)  SpO2: 93% (07 Feb 2021 04:48) (92% - 93%)  I&O's Summary    BMI (kg/m2): 32.9 (02-03-21 @ 15:47)  PHYSICAL EXAM:  General: NAD, A/O x2-3, obese  ENT: MMM, no scleral icterus  Neck: Supple, No JVD  Lungs: Respirations unlabored. Clear to auscultation bilaterally, no wheezes, rales, rhonchi  Cardio: RRR, S1/S2, No murmurs  Abdomen: Soft, Nontender, Nondistended; Bowel sounds present  Extremities: No calf tenderness, No pitting edema b/l    LABS:                        11.4   6.74  )-----------( 182      ( 07 Feb 2021 07:30 )             35.3       02-06    147  |  109  |  21  ----------------------------<  178  3.4   |  29  |  1.44    Ca    8.3      06 Feb 2021 14:45  Mg     2.3     02-06       eGFR if Non African American: 38 mL/min/1.73M2 (02-06-21 @ 14:45)  eGFR if : 44 mL/min/1.73M2 (02-06-21 @ 14:45)    02-04 Chol 126 mg/dL LDL -- HDL 41 mg/dL Trig 151 mg/dL    Culture - Blood (collected 05 Feb 2021 06:42)  Source: .Blood Blood-Peripheral  Preliminary Report (06 Feb 2021 09:01):    No growth to date.    Culture - Blood (collected 05 Feb 2021 06:42)  Source: .Blood Blood-Peripheral  Preliminary Report (06 Feb 2021 09:01):    No growth to date.    Culture - Urine (collected 03 Feb 2021 11:38)  Source: .Urine Clean Catch (Midstream)  Final Report (05 Feb 2021 09:28):    >100,000 CFU/ml Escherichia coli  Organism: Escherichia coli (05 Feb 2021 09:28)  Organism: Escherichia coli (05 Feb 2021 09:28)      -  Amikacin: S <=16      -  Amoxicillin/Clavulanic Acid: S <=8/4      -  Ampicillin: S <=8 These ampicillin results predict results for amoxicillin      -  Ampicillin/Sulbactam: S <=4/2 Enterobacter, Citrobacter, and Serratia may develop resistance during prolonged therapy (3-4 days)      -  Aztreonam: S <=4      -  Cefazolin: S <=2 (MIC_CL_COM_ENTERIC_CEFAZU) For uncomplicated UTI with K. pneumoniae, E. coli, or P. mirablis: ARELI <=16 is sensitive and ARELI >=32 is resistant. This also predicts results for oral agents cefaclor, cefdinir, cefpodoxime, cefprozil, cefuroxime axetil, cephalexin and locarbef for uncomplicated UTI. Note that some isolates may be susceptible to these agents while testing resistant to cefazolin.      -  Cefepime: S <=2      -  Cefoxitin: S <=8      -  Ceftriaxone: S <=1 Enterobacter, Citrobacter, and Serratia may develop resistance during prolonged therapy      -  Ciprofloxacin: S <=0.25      -  Ertapenem: S <=0.5      -  Gentamicin: S <=2      -  Imipenem: S <=1      -  Levofloxacin: S <=0.5      -  Meropenem: S <=1      -  Nitrofurantoin: S <=32 Should not be used to treat pyelonephritis      -  Piperacillin/Tazobactam: S <=8      -  Tigecycline: S <=2      -  Tobramycin: S <=2      -  Trimethoprim/Sulfamethoxazole: S <=0.5/9.5      Method Type: ARELI    Culture - Blood (collected 03 Feb 2021 11:38)  Source: .Blood Blood-Peripheral  Gram Stain (04 Feb 2021 02:21):    Growth in aerobic and anaerobic bottles: Gram Negative Rods  Final Report (05 Feb 2021 16:24):    Growth in aerobic and anaerobic bottles: Escherichia coli    See previous culture [id number]    Culture - Blood (collected 03 Feb 2021 11:38)  Source: .Blood Blood-Peripheral  Gram Stain (04 Feb 2021 02:21):    Growth in aerobic and anaerobic bottles: Gram Negative Rods  Final Report (05 Feb 2021 16:10):    Growth in aerobic and anaerobic bottles: Escherichia coli    ***Blood Panel PCR results on this specimen are available    approximately 3 hours after the Gram stain result.***    Gram stain, PCR, and/or culture results may not always    correspond due to difference in methodologies.    ************************************************************    This PCR assay was performed by multiplex PCR. This    Assay tests for 66 bacterial and resistance gene targets.    Please refer to the [facility] test directory    at [email or web address] for details.  Organism: Blood Culture PCR  Escherichia coli (05 Feb 2021 16:10)  Organism: Escherichia coli (05 Feb 2021 16:10)      -  Amikacin: S <=16      -  Ampicillin: S <=8 These ampicillin results predict results for amoxicillin      -  Ampicillin/Sulbactam: S <=4/2 Enterobacter, Citrobacter, and Serratia may develop resistance during prolonged therapy (3-4 days)      -  Aztreonam: S <=4      -  Cefazolin: S <=2 Enterobacter, Citrobacter, and Serratia may develop resistance during prolonged therapy (3-4 days)      -  Cefepime: S <=2      -  Cefoxitin: S <=8      -  Ceftriaxone: S <=1 Enterobacter, Citrobacter, and Serratia may develop resistance during prolonged therapy      -  Ciprofloxacin: S <=0.25      -  Ertapenem: S <=0.5      -  Gentamicin: S <=2      -  Imipenem: S <=1      -  Levofloxacin: S <=0.5      -  Meropenem: S <=1      -  Piperacillin/Tazobactam: S <=8      -  Tobramycin: S <=2      -  Trimethoprim/Sulfamethoxazole: S <=0.5/9.5      Method Type: ARELI  Organism: Blood Culture PCR (05 Feb 2021 16:10)      -  Escherichia coli: Detec      Method Type: PCR    RADIOLOGY & ADDITIONAL TESTS: reviewed    Care Discussed with Consultants/Other Providers: yes

## 2021-02-07 NOTE — PROGRESS NOTE ADULT - ASSESSMENT
64 y/o female with history of T2DM, HTN, ?early dementia, cognitive impairment, Right breast malignancy on chemo sent from [facility] s/p 3 falls. Admitted for falls like due to UTI    #Sepsis present on admission - now resolved  #Bacteremia 2/2 E.Coli UTI - resolved    -T99.5 (2/5), BP wnl with meds   -Urine, blood cx with e. Coli  -Repeat blood cx 2/5 NGTD  -Continue with Ceftriaxone IV (2/3- )  -elevated LDH  -Off IVF    #Multiple Falls  -CT head neg for acute pathology  -PT eval (HCP seems to think she may not need walker and may trip over it)    #Abnormal Echo  -Cardio suggested less likely HOCM. may need Holter to r/o arrythmia.  -Continue tele monitoring  -Cont ASA 81mg qd    #JOEL likely pre-renal - improved  #Chronic Hypernatremia  -Avoid nephrotoxic medications  -Encouraged PO hydration  -On lasix 40qd and losartan. hold if worsening creatinine    #Hypokalemia  -Likely due to lasix, dehydration  -Added KCL 20 daily  -AM BMP, mag pending  -Cont losartan    #Macrocytosis  -Low B12, supplement.   -Normal folate.  -TSH 4.2     #DM type II - HbA1c 6.3   -Home metformin held - A1c 6.3  -Continue FS, ISS    #HTN - uncontrolled  -Continue amlodipine 10mg qd, -Losartan 100mg qd  -Carvedilol 25mg q12h  -Clonidine 0.2mg TID  -Lasix 40mg qd, KCl 20mg qd  -Hydralazine 50mg TID    #HLD  -Atorvastatin 20mg qhs    #Hypothyroidism  -Synthroid 150mcg qd  -TSH slightly up.   -repeat TSH by PCP in 4-6 weeks    #History of Right Breast Cancer  -Anastrozole   -Outpatient f/u    #Cognitive Impairment  -High risk for delirium, redirect/reorient frequently, avoid sedatives  -Continue to monitor    #DVT ppx - HSQ    CODE STATUS - DNR/DNI  Dispo: pending clinical course. possible early next week    HCP/POA - Fahad Lowe [phone] ([place])  Alternative Gissel Carrasquillo ([phone])  Living Will for DNR/DNI in Chart as well as HCP and POA    Fahad called 2/6 - no answer, VM left - requests daily update   66 y/o female with history of T2DM, HTN, ?early dementia, cognitive impairment, Right breast malignancy on chemo sent from [facility] s/p 3 falls. Admitted for falls likely due to UTI    #Sepsis present on admission - now resolved  #Bacteremia 2/2 E.Coli UTI - resolved    -Afebrile, hemodynamically stable  -Urine, blood cx with e. Coli  -Repeat blood cx 2/5 NGTD  -Continue with Ceftriaxone IV (2/3- )  -Elevated LDH  -Off IVF    #Multiple Falls  -CT head neg for acute pathology  -PT eval (HCP seems to think she may not need walker and may trip over it)    #Abnormal Echo  -Cardio suggested less likely HOCM. may need Holter to r/o arrythmia.  -Continue tele monitoring  -Cont ASA 81mg qd    #JOEL likely pre-renal - improved  #Chronic Hypernatremia  -Avoid nephrotoxic medications  -Encouraged PO hydration  -On lasix 40qd and losartan. hold if worsening creatinine    #Hypokalemia  -Likely due to lasix, dehydration  -Added KCL 20 daily  -AM BMP, mag pending  -Cont losartan    #Macrocytosis  -Low B12, supplement  -Normal folate.  -TSH 4.2     #DM type II - HbA1c 6.3   -Home metformin held - A1c 6.3  -Continue FS, ISS    #HTN - uncontrolled  -Continue amlodipine 10mg qd, -Losartan 100mg qd  -Carvedilol 25mg q12h  -Clonidine 0.2mg TID  -Lasix 40mg qd, KCl 20mg qd  -Hydralazine 50mg TID    #HLD  -Atorvastatin 20mg qhs    #Hypothyroidism  -Synthroid 150mcg qd  -TSH slightly up.   -repeat TSH by PCP in 4-6 weeks    #History of Right Breast Cancer  -Anastrozole   -Outpatient f/u    #Cognitive Impairment  -High risk for delirium, redirect/reorient frequently, avoid sedatives  -Continue to monitor    #DVT ppx - HSQ    CODE STATUS - DNR/DNI  Dispo: pending clinical course. possible early next week    HCP/POA - Fahad Lowe [phone] ([place])  Alternative Gissel Yulia Foreign ([phone])  Living Will for DNR/DNI in Chart as well as HCP and POA    Fahad updated 2/7 - requests daily updates

## 2021-02-08 ENCOUNTER — TRANSCRIPTION ENCOUNTER (OUTPATIENT)
Age: 66
End: 2021-02-08

## 2021-02-08 VITALS
SYSTOLIC BLOOD PRESSURE: 121 MMHG | HEART RATE: 85 BPM | TEMPERATURE: 98 F | OXYGEN SATURATION: 95 % | RESPIRATION RATE: 16 BRPM | DIASTOLIC BLOOD PRESSURE: 67 MMHG

## 2021-02-08 LAB
ANION GAP SERPL CALC-SCNC: 11 MMOL/L — SIGNIFICANT CHANGE UP (ref 5–17)
BUN SERPL-MCNC: 27 MG/DL — HIGH (ref 7–23)
CALCIUM SERPL-MCNC: 8.5 MG/DL — SIGNIFICANT CHANGE UP (ref 8.4–10.5)
CHLORIDE SERPL-SCNC: 110 MMOL/L — HIGH (ref 96–108)
CO2 SERPL-SCNC: 27 MMOL/L — SIGNIFICANT CHANGE UP (ref 22–31)
CREAT SERPL-MCNC: 1.12 MG/DL — SIGNIFICANT CHANGE UP (ref 0.5–1.3)
GLUCOSE BLDC GLUCOMTR-MCNC: 128 MG/DL — HIGH (ref 70–99)
GLUCOSE BLDC GLUCOMTR-MCNC: 139 MG/DL — HIGH (ref 70–99)
GLUCOSE SERPL-MCNC: 143 MG/DL — HIGH (ref 70–99)
HCT VFR BLD CALC: 38.5 % — SIGNIFICANT CHANGE UP (ref 34.5–45)
HGB BLD-MCNC: 11.9 G/DL — SIGNIFICANT CHANGE UP (ref 11.5–15.5)
MCHC RBC-ENTMCNC: 30.5 PG — SIGNIFICANT CHANGE UP (ref 27–34)
MCHC RBC-ENTMCNC: 30.9 GM/DL — LOW (ref 32–36)
MCV RBC AUTO: 98.7 FL — SIGNIFICANT CHANGE UP (ref 80–100)
NRBC # BLD: 0 /100 WBCS — SIGNIFICANT CHANGE UP (ref 0–0)
PLATELET # BLD AUTO: 215 K/UL — SIGNIFICANT CHANGE UP (ref 150–400)
POTASSIUM SERPL-MCNC: 3.4 MMOL/L — LOW (ref 3.5–5.3)
POTASSIUM SERPL-SCNC: 3.4 MMOL/L — LOW (ref 3.5–5.3)
RBC # BLD: 3.9 M/UL — SIGNIFICANT CHANGE UP (ref 3.8–5.2)
RBC # FLD: 15.9 % — HIGH (ref 10.3–14.5)
SODIUM SERPL-SCNC: 148 MMOL/L — HIGH (ref 135–145)
WBC # BLD: 7.43 K/UL — SIGNIFICANT CHANGE UP (ref 3.8–10.5)
WBC # FLD AUTO: 7.43 K/UL — SIGNIFICANT CHANGE UP (ref 3.8–10.5)

## 2021-02-08 PROCEDURE — 93306 TTE W/DOPPLER COMPLETE: CPT

## 2021-02-08 PROCEDURE — 87040 BLOOD CULTURE FOR BACTERIA: CPT

## 2021-02-08 PROCEDURE — 82728 ASSAY OF FERRITIN: CPT

## 2021-02-08 PROCEDURE — 83550 IRON BINDING TEST: CPT

## 2021-02-08 PROCEDURE — 83540 ASSAY OF IRON: CPT

## 2021-02-08 PROCEDURE — 80061 LIPID PANEL: CPT

## 2021-02-08 PROCEDURE — 72170 X-RAY EXAM OF PELVIS: CPT

## 2021-02-08 PROCEDURE — 87186 SC STD MICRODIL/AGAR DIL: CPT

## 2021-02-08 PROCEDURE — 36000 PLACE NEEDLE IN VEIN: CPT

## 2021-02-08 PROCEDURE — 82746 ASSAY OF FOLIC ACID SERUM: CPT

## 2021-02-08 PROCEDURE — 85025 COMPLETE CBC W/AUTO DIFF WBC: CPT

## 2021-02-08 PROCEDURE — 86769 SARS-COV-2 COVID-19 ANTIBODY: CPT

## 2021-02-08 PROCEDURE — 99239 HOSP IP/OBS DSCHRG MGMT >30: CPT

## 2021-02-08 PROCEDURE — 82607 VITAMIN B-12: CPT

## 2021-02-08 PROCEDURE — 70450 CT HEAD/BRAIN W/O DYE: CPT

## 2021-02-08 PROCEDURE — 99285 EMERGENCY DEPT VISIT HI MDM: CPT | Mod: 25

## 2021-02-08 PROCEDURE — 84100 ASSAY OF PHOSPHORUS: CPT

## 2021-02-08 PROCEDURE — 87086 URINE CULTURE/COLONY COUNT: CPT

## 2021-02-08 PROCEDURE — 83036 HEMOGLOBIN GLYCOSYLATED A1C: CPT

## 2021-02-08 PROCEDURE — U0003: CPT

## 2021-02-08 PROCEDURE — 81001 URINALYSIS AUTO W/SCOPE: CPT

## 2021-02-08 PROCEDURE — 82550 ASSAY OF CK (CPK): CPT

## 2021-02-08 PROCEDURE — 36415 COLL VENOUS BLD VENIPUNCTURE: CPT

## 2021-02-08 PROCEDURE — 80053 COMPREHEN METABOLIC PANEL: CPT

## 2021-02-08 PROCEDURE — 82962 GLUCOSE BLOOD TEST: CPT

## 2021-02-08 PROCEDURE — 93225 XTRNL ECG REC<48 HRS REC: CPT

## 2021-02-08 PROCEDURE — 85610 PROTHROMBIN TIME: CPT

## 2021-02-08 PROCEDURE — 84443 ASSAY THYROID STIM HORMONE: CPT

## 2021-02-08 PROCEDURE — 87150 DNA/RNA AMPLIFIED PROBE: CPT

## 2021-02-08 PROCEDURE — U0005: CPT

## 2021-02-08 PROCEDURE — 93005 ELECTROCARDIOGRAM TRACING: CPT

## 2021-02-08 PROCEDURE — 84484 ASSAY OF TROPONIN QUANT: CPT

## 2021-02-08 PROCEDURE — 71045 X-RAY EXAM CHEST 1 VIEW: CPT

## 2021-02-08 PROCEDURE — 87077 CULTURE AEROBIC IDENTIFY: CPT

## 2021-02-08 PROCEDURE — 80048 BASIC METABOLIC PNL TOTAL CA: CPT

## 2021-02-08 PROCEDURE — 83615 LACTATE (LD) (LDH) ENZYME: CPT

## 2021-02-08 PROCEDURE — 83735 ASSAY OF MAGNESIUM: CPT

## 2021-02-08 PROCEDURE — 83605 ASSAY OF LACTIC ACID: CPT

## 2021-02-08 PROCEDURE — 85027 COMPLETE CBC AUTOMATED: CPT

## 2021-02-08 RX ORDER — LEVOTHYROXINE SODIUM 125 MCG
1 TABLET ORAL
Qty: 0 | Refills: 0 | DISCHARGE

## 2021-02-08 RX ORDER — CEFUROXIME AXETIL 250 MG
1 TABLET ORAL
Qty: 18 | Refills: 0
Start: 2021-02-08 | End: 2021-02-16

## 2021-02-08 RX ORDER — LEVOTHYROXINE SODIUM 125 MCG
1 TABLET ORAL
Qty: 0 | Refills: 0 | DISCHARGE
Start: 2021-02-08

## 2021-02-08 RX ORDER — POTASSIUM CHLORIDE 20 MEQ
40 PACKET (EA) ORAL EVERY 4 HOURS
Refills: 0 | Status: COMPLETED | OUTPATIENT
Start: 2021-02-08 | End: 2021-02-08

## 2021-02-08 RX ADMIN — POLYETHYLENE GLYCOL 3350 17 GRAM(S): 17 POWDER, FOR SOLUTION ORAL at 12:37

## 2021-02-08 RX ADMIN — Medication 40 MILLIGRAM(S): at 05:09

## 2021-02-08 RX ADMIN — CARVEDILOL PHOSPHATE 25 MILLIGRAM(S): 80 CAPSULE, EXTENDED RELEASE ORAL at 05:08

## 2021-02-08 RX ADMIN — Medication 40 MILLIEQUIVALENT(S): at 12:37

## 2021-02-08 RX ADMIN — Medication 0.2 MILLIGRAM(S): at 05:08

## 2021-02-08 RX ADMIN — Medication 0.2 MILLIGRAM(S): at 12:37

## 2021-02-08 RX ADMIN — Medication 50 MILLIGRAM(S): at 05:09

## 2021-02-08 RX ADMIN — Medication 50 MILLIGRAM(S): at 12:37

## 2021-02-08 RX ADMIN — Medication 150 MICROGRAM(S): at 05:09

## 2021-02-08 RX ADMIN — AMLODIPINE BESYLATE 10 MILLIGRAM(S): 2.5 TABLET ORAL at 05:08

## 2021-02-08 RX ADMIN — ANASTROZOLE 1 MILLIGRAM(S): 1 TABLET ORAL at 12:37

## 2021-02-08 RX ADMIN — HEPARIN SODIUM 5000 UNIT(S): 5000 INJECTION INTRAVENOUS; SUBCUTANEOUS at 05:09

## 2021-02-08 RX ADMIN — HEPARIN SODIUM 5000 UNIT(S): 5000 INJECTION INTRAVENOUS; SUBCUTANEOUS at 12:37

## 2021-02-08 RX ADMIN — PREGABALIN 1000 MICROGRAM(S): 225 CAPSULE ORAL at 12:37

## 2021-02-08 RX ADMIN — Medication 20 MILLIEQUIVALENT(S): at 12:36

## 2021-02-08 RX ADMIN — Medication 40 MILLIEQUIVALENT(S): at 10:38

## 2021-02-08 RX ADMIN — Medication 81 MILLIGRAM(S): at 12:37

## 2021-02-08 RX ADMIN — LOSARTAN POTASSIUM 100 MILLIGRAM(S): 100 TABLET, FILM COATED ORAL at 05:09

## 2021-02-08 NOTE — DISCHARGE NOTE PROVIDER - NSDCCPCAREPLAN_GEN_ALL_CORE_FT
PRINCIPAL DISCHARGE DIAGNOSIS  Diagnosis: UTI (urinary tract infection)  Assessment and Plan of Treatment: You were admitted to the hospital due to a urinary tract infection which went to your blood (called bacteremia).   -You were treated with IV antibiotics and switched to oral antibiotics (available at your pharmacy). Start Ceftin 500mg twice daily on 2/9 and continue until 2/17  -Follow up with your primary care physician within the week      SECONDARY DISCHARGE DIAGNOSES  Diagnosis: Hypothyroidism  Assessment and Plan of Treatment: Continue your home medication Synthroid  -Follow up with your primary care physician for thyroid functioning tests in 4-6 weeks    Diagnosis: Type 2 diabetes mellitus  Assessment and Plan of Treatment: Your hemoglobin A1C was 6.3  -Continue your home medication Metformin  -Follow up with your primary care physician within the week    Diagnosis: Echocardiogram abnormal  Assessment and Plan of Treatment: Your echocardiogram (ultrasound of the heart) was abnormal.   -Follow up with your cardiologist within the week for further testing

## 2021-02-08 NOTE — PROGRESS NOTE ADULT - ASSESSMENT
65F with PMH Type 2 DM, HTN, cognitive impairment, right breast malignancy on chemo sent from [facility] s/p 3 falls. Admitted for sepsis 2/2 to E Coli bacteremia and UTI.     #Sepsis present on admission - now resolved  #Bacteremia 2/2 E.Coli UTI - resolved    -Urine, blood cx with e. Coli  -Repeat blood cx 2/5 NGTD  -Continue IV Rocephin, transition to oral Ceftin BID for a total of 14 days    #Multiple Falls  -CT head neg for acute pathology  -PT eval - home with home PT    #Abnormal Echo  -Cardio suggested less likely HOCM. May need Holter to r/o arrythmia.  -Continue tele monitoring  -Cont ASA 81mg qd  -Follow up with outpatient cardiologist     #JOEL on CKD Stage 3 - likely pre-renal azotemia  -Per chart review Cr ~1.0   -Avoid nephrotoxic medications  -PO hydration encouraged    #Hypernatremia  -Likely due to medication side effect  -PO hydration encouraged  -Consider decreasing Lasix v starting D5   -Follow up AM BMP    #Hypokalemia  -Persistent, exacerbated by Lasix use  -KCl 40mEq x 2, continue KCl 20mEq daily  -Follow up AM BMP    #DM type II - HbA1c 6.3   -Home metformin held  -Continue hypoglycemia protocol  -Blood glucose goal 100-180    #HTN - uncontrolled  -Continue Amlodipine, Losartan, Carvedilol, Clonidine, Lasix, Hydralazine  -Monitor vitals    #HLD  -Continue Atorvastatin     #Hypothyroidism  -Chronic  -Continue Synthroid   -Repeat TFTs in 4-6 weeks outpatient    #History of Right Breast Cancer  -Continue Anastrozole     #DVT ppx - HSQ    CODE STATUS - DNR/DNI    HCP/POA - Fahad Lowe [phone] ([place])  Alternative Gissel Carrasquillo ([phone])  Living Will for DNR/DNI in Chart as well as HCP and POA   65F with PMH Type 2 DM, HTN, cognitive impairment, right breast malignancy on chemo [place] [facility] [facility] [place]/p 3 [place]. Admitted for sepsis 2/2 to E Coli bacteremia and UTI.     #Sepsis present on admission - now resolved  #Bacteremia 2/2 E.Coli UTI - resolved    -Urine, blood cx with e. Coli  -Repeat blood cx 2/5 NGTD  -Continue IV Rocephin, transition to oral Ceftin BID for a total of 14 days    #Multiple Falls  -CT head neg for acute pathology  -PT eval - home with home PT    #Abnormal Echo  -Cardio suggested less likely HOCM. May need Holter to r/o arrythmia.  -Continue tele monitoring  -Cont ASA 81mg qd  -Follow up with outpatient cardiologist     #JOEL on CKD Stage 3 - likely pre-renal azotemia  -Per chart review Cr ~1.0   -Avoid nephrotoxic medications  -PO hydration encouraged    #Hypernatremia  -Likely due to medication side effect  -PO hydration encouraged  -Consider decreasing Lasix v starting D5   -Follow up AM BMP    #Hypokalemia  -Persistent, exacerbated by Lasix use  -KCl 40mEq x 2, continue KCl 20mEq daily  -Follow up AM BMP    #DM type II - HbA1c 6.3   -Home metformin held  -Continue hypoglycemia protocol  -Blood glucose goal 100-180    #HTN - uncontrolled  -Continue Amlodipine, Losartan, Carvedilol, Clonidine, Lasix, Hydralazine  -Monitor vitals    #HLD  -Continue Atorvastatin     #Hypothyroidism  -Chronic  -Continue Synthroid   -Repeat TFTs in 4-6 weeks outpatient    #History of Right Breast Cancer  -Continue Anastrozole     #DVT ppx - HSQ    CODE STATUS - DNR/DNI    HCP/POA - Fahad Lowe [phone] ([place])  Alternative Gissel Carrasquillo ([phone])  Living Will for DNR/DNI in Chart as well as HCP and POA    Discussed discharge instructions with patient and HCP, Fahad, aware and in agreement with above. D/C planning back to [facility] [place] follow up.

## 2021-02-08 NOTE — DISCHARGE NOTE NURSING/CASE MANAGEMENT/SOCIAL WORK - PATIENT PORTAL LINK FT
You can access the FollowMyHealth Patient Portal offered by [facility] by registering at the following website: [email or web address]. By joining [employer]’s FollowMyHealth portal, you will also be able to view your health information using other applications (apps) compatible with our system.

## 2021-02-08 NOTE — PROGRESS NOTE ADULT - SUBJECTIVE AND OBJECTIVE BOX
Patient is a 65y old  Female who presents with a chief complaint of Falls (08 Feb 2021 08:28)      INTERVAL HPI/OVERNIGHT EVENTS: Patient seen and examined at bedside. No overnight events. A&Ox3, offers no complaints. Ambulates with a walker to the bathroom but also uses a diaper.   D/C planning for today.     MEDICATIONS  (STANDING):  amLODIPine   Tablet 10 milliGRAM(s) Oral daily  anastrozole 1 milliGRAM(s) Oral daily  aspirin  chewable 81 milliGRAM(s) Oral daily  atorvastatin 20 milliGRAM(s) Oral at bedtime  carvedilol 25 milliGRAM(s) Oral every 12 hours  cefTRIAXone   IVPB 1000 milliGRAM(s) IV Intermittent every 24 hours  cloNIDine 0.2 milliGRAM(s) Oral three times a day  cyanocobalamin 1000 MICROGram(s) Oral daily  furosemide    Tablet 40 milliGRAM(s) Oral daily  heparin   Injectable 5000 Unit(s) SubCutaneous every 8 hours  hydrALAZINE 50 milliGRAM(s) Oral three times a day  levothyroxine 150 MICROGram(s) Oral daily  lidocaine   Patch 1 Patch Transdermal daily  losartan 100 milliGRAM(s) Oral daily  polyethylene glycol 3350 17 Gram(s) Oral daily  potassium chloride    Tablet ER 20 milliEquivalent(s) Oral daily  sodium chloride 0.9% Bolus 500 milliLiter(s) IV Bolus once    MEDICATIONS  (PRN):  acetaminophen   Tablet .. 650 milliGRAM(s) Oral every 6 hours PRN Temp greater or equal to 38C (100.4F), Mild Pain (1 - 3)  senna 2 Tablet(s) Oral at bedtime PRN Constipation      Allergies    No Known Allergies    Intolerances        REVIEW OF SYSTEMS:  CONSTITUTIONAL: No fever or chills  HEENT:  No headache, no sore throat  RESPIRATORY: No cough, wheezing, or shortness of breath  CARDIOVASCULAR: No chest pain, palpitations  GASTROINTESTINAL: No abd pain, nausea, vomiting, or diarrhea  GENITOURINARY: No dysuria, frequency, or hematuria  NEUROLOGICAL: no focal weakness or dizziness  MUSCULOSKELETAL: no myalgias     Vital Signs Last 24 Hrs  T(C): 36.4 (08 Feb 2021 12:35), Max: 37.1 (07 Feb 2021 18:45)  T(F): 97.5 (08 Feb 2021 12:35), Max: 98.7 (07 Feb 2021 18:45)  HR: 74 (08 Feb 2021 12:35) (71 - 81)  BP: 139/80 (08 Feb 2021 12:35) (130/77 - 148/79)  BP(mean): 96 (08 Feb 2021 12:35) (96 - 96)  RR: 16 (08 Feb 2021 12:35) (16 - 17)  SpO2: 94% (08 Feb 2021 12:35) (91% - 94%)    PHYSICAL EXAM:  GENERAL: NAD, forgetful, cognitive impairment   HEENT:  anicteric, moist mucous membranes  CHEST/LUNG: poor inspiratory effort, CTA b/l, no rales, wheezes, or rhonchi  HEART:  RRR, S1, S2  ABDOMEN:  BS+, soft, nontender, nondistended, obese  EXTREMITIES: no edema, cyanosis, or calf tenderness  NERVOUS SYSTEM: answers questions and follows commands appropriately    LABS:                        11.9   7.43  )-----------( 215      ( 08 Feb 2021 05:30 )             38.5     02-08    148  |  110  |  27  ----------------------------<  143  3.4   |  27  |  1.12    Ca    8.5      08 Feb 2021 05:30  Mg     2.3     02-06          eGFR if Non African American: 51 mL/min/1.73M2 (02-08-21 @ 05:30)  eGFR if : 59 mL/min/1.73M2 (02-08-21 @ 05:30)              02-04 Chol 126 mg/dL LDL -- HDL 41 mg/dL Trig 151 mg/dL              POCT Blood Glucose.: 139 mg/dL (08 Feb 2021 11:34)  POCT Blood Glucose.: 128 mg/dL (08 Feb 2021 08:12)          Culture - Blood (collected 05 Feb 2021 06:42)  Source: .Blood Blood-Peripheral  Preliminary Report (06 Feb 2021 09:01):    No growth to date.    Culture - Blood (collected 05 Feb 2021 06:42)  Source: .Blood Blood-Peripheral  Preliminary Report (06 Feb 2021 09:01):    No growth to date.    Culture - Urine (collected 03 Feb 2021 11:38)  Source: .Urine Clean Catch (Midstream)  Final Report (05 Feb 2021 09:28):    >100,000 CFU/ml Escherichia coli  Organism: Escherichia coli (05 Feb 2021 09:28)  Organism: Escherichia coli (05 Feb 2021 09:28)      -  Amikacin: S <=16      -  Amoxicillin/Clavulanic Acid: S <=8/4      -  Ampicillin: S <=8 These ampicillin results predict results for amoxicillin      -  Ampicillin/Sulbactam: S <=4/2 Enterobacter, Citrobacter, and Serratia may develop resistance during prolonged therapy (3-4 days)      -  Aztreonam: S <=4      -  Cefazolin: S <=2 (MIC_CL_COM_ENTERIC_CEFAZU) For uncomplicated UTI with K. pneumoniae, E. coli, or P. mirablis: ARELI <=16 is sensitive and ARELI >=32 is resistant. This also predicts results for oral agents cefaclor, cefdinir, cefpodoxime, cefprozil, cefuroxime axetil, cephalexin and locarbef for uncomplicated UTI. Note that some isolates may be susceptible to these agents while testing resistant to cefazolin.      -  Cefepime: S <=2      -  Cefoxitin: S <=8      -  Ceftriaxone: S <=1 Enterobacter, Citrobacter, and Serratia may develop resistance during prolonged therapy      -  Ciprofloxacin: S <=0.25      -  Ertapenem: S <=0.5      -  Gentamicin: S <=2      -  Imipenem: S <=1      -  Levofloxacin: S <=0.5      -  Meropenem: S <=1      -  Nitrofurantoin: S <=32 Should not be used to treat pyelonephritis      -  Piperacillin/Tazobactam: S <=8      -  Tigecycline: S <=2      -  Tobramycin: S <=2      -  Trimethoprim/Sulfamethoxazole: S <=0.5/9.5      Method Type: ARELI    Culture - Blood (collected 03 Feb 2021 11:38)  Source: .Blood Blood-Peripheral  Gram Stain (04 Feb 2021 02:21):    Growth in aerobic and anaerobic bottles: Gram Negative Rods  Final Report (05 Feb 2021 16:24):    Growth in aerobic and anaerobic bottles: Escherichia coli    See previous culture [id number]    Culture - Blood (collected 03 Feb 2021 11:38)  Source: .Blood Blood-Peripheral  Gram Stain (04 Feb 2021 02:21):    Growth in aerobic and anaerobic bottles: Gram Negative Rods  Final Report (05 Feb 2021 16:10):    Growth in aerobic and anaerobic bottles: Escherichia coli    ***Blood Panel PCR results on this specimen are available    approximately 3 hours after the Gram stain result.***    Gram stain, PCR, and/or culture results may not always    correspond due to difference in methodologies.    ************************************************************    This PCR assay was performed by multiplex PCR. This    Assay tests for 66 bacterial and resistance gene targets.    Please refer to the [facility] test directory    at [email or web address] for details.  Organism: Blood Culture PCR  Escherichia coli (05 Feb 2021 16:10)  Organism: Escherichia coli (05 Feb 2021 16:10)      -  Amikacin: S <=16      -  Ampicillin: S <=8 These ampicillin results predict results for amoxicillin      -  Ampicillin/Sulbactam: S <=4/2 Enterobacter, Citrobacter, and Serratia may develop resistance during prolonged therapy (3-4 days)      -  Aztreonam: S <=4      -  Cefazolin: S <=2 Enterobacter, Citrobacter, and Serratia may develop resistance during prolonged therapy (3-4 days)      -  Cefepime: S <=2      -  Cefoxitin: S <=8      -  Ceftriaxone: S <=1 Enterobacter, Citrobacter, and Serratia may develop resistance during prolonged therapy      -  Ciprofloxacin: S <=0.25      -  Ertapenem: S <=0.5      -  Gentamicin: S <=2      -  Imipenem: S <=1      -  Levofloxacin: S <=0.5      -  Meropenem: S <=1      -  Piperacillin/Tazobactam: S <=8      -  Tobramycin: S <=2      -  Trimethoprim/Sulfamethoxazole: S <=0.5/9.5      Method Type: ARELI  Organism: Blood Culture PCR (05 Feb 2021 16:10)      -  Escherichia coli: Detec      Method Type: PCR          Culture - Blood (collected 02-05-21 @ 06:42)  Source: .Blood Blood-Peripheral  Preliminary Report (02-06-21 @ 09:01):    No growth to date.    Culture - Blood (collected 02-05-21 @ 06:42)  Source: .Blood Blood-Peripheral  Preliminary Report (02-06-21 @ 09:01):    No growth to date.    Culture - Urine (collected 02-03-21 @ 11:38)  Source: .Urine Clean Catch (Midstream)  Final Report (02-05-21 @ 09:28):    >100,000 CFU/ml Escherichia coli  Organism: Escherichia coli (02-05-21 @ 09:28)  Organism: Escherichia coli (02-05-21 @ 09:28)      -  Amikacin: S <=16      -  Amoxicillin/Clavulanic Acid: S <=8/4      -  Ampicillin: S <=8 These ampicillin results predict results for amoxicillin      -  Ampicillin/Sulbactam: S <=4/2 Enterobacter, Citrobacter, and Serratia may develop resistance during prolonged therapy (3-4 days)      -  Aztreonam: S <=4      -  Cefazolin: S <=2 (MIC_CL_COM_ENTERIC_CEFAZU) For uncomplicated UTI with K. pneumoniae, E. coli, or P. mirablis: ARELI <=16 is sensitive and ARELI >=32 is resistant. This also predicts results for oral agents cefaclor, cefdinir, cefpodoxime, cefprozil, cefuroxime axetil, cephalexin and locarbef for uncomplicated UTI. Note that some isolates may be susceptible to these agents while testing resistant to cefazolin.      -  Cefepime: S <=2      -  Cefoxitin: S <=8      -  Ceftriaxone: S <=1 Enterobacter, Citrobacter, and Serratia may develop resistance during prolonged therapy      -  Ciprofloxacin: S <=0.25      -  Ertapenem: S <=0.5      -  Gentamicin: S <=2      -  Imipenem: S <=1      -  Levofloxacin: S <=0.5      -  Meropenem: S <=1      -  Nitrofurantoin: S <=32 Should not be used to treat pyelonephritis      -  Piperacillin/Tazobactam: S <=8      -  Tigecycline: S <=2      -  Tobramycin: S <=2      -  Trimethoprim/Sulfamethoxazole: S <=0.5/9.5      Method Type: ARELI    Culture - Blood (collected 02-03-21 @ 11:38)  Source: .Blood Blood-Peripheral  Gram Stain (02-04-21 @ 02:21):    Growth in aerobic and anaerobic bottles: Gram Negative Rods  Final Report (02-05-21 @ 16:24):    Growth in aerobic and anaerobic bottles: Escherichia coli    See previous culture [id number]    Culture - Blood (collected 02-03-21 @ 11:38)  Source: .Blood Blood-Peripheral  Gram Stain (02-04-21 @ 02:21):    Growth in aerobic and anaerobic bottles: Gram Negative Rods  Final Report (02-05-21 @ 16:10):    Growth in aerobic and anaerobic bottles: Escherichia coli    ***Blood Panel PCR results on this specimen are available    approximately 3 hours after the Gram stain result.***    Gram stain, PCR, and/or culture results may not always    correspond due to difference in methodologies.    ************************************************************    This PCR assay was performed by multiplex PCR. This    Assay tests for 66 bacterial and resistance gene targets.    Please refer to the [facility] test directory    at [email or web address] for details.  Organism: Blood Culture PCR  Escherichia coli (02-05-21 @ 16:10)  Organism: Escherichia coli (02-05-21 @ 16:10)      -  Amikacin: S <=16      -  Ampicillin: S <=8 These ampicillin results predict results for amoxicillin      -  Ampicillin/Sulbactam: S <=4/2 Enterobacter, Citrobacter, and Serratia may develop resistance during prolonged therapy (3-4 days)      -  Aztreonam: S <=4      -  Cefazolin: S <=2 Enterobacter, Citrobacter, and Serratia may develop resistance during prolonged therapy (3-4 days)      -  Cefepime: S <=2      -  Cefoxitin: S <=8      -  Ceftriaxone: S <=1 Enterobacter, Citrobacter, and Serratia may develop resistance during prolonged therapy      -  Ciprofloxacin: S <=0.25      -  Ertapenem: S <=0.5      -  Gentamicin: S <=2      -  Imipenem: S <=1      -  Levofloxacin: S <=0.5      -  Meropenem: S <=1      -  Piperacillin/Tazobactam: S <=8      -  Tobramycin: S <=2      -  Trimethoprim/Sulfamethoxazole: S <=0.5/9.5      Method Type: ARELI  Organism: Blood Culture PCR (02-05-21 @ 16:10)      -  Escherichia coli: Detec      Method Type: PCR        RADIOLOGY & ADDITIONAL TESTS: Personally reviewed.     Consultant(s) Notes Reviewed:  [x] YES  [ ] NO

## 2021-02-08 NOTE — DISCHARGE NOTE PROVIDER - CARE PROVIDER_API CALL
Josiane Higgins  INTERNAL MEDICINE  [address]  [place] [address]  Phone: [phone]  Fax: [phone]  Follow Up Time:     Ricardo Beavers  CARDIOLOGY  [address], [address]  [place] [address]  Phone: [phone]  Fax: [phone]  Follow Up Time:

## 2021-02-08 NOTE — DISCHARGE NOTE PROVIDER - HOSPITAL COURSE
Hospital Course  65F with PMH Type 2 DM, HTN, cognitive impairment, right breast malignancy on chemo sent from [facility] s/p 3 falls. Admitted for falls likely due to UTI. Sepsis present on admission, resolved. Found to have bacteremia 2/2 to E Coli UTI. Started on IV Rocephin and switched to PO abx for a total of 14 days. Repeat blood cultures x2 no growth.    CT head neg for acute pathology. PT evaluated patient recommended home with home PT.     DM type II - HbA1c 6.3     CODE STATUS - DNR/DNI    HCP/POA - Fahad Lowe [phone] ([place])  Alternative Gissel Carrasquillo ([phone])    Source of Infection: E Coli bacteremia   Antibiotic / Last Day: Ceftin 500mg BID  until 2/17 (total 14 day course)     Discharging Provider:  Edi Ponce DO  Contact Info: Cell [phone]  Please call with any questions or concerns.    Time spent: 40 minutes.    Hospital Course  65F with PMH Type 2 DM, HTN, cognitive impairment, right breast malignancy on chemo sent from [facility] s/p 3 falls. Admitted for falls likely due to UTI. Sepsis present on admission, resolved. Found to have bacteremia 2/2 to E Coli UTI. Started on IV Rocephin and switched to PO abx for a total of 14 days. Repeat blood cultures x2 no growth.    CT head neg for acute pathology. PT evaluated patient recommended home with home PT.     DM type II - HbA1c 6.3     CODE STATUS - DNR/DNI    HCP/POA - Fahad Lowe [phone] ([place])  Alternative Gissel Carrasquillo ([phone])    Source of Infection: E Coli bacteremia   Antibiotic / Last Day: Ceftin 500mg BID  until 2/17 (total 14 day course)     Discharging Provider:  Edi Ponce DO  Contact Info: Cell [phone]  Please call with any questions or concerns.    Signed out to Dr. Elgin Gonzalez [phone]    Time spent: 40 minutes.

## 2021-02-08 NOTE — DISCHARGE NOTE PROVIDER - NSDCMRMEDTOKEN_GEN_ALL_CORE_FT
amLODIPine 10 mg oral tablet: 1 tab(s) orally once a day  anastrozole 1 mg oral tablet: 1 tab(s) orally once a day  aspirin 81 mg oral tablet: 1 tab(s) orally once a day  atorvastatin 20 mg oral tablet: 1 tab(s) orally once a day  Augmentin 875 mg-125 mg oral tablet: 1 tab(s) orally every 12 hours   carvedilol 25 mg oral tablet: 1 tab(s) orally 2 times a day  cloNIDine 0.2 mg oral tablet: 1 tab(s) orally 3 times a day  furosemide 40 mg oral tablet: 1 tab(s) orally once a day  hydrALAZINE 50 mg oral tablet: orally 3 times a day  levothyroxine 125 mcg (0.125 mg) oral tablet: 1 tab(s) orally once a day  losartan 100 mg oral tablet: 1 tab(s) orally once a day  metFORMIN 500 mg oral tablet, extended release: 1 tab(s) orally once a day  potassium chloride 20 mEq oral tablet, extended release: orally 4 times a day  Senna 8.6 mg oral tablet: 2 tab(s) orally once a day (at bedtime)   amLODIPine 10 mg oral tablet: 1 tab(s) orally once a day  anastrozole 1 mg oral tablet: 1 tab(s) orally once a day  aspirin 81 mg oral tablet: 1 tab(s) orally once a day  atorvastatin 20 mg oral tablet: 1 tab(s) orally once a day  Augmentin 875 mg-125 mg oral tablet: 1 tab(s) orally every 12 hours   carvedilol 25 mg oral tablet: 1 tab(s) orally 2 times a day  cloNIDine 0.2 mg oral tablet: 1 tab(s) orally 3 times a day  furosemide 40 mg oral tablet: 1 tab(s) orally once a day  hydrALAZINE 50 mg oral tablet: orally 3 times a day  levothyroxine 150 mcg (0.15 mg) oral tablet: 1 tab(s) orally once a day  losartan 100 mg oral tablet: 1 tab(s) orally once a day  metFORMIN 500 mg oral tablet, extended release: 1 tab(s) orally once a day  potassium chloride 20 mEq oral tablet, extended release: orally 4 times a day  Senna 8.6 mg oral tablet: 2 tab(s) orally once a day (at bedtime)   amLODIPine 10 mg oral tablet: 1 tab(s) orally once a day  anastrozole 1 mg oral tablet: 1 tab(s) orally once a day  aspirin 81 mg oral tablet: 1 tab(s) orally once a day  atorvastatin 20 mg oral tablet: 1 tab(s) orally once a day  carvedilol 25 mg oral tablet: 1 tab(s) orally 2 times a day  cefuroxime 500 mg oral tablet: 1 tab(s) orally 2 times a day   START: 2/9  COMPLETE: 2/17  cloNIDine 0.2 mg oral tablet: 1 tab(s) orally 3 times a day  furosemide 40 mg oral tablet: 1 tab(s) orally once a day  hydrALAZINE 50 mg oral tablet: orally 3 times a day  levothyroxine 150 mcg (0.15 mg) oral tablet: 1 tab(s) orally once a day  losartan 100 mg oral tablet: 1 tab(s) orally once a day  metFORMIN 500 mg oral tablet, extended release: 1 tab(s) orally once a day  potassium chloride 20 mEq oral tablet, extended release: orally 4 times a day  Senna 8.6 mg oral tablet: 2 tab(s) orally once a day (at bedtime)

## 2021-02-08 NOTE — DIETITIAN INITIAL EVALUATION ADULT. - OTHER INFO
pt. admitted w/ falls; cognitively impaired ? dementia. eating very well, 100% meals taken. tolerates cons. cho diet. unable to complete education sec. to poor cognition. skin intact. no edema noted. weight hx. unknown. no n/v/ diarrhea noted.

## 2021-02-10 LAB
CULTURE RESULTS: SIGNIFICANT CHANGE UP
CULTURE RESULTS: SIGNIFICANT CHANGE UP
SPECIMEN SOURCE: SIGNIFICANT CHANGE UP
SPECIMEN SOURCE: SIGNIFICANT CHANGE UP

## 2021-05-26 NOTE — PATIENT PROFILE ADULT - BRADEN SENSORY
"Oncology Rooming Note    May 26, 2021 10:59 AM   Richa Ashby is a 55 year old female who presents for:    Chief Complaint   Patient presents with     Port Draw     Labs drawn via port by rn in lab. VS taken.     Oncology Clinic Visit     sarcoma      Initial Vitals: /60 (BP Location: Right arm, Patient Position: Sitting, Cuff Size: Adult Large)   Pulse 84   Temp 99  F (37.2  C) (Oral)   Resp 16   Wt 97.9 kg (215 lb 12.8 oz)   LMP 03/19/2019   SpO2 96%   BMI 33.79 kg/m   Estimated body mass index is 33.79 kg/m  as calculated from the following:    Height as of 5/10/21: 1.702 m (5' 7.01\").    Weight as of this encounter: 97.9 kg (215 lb 12.8 oz). Body surface area is 2.15 meters squared.  Severe Pain (6) Comment: Data Unavailable   Patient's last menstrual period was 03/19/2019.  Allergies reviewed: Yes  Medications reviewed: Yes    Medications: Medication refills not needed today.  Pharmacy name entered into [facility]:    [id number] - [place], [place] - [address] [place] AT [facility] [place], [place] - [phone]    Clinical concerns: Concerns mentioned to triage  Harshal  was notified.      Emiliana Mares            " (4) no impairment

## 2021-08-18 NOTE — ED PROVIDER NOTE - NSDCPRINTRESULTS_ED_ALL_ED
0 Hour(s) 1 Minute(s) Patient requests all Lab and Radiology Results on their Discharge Instructions

## 2021-09-06 NOTE — ED ADULT TRIAGE NOTE - CHIEF COMPLAINT QUOTE
'I slipped and fell I guess'. pt ANAYA from [facility], EMS found pt on bathroom floor, hypoxic, spo2 82% on RA and 98% on 12 liter O2.

## 2021-09-07 NOTE — SWALLOW BEDSIDE ASSESSMENT ADULT - SWALLOW EVAL: DIAGNOSIS
Pt p/w a mild oral dysphagia when given regular solids marked by adequate retrieval and containment, mildly prolonged mastication/manipulation and transfer, and adequate clearance post swallow. Functional oral phase when given thin liquids, nectar thick liquids, and puree marked by adequate retrieval and containment, timely manipulation and transfer, and adequate clearance post swallow. Pharyngeal dysphagia marked by suspected delayed swallow onset with regular solids (suspected to be more timely with less viscous textures), +laryngeal elevation to palpation, and no overt s/sx of aspiration. Pt's O2 sats remained WFL/at baseline throughout PO trials. Recommend solid downgrade to soft solids with continuation of thin liquids +aspiration precautions. Continue to monitor respiratory status closely; if there is a decline in status, hold PO and notify SLP. CXR is pending, if there is concern for aspiration, recommend MBS to r/o silent aspiration. Discussed with RN and Dr. Garcia.

## 2021-09-07 NOTE — ED ADULT NURSE NOTE - NSIMPLEMENTINTERV_GEN_ALL_ED
Implemented All Fall with Harm Risk Interventions:  [place] to call system. Call bell, personal items and telephone within reach. Instruct patient to call for assistance. Room bathroom lighting operational. Non-slip footwear when patient is off stretcher. Physically safe environment: no spills, clutter or unnecessary equipment. Stretcher in lowest position, wheels locked, appropriate side rails in place. Provide visual cue, wrist band, yellow gown, etc. Monitor gait and stability. Monitor for mental status changes and reorient to person, place, and time. Review medications for side effects contributing to fall risk. Reinforce activity limits and safety measures with patient and family. Provide visual clues: red socks.

## 2021-09-07 NOTE — CONSULT NOTE ADULT - ASSESSMENT
The patient is a 65 year old female with a history of HTN, DM, hypothyroidism, cognitive impairment, right breast cancer who presents with weakness, fall, fever, shortness of breath.    Plan:  - ECG with LVH related changes  - Continue carvedilol 25 mg bid  - Continue clonidine 0.2 mg tid  - Continue hydralazine 100 mg tid  - Continue losartan 100 mg daily  - Continue aspirin 81 mg daily  - Continue atorvastatin 20 mg daily  - Furosemide on hold - slight JOEL noted - likely will need to be resumed  - Check BNP  - Check echo  - CXR with ?small pleural effusions  - Pulm eval

## 2021-09-07 NOTE — CONSULT NOTE ADULT - ASSESSMENT
65F with PMH Type 2 DM, HTN,hypothyroid, cognitive impairment,recurrent falls,recurrent uti, right breast malignancy on chemo  sent from [facility] for weakness fall, fever in syo , hypoxic 89% on RA c/o shortness of breath. 65F with PMH Type 2 DM, HTN,hypothyroid, cognitive impairment,recurrent falls,recurrent uti, right breast malignancy on chemo  sent from [facility] for weakness fall, fever in syo , hypoxic 89% on RA c/o shortness of breath.    TTE done - report pending  cardio optimization in progress  I and O  Diuresis on HOLD - JOEL -   serial renal indices  will check CT chest -   eval for eff - atelectasis - pna - pulm edema -   assist with needs  monitor VS and HD and Sat  GOC discussion  on emp ABX rx regimen

## 2021-09-07 NOTE — H&P ADULT - HISTORY OF PRESENT ILLNESS
Shingrix vaccine given in Left Deltoid. Pt tolerated vaccine well.  VIS sheet given
65F with PMH Type 2 DM, HTN,hypothyroid, cognitive impairment,recurrent falls,recurrent uti, right breast malignancy on chemo  sent from [facility] for weakness fall, fever in syo , hypoxic 89% on RA c/o shortness of breath.  patient is poor historian. fully vaccinated for covid, has leucocytosis, x ray chest suspicion of PNA, started on oxygen 3 litres for hypoxia, and started on vanc zosyn and admitted for further management

## 2021-09-07 NOTE — SWALLOW BEDSIDE ASSESSMENT ADULT - SLP PERTINENT HISTORY OF CURRENT PROBLEM
Per charting, "65F with PMH Type 2 DM, HTN,hypothyroid, cognitive impairment,recurrent falls,recurrent uti, right breast malignancy on chemo  sent from [facility] for weakness fall, fever in syo , hypoxic 89% on RA c/o shortness of breath.  patient is poor historian. fully vaccinated for covid, has leucocytosis, x ray chest suspicion of PNA, started on oxygen 3 litres for hypoxia, and started on vanc zosyn and admitted for further management"

## 2021-09-07 NOTE — ED PROVIDER NOTE - OBJECTIVE STATEMENT
66 yo female DM, HTN, HLD, ?early dementia, cognitive impairment, Right breast malignancy on chemo sent from [facility] for weakness fall, fever here 101, hypoxic 89% on RA c/o shortness of breath.  patient is poor historian. fully vaccinated for covid

## 2021-09-07 NOTE — ED ADULT NURSE NOTE - OBJECTIVE STATEMENT
'I slipped and fell I guess'. pt ANAYA from [facility], EMS found pt on bathroom floor, hypoxic, spo2 82% on RA and 98% on 12 liter O2.

## 2021-09-07 NOTE — ED ADULT NURSE NOTE - CHIEF COMPLAINT QUOTE
'I slipped and fell I guess'. pt ANAYA from [facility], EMS found pt on bathroom floor, hypoxic, spo2 82% on RA and 98% on 12 liter O2.

## 2021-09-07 NOTE — SWALLOW BEDSIDE ASSESSMENT ADULT - COMMENTS
Pt received upright in bed, awake and cooperative. Pt on supplemental O2 via NC with O2 sats trending high 90s. Pt was agreeable to assessment. Pt followed simple commands. Pt's vocal quality/intensity and speech production was WFL. Pt denied h/o dysphagia. Pt denied pain pre and post assessment.    CXR 9/7 is pending. Pt's WBC is elevated, no fever.

## 2021-09-07 NOTE — CONSULT NOTE ADULT - SUBJECTIVE AND OBJECTIVE BOX
History of Present Illness: The patient is a 65 year old female with a history of HTN, DM, hypothyroidism, cognitive impairment, right breast cancer who presents with weakness, fall, fever, shortness of breath. The patient is a poor historian. She states she feels improved today and denies shortness of breath. Yesterday she was noted to be hypoxic to 89% on RA.    Past Medical/Surgical History:  HTN, DM, hypothyroidism, cognitive impairment, right breast cancer    Medications:  Home Medications:  anastrozole 1 mg oral tablet: 1 tab(s) orally once a day (07 Sep 2021 01:37)  aspirin 81 mg oral tablet: 1 tab(s) orally once a day (07 Sep 2021 00:59)  atorvastatin 20 mg oral tablet: 1 tab(s) orally once a day (07 Sep 2021 00:59)  carvedilol 25 mg oral tablet: 1 tab(s) orally 2 times a day (07 Sep 2021 00:59)  cloNIDine 0.2 mg oral tablet: orally 3 times a day (07 Sep 2021 01:37)  furosemide 40 mg oral tablet: 1 tab(s) orally once a day (07 Sep 2021 00:59)  hydrALAZINE 100 mg oral tablet: orally 3 times a day (07 Sep 2021 01:37)  levothyroxine 150 mcg (0.15 mg) oral tablet: 1 tab(s) orally once a day (07 Sep 2021 00:59)  losartan 100 mg oral tablet: 1 tab(s) orally once a day (07 Sep 2021 00:59)  metFORMIN 500 mg oral tablet, extended release: 1 tab(s) orally once a day (07 Sep 2021 00:59)  potassium chloride 20 mEq oral tablet, extended release: orally 2 times a day (07 Sep 2021 01:37)  Senna 8.6 mg oral tablet: 2 tab(s) orally once a day (at bedtime) (07 Sep 2021 00:59)      Family History: Non-contributory family history of premature cardiovascular atherosclerotic disease    Social History: No tobacco, alcohol or drug use    Review of Systems:  General: No fevers, chills, weight loss or gain  Skin: No rashes, color changes  Cardiovascular: No chest pain, orthopnea  Respiratory: No shortness of breath, cough  Gastrointestinal: No nausea, abdominal pain  Genitourinary: No incontinence, pain with urination  Musculoskeletal: No pain, swelling, decreased range of motion  Neurological: No headache, weakness  Psychiatric: No depression, anxiety  Endocrine: No weight loss or gain, increased thirst  All other systems are comprehensively negative.    Physical Exam:  Vitals:        Vital Signs Last 24 Hrs  T(C): 36.6 (07 Sep 2021 08:30), Max: 38.9 (06 Sep 2021 23:30)  T(F): 97.9 (07 Sep 2021 08:30), Max: 102 (06 Sep 2021 23:30)  HR: 75 (07 Sep 2021 08:30) (69 - 107)  BP: 138/71 (07 Sep 2021 08:30) (107/64 - 197/102)  BP(mean): --  RR: 18 (07 Sep 2021 08:30) (18 - 22)  SpO2: 98% (07 Sep 2021 08:30) (89% - 98%)  General: NAD  HEENT: MMM  Neck: No JVD, no carotid bruit  Lungs: CTAB  CV: RRR, nl S1/S2, no M/R/G  Abdomen: S/NT/ND, +BS  Extremities: 2+ LE edema, no cyanosis  Neuro: AAOx3, non-focal  Skin: No rash    Labs:                        10.8   14.64 )-----------( 207      ( 07 Sep 2021 05:56 )             34.6     09-07    143  |  105  |  24<H>  ----------------------------<  160<H>  3.5   |  30  |  1.38<H>    Ca    8.1<L>      07 Sep 2021 05:56    TPro  6.0  /  Alb  3.0<L>  /  TBili  1.3<H>  /  DBili  x   /  AST  17  /  ALT  27  /  AlkPhos  74  09-07    CARDIAC MARKERS ( 07 Sep 2021 00:31 )  .023 ng/mL / x     / x     / x     / x          PT/INR - ( 07 Sep 2021 00:31 )   PT: 12.0 sec;   INR: 0.99 ratio         PTT - ( 07 Sep 2021 00:31 )  PTT:23.1 sec    ECG: NSR, LVH with secondary repolarization abnormality    
  HPI:  65F resident of [facility] Type 2 DM, HTN, hypothyroid, cognitive impairment, recurrent falls, recurrent uti, right breast malignancy on chemo presented to the hospital with cc of weakness fall, fever. In ED Tmax 102 hypoxic 89% on RA c/o shortness of breath.  patient is poor historian. fully vaccinated for covid, CT chest showed Left lung pna. No n/v/d CP abd pain.     Infectious Disease consult was called to evaluate pt and for antibiotic management.      Past Medical & Surgical Hx:  PAST MEDICAL & SURGICAL HISTORY:  HTN (hypertension)  Anxiety  HLD (hyperlipidemia)  Hypothyroid  Breast CA  right  Poor historian  Cancer of thyroid  unsure of dates but prior to 2005  Coronary artery disease  Cognitive impairment  Osteoarthritis  Type 2 diabetes mellitus  Urinary frequency  History of adrenal adenoma  Diabetes  H/O total thyroidectomy  H/O bilateral breast biopsy  November 2018 and December 2018  S/P angioplasty with stent  drug eluting stent in first diagonal on 2/21/19  S/P dilation and curettage  TOP x2      Social History--  EtOH: denies   Tobacco: denies   Drug Use: denies     FAMILY HISTORY:  FHx: suicide  FHx: lymphoma  Family history of brain damage (Sibling)  at birth      Allergies  No Known Allergies    Intolerances  NONE    Home Medications:  anastrozole 1 mg oral tablet: 1 tab(s) orally once a day (07 Sep 2021 01:37)  aspirin 81 mg oral tablet: 1 tab(s) orally once a day (07 Sep 2021 00:59)  atorvastatin 20 mg oral tablet: 1 tab(s) orally once a day (07 Sep 2021 00:59)  carvedilol 25 mg oral tablet: 1 tab(s) orally 2 times a day (07 Sep 2021 00:59)  cloNIDine 0.2 mg oral tablet: orally 3 times a day (07 Sep 2021 01:37)  furosemide 40 mg oral tablet: 1 tab(s) orally once a day (07 Sep 2021 00:59)  hydrALAZINE 100 mg oral tablet: orally 3 times a day (07 Sep 2021 01:37)  levothyroxine 150 mcg (0.15 mg) oral tablet: 1 tab(s) orally once a day (07 Sep 2021 00:59)  losartan 100 mg oral tablet: 1 tab(s) orally once a day (07 Sep 2021 00:59)  metFORMIN 500 mg oral tablet, extended release: 1 tab(s) orally once a day (07 Sep 2021 00:59)  potassium chloride 20 mEq oral tablet, extended release: orally 2 times a day (07 Sep 2021 01:37)  Senna 8.6 mg oral tablet: 2 tab(s) orally once a day (at bedtime) (07 Sep 2021 00:59)      Current Inpatient Medications :    ANTIBIOTICS:   piperacillin/tazobactam IVPB.. 3.375 Gram(s) IV Intermittent every 8 hours  vancomycin  IVPB 1500 milliGRAM(s) IV Intermittent every 12 hours      OTHER RELEVANT MEDICATIONS :  acetaminophen   Tablet .. 650 milliGRAM(s) Oral every 6 hours PRN  albuterol/ipratropium for Nebulization 3 milliLiter(s) Nebulizer every 6 hours  aspirin  chewable 81 milliGRAM(s) Oral daily  atorvastatin 20 milliGRAM(s) Oral at bedtime  carvedilol 25 milliGRAM(s) Oral every 12 hours  cloNIDine 0.2 milliGRAM(s) Oral three times a day  dextrose 40% Gel 15 Gram(s) Oral once  dextrose 5%. 1000 milliLiter(s) IV Continuous <Continuous>  dextrose 5%. 1000 milliLiter(s) IV Continuous <Continuous>  dextrose 50% Injectable 25 Gram(s) IV Push once  dextrose 50% Injectable 12.5 Gram(s) IV Push once  dextrose 50% Injectable 25 Gram(s) IV Push once  enoxaparin Injectable 40 milliGRAM(s) SubCutaneous daily  glucagon  Injectable 1 milliGRAM(s) IntraMuscular once  hydrALAZINE 100 milliGRAM(s) Oral three times a day  insulin lispro (ADMELOG) corrective regimen sliding scale   SubCutaneous three times a day before meals  insulin lispro (ADMELOG) corrective regimen sliding scale   SubCutaneous at bedtime  levothyroxine 150 MICROGram(s) Oral daily  losartan 100 milliGRAM(s) Oral daily  metFORMIN 500 milliGRAM(s) Oral two times a day  senna 2 Tablet(s) Oral at bedtime      ROS:  CONSTITUTIONAL: + fever or chills  EYES:  Negative  blurry vision or double vision  CARDIOVASCULAR:  Negative for chest pain or palpitations  RESPIRATORY:  Negative for cough, wheezing, + SOB   GASTROINTESTINAL:  Negative for nausea, vomiting, diarrhea, constipation, or abdominal pain  GENITOURINARY:  Negative frequency, urgency , dysuria or hematuria   NEUROLOGIC:  No headache, confusion, dizziness, lightheadedness  All other systems were reviewed and are negative      I&O's Detail    06 Sep 2021 07:01  -  07 Sep 2021 07:00  --------------------------------------------------------  IN:  Total IN: 0 mL    OUT:    Voided (mL): 500 mL  Total OUT: 500 mL    Total NET: -500 mL      07 Sep 2021 07:01  -  07 Sep 2021 23:33  --------------------------------------------------------  IN:    IV PiggyBack: 600 mL  Total IN: 600 mL    OUT:  Total OUT: 0 mL    Total NET: 600 mL      Physical Exam:  Vital Signs Last 24 Hrs  T(C): 36.7 (07 Sep 2021 21:41), Max: 37.3 (07 Sep 2021 01:14)  T(F): 98 (07 Sep 2021 21:41), Max: 99.1 (07 Sep 2021 01:14)  HR: 89 (07 Sep 2021 21:41) (69 - 102)  BP: 100/63 (07 Sep 2021 21:41) (100/63 - 190/80)  RR: 18 (07 Sep 2021 21:41) (17 - 20)  SpO2: 95% (07 Sep 2021 21:41) (95% - 98%)  Height (cm): 170.2 (09-07 @ 19:02)  Weight (kg): 99 (09-07 @ 19:02)  BMI (kg/m2): 34.2 (09-07 @ 19:02)  BSA (m2): 2.1 (09-07 @ 19:02)    General: weak no acute distress  Neck: supple, trachea midline  Lungs: Decreased, no wheeze/rhonchi  Cardiovascular: regular rate and rhythm, S1 S2  Abdomen: soft, nontender, ND, bowel sounds normal  Neurological:  alert and oriented x2  Skin: no rash  Extremities: + edema    Labs:                         10.8   14.64 )-----------( 207      ( 07 Sep 2021 05:56 )             34.6   09-07    143  |  105  |  24<H>  ----------------------------<  160<H>  3.5   |  30  |  1.38<H>    Ca    8.1<L>      07 Sep 2021 05:56    TPro  6.0  /  Alb  3.0<L>  /  TBili  1.3<H>  /  DBili  x   /  AST  17  /  ALT  27  /  AlkPhos  74  09-07      RECENT CULTURES:          RADIOLOGY & ADDITIONAL STUDIES:    EXAM:  CT CHEST                                  PROCEDURE DATE:  09/07/2021          INTERPRETATION:  CLINICAL INFORMATION: Short of breath    COMPARISON: 4/19/2019.    CONTRAST/COMPLICATIONS:  IV Contrast: NONE  0 cc administered   0 cc discarded  Oral Contrast: NONE  Complications: None reported at time of study completion    PROCEDURE:  CT of the Chest was performed.  Sagittal and coronal reformats were performed.    FINDINGS:    LUNGS AND AIRWAYS: Patent central airways. Very low lung volumes. Patchy consolidation in the lingula , and left lower lobe likely representing a combination of pneumonia and atelectasis. Correlate clinically for active infection. Fibrotic atelectatic changes in the right upper middle and lower lobes.  PLEURA: Trace basilar pleural effusions  MEDIASTINUM AND FRANCISCA: No lymphadenopathy.  VESSELS: Nonaneurysmal thoracic aorta.  HEART: Cardiomegaly with cardiac vascular calcific. Trace pericardial effusion.  CHEST WALL AND LOWER NECK: Asymmetric soft tissue density right breast may reflect scarring/postoperative change. Cannot exclude neoplasm. Recommend clinical correlation correlation with dedicated breast.  VISUALIZED UPPER ABDOMEN: Cholelithiasis. Indeterminate left adrenal nodule increased in size, now measures 3.5 cm previously measured 2.6 cm. Correlate with MR  BONES: Degenerative changes.    IMPRESSION:  Left basilar pneumonia and atelectasis.  Trace basilar pleural effusions. Trace pericardial effusion.  Asymmetric soft tissue density right breast. Correlate clinically and with dedicated breast imaging.  Indeterminate left adrenal nodule with interval increase in size compared to 4/19/2019. Correlate with MR    Assessment :   65F resident of [facility] PMH Type 2 DM, HTN, hypothyroid, cognitive impairment, recurrent falls, recurrent uti, right breast malignancy on chemo admitted with weakness fall, fever sec Left lung pna. In ED Tmax 102 hypoxic 89% on RA c/o shortness of breath.  CT chest showed Left lung pna.      Plan :   Cont Zosyn Dc Vanc  Trend temps and cbc  Fu cultures  Legionella and pneumococcal urinary antigen  Asp precautions    D/w Dr Garcia    Continue with present regiment .  Approptiate use of antibiotics and adverse effects reviewed.        > 45 minutes spent in direct patient care reviewing  the notes, lab data/ imaging , discussion with multidisciplinary team. All questions were addressed and answered to the best of my capacity .    Thank you for allowing me to participate in the care of your patient .      Sunni Espinoza MD  Infectious Disease  [phone]
Date/Time Patient Seen:  		  Referring MD:   Data Reviewed	       Patient is a 65y old  Female who presents with a chief complaint of sob (07 Sep 2021 01:25)      Subjective/HPI  er provider note reviewed  h and p reviewed  old records reviewed  poor historian           History of Present Illness:  Reason for Admission: sob  History of Present Illness:   65F with PMH Type 2 DM, HTN,hypothyroid, cognitive impairment,recurrent falls,recurrent uti, right breast malignancy on chemo  sent from [facility] for weakness fall, fever in syo , hypoxic 89% on RA c/o shortness of breath.  patient is poor historian. fully vaccinated for covid, has leucocytosis, x ray chest suspicion of PNA, started on oxygen 3 litres for hypoxia, and started on vanc zosyn and admitted for further management   Hospital Course  65F with PMH Type 2 DM, HTN, cognitive impairment, right breast malignancy on chemo sent from [facility] assisted s/p 3 falls. Admitted for falls likely due to UTI. Sepsis present on admission, resolved. Found to have bacteremia 2/2 to E Coli UTI. Started on IV Rocephin and switched to PO abx for a total of 14 days. Repeat blood cultures x2 no growth.  PAST MEDICAL & SURGICAL HISTORY:  HTN (hypertension)    HLD (hyperlipidemia)    Anxiety    HLD (hyperlipidemia)    Hypothyroid    Adrenal nodule    Breast CA  right    Poor historian    Cancer of thyroid  unsure of dates but prior to 2005    Coronary artery disease    Cognitive impairment    Osteoarthritis    Type 2 diabetes mellitus    Urinary frequency    History of adrenal adenoma    Diabetes    H/O total thyroidectomy    H/O bilateral breast biopsy  November 2018 and December 2018    S/P angioplasty with stent  drug eluting stent in first diagonal on 2/21/19    S/P dilation and curettage  TOP x2    FAMILY HISTORY:  FHx: lymphoma  FHx: suicide    Sibling  Still living? Yes, Estimated age: 51-60  Family history of brain damage, Age at diagnosis: Age Unknown.     Social History:  Social History (marital status, living situation, occupation, tobacco use, alcohol and drug use, and sexual history): denies smoking cigarettes, drinking alcohol, and illicit drug use  Lives at [facility]     Tobacco Screening:  · Core Measure Site	Yes  · Has the patient used tobacco in the past 30 days?	No    Risk Assessment:    Present on Admission:  Deep Venous Thrombosis	no  Pulmonary Embolus	no     Heart Failure:  Does this patient have a history of or has been diagnosed with heart failure? no.      Medication list         MEDICATIONS  (STANDING):  albuterol/ipratropium for Nebulization 3 milliLiter(s) Nebulizer every 6 hours  anastrozole 1 milliGRAM(s) Oral daily  aspirin  chewable 81 milliGRAM(s) Oral daily  atorvastatin 20 milliGRAM(s) Oral at bedtime  carvedilol 25 milliGRAM(s) Oral every 12 hours  cloNIDine 0.2 milliGRAM(s) Oral three times a day  dextrose 40% Gel 15 Gram(s) Oral once  dextrose 5%. 1000 milliLiter(s) (50 mL/Hr) IV Continuous <Continuous>  dextrose 5%. 1000 milliLiter(s) (100 mL/Hr) IV Continuous <Continuous>  dextrose 50% Injectable 25 Gram(s) IV Push once  dextrose 50% Injectable 12.5 Gram(s) IV Push once  dextrose 50% Injectable 25 Gram(s) IV Push once  enoxaparin Injectable 40 milliGRAM(s) SubCutaneous daily  glucagon  Injectable 1 milliGRAM(s) IntraMuscular once  hydrALAZINE 100 milliGRAM(s) Oral three times a day  insulin lispro (ADMELOG) corrective regimen sliding scale   SubCutaneous three times a day before meals  insulin lispro (ADMELOG) corrective regimen sliding scale   SubCutaneous at bedtime  lactobacillus acidophilus 1 Tablet(s) Oral three times a day  levothyroxine 150 MICROGram(s) Oral daily  losartan 100 milliGRAM(s) Oral daily  metFORMIN 500 milliGRAM(s) Oral two times a day  piperacillin/tazobactam IVPB.. 3.375 Gram(s) IV Intermittent every 8 hours  senna 2 Tablet(s) Oral at bedtime  vancomycin  IVPB 1500 milliGRAM(s) IV Intermittent every 12 hours    MEDICATIONS  (PRN):  acetaminophen   Tablet .. 650 milliGRAM(s) Oral every 6 hours PRN Temp greater or equal to 38C (100.4F), Moderate Pain (4 - 6)         Vitals log        ICU Vital Signs Last 24 Hrs  T(C): 36.7 (07 Sep 2021 04:56), Max: 38.9 (06 Sep 2021 23:30)  T(F): 98 (07 Sep 2021 04:56), Max: 102 (06 Sep 2021 23:30)  HR: 69 (07 Sep 2021 06:45) (69 - 107)  BP: 133/69 (07 Sep 2021 04:56) (107/64 - 197/102)  BP(mean): --  ABP: --  ABP(mean): --  RR: 18 (07 Sep 2021 04:56) (18 - 22)  SpO2: 97% (07 Sep 2021 06:45) (89% - 97%)           Input and Output:  I&O's Detail    06 Sep 2021 07:01  -  07 Sep 2021 07:00  --------------------------------------------------------  IN:  Total IN: 0 mL    OUT:    Voided (mL): 500 mL  Total OUT: 500 mL    Total NET: -500 mL          Lab Data                        10.8   14.64 )-----------( 207      ( 07 Sep 2021 05:56 )             34.6     09-07    143  |  105  |  24<H>  ----------------------------<  160<H>  3.5   |  30  |  1.38<H>    Ca    8.1<L>      07 Sep 2021 05:56    TPro  6.0  /  Alb  3.0<L>  /  TBili  1.3<H>  /  DBili  x   /  AST  17  /  ALT  27  /  AlkPhos  74  09-07      CARDIAC MARKERS ( 07 Sep 2021 00:31 )  .023 ng/mL / x     / x     / x     / x            Review of Systems	      Objective     Physical Examination        Pertinent Lab findings & Imaging      Miguel Angel:  NO   Adequate UO     I&O's Detail    06 Sep 2021 07:01  -  07 Sep 2021 07:00  --------------------------------------------------------  IN:  Total IN: 0 mL    OUT:    Voided (mL): 500 mL  Total OUT: 500 mL    Total NET: -500 mL               Discussed with:     Cultures:	        Radiology      EXAM:  CT CHEST        PROCEDURE DATE:  04/19/2019           INTERPRETATION:  CLINICAL INFORMATION: Patient has invasive ductal   carcinoma of the right breast status post breast mass resection.  Study   obtained for breast cancer staging.    COMPARISON: CT of the abdomen and pelvis with contrast on 11/13/2018,   12/12/2014. Chest CT scan dated 7/25/2008.    PROCEDURE:   CT of the Chest, Abdomen and Pelvis was performed without intravenous   contrast as requested due to elevated creatinine.   Oral contrast: positive contrast was administered.  Sagittal and coronal reformats were performed.    FINDINGS:    CHEST:   Evaluation of the solid organ parenchyma is limited due to the lack of   intravenous contrast.  LUNGS AND LARGE AIRWAYS: Patent central airways. A 2 mm pulmonary nodule   is noted in the left lower lobe (series 2, image 31).  PLEURA: No pleural effusion.  VESSELS: Atherosclerotic calcifications of the coronary arteries and   aorta.  HEART: Heart size is normal. No pericardial effusion.  MEDIASTINUM AND FRANCISCA: No axillary, internal mammary, mediastinal, or   hilar lymphadenopathy.  CHEST WALL AND LOWER NECK: Marked right breast skin thickening with   underlying ill-defined soft tissue stranding. Additionally within the   right breast there is 10.6 x 2.6 x 11.1 cm fluid collection. Soft tissue   stranding is noted within the right axilla, findings consistent with   recent surgery.    ABDOMEN AND PELVIS:    LIVER: Within normal limits.  BILE DUCTS: Normal caliber.  GALLBLADDER: Within normal limits.  SPLEEN: Within normal limits.  PANCREAS: Within normal limits.  ADRENALS: No significant interval change in a 3.0 cm low-density left   adrenal nodule, previously characterized as an adrenal adenoma. The right   adrenal gland is unremarkable.   KIDNEYS/URETERS: Left midpole renal cyst. No hydronephrosis bilaterally.    BLADDER: Within normal limits.  REPRODUCTIVE ORGANS: Calcified fibroid uterus. Adnexa are within normal   limits.    BOWEL: No bowel obstruction or inflammation. A few scattered colonic   diverticuli are seen without evidence for diverticulitis. Appendix is   normal.  PERITONEUM: No ascites.  VESSELS:  Atheromatous disease of aorta and its branches.  RETROPERITONEUM: No lymphadenopathy. Unchanged small, nonspecific upper   retroperitoneal lymph nodes.   ABDOMINAL WALL: Tiny fat-containing umbilical hernia.  BONES: Mild multilevel degenerative changes of the thoracolumbar spine.    IMPRESSION:    Right breast and axillary postoperative changes including a 11.1 cm   seroma.     A 2 mm pulmonary nodule is noted in the left lower lobe.    No CT evidence of metastatic disease in the abdomen or pelvis.                   EARLE HOYT M.D., RADIOLOGY RESIDENT  This document has been electronically signed.  NICCI GUTHRIE M.D., ATTENDING RADIOLOGIST Phone #: [phone]  This document has been electronically signed. Apr 22 2019  8:00AM

## 2021-09-07 NOTE — H&P ADULT - ASSESSMENT
65F with PMH Type 2 DM, HTN,hypothyroid, cognitive impairment,recurrent falls,recurrent uti, right breast malignancy on chemo  sent from [facility] for weakness fall, fever in syo , hypoxic 89% on RA c/o shortness of breath.  patient is poor historian. fully vaccinated for covid, has leucocytosis, x ray chest suspicion of PNA, started on oxygen 3 litres for hypoxia, and started on vanc zosyn and admitted for further management   for  Ac hypoxic resp failure with susected PNA, covid results pendinf  cognitive imairement'  DM2  rec falls  hypothyroid  breast malig  Ess HTN 65F with PMH Type 2 DM, HTN,hypothyroid, cognitive impairment,recurrent falls,recurrent uti, right breast malignancy on chemo  sent from [facility] for weakness fall, fever in syo , hypoxic 89% on RA c/o shortness of breath.  patient is poor historian. fully vaccinated for covid, has leucocytosis, x ray chest suspicion of PNA, started on oxygen 3 litres for hypoxia, and started on vanc zosyn and admitted for further management   for  Ac hypoxic resp failure on admission  with suspected PNA, covid negative  cognitive impairement'  DM2  rec falls  hypothyroid  breast malig  Ess HTN  h/o CAD and stents  h/o thyroidectomy - ca  started on abx, ID pulmonary and cardio consult called  d/w family [phone]  goals of care discussed with patient and brother, unsure at this point, all information provided  Advanced care planning discussed with patient/family. Advaced care planning forms reviewed,discussed /completed, 20 min spent   65F with PMH Type 2 DM, HTN,hypothyroid, cognitive impairment,recurrent falls,recurrent uti, right breast malignancy on chemo  sent from [facility] for weakness fall, fever in syo , hypoxic 89% on RA c/o shortness of breath.  patient is poor historian. fully vaccinated for covid, has leucocytosis, x ray chest suspicion of PNA, started on oxygen 3 litres for hypoxia, and started on vanc zosyn and admitted for further management   for  Ac hypoxic resp failure on admission  with suspected PNA, covid negative  cognitive impairement'  DM2  rec falls  hypothyroid  breast malig  Ess HTN  h/o CAD and stents  h/o thyroidectomy - ca  Mild jay- lasix on hold , will resume as improves  started on abx, ID pulmonary and cardio consult called  d/w family [phone]  goals of care discussed with patient and brother, unsure at this point, all information provided  Advanced care planning discussed with patient/family. Advaced care planning forms reviewed,discussed /completed, 20 min spent

## 2021-09-08 NOTE — PROGRESS NOTE ADULT - ASSESSMENT
65F with PMH Type 2 DM, HTN,hypothyroid, cognitive impairment,recurrent falls,recurrent uti, right breast malignancy on chemo  sent from [facility] for weakness fall, fever in syo , hypoxic 89% on RA c/o shortness of breath.  patient is poor historian. fully vaccinated for covid, has leucocytosis, x ray chest suspicion of PNA, started on oxygen 3 litres for hypoxia, and started on vanc zosyn and admitted for further management   for  Ac hypoxic resp failure on admission with CT chest shows left basilar PNA - Atelectasis,Blood cx - gram neg -  covid negative  cognitive impairement'  DM2  rec falls  hypothyroid  breast malig  Ess HTN  h/o CAD and stents  h/o thyroidectomy - ca  Mild jay- lasix on hold , will resume as improves  started on abx, ID pulmonary and cardio consult noted  adrenal nodule - Ct abd pelvis advised  d/w family [id number] HCP, has living will  goals of care discussed with patient and brother, unsure at this point, all information provided  Advanced care planning discussed with patient/family. Advaced care planning forms reviewed,discussed /completed, 20 min spent 9/8   65F with PMH Type 2 DM, HTN,hypothyroid, cognitive impairment,recurrent falls,recurrent uti, right breast malignancy on chemo  sent from [facility] for weakness fall, fever in syo , hypoxic 89% on RA c/o shortness of breath.  patient is poor historian. fully vaccinated for covid, has leucocytosis, x ray chest suspicion of PNA, started on oxygen 3 litres for hypoxia, and started on vanc zosyn and admitted for further management   for  Ac hypoxic resp failure on admission with CT chest shows left basilar PNA - Atelectasis,Blood cx - gram neg -  covid negative  sepsis due to PNA with e coli bactremia fever, hypoxia, leucocytosis  cognitive impairement'  DM2  rec falls  hypothyroid  breast malig  Ess HTN  h/o CAD and stents  h/o thyroidectomy - ca  Mild jay- lasix on hold , will resume as improves  started on abx, ID pulmonary and cardio consult noted  adrenal nodule - Ct abd pelvis advised  d/w family Aeaqy-[id number] HCP, has living will  goals of care discussed with patient and brother, unsure at this point, all information provided  Advanced care planning discussed with patient/family. Advaced care planning forms reviewed,discussed /completed, 20 min spent 9/8   65F with PMH Type 2 DM, HTN,hypothyroid, cognitive impairment,recurrent falls,recurrent uti, right breast malignancy on chemo  sent from [facility] for weakness fall, fever in syo , hypoxic 89% on RA c/o shortness of breath.  patient is poor historian. fully vaccinated for covid, has leucocytosis, x ray chest suspicion of PNA, started on oxygen 3 litres for hypoxia, and started on vanc zosyn and admitted for further management   for  Ac hypoxic resp failure on admission with CT chest shows left basilar PNA - Atelectasis,Blood cx - gram neg -  covid negative  sepsis due to PNA with e coli bactremia fever, hypoxia, leucocytosis  cognitive impairement'  DM2  rec falls  hypothyroid  breast malig  Ess HTN  h/o CAD and stents  h/o thyroidectomy - ca  Mild jay- lasix on hold , will resume as improves  started on abx, ID pulmonary and cardio consult noted  adrenal nodule - Ct abd pelvis advised  d/w family [id number] HCP, has living will  goals of care discussed with patient and brother, unsure at this point, all information provided  Advanced care planning discussed with patient/family. Advaced care planning forms reviewed,discussed /completed, 20 min spent 9/8  MOLST filled

## 2021-09-08 NOTE — PHYSICAL THERAPY INITIAL EVALUATION ADULT - PERTINENT HX OF CURRENT PROBLEM, REHAB EVAL
65F with PMH Type 2 DM, HTN,hypothyroid, cognitive impairment,recurrent falls,recurrent uti, right breast malignancy on chemo  sent from [facility] for weakness fall, fever in syo , hypoxic 89% on RA c/o shortness of breath.  patient is poor historian. fully vaccinated for covid, has leucocytosis, x ray chest suspicion of PNA, started on oxygen 3 liters for hypoxia, and started on vanc zosyn and admitted for further management.  CT chest: Left basilar pneumonia and atelectasis.

## 2021-09-08 NOTE — DISCHARGE NOTE NURSING/CASE MANAGEMENT/SOCIAL WORK - NSDCPEFALRISK_GEN_ALL_CORE
For information on Fall & injury Prevention, visit [email or web address]

## 2021-09-08 NOTE — PROVIDER CONTACT NOTE (CRITICAL VALUE NOTIFICATION) - ACTION/TREATMENT ORDERED:
notified, patient is already on ZOSYN IV antibiotic. will continue to monitor.
As per  ,Patient is covered with current antibiotics.

## 2021-09-08 NOTE — DISCHARGE NOTE NURSING/CASE MANAGEMENT/SOCIAL WORK - NSDCDMENAME_GEN_ALL_CORE_FT
Prior to this admission you have a rolling walker back at [facility]. Prior to this admission you have a rolling walker back at [address]; new home oxygen thru [employer] [phone]

## 2021-09-08 NOTE — PROGRESS NOTE ADULT - ASSESSMENT
65F with PMH Type 2 DM, HTN,hypothyroid, cognitive impairment,recurrent falls,recurrent uti, right breast malignancy on chemo  sent from [facility] [address] for weakness fall, fever in syo , hypoxic 89% on RA c/o shortness of breath.    CT chest shows left basilar PNA - Atelectasis  Blood cx - gram neg -   vs noted  on ABX  consideration for CT abd - pel  ID eval noted    TTE done - report pending  cardio optimization in progress  I and O  Diuresis on HOLD - JOEL -   serial renal indices  will check CT chest -   eval for eff - atelectasis - pna - pulm edema -   assist with needs  monitor VS and HD and Sat  GOC discussion  on emp ABX rx regimen   65F with PMH Type 2 DM, HTN,hypothyroid, cognitive impairment,recurrent falls,recurrent uti, right breast malignancy on chemo  sent from [address] for weakness fall, fever in syo , hypoxic 89% on RA c/o shortness of breath.    CT chest shows left basilar PNA - Atelectasis  Blood cx - gram neg -   vs noted  on ABX  consideration for CT abd - pel  ID eval noted    TTE done - report pending  cardio optimization in progress  I and O  Diuresis on HOLD - JOEL -   serial renal indices  eval for eff - atelectasis - pna - pulm edema -   assist with needs  monitor VS and HD and Sat  GOC discussion  on emp ABX rx regimen

## 2021-09-08 NOTE — PHYSICAL THERAPY INITIAL EVALUATION ADULT - SITTING BALANCE: DYNAMIC
Patient seen with the nursing staff and patient's wife who provided most of the history in this patient who is forgetful  Paroxysmal A. fib with rapid ventricular response converted to sinus rhythm on IV Cardizem.  The patient and wife unaware of previous diagnosis of A. fib.  IV heparin if okay with GI in this patient with cirrhosis and ascites  Extensive hospitalization earlier this year [place].  Obtain records from PCP as the patient and his wife are not clear about.  Patient is  entrestoand midodrine.  Would hold the former history of coronary stent in 2009 at [facility].  Questionable history of fluid overload possible CHF.  History of \"\" irregular rhythm but not on anticoagulation.  Early dementia.    Now admitted with urosepsis and received fluids.  Ascites/cirrhosis workup as per GI.  Shortness of breath.  May benefit from paracentesis  Gentle diuresis.  Check echo get old records.    Full consult dictated discussed with        normal balance

## 2021-09-08 NOTE — DISCHARGE NOTE NURSING/CASE MANAGEMENT/SOCIAL WORK - NSDCCRTYPESERV_GEN_ALL_CORE_FT
You are a resident of above assisted living facility ([facility]) and receive assistance from [facility] staff

## 2021-09-08 NOTE — PROGRESS NOTE ADULT - ASSESSMENT
The patient is a 65 year old female with a history of HTN, DM, hypothyroidism, cognitive impairment, right breast cancer who presents with weakness, fall, fever, shortness of breath.    Plan:  - ECG with LVH related changes  - Continue carvedilol 25 mg bid  - Continue clonidine 0.2 mg tid  - Continue hydralazine 100 mg tid  - Continue losartan 100 mg daily  - Continue aspirin 81 mg daily  - Continue atorvastatin 20 mg daily  - BNP mildly elevated at 1644  - Echo with hyperdynamic LV systolic function, LVOT obstruction from ?posterior leaflet of mitral valve - technically difficult study  - Will resume furosemide 40 mg PO daily but cautious use given LVOT obstruction  - Pulm follow-up  - On zosyn

## 2021-09-08 NOTE — DISCHARGE NOTE NURSING/CASE MANAGEMENT/SOCIAL WORK - NSDCCRNAME_GEN_ALL_CORE_FT
[facility] assisted living facility ([facility]) [phone]. [facility] [phone].

## 2021-09-08 NOTE — DISCHARGE NOTE NURSING/CASE MANAGEMENT/SOCIAL WORK - NSDCVIVACCINE_GEN_ALL_CORE_FT
Tdap; 03-Oct-2020 15:40; Heydi Rojas (RN); [employer]; [id number] (Exp. Date: 31-Jul-2022); IntraMuscular; Deltoid Right.; 0.5 milliLiter(s); VIS (VIS Published: 09-May-2013, VIS Presented: 03-Oct-2020);   Tdap; 18-Nov-2020 13:08; Kandy Chaudhary (DAMEON); [employer]; [id number] (Exp. Date: 31-Jul-2022); IntraMuscular; Deltoid Left.; 0.5 milliLiter(s); VIS (VIS Published: 09-May-2013, VIS Presented: 18-Nov-2020);

## 2021-09-08 NOTE — DISCHARGE NOTE NURSING/CASE MANAGEMENT/SOCIAL WORK - NSSCCARECORD_GEN_ALL_CORE
Home Care Agency/Durable Medical Equipment Agency Home Care Agency/Durable Medical Equipment Agency/[facility]

## 2021-09-08 NOTE — PROVIDER CONTACT NOTE (CRITICAL VALUE NOTIFICATION) - TEST AND RESULT REPORTED:
Blood culture results  1st bottle:aerobic -gram variable rods  anerobic: gram negative rods.  2nd bottle:  aerobic and anerobic :gram negative rods

## 2021-09-08 NOTE — DISCHARGE NOTE NURSING/CASE MANAGEMENT/SOCIAL WORK - NSDPFAC_GEN_ALL_CORE
[facility] assisted living facility ([facility]) [phone].

## 2021-09-08 NOTE — PROVIDER CONTACT NOTE (CRITICAL VALUE NOTIFICATION) - ASSESSMENT
Currently patient is alert,OX4,not in any distress/discomfort
Patient is alert,OX4,not in any distress/discomfort.

## 2021-09-08 NOTE — PROVIDER CONTACT NOTE (CRITICAL VALUE NOTIFICATION) - RECOMMENDATIONS
Patient is on Vancomycin 1500mg IV,Zosyn  3.375mg IV every 8hours.
 notified, patient is already on ZOSYN IV antibiotic.

## 2021-09-08 NOTE — DISCHARGE NOTE NURSING/CASE MANAGEMENT/SOCIAL WORK - PATIENT PORTAL LINK FT
You can access the FollowMyHealth Patient Portal offered by [facility] by registering at the following website: [email or web address]. By joining [facility]’s FollowMyHealth portal, you will also be able to view your health information using other applications (apps) compatible with our system.

## 2021-09-08 NOTE — PROVIDER CONTACT NOTE (CRITICAL VALUE NOTIFICATION) - SITUATION
Blood  culture / Aerobic bottle is positive  for gram variable rods.
Blood culture results  1st bottle: aerobic -gram variable rods  anaerobic: gram negative rods.  2nd bottle:  aerobic and anaerobic :gram negative rods

## 2021-09-09 NOTE — PROGRESS NOTE ADULT - ASSESSMENT
65F with PMH Type 2 DM, HTN,hypothyroid, cognitive impairment,recurrent falls,recurrent uti, right breast malignancy on chemo  sent from [facility] [address] for weakness fall, fever in syo , hypoxic 89% on RA c/o shortness of breath.  patient is poor historian. fully vaccinated for covid, has leucocytosis, x ray chest suspicion of PNA, started on oxygen 3 litres for hypoxia, and started on vanc zosyn and admitted for further management   for  Ac hypoxic resp failure on admission with CT chest shows left basilar PNA - Atelectasis,Blood cx - gram neg -  covid negative  sepsis due to E coli UTI  with e coli bactremia fever, hypoxia, leucocytosis  possible PNA with atelectasis  cognitive impairement'  DM2  rec falls  hypothyroid  breast malig  Ess HTN  h/o CAD and stents  h/o thyroidectomy - ca  Mild jay- lasix on hold , will resume as improves  started on abx, ID pulmonary and cardio consult noted  adrenal nodule - Ct abd pelvis noted  d/w family [id number] HCP, has living will  goals of care discussed with patient and brother, unsure at this point, all information provided  Advanced care planning discussed with patient/family. Advaced care planning forms reviewed,discussed /completed, 20 min spent 9/8  MOLST filled

## 2021-09-09 NOTE — PROGRESS NOTE ADULT - ASSESSMENT
The patient is a 65 year old female with a history of HTN, DM, hypothyroidism, cognitive impairment, right breast cancer who presents with weakness, fall, fever, shortness of breath.    Plan:  - ECG with LVH related changes  - Continue carvedilol 25 mg bid  - Continue clonidine 0.2 mg tid  - Continue hydralazine 100 mg tid  - Continue losartan 100 mg daily  - Continue aspirin 81 mg daily  - Continue atorvastatin 20 mg daily  - BNP mildly elevated at 1644  - Echo with hyperdynamic LV systolic function, LVOT obstruction from ?posterior leaflet of mitral valve - technically difficult study  - Continue furosemide 40 mg PO daily but cautious use given LVOT obstruction  - Pulm follow-up  - On zosyn

## 2021-09-09 NOTE — PROGRESS NOTE ADULT - ASSESSMENT
65F with PMH Type 2 DM, HTN,hypothyroid, cognitive impairment,recurrent falls,recurrent uti, right breast malignancy on chemo  sent from [address] for weakness fall, fever in syo , hypoxic 89% on RA c/o shortness of breath.    CT chest shows eff - atelectasis - poss PNA  Blood cx - gram neg -   vs noted  on ABX  CT abd reviewed - no acute path  ID eval noted    TTE done -   cardio optimization in progress  I and O  Diuresis in progress  serial renal indices  eval for eff - atelectasis - pna - pulm edema -   assist with needs  monitor VS and HD and Sat  GOC discussion  on emp ABX rx regimen

## 2021-09-10 NOTE — PROGRESS NOTE ADULT - ASSESSMENT
The patient is a 65 year old female with a history of HTN, DM, hypothyroidism, cognitive impairment, right breast cancer who presents with weakness, fall, fever, shortness of breath.    Plan:  - ECG with LVH related changes  - Continue carvedilol 25 mg bid  - Continue clonidine 0.2 mg tid  - Continue hydralazine 100 mg tid  - Continue losartan 100 mg daily  - Continue aspirin 81 mg daily  - Continue atorvastatin 20 mg daily  - BNP mildly elevated at 1644  - Echo with hyperdynamic LV systolic function, LVOT obstruction from ?posterior leaflet of mitral valve - technically difficult study  - Continue furosemide 40 mg PO daily but cautious use given LVOT obstruction  - Pulm follow-up  - On ceftriaxone

## 2021-09-10 NOTE — PROGRESS NOTE ADULT - ASSESSMENT
65F with PMH Type 2 DM, HTN,hypothyroid, cognitive impairment,recurrent falls,recurrent uti, right breast malignancy on chemo  sent from [facility] [address] for weakness fall, fever in syo , hypoxic 89% on RA c/o shortness of breath.  patient is poor historian. fully vaccinated for covid, has leucocytosis, x ray chest suspicion of PNA, started on oxygen 3 litres for hypoxia, and started on vanc zosyn and admitted for further management   for  Ac hypoxic resp failure on admission with CT chest shows left basilar PNA - Atelectasis,Blood cx - gram neg -  covid negative  sepsis due to E coli UTI  with e coli bactremia fever, hypoxia, leucocytosis, with  possible concurrent  PNA with atelectasis  cognitive impairement'  DM2  rec falls  hypothyroid  breast malig  Ess HTN  h/o CAD and stents  h/o thyroidectomy - ca  Mild jay- lasix on hold , will resume as improves  started on abx, ID pulmonary and cardio consult noted  adrenal nodule - Ct abd pelvis noted  d/w family Ovodg-[id number] HCP, has living will  goals of care discussed with patient and brother, unsure at this point, all information provided  Advanced care planning discussed with patient/family. Advaced care planning forms reviewed,discussed /completed, 20 min spent 9/8  MOLST filled    65F with PMH Type 2 DM, HTN,hypothyroid, cognitive impairment,recurrent falls,recurrent uti, right breast malignancy on chemo  sent from [facility] [address] for weakness fall, fever in syo , hypoxic 89% on RA c/o shortness of breath.  patient is poor historian. fully vaccinated for covid, has leucocytosis, x ray chest suspicion of PNA, started on oxygen 3 litres for hypoxia, and started on vanc zosyn and admitted for further management   for  Ac hypoxic resp failure on admission with CT chest shows left basilar PNA - Atelectasis,Blood cx - gram neg -  covid negative  sepsis due to E coli UTI  with e coli bactremia fever, hypoxia, leucocytosis, with  possible concurrent  PNA with atelectasis  cognitive impairement'  DM2  rec falls  hypothyroid  breast malig  Ess HTN  h/o CAD and stents  h/o thyroidectomy - ca  Mild jay- lasix on hold , will resume as improves  started on abx, ID pulmonary and cardio consult noted  adrenal nodule - Ct abd pelvis noted  d/w family [id number] HCP, has living will, discussed and informed of condition  goals of care discussed with patient and brother, unsure at this point, all information provided  Advanced care planning discussed with patient/family. Advaced care planning forms reviewed,discussed /completed, 20 min spent 9/8  MOLST filled    65F with PMH Type 2 DM, HTN,hypothyroid, cognitive impairment,recurrent falls,recurrent uti, right breast malignancy on chemo  sent from [facility] for weakness fall, fever in syo , hypoxic 89% on RA c/o shortness of breath.  patient is poor historian. fully vaccinated for covid, has leucocytosis, x ray chest suspicion of PNA, started on oxygen 3 litres for hypoxia, and started on vanc zosyn and admitted for further management   for  Ac hypoxic resp failure on admission with CT chest shows left basilar PNA - Atelectasis,Blood cx - gram neg -  covid negative  was hypertensive on admission , did not receive IV fluid , ON further manage ment found to have bactremia and urine culture E coli thus on retrospect suspected  sepsis due to E coli UTI  with e coli bactremia fever, hypoxia, leucocytosis, with  possible concurrent  PNA with atelectasis  cognitive impairement'  DM2  rec falls  hypothyroid  breast malig  Ess HTN  h/o CAD and stents  h/o thyroidectomy - ca  Mild jay- lasix on hold , will resume as improves  started on abx, ID pulmonary and cardio consult noted  adrenal nodule - Ct abd pelvis noted  d/w family [id number]-[id number] HCP, has living will, discussed and informed of condition  goals of care discussed with patient and brother, unsure at this point, all information provided  Advanced care planning discussed with patient/family. Advaced care planning forms reviewed,discussed /completed, 20 min spent 9/8  MOLST filled

## 2021-09-10 NOTE — PROGRESS NOTE ADULT - ASSESSMENT
65F with PMH Type 2 DM, HTN,hypothyroid, cognitive impairment,recurrent falls,recurrent uti, right breast malignancy on chemo  sent from [facility] for weakness fall, fever in syo , hypoxic 89% on RA c/o shortness of breath.    CT chest shows eff - atelectasis - poss PNA  Blood cx - gram neg -   vs noted  on ABX  CT abd reviewed - no acute path  ID eval noted    TTE done - reviewed  cardio optimization in progress  I and O  Diuresis in progress  serial renal indices  eval for eff - atelectasis - pna - pulm edema -   assist with needs  monitor VS and HD and Sat  GOC discussion  on emp ABX rx regimen

## 2021-09-11 NOTE — PROGRESS NOTE ADULT - ASSESSMENT
65F with PMH Type 2 DM, HTN,hypothyroid, cognitive impairment,recurrent falls,recurrent uti, right breast malignancy on chemo  sent from [facility] [address] for weakness fall, fever in syo , hypoxic 89% on RA c/o shortness of breath.  patient is poor historian. fully vaccinated for covid, has leucocytosis, x ray chest suspicion of PNA, started on oxygen 3 litres for hypoxia, and started on vanc zosyn and admitted for further management   for  Ac hypoxic resp failure on admission with CT chest shows left basilar PNA - Atelectasis,Blood cx - gram neg -  covid negative  was hypertensive on admission , did not receive IV fluid , ON further manage ment found to have bactremia and urine culture E coli thus on retrospect suspected  sepsis due to E coli UTI  with e coli bactremia fever, hypoxia, leucocytosis, with  possible concurrent  PNA with atelectasis  cognitive impairement'  DM2  rec falls  hypothyroid  breast malig  Ess HTN  h/o CAD and stents  h/o thyroidectomy - ca  Mild jay- lasix on hold , will resume as improves  started on abx, ID pulmonary and cardio consult noted  adrenal nodule - Ct abd pelvis noted  d/w family Lybcp-[id number] HCP, has living will, discussed and informed of condition  goals of care discussed with patient and brother, unsure at this point, all information provided  Advanced care planning discussed with patient/family. Advaced care planning forms reviewed,discussed /completed, 20 min spent 9/8  MOLST filled

## 2021-09-11 NOTE — PROGRESS NOTE ADULT - ASSESSMENT
The patient is a 65 year old female with a history of HTN, DM, hypothyroidism, cognitive impairment, right breast cancer who presents with weakness, fall, fever, shortness of breath.    9/11/21  Seen at [facility] telemetry  Patient sitting up  No complaints offered    Plan:  - ECG with LVH related changes  - Continue carvedilol 25 mg bid  - Continue clonidine 0.2 mg tid  - Continue hydralazine 100 mg tid  - Continue losartan 100 mg daily  - Continue aspirin 81 mg daily  - Continue atorvastatin 20 mg daily  - BNP mildly elevated at 1644  - Echo with hyperdynamic LV systolic function, LVOT obstruction from ?posterior leaflet of mitral valve - technically difficult study  - Continue furosemide 40 mg PO daily but cautious use given LVOT obstruction  - Being followed by Pulmonary, ID  - On ceftriaxone The patient is a 65 year old female with a history of HTN, DM, hypothyroidism, cognitive impairment, right breast cancer who presents with weakness, fall, fever, shortness of breath.    9/11/21  Seen at [facility] telemetry  Patient sitting up  No complaints offered  Potassium-3.4    Plan:  - ECG with LVH related changes  - Continue carvedilol 25 mg bid  - Continue clonidine 0.2 mg tid  - Continue hydralazine 100 mg tid  - Continue losartan 100 mg daily  - Continue aspirin 81 mg daily  - Continue atorvastatin 20 mg daily  - BNP mildly elevated at 1644  - Potassium supps and follow labs  - Echo with hyperdynamic LV systolic function, LVOT obstruction from ?posterior leaflet of mitral valve - technically difficult study  - Continue furosemide 40 mg PO daily but cautious use given LVOT obstruction  - Being followed by Pulmonary, ID  - On ceftriaxone    Addendum:  About 1 pm patient had 14-beat of asymptomatic ?NSVT

## 2021-09-12 NOTE — PROGRESS NOTE ADULT - ASSESSMENT
65F with PMH Type 2 DM, HTN,hypothyroid, cognitive impairment,recurrent falls,recurrent uti, right breast malignancy on chemo  sent from [facility] for weakness fall, fever in syo , hypoxic 89% on RA c/o shortness of breath.  patient is poor historian. fully vaccinated for covid, has leucocytosis, x ray chest suspicion of PNA, started on oxygen 3 litres for hypoxia, and started on vanc zosyn and admitted for further management   for  Ac hypoxic resp failure on admission with CT chest shows left basilar PNA - Atelectasis,Blood cx - gram neg -  covid negative  was hypertensive on admission , did not receive IV fluid , ON further manage ment found to have bactremia and urine culture E coli thus on retrospect suspected  sepsis due to E coli UTI  with e coli bactremia fever, hypoxia, leucocytosis, with  possible concurrent  PNA with atelectasis  cognitive impairement'  DM2  rec falls  hypothyroid  breast malig  Ess HTN  h/o CAD and stents  h/o thyroidectomy - ca  Mild jay- lasix on hold , will resume as improves  started on abx, ID pulmonary and cardio consult noted  adrenal nodule - Ct abd pelvis noted  d/w family [phone] HCP, has living will, discussed and informed of condition  goals of care discussed with patient and brother, unsure at this point, all information provided  Advanced care planning discussed with patient/family. Advaced care planning forms reviewed,discussed /completed, 20 min spent 9/8  MOLST filled

## 2021-09-12 NOTE — PROGRESS NOTE ADULT - ASSESSMENT
The patient is a 65 year old female with a history of HTN, DM, hypothyroidism, cognitive impairment, right breast cancer who presents with weakness, fall, fever, shortness of breath.    9/12/21  Seen at [facility] telemetry  Patient awake and alert  No complaints offered  No further, significant arrhythmia documented    Plan:  - ECG with LVH related changes  - Continue carvedilol 25 mg bid  - Continue clonidine 0.2 mg tid  - Continue hydralazine 100 mg tid  - Continue losartan 100 mg daily  - Continue aspirin 81 mg daily  - Continue atorvastatin 20 mg daily  - BNP mildly elevated at 1644  - Potassium supps and follow labs  - Echo with hyperdynamic LV systolic function, LVOT obstruction from ?posterior leaflet of mitral valve - technically difficult study  - Continue furosemide 40 mg PO daily but cautious use given LVOT obstruction  - Being followed by Pulmonary, ID  - On ceftriaxone    Addendum:  About 1 pm patient had 14-beat of asymptomatic ?NSVT

## 2021-09-13 NOTE — PROGRESS NOTE ADULT - ASSESSMENT
65F with PMH Type 2 DM, HTN,hypothyroid, cognitive impairment,recurrent falls,recurrent uti, right breast malignancy on chemo  sent from [facility] for weakness fall, fever in syo , hypoxic 89% on RA c/o shortness of breath.  patient is poor historian. fully vaccinated for covid, has leucocytosis, x ray chest suspicion of PNA, started on oxygen 3 litres for hypoxia, and started on vanc zosyn and admitted for further management   for  Ac hypoxic resp failure on admission with CT chest shows left basilar PNA - Atelectasis,Blood cx - gram neg -  covid negative  was hypertensive on admission , did not receive IV fluid , ON further manage ment found to have bactremia and urine culture E coli thus on retrospect suspected  sepsis due to E coli UTI  with e coli bactremia fever, hypoxia, leucocytosis, with  possible concurrent  PNA with atelectasis  cognitive impairement'  DM2  rec falls  hypothyroid  breast malig  Ess HTN  h/o CAD and stents  h/o thyroidectomy - ca  Mild jay- lasix on hold , will resume as improves  started on abx, ID pulmonary and cardio consult noted  adrenal nodule - Ct abd pelvis noted  d/w family [phone] HCP, has living will, discussed and informed of condition  goals of care discussed with patient and brother, unsure at this point, all information provided  Advanced care planning discussed with patient/family. Advaced care planning forms reviewed,discussed /completed, 20 min spent 9/8  MOLST filled

## 2021-09-13 NOTE — DIETITIAN INITIAL EVALUATION ADULT. - OTHER INFO
Per H&P, pt is a 65F with PMH Type 2 DM (A1c 6.4% 9/7), HTN, hypothyroid, cognitive impairment, recurrent falls, recurrent uti, right breast malignancy on chemo sent from [facility] for weakness fall, fever in syo , hypoxic 89% on RA c/o shortness of breath.  patient is poor historian. fully vaccinated for covid, has leucocytosis, x ray chest suspicion of PNA, started on oxygen 3 litres for hypoxia, and started on vanc zosyn and admitted for further management.    Pt seen for nutrition assessment secondary to length of stay policy (</ 7 days since admission).  Pt admitted with PNA and +ecoli UTI. Pt on Con CHO, dysphagia 3 soft diet with thin liquids per SLP evaluation recommendations (9/7 assessment).  Pt reports overall good appetite, per nursing documentation is consuming ~% of most meals.  Meal preferences discussed daily to optimize po intake and tolerance.  On no concentrated sweets, no added salt diet @[facility].  Skin: stage I to sacrum.  Pt denies nutrition related questions or concerns at time of visit.

## 2021-09-13 NOTE — PROGRESS NOTE ADULT - ASSESSMENT
The patient is a 65 year old female with a history of HTN, DM, hypothyroidism, cognitive impairment, right breast cancer who presents with weakness, fall, fever, shortness of breath.    Plan:  - ECG with LVH related changes  - Continue carvedilol 25 mg bid  - Continue clonidine 0.2 mg tid  - Continue hydralazine 100 mg tid  - Continue losartan 100 mg daily  - Continue aspirin 81 mg daily  - Continue atorvastatin 20 mg daily  - BNP mildly elevated at 1644  - Echo with hyperdynamic LV systolic function, LVOT obstruction - technically difficult study  - Continue furosemide 40 mg PO daily but cautious use given LVOT obstruction  - NSVT - outpatient cardiology follow-up given possible findings of hypertrophic cardiomyopathy. May need additional testing (i.e. outpatient event monitoring) and consideration for ICD if additional VT present  - On ceftriaxone

## 2021-09-13 NOTE — DIETITIAN INITIAL EVALUATION ADULT. - PERTINENT MEDS FT
MEDICATIONS  (STANDING):  albuterol/ipratropium for Nebulization 3 milliLiter(s) Nebulizer every 6 hours  anastrozole 1 milliGRAM(s) Oral daily  aspirin  chewable 81 milliGRAM(s) Oral daily  atorvastatin 20 milliGRAM(s) Oral at bedtime  carvedilol 25 milliGRAM(s) Oral every 12 hours  cefTRIAXone   IVPB 1000 milliGRAM(s) IV Intermittent every 24 hours  cloNIDine 0.2 milliGRAM(s) Oral three times a day  dextrose 40% Gel 15 Gram(s) Oral once  dextrose 5%. 1000 milliLiter(s) (50 mL/Hr) IV Continuous <Continuous>  dextrose 5%. 1000 milliLiter(s) (100 mL/Hr) IV Continuous <Continuous>  dextrose 50% Injectable 25 Gram(s) IV Push once  dextrose 50% Injectable 12.5 Gram(s) IV Push once  dextrose 50% Injectable 25 Gram(s) IV Push once  enoxaparin Injectable 40 milliGRAM(s) SubCutaneous daily  furosemide    Tablet 40 milliGRAM(s) Oral daily  glucagon  Injectable 1 milliGRAM(s) IntraMuscular once  hydrALAZINE 100 milliGRAM(s) Oral three times a day  insulin lispro (ADMELOG) corrective regimen sliding scale   SubCutaneous three times a day before meals  insulin lispro (ADMELOG) corrective regimen sliding scale   SubCutaneous at bedtime  lactobacillus acidophilus 1 Tablet(s) Oral three times a day  levothyroxine 150 MICROGram(s) Oral daily  losartan 100 milliGRAM(s) Oral daily  metFORMIN 500 milliGRAM(s) Oral two times a day  senna 2 Tablet(s) Oral at bedtime    MEDICATIONS  (PRN):  acetaminophen   Tablet .. 650 milliGRAM(s) Oral every 6 hours PRN Temp greater or equal to 38C (100.4F), Moderate Pain (4 - 6)

## 2021-09-14 NOTE — PROGRESS NOTE ADULT - ASSESSMENT
65F with PMH Type 2 DM, HTN,hypothyroid, cognitive impairment,recurrent falls,recurrent uti, right breast malignancy on chemo  sent from [facility] for weakness fall, fever in syo , hypoxic 89% on RA c/o shortness of breath.  patient is poor historian. fully vaccinated for covid, has leucocytosis, x ray chest suspicion of PNA, started on oxygen 3 litres for hypoxia, and started on vanc zosyn and admitted for further management   for  Ac hypoxic resp failure on admission with CT chest shows left basilar PNA - Atelectasis,Blood cx - gram neg -  covid negative  was hypertensive on admission , did not receive IV fluid , ON further manage ment found to have bactremia and urine culture E coli thus on retrospect suspected  sepsis due to E coli UTI  with e coli bactremia fever, hypoxia, leucocytosis, with  possible concurrent  PNA with atelectasis, with hypoxia, likely COPD , may need long term o2  cognitive impairement'  DM2  rec falls  hypothyroid  breast malig  Ess HTN  h/o CAD and stents  h/o thyroidectomy - ca  Mild jay- improved  hypokalemia repleted  mild anemia likely with ac infection  started on abx, ID pulmonary and cardio consult noted  adrenal nodule - Ct abd pelvis noted  d/w family [phone] HCP, has living will, discussed and informed of condition  goals of care discussed with patient and brother, unsure at this point, all information provided  Advanced care planning discussed with patient/family. Advaced care planning forms reviewed,discussed /completed, 20 min spent 9/8  MOLST filled

## 2021-09-14 NOTE — PROGRESS NOTE ADULT - ASSESSMENT
65F with PMH Type 2 DM, HTN,hypothyroid, cognitive impairment,recurrent falls,recurrent uti, right breast malignancy on chemo  sent from [facility] for weakness fall, fever in syo , hypoxic 89% on RA c/o shortness of breath.    GOC documented - pt is DNR DNI    CT chest shows eff - atelectasis - poss PNA  Blood cx - gram neg -   vs noted  on ABX - CEFTIN PO now -   CT abd reviewed - no acute path  ID eval noted    TTE done - reviewed  cardio optimization in progress  I and O  Diuresis in progress  serial renal indices  eval for eff - atelectasis - pna - pulm edema -   assist with needs  monitor VS and HD and Sat

## 2021-09-14 NOTE — PROGRESS NOTE ADULT - ASSESSMENT
The patient is a 65 year old female with a history of HTN, DM, hypothyroidism, cognitive impairment, right breast cancer who presents with weakness, fall, fever, shortness of breath.    Plan:  - ECG with LVH related changes  - Continue carvedilol 25 mg bid  - Continue clonidine 0.2 mg tid  - Continue hydralazine 100 mg tid  - Continue losartan 100 mg daily  - Continue aspirin 81 mg daily  - Continue atorvastatin 20 mg daily  - BNP mildly elevated at 1644  - Echo with hyperdynamic LV systolic function, LVOT obstruction - technically difficult study  - Continue furosemide 40 mg PO daily but cautious use given LVOT obstruction  - NSVT - outpatient cardiology follow-up given possible findings of hypertrophic cardiomyopathy. May need additional testing (i.e. outpatient event monitoring) and consideration for ICD if additional VT present, although given current GOC, unlikely a candidate  - On oral antibiotics

## 2021-09-15 NOTE — PROGRESS NOTE ADULT - PROBLEM SELECTOR PROBLEM 4
Acute respiratory failure with hypoxia
Benign essential HTN
Acute respiratory failure with hypoxia
Acute respiratory failure with hypoxia

## 2021-09-15 NOTE — PROGRESS NOTE ADULT - PROBLEM SELECTOR PLAN 2
IV ceftriaxone ID f up, improved changed t PO ceftin for 4 days
IV ceftriaxone ID f up, improved changed t PO ceftin for 4 days
IV ceftriaxone ID f up noted
IV ceftriaxone ID f up noted
IV ceftriaxone ID f up
IV ceftriaxone ID f up noted
o2, aspiration precautions, pulm consult
IV ceftriaxone ID f up noted

## 2021-09-15 NOTE — PROGRESS NOTE ADULT - PROBLEM SELECTOR PLAN 3
changed to ceftriaxone for e coli bactremia with uti
ssi
changed to ceftriaxone for e coli bactremia with uti

## 2021-09-15 NOTE — PROGRESS NOTE ADULT - ASSESSMENT
65F with PMH Type 2 DM, HTN,hypothyroid, cognitive impairment,recurrent falls,recurrent uti, right breast malignancy on chemo  sent from [facility] for weakness fall, fever in syo , hypoxic 89% on RA c/o shortness of breath.    check Sat on RA at rest and on exertion  pt has COPD - on Bronchodilators  may need o2 support on DC -     GOC documented - pt is DNR DNI    CT chest shows eff - atelectasis - poss PNA  Blood cx - gram neg -   vs noted  on ABX - CEFTIN PO now -   CT abd reviewed - no acute path  ID eval noted    TTE done - reviewed  cardio optimization in progress  I and O  Diuresis in progress  serial renal indices  eval for eff - atelectasis - pna - pulm edema -   assist with needs  monitor VS and HD and Sat

## 2021-09-15 NOTE — DISCHARGE NOTE PROVIDER - HOSPITAL COURSE
65F with PMH Type 2 DM, HTN,hypothyroid, cognitive impairment,recurrent falls,recurrent uti, right breast malignancy on chemo  sent from [facility] for weakness fall, fever in syo , hypoxic 89% on RA c/o shortness of breath.  patient is poor historian. fully vaccinated for covid, has leucocytosis, x ray chest suspicion of PNA, started on oxygen 3 litres for hypoxia, and started on vanc zosyn and admitted for further management   for  Ac hypoxic resp failure on admission with CT chest shows left basilar PNA - Atelectasis,Blood cx - gram neg -  covid negative  was hypertensive on admission , did not receive IV fluid , ON further manage ment found to have bactremia and urine culture E coli thus on retrospect suspected  sepsis due to E coli UTI  with e coli bactremia fever, hypoxia, leucocytosis, with  possible concurrent CAP, PNA with atelectasis, with hypoxia, likely COPD , Ac on ch resp failure,may need long term o2  cognitive impairement'  DM2  rec falls  hypothyroid  breast malig  Ess HTN  hypokalemia  h/o CAD and stents  h/o thyroidectomy - ca  Mild jay- improved, base line creat not known  hypokalemia repleted  mild anemia likely with ac infection  started on abx, ID pulmonary and cardio consult noted  adrenal nodule - Ct abd pelvis noted  patient has O2 sat on RA 83 %,placed back on o2 2 LPMvia nasal cannula continuosly and improved to93 %. Pt will need home o2 2 LPM via NC continuously due to chronic diagnosis of COPD, Pt was treated for Pneumonia, ac condition has resolved, and pt is in ch stable state, and requires home o2 for safe discharge.  d/w family Aucby-[id number] HCP, has living will, discussed and informed of condition  goals of care discussed with patient and brother, unsure at this point, all information provided  Advanced care planning discussed with patient/family. Advaced care planning forms reviewed,discussed /completed, 20 min spent 9/8  MOLST filled   DC plan to snf

## 2021-09-15 NOTE — PROGRESS NOTE ADULT - ASSESSMENT
65F with PMH Type 2 DM, HTN,hypothyroid, cognitive impairment,recurrent falls,recurrent uti, right breast malignancy on chemo  sent from [facility] for weakness fall, fever in syo , hypoxic 89% on RA c/o shortness of breath.  patient is poor historian. fully vaccinated for covid, has leucocytosis, x ray chest suspicion of PNA, started on oxygen 3 litres for hypoxia, and started on vanc zosyn and admitted for further management   for  Ac hypoxic resp failure on admission with CT chest shows left basilar PNA - Atelectasis,Blood cx - gram neg -  covid negative  was hypertensive on admission , did not receive IV fluid , ON further manage ment found to have bactremia and urine culture E coli thus on retrospect suspected  sepsis due to E coli UTI  with e coli bactremia fever, hypoxia, leucocytosis, with  possible concurrent  PNA with atelectasis, with hypoxia, likely COPD , may need long term o2  cognitive impairement'  DM2  rec falls  hypothyroid  breast malig  Ess HTN  h/o CAD and stents  h/o thyroidectomy - ca  Mild jay- improved  hypokalemia repleted  mild anemia likely with ac infection  started on abx, ID pulmonary and cardio consult noted  adrenal nodule - Ct abd pelvis noted  patient has O2 sat on RA 83 %,placed back on o2 2 LPMvia nasal cannula continuosly and improved to93 %. Pt will need home o2 2 LPM via NC continuously due to chronic diagnosis of COPD, Pt was treated for Pneumonia, ac condition has resolved, and pt is in ch stable state, and requires home o2 for safe discharge.  d/w family [phone] HCP, has living will, discussed and informed of condition  goals of care discussed with patient and brother, unsure at this point, all information provided  Advanced care planning discussed with patient/family. Advaced care planning forms reviewed,discussed /completed, 20 min spent 9/8  MOLST filled

## 2021-09-15 NOTE — PROGRESS NOTE ADULT - PROVIDER SPECIALTY LIST ADULT
Cardiology
Infectious Disease
Infectious Disease
Internal Medicine
Pulmonology
Pulmonology
Cardiology
Infectious Disease
Pulmonology
Cardiology
Infectious Disease
Pulmonology
Internal Medicine

## 2021-09-15 NOTE — DISCHARGE NOTE PROVIDER - NSDCMRMEDTOKEN_GEN_ALL_CORE_FT
albuterol 90 mcg/inh inhalation aerosol: 2 puff(s) inhaled every 6 hours, As needed, Shortness of Breath and/or Wheezing  amLODIPine 10 mg oral tablet: 1 tab(s) orally once a day  anastrozole 1 mg oral tablet: 1 tab(s) orally once a day  aspirin 81 mg oral tablet: 1 tab(s) orally once a day  atorvastatin 20 mg oral tablet: 1 tab(s) orally once a day  carvedilol 25 mg oral tablet: 1 tab(s) orally 2 times a day  cefuroxime 500 mg oral tablet: 1 tab(s) orally every 12 hours  cloNIDine 0.2 mg oral tablet: orally 3 times a day  furosemide 40 mg oral tablet: 1 tab(s) orally once a day  hydrALAZINE 100 mg oral tablet: orally 3 times a day  lactobacillus acidophilus oral capsule: 1 tab(s) orally 2 times a day   levothyroxine 150 mcg (0.15 mg) oral tablet: 1 tab(s) orally once a day  losartan 100 mg oral tablet: 1 tab(s) orally once a day  metFORMIN 500 mg oral tablet, extended release: 1 tab(s) orally once a day  potassium chloride 20 mEq oral tablet, extended release: orally 2 times a day

## 2021-09-15 NOTE — PROGRESS NOTE ADULT - TIME BILLING
I have discussed care plan with patient and HCP,expressed understanding of problems treatment and their effect and side effects, alternatives in detail,I have asked if they have any questions and concerns and appropriately addressed them to best of my ability  Reviewed all diagonostic tests, lab results and drug drug interactions, and medications

## 2021-09-15 NOTE — PROGRESS NOTE ADULT - PROBLEM SELECTOR PLAN 4
cont home meds cardiology consult
o2, aspiration precautions, pulm consult

## 2021-09-15 NOTE — PROGRESS NOTE ADULT - PROBLEM SELECTOR PROBLEM 2
Sepsis due to Escherichia coli
Acute respiratory failure with hypoxia
Sepsis due to Escherichia coli

## 2021-09-15 NOTE — DISCHARGE NOTE PROVIDER - NSDCCPCAREPLAN_GEN_ALL_CORE_FT
PRINCIPAL DISCHARGE DIAGNOSIS  Diagnosis: Sepsis secondary to UTI  Assessment and Plan of Treatment: complete abx with ceftin      SECONDARY DISCHARGE DIAGNOSES  Diagnosis: PNA (pneumonia)  Assessment and Plan of Treatment: complete abx course    Diagnosis: Hypoxia  Assessment and Plan of Treatment: o2 2 LPM continuous

## 2021-09-15 NOTE — PROGRESS NOTE ADULT - PROBLEM SELECTOR PLAN 6
cont home meds cardiology consult
cont home meds cardiology consult
supportive care
cont home meds cardiology consult

## 2021-09-15 NOTE — PROGRESS NOTE ADULT - SUBJECTIVE AND OBJECTIVE BOX
YOSI CORRAL is a 65yFemale , patient examined and chart reviewed.    INTERVAL HPI/ OVERNIGHT EVENTS:   NAD. Afebrile.   No events.    PAST MEDICAL & SURGICAL HISTORY:  HTN (hypertension)  Anxiety  HLD (hyperlipidemia)  Hypothyroid  Breast CA  right  Poor historian  Cancer of thyroid  unsure of dates but prior to   Coronary artery disease  Cognitive impairment  Osteoarthritis  Type 2 diabetes mellitus  Urinary frequency  History of adrenal adenoma  Diabetes  H/O total thyroidectomy  H/O bilateral breast biopsy  2018 and 2018  S/P angioplasty with stent  drug eluting stent in first diagonal on 19  S/P dilation and curettage  TOP x2      For details regarding the patient's social history, family history, and other miscellaneous elements, please refer the initial infectious diseases consultation and/or the admitting history and physical examination for this admission.    ROS:  CONSTITUTIONAL:  Negative fever or chills  EYES:  Negative  blurry vision or double vision  CARDIOVASCULAR:  Negative for chest pain or palpitations  RESPIRATORY:  Negative for cough, wheezing, or SOB   GASTROINTESTINAL:  Negative for nausea, vomiting, diarrhea, constipation, or abdominal pain  GENITOURINARY:  Negative frequency, urgency or dysuria  NEUROLOGIC:  No headache, confusion, dizziness, lightheadedness  All other systems were reviewed and are negative         Current inpatient medications :    ANTIBIOTICS/RELEVANT:  cefuroxime   Tablet 500 milliGRAM(s) Oral every 12 hours    MEDICATIONS  (STANDING):  amLODIPine   Tablet 10 milliGRAM(s) Oral daily  anastrozole 1 milliGRAM(s) Oral daily  aspirin  chewable 81 milliGRAM(s) Oral daily  atorvastatin 20 milliGRAM(s) Oral at bedtime  carvedilol 25 milliGRAM(s) Oral every 12 hours  cloNIDine 0.2 milliGRAM(s) Oral three times a day  dextrose 40% Gel 15 Gram(s) Oral once  dextrose 5%. 1000 milliLiter(s) (50 mL/Hr) IV Continuous <Continuous>  dextrose 5%. 1000 milliLiter(s) (100 mL/Hr) IV Continuous <Continuous>  dextrose 50% Injectable 25 Gram(s) IV Push once  dextrose 50% Injectable 12.5 Gram(s) IV Push once  dextrose 50% Injectable 25 Gram(s) IV Push once  enoxaparin Injectable 40 milliGRAM(s) SubCutaneous daily  furosemide    Tablet 40 milliGRAM(s) Oral daily  glucagon  Injectable 1 milliGRAM(s) IntraMuscular once  hydrALAZINE 100 milliGRAM(s) Oral three times a day  insulin lispro (ADMELOG) corrective regimen sliding scale   SubCutaneous three times a day before meals  insulin lispro (ADMELOG) corrective regimen sliding scale   SubCutaneous at bedtime  lactobacillus acidophilus 1 Tablet(s) Oral three times a day  levothyroxine 150 MICROGram(s) Oral daily  losartan 100 milliGRAM(s) Oral daily  metFORMIN 500 milliGRAM(s) Oral two times a day  senna 2 Tablet(s) Oral at bedtime    MEDICATIONS  (PRN):  acetaminophen   Tablet .. 650 milliGRAM(s) Oral every 6 hours PRN Temp greater or equal to 38C (100.4F), Moderate Pain (4 - 6)  ALBUTerol    90 MICROgram(s) HFA Inhaler 2 Puff(s) Inhalation every 6 hours PRN Shortness of Breath and/or Wheezing        Objective:  Vital Signs Last 24 Hrs  T(C): 36.8 (15 Sep 2021 14:24), Max: 37.1 (14 Sep 2021 21:11)  T(F): 98.3 (15 Sep 2021 14:24), Max: 98.7 (14 Sep 2021 21:11)  HR: 68 (15 Sep 2021 14:24) (64 - 80)  BP: 147/87 (15 Sep 2021 14:24) (101/67 - 168/98)  RR: 18 (15 Sep 2021 14:24) (15 - 18)  SpO2: 92% (15 Sep 2021 14:24) (92% - 97%)      Physical Exam:  General: no acute distress  Neck: supple, trachea midline  Lungs: clear, no wheeze/rhonchi  Cardiovascular: regular rate and rhythm, S1 S2  Abdomen: soft, nontender,  bowel sounds normal  Neurological: alert and oriented x3  Skin: no rash  Extremities: + Edema      LABS:                        11.1   8.67  )-----------( 228      ( 15 Sep 2021 07:13 )             36.1   09-15    144  |  104  |  23  ----------------------------<  131<H>  3.4<L>   |  32<H>  |  1.00    Ca    8.6      15 Sep 2021 07:13    TPro  6.5  /  Alb  3.0<L>  /  TBili  1.6<H>  /  DBili  x   /  AST  25  /  ALT  45  /  AlkPhos  72  -15      Urinalysis Basic - ( 07 Sep 2021 00:31 )    Color: Yellow / Appearance: Clear / S.010 / pH: x  Gluc: x / Ketone: Negative  / Bili: Negative / Urobili: Negative mg/dL   Blood: x / Protein: 30 mg/dL / Nitrite: Negative   Leuk Esterase: Negative / RBC: 0-2 /HPF / WBC 0-2   Sq Epi: x / Non Sq Epi: Occasional / Bacteria: Few      MICROBIOLOGY:  Culture - Blood (collected 08 Sep 2021 13:16)  Source: .Blood Blood-Peripheral  Preliminary Report (09 Sep 2021 14:02):    No growth to date.    Culture - Urine (collected 07 Sep 2021 13:17)  Source: Clean Catch Clean Catch (Midstream)  Final Report (09 Sep 2021 06:07):    >100,000 CFU/ml Escherichia coli  Organism: Escherichia coli (09 Sep 2021 06:07)  Organism: Escherichia coli (09 Sep 2021 06:07)      -  Amikacin: S <=16      -  Amoxicillin/Clavulanic Acid: S <=8/4      -  Ampicillin: S <=8 These ampicillin results predict results for amoxicillin      -  Ampicillin/Sulbactam: S <=4/2 Enterobacter, Citrobacter, and Serratia may develop resistance during prolonged therapy (3-4 days)      -  Aztreonam: S <=4      -  Cefazolin: S <=2 (MIC_CL_[id number]) For uncomplicated UTI with K. pneumoniae, E. coli, or P. mirablis: ARELI <=16 is sensitive and ARELI >=32 is resistant. This also predicts results for oral agents cefaclor, cefdinir, cefpodoxime, cefprozil, cefuroxime axetil, cephalexin and locarbef for uncomplicated UTI. Note that some isolates may be susceptible to these agents while testing resistant to cefazolin.      -  Cefepime: S <=2      -  Cefoxitin: S <=8      -  Ceftriaxone: S <=1 Enterobacter, Citrobacter, and Serratia may develop resistance during prolonged therapy      -  Ciprofloxacin: R >2      -  Ertapenem: S <=0.5      -  Gentamicin: S <=2      -  Imipenem: S <=1      -  Levofloxacin: R >4      -  Meropenem: S <=1      -  Nitrofurantoin: S <=32 Should not be used to treat pyelonephritis      -  Piperacillin/Tazobactam: S <=8      -  Tigecycline: S <=2      -  Tobramycin: S <=2      -  Trimethoprim/Sulfamethoxazole: S <=0.5/9.5      Method Type: ARELI    Culture - Blood (collected 07 Sep 2021 13:17)  Source: .Blood Blood  Gram Stain (08 Sep 2021 01:22):    Growth in aerobic and anaerobic bottles: Gram Negative Rods  Final Report (09 Sep 2021 17:28):    Growth in aerobic and anaerobic bottles: Escherichia coli    See previous culture [id number]    Culture - Blood (collected 07 Sep 2021 13:17)  Source: .Blood Blood  Gram Stain (08 Sep 2021 01:21):    Growth in aerobic bottle: Gram Variable Rods    Growth in anaerobic bottle: Gram Negative Rods  Final Report (09 Sep 2021 16:27):    Growth in aerobic and anaerobic bottles: Escherichia coli    ***Blood Panel PCR results on this specimen are available    approximately 3 hours after the Gram stain result.***    Gram stain, PCR, and/or culture results may not always    correspond due to difference in methodologies.    ************************************************************    This PCR assay was performed by multiplex PCR. This    Assay tests for 66 bacterial and resistance gene targets.    Please refer to the [facility] test directory    at [email or web address] for details.  Organism: Blood Culture PCR  Escherichia coli (09 Sep 2021 16:27)  Organism: Escherichia coli (09 Sep 2021 16:27)      -  Amikacin: S <=16      -  Ampicillin: S <=8 These ampicillin results predict results for amoxicillin      -  Ampicillin/Sulbactam: S <=4/2 Enterobacter, Citrobacter, and Serratia may develop resistance during prolonged therapy (3-4 days)      -  Aztreonam: S <=4      -  Cefazolin: S <=2 Enterobacter, Citrobacter, and Serratia may develop resistance during prolonged therapy (3-4 days)      -  Cefepime: S <=2      -  Cefoxitin: S <=8      -  Ceftriaxone: S <=1 Enterobacter, Citrobacter, and Serratia may develop resistance during prolonged therapy      -  Ciprofloxacin: R >2      -  Ertapenem: S <=0.5      -  Gentamicin: S <=2      -  Imipenem: S <=1      -  Levofloxacin: R >4      -  Meropenem: S <=1      -  Piperacillin/Tazobactam: S <=8      -  Tobramycin: S <=2      -  Trimethoprim/Sulfamethoxazole: S <=0.5/9.5      Method Type: ARELI  Organism: Blood Culture PCR (09 Sep 2021 16:27)      -  Escherichia coli: Detec      Method Type: PCR      RADIOLOGY & ADDITIONAL STUDIES:    EXAM:  CT ABDOMEN AND PELVIS                                  PROCEDURE DATE:  2021          INTERPRETATION:  CLINICAL INFORMATION: Sepsis.    COMPARISON: CT abdomen and pelvis 2019    CONTRAST/COMPLICATIONS:  IV Contrast: None  Oral Contrast: None  Complications: None    PROCEDURE:  CT of the Abdomen and Pelvis was performed.  Sagittal and coronal reformats were performed.    FINDINGS:  LOWER CHEST: Partially imaged, stable right breast skin thickening and retroareolar tissue. Subsegmental left lower lobe atelectasis and trace bilateral pleural effusions.    LIVER: Within normal limits.  BILE DUCTS: Normal caliber.  GALLBLADDER: Within normal limits.  SPLEEN: Within normal limits.  PANCREAS: Within normal limits.  ADRENALS: 3.2 x 2.6 cm left adrenal adenoma is stable. Right adrenal gland is within normal limits.  KIDNEYS/URETERS: Stable bilateral cysts. No renal stones or hydronephrosis.    BLADDER: Within normal limits.  REPRODUCTIVE ORGANS: Enlarged multi fibroid uterus.    BOWEL: No bowel obstruction. Appendix is normal.  PERITONEUM: No ascites.  VESSELS: Atherosclerotic changes.  RETROPERITONEUM/LYMPH NODES: No lymphadenopathy.  ABDOMINAL WALL: Small fat-containing umbilical hernia. Nonspecific bilateral lateral abdominal wall edema and skin thickening.  BONES: Degenerative changes.    IMPRESSION:  No acute intra-abdominal pathology.    Nonspecific bilateral lateral abdominal wall edema and skin thickening. Please correlate for signs of cellulitis.      EXAM:  CT CHEST                          PROCEDURE DATE:  2021          INTERPRETATION:  CLINICAL INFORMATION: Short of breath    COMPARISON: 2019.    CONTRAST/COMPLICATIONS:  IV Contrast: NONE  0 cc administered   0 cc discarded  Oral Contrast: NONE  Complications: None reported at time of study completion    PROCEDURE:  CT of the Chest was performed.  Sagittal and coronal reformats were performed.    FINDINGS:    LUNGS AND AIRWAYS: Patent central airways. Very low lung volumes. Patchy consolidation in the lingula , and left lower lobe likely representing a combination of pneumonia and atelectasis. Correlate clinically for active infection. Fibrotic atelectatic changes in the right upper middle and lower lobes.  PLEURA: Trace basilar pleural effusions  MEDIASTINUM AND FRANCISCA: No lymphadenopathy.  VESSELS: Nonaneurysmal thoracic aorta.  HEART: Cardiomegaly with cardiac vascular calcific. Trace pericardial effusion.  CHEST WALL AND LOWER NECK: Asymmetric soft tissue density right breast may reflect scarring/postoperative change. Cannot exclude neoplasm. Recommend clinical correlation correlation with dedicated breast.  VISUALIZED UPPER ABDOMEN: Cholelithiasis. Indeterminate left adrenal nodule increased in size, now measures 3.5 cm previously measured 2.6 cm. Correlate with MR  BONES: Degenerative changes.    IMPRESSION:  Left basilar pneumonia and atelectasis.  Trace basilar pleural effusions. Trace pericardial effusion.  Asymmetric soft tissue density right breast. Correlate clinically and with dedicated breast imaging.  Indeterminate left adrenal nodule with interval increase in size compared to 2019. Correlate with MR    Assessment :   65F resident of [facility] Type 2 DM, HTN, hypothyroid, cognitive impairment, recurrent falls, recurrent uti, right breast malignancy on chemo admitted with weakness fall, fever sec Ecoli sepsis with bacteremia likely sec UTI  Repeat blood cultures NGTD  ?concurrent Left lung pna.   Sp fever  Clinically better    Plan :   Off Rocephin   Finish course of po Ceftin x 4 days   Trend temps and cbc  Asp precautions  Dc planning    D/w Dr Garcia    Continue with present regiment.  Appropriate use of antibiotics and adverse effects reviewed.      > 35 minutes were spent in direct patient care reviewing notes, medications ,labs data/ imaging , discussion with multidisciplinary team.    Thank you for allowing me to participate in care of your patient .    Sunni Espinoza MD  Infectious Disease  [phone]
Chief Complaint: Weakness, fall, shortness of breath    Interval Events: No events overnight.    Review of Systems:  General: No fevers, chills, weight loss or gain  Skin: No rashes, color changes  Cardiovascular: No chest pain, orthopnea  Respiratory: No shortness of breath, cough  Gastrointestinal: No nausea, abdominal pain  Genitourinary: No incontinence, pain with urination  Musculoskeletal: No pain, swelling, decreased range of motion  Neurological: No headache, weakness  Psychiatric: No depression, anxiety  Endocrine: No weight loss or gain, increased thirst  All other systems are comprehensively negative.    Physical Exam:  Vital Signs Last 24 Hrs  T(C): 36.4 (09 Sep 2021 09:39), Max: 37.1 (08 Sep 2021 14:45)  T(F): 97.5 (09 Sep 2021 09:39), Max: 98.7 (08 Sep 2021 14:45)  HR: 77 (09 Sep 2021 09:39) (68 - 82)  BP: 155/85 (09 Sep 2021 09:39) (155/85 - 188/98)  BP(mean): --  RR: 18 (09 Sep 2021 09:39) (16 - 22)  SpO2: 95% (09 Sep 2021 09:39) (93% - 99%)  General: NAD  HEENT: MMM  Neck: No JVD, no carotid bruit  Lungs: CTAB  CV: RRR, nl S1/S2, no M/R/G  Abdomen: S/NT/ND, +BS  Extremities: No LE edema, no cyanosis  Neuro: AAOx3, non-focal  Skin: No rash    Labs:                        10.3   8.83  )-----------( 183      ( 08 Sep 2021 06:53 )             33.4     09-08    145  |  106  |  21  ----------------------------<  134<H>  3.6   |  30  |  1.02    Ca    8.0<L>      08 Sep 2021 06:53    TPro  5.7<L>  /  Alb  2.6<L>  /  TBili  0.7  /  DBili  x   /  AST  15  /  ALT  26  /  AlkPhos  60  09-08    CARDIAC MARKERS ( 07 Sep 2021 00:31 )  .023 ng/mL / x     / x     / x     / x          PT/INR - ( 07 Sep 2021 00:31 )   PT: 12.0 sec;   INR: 0.99 ratio         PTT - ( 07 Sep 2021 00:31 )  PTT:23.1 sec  
Chief Complaint: Weakness, fall, shortness of breath    Interval Events: No events overnight.    Review of Systems:  General: No fevers, chills, weight loss or gain  Skin: No rashes, color changes  Cardiovascular: No chest pain, orthopnea  Respiratory: No shortness of breath, cough  Gastrointestinal: No nausea, abdominal pain  Genitourinary: No incontinence, pain with urination  Musculoskeletal: No pain, swelling, decreased range of motion  Neurological: No headache, weakness  Psychiatric: No depression, anxiety  Endocrine: No weight loss or gain, increased thirst  All other systems are comprehensively negative.    Physical Exam:  Vitals:        Vital Signs Last 24 Hrs  T(C): 36.7 (08 Sep 2021 06:25), Max: 36.8 (07 Sep 2021 17:43)  T(F): 98.1 (08 Sep 2021 06:25), Max: 98.2 (07 Sep 2021 17:43)  HR: 65 (08 Sep 2021 06:51) (65 - 89)  BP: 130/81 (08 Sep 2021 06:25) (100/63 - 168/96)  BP(mean): --  RR: 18 (08 Sep 2021 06:25) (17 - 18)  SpO2: 98% (08 Sep 2021 06:51) (95% - 98%)  General: NAD  HEENT: MMM  Neck: No JVD, no carotid bruit  Lungs: CTAB  CV: RRR, nl S1/S2, no M/R/G  Abdomen: S/NT/ND, +BS  Extremities: No LE edema, no cyanosis  Neuro: AAOx3, non-focal  Skin: No rash    Labs:                        10.3   8.83  )-----------( 183      ( 08 Sep 2021 06:53 )             33.4     09-08    145  |  106  |  21  ----------------------------<  134<H>  3.6   |  30  |  1.02    Ca    8.0<L>      08 Sep 2021 06:53    TPro  5.7<L>  /  Alb  2.6<L>  /  TBili  0.7  /  DBili  x   /  AST  15  /  ALT  26  /  AlkPhos  60  09-08    CARDIAC MARKERS ( 07 Sep 2021 00:31 )  .023 ng/mL / x     / x     / x     / x          PT/INR - ( 07 Sep 2021 00:31 )   PT: 12.0 sec;   INR: 0.99 ratio         PTT - ( 07 Sep 2021 00:31 )  PTT:23.1 sec    Telemetry: 
Date/Time Patient Seen:  		  Referring MD:   Data Reviewed	       Patient is a 65y old  Female who presents with a chief complaint of sob (09 Sep 2021 12:24)      Subjective/HPI     PAST MEDICAL & SURGICAL HISTORY:  HTN (hypertension)    HLD (hyperlipidemia)    Anxiety    HLD (hyperlipidemia)    Hypothyroid    Adrenal nodule    Breast CA  right    Poor historian    Cancer of thyroid  unsure of dates but prior to 2005    Coronary artery disease    Cognitive impairment    Osteoarthritis    Type 2 diabetes mellitus    Urinary frequency    History of adrenal adenoma    Diabetes    H/O total thyroidectomy    H/O bilateral breast biopsy  November 2018 and December 2018    S/P angioplasty with stent  drug eluting stent in first diagonal on 2/21/19    S/P dilation and curettage  TOP x2          Medication list         MEDICATIONS  (STANDING):  albuterol/ipratropium for Nebulization 3 milliLiter(s) Nebulizer every 6 hours  anastrozole 1 milliGRAM(s) Oral daily  aspirin  chewable 81 milliGRAM(s) Oral daily  atorvastatin 20 milliGRAM(s) Oral at bedtime  carvedilol 25 milliGRAM(s) Oral every 12 hours  cefTRIAXone   IVPB 1000 milliGRAM(s) IV Intermittent every 24 hours  cloNIDine 0.2 milliGRAM(s) Oral three times a day  dextrose 40% Gel 15 Gram(s) Oral once  dextrose 5%. 1000 milliLiter(s) (50 mL/Hr) IV Continuous <Continuous>  dextrose 5%. 1000 milliLiter(s) (100 mL/Hr) IV Continuous <Continuous>  dextrose 50% Injectable 25 Gram(s) IV Push once  dextrose 50% Injectable 12.5 Gram(s) IV Push once  dextrose 50% Injectable 25 Gram(s) IV Push once  enoxaparin Injectable 40 milliGRAM(s) SubCutaneous daily  furosemide    Tablet 40 milliGRAM(s) Oral daily  glucagon  Injectable 1 milliGRAM(s) IntraMuscular once  hydrALAZINE 100 milliGRAM(s) Oral three times a day  insulin lispro (ADMELOG) corrective regimen sliding scale   SubCutaneous three times a day before meals  insulin lispro (ADMELOG) corrective regimen sliding scale   SubCutaneous at bedtime  lactobacillus acidophilus 1 Tablet(s) Oral three times a day  levothyroxine 150 MICROGram(s) Oral daily  losartan 100 milliGRAM(s) Oral daily  metFORMIN 500 milliGRAM(s) Oral two times a day  senna 2 Tablet(s) Oral at bedtime    MEDICATIONS  (PRN):  acetaminophen   Tablet .. 650 milliGRAM(s) Oral every 6 hours PRN Temp greater or equal to 38C (100.4F), Moderate Pain (4 - 6)         Vitals log        ICU Vital Signs Last 24 Hrs  T(C): 37.1 (10 Sep 2021 05:58), Max: 37.1 (10 Sep 2021 05:58)  T(F): 98.8 (10 Sep 2021 05:58), Max: 98.8 (10 Sep 2021 05:58)  HR: 72 (10 Sep 2021 05:58) (72 - 101)  BP: 184/84 (10 Sep 2021 05:58) (155/85 - 189/102)  BP(mean): --  ABP: --  ABP(mean): --  RR: 20 (10 Sep 2021 05:58) (18 - 20)  SpO2: 96% (10 Sep 2021 05:58) (95% - 98%)           Input and Output:  I&O's Detail    08 Sep 2021 07:01  -  09 Sep 2021 07:00  --------------------------------------------------------  IN:    IV PiggyBack: 100 mL    Oral Fluid: 360 mL  Total IN: 460 mL    OUT:    Voided (mL): 2450 mL  Total OUT: 2450 mL    Total NET: -1990 mL      09 Sep 2021 07:01  -  10 Sep 2021 06:32  --------------------------------------------------------  IN:    Oral Fluid: 500 mL  Total IN: 500 mL    OUT:  Total OUT: 0 mL    Total NET: 500 mL          Lab Data                        10.3   7.73  )-----------( 184      ( 09 Sep 2021 07:21 )             33.2     09-09    145  |  105  |  17  ----------------------------<  126<H>  3.4<L>   |  32<H>  |  1.04    Ca    8.2<L>      09 Sep 2021 07:21    TPro  5.6<L>  /  Alb  2.9<L>  /  TBili  0.8  /  DBili  x   /  AST  11  /  ALT  22  /  AlkPhos  68  09-09            Review of Systems	      Objective     Physical Examination    heart s1s2  lung dec bS  abd soft      Pertinent Lab findings & Imaging      Miguel Angel:  NO   Adequate UO     I&O's Detail    08 Sep 2021 07:01  -  09 Sep 2021 07:00  --------------------------------------------------------  IN:    IV PiggyBack: 100 mL    Oral Fluid: 360 mL  Total IN: 460 mL    OUT:    Voided (mL): 2450 mL  Total OUT: 2450 mL    Total NET: -1990 mL      09 Sep 2021 07:01  -  10 Sep 2021 06:32  --------------------------------------------------------  IN:    Oral Fluid: 500 mL  Total IN: 500 mL    OUT:  Total OUT: 0 mL    Total NET: 500 mL               Discussed with:     Cultures:	        Radiology                            
Date/Time Patient Seen:  		  Referring MD:   Data Reviewed	       Patient is a 65y old  Female who presents with a chief complaint of sob (13 Sep 2021 11:35)      Subjective/HPI     PAST MEDICAL & SURGICAL HISTORY:  HTN (hypertension)    HLD (hyperlipidemia)    Anxiety    HLD (hyperlipidemia)    Hypothyroid    Adrenal nodule    Breast CA  right    Poor historian    Cancer of thyroid  unsure of dates but prior to 2005    Coronary artery disease    Cognitive impairment    Osteoarthritis    Type 2 diabetes mellitus    Urinary frequency    History of adrenal adenoma    Diabetes    H/O total thyroidectomy    H/O bilateral breast biopsy  November 2018 and December 2018    S/P angioplasty with stent  drug eluting stent in first diagonal on 2/21/19    S/P dilation and curettage  TOP x2          Medication list         MEDICATIONS  (STANDING):  albuterol/ipratropium for Nebulization 3 milliLiter(s) Nebulizer every 6 hours  anastrozole 1 milliGRAM(s) Oral daily  aspirin  chewable 81 milliGRAM(s) Oral daily  atorvastatin 20 milliGRAM(s) Oral at bedtime  carvedilol 25 milliGRAM(s) Oral every 12 hours  cefuroxime   Tablet 500 milliGRAM(s) Oral every 12 hours  cloNIDine 0.2 milliGRAM(s) Oral three times a day  dextrose 40% Gel 15 Gram(s) Oral once  dextrose 5%. 1000 milliLiter(s) (50 mL/Hr) IV Continuous <Continuous>  dextrose 5%. 1000 milliLiter(s) (100 mL/Hr) IV Continuous <Continuous>  dextrose 50% Injectable 12.5 Gram(s) IV Push once  dextrose 50% Injectable 25 Gram(s) IV Push once  dextrose 50% Injectable 25 Gram(s) IV Push once  enoxaparin Injectable 40 milliGRAM(s) SubCutaneous daily  furosemide    Tablet 40 milliGRAM(s) Oral daily  glucagon  Injectable 1 milliGRAM(s) IntraMuscular once  hydrALAZINE 100 milliGRAM(s) Oral three times a day  insulin lispro (ADMELOG) corrective regimen sliding scale   SubCutaneous three times a day before meals  insulin lispro (ADMELOG) corrective regimen sliding scale   SubCutaneous at bedtime  lactobacillus acidophilus 1 Tablet(s) Oral three times a day  levothyroxine 150 MICROGram(s) Oral daily  losartan 100 milliGRAM(s) Oral daily  metFORMIN 500 milliGRAM(s) Oral two times a day  senna 2 Tablet(s) Oral at bedtime    MEDICATIONS  (PRN):  acetaminophen   Tablet .. 650 milliGRAM(s) Oral every 6 hours PRN Temp greater or equal to 38C (100.4F), Moderate Pain (4 - 6)         Vitals log        ICU Vital Signs Last 24 Hrs  T(C): 36.7 (14 Sep 2021 06:07), Max: 37 (14 Sep 2021 01:25)  T(F): 98 (14 Sep 2021 06:07), Max: 98.6 (14 Sep 2021 01:25)  HR: 87 (14 Sep 2021 06:15) (71 - 87)  BP: 143/81 (14 Sep 2021 06:15) (138/85 - 205/113)  BP(mean): --  ABP: --  ABP(mean): --  RR: 18 (14 Sep 2021 06:07) (16 - 18)  SpO2: 95% (14 Sep 2021 06:07) (87% - 98%)           Input and Output:  I&O's Detail    12 Sep 2021 07:01  -  13 Sep 2021 07:00  --------------------------------------------------------  IN:  Total IN: 0 mL    OUT:    Voided (mL): 800 mL  Total OUT: 800 mL    Total NET: -800 mL      13 Sep 2021 07:01  -  14 Sep 2021 06:40  --------------------------------------------------------  IN:    Oral Fluid: 780 mL  Total IN: 780 mL    OUT:    Voided (mL): 1300 mL  Total OUT: 1300 mL    Total NET: -520 mL          Lab Data                        11.1   9.14  )-----------( 202      ( 12 Sep 2021 07:31 )             35.7     09-12    144  |  104  |  19  ----------------------------<  123<H>  3.7   |  29  |  0.90    Ca    8.3<L>      12 Sep 2021 07:31    TPro  6.2  /  Alb  2.8<L>  /  TBili  0.6  /  DBili  x   /  AST  25  /  ALT  34  /  AlkPhos  60  09-12            Review of Systems	      Objective     Physical Examination    heart s1s2  lung dec BS  abd soft      Pertinent Lab findings & Imaging      Miguel Angel:  NO   Adequate UO     I&O's Detail    12 Sep 2021 07:01  -  13 Sep 2021 07:00  --------------------------------------------------------  IN:  Total IN: 0 mL    OUT:    Voided (mL): 800 mL  Total OUT: 800 mL    Total NET: -800 mL      13 Sep 2021 07:01  -  14 Sep 2021 06:40  --------------------------------------------------------  IN:    Oral Fluid: 780 mL  Total IN: 780 mL    OUT:    Voided (mL): 1300 mL  Total OUT: 1300 mL    Total NET: -520 mL               Discussed with:     Cultures:	        Radiology                            
Patient is a 65y Female with a known history of :  Pneumonia [J18.9]    Acute respiratory failure with hypoxia [J96.01]    Diabetes mellitus [E11.9]    Benign essential HTN [I10]    Hypothyroidism [E03.9]    Cognitive impairment [R41.89]    E-coli UTI [N39.0]    Sepsis due to Escherichia coli [A41.51]      HPI:  65F with PMH Type 2 DM, HTN,hypothyroid, cognitive impairment,recurrent falls,recurrent uti, right breast malignancy on chemo  sent from [facility] for weakness fall, fever in syo , hypoxic 89% on RA c/o shortness of breath.  patient is poor historian. fully vaccinated for covid, has leucocytosis, x ray chest suspicion of PNA, started on oxygen 3 litres for hypoxia, and started on vanc zosyn and admitted for further management  (07 Sep 2021 01:25)      REVIEW OF SYSTEMS:    CONSTITUTIONAL: No fever, weight loss, or fatigue  EYES: No eye pain, visual disturbances, or discharge  ENMT:  No difficulty hearing, tinnitus, vertigo; No sinus or throat pain  NECK: No pain or stiffness  BREASTS: No pain, masses, or nipple discharge  RESPIRATORY: No cough, wheezing, chills or hemoptysis; No shortness of breath  CARDIOVASCULAR: No chest pain, palpitations, dizziness, or leg swelling  GASTROINTESTINAL: No abdominal or epigastric pain. No nausea, vomiting, or hematemesis; No diarrhea or constipation. No melena or hematochezia.  GENITOURINARY: No dysuria, frequency, hematuria, or incontinence  NEUROLOGICAL: No headaches, memory loss, loss of strength, numbness, or tremors  SKIN: No itching, burning, rashes, or lesions   LYMPH NODES: No enlarged glands  ENDOCRINE: No heat or cold intolerance; No hair loss  MUSCULOSKELETAL: No joint pain or swelling; No muscle, back, or extremity pain  PSYCHIATRIC: No depression, anxiety, mood swings, or difficulty sleeping  HEME/LYMPH: No easy bruising, or bleeding gums  ALLERGY AND IMMUNOLOGIC: No hives or eczema    MEDICATIONS  (STANDING):  albuterol/ipratropium for Nebulization 3 milliLiter(s) Nebulizer every 6 hours  anastrozole 1 milliGRAM(s) Oral daily  aspirin  chewable 81 milliGRAM(s) Oral daily  atorvastatin 20 milliGRAM(s) Oral at bedtime  carvedilol 25 milliGRAM(s) Oral every 12 hours  cefTRIAXone   IVPB 1000 milliGRAM(s) IV Intermittent every 24 hours  cloNIDine 0.2 milliGRAM(s) Oral three times a day  dextrose 40% Gel 15 Gram(s) Oral once  dextrose 5%. 1000 milliLiter(s) (50 mL/Hr) IV Continuous <Continuous>  dextrose 5%. 1000 milliLiter(s) (100 mL/Hr) IV Continuous <Continuous>  dextrose 50% Injectable 25 Gram(s) IV Push once  dextrose 50% Injectable 12.5 Gram(s) IV Push once  dextrose 50% Injectable 25 Gram(s) IV Push once  enoxaparin Injectable 40 milliGRAM(s) SubCutaneous daily  furosemide    Tablet 40 milliGRAM(s) Oral daily  glucagon  Injectable 1 milliGRAM(s) IntraMuscular once  hydrALAZINE 100 milliGRAM(s) Oral three times a day  insulin lispro (ADMELOG) corrective regimen sliding scale   SubCutaneous three times a day before meals  insulin lispro (ADMELOG) corrective regimen sliding scale   SubCutaneous at bedtime  lactobacillus acidophilus 1 Tablet(s) Oral three times a day  levothyroxine 150 MICROGram(s) Oral daily  losartan 100 milliGRAM(s) Oral daily  metFORMIN 500 milliGRAM(s) Oral two times a day  senna 2 Tablet(s) Oral at bedtime    MEDICATIONS  (PRN):  acetaminophen   Tablet .. 650 milliGRAM(s) Oral every 6 hours PRN Temp greater or equal to 38C (100.4F), Moderate Pain (4 - 6)      ALLERGIES: No Known Allergies      FAMILY HISTORY:  FHx: suicide    FHx: lymphoma    Family history of brain damage (Sibling)  at birth        Social history:  Alochol:   Smoking:   Drug Use:   Marital Status:     PHYSICAL EXAMINATION:  -----------------------------  T(C): 36.8 (09-12-21 @ 06:41), Max: 37 (09-11-21 @ 14:00)  HR: 71 (09-12-21 @ 07:17) (69 - 86)  BP: 150/88 (09-12-21 @ 07:17) (135/78 - 208/114)  RR: 18 (09-12-21 @ 06:41) (16 - 18)  SpO2: 97% (09-12-21 @ 07:11) (93% - 98%)  Wt(kg): --    09-11 @ 07:01  -  09-12 @ 07:00  --------------------------------------------------------  IN:  Total IN: 0 mL    OUT:    Voided (mL): 700 mL  Total OUT: 700 mL    Total NET: -700 mL            Constitutional: well developed, normal appearance, well groomed, well nourished, no deformities and no acute distress.   Eyes: the conjunctiva exhibited no abnormalities and the eyelids demonstrated no xanthelasmas.   HEENT: normal oral mucosa, no oral pallor and no oral cyanosis.   Neck: normal jugular venous A waves present, normal jugular venous V waves present and no jugular venous villarreal A waves.   Pulmonary: no respiratory distress, normal respiratory rhythm and effort, no accessory muscle use and lungs were clear to auscultation bilaterally. Anteriorly  Cardiovascular: heart rate and rhythm were normal, normal S1 and S2 and no murmur, gallop, rub, heave or thrill are present.  with II/VI systolic murmur  Musculoskeletal: the gait could not be assessed.   Extremities: no clubbing of the fingernails, no localized cyanosis, no petechial hemorrhages and no ischemic changes.   Skin: normal skin color and pigmentation, no rash, no venous stasis, no skin lesions, no skin ulcer and no xanthoma was observed.      LABS:   --------  09-12    144  |  104  |  19  ----------------------------<  123<H>  3.7   |  29  |  0.90    Ca    8.3<L>      12 Sep 2021 07:31    TPro  6.2  /  Alb  2.8<L>  /  TBili  0.6  /  DBili  x   /  AST  25  /  ALT  34  /  AlkPhos  60  09-12                         11.1   9.14  )-----------( 202      ( 12 Sep 2021 07:31 )             35.7                   Radiology:    
     YOSI CORRAL is a 65yFemale , patient examined and chart reviewed.    INTERVAL HPI/ OVERNIGHT EVENTS:   Feeling better. Fevers better.   Blood cultures with Ecoli.    PAST MEDICAL & SURGICAL HISTORY:  HTN (hypertension)  Anxiety  HLD (hyperlipidemia)  Hypothyroid  Breast CA  right  Poor historian  Cancer of thyroid  unsure of dates but prior to   Coronary artery disease  Cognitive impairment  Osteoarthritis  Type 2 diabetes mellitus  Urinary frequency  History of adrenal adenoma  Diabetes  H/O total thyroidectomy  H/O bilateral breast biopsy  2018 and 2018  S/P angioplasty with stent  drug eluting stent in first diagonal on 19  S/P dilation and curettage  TOP x2      For details regarding the patient's social history, family history, and other miscellaneous elements, please refer the initial infectious diseases consultation and/or the admitting history and physical examination for this admission.    ROS:  CONSTITUTIONAL:  Negative fever or chills  EYES:  Negative  blurry vision or double vision  CARDIOVASCULAR:  Negative for chest pain or palpitations  RESPIRATORY:  Negative for cough, wheezing, or SOB   GASTROINTESTINAL:  Negative for nausea, vomiting, diarrhea, constipation, or abdominal pain  GENITOURINARY:  Negative frequency, urgency or dysuria  NEUROLOGIC:  No headache, confusion, dizziness, lightheadedness  All other systems were reviewed and are negative         Current inpatient medications :    ANTIBIOTICS/RELEVANT:  lactobacillus acidophilus 1 Tablet(s) Oral three times a day  piperacillin/tazobactam IVPB.. 3.375 Gram(s) IV Intermittent every 8 hours      acetaminophen   Tablet .. 650 milliGRAM(s) Oral every 6 hours PRN  albuterol/ipratropium for Nebulization 3 milliLiter(s) Nebulizer every 6 hours  anastrozole 1 milliGRAM(s) Oral daily  aspirin  chewable 81 milliGRAM(s) Oral daily  atorvastatin 20 milliGRAM(s) Oral at bedtime  carvedilol 25 milliGRAM(s) Oral every 12 hours  cloNIDine 0.2 milliGRAM(s) Oral three times a day  dextrose 40% Gel 15 Gram(s) Oral once  dextrose 5%. 1000 milliLiter(s) IV Continuous <Continuous>  dextrose 5%. 1000 milliLiter(s) IV Continuous <Continuous>  dextrose 50% Injectable 25 Gram(s) IV Push once  dextrose 50% Injectable 12.5 Gram(s) IV Push once  dextrose 50% Injectable 25 Gram(s) IV Push once  enoxaparin Injectable 40 milliGRAM(s) SubCutaneous daily  furosemide    Tablet 40 milliGRAM(s) Oral daily  glucagon  Injectable 1 milliGRAM(s) IntraMuscular once  hydrALAZINE 100 milliGRAM(s) Oral three times a day  insulin lispro (ADMELOG) corrective regimen sliding scale   SubCutaneous three times a day before meals  insulin lispro (ADMELOG) corrective regimen sliding scale   SubCutaneous at bedtime  levothyroxine 150 MICROGram(s) Oral daily  losartan 100 milliGRAM(s) Oral daily  metFORMIN 500 milliGRAM(s) Oral two times a day  senna 2 Tablet(s) Oral at bedtime      Objective:     @ 07:01  -   @ 07:00  --------------------------------------------------------  IN: 700 mL / OUT: 600 mL / NET: 100 mL     @ 07:01  -   @ 17:32  --------------------------------------------------------  IN: 360 mL / OUT: 0 mL / NET: 360 mL      T(C): 36.8 (21 @ 17:27), Max: 37.1 (21 @ 14:45)  HR: 80 (21 @ 17:27) (65 - 89)  BP: 175/- (21 @ 17:27) (100/63 - 175/-)  RR: 16 (21 @ 17:27) (16 - 22)  SpO2: 95% (21 @ 17:27) (93% - 98%)      Physical Exam:  General: no acute distress  Neck: supple, trachea midline  Lungs: clear, no wheeze/rhonchi  Cardiovascular: regular rate and rhythm, S1 S2  Abdomen: soft, nontender,  bowel sounds normal  Neurological: alert and oriented x3  Skin: no rash  Extremities: + Edema      LABS:                          10.3   8.83  )-----------( 183      ( 08 Sep 2021 06:53 )             33.4       09-08    145  |  106  |  21  ----------------------------<  134<H>  3.6   |  30  |  1.02    Ca    8.0<L>      08 Sep 2021 06:53    TPro  5.7<L>  /  Alb  2.6<L>  /  TBili  0.7  /  DBili  x   /  AST  15  /  ALT  26  /  AlkPhos  60  09-08      PT/INR - ( 07 Sep 2021 00:31 )   PT: 12.0 sec;   INR: 0.99 ratio         PTT - ( 07 Sep 2021 00:31 )  PTT:23.1 sec  Urinalysis Basic - ( 07 Sep 2021 00:31 )    Color: Yellow / Appearance: Clear / S.010 / pH: x  Gluc: x / Ketone: Negative  / Bili: Negative / Urobili: Negative mg/dL   Blood: x / Protein: 30 mg/dL / Nitrite: Negative   Leuk Esterase: Negative / RBC: 0-2 /HPF / WBC 0-2   Sq Epi: x / Non Sq Epi: Occasional / Bacteria: Few      MICROBIOLOGY:    Culture - Urine (collected 07 Sep 2021 13:17)  Source: Clean Catch Clean Catch (Midstream)  Preliminary Report (08 Sep 2021 14:08):    >100,000 CFU/ml Escherichia coli    Culture - Blood (collected 07 Sep 2021 13:17)  Source: .Blood Blood  Gram Stain (08 Sep 2021 01:22):    Growth in aerobic and anaerobic bottles: Gram Negative Rods  Preliminary Report (08 Sep 2021 01:22):    Growth in aerobic and anaerobic bottles: Gram Negative Rods    Culture - Blood (collected 07 Sep 2021 13:17)  Source: .Blood Blood  Gram Stain (08 Sep 2021 01:21):    Growth in aerobic bottle: Gram Variable Rods    Growth in anaerobic bottle: Gram Negative Rods  Preliminary Report (08 Sep 2021 01:22):    Growth in aerobic bottle: Gram Variable Rods    Growth in anaerobic bottle: Gram Negative Rods    ***Blood Panel PCR results on this specimen are available    approximately 3 hours after the Gram stain result.***    Gram stain, PCR, and/or culture results may not always    correspond due to difference in methodologies.    ************************************************************    This PCR assay was performed by multiplex PCR. This    Assay tests for 66 bacterial and resistance gene targets.    Please refer to the [facility] test directory    at [email or web address] for details.  Organism: Blood Culture PCR (07 Sep 2021 22:57)  Organism: Blood Culture PCR (07 Sep 2021 22:57)      -  Escherichia coli: Detec      Method Type: PCR      RADIOLOGY & ADDITIONAL STUDIES:    EXAM:  CT ABDOMEN AND PELVIS                                  PROCEDURE DATE:  2021          INTERPRETATION:  CLINICAL INFORMATION: Sepsis.    COMPARISON: CT abdomen and pelvis 2019    CONTRAST/COMPLICATIONS:  IV Contrast: None  Oral Contrast: None  Complications: None    PROCEDURE:  CT of the Abdomen and Pelvis was performed.  Sagittal and coronal reformats were performed.    FINDINGS:  LOWER CHEST: Partially imaged, stable right breast skin thickening and retroareolar tissue. Subsegmental left lower lobe atelectasis and trace bilateral pleural effusions.    LIVER: Within normal limits.  BILE DUCTS: Normal caliber.  GALLBLADDER: Within normal limits.  SPLEEN: Within normal limits.  PANCREAS: Within normal limits.  ADRENALS: 3.2 x 2.6 cm left adrenal adenoma is stable. Right adrenal gland is within normal limits.  KIDNEYS/URETERS: Stable bilateral cysts. No renal stones or hydronephrosis.    BLADDER: Within normal limits.  REPRODUCTIVE ORGANS: Enlarged multi fibroid uterus.    BOWEL: No bowel obstruction. Appendix is normal.  PERITONEUM: No ascites.  VESSELS: Atherosclerotic changes.  RETROPERITONEUM/LYMPH NODES: No lymphadenopathy.  ABDOMINAL WALL: Small fat-containing umbilical hernia. Nonspecific bilateral lateral abdominal wall edema and skin thickening.  BONES: Degenerative changes.    IMPRESSION:  No acute intra-abdominal pathology.    Nonspecific bilateral lateral abdominal wall edema and skin thickening. Please correlate for signs of cellulitis.      EXAM:  CT CHEST                          PROCEDURE DATE:  2021          INTERPRETATION:  CLINICAL INFORMATION: Short of breath    COMPARISON: 2019.    CONTRAST/COMPLICATIONS:  IV Contrast: NONE  0 cc administered   0 cc discarded  Oral Contrast: NONE  Complications: None reported at time of study completion    PROCEDURE:  CT of the Chest was performed.  Sagittal and coronal reformats were performed.    FINDINGS:    LUNGS AND AIRWAYS: Patent central airways. Very low lung volumes. Patchy consolidation in the lingula , and left lower lobe likely representing a combination of pneumonia and atelectasis. Correlate clinically for active infection. Fibrotic atelectatic changes in the right upper middle and lower lobes.  PLEURA: Trace basilar pleural effusions  MEDIASTINUM AND FRANCISCA: No lymphadenopathy.  VESSELS: Nonaneurysmal thoracic aorta.  HEART: Cardiomegaly with cardiac vascular calcific. Trace pericardial effusion.  CHEST WALL AND LOWER NECK: Asymmetric soft tissue density right breast may reflect scarring/postoperative change. Cannot exclude neoplasm. Recommend clinical correlation correlation with dedicated breast.  VISUALIZED UPPER ABDOMEN: Cholelithiasis. Indeterminate left adrenal nodule increased in size, now measures 3.5 cm previously measured 2.6 cm. Correlate with MR  BONES: Degenerative changes.    IMPRESSION:  Left basilar pneumonia and atelectasis.  Trace basilar pleural effusions. Trace pericardial effusion.  Asymmetric soft tissue density right breast. Correlate clinically and with dedicated breast imaging.  Indeterminate left adrenal nodule with interval increase in size compared to 2019. Correlate with MR    Assessment :   65F resident of [facility] Type 2 DM, HTN, hypothyroid, cognitive impairment, recurrent falls, recurrent uti, right breast malignancy on chemo admitted with weakness fall, fever sec Ecoli sepsis with bacteremia likely sec UTI  ?Left lung pna.   Sp fever    Plan :   Cont Zosyn   Trend temps and cbc  Fu cultures  Repeat blood cultures  Asp precautions    D/w Dr Garcia    Continue with present regiment.  Appropriate use of antibiotics and adverse effects reviewed.      > 35 minutes were spent in direct patient care reviewing notes, medications ,labs data/ imaging , discussion with multidisciplinary team.    Thank you for allowing me to participate in care of your patient .    Sunni Espinoza MD  Infectious Disease  [phone]
Date/Time Patient Seen:  		  Referring MD:   Data Reviewed	       Patient is a 65y old  Female who presents with a chief complaint of sob (08 Sep 2021 16:32)      Subjective/HPI     PAST MEDICAL & SURGICAL HISTORY:  HTN (hypertension)    HLD (hyperlipidemia)    Anxiety    HLD (hyperlipidemia)    Hypothyroid    Adrenal nodule    Breast CA  right    Poor historian    Cancer of thyroid  unsure of dates but prior to 2005    Coronary artery disease    Cognitive impairment    Osteoarthritis    Type 2 diabetes mellitus    Urinary frequency    History of adrenal adenoma    Diabetes    H/O total thyroidectomy    H/O bilateral breast biopsy  November 2018 and December 2018    S/P angioplasty with stent  drug eluting stent in first diagonal on 2/21/19    S/P dilation and curettage  TOP x2          Medication list         MEDICATIONS  (STANDING):  albuterol/ipratropium for Nebulization 3 milliLiter(s) Nebulizer every 6 hours  anastrozole 1 milliGRAM(s) Oral daily  aspirin  chewable 81 milliGRAM(s) Oral daily  atorvastatin 20 milliGRAM(s) Oral at bedtime  carvedilol 25 milliGRAM(s) Oral every 12 hours  cloNIDine 0.2 milliGRAM(s) Oral three times a day  dextrose 40% Gel 15 Gram(s) Oral once  dextrose 5%. 1000 milliLiter(s) (50 mL/Hr) IV Continuous <Continuous>  dextrose 5%. 1000 milliLiter(s) (100 mL/Hr) IV Continuous <Continuous>  dextrose 50% Injectable 25 Gram(s) IV Push once  dextrose 50% Injectable 12.5 Gram(s) IV Push once  dextrose 50% Injectable 25 Gram(s) IV Push once  enoxaparin Injectable 40 milliGRAM(s) SubCutaneous daily  furosemide    Tablet 40 milliGRAM(s) Oral daily  glucagon  Injectable 1 milliGRAM(s) IntraMuscular once  hydrALAZINE 100 milliGRAM(s) Oral three times a day  insulin lispro (ADMELOG) corrective regimen sliding scale   SubCutaneous three times a day before meals  insulin lispro (ADMELOG) corrective regimen sliding scale   SubCutaneous at bedtime  lactobacillus acidophilus 1 Tablet(s) Oral three times a day  levothyroxine 150 MICROGram(s) Oral daily  losartan 100 milliGRAM(s) Oral daily  metFORMIN 500 milliGRAM(s) Oral two times a day  piperacillin/tazobactam IVPB.. 3.375 Gram(s) IV Intermittent every 8 hours  senna 2 Tablet(s) Oral at bedtime    MEDICATIONS  (PRN):  acetaminophen   Tablet .. 650 milliGRAM(s) Oral every 6 hours PRN Temp greater or equal to 38C (100.4F), Moderate Pain (4 - 6)         Vitals log        ICU Vital Signs Last 24 Hrs  T(C): 37.1 (09 Sep 2021 04:46), Max: 37.1 (08 Sep 2021 14:45)  T(F): 98.7 (09 Sep 2021 04:46), Max: 98.7 (08 Sep 2021 14:45)  HR: 68 (09 Sep 2021 04:46) (65 - 82)  BP: 170/88 (09 Sep 2021 04:46) (156/87 - 188/98)  BP(mean): --  ABP: --  ABP(mean): --  RR: 18 (09 Sep 2021 04:46) (16 - 22)  SpO2: 97% (09 Sep 2021 04:46) (93% - 99%)           Input and Output:  I&O's Detail    07 Sep 2021 07:01  -  08 Sep 2021 07:00  --------------------------------------------------------  IN:    IV PiggyBack: 700 mL  Total IN: 700 mL    OUT:    Voided (mL): 600 mL  Total OUT: 600 mL    Total NET: 100 mL      08 Sep 2021 07:01  -  09 Sep 2021 06:39  --------------------------------------------------------  IN:    IV PiggyBack: 100 mL    Oral Fluid: 360 mL  Total IN: 460 mL    OUT:    Voided (mL): 2450 mL  Total OUT: 2450 mL    Total NET: -1990 mL          Lab Data                        10.3   8.83  )-----------( 183      ( 08 Sep 2021 06:53 )             33.4     09-08    145  |  106  |  21  ----------------------------<  134<H>  3.6   |  30  |  1.02    Ca    8.0<L>      08 Sep 2021 06:53    TPro  5.7<L>  /  Alb  2.6<L>  /  TBili  0.7  /  DBili  x   /  AST  15  /  ALT  26  /  AlkPhos  60  09-08            Review of Systems	      Objective     Physical Examination    heart s1s2  lung dec BS  abd soft      Pertinent Lab findings & Imaging      Miguel Angel:  NO   Adequate UO     I&O's Detail    07 Sep 2021 07:01  -  08 Sep 2021 07:00  --------------------------------------------------------  IN:    IV PiggyBack: 700 mL  Total IN: 700 mL    OUT:    Voided (mL): 600 mL  Total OUT: 600 mL    Total NET: 100 mL      08 Sep 2021 07:01  -  09 Sep 2021 06:39  --------------------------------------------------------  IN:    IV PiggyBack: 100 mL    Oral Fluid: 360 mL  Total IN: 460 mL    OUT:    Voided (mL): 2450 mL  Total OUT: 2450 mL    Total NET: -1990 mL               Discussed with:     Cultures:	        Radiology                            
Date/Time Patient Seen:  		  Referring MD:   Data Reviewed	       Patient is a 65y old  Female who presents with a chief complaint of sob (10 Sep 2021 12:43)      Subjective/HPI     PAST MEDICAL & SURGICAL HISTORY:  HTN (hypertension)    HLD (hyperlipidemia)    Anxiety    HLD (hyperlipidemia)    Hypothyroid    Adrenal nodule    Breast CA  right    Poor historian    Cancer of thyroid  unsure of dates but prior to 2005    Coronary artery disease    Cognitive impairment    Osteoarthritis    Type 2 diabetes mellitus    Urinary frequency    History of adrenal adenoma    Diabetes    H/O total thyroidectomy    H/O bilateral breast biopsy  November 2018 and December 2018    S/P angioplasty with stent  drug eluting stent in first diagonal on 2/21/19    S/P dilation and curettage  TOP x2          Medication list         MEDICATIONS  (STANDING):  albuterol/ipratropium for Nebulization 3 milliLiter(s) Nebulizer every 6 hours  anastrozole 1 milliGRAM(s) Oral daily  aspirin  chewable 81 milliGRAM(s) Oral daily  atorvastatin 20 milliGRAM(s) Oral at bedtime  carvedilol 25 milliGRAM(s) Oral every 12 hours  cefTRIAXone   IVPB 1000 milliGRAM(s) IV Intermittent every 24 hours  cloNIDine 0.2 milliGRAM(s) Oral three times a day  dextrose 40% Gel 15 Gram(s) Oral once  dextrose 5%. 1000 milliLiter(s) (50 mL/Hr) IV Continuous <Continuous>  dextrose 5%. 1000 milliLiter(s) (100 mL/Hr) IV Continuous <Continuous>  dextrose 50% Injectable 25 Gram(s) IV Push once  dextrose 50% Injectable 12.5 Gram(s) IV Push once  dextrose 50% Injectable 25 Gram(s) IV Push once  enoxaparin Injectable 40 milliGRAM(s) SubCutaneous daily  furosemide    Tablet 40 milliGRAM(s) Oral daily  glucagon  Injectable 1 milliGRAM(s) IntraMuscular once  hydrALAZINE 100 milliGRAM(s) Oral three times a day  insulin lispro (ADMELOG) corrective regimen sliding scale   SubCutaneous three times a day before meals  insulin lispro (ADMELOG) corrective regimen sliding scale   SubCutaneous at bedtime  lactobacillus acidophilus 1 Tablet(s) Oral three times a day  levothyroxine 150 MICROGram(s) Oral daily  losartan 100 milliGRAM(s) Oral daily  metFORMIN 500 milliGRAM(s) Oral two times a day  senna 2 Tablet(s) Oral at bedtime    MEDICATIONS  (PRN):  acetaminophen   Tablet .. 650 milliGRAM(s) Oral every 6 hours PRN Temp greater or equal to 38C (100.4F), Moderate Pain (4 - 6)         Vitals log        ICU Vital Signs Last 24 Hrs  T(C): 36.9 (11 Sep 2021 02:15), Max: 37.3 (10 Sep 2021 21:55)  T(F): 98.5 (11 Sep 2021 02:15), Max: 99.1 (10 Sep 2021 21:55)  HR: 79 (11 Sep 2021 02:15) (72 - 93)  BP: 168/93 (11 Sep 2021 02:15) (128/78 - 184/105)  BP(mean): --  ABP: --  ABP(mean): --  RR: 18 (11 Sep 2021 02:15) (18 - 18)  SpO2: 93% (11 Sep 2021 02:15) (93% - 97%)           Input and Output:  I&O's Detail    09 Sep 2021 07:01  -  10 Sep 2021 07:00  --------------------------------------------------------  IN:    Oral Fluid: 500 mL  Total IN: 500 mL    OUT:  Total OUT: 0 mL    Total NET: 500 mL      10 Sep 2021 07:01  -  11 Sep 2021 06:13  --------------------------------------------------------  IN:    Oral Fluid: 360 mL  Total IN: 360 mL    OUT:    Voided (mL): 1000 mL  Total OUT: 1000 mL    Total NET: -640 mL          Lab Data                        10.3   7.17  )-----------( 191      ( 10 Sep 2021 07:47 )             33.1     09-10    143  |  105  |  19  ----------------------------<  123<H>  3.3<L>   |  33<H>  |  0.88    Ca    8.3<L>      10 Sep 2021 07:47    TPro  6.4  /  Alb  2.8<L>  /  TBili  0.5  /  DBili  x   /  AST  15  /  ALT  22  /  AlkPhos  68  09-10            Review of Systems	      Objective     Physical Examination    heart s1s2  lung dc BS  abd soft      Pertinent Lab findings & Imaging      Miguel Angel:  NO   Adequate UO     I&O's Detail    09 Sep 2021 07:01  -  10 Sep 2021 07:00  --------------------------------------------------------  IN:    Oral Fluid: 500 mL  Total IN: 500 mL    OUT:  Total OUT: 0 mL    Total NET: 500 mL      10 Sep 2021 07:01  -  11 Sep 2021 06:13  --------------------------------------------------------  IN:    Oral Fluid: 360 mL  Total IN: 360 mL    OUT:    Voided (mL): 1000 mL  Total OUT: 1000 mL    Total NET: -640 mL               Discussed with:     Cultures:	        Radiology                            
     YOSI CORRAL is a 65yFemale , patient examined and chart reviewed.    INTERVAL HPI/ OVERNIGHT EVENTS:   NAD. Afebrile.  Feeling better.      PAST MEDICAL & SURGICAL HISTORY:  HTN (hypertension)  Anxiety  HLD (hyperlipidemia)  Hypothyroid  Breast CA  right  Poor historian  Cancer of thyroid  unsure of dates but prior to   Coronary artery disease  Cognitive impairment  Osteoarthritis  Type 2 diabetes mellitus  Urinary frequency  History of adrenal adenoma  Diabetes  H/O total thyroidectomy  H/O bilateral breast biopsy  2018 and 2018  S/P angioplasty with stent  drug eluting stent in first diagonal on 19  S/P dilation and curettage  TOP x2      For details regarding the patient's social history, family history, and other miscellaneous elements, please refer the initial infectious diseases consultation and/or the admitting history and physical examination for this admission.    ROS:  CONSTITUTIONAL:  Negative fever or chills  EYES:  Negative  blurry vision or double vision  CARDIOVASCULAR:  Negative for chest pain or palpitations  RESPIRATORY:  Negative for cough, wheezing, or SOB   GASTROINTESTINAL:  Negative for nausea, vomiting, diarrhea, constipation, or abdominal pain  GENITOURINARY:  Negative frequency, urgency or dysuria  NEUROLOGIC:  No headache, confusion, dizziness, lightheadedness  All other systems were reviewed and are negative         Current inpatient medications :    ANTIBIOTICS/RELEVANT:  piperacillin/tazobactam IVPB.. 3.375 Gram(s) IV Intermittent every 8 hours    MEDICATIONS  (STANDING):  albuterol/ipratropium for Nebulization 3 milliLiter(s) Nebulizer every 6 hours  anastrozole 1 milliGRAM(s) Oral daily  aspirin  chewable 81 milliGRAM(s) Oral daily  atorvastatin 20 milliGRAM(s) Oral at bedtime  carvedilol 25 milliGRAM(s) Oral every 12 hours  cloNIDine 0.2 milliGRAM(s) Oral three times a day  dextrose 40% Gel 15 Gram(s) Oral once  dextrose 5%. 1000 milliLiter(s) (50 mL/Hr) IV Continuous <Continuous>  dextrose 5%. 1000 milliLiter(s) (100 mL/Hr) IV Continuous <Continuous>  dextrose 50% Injectable 25 Gram(s) IV Push once  dextrose 50% Injectable 12.5 Gram(s) IV Push once  dextrose 50% Injectable 25 Gram(s) IV Push once  enoxaparin Injectable 40 milliGRAM(s) SubCutaneous daily  furosemide    Tablet 40 milliGRAM(s) Oral daily  glucagon  Injectable 1 milliGRAM(s) IntraMuscular once  hydrALAZINE 100 milliGRAM(s) Oral three times a day  insulin lispro (ADMELOG) corrective regimen sliding scale   SubCutaneous three times a day before meals  insulin lispro (ADMELOG) corrective regimen sliding scale   SubCutaneous at bedtime  lactobacillus acidophilus 1 Tablet(s) Oral three times a day  levothyroxine 150 MICROGram(s) Oral daily  losartan 100 milliGRAM(s) Oral daily  metFORMIN 500 milliGRAM(s) Oral two times a day  senna 2 Tablet(s) Oral at bedtime    MEDICATIONS  (PRN):  acetaminophen   Tablet .. 650 milliGRAM(s) Oral every 6 hours PRN Temp greater or equal to 38C (100.4F), Moderate Pain (4 - 6)        Objective:  Vital Signs Last 24 Hrs  T(C): 36.4 (09 Sep 2021 09:39), Max: 37.1 (08 Sep 2021 14:45)  T(F): 97.5 (09 Sep 2021 09:39), Max: 98.7 (08 Sep 2021 14:45)  HR: 77 (09 Sep 2021 09:39) (68 - 82)  BP: 155/85 (09 Sep 2021 09:39) (155/85 - 188/98)  RR: 18 (09 Sep 2021 09:39) (16 - 20)  SpO2: 95% (09 Sep 2021 09:39) (93% - 99%)    Physical Exam:  General: no acute distress  Neck: supple, trachea midline  Lungs: clear, no wheeze/rhonchi  Cardiovascular: regular rate and rhythm, S1 S2  Abdomen: soft, nontender,  bowel sounds normal  Neurological: alert and oriented x3  Skin: no rash  Extremities: + Edema      LABS:                        10.3   7.73  )-----------( 184      ( 09 Sep 2021 07:21 )             33.2   -    145  |  105  |  17  ----------------------------<  126<H>  3.4<L>   |  32<H>  |  1.04    Ca    8.2<L>      09 Sep 2021 07:21    TPro  5.6<L>  /  Alb  2.9<L>  /  TBili  0.8  /  DBili  x   /  AST  11  /  ALT  22  /  AlkPhos  68      Urinalysis Basic - ( 07 Sep 2021 00:31 )    Color: Yellow / Appearance: Clear / S.010 / pH: x  Gluc: x / Ketone: Negative  / Bili: Negative / Urobili: Negative mg/dL   Blood: x / Protein: 30 mg/dL / Nitrite: Negative   Leuk Esterase: Negative / RBC: 0-2 /HPF / WBC 0-2   Sq Epi: x / Non Sq Epi: Occasional / Bacteria: Few      MICROBIOLOGY:    Culture - Urine (collected 07 Sep 2021 13:17)  Source: Clean Catch Clean Catch (Midstream)  Final Report (09 Sep 2021 06:07):    >100,000 CFU/ml Escherichia coli  Organism: Escherichia coli (09 Sep 2021 06:07)  Organism: Escherichia coli (09 Sep 2021 06:07)      -  Amikacin: S <=16      -  Amoxicillin/Clavulanic Acid: S <=8/4      -  Ampicillin: S <=8 These ampicillin results predict results for amoxicillin      -  Ampicillin/Sulbactam: S <=4/2 Enterobacter, Citrobacter, and Serratia may develop resistance during prolonged therapy (3-4 days)      -  Aztreonam: S <=4      -  Cefazolin: S <=2 (MIC_CL_COM_ENTERIC_CEFAZU) For uncomplicated UTI with K. pneumoniae, E. coli, or P. mirablis: ARELI <=16 is sensitive and ARELI >=32 is resistant. This also predicts results for oral agents cefaclor, cefdinir, cefpodoxime, cefprozil, cefuroxime axetil, cephalexin and locarbef for uncomplicated UTI. Note that some isolates may be susceptible to these agents while testing resistant to cefazolin.      -  Cefepime: S <=2      -  Cefoxitin: S <=8      -  Ceftriaxone: S <=1 Enterobacter, Citrobacter, and Serratia may develop resistance during prolonged therapy      -  Ciprofloxacin: R >2      -  Ertapenem: S <=0.5      -  Gentamicin: S <=2      -  Imipenem: S <=1      -  Levofloxacin: R >4      -  Meropenem: S <=1      -  Nitrofurantoin: S <=32 Should not be used to treat pyelonephritis      -  Piperacillin/Tazobactam: S <=8      -  Tigecycline: S <=2      -  Tobramycin: S <=2      -  Trimethoprim/Sulfamethoxazole: S <=0.5/9.5      Method Type: ARELI    Culture - Blood (collected 07 Sep 2021 13:17)  Source: .Blood Blood  Gram Stain (08 Sep 2021 01:22):    Growth in aerobic and anaerobic bottles: Gram Negative Rods  Preliminary Report (08 Sep 2021 21:44):    Growth in aerobic and anaerobic bottles: Escherichia coli    See previous culture [id number]    Culture - Blood (collected 07 Sep 2021 13:17)  Source: .Blood Blood  Gram Stain (08 Sep 2021 01:21):    Growth in aerobic bottle: Gram Variable Rods    Growth in anaerobic bottle: Gram Negative Rods  Preliminary Report (08 Sep 2021 20:10):    Growth in aerobic and anaerobic bottles: Escherichia coli    ***Blood Panel PCR results on this specimen are available    approximately 3 hours after the Gram stain result.***    Gram stain, PCR, and/or culture results may not always    correspond due to difference in methodologies.    ************************************************************    This PCR assay was performed by multiplex PCR. This    Assay tests for 66 bacterial and resistance gene targets.    Please refer to the [facility] test directory    at [email or web address] for details.  Organism: Blood Culture PCR (07 Sep 2021 22:57)  Organism: Blood Culture PCR (07 Sep 2021 22:57)      -  Escherichia coli: Detec      Method Type: PCR      RADIOLOGY & ADDITIONAL STUDIES:    EXAM:  CT ABDOMEN AND PELVIS                                  PROCEDURE DATE:  2021          INTERPRETATION:  CLINICAL INFORMATION: Sepsis.    COMPARISON: CT abdomen and pelvis 2019    CONTRAST/COMPLICATIONS:  IV Contrast: None  Oral Contrast: None  Complications: None    PROCEDURE:  CT of the Abdomen and Pelvis was performed.  Sagittal and coronal reformats were performed.    FINDINGS:  LOWER CHEST: Partially imaged, stable right breast skin thickening and retroareolar tissue. Subsegmental left lower lobe atelectasis and trace bilateral pleural effusions.    LIVER: Within normal limits.  BILE DUCTS: Normal caliber.  GALLBLADDER: Within normal limits.  SPLEEN: Within normal limits.  PANCREAS: Within normal limits.  ADRENALS: 3.2 x 2.6 cm left adrenal adenoma is stable. Right adrenal gland is within normal limits.  KIDNEYS/URETERS: Stable bilateral cysts. No renal stones or hydronephrosis.    BLADDER: Within normal limits.  REPRODUCTIVE ORGANS: Enlarged multi fibroid uterus.    BOWEL: No bowel obstruction. Appendix is normal.  PERITONEUM: No ascites.  VESSELS: Atherosclerotic changes.  RETROPERITONEUM/LYMPH NODES: No lymphadenopathy.  ABDOMINAL WALL: Small fat-containing umbilical hernia. Nonspecific bilateral lateral abdominal wall edema and skin thickening.  BONES: Degenerative changes.    IMPRESSION:  No acute intra-abdominal pathology.    Nonspecific bilateral lateral abdominal wall edema and skin thickening. Please correlate for signs of cellulitis.      EXAM:  CT CHEST                          PROCEDURE DATE:  2021          INTERPRETATION:  CLINICAL INFORMATION: Short of breath    COMPARISON: 2019.    CONTRAST/COMPLICATIONS:  IV Contrast: NONE  0 cc administered   0 cc discarded  Oral Contrast: NONE  Complications: None reported at time of study completion    PROCEDURE:  CT of the Chest was performed.  Sagittal and coronal reformats were performed.    FINDINGS:    LUNGS AND AIRWAYS: Patent central airways. Very low lung volumes. Patchy consolidation in the lingula , and left lower lobe likely representing a combination of pneumonia and atelectasis. Correlate clinically for active infection. Fibrotic atelectatic changes in the right upper middle and lower lobes.  PLEURA: Trace basilar pleural effusions  MEDIASTINUM AND FRANCISCA: No lymphadenopathy.  VESSELS: Nonaneurysmal thoracic aorta.  HEART: Cardiomegaly with cardiac vascular calcific. Trace pericardial effusion.  CHEST WALL AND LOWER NECK: Asymmetric soft tissue density right breast may reflect scarring/postoperative change. Cannot exclude neoplasm. Recommend clinical correlation correlation with dedicated breast.  VISUALIZED UPPER ABDOMEN: Cholelithiasis. Indeterminate left adrenal nodule increased in size, now measures 3.5 cm previously measured 2.6 cm. Correlate with MR  BONES: Degenerative changes.    IMPRESSION:  Left basilar pneumonia and atelectasis.  Trace basilar pleural effusions. Trace pericardial effusion.  Asymmetric soft tissue density right breast. Correlate clinically and with dedicated breast imaging.  Indeterminate left adrenal nodule with interval increase in size compared to 2019. Correlate with MR    Assessment :   65F resident of [facility] PM Type 2 DM, HTN, hypothyroid, cognitive impairment, recurrent falls, recurrent uti, right breast malignancy on chemo admitted with weakness fall, fever sec Ecoli sepsis with bacteremia likely sec UTI  ?Left lung pna.   Sp fever    Plan :   Change Zosyn to Rocephin  Trend temps and cbc  Fu repeat blood cultures  Asp precautions    D/w Dr Garcia    Continue with present regiment.  Appropriate use of antibiotics and adverse effects reviewed.      > 35 minutes were spent in direct patient care reviewing notes, medications ,labs data/ imaging , discussion with multidisciplinary team.    Thank you for allowing me to participate in care of your patient .    Sunni Espinoza MD  Infectious Disease  [phone]
     YOSI CORRAL is a 65yFemale , patient examined and chart reviewed.    INTERVAL HPI/ OVERNIGHT EVENTS:   NAD. Afebrile. Feeling better.  No events.    PAST MEDICAL & SURGICAL HISTORY:  HTN (hypertension)  Anxiety  HLD (hyperlipidemia)  Hypothyroid  Breast CA  right  Poor historian  Cancer of thyroid  unsure of dates but prior to   Coronary artery disease  Cognitive impairment  Osteoarthritis  Type 2 diabetes mellitus  Urinary frequency  History of adrenal adenoma  Diabetes  H/O total thyroidectomy  H/O bilateral breast biopsy  2018 and 2018  S/P angioplasty with stent  drug eluting stent in first diagonal on 19  S/P dilation and curettage  TOP x2      For details regarding the patient's social history, family history, and other miscellaneous elements, please refer the initial infectious diseases consultation and/or the admitting history and physical examination for this admission.    ROS:  CONSTITUTIONAL:  Negative fever or chills  EYES:  Negative  blurry vision or double vision  CARDIOVASCULAR:  Negative for chest pain or palpitations  RESPIRATORY:  Negative for cough, wheezing, or SOB   GASTROINTESTINAL:  Negative for nausea, vomiting, diarrhea, constipation, or abdominal pain  GENITOURINARY:  Negative frequency, urgency or dysuria  NEUROLOGIC:  No headache, confusion, dizziness, lightheadedness  All other systems were reviewed and are negative         Current inpatient medications :    ANTIBIOTICS/RELEVANT:  cefTRIAXone   IVPB 1000 milliGRAM(s) IV Intermittent every 24 hours    MEDICATIONS  (STANDING):  albuterol/ipratropium for Nebulization 3 milliLiter(s) Nebulizer every 6 hours  anastrozole 1 milliGRAM(s) Oral daily  aspirin  chewable 81 milliGRAM(s) Oral daily  atorvastatin 20 milliGRAM(s) Oral at bedtime  carvedilol 25 milliGRAM(s) Oral every 12 hours  cloNIDine 0.2 milliGRAM(s) Oral three times a day  dextrose 40% Gel 15 Gram(s) Oral once  dextrose 5%. 1000 milliLiter(s) (50 mL/Hr) IV Continuous <Continuous>  dextrose 5%. 1000 milliLiter(s) (100 mL/Hr) IV Continuous <Continuous>  dextrose 50% Injectable 25 Gram(s) IV Push once  dextrose 50% Injectable 12.5 Gram(s) IV Push once  dextrose 50% Injectable 25 Gram(s) IV Push once  enoxaparin Injectable 40 milliGRAM(s) SubCutaneous daily  furosemide    Tablet 40 milliGRAM(s) Oral daily  glucagon  Injectable 1 milliGRAM(s) IntraMuscular once  hydrALAZINE 100 milliGRAM(s) Oral three times a day  insulin lispro (ADMELOG) corrective regimen sliding scale   SubCutaneous three times a day before meals  insulin lispro (ADMELOG) corrective regimen sliding scale   SubCutaneous at bedtime  lactobacillus acidophilus 1 Tablet(s) Oral three times a day  levothyroxine 150 MICROGram(s) Oral daily  losartan 100 milliGRAM(s) Oral daily  metFORMIN 500 milliGRAM(s) Oral two times a day  senna 2 Tablet(s) Oral at bedtime    MEDICATIONS  (PRN):  acetaminophen   Tablet .. 650 milliGRAM(s) Oral every 6 hours PRN Temp greater or equal to 38C (100.4F), Moderate Pain (4 - 6)      Objective:  Vital Signs Last 24 Hrs  T(C): 36.9 (13 Sep 2021 10:32), Max: 36.9 (13 Sep 2021 10:32)  T(F): 98.4 (13 Sep 2021 10:32), Max: 98.4 (13 Sep 2021 10:32)  HR: 71 (13 Sep 2021 10:32) (62 - 87)  BP: 138/85 (13 Sep 2021 10:32) (138/85 - 175/83)  RR: 18 (13 Sep 2021 10:32) (17 - 18)  SpO2: 96% (13 Sep 2021 10:32) (95% - 99%)    Physical Exam:  General: no acute distress  Neck: supple, trachea midline  Lungs: clear, no wheeze/rhonchi  Cardiovascular: regular rate and rhythm, S1 S2  Abdomen: soft, nontender,  bowel sounds normal  Neurological: alert and oriented x3  Skin: no rash  Extremities: + Edema      LABS:                        11.1   9.14  )-----------( 202      ( 12 Sep 2021 07:31 )             35.7   09-12    144  |  104  |  19  ----------------------------<  123<H>  3.7   |  29  |  0.90    Ca    8.3<L>      12 Sep 2021 07:31    TPro  6.2  /  Alb  2.8<L>  /  TBili  0.6  /  DBili  x   /  AST  25  /  ALT  34  /  AlkPhos  60  09-12      Urinalysis Basic - ( 07 Sep 2021 00:31 )    Color: Yellow / Appearance: Clear / S.010 / pH: x  Gluc: x / Ketone: Negative  / Bili: Negative / Urobili: Negative mg/dL   Blood: x / Protein: 30 mg/dL / Nitrite: Negative   Leuk Esterase: Negative / RBC: 0-2 /HPF / WBC 0-2   Sq Epi: x / Non Sq Epi: Occasional / Bacteria: Few      MICROBIOLOGY:    Culture - Blood (collected 08 Sep 2021 13:16)  Source: .Blood Blood-Peripheral  Preliminary Report (09 Sep 2021 14:02):    No growth to date.    Culture - Urine (collected 07 Sep 2021 13:17)  Source: Clean Catch Clean Catch (Midstream)  Final Report (09 Sep 2021 06:07):    >100,000 CFU/ml Escherichia coli  Organism: Escherichia coli (09 Sep 2021 06:07)  Organism: Escherichia coli (09 Sep 2021 06:07)      -  Amikacin: S <=16      -  Amoxicillin/Clavulanic Acid: S <=8/4      -  Ampicillin: S <=8 These ampicillin results predict results for amoxicillin      -  Ampicillin/Sulbactam: S <=4/2 Enterobacter, Citrobacter, and Serratia may develop resistance during prolonged therapy (3-4 days)      -  Aztreonam: S <=4      -  Cefazolin: S <=2 (MIC_CL_COM_ENTERIC_CEFAZU) For uncomplicated UTI with K. pneumoniae, E. coli, or P. mirablis: ARELI <=16 is sensitive and ARELI >=32 is resistant. This also predicts results for oral agents cefaclor, cefdinir, cefpodoxime, cefprozil, cefuroxime axetil, cephalexin and locarbef for uncomplicated UTI. Note that some isolates may be susceptible to these agents while testing resistant to cefazolin.      -  Cefepime: S <=2      -  Cefoxitin: S <=8      -  Ceftriaxone: S <=1 Enterobacter, Citrobacter, and Serratia may develop resistance during prolonged therapy      -  Ciprofloxacin: R >2      -  Ertapenem: S <=0.5      -  Gentamicin: S <=2      -  Imipenem: S <=1      -  Levofloxacin: R >4      -  Meropenem: S <=1      -  Nitrofurantoin: S <=32 Should not be used to treat pyelonephritis      -  Piperacillin/Tazobactam: S <=8      -  Tigecycline: S <=2      -  Tobramycin: S <=2      -  Trimethoprim/Sulfamethoxazole: S <=0.5/9.5      Method Type: ARELI    Culture - Blood (collected 07 Sep 2021 13:17)  Source: .Blood Blood  Gram Stain (08 Sep 2021 01:22):    Growth in aerobic and anaerobic bottles: Gram Negative Rods  Final Report (09 Sep 2021 17:28):    Growth in aerobic and anaerobic bottles: Escherichia coli    See previous culture [id number]    Culture - Blood (collected 07 Sep 2021 13:17)  Source: .Blood Blood  Gram Stain (08 Sep 2021 01:21):    Growth in aerobic bottle: Gram Variable Rods    Growth in anaerobic bottle: Gram Negative Rods  Final Report (09 Sep 2021 16:27):    Growth in aerobic and anaerobic bottles: Escherichia coli    ***Blood Panel PCR results on this specimen are available    approximately 3 hours after the Gram stain result.***    Gram stain, PCR, and/or culture results may not always    correspond due to difference in methodologies.    ************************************************************    This PCR assay was performed by multiplex PCR. This    Assay tests for 66 bacterial and resistance gene targets.    Please refer to the [facility] test directory    at [email or web address] for details.  Organism: Blood Culture PCR  Escherichia coli (09 Sep 2021 16:27)  Organism: Escherichia coli (09 Sep 2021 16:27)      -  Amikacin: S <=16      -  Ampicillin: S <=8 These ampicillin results predict results for amoxicillin      -  Ampicillin/Sulbactam: S <=4/2 Enterobacter, Citrobacter, and Serratia may develop resistance during prolonged therapy (3-4 days)      -  Aztreonam: S <=4      -  Cefazolin: S <=2 Enterobacter, Citrobacter, and Serratia may develop resistance during prolonged therapy (3-4 days)      -  Cefepime: S <=2      -  Cefoxitin: S <=8      -  Ceftriaxone: S <=1 Enterobacter, Citrobacter, and Serratia may develop resistance during prolonged therapy      -  Ciprofloxacin: R >2      -  Ertapenem: S <=0.5      -  Gentamicin: S <=2      -  Imipenem: S <=1      -  Levofloxacin: R >4      -  Meropenem: S <=1      -  Piperacillin/Tazobactam: S <=8      -  Tobramycin: S <=2      -  Trimethoprim/Sulfamethoxazole: S <=0.5/9.5      Method Type: ARELI  Organism: Blood Culture PCR (09 Sep 2021 16:27)      -  Escherichia coli: Detec      Method Type: PCR      RADIOLOGY & ADDITIONAL STUDIES:    EXAM:  CT ABDOMEN AND PELVIS                                  PROCEDURE DATE:  2021          INTERPRETATION:  CLINICAL INFORMATION: Sepsis.    COMPARISON: CT abdomen and pelvis 2019    CONTRAST/COMPLICATIONS:  IV Contrast: None  Oral Contrast: None  Complications: None    PROCEDURE:  CT of the Abdomen and Pelvis was performed.  Sagittal and coronal reformats were performed.    FINDINGS:  LOWER CHEST: Partially imaged, stable right breast skin thickening and retroareolar tissue. Subsegmental left lower lobe atelectasis and trace bilateral pleural effusions.    LIVER: Within normal limits.  BILE DUCTS: Normal caliber.  GALLBLADDER: Within normal limits.  SPLEEN: Within normal limits.  PANCREAS: Within normal limits.  ADRENALS: 3.2 x 2.6 cm left adrenal adenoma is stable. Right adrenal gland is within normal limits.  KIDNEYS/URETERS: Stable bilateral cysts. No renal stones or hydronephrosis.    BLADDER: Within normal limits.  REPRODUCTIVE ORGANS: Enlarged multi fibroid uterus.    BOWEL: No bowel obstruction. Appendix is normal.  PERITONEUM: No ascites.  VESSELS: Atherosclerotic changes.  RETROPERITONEUM/LYMPH NODES: No lymphadenopathy.  ABDOMINAL WALL: Small fat-containing umbilical hernia. Nonspecific bilateral lateral abdominal wall edema and skin thickening.  BONES: Degenerative changes.    IMPRESSION:  No acute intra-abdominal pathology.    Nonspecific bilateral lateral abdominal wall edema and skin thickening. Please correlate for signs of cellulitis.      EXAM:  CT CHEST                          PROCEDURE DATE:  2021          INTERPRETATION:  CLINICAL INFORMATION: Short of breath    COMPARISON: 2019.    CONTRAST/COMPLICATIONS:  IV Contrast: NONE  0 cc administered   0 cc discarded  Oral Contrast: NONE  Complications: None reported at time of study completion    PROCEDURE:  CT of the Chest was performed.  Sagittal and coronal reformats were performed.    FINDINGS:    LUNGS AND AIRWAYS: Patent central airways. Very low lung volumes. Patchy consolidation in the lingula , and left lower lobe likely representing a combination of pneumonia and atelectasis. Correlate clinically for active infection. Fibrotic atelectatic changes in the right upper middle and lower lobes.  PLEURA: Trace basilar pleural effusions  MEDIASTINUM AND FRANCISCA: No lymphadenopathy.  VESSELS: Nonaneurysmal thoracic aorta.  HEART: Cardiomegaly with cardiac vascular calcific. Trace pericardial effusion.  CHEST WALL AND LOWER NECK: Asymmetric soft tissue density right breast may reflect scarring/postoperative change. Cannot exclude neoplasm. Recommend clinical correlation correlation with dedicated breast.  VISUALIZED UPPER ABDOMEN: Cholelithiasis. Indeterminate left adrenal nodule increased in size, now measures 3.5 cm previously measured 2.6 cm. Correlate with MR  BONES: Degenerative changes.    IMPRESSION:  Left basilar pneumonia and atelectasis.  Trace basilar pleural effusions. Trace pericardial effusion.  Asymmetric soft tissue density right breast. Correlate clinically and with dedicated breast imaging.  Indeterminate left adrenal nodule with interval increase in size compared to 2019. Correlate with MR    Assessment :   65F resident of [facility] PMH Type 2 DM, HTN, hypothyroid, cognitive impairment, recurrent falls, recurrent uti, right breast malignancy on chemo admitted with weakness fall, fever sec Ecoli sepsis with bacteremia likely sec UTI  Repeat blood cultures NGTD  ?concurrent Left lung pna.   Sp fever  Clinically better    Plan :   Cont Rocephin (antibiotic day 6)  Change to po Ceftin x 4 days in am  Trend temps and cbc  Asp precautions  Dc planning    D/w Dr Garcia    Continue with present regiment.  Appropriate use of antibiotics and adverse effects reviewed.      > 35 minutes were spent in direct patient care reviewing notes, medications ,labs data/ imaging , discussion with multidisciplinary team.    Thank you for allowing me to participate in care of your patient .    Sunni Espinoza MD  Infectious Disease  [phone]
     YOSI CORRAL is a 65yFemale , patient examined and chart reviewed.    INTERVAL HPI/ OVERNIGHT EVENTS:   NAD. Afebrile. Feeling better.  No events. Doing well.    PAST MEDICAL & SURGICAL HISTORY:  HTN (hypertension)  Anxiety  HLD (hyperlipidemia)  Hypothyroid  Breast CA  right  Poor historian  Cancer of thyroid  unsure of dates but prior to   Coronary artery disease  Cognitive impairment  Osteoarthritis  Type 2 diabetes mellitus  Urinary frequency  History of adrenal adenoma  Diabetes  H/O total thyroidectomy  H/O bilateral breast biopsy  2018 and 2018  S/P angioplasty with stent  drug eluting stent in first diagonal on 19  S/P dilation and curettage  TOP x2      For details regarding the patient's social history, family history, and other miscellaneous elements, please refer the initial infectious diseases consultation and/or the admitting history and physical examination for this admission.    ROS:  CONSTITUTIONAL:  Negative fever or chills  EYES:  Negative  blurry vision or double vision  CARDIOVASCULAR:  Negative for chest pain or palpitations  RESPIRATORY:  Negative for cough, wheezing, or SOB   GASTROINTESTINAL:  Negative for nausea, vomiting, diarrhea, constipation, or abdominal pain  GENITOURINARY:  Negative frequency, urgency or dysuria  NEUROLOGIC:  No headache, confusion, dizziness, lightheadedness  All other systems were reviewed and are negative         Current inpatient medications :    ANTIBIOTICS/RELEVANT:  cefuroxime   Tablet 500 milliGRAM(s) Oral every 12 hours    MEDICATIONS  (STANDING):  anastrozole 1 milliGRAM(s) Oral daily  aspirin  chewable 81 milliGRAM(s) Oral daily  atorvastatin 20 milliGRAM(s) Oral at bedtime  carvedilol 25 milliGRAM(s) Oral every 12 hours  cloNIDine 0.2 milliGRAM(s) Oral three times a day  dextrose 40% Gel 15 Gram(s) Oral once  dextrose 5%. 1000 milliLiter(s) (50 mL/Hr) IV Continuous <Continuous>  dextrose 5%. 1000 milliLiter(s) (100 mL/Hr) IV Continuous <Continuous>  dextrose 50% Injectable 12.5 Gram(s) IV Push once  dextrose 50% Injectable 25 Gram(s) IV Push once  dextrose 50% Injectable 25 Gram(s) IV Push once  enoxaparin Injectable 40 milliGRAM(s) SubCutaneous daily  furosemide    Tablet 40 milliGRAM(s) Oral daily  glucagon  Injectable 1 milliGRAM(s) IntraMuscular once  hydrALAZINE 100 milliGRAM(s) Oral three times a day  insulin lispro (ADMELOG) corrective regimen sliding scale   SubCutaneous three times a day before meals  insulin lispro (ADMELOG) corrective regimen sliding scale   SubCutaneous at bedtime  lactobacillus acidophilus 1 Tablet(s) Oral three times a day  levothyroxine 150 MICROGram(s) Oral daily  losartan 100 milliGRAM(s) Oral daily  metFORMIN 500 milliGRAM(s) Oral two times a day  senna 2 Tablet(s) Oral at bedtime    MEDICATIONS  (PRN):  acetaminophen   Tablet .. 650 milliGRAM(s) Oral every 6 hours PRN Temp greater or equal to 38C (100.4F), Moderate Pain (4 - 6)  ALBUTerol    90 MICROgram(s) HFA Inhaler 2 Puff(s) Inhalation every 6 hours PRN Shortness of Breath and/or Wheezing      Objective:  Vital Signs Last 24 Hrs  T(C): 36.5 (14 Sep 2021 10:17), Max: 37 (14 Sep 2021 01:25)  T(F): 97.7 (14 Sep 2021 10:17), Max: 98.6 (14 Sep 2021 01:25)  HR: 74 (14 Sep 2021 10:17) (71 - 87)  BP: 175/90 (14 Sep 2021 10:17) (143/81 - 205/113)  RR: 18 (14 Sep 2021 10:17) (16 - 18)  SpO2: 95% (14 Sep 2021 10:17) (87% - 96%)    Physical Exam:  General: no acute distress  Neck: supple, trachea midline  Lungs: clear, no wheeze/rhonchi  Cardiovascular: regular rate and rhythm, S1 S2  Abdomen: soft, nontender,  bowel sounds normal  Neurological: alert and oriented x3  Skin: no rash  Extremities: + Edema      LABS:                        11.1   9.33  )-----------( 213      ( 14 Sep 2021 07:17 )             36.0   09-14    143  |  104  |  19  ----------------------------<  130<H>  3.2<L>   |  33<H>  |  0.88    Ca    8.5      14 Sep 2021 07:17    TPro  5.8<L>  /  Alb  2.9<L>  /  TBili  0.4  /  DBili  x   /  AST  25  /  ALT  46  /  AlkPhos  73  -    Urinalysis Basic - ( 07 Sep 2021 00:31 )    Color: Yellow / Appearance: Clear / S.010 / pH: x  Gluc: x / Ketone: Negative  / Bili: Negative / Urobili: Negative mg/dL   Blood: x / Protein: 30 mg/dL / Nitrite: Negative   Leuk Esterase: Negative / RBC: 0-2 /HPF / WBC 0-2   Sq Epi: x / Non Sq Epi: Occasional / Bacteria: Few      MICROBIOLOGY:    Culture - Blood (collected 08 Sep 2021 13:16)  Source: .Blood Blood-Peripheral  Preliminary Report (09 Sep 2021 14:02):    No growth to date.    Culture - Urine (collected 07 Sep 2021 13:17)  Source: Clean Catch Clean Catch (Midstream)  Final Report (09 Sep 2021 06:07):    >100,000 CFU/ml Escherichia coli  Organism: Escherichia coli (09 Sep 2021 06:07)  Organism: Escherichia coli (09 Sep 2021 06:07)      -  Amikacin: S <=16      -  Amoxicillin/Clavulanic Acid: S <=8/4      -  Ampicillin: S <=8 These ampicillin results predict results for amoxicillin      -  Ampicillin/Sulbactam: S <=4/2 Enterobacter, Citrobacter, and Serratia may develop resistance during prolonged therapy (3-4 days)      -  Aztreonam: S <=4      -  Cefazolin: S <=2 (MIC_CL_COM_ENTERIC_CEFAZU) For uncomplicated UTI with K. pneumoniae, E. coli, or P. mirablis: ARELI <=16 is sensitive and ARELI >=32 is resistant. This also predicts results for oral agents cefaclor, cefdinir, cefpodoxime, cefprozil, cefuroxime axetil, cephalexin and locarbef for uncomplicated UTI. Note that some isolates may be susceptible to these agents while testing resistant to cefazolin.      -  Cefepime: S <=2      -  Cefoxitin: S <=8      -  Ceftriaxone: S <=1 Enterobacter, Citrobacter, and Serratia may develop resistance during prolonged therapy      -  Ciprofloxacin: R >2      -  Ertapenem: S <=0.5      -  Gentamicin: S <=2      -  Imipenem: S <=1      -  Levofloxacin: R >4      -  Meropenem: S <=1      -  Nitrofurantoin: S <=32 Should not be used to treat pyelonephritis      -  Piperacillin/Tazobactam: S <=8      -  Tigecycline: S <=2      -  Tobramycin: S <=2      -  Trimethoprim/Sulfamethoxazole: S <=0.5/9.5      Method Type: ARELI    Culture - Blood (collected 07 Sep 2021 13:17)  Source: .Blood Blood  Gram Stain (08 Sep 2021 01:22):    Growth in aerobic and anaerobic bottles: Gram Negative Rods  Final Report (09 Sep 2021 17:28):    Growth in aerobic and anaerobic bottles: Escherichia coli    See previous culture [id number]    Culture - Blood (collected 07 Sep 2021 13:17)  Source: .Blood Blood  Gram Stain (08 Sep 2021 01:21):    Growth in aerobic bottle: Gram Variable Rods    Growth in anaerobic bottle: Gram Negative Rods  Final Report (09 Sep 2021 16:27):    Growth in aerobic and anaerobic bottles: Escherichia coli    ***Blood Panel PCR results on this specimen are available    approximately 3 hours after the Gram stain result.***    Gram stain, PCR, and/or culture results may not always    correspond due to difference in methodologies.    ************************************************************    This PCR assay was performed by multiplex PCR. This    Assay tests for 66 bacterial and resistance gene targets.    Please refer to the [facility] test directory    at [email or web address] for details.  Organism: Blood Culture PCR  Escherichia coli (09 Sep 2021 16:27)  Organism: Escherichia coli (09 Sep 2021 16:27)      -  Amikacin: S <=16      -  Ampicillin: S <=8 These ampicillin results predict results for amoxicillin      -  Ampicillin/Sulbactam: S <=4/2 Enterobacter, Citrobacter, and Serratia may develop resistance during prolonged therapy (3-4 days)      -  Aztreonam: S <=4      -  Cefazolin: S <=2 Enterobacter, Citrobacter, and Serratia may develop resistance during prolonged therapy (3-4 days)      -  Cefepime: S <=2      -  Cefoxitin: S <=8      -  Ceftriaxone: S <=1 Enterobacter, Citrobacter, and Serratia may develop resistance during prolonged therapy      -  Ciprofloxacin: R >2      -  Ertapenem: S <=0.5      -  Gentamicin: S <=2      -  Imipenem: S <=1      -  Levofloxacin: R >4      -  Meropenem: S <=1      -  Piperacillin/Tazobactam: S <=8      -  Tobramycin: S <=2      -  Trimethoprim/Sulfamethoxazole: S <=0.5/9.5      Method Type: ARELI  Organism: Blood Culture PCR (09 Sep 2021 16:27)      -  Escherichia coli: Detec      Method Type: PCR      RADIOLOGY & ADDITIONAL STUDIES:    EXAM:  CT ABDOMEN AND PELVIS                                  PROCEDURE DATE:  2021          INTERPRETATION:  CLINICAL INFORMATION: Sepsis.    COMPARISON: CT abdomen and pelvis 2019    CONTRAST/COMPLICATIONS:  IV Contrast: None  Oral Contrast: None  Complications: None    PROCEDURE:  CT of the Abdomen and Pelvis was performed.  Sagittal and coronal reformats were performed.    FINDINGS:  LOWER CHEST: Partially imaged, stable right breast skin thickening and retroareolar tissue. Subsegmental left lower lobe atelectasis and trace bilateral pleural effusions.    LIVER: Within normal limits.  BILE DUCTS: Normal caliber.  GALLBLADDER: Within normal limits.  SPLEEN: Within normal limits.  PANCREAS: Within normal limits.  ADRENALS: 3.2 x 2.6 cm left adrenal adenoma is stable. Right adrenal gland is within normal limits.  KIDNEYS/URETERS: Stable bilateral cysts. No renal stones or hydronephrosis.    BLADDER: Within normal limits.  REPRODUCTIVE ORGANS: Enlarged multi fibroid uterus.    BOWEL: No bowel obstruction. Appendix is normal.  PERITONEUM: No ascites.  VESSELS: Atherosclerotic changes.  RETROPERITONEUM/LYMPH NODES: No lymphadenopathy.  ABDOMINAL WALL: Small fat-containing umbilical hernia. Nonspecific bilateral lateral abdominal wall edema and skin thickening.  BONES: Degenerative changes.    IMPRESSION:  No acute intra-abdominal pathology.    Nonspecific bilateral lateral abdominal wall edema and skin thickening. Please correlate for signs of cellulitis.      EXAM:  CT CHEST                          PROCEDURE DATE:  2021          INTERPRETATION:  CLINICAL INFORMATION: Short of breath    COMPARISON: 2019.    CONTRAST/COMPLICATIONS:  IV Contrast: NONE  0 cc administered   0 cc discarded  Oral Contrast: NONE  Complications: None reported at time of study completion    PROCEDURE:  CT of the Chest was performed.  Sagittal and coronal reformats were performed.    FINDINGS:    LUNGS AND AIRWAYS: Patent central airways. Very low lung volumes. Patchy consolidation in the lingula , and left lower lobe likely representing a combination of pneumonia and atelectasis. Correlate clinically for active infection. Fibrotic atelectatic changes in the right upper middle and lower lobes.  PLEURA: Trace basilar pleural effusions  MEDIASTINUM AND FRANCISCA: No lymphadenopathy.  VESSELS: Nonaneurysmal thoracic aorta.  HEART: Cardiomegaly with cardiac vascular calcific. Trace pericardial effusion.  CHEST WALL AND LOWER NECK: Asymmetric soft tissue density right breast may reflect scarring/postoperative change. Cannot exclude neoplasm. Recommend clinical correlation correlation with dedicated breast.  VISUALIZED UPPER ABDOMEN: Cholelithiasis. Indeterminate left adrenal nodule increased in size, now measures 3.5 cm previously measured 2.6 cm. Correlate with MR  BONES: Degenerative changes.    IMPRESSION:  Left basilar pneumonia and atelectasis.  Trace basilar pleural effusions. Trace pericardial effusion.  Asymmetric soft tissue density right breast. Correlate clinically and with dedicated breast imaging.  Indeterminate left adrenal nodule with interval increase in size compared to 2019. Correlate with MR    Assessment :   65F resident of [facility] PM Type 2 DM, HTN, hypothyroid, cognitive impairment, recurrent falls, recurrent uti, right breast malignancy on chemo admitted with weakness fall, fever sec Ecoli sepsis with bacteremia likely sec UTI  Repeat blood cultures NGTD  ?concurrent Left lung pna.   Sp fever  Clinically better    Plan :   Off Rocephin   Finish course of po Ceftin x 4 days   Trend temps and cbc  Asp precautions  Dc planning    D/w Dr Garcia    Continue with present regiment.  Appropriate use of antibiotics and adverse effects reviewed.      > 35 minutes were spent in direct patient care reviewing notes, medications ,labs data/ imaging , discussion with multidisciplinary team.    Thank you for allowing me to participate in care of your patient .    Sunni Espinoza MD  Infectious Disease  [phone]
     YOSI CORRAL is a 65yFemale , patient examined and chart reviewed.    INTERVAL HPI/ OVERNIGHT EVENTS:   NAD. Afebrile. No events.  Feeling better.      PAST MEDICAL & SURGICAL HISTORY:  HTN (hypertension)  Anxiety  HLD (hyperlipidemia)  Hypothyroid  Breast CA  right  Poor historian  Cancer of thyroid  unsure of dates but prior to   Coronary artery disease  Cognitive impairment  Osteoarthritis  Type 2 diabetes mellitus  Urinary frequency  History of adrenal adenoma  Diabetes  H/O total thyroidectomy  H/O bilateral breast biopsy  2018 and 2018  S/P angioplasty with stent  drug eluting stent in first diagonal on 19  S/P dilation and curettage  TOP x2      For details regarding the patient's social history, family history, and other miscellaneous elements, please refer the initial infectious diseases consultation and/or the admitting history and physical examination for this admission.    ROS:  CONSTITUTIONAL:  Negative fever or chills  EYES:  Negative  blurry vision or double vision  CARDIOVASCULAR:  Negative for chest pain or palpitations  RESPIRATORY:  Negative for cough, wheezing, or SOB   GASTROINTESTINAL:  Negative for nausea, vomiting, diarrhea, constipation, or abdominal pain  GENITOURINARY:  Negative frequency, urgency or dysuria  NEUROLOGIC:  No headache, confusion, dizziness, lightheadedness  All other systems were reviewed and are negative         Current inpatient medications :    ANTIBIOTICS/RELEVANT:  cefTRIAXone   IVPB 1000 milliGRAM(s) IV Intermittent every 24 hours    MEDICATIONS  (STANDING):  albuterol/ipratropium for Nebulization 3 milliLiter(s) Nebulizer every 6 hours  anastrozole 1 milliGRAM(s) Oral daily  aspirin  chewable 81 milliGRAM(s) Oral daily  atorvastatin 20 milliGRAM(s) Oral at bedtime  carvedilol 25 milliGRAM(s) Oral every 12 hours  cloNIDine 0.2 milliGRAM(s) Oral three times a day  dextrose 40% Gel 15 Gram(s) Oral once  dextrose 5%. 1000 milliLiter(s) (50 mL/Hr) IV Continuous <Continuous>  dextrose 5%. 1000 milliLiter(s) (100 mL/Hr) IV Continuous <Continuous>  dextrose 50% Injectable 25 Gram(s) IV Push once  dextrose 50% Injectable 12.5 Gram(s) IV Push once  dextrose 50% Injectable 25 Gram(s) IV Push once  enoxaparin Injectable 40 milliGRAM(s) SubCutaneous daily  furosemide    Tablet 40 milliGRAM(s) Oral daily  glucagon  Injectable 1 milliGRAM(s) IntraMuscular once  hydrALAZINE 100 milliGRAM(s) Oral three times a day  insulin lispro (ADMELOG) corrective regimen sliding scale   SubCutaneous three times a day before meals  insulin lispro (ADMELOG) corrective regimen sliding scale   SubCutaneous at bedtime  lactobacillus acidophilus 1 Tablet(s) Oral three times a day  levothyroxine 150 MICROGram(s) Oral daily  losartan 100 milliGRAM(s) Oral daily  metFORMIN 500 milliGRAM(s) Oral two times a day  senna 2 Tablet(s) Oral at bedtime    MEDICATIONS  (PRN):  acetaminophen   Tablet .. 650 milliGRAM(s) Oral every 6 hours PRN Temp greater or equal to 38C (100.4F), Moderate Pain (4 - 6)      Objective:  Vital Signs Last 24 Hrs  T(C): 36.6 (10 Sep 2021 09:48), Max: 37.1 (10 Sep 2021 05:58)  T(F): 97.8 (10 Sep 2021 09:48), Max: 98.8 (10 Sep 2021 05:58)  HR: 78 (10 Sep 2021 09:48) (72 - 101)  BP: 128/78 (10 Sep 2021 09:48) (128/78 - 189/102)  RR: 18 (10 Sep 2021 09:48) (18 - 20)  SpO2: 95% (10 Sep 2021 09:48) (95% - 98%)    Physical Exam:  General: no acute distress  Neck: supple, trachea midline  Lungs: clear, no wheeze/rhonchi  Cardiovascular: regular rate and rhythm, S1 S2  Abdomen: soft, nontender,  bowel sounds normal  Neurological: alert and oriented x3  Skin: no rash  Extremities: + Edema      LABS:                        10.3   7.17  )-----------( 191      ( 10 Sep 2021 07:47 )             33.1   09-10    143  |  105  |  19  ----------------------------<  123<H>  3.3<L>   |  33<H>  |  0.88    Ca    8.3<L>      10 Sep 2021 07:47    TPro  6.4  /  Alb  2.8<L>  /  TBili  0.5  /  DBili  x   /  AST  15  /  ALT  22  /  AlkPhos  68  09-10    Urinalysis Basic - ( 07 Sep 2021 00:31 )    Color: Yellow / Appearance: Clear / S.010 / pH: x  Gluc: x / Ketone: Negative  / Bili: Negative / Urobili: Negative mg/dL   Blood: x / Protein: 30 mg/dL / Nitrite: Negative   Leuk Esterase: Negative / RBC: 0-2 /HPF / WBC 0-2   Sq Epi: x / Non Sq Epi: Occasional / Bacteria: Few      MICROBIOLOGY:    Culture - Blood (collected 08 Sep 2021 13:16)  Source: .Blood Blood-Peripheral  Preliminary Report (09 Sep 2021 14:02):    No growth to date.    Culture - Urine (collected 07 Sep 2021 13:17)  Source: Clean Catch Clean Catch (Midstream)  Final Report (09 Sep 2021 06:07):    >100,000 CFU/ml Escherichia coli  Organism: Escherichia coli (09 Sep 2021 06:07)  Organism: Escherichia coli (09 Sep 2021 06:07)      -  Amikacin: S <=16      -  Amoxicillin/Clavulanic Acid: S <=8/4      -  Ampicillin: S <=8 These ampicillin results predict results for amoxicillin      -  Ampicillin/Sulbactam: S <=4/2 Enterobacter, Citrobacter, and Serratia may develop resistance during prolonged therapy (3-4 days)      -  Aztreonam: S <=4      -  Cefazolin: S <=2 (MIC_CL_COM_ENTERIC_CEFAZU) For uncomplicated UTI with K. pneumoniae, E. coli, or P. mirablis: ARELI <=16 is sensitive and ARELI >=32 is resistant. This also predicts results for oral agents cefaclor, cefdinir, cefpodoxime, cefprozil, cefuroxime axetil, cephalexin and locarbef for uncomplicated UTI. Note that some isolates may be susceptible to these agents while testing resistant to cefazolin.      -  Cefepime: S <=2      -  Cefoxitin: S <=8      -  Ceftriaxone: S <=1 Enterobacter, Citrobacter, and Serratia may develop resistance during prolonged therapy      -  Ciprofloxacin: R >2      -  Ertapenem: S <=0.5      -  Gentamicin: S <=2      -  Imipenem: S <=1      -  Levofloxacin: R >4      -  Meropenem: S <=1      -  Nitrofurantoin: S <=32 Should not be used to treat pyelonephritis      -  Piperacillin/Tazobactam: S <=8      -  Tigecycline: S <=2      -  Tobramycin: S <=2      -  Trimethoprim/Sulfamethoxazole: S <=0.5/9.5      Method Type: ARELI    Culture - Blood (collected 07 Sep 2021 13:17)  Source: .Blood Blood  Gram Stain (08 Sep 2021 01:22):    Growth in aerobic and anaerobic bottles: Gram Negative Rods  Final Report (09 Sep 2021 17:28):    Growth in aerobic and anaerobic bottles: Escherichia coli    See previous culture [id number]    Culture - Blood (collected 07 Sep 2021 13:17)  Source: .Blood Blood  Gram Stain (08 Sep 2021 01:21):    Growth in aerobic bottle: Gram Variable Rods    Growth in anaerobic bottle: Gram Negative Rods  Final Report (09 Sep 2021 16:27):    Growth in aerobic and anaerobic bottles: Escherichia coli    ***Blood Panel PCR results on this specimen are available    approximately 3 hours after the Gram stain result.***    Gram stain, PCR, and/or culture results may not always    correspond due to difference in methodologies.    ************************************************************    This PCR assay was performed by multiplex PCR. This    Assay tests for 66 bacterial and resistance gene targets.    Please refer to the [facility] test directory    at [email or web address] for details.  Organism: Blood Culture PCR  Escherichia coli (09 Sep 2021 16:27)  Organism: Escherichia coli (09 Sep 2021 16:27)      -  Amikacin: S <=16      -  Ampicillin: S <=8 These ampicillin results predict results for amoxicillin      -  Ampicillin/Sulbactam: S <=4/2 Enterobacter, Citrobacter, and Serratia may develop resistance during prolonged therapy (3-4 days)      -  Aztreonam: S <=4      -  Cefazolin: S <=2 Enterobacter, Citrobacter, and Serratia may develop resistance during prolonged therapy (3-4 days)      -  Cefepime: S <=2      -  Cefoxitin: S <=8      -  Ceftriaxone: S <=1 Enterobacter, Citrobacter, and Serratia may develop resistance during prolonged therapy      -  Ciprofloxacin: R >2      -  Ertapenem: S <=0.5      -  Gentamicin: S <=2      -  Imipenem: S <=1      -  Levofloxacin: R >4      -  Meropenem: S <=1      -  Piperacillin/Tazobactam: S <=8      -  Tobramycin: S <=2      -  Trimethoprim/Sulfamethoxazole: S <=0.5/9.5      Method Type: ARELI  Organism: Blood Culture PCR (09 Sep 2021 16:27)      -  Escherichia coli: Detec      Method Type: PCR      RADIOLOGY & ADDITIONAL STUDIES:    EXAM:  CT ABDOMEN AND PELVIS                                  PROCEDURE DATE:  2021          INTERPRETATION:  CLINICAL INFORMATION: Sepsis.    COMPARISON: CT abdomen and pelvis 2019    CONTRAST/COMPLICATIONS:  IV Contrast: None  Oral Contrast: None  Complications: None    PROCEDURE:  CT of the Abdomen and Pelvis was performed.  Sagittal and coronal reformats were performed.    FINDINGS:  LOWER CHEST: Partially imaged, stable right breast skin thickening and retroareolar tissue. Subsegmental left lower lobe atelectasis and trace bilateral pleural effusions.    LIVER: Within normal limits.  BILE DUCTS: Normal caliber.  GALLBLADDER: Within normal limits.  SPLEEN: Within normal limits.  PANCREAS: Within normal limits.  ADRENALS: 3.2 x 2.6 cm left adrenal adenoma is stable. Right adrenal gland is within normal limits.  KIDNEYS/URETERS: Stable bilateral cysts. No renal stones or hydronephrosis.    BLADDER: Within normal limits.  REPRODUCTIVE ORGANS: Enlarged multi fibroid uterus.    BOWEL: No bowel obstruction. Appendix is normal.  PERITONEUM: No ascites.  VESSELS: Atherosclerotic changes.  RETROPERITONEUM/LYMPH NODES: No lymphadenopathy.  ABDOMINAL WALL: Small fat-containing umbilical hernia. Nonspecific bilateral lateral abdominal wall edema and skin thickening.  BONES: Degenerative changes.    IMPRESSION:  No acute intra-abdominal pathology.    Nonspecific bilateral lateral abdominal wall edema and skin thickening. Please correlate for signs of cellulitis.      EXAM:  CT CHEST                          PROCEDURE DATE:  2021          INTERPRETATION:  CLINICAL INFORMATION: Short of breath    COMPARISON: 2019.    CONTRAST/COMPLICATIONS:  IV Contrast: NONE  0 cc administered   0 cc discarded  Oral Contrast: NONE  Complications: None reported at time of study completion    PROCEDURE:  CT of the Chest was performed.  Sagittal and coronal reformats were performed.    FINDINGS:    LUNGS AND AIRWAYS: Patent central airways. Very low lung volumes. Patchy consolidation in the lingula , and left lower lobe likely representing a combination of pneumonia and atelectasis. Correlate clinically for active infection. Fibrotic atelectatic changes in the right upper middle and lower lobes.  PLEURA: Trace basilar pleural effusions  MEDIASTINUM AND FRANCISCA: No lymphadenopathy.  VESSELS: Nonaneurysmal thoracic aorta.  HEART: Cardiomegaly with cardiac vascular calcific. Trace pericardial effusion.  CHEST WALL AND LOWER NECK: Asymmetric soft tissue density right breast may reflect scarring/postoperative change. Cannot exclude neoplasm. Recommend clinical correlation correlation with dedicated breast.  VISUALIZED UPPER ABDOMEN: Cholelithiasis. Indeterminate left adrenal nodule increased in size, now measures 3.5 cm previously measured 2.6 cm. Correlate with MR  BONES: Degenerative changes.    IMPRESSION:  Left basilar pneumonia and atelectasis.  Trace basilar pleural effusions. Trace pericardial effusion.  Asymmetric soft tissue density right breast. Correlate clinically and with dedicated breast imaging.  Indeterminate left adrenal nodule with interval increase in size compared to 2019. Correlate with MR    Assessment :   65F resident of [facility] PM Type 2 DM, HTN, hypothyroid, cognitive impairment, recurrent falls, recurrent uti, right breast malignancy on chemo admitted with weakness fall, fever sec Ecoli sepsis with bacteremia likely sec UTI  ?concurrent Left lung pna.   Sp fever  Clinically better    Plan :   Cont Rocephin (antibiotic day 3)  Trend temps and cbc  Fu repeat blood cultures  Asp precautions    D/w Dr Garcia    Continue with present regiment.  Appropriate use of antibiotics and adverse effects reviewed.      > 35 minutes were spent in direct patient care reviewing notes, medications ,labs data/ imaging , discussion with multidisciplinary team.    Thank you for allowing me to participate in care of your patient .    Sunni Espinoza MD  Infectious Disease  [phone]
Chief Complaint: Weakness, fall, shortness of breath    Interval Events: No events overnight.    Review of Systems:  General: No fevers, chills, weight loss or gain  Skin: No rashes, color changes  Cardiovascular: No chest pain, orthopnea  Respiratory: No shortness of breath, cough  Gastrointestinal: No nausea, abdominal pain  Genitourinary: No incontinence, pain with urination  Musculoskeletal: No pain, swelling, decreased range of motion  Neurological: No headache, weakness  Psychiatric: No depression, anxiety  Endocrine: No weight loss or gain, increased thirst  All other systems are comprehensively negative.    Physical Exam:  Vital Signs Last 24 Hrs  T(C): 36.5 (14 Sep 2021 10:17), Max: 37 (14 Sep 2021 01:25)  T(F): 97.7 (14 Sep 2021 10:17), Max: 98.6 (14 Sep 2021 01:25)  HR: 74 (14 Sep 2021 10:17) (71 - 87)  BP: 175/90 (14 Sep 2021 10:17) (143/81 - 205/113)  BP(mean): --  RR: 18 (14 Sep 2021 10:17) (16 - 18)  SpO2: 90% (14 Sep 2021 12:30) (87% - 96%)  General: NAD  HEENT: MMM  Neck: No JVD, no carotid bruit  Lungs: CTAB  CV: RRR, nl S1/S2, 2/6 systolic murmur  Abdomen: S/NT/ND, +BS  Extremities: 1+ LE edema, no cyanosis  Neuro: AAOx3, non-focal  Skin: No rash    Labs:    09-14    143  |  104  |  19  ----------------------------<  130<H>  3.2<L>   |  33<H>  |  0.88    Ca    8.5      14 Sep 2021 07:17    TPro  5.8<L>  /  Alb  2.9<L>  /  TBili  0.4  /  DBili  x   /  AST  25  /  ALT  46  /  AlkPhos  73  09-14                        11.1   9.33  )-----------( 213      ( 14 Sep 2021 07:17 )             36.0       Telemetry: Sinus rhythm
Chief Complaint: Weakness, fall, shortness of breath    Interval Events: No events overnight.    Review of Systems:  General: No fevers, chills, weight loss or gain  Skin: No rashes, color changes  Cardiovascular: No chest pain, orthopnea  Respiratory: No shortness of breath, cough  Gastrointestinal: No nausea, abdominal pain  Genitourinary: No incontinence, pain with urination  Musculoskeletal: No pain, swelling, decreased range of motion  Neurological: No headache, weakness  Psychiatric: No depression, anxiety  Endocrine: No weight loss or gain, increased thirst  All other systems are comprehensively negative.    Physical Exam:  Vital Signs Last 24 Hrs  T(C): 36.8 (13 Sep 2021 04:32), Max: 36.8 (12 Sep 2021 14:16)  T(F): 98.2 (13 Sep 2021 04:32), Max: 98.3 (12 Sep 2021 21:32)  HR: 72 (13 Sep 2021 07:01) (62 - 87)  BP: 175/83 (13 Sep 2021 04:32) (129/79 - 175/83)  BP(mean): --  RR: 17 (13 Sep 2021 04:32) (17 - 18)  SpO2: 98% (13 Sep 2021 07:01) (95% - 99%)  General: NAD  HEENT: MMM  Neck: No JVD, no carotid bruit  Lungs: CTAB  CV: RRR, nl S1/S2, 2/6 systolic murmur  Abdomen: S/NT/ND, +BS  Extremities: 1+ LE edema, no cyanosis  Neuro: AAOx3, non-focal  Skin: No rash    Labs:    09-12    144  |  104  |  19  ----------------------------<  123<H>  3.7   |  29  |  0.90    Ca    8.3<L>      12 Sep 2021 07:31    TPro  6.2  /  Alb  2.8<L>  /  TBili  0.6  /  DBili  x   /  AST  25  /  ALT  34  /  AlkPhos  60  09-12                        11.1   9.14  )-----------( 202      ( 12 Sep 2021 07:31 )             35.7     Telemetry: Sinus rhythm
Chief Complaint: Weakness, fall, shortness of breath    Interval Events: No events overnight.    Review of Systems:  General: No fevers, chills, weight loss or gain  Skin: No rashes, color changes  Cardiovascular: No chest pain, orthopnea  Respiratory: No shortness of breath, cough  Gastrointestinal: No nausea, abdominal pain  Genitourinary: No incontinence, pain with urination  Musculoskeletal: No pain, swelling, decreased range of motion  Neurological: No headache, weakness  Psychiatric: No depression, anxiety  Endocrine: No weight loss or gain, increased thirst  All other systems are comprehensively negative.    Physical Exam:  Vital Signs Last 24 Hrs  T(C): 37.1 (15 Sep 2021 09:19), Max: 37.1 (14 Sep 2021 21:11)  T(F): 98.7 (15 Sep 2021 09:19), Max: 98.7 (14 Sep 2021 21:11)  HR: 77 (15 Sep 2021 09:19) (64 - 90)  BP: 101/67 (15 Sep 2021 09:19) (101/67 - 181/105)  BP(mean): --  RR: 17 (15 Sep 2021 09:19) (15 - 18)  SpO2: 94% (15 Sep 2021 09:19) (90% - 97%)  General: NAD  HEENT: MMM  Neck: No JVD, no carotid bruit  Lungs: CTAB  CV: RRR, nl S1/S2, 2/6 systolic murmur  Abdomen: S/NT/ND, +BS  Extremities: 1+ LE edema, no cyanosis  Neuro: AAOx3, non-focal  Skin: No rash    Labs:    09-15    144  |  104  |  23  ----------------------------<  131<H>  3.4<L>   |  32<H>  |  1.00    Ca    8.6      15 Sep 2021 07:13    TPro  6.5  /  Alb  3.0<L>  /  TBili  1.6<H>  /  DBili  x   /  AST  25  /  ALT  45  /  AlkPhos  72  09-15                        11.1   8.67  )-----------( 228      ( 15 Sep 2021 07:13 )             36.1       Telemetry: Sinus rhythm
Chief Complaint: Weakness, fall, shortness of breath    Interval Events: No events overnight. Sleeping.    Review of Systems:  General: No fevers, chills, weight loss or gain  Skin: No rashes, color changes  Cardiovascular: No chest pain, orthopnea  Respiratory: No shortness of breath, cough  Gastrointestinal: No nausea, abdominal pain  Genitourinary: No incontinence, pain with urination  Musculoskeletal: No pain, swelling, decreased range of motion  Neurological: No headache, weakness  Psychiatric: No depression, anxiety  Endocrine: No weight loss or gain, increased thirst  All other systems are comprehensively negative.    Physical Exam:  Vital Signs Last 24 Hrs  T(C): 36.6 (10 Sep 2021 09:48), Max: 37.1 (10 Sep 2021 05:58)  T(F): 97.8 (10 Sep 2021 09:48), Max: 98.8 (10 Sep 2021 05:58)  HR: 78 (10 Sep 2021 09:48) (72 - 101)  BP: 128/78 (10 Sep 2021 09:48) (128/78 - 189/102)  BP(mean): --  RR: 18 (10 Sep 2021 09:48) (18 - 20)  SpO2: 95% (10 Sep 2021 09:48) (95% - 98%)  General: NAD  HEENT: MMM  Neck: No JVD, no carotid bruit  Lungs: CTAB  CV: RRR, nl S1/S2, 2/6 systolic murmur  Abdomen: S/NT/ND, +BS  Extremities: 1+ LE edema, no cyanosis  Neuro: AAOx3, non-focal  Skin: No rash    Labs:  09-10    143  |  105  |  19  ----------------------------<  123<H>  3.3<L>   |  33<H>  |  0.88    Ca    8.3<L>      10 Sep 2021 07:47    TPro  6.4  /  Alb  2.8<L>  /  TBili  0.5  /  DBili  x   /  AST  15  /  ALT  22  /  AlkPhos  68  09-10                        10.3   7.17  )-----------( 191      ( 10 Sep 2021 07:47 )             33.1   
Date/Time Patient Seen:  		  Referring MD:   Data Reviewed	       Patient is a 65y old  Female who presents with a chief complaint of sob (11 Sep 2021 12:40)      Subjective/HPI     PAST MEDICAL & SURGICAL HISTORY:  HTN (hypertension)    HLD (hyperlipidemia)    Anxiety    HLD (hyperlipidemia)    Hypothyroid    Adrenal nodule    Breast CA  right    Poor historian    Cancer of thyroid  unsure of dates but prior to 2005    Coronary artery disease    Cognitive impairment    Osteoarthritis    Type 2 diabetes mellitus    Urinary frequency    History of adrenal adenoma    Diabetes    H/O total thyroidectomy    H/O bilateral breast biopsy  November 2018 and December 2018    S/P angioplasty with stent  drug eluting stent in first diagonal on 2/21/19    S/P dilation and curettage  TOP x2          Medication list         MEDICATIONS  (STANDING):  albuterol/ipratropium for Nebulization 3 milliLiter(s) Nebulizer every 6 hours  anastrozole 1 milliGRAM(s) Oral daily  aspirin  chewable 81 milliGRAM(s) Oral daily  atorvastatin 20 milliGRAM(s) Oral at bedtime  carvedilol 25 milliGRAM(s) Oral every 12 hours  cefTRIAXone   IVPB 1000 milliGRAM(s) IV Intermittent every 24 hours  cloNIDine 0.2 milliGRAM(s) Oral three times a day  dextrose 40% Gel 15 Gram(s) Oral once  dextrose 5%. 1000 milliLiter(s) (50 mL/Hr) IV Continuous <Continuous>  dextrose 5%. 1000 milliLiter(s) (100 mL/Hr) IV Continuous <Continuous>  dextrose 50% Injectable 25 Gram(s) IV Push once  dextrose 50% Injectable 12.5 Gram(s) IV Push once  dextrose 50% Injectable 25 Gram(s) IV Push once  enoxaparin Injectable 40 milliGRAM(s) SubCutaneous daily  furosemide    Tablet 40 milliGRAM(s) Oral daily  glucagon  Injectable 1 milliGRAM(s) IntraMuscular once  hydrALAZINE 100 milliGRAM(s) Oral three times a day  insulin lispro (ADMELOG) corrective regimen sliding scale   SubCutaneous three times a day before meals  insulin lispro (ADMELOG) corrective regimen sliding scale   SubCutaneous at bedtime  lactobacillus acidophilus 1 Tablet(s) Oral three times a day  levothyroxine 150 MICROGram(s) Oral daily  losartan 100 milliGRAM(s) Oral daily  metFORMIN 500 milliGRAM(s) Oral two times a day  senna 2 Tablet(s) Oral at bedtime    MEDICATIONS  (PRN):  acetaminophen   Tablet .. 650 milliGRAM(s) Oral every 6 hours PRN Temp greater or equal to 38C (100.4F), Moderate Pain (4 - 6)         Vitals log        ICU Vital Signs Last 24 Hrs  T(C): 36.3 (12 Sep 2021 01:10), Max: 37.1 (11 Sep 2021 09:35)  T(F): 97.4 (12 Sep 2021 01:10), Max: 98.8 (11 Sep 2021 09:35)  HR: 86 (12 Sep 2021 01:10) (70 - 86)  BP: 135/78 (12 Sep 2021 01:10) (135/78 - 193/113)  BP(mean): --  ABP: --  ABP(mean): --  RR: 18 (12 Sep 2021 01:10) (16 - 18)  SpO2: 95% (12 Sep 2021 01:10) (95% - 98%)           Input and Output:  I&O's Detail    10 Sep 2021 07:01  -  11 Sep 2021 07:00  --------------------------------------------------------  IN:    Oral Fluid: 360 mL  Total IN: 360 mL    OUT:    Voided (mL): 1000 mL  Total OUT: 1000 mL    Total NET: -640 mL          Lab Data                        10.8   8.21  )-----------( 205      ( 11 Sep 2021 07:36 )             34.7     09-11    143  |  105  |  18  ----------------------------<  133<H>  3.4<L>   |  32<H>  |  0.94    Ca    8.6      11 Sep 2021 07:36    TPro  6.0  /  Alb  2.7<L>  /  TBili  0.5  /  DBili  x   /  AST  16  /  ALT  22  /  AlkPhos  67  09-11            Review of Systems	      Objective     Physical Examination  heart s1s2  lung dec BS  abd soft        Pertinent Lab findings & Imaging      Miguel Angel:  NO   Adequate UO     I&O's Detail    10 Sep 2021 07:01  -  11 Sep 2021 07:00  --------------------------------------------------------  IN:    Oral Fluid: 360 mL  Total IN: 360 mL    OUT:    Voided (mL): 1000 mL  Total OUT: 1000 mL    Total NET: -640 mL               Discussed with:     Cultures:	        Radiology                            
Patient is a 65y Female with a known history of :  Pneumonia [J18.9]    Acute respiratory failure with hypoxia [J96.01]    Diabetes mellitus [E11.9]    Benign essential HTN [I10]    Hypothyroidism [E03.9]    Cognitive impairment [R41.89]    E-coli UTI [N39.0]    Sepsis due to Escherichia coli [A41.51]      HPI:  65F with PMH Type 2 DM, HTN,hypothyroid, cognitive impairment,recurrent falls,recurrent uti, right breast malignancy on chemo  sent from [facility] for weakness fall, fever in syo , hypoxic 89% on RA c/o shortness of breath.  patient is poor historian. fully vaccinated for covid, has leucocytosis, x ray chest suspicion of PNA, started on oxygen 3 litres for hypoxia, and started on vanc zosyn and admitted for further management  (07 Sep 2021 01:25)      REVIEW OF SYSTEMS:    CONSTITUTIONAL: No fever, weight loss, or fatigue  EYES: No eye pain, visual disturbances, or discharge  ENMT:  No difficulty hearing, tinnitus, vertigo; No sinus or throat pain  NECK: No pain or stiffness  BREASTS: No pain, masses, or nipple discharge  RESPIRATORY: No cough, wheezing, chills or hemoptysis; No shortness of breath  CARDIOVASCULAR: No chest pain, palpitations, dizziness, or leg swelling  GASTROINTESTINAL: No abdominal or epigastric pain. No nausea, vomiting, or hematemesis; No diarrhea or constipation. No melena or hematochezia.  GENITOURINARY: No dysuria, frequency, hematuria, or incontinence  NEUROLOGICAL: No headaches, memory loss, loss of strength, numbness, or tremors  SKIN: No itching, burning, rashes, or lesions   LYMPH NODES: No enlarged glands  ENDOCRINE: No heat or cold intolerance; No hair loss  MUSCULOSKELETAL: No joint pain or swelling; No muscle, back, or extremity pain  PSYCHIATRIC: No depression, anxiety, mood swings, or difficulty sleeping  HEME/LYMPH: No easy bruising, or bleeding gums  ALLERGY AND IMMUNOLOGIC: No hives or eczema    MEDICATIONS  (STANDING):  albuterol/ipratropium for Nebulization 3 milliLiter(s) Nebulizer every 6 hours  anastrozole 1 milliGRAM(s) Oral daily  aspirin  chewable 81 milliGRAM(s) Oral daily  atorvastatin 20 milliGRAM(s) Oral at bedtime  carvedilol 25 milliGRAM(s) Oral every 12 hours  cefTRIAXone   IVPB 1000 milliGRAM(s) IV Intermittent every 24 hours  cloNIDine 0.2 milliGRAM(s) Oral three times a day  dextrose 40% Gel 15 Gram(s) Oral once  dextrose 5%. 1000 milliLiter(s) (100 mL/Hr) IV Continuous <Continuous>  dextrose 5%. 1000 milliLiter(s) (50 mL/Hr) IV Continuous <Continuous>  dextrose 50% Injectable 25 Gram(s) IV Push once  dextrose 50% Injectable 12.5 Gram(s) IV Push once  dextrose 50% Injectable 25 Gram(s) IV Push once  enoxaparin Injectable 40 milliGRAM(s) SubCutaneous daily  furosemide    Tablet 40 milliGRAM(s) Oral daily  glucagon  Injectable 1 milliGRAM(s) IntraMuscular once  hydrALAZINE 100 milliGRAM(s) Oral three times a day  insulin lispro (ADMELOG) corrective regimen sliding scale   SubCutaneous three times a day before meals  insulin lispro (ADMELOG) corrective regimen sliding scale   SubCutaneous at bedtime  lactobacillus acidophilus 1 Tablet(s) Oral three times a day  levothyroxine 150 MICROGram(s) Oral daily  losartan 100 milliGRAM(s) Oral daily  metFORMIN 500 milliGRAM(s) Oral two times a day  potassium chloride    Tablet ER 40 milliEquivalent(s) Oral every 4 hours  senna 2 Tablet(s) Oral at bedtime    MEDICATIONS  (PRN):  acetaminophen   Tablet .. 650 milliGRAM(s) Oral every 6 hours PRN Temp greater or equal to 38C (100.4F), Moderate Pain (4 - 6)      ALLERGIES: No Known Allergies      FAMILY HISTORY:  FHx: suicide    FHx: lymphoma    Family history of brain damage (Sibling)  at birth        Social history:  Alochol:   Smoking:   Drug Use:   Marital Status:     PHYSICAL EXAMINATION:  -----------------------------  T(C): 37.1 (09-11-21 @ 09:35), Max: 37.3 (09-10-21 @ 21:55)  HR: 81 (09-11-21 @ 09:35) (71 - 93)  BP: 149/83 (09-11-21 @ 09:35) (149/83 - 198/98)  RR: 18 (09-11-21 @ 09:35) (18 - 18)  SpO2: 95% (09-11-21 @ 09:35) (93% - 98%)  Wt(kg): --    09-10 @ 07:01  -  09-11 @ 07:00  --------------------------------------------------------  IN:    Oral Fluid: 360 mL  Total IN: 360 mL    OUT:    Voided (mL): 1000 mL  Total OUT: 1000 mL    Total NET: -640 mL            Constitutional: well developed, normal appearance, well groomed, well nourished, no deformities and no acute distress.   Eyes: the conjunctiva exhibited no abnormalities and the eyelids demonstrated no xanthelasmas.   HEENT: normal oral mucosa, no oral pallor and no oral cyanosis.   Neck: normal jugular venous A waves present, normal jugular venous V waves present and no jugular venous villarreal A waves.   Pulmonary: no respiratory distress, normal respiratory rhythm and effort, no accessory muscle use and lungs were clear to auscultation bilaterally. Anteriorly  Cardiovascular: heart rate and rhythm were normal, normal S1 and S2 and no murmur, gallop, rub, heave or thrill are present.    Musculoskeletal: the gait could not be assessed.   Extremities: no clubbing of the fingernails, no localized cyanosis, no petechial hemorrhages and no ischemic changes.   Skin: normal skin color and pigmentation, no rash, no venous stasis, no skin lesions, no skin ulcer and no xanthoma was observed.      LABS:   --------  09-11    143  |  105  |  18  ----------------------------<  133<H>  3.4<L>   |  32<H>  |  0.94    Ca    8.6      11 Sep 2021 07:36    TPro  6.0  /  Alb  2.7<L>  /  TBili  0.5  /  DBili  x   /  AST  16  /  ALT  22  /  AlkPhos  67  09-11                         10.8   8.21  )-----------( 205      ( 11 Sep 2021 07:36 )             34.7             Culture Results:   No growth to date. (09-09 @ 13:20)    09-09 @ 13:20    Organism --   Gram Stain Blood -- Gram Stain --  Specimen Source .Blood Blood-Peripheral  Culture-Blood --        Radiology:    < from: US Transthoracic Echocardiogram w/Doppler Complete (09.07.21 @ 11:29) >    EXAM:  US TTE W DOPPLER COMPLETE                                  PROCEDURE DATE:  09/07/2021          INTERPRETATION:  Indication: Abnormal EKG or approximately edema    Technician: Gal Booth    Study Quality: Technical limited, difficult study  Height 5 feet 7 inches, weight 200 pounds, blood pressure 138/71 mmHg    Measurements: Aortic root size 3.5 cm, left atrial size 5.1 cm, right atrial size 2.4 cm, right ventricular size 2.1 cm, left ventricular end-diastolic diameter 4.4 cm, left ventricular end-systolic diameter 2.5 cm, septal wall thickness 1.7 cm, posterior wall thickness 1.2 cm, aortic velocity 1.9 m/s, mitral E velocity 0.9 m/s, ejection fraction  60-65%.    Mitral Valve: Mild mitral annular consultation, thickened mitral valve there is systolic anterior motion of anterior mitral leaflet, chordae causing outflow track obstruction amount gradient recorded 43 mmHg. There is a moderate eccentric mitral regurgitation     Aortic Valve: Thickened aortic valve with normal cusp excursion  Left Atrium: Enlarged  Left Ventricle: Normal size, moderate asymmetric septal hypertrophy with the normal overall systolic function. There is evidence of out flow tract obstruction with systolic anterior motion of chordae and anteriormitral leaflet, gradient noted 43 mmHg  Pericardium: Small circumferential pericardial effusion, more on lateral side. No evidence of tamponade are noted  Tricuspid Valve: Appears normal with trace tricuspid regurgitation with normal right ventricular systolic pressure  Pulmonic Valve: Not well-visualized but appears to be normal  Right Ventricle: Normal size with normal right ventricular systolic function  Right Atrium: Normal size    IMPRESSION:  1. moderate asymmetric septal hypertrophy with the normal overall systolic function with significant outflow tract obstruction  2. moderate eccentric mitral regurgitation  3. left atrial enlargement    --- End of Report ---              [facility]  This document has been electronically signed. Sep  9 2021 12:45PM    < end of copied text >    
Date/Time Patient Seen:  		  Referring MD:   Data Reviewed	       Patient is a 65y old  Female who presents with a chief complaint of sob (07 Sep 2021 17:31)      Subjective/HPI     PAST MEDICAL & SURGICAL HISTORY:  HTN (hypertension)    HLD (hyperlipidemia)    Anxiety    HLD (hyperlipidemia)    Hypothyroid    Adrenal nodule    Breast CA  right    Poor historian    Cancer of thyroid  unsure of dates but prior to 2005    Coronary artery disease    Cognitive impairment    Osteoarthritis    Type 2 diabetes mellitus    Urinary frequency    History of adrenal adenoma    Diabetes    H/O total thyroidectomy    H/O bilateral breast biopsy  November 2018 and December 2018    S/P angioplasty with stent  drug eluting stent in first diagonal on 2/21/19    S/P dilation and curettage  TOP x2          Medication list         MEDICATIONS  (STANDING):  albuterol/ipratropium for Nebulization 3 milliLiter(s) Nebulizer every 6 hours  anastrozole 1 milliGRAM(s) Oral daily  aspirin  chewable 81 milliGRAM(s) Oral daily  atorvastatin 20 milliGRAM(s) Oral at bedtime  carvedilol 25 milliGRAM(s) Oral every 12 hours  cloNIDine 0.2 milliGRAM(s) Oral three times a day  dextrose 40% Gel 15 Gram(s) Oral once  dextrose 5%. 1000 milliLiter(s) (50 mL/Hr) IV Continuous <Continuous>  dextrose 5%. 1000 milliLiter(s) (100 mL/Hr) IV Continuous <Continuous>  dextrose 50% Injectable 25 Gram(s) IV Push once  dextrose 50% Injectable 12.5 Gram(s) IV Push once  dextrose 50% Injectable 25 Gram(s) IV Push once  enoxaparin Injectable 40 milliGRAM(s) SubCutaneous daily  glucagon  Injectable 1 milliGRAM(s) IntraMuscular once  hydrALAZINE 100 milliGRAM(s) Oral three times a day  insulin lispro (ADMELOG) corrective regimen sliding scale   SubCutaneous three times a day before meals  insulin lispro (ADMELOG) corrective regimen sliding scale   SubCutaneous at bedtime  lactobacillus acidophilus 1 Tablet(s) Oral three times a day  levothyroxine 150 MICROGram(s) Oral daily  losartan 100 milliGRAM(s) Oral daily  metFORMIN 500 milliGRAM(s) Oral two times a day  piperacillin/tazobactam IVPB.. 3.375 Gram(s) IV Intermittent every 8 hours  senna 2 Tablet(s) Oral at bedtime    MEDICATIONS  (PRN):  acetaminophen   Tablet .. 650 milliGRAM(s) Oral every 6 hours PRN Temp greater or equal to 38C (100.4F), Moderate Pain (4 - 6)         Vitals log        ICU Vital Signs Last 24 Hrs  T(C): 36.7 (08 Sep 2021 06:25), Max: 36.8 (07 Sep 2021 17:43)  T(F): 98.1 (08 Sep 2021 06:25), Max: 98.2 (07 Sep 2021 17:43)  HR: 65 (08 Sep 2021 06:51) (65 - 89)  BP: 130/81 (08 Sep 2021 06:25) (100/63 - 168/96)  BP(mean): --  ABP: --  ABP(mean): --  RR: 18 (08 Sep 2021 06:25) (17 - 18)  SpO2: 98% (08 Sep 2021 06:51) (95% - 98%)           Input and Output:  I&O's Detail    07 Sep 2021 07:01  -  08 Sep 2021 07:00  --------------------------------------------------------  IN:    IV PiggyBack: 700 mL  Total IN: 700 mL    OUT:    Voided (mL): 600 mL  Total OUT: 600 mL    Total NET: 100 mL          Lab Data                        10.8   14.64 )-----------( 207      ( 07 Sep 2021 05:56 )             34.6     09-07    143  |  105  |  24<H>  ----------------------------<  160<H>  3.5   |  30  |  1.38<H>    Ca    8.1<L>      07 Sep 2021 05:56    TPro  6.0  /  Alb  3.0<L>  /  TBili  1.3<H>  /  DBili  x   /  AST  17  /  ALT  27  /  AlkPhos  74  09-07      CARDIAC MARKERS ( 07 Sep 2021 00:31 )  .023 ng/mL / x     / x     / x     / x            Review of Systems	      Objective     Physical Examination    heart s1s2  lung dec BS  abd soft      Pertinent Lab findings & Imaging      Miguel Angel:  NO   Adequate UO     I&O's Detail    07 Sep 2021 07:01  -  08 Sep 2021 07:00  --------------------------------------------------------  IN:    IV PiggyBack: 700 mL  Total IN: 700 mL    OUT:    Voided (mL): 600 mL  Total OUT: 600 mL    Total NET: 100 mL               Discussed with:     Cultures:	  Culture Results:   Growth in aerobic bottle: Gram Variable Rods  Growth in anaerobic bottle: Gram Negative Rods  ***Blood Panel PCR results on this specimen are available  approximately 3 hours after the Gram stain result.***  Gram stain, PCR, and/or culture results may not always  correspond due to difference in methodologies.  ************************************************************  This PCR assay was performed by multiplex PCR. This  Assay tests for 66 bacterial and resistance gene targets.  Please refer to the [facility] test directory  at [email or web address] for details. (09-07 @ 13:17)  Culture Results:   Growth in aerobic and anaerobic bottles: Gram Negative Rods (09-07 @ 13:17)        Radiology                            
Date/Time Patient Seen:  		  Referring MD:   Data Reviewed	       Patient is a 65y old  Female who presents with a chief complaint of sob (12 Sep 2021 11:19)      Subjective/HPI     PAST MEDICAL & SURGICAL HISTORY:  HTN (hypertension)    HLD (hyperlipidemia)    Anxiety    HLD (hyperlipidemia)    Hypothyroid    Adrenal nodule    Breast CA  right    Poor historian    Cancer of thyroid  unsure of dates but prior to 2005    Coronary artery disease    Cognitive impairment    Osteoarthritis    Type 2 diabetes mellitus    Urinary frequency    History of adrenal adenoma    Diabetes    H/O total thyroidectomy    H/O bilateral breast biopsy  November 2018 and December 2018    S/P angioplasty with stent  drug eluting stent in first diagonal on 2/21/19    S/P dilation and curettage  TOP x2          Medication list         MEDICATIONS  (STANDING):  albuterol/ipratropium for Nebulization 3 milliLiter(s) Nebulizer every 6 hours  anastrozole 1 milliGRAM(s) Oral daily  aspirin  chewable 81 milliGRAM(s) Oral daily  atorvastatin 20 milliGRAM(s) Oral at bedtime  carvedilol 25 milliGRAM(s) Oral every 12 hours  cefTRIAXone   IVPB 1000 milliGRAM(s) IV Intermittent every 24 hours  cloNIDine 0.2 milliGRAM(s) Oral three times a day  dextrose 40% Gel 15 Gram(s) Oral once  dextrose 5%. 1000 milliLiter(s) (50 mL/Hr) IV Continuous <Continuous>  dextrose 5%. 1000 milliLiter(s) (100 mL/Hr) IV Continuous <Continuous>  dextrose 50% Injectable 25 Gram(s) IV Push once  dextrose 50% Injectable 12.5 Gram(s) IV Push once  dextrose 50% Injectable 25 Gram(s) IV Push once  enoxaparin Injectable 40 milliGRAM(s) SubCutaneous daily  furosemide    Tablet 40 milliGRAM(s) Oral daily  glucagon  Injectable 1 milliGRAM(s) IntraMuscular once  hydrALAZINE 100 milliGRAM(s) Oral three times a day  insulin lispro (ADMELOG) corrective regimen sliding scale   SubCutaneous three times a day before meals  insulin lispro (ADMELOG) corrective regimen sliding scale   SubCutaneous at bedtime  lactobacillus acidophilus 1 Tablet(s) Oral three times a day  levothyroxine 150 MICROGram(s) Oral daily  losartan 100 milliGRAM(s) Oral daily  metFORMIN 500 milliGRAM(s) Oral two times a day  senna 2 Tablet(s) Oral at bedtime    MEDICATIONS  (PRN):  acetaminophen   Tablet .. 650 milliGRAM(s) Oral every 6 hours PRN Temp greater or equal to 38C (100.4F), Moderate Pain (4 - 6)         Vitals log        ICU Vital Signs Last 24 Hrs  T(C): 36.8 (13 Sep 2021 04:32), Max: 36.8 (12 Sep 2021 06:41)  T(F): 98.2 (13 Sep 2021 04:32), Max: 98.3 (12 Sep 2021 06:41)  HR: 62 (13 Sep 2021 04:32) (62 - 87)  BP: 175/83 (13 Sep 2021 04:32) (129/79 - 208/114)  BP(mean): --  ABP: --  ABP(mean): --  RR: 17 (13 Sep 2021 04:32) (17 - 18)  SpO2: 97% (13 Sep 2021 04:32) (93% - 99%)           Input and Output:  I&O's Detail    11 Sep 2021 07:01  -  12 Sep 2021 07:00  --------------------------------------------------------  IN:  Total IN: 0 mL    OUT:    Voided (mL): 700 mL  Total OUT: 700 mL    Total NET: -700 mL          Lab Data                        11.1   9.14  )-----------( 202      ( 12 Sep 2021 07:31 )             35.7     09-12    144  |  104  |  19  ----------------------------<  123<H>  3.7   |  29  |  0.90    Ca    8.3<L>      12 Sep 2021 07:31    TPro  6.2  /  Alb  2.8<L>  /  TBili  0.6  /  DBili  x   /  AST  25  /  ALT  34  /  AlkPhos  60  09-12            Review of Systems	      Objective     Physical Examination    heart s1s2  lung dec BS  abd soft  head nc      Pertinent Lab findings & Imaging      Miguel Angel:  NO   Adequate UO     I&O's Detail    11 Sep 2021 07:01  -  12 Sep 2021 07:00  --------------------------------------------------------  IN:  Total IN: 0 mL    OUT:    Voided (mL): 700 mL  Total OUT: 700 mL    Total NET: -700 mL               Discussed with:     Cultures:	        Radiology                            
Date/Time Patient Seen:  		  Referring MD:   Data Reviewed	       Patient is a 65y old  Female who presents with a chief complaint of sob (14 Sep 2021 11:39)      Subjective/HPI     PAST MEDICAL & SURGICAL HISTORY:  HTN (hypertension)    HLD (hyperlipidemia)    Anxiety    HLD (hyperlipidemia)    Hypothyroid    Adrenal nodule    Breast CA  right    Poor historian    Cancer of thyroid  unsure of dates but prior to 2005    Coronary artery disease    Cognitive impairment    Osteoarthritis    Type 2 diabetes mellitus    Urinary frequency    History of adrenal adenoma    Diabetes    H/O total thyroidectomy    H/O bilateral breast biopsy  November 2018 and December 2018    S/P angioplasty with stent  drug eluting stent in first diagonal on 2/21/19    S/P dilation and curettage  TOP x2          Medication list         MEDICATIONS  (STANDING):  amLODIPine   Tablet 10 milliGRAM(s) Oral daily  anastrozole 1 milliGRAM(s) Oral daily  aspirin  chewable 81 milliGRAM(s) Oral daily  atorvastatin 20 milliGRAM(s) Oral at bedtime  carvedilol 25 milliGRAM(s) Oral every 12 hours  cefuroxime   Tablet 500 milliGRAM(s) Oral every 12 hours  cloNIDine 0.2 milliGRAM(s) Oral three times a day  dextrose 40% Gel 15 Gram(s) Oral once  dextrose 5%. 1000 milliLiter(s) (50 mL/Hr) IV Continuous <Continuous>  dextrose 5%. 1000 milliLiter(s) (100 mL/Hr) IV Continuous <Continuous>  dextrose 50% Injectable 12.5 Gram(s) IV Push once  dextrose 50% Injectable 25 Gram(s) IV Push once  dextrose 50% Injectable 25 Gram(s) IV Push once  enoxaparin Injectable 40 milliGRAM(s) SubCutaneous daily  furosemide    Tablet 40 milliGRAM(s) Oral daily  glucagon  Injectable 1 milliGRAM(s) IntraMuscular once  hydrALAZINE 100 milliGRAM(s) Oral three times a day  insulin lispro (ADMELOG) corrective regimen sliding scale   SubCutaneous three times a day before meals  insulin lispro (ADMELOG) corrective regimen sliding scale   SubCutaneous at bedtime  lactobacillus acidophilus 1 Tablet(s) Oral three times a day  levothyroxine 150 MICROGram(s) Oral daily  losartan 100 milliGRAM(s) Oral daily  metFORMIN 500 milliGRAM(s) Oral two times a day  senna 2 Tablet(s) Oral at bedtime    MEDICATIONS  (PRN):  acetaminophen   Tablet .. 650 milliGRAM(s) Oral every 6 hours PRN Temp greater or equal to 38C (100.4F), Moderate Pain (4 - 6)  ALBUTerol    90 MICROgram(s) HFA Inhaler 2 Puff(s) Inhalation every 6 hours PRN Shortness of Breath and/or Wheezing         Vitals log        ICU Vital Signs Last 24 Hrs  T(C): 36.7 (15 Sep 2021 05:06), Max: 37.1 (14 Sep 2021 21:11)  T(F): 98.1 (15 Sep 2021 05:06), Max: 98.7 (14 Sep 2021 21:11)  HR: 72 (15 Sep 2021 05:06) (64 - 90)  BP: 168/85 (15 Sep 2021 05:06) (149/80 - 181/105)  BP(mean): --  ABP: --  ABP(mean): --  RR: 15 (15 Sep 2021 05:06) (15 - 18)  SpO2: 96% (15 Sep 2021 05:06) (90% - 97%)           Input and Output:  I&O's Detail    13 Sep 2021 07:01  -  14 Sep 2021 07:00  --------------------------------------------------------  IN:    Oral Fluid: 780 mL  Total IN: 780 mL    OUT:    Voided (mL): 1300 mL  Total OUT: 1300 mL    Total NET: -520 mL      14 Sep 2021 07:01  -  15 Sep 2021 06:29  --------------------------------------------------------  IN:    Oral Fluid: 840 mL  Total IN: 840 mL    OUT:    Voided (mL): 1550 mL  Total OUT: 1550 mL    Total NET: -710 mL          Lab Data                        11.1   9.33  )-----------( 213      ( 14 Sep 2021 07:17 )             36.0     09-14    143  |  104  |  19  ----------------------------<  130<H>  3.2<L>   |  33<H>  |  0.88    Ca    8.5      14 Sep 2021 07:17    TPro  5.8<L>  /  Alb  2.9<L>  /  TBili  0.4  /  DBili  x   /  AST  25  /  ALT  46  /  AlkPhos  73  09-14            Review of Systems	      Objective     Physical Examination    heart s1s2  lung dec bS  abd soft      Pertinent Lab findings & Imaging      Miguel Angel:  NO   Adequate UO     I&O's Detail    13 Sep 2021 07:01  -  14 Sep 2021 07:00  --------------------------------------------------------  IN:    Oral Fluid: 780 mL  Total IN: 780 mL    OUT:    Voided (mL): 1300 mL  Total OUT: 1300 mL    Total NET: -520 mL      14 Sep 2021 07:01  -  15 Sep 2021 06:29  --------------------------------------------------------  IN:    Oral Fluid: 840 mL  Total IN: 840 mL    OUT:    Voided (mL): 1550 mL  Total OUT: 1550 mL    Total NET: -710 mL               Discussed with:     Cultures:	        Radiology                            
Patient is a 65y old  Female who presents with a chief complaint of sob (13 Sep 2021 11:01)      INTERVAL HPI/OVERNIGHT EVENTS:overnight events noted    Home Medications:  anastrozole 1 mg oral tablet: 1 tab(s) orally once a day (07 Sep 2021 01:37)  aspirin 81 mg oral tablet: 1 tab(s) orally once a day (07 Sep 2021 00:59)  atorvastatin 20 mg oral tablet: 1 tab(s) orally once a day (07 Sep 2021 00:59)  carvedilol 25 mg oral tablet: 1 tab(s) orally 2 times a day (07 Sep 2021 00:59)  cloNIDine 0.2 mg oral tablet: orally 3 times a day (07 Sep 2021 01:37)  furosemide 40 mg oral tablet: 1 tab(s) orally once a day (07 Sep 2021 00:59)  hydrALAZINE 100 mg oral tablet: orally 3 times a day (07 Sep 2021 01:37)  levothyroxine 150 mcg (0.15 mg) oral tablet: 1 tab(s) orally once a day (07 Sep 2021 00:59)  losartan 100 mg oral tablet: 1 tab(s) orally once a day (07 Sep 2021 00:59)  metFORMIN 500 mg oral tablet, extended release: 1 tab(s) orally once a day (07 Sep 2021 00:59)  potassium chloride 20 mEq oral tablet, extended release: orally 2 times a day (07 Sep 2021 01:37)  Senna 8.6 mg oral tablet: 2 tab(s) orally once a day (at bedtime) (07 Sep 2021 00:59)      MEDICATIONS  (STANDING):  albuterol/ipratropium for Nebulization 3 milliLiter(s) Nebulizer every 6 hours  anastrozole 1 milliGRAM(s) Oral daily  aspirin  chewable 81 milliGRAM(s) Oral daily  atorvastatin 20 milliGRAM(s) Oral at bedtime  carvedilol 25 milliGRAM(s) Oral every 12 hours  cefTRIAXone   IVPB 1000 milliGRAM(s) IV Intermittent every 24 hours  cloNIDine 0.2 milliGRAM(s) Oral three times a day  dextrose 40% Gel 15 Gram(s) Oral once  dextrose 5%. 1000 milliLiter(s) (50 mL/Hr) IV Continuous <Continuous>  dextrose 5%. 1000 milliLiter(s) (100 mL/Hr) IV Continuous <Continuous>  dextrose 50% Injectable 25 Gram(s) IV Push once  dextrose 50% Injectable 12.5 Gram(s) IV Push once  dextrose 50% Injectable 25 Gram(s) IV Push once  enoxaparin Injectable 40 milliGRAM(s) SubCutaneous daily  furosemide    Tablet 40 milliGRAM(s) Oral daily  glucagon  Injectable 1 milliGRAM(s) IntraMuscular once  hydrALAZINE 100 milliGRAM(s) Oral three times a day  insulin lispro (ADMELOG) corrective regimen sliding scale   SubCutaneous three times a day before meals  insulin lispro (ADMELOG) corrective regimen sliding scale   SubCutaneous at bedtime  lactobacillus acidophilus 1 Tablet(s) Oral three times a day  levothyroxine 150 MICROGram(s) Oral daily  losartan 100 milliGRAM(s) Oral daily  metFORMIN 500 milliGRAM(s) Oral two times a day  senna 2 Tablet(s) Oral at bedtime    MEDICATIONS  (PRN):  acetaminophen   Tablet .. 650 milliGRAM(s) Oral every 6 hours PRN Temp greater or equal to 38C (100.4F), Moderate Pain (4 - 6)      Allergies    No Known Allergies    Intolerances        REVIEW OF SYSTEMS:  CONSTITUTIONAL: No fever, weight loss, or fatigue  EYES: No eye pain, visual disturbances, or discharge  ENMT:  No difficulty hearing, tinnitus, vertigo; No sinus or throat pain  NECK: No pain or stiffness  BREASTS: No pain, masses, or nipple discharge  RESPIRATORY: No cough, wheezing, chills or hemoptysis; No shortness of breath  CARDIOVASCULAR: No chest pain, palpitations, dizziness, or leg swelling  GASTROINTESTINAL: No abdominal or epigastric pain. No nausea, vomiting, or hematemesis;  GENITOURINARY: No dysuria, frequency, hematuria, has incontinence  NEUROLOGICAL: No headaches, has memory loss, loss of strength, numbness, or tremors  SKIN: No itching, burning, rashes, or lesions   LYMPH NODES: No enlarged glands  ENDOCRINE: No heat or cold intolerance; No hair loss  MUSCULOSKELETAL: No joint pain or swelling; No muscle, back, or extremity pain  PSYCHIATRIC: No depression, anxiety, mood swings, or difficulty sleeping  HEME/LYMPH: No easy bruising, or bleeding gums  ALLERGY AND IMMUNOLOGIC: No hives or eczema    Vital Signs Last 24 Hrs  T(C): 36.9 (13 Sep 2021 10:32), Max: 36.9 (13 Sep 2021 10:32)  T(F): 98.4 (13 Sep 2021 10:32), Max: 98.4 (13 Sep 2021 10:32)  HR: 71 (13 Sep 2021 10:32) (62 - 87)  BP: 138/85 (13 Sep 2021 10:32) (138/85 - 175/83)  BP(mean): --  RR: 18 (13 Sep 2021 10:32) (17 - 18)  SpO2: 96% (13 Sep 2021 10:32) (95% - 99%)    PHYSICAL EXAM:  GENERAL: NAD, well-groomed, well-developed  HEAD:  Atraumatic, Normocephalic  EYES: EOMI, PERRLA, conjunctiva and sclera clear  ENMT: Moist mucous membranes,   NECK: Supple, No JVD, Normal thyroid  NERVOUS SYSTEM:  Alert & Oriented X2, confused non focal  CHEST/LUNG: Clear to percussion bilaterally; No rales, rhonchi, wheezing, or rubs  HEART: Regular rate and rhythm; No murmurs, rubs, or gallops  ABDOMEN: Soft, Nontender, Nondistended; Bowel sounds present  EXTREMITIES:  2+ Peripheral Pulses, No clubbing, cyanosis, or edema  LYMPH: No lymphadenopathy noted  SKIN: No rashes or lesions    LABS:                        11.1   9.14  )-----------( 202      ( 12 Sep 2021 07:31 )             35.7     09-12    144  |  104  |  19  ----------------------------<  123<H>  3.7   |  29  |  0.90    Ca    8.3<L>      12 Sep 2021 07:31    TPro  6.2  /  Alb  2.8<L>  /  TBili  0.6  /  DBili  x   /  AST  25  /  ALT  34  /  AlkPhos  60  09-12        CAPILLARY BLOOD GLUCOSE      POCT Blood Glucose.: 130 mg/dL (13 Sep 2021 07:48)  POCT Blood Glucose.: 131 mg/dL (12 Sep 2021 21:13)  POCT Blood Glucose.: 120 mg/dL (12 Sep 2021 16:35)  POCT Blood Glucose.: 130 mg/dL (12 Sep 2021 12:01)        Culture - Blood (collected 09-09-21 @ 13:20)  Source: .Blood Blood-Peripheral  Preliminary Report (09-10-21 @ 14:01):    No growth to date.    Culture - Blood (collected 09-08-21 @ 13:16)  Source: .Blood Blood-Peripheral  Preliminary Report (09-09-21 @ 14:02):    No growth to date.    Culture - Urine (collected 09-07-21 @ 13:17)  Source: Clean Catch Clean Catch (Midstream)  Final Report (09-09-21 @ 06:07):    >100,000 CFU/ml Escherichia coli  Organism: Escherichia coli (09-09-21 @ 06:07)  Organism: Escherichia coli (09-09-21 @ 06:07)      -  Amikacin: S <=16      -  Amoxicillin/Clavulanic Acid: S <=8/4      -  Ampicillin: S <=8 These ampicillin results predict results for amoxicillin      -  Ampicillin/Sulbactam: S <=4/2 Enterobacter, Citrobacter, and Serratia may develop resistance during prolonged therapy (3-4 days)      -  Aztreonam: S <=4      -  Cefazolin: S <=2 (MIC_CL_COM_ENTERIC_CEFAZU) For uncomplicated UTI with K. pneumoniae, E. coli, or P. mirablis: ARELI <=16 is sensitive and ARELI >=32 is resistant. This also predicts results for oral agents cefaclor, cefdinir, cefpodoxime, cefprozil, cefuroxime axetil, cephalexin and locarbef for uncomplicated UTI. Note that some isolates may be susceptible to these agents while testing resistant to cefazolin.      -  Cefepime: S <=2      -  Cefoxitin: S <=8      -  Ceftriaxone: S <=1 Enterobacter, Citrobacter, and Serratia may develop resistance during prolonged therapy      -  Ciprofloxacin: R >2      -  Ertapenem: S <=0.5      -  Gentamicin: S <=2      -  Imipenem: S <=1      -  Levofloxacin: R >4      -  Meropenem: S <=1      -  Nitrofurantoin: S <=32 Should not be used to treat pyelonephritis      -  Piperacillin/Tazobactam: S <=8      -  Tigecycline: S <=2      -  Tobramycin: S <=2      -  Trimethoprim/Sulfamethoxazole: S <=0.5/9.5      Method Type: ARELI    Culture - Blood (collected 09-07-21 @ 13:17)  Source: .Blood Blood  Gram Stain (09-08-21 @ 01:22):    Growth in aerobic and anaerobic bottles: Gram Negative Rods  Final Report (09-09-21 @ 17:28):    Growth in aerobic and anaerobic bottles: Escherichia coli    See previous culture [id number]    Culture - Blood (collected 09-07-21 @ 13:17)  Source: .Blood Blood  Gram Stain (09-08-21 @ 01:21):    Growth in aerobic bottle: Gram Variable Rods    Growth in anaerobic bottle: Gram Negative Rods  Final Report (09-09-21 @ 16:27):    Growth in aerobic and anaerobic bottles: Escherichia coli    ***Blood Panel PCR results on this specimen are available    approximately 3 hours after the Gram stain result.***    Gram stain, PCR, and/or culture results may not always    correspond due to difference in methodologies.    ************************************************************    This PCR assay was performed by multiplex PCR. This    Assay tests for 66 bacterial and resistance gene targets.    Please refer to the [facility] test directory    at [email or web address] for details.  Organism: Blood Culture PCR  Escherichia coli (09-09-21 @ 16:27)  Organism: Escherichia coli (09-09-21 @ 16:27)      -  Amikacin: S <=16      -  Ampicillin: S <=8 These ampicillin results predict results for amoxicillin      -  Ampicillin/Sulbactam: S <=4/2 Enterobacter, Citrobacter, and Serratia may develop resistance during prolonged therapy (3-4 days)      -  Aztreonam: S <=4      -  Cefazolin: S <=2 Enterobacter, Citrobacter, and Serratia may develop resistance during prolonged therapy (3-4 days)      -  Cefepime: S <=2      -  Cefoxitin: S <=8      -  Ceftriaxone: S <=1 Enterobacter, Citrobacter, and Serratia may develop resistance during prolonged therapy      -  Ciprofloxacin: R >2      -  Ertapenem: S <=0.5      -  Gentamicin: S <=2      -  Imipenem: S <=1      -  Levofloxacin: R >4      -  Meropenem: S <=1      -  Piperacillin/Tazobactam: S <=8      -  Tobramycin: S <=2      -  Trimethoprim/Sulfamethoxazole: S <=0.5/9.5      Method Type: ARELI  Organism: Blood Culture PCR (09-09-21 @ 16:27)      -  Escherichia coli: Detec      Method Type: PCR        I&O's Summary    12 Sep 2021 07:01  -  13 Sep 2021 07:00  --------------------------------------------------------  IN: 0 mL / OUT: 800 mL / NET: -800 mL        RADIOLOGY & ADDITIONAL TESTS:    Imaging Personally Reviewed:  [x ] YES  [ ] NO    Consultant(s) Notes Reviewed:  [x ] YES  [ ] NO    Care Discussed with Consultants/Other Providers [x ] YES  [ ] NO
Patient is a 65y old  Female who presents with a chief complaint of sob (10 Sep 2021 11:28)      INTERVAL HPI/OVERNIGHT EVENTS:overnight events noted    Home Medications:  anastrozole 1 mg oral tablet: 1 tab(s) orally once a day (07 Sep 2021 01:37)  aspirin 81 mg oral tablet: 1 tab(s) orally once a day (07 Sep 2021 00:59)  atorvastatin 20 mg oral tablet: 1 tab(s) orally once a day (07 Sep 2021 00:59)  carvedilol 25 mg oral tablet: 1 tab(s) orally 2 times a day (07 Sep 2021 00:59)  cloNIDine 0.2 mg oral tablet: orally 3 times a day (07 Sep 2021 01:37)  furosemide 40 mg oral tablet: 1 tab(s) orally once a day (07 Sep 2021 00:59)  hydrALAZINE 100 mg oral tablet: orally 3 times a day (07 Sep 2021 01:37)  levothyroxine 150 mcg (0.15 mg) oral tablet: 1 tab(s) orally once a day (07 Sep 2021 00:59)  losartan 100 mg oral tablet: 1 tab(s) orally once a day (07 Sep 2021 00:59)  metFORMIN 500 mg oral tablet, extended release: 1 tab(s) orally once a day (07 Sep 2021 00:59)  potassium chloride 20 mEq oral tablet, extended release: orally 2 times a day (07 Sep 2021 01:37)  Senna 8.6 mg oral tablet: 2 tab(s) orally once a day (at bedtime) (07 Sep 2021 00:59)      MEDICATIONS  (STANDING):  albuterol/ipratropium for Nebulization 3 milliLiter(s) Nebulizer every 6 hours  anastrozole 1 milliGRAM(s) Oral daily  aspirin  chewable 81 milliGRAM(s) Oral daily  atorvastatin 20 milliGRAM(s) Oral at bedtime  carvedilol 25 milliGRAM(s) Oral every 12 hours  cefTRIAXone   IVPB 1000 milliGRAM(s) IV Intermittent every 24 hours  cloNIDine 0.2 milliGRAM(s) Oral three times a day  dextrose 40% Gel 15 Gram(s) Oral once  dextrose 5%. 1000 milliLiter(s) (50 mL/Hr) IV Continuous <Continuous>  dextrose 5%. 1000 milliLiter(s) (100 mL/Hr) IV Continuous <Continuous>  dextrose 50% Injectable 25 Gram(s) IV Push once  dextrose 50% Injectable 12.5 Gram(s) IV Push once  dextrose 50% Injectable 25 Gram(s) IV Push once  enoxaparin Injectable 40 milliGRAM(s) SubCutaneous daily  furosemide    Tablet 40 milliGRAM(s) Oral daily  glucagon  Injectable 1 milliGRAM(s) IntraMuscular once  hydrALAZINE 100 milliGRAM(s) Oral three times a day  insulin lispro (ADMELOG) corrective regimen sliding scale   SubCutaneous three times a day before meals  insulin lispro (ADMELOG) corrective regimen sliding scale   SubCutaneous at bedtime  lactobacillus acidophilus 1 Tablet(s) Oral three times a day  levothyroxine 150 MICROGram(s) Oral daily  losartan 100 milliGRAM(s) Oral daily  metFORMIN 500 milliGRAM(s) Oral two times a day  potassium chloride    Tablet ER 40 milliEquivalent(s) Oral once  senna 2 Tablet(s) Oral at bedtime    MEDICATIONS  (PRN):  acetaminophen   Tablet .. 650 milliGRAM(s) Oral every 6 hours PRN Temp greater or equal to 38C (100.4F), Moderate Pain (4 - 6)      Allergies    No Known Allergies    Intolerances        REVIEW OF SYSTEMS:unreliable as consfused denies any complaint  CONSTITUTIONAL: No fever, weight loss, or fatigue  EYES: No eye pain, visual disturbances, or discharge  ENMT:  No difficulty hearing, tinnitus, vertigo; No sinus or throat pain  NECK: No pain or stiffness  BREASTS: No pain, masses, or nipple discharge  RESPIRATORY: No cough, wheezing, chills or hemoptysis; No shortness of breath  CARDIOVASCULAR: No chest pain, palpitations, dizziness, or leg swelling  GASTROINTESTINAL: No abdominal or epigastric pain. No nausea, vomiting,   GENITOURINARY: No dysuria, frequency, hematuria, or incontinence  NEUROLOGICAL: No headaches, memory loss, loss of strength, numbness, or tremors  SKIN: No itching, burning, rashes, or lesions   LYMPH NODES: No enlarged glands  ENDOCRINE: No heat or cold intolerance; No hair loss  MUSCULOSKELETAL: No joint pain or swelling; No muscle, back, or extremity pain  PSYCHIATRIC: No depression, anxiety, mood swings, or difficulty sleeping  HEME/LYMPH: No easy bruising, or bleeding gums  ALLERGY AND IMMUNOLOGIC: No hives or eczema    Vital Signs Last 24 Hrs  T(C): 36.6 (10 Sep 2021 09:48), Max: 37.1 (10 Sep 2021 05:58)  T(F): 97.8 (10 Sep 2021 09:48), Max: 98.8 (10 Sep 2021 05:58)  HR: 78 (10 Sep 2021 09:48) (72 - 101)  BP: 128/78 (10 Sep 2021 09:48) (128/78 - 189/102)  BP(mean): --  RR: 18 (10 Sep 2021 09:48) (18 - 20)  SpO2: 95% (10 Sep 2021 09:48) (95% - 98%)    PHYSICAL EXAM:  GENERAL:  well-groomed, well-developed  HEAD:  Atraumatic, Normocephalic  EYES: EOMI, PERRLA, conjunctiva and sclera clear  ENMT: Moist mucous membranes,   NECK: Supple, No JVD, Normal thyroid  NERVOUS SYSTEM:  Alert & Oriented X2, confused non focal  CHEST/LUNG: Clear to percussion bilaterally; No rales, rhonchi, wheezing, or rubs  HEART: Regular rate and rhythm; No murmurs, rubs, or gallops  ABDOMEN: Soft, Nontender, Nondistended; Bowel sounds present  EXTREMITIES:  2+ Peripheral Pulses, No clubbing, cyanosis, or edema  LYMPH: No lymphadenopathy noted  SKIN: No rashes or lesions    LABS:                        10.3   7.17  )-----------( 191      ( 10 Sep 2021 07:47 )             33.1     09-10    143  |  105  |  19  ----------------------------<  123<H>  3.3<L>   |  33<H>  |  0.88    Ca    8.3<L>      10 Sep 2021 07:47    TPro  6.4  /  Alb  2.8<L>  /  TBili  0.5  /  DBili  x   /  AST  15  /  ALT  22  /  AlkPhos  68  09-10        CAPILLARY BLOOD GLUCOSE      POCT Blood Glucose.: 150 mg/dL (10 Sep 2021 12:04)  POCT Blood Glucose.: 169 mg/dL (10 Sep 2021 07:37)  POCT Blood Glucose.: 128 mg/dL (09 Sep 2021 21:10)  POCT Blood Glucose.: 108 mg/dL (09 Sep 2021 16:48)        Culture - Blood (collected 09-08-21 @ 13:16)  Source: .Blood Blood-Peripheral  Preliminary Report (09-09-21 @ 14:02):    No growth to date.    Culture - Urine (collected 09-07-21 @ 13:17)  Source: Clean Catch Clean Catch (Midstream)  Final Report (09-09-21 @ 06:07):    >100,000 CFU/ml Escherichia coli  Organism: Escherichia coli (09-09-21 @ 06:07)  Organism: Escherichia coli (09-09-21 @ 06:07)      -  Amikacin: S <=16      -  Amoxicillin/Clavulanic Acid: S <=8/4      -  Ampicillin: S <=8 These ampicillin results predict results for amoxicillin      -  Ampicillin/Sulbactam: S <=4/2 Enterobacter, Citrobacter, and Serratia may develop resistance during prolonged therapy (3-4 days)      -  Aztreonam: S <=4      -  Cefazolin: S <=2 (MIC_CL_COM_ENTERIC_CEFAZU) For uncomplicated UTI with K. pneumoniae, E. coli, or P. mirablis: ARELI <=16 is sensitive and ARELI >=32 is resistant. This also predicts results for oral agents cefaclor, cefdinir, cefpodoxime, cefprozil, cefuroxime axetil, cephalexin and locarbef for uncomplicated UTI. Note that some isolates may be susceptible to these agents while testing resistant to cefazolin.      -  Cefepime: S <=2      -  Cefoxitin: S <=8      -  Ceftriaxone: S <=1 Enterobacter, Citrobacter, and Serratia may develop resistance during prolonged therapy      -  Ciprofloxacin: R >2      -  Ertapenem: S <=0.5      -  Gentamicin: S <=2      -  Imipenem: S <=1      -  Levofloxacin: R >4      -  Meropenem: S <=1      -  Nitrofurantoin: S <=32 Should not be used to treat pyelonephritis      -  Piperacillin/Tazobactam: S <=8      -  Tigecycline: S <=2      -  Tobramycin: S <=2      -  Trimethoprim/Sulfamethoxazole: S <=0.5/9.5      Method Type: ARELI    Culture - Blood (collected 09-07-21 @ 13:17)  Source: .Blood Blood  Gram Stain (09-08-21 @ 01:22):    Growth in aerobic and anaerobic bottles: Gram Negative Rods  Final Report (09-09-21 @ 17:28):    Growth in aerobic and anaerobic bottles: Escherichia coli    See previous culture [id number]    Culture - Blood (collected 09-07-21 @ 13:17)  Source: .Blood Blood  Gram Stain (09-08-21 @ 01:21):    Growth in aerobic bottle: Gram Variable Rods    Growth in anaerobic bottle: Gram Negative Rods  Final Report (09-09-21 @ 16:27):    Growth in aerobic and anaerobic bottles: Escherichia coli    ***Blood Panel PCR results on this specimen are available    approximately 3 hours after the Gram stain result.***    Gram stain, PCR, and/or culture results may not always    correspond due to difference in methodologies.    ************************************************************    This PCR assay was performed by multiplex PCR. This    Assay tests for 66 bacterial and resistance gene targets.    Please refer to the [facility] Labs test directory    at [email or web address] for details.  Organism: Blood Culture PCR  Escherichia coli (09-09-21 @ 16:27)  Organism: Escherichia coli (09-09-21 @ 16:27)      -  Amikacin: S <=16      -  Ampicillin: S <=8 These ampicillin results predict results for amoxicillin      -  Ampicillin/Sulbactam: S <=4/2 Enterobacter, Citrobacter, and Serratia may develop resistance during prolonged therapy (3-4 days)      -  Aztreonam: S <=4      -  Cefazolin: S <=2 Enterobacter, Citrobacter, and Serratia may develop resistance during prolonged therapy (3-4 days)      -  Cefepime: S <=2      -  Cefoxitin: S <=8      -  Ceftriaxone: S <=1 Enterobacter, Citrobacter, and Serratia may develop resistance during prolonged therapy      -  Ciprofloxacin: R >2      -  Ertapenem: S <=0.5      -  Gentamicin: S <=2      -  Imipenem: S <=1      -  Levofloxacin: R >4      -  Meropenem: S <=1      -  Piperacillin/Tazobactam: S <=8      -  Tobramycin: S <=2      -  Trimethoprim/Sulfamethoxazole: S <=0.5/9.5      Method Type: ARELI  Organism: Blood Culture PCR (09-09-21 @ 16:27)      -  Escherichia coli: Detec      Method Type: PCR        I&O's Summary    09 Sep 2021 07:01  -  10 Sep 2021 07:00  --------------------------------------------------------  IN: 500 mL / OUT: 0 mL / NET: 500 mL    10 Sep 2021 07:01  -  10 Sep 2021 12:43  --------------------------------------------------------  IN: 360 mL / OUT: 0 mL / NET: 360 mL        RADIOLOGY & ADDITIONAL TESTS:< from: CT Chest No Cont (09.07.21 @ 14:10) >    Left basilar pneumonia and atelectasis.  Trace basilar pleural effusions. Trace pericardial effusion.  Asymmetric soft tissue density right breast. Correlate clinically and with dedicated breast imaging.  Indeterminate left adrenal nodule with interval increase in size compared to 4/19/2019. Correlate with MR      < end of copied text >  < from: CT Abdomen and Pelvis No Cont (09.08.21 @ 14:25) >    No acute intra-abdominal pathology.      < end of copied text >      Imaging Personally Reviewed:  [ x] YES  [ ] NO    Consultant(s) Notes Reviewed:  [ x] YES  [ ] NO    Care Discussed with Consultants/Other Providers [ x] YES  [ ] NO
Patient is a 65y old  Female who presents with a chief complaint of sob (08 Sep 2021 07:07)      INTERVAL HPI/OVERNIGHT EVENTS:overnight events noted    Home Medications:  anastrozole 1 mg oral tablet: 1 tab(s) orally once a day (07 Sep 2021 01:37)  aspirin 81 mg oral tablet: 1 tab(s) orally once a day (07 Sep 2021 00:59)  atorvastatin 20 mg oral tablet: 1 tab(s) orally once a day (07 Sep 2021 00:59)  carvedilol 25 mg oral tablet: 1 tab(s) orally 2 times a day (07 Sep 2021 00:59)  cloNIDine 0.2 mg oral tablet: orally 3 times a day (07 Sep 2021 01:37)  furosemide 40 mg oral tablet: 1 tab(s) orally once a day (07 Sep 2021 00:59)  hydrALAZINE 100 mg oral tablet: orally 3 times a day (07 Sep 2021 01:37)  levothyroxine 150 mcg (0.15 mg) oral tablet: 1 tab(s) orally once a day (07 Sep 2021 00:59)  losartan 100 mg oral tablet: 1 tab(s) orally once a day (07 Sep 2021 00:59)  metFORMIN 500 mg oral tablet, extended release: 1 tab(s) orally once a day (07 Sep 2021 00:59)  potassium chloride 20 mEq oral tablet, extended release: orally 2 times a day (07 Sep 2021 01:37)  Senna 8.6 mg oral tablet: 2 tab(s) orally once a day (at bedtime) (07 Sep 2021 00:59)      MEDICATIONS  (STANDING):  albuterol/ipratropium for Nebulization 3 milliLiter(s) Nebulizer every 6 hours  anastrozole 1 milliGRAM(s) Oral daily  aspirin  chewable 81 milliGRAM(s) Oral daily  atorvastatin 20 milliGRAM(s) Oral at bedtime  carvedilol 25 milliGRAM(s) Oral every 12 hours  cloNIDine 0.2 milliGRAM(s) Oral three times a day  dextrose 40% Gel 15 Gram(s) Oral once  dextrose 5%. 1000 milliLiter(s) (50 mL/Hr) IV Continuous <Continuous>  dextrose 5%. 1000 milliLiter(s) (100 mL/Hr) IV Continuous <Continuous>  dextrose 50% Injectable 25 Gram(s) IV Push once  dextrose 50% Injectable 12.5 Gram(s) IV Push once  dextrose 50% Injectable 25 Gram(s) IV Push once  enoxaparin Injectable 40 milliGRAM(s) SubCutaneous daily  furosemide    Tablet 40 milliGRAM(s) Oral daily  glucagon  Injectable 1 milliGRAM(s) IntraMuscular once  hydrALAZINE 100 milliGRAM(s) Oral three times a day  insulin lispro (ADMELOG) corrective regimen sliding scale   SubCutaneous three times a day before meals  insulin lispro (ADMELOG) corrective regimen sliding scale   SubCutaneous at bedtime  lactobacillus acidophilus 1 Tablet(s) Oral three times a day  levothyroxine 150 MICROGram(s) Oral daily  losartan 100 milliGRAM(s) Oral daily  metFORMIN 500 milliGRAM(s) Oral two times a day  piperacillin/tazobactam IVPB.. 3.375 Gram(s) IV Intermittent every 8 hours  senna 2 Tablet(s) Oral at bedtime    MEDICATIONS  (PRN):  acetaminophen   Tablet .. 650 milliGRAM(s) Oral every 6 hours PRN Temp greater or equal to 38C (100.4F), Moderate Pain (4 - 6)      Allergies    No Known Allergies    Intolerances        REVIEW OF SYSTEMS:  CONSTITUTIONAL: No fever, weight loss, has fatigue  EYES: No eye pain, visual disturbances, or discharge  ENMT:  No difficulty hearing, tinnitus, vertigo; No sinus or throat pain  NECK: No pain or stiffness  BREASTS: No pain, masses, or nipple discharge  RESPIRATORY: No cough, wheezing, chills or hemoptysis; No shortness of breath  CARDIOVASCULAR: No chest pain, palpitations, dizziness, or leg swelling  GASTROINTESTINAL: No abdominal or epigastric pain. No nausea, vomiting, or hematemesis; No diarrhea or constipation. No melena or hematochezia.  GENITOURINARY: No dysuria, frequency, hematuria, or incontinence  NEUROLOGICAL: No headaches, memory loss,has  loss of strength,no  numbness, or tremors  SKIN: No itching, burning, rashes, or lesions   LYMPH NODES: No enlarged glands  ENDOCRINE: No heat or cold intolerance; No hair loss  MUSCULOSKELETAL: No joint pain or swelling; No muscle, back, or extremity pain  PSYCHIATRIC: No depression, anxiety, mood swings, or difficulty sleeping  HEME/LYMPH: No easy bruising, or bleeding gums  ALLERGY AND IMMUNOLOGIC: No hives or eczema    Vital Signs Last 24 Hrs  T(C): 36.7 (08 Sep 2021 06:25), Max: 36.8 (07 Sep 2021 17:43)  T(F): 98.1 (08 Sep 2021 06:25), Max: 98.2 (07 Sep 2021 17:43)  HR: 65 (08 Sep 2021 06:51) (65 - 89)  BP: 130/81 (08 Sep 2021 06:25) (100/63 - 168/96)  BP(mean): --  RR: 18 (08 Sep 2021 06:25) (17 - 18)  SpO2: 98% (08 Sep 2021 06:51) (95% - 98%)    PHYSICAL EXAM:  GENERAL:  well-groomed, well-developed  HEAD:  Atraumatic, Normocephalic  EYES: EOMI, PERRLA, conjunctiva and sclera clear  ENMT: Moist mucous membranes,   NECK: Supple, No JVD, Normal thyroid  NERVOUS SYSTEM:  Alert & Oriented X2, Good concentration; Motor Strength 5/5 B/L upper and lower extremities; DTRs 2+ intact and symmetric  CHEST/LUNG: Clear to percussion bilaterally; No rales, rhonchi, wheezing, or rubs  HEART: Regular rate and rhythm; No murmurs, rubs, or gallops  ABDOMEN: Soft, Nontender, Nondistended; Bowel sounds present  EXTREMITIES:  2+ Peripheral Pulses, No clubbing, cyanosis, or edema  LYMPH: No lymphadenopathy noted  SKIN: No rashes or lesions    LABS:                        10.3   8.83  )-----------( 183      ( 08 Sep 2021 06:53 )             33.4     -    145  |  106  |  21  ----------------------------<  134<H>  3.6   |  30  |  1.02    Ca    8.0<L>      08 Sep 2021 06:53    TPro  5.7<L>  /  Alb  2.6<L>  /  TBili  0.7  /  DBili  x   /  AST  15  /  ALT  26  /  AlkPhos  60  09-08    PT/INR - ( 07 Sep 2021 00:31 )   PT: 12.0 sec;   INR: 0.99 ratio         PTT - ( 07 Sep 2021 00:31 )  PTT:23.1 sec  Urinalysis Basic - ( 07 Sep 2021 00:31 )    Color: Yellow / Appearance: Clear / S.010 / pH: x  Gluc: x / Ketone: Negative  / Bili: Negative / Urobili: Negative mg/dL   Blood: x / Protein: 30 mg/dL / Nitrite: Negative   Leuk Esterase: Negative / RBC: 0-2 /HPF / WBC 0-2   Sq Epi: x / Non Sq Epi: Occasional / Bacteria: Few      CAPILLARY BLOOD GLUCOSE      POCT Blood Glucose.: 126 mg/dL (08 Sep 2021 08:10)  POCT Blood Glucose.: 122 mg/dL (07 Sep 2021 21:00)  POCT Blood Glucose.: 140 mg/dL (07 Sep 2021 17:02)  POCT Blood Glucose.: 144 mg/dL (07 Sep 2021 12:25)        Culture - Blood (collected 21 @ 13:17)  Source: .Blood Blood  Gram Stain (21 @ 01:22):    Growth in aerobic and anaerobic bottles: Gram Negative Rods  Preliminary Report (21 @ 01:22):    Growth in aerobic and anaerobic bottles: Gram Negative Rods    Culture - Blood (collected 21 @ 13:17)  Source: .Blood Blood  Gram Stain (21 @ 01:21):    Growth in aerobic bottle: Gram Variable Rods    Growth in anaerobic bottle: Gram Negative Rods  Preliminary Report (21 @ 01:22):    Growth in aerobic bottle: Gram Variable Rods    Growth in anaerobic bottle: Gram Negative Rods    ***Blood Panel PCR results on this specimen are available    approximately 3 hours after the Gram stain result.***    Gram stain, PCR, and/or culture results may not always    correspond due to difference in methodologies.    ************************************************************    This PCR assay was performed by multiplex PCR. This    Assay tests for 66 bacterial and resistance gene targets.    Please refer to the [facility] test directory    at [email or web address] for details.  Organism: Blood Culture PCR (21 @ 22:57)  Organism: Blood Culture PCR (21 @ 22:57)      -  Escherichia coli: Detec      Method Type: PCR        I&O's Summary    07 Sep 2021 07:01  -  08 Sep 2021 07:00  --------------------------------------------------------  IN: 700 mL / OUT: 600 mL / NET: 100 mL        RADIOLOGY & ADDITIONAL TESTS:    Imaging Personally Reviewed:  [ x] YES  [ ] NO    Consultant(s) Notes Reviewed:  [x ] YES  [ ] NO    Care Discussed with Consultants/Other Providers [x ] YES  [ ] NO
Patient is a 65y old  Female who presents with a chief complaint of sob (11 Sep 2021 06:12)      INTERVAL HPI/OVERNIGHT EVENTS:overnight events noted    Home Medications:  anastrozole 1 mg oral tablet: 1 tab(s) orally once a day (07 Sep 2021 01:37)  aspirin 81 mg oral tablet: 1 tab(s) orally once a day (07 Sep 2021 00:59)  atorvastatin 20 mg oral tablet: 1 tab(s) orally once a day (07 Sep 2021 00:59)  carvedilol 25 mg oral tablet: 1 tab(s) orally 2 times a day (07 Sep 2021 00:59)  cloNIDine 0.2 mg oral tablet: orally 3 times a day (07 Sep 2021 01:37)  furosemide 40 mg oral tablet: 1 tab(s) orally once a day (07 Sep 2021 00:59)  hydrALAZINE 100 mg oral tablet: orally 3 times a day (07 Sep 2021 01:37)  levothyroxine 150 mcg (0.15 mg) oral tablet: 1 tab(s) orally once a day (07 Sep 2021 00:59)  losartan 100 mg oral tablet: 1 tab(s) orally once a day (07 Sep 2021 00:59)  metFORMIN 500 mg oral tablet, extended release: 1 tab(s) orally once a day (07 Sep 2021 00:59)  potassium chloride 20 mEq oral tablet, extended release: orally 2 times a day (07 Sep 2021 01:37)  Senna 8.6 mg oral tablet: 2 tab(s) orally once a day (at bedtime) (07 Sep 2021 00:59)      MEDICATIONS  (STANDING):  albuterol/ipratropium for Nebulization 3 milliLiter(s) Nebulizer every 6 hours  anastrozole 1 milliGRAM(s) Oral daily  aspirin  chewable 81 milliGRAM(s) Oral daily  atorvastatin 20 milliGRAM(s) Oral at bedtime  carvedilol 25 milliGRAM(s) Oral every 12 hours  cefTRIAXone   IVPB 1000 milliGRAM(s) IV Intermittent every 24 hours  cloNIDine 0.2 milliGRAM(s) Oral three times a day  dextrose 40% Gel 15 Gram(s) Oral once  dextrose 5%. 1000 milliLiter(s) (50 mL/Hr) IV Continuous <Continuous>  dextrose 5%. 1000 milliLiter(s) (100 mL/Hr) IV Continuous <Continuous>  dextrose 50% Injectable 25 Gram(s) IV Push once  dextrose 50% Injectable 12.5 Gram(s) IV Push once  dextrose 50% Injectable 25 Gram(s) IV Push once  enoxaparin Injectable 40 milliGRAM(s) SubCutaneous daily  furosemide    Tablet 40 milliGRAM(s) Oral daily  glucagon  Injectable 1 milliGRAM(s) IntraMuscular once  hydrALAZINE 100 milliGRAM(s) Oral three times a day  insulin lispro (ADMELOG) corrective regimen sliding scale   SubCutaneous three times a day before meals  insulin lispro (ADMELOG) corrective regimen sliding scale   SubCutaneous at bedtime  lactobacillus acidophilus 1 Tablet(s) Oral three times a day  levothyroxine 150 MICROGram(s) Oral daily  losartan 100 milliGRAM(s) Oral daily  metFORMIN 500 milliGRAM(s) Oral two times a day  potassium chloride    Tablet ER 40 milliEquivalent(s) Oral every 4 hours  senna 2 Tablet(s) Oral at bedtime    MEDICATIONS  (PRN):  acetaminophen   Tablet .. 650 milliGRAM(s) Oral every 6 hours PRN Temp greater or equal to 38C (100.4F), Moderate Pain (4 - 6)      Allergies    No Known Allergies    Intolerances        REVIEW OF SYSTEMS:  CONSTITUTIONAL: No fever, weight loss, or fatigue  EYES: No eye pain, visual disturbances, or discharge  ENMT:  No difficulty hearing, tinnitus, vertigo; No sinus or throat pain  NECK: No pain or stiffness  BREASTS: No pain, masses, or nipple discharge  RESPIRATORY: No cough, wheezing, chills or hemoptysis; No shortness of breath  CARDIOVASCULAR: No chest pain, palpitations, dizziness, or leg swelling  GASTROINTESTINAL: No abdominal or epigastric pain. No nausea, vomiting,  GENITOURINARY: No dysuria, frequency, hematuria, or incontinence  NEUROLOGICAL: No headaches, memory loss, loss of strength, numbness, or tremors  SKIN: No itching, burning, rashes, or lesions   LYMPH NODES: No enlarged glands  ENDOCRINE: No heat or cold intolerance; No hair loss  MUSCULOSKELETAL: No joint pain or swelling; No muscle, back, or extremity pain  PSYCHIATRIC: No depression, anxiety, mood swings, or difficulty sleeping  HEME/LYMPH: No easy bruising, or bleeding gums  ALLERGY AND IMMUNOLOGIC: No hives or eczema    Vital Signs Last 24 Hrs  T(C): 37.1 (11 Sep 2021 09:35), Max: 37.3 (10 Sep 2021 21:55)  T(F): 98.8 (11 Sep 2021 09:35), Max: 99.1 (10 Sep 2021 21:55)  HR: 81 (11 Sep 2021 09:35) (71 - 93)  BP: 149/83 (11 Sep 2021 09:35) (149/83 - 198/98)  BP(mean): --  RR: 18 (11 Sep 2021 09:35) (18 - 18)  SpO2: 95% (11 Sep 2021 09:35) (93% - 98%)    PHYSICAL EXAM:  GENERAL: NAD, well-groomed, well-developed  HEAD:  Atraumatic, Normocephalic  EYES: EOMI, PERRLA, conjunctiva and sclera clear  ENMT: Moist mucous membranes,   NECK: Supple, No JVD, Normal thyroid  NERVOUS SYSTEM:  Alert & Oriented X2, confused non focal  CHEST/LUNG: Clear to percussion bilaterally; No rales, rhonchi, wheezing, or rubs  HEART: Regular rate and rhythm; No murmurs, rubs, or gallops  ABDOMEN: Soft, Nontender, Nondistended; Bowel sounds present  EXTREMITIES:  2+ Peripheral Pulses, No clubbing, cyanosis, or edema  LYMPH: No lymphadenopathy noted  SKIN: No rashes or lesions    LABS:                        10.8   8.21  )-----------( 205      ( 11 Sep 2021 07:36 )             34.7     09-11    143  |  105  |  18  ----------------------------<  133<H>  3.4<L>   |  32<H>  |  0.94    Ca    8.6      11 Sep 2021 07:36    TPro  6.0  /  Alb  2.7<L>  /  TBili  0.5  /  DBili  x   /  AST  16  /  ALT  22  /  AlkPhos  67  09-11        CAPILLARY BLOOD GLUCOSE      POCT Blood Glucose.: 125 mg/dL (11 Sep 2021 07:51)  POCT Blood Glucose.: 131 mg/dL (10 Sep 2021 21:12)  POCT Blood Glucose.: 116 mg/dL (10 Sep 2021 16:55)  POCT Blood Glucose.: 150 mg/dL (10 Sep 2021 12:04)        Culture - Blood (collected 09-09-21 @ 13:20)  Source: .Blood Blood-Peripheral  Preliminary Report (09-10-21 @ 14:01):    No growth to date.    Culture - Blood (collected 09-08-21 @ 13:16)  Source: .Blood Blood-Peripheral  Preliminary Report (09-09-21 @ 14:02):    No growth to date.    Culture - Urine (collected 09-07-21 @ 13:17)  Source: Clean Catch Clean Catch (Midstream)  Final Report (09-09-21 @ 06:07):    >100,000 CFU/ml Escherichia coli  Organism: Escherichia coli (09-09-21 @ 06:07)  Organism: Escherichia coli (09-09-21 @ 06:07)      -  Amikacin: S <=16      -  Amoxicillin/Clavulanic Acid: S <=8/4      -  Ampicillin: S <=8 These ampicillin results predict results for amoxicillin      -  Ampicillin/Sulbactam: S <=4/2 Enterobacter, Citrobacter, and Serratia may develop resistance during prolonged therapy (3-4 days)      -  Aztreonam: S <=4      -  Cefazolin: S <=2 ([id number]) For uncomplicated UTI with K. pneumoniae, E. coli, or P. mirablis: ARELI <=16 is sensitive and ARELI >=32 is resistant. This also predicts results for oral agents cefaclor, cefdinir, cefpodoxime, cefprozil, cefuroxime axetil, cephalexin and locarbef for uncomplicated UTI. Note that some isolates may be susceptible to these agents while testing resistant to cefazolin.      -  Cefepime: S <=2      -  Cefoxitin: S <=8      -  Ceftriaxone: S <=1 Enterobacter, Citrobacter, and Serratia may develop resistance during prolonged therapy      -  Ciprofloxacin: R >2      -  Ertapenem: S <=0.5      -  Gentamicin: S <=2      -  Imipenem: S <=1      -  Levofloxacin: R >4      -  Meropenem: S <=1      -  Nitrofurantoin: S <=32 Should not be used to treat pyelonephritis      -  Piperacillin/Tazobactam: S <=8      -  Tigecycline: S <=2      -  Tobramycin: S <=2      -  Trimethoprim/Sulfamethoxazole: S <=0.5/9.5      Method Type: ARELI    Culture - Blood (collected 09-07-21 @ 13:17)  Source: .Blood Blood  Gram Stain (09-08-21 @ 01:22):    Growth in aerobic and anaerobic bottles: Gram Negative Rods  Final Report (09-09-21 @ 17:28):    Growth in aerobic and anaerobic bottles: Escherichia coli    See previous culture [id number]    Culture - Blood (collected 09-07-21 @ 13:17)  Source: .Blood Blood  Gram Stain (09-08-21 @ 01:21):    Growth in aerobic bottle: Gram Variable Rods    Growth in anaerobic bottle: Gram Negative Rods  Final Report (09-09-21 @ 16:27):    Growth in aerobic and anaerobic bottles: Escherichia coli    ***Blood Panel PCR results on this specimen are available    approximately 3 hours after the Gram stain result.***    Gram stain, PCR, and/or culture results may not always    correspond due to difference in methodologies.    ************************************************************    This PCR assay was performed by multiplex PCR. This    Assay tests for 66 bacterial and resistance gene targets.    Please refer to the [facility] Labs test directory    at [email or web address] for details.  Organism: Blood Culture PCR  Escherichia coli (09-09-21 @ 16:27)  Organism: Escherichia coli (09-09-21 @ 16:27)      -  Amikacin: S <=16      -  Ampicillin: S <=8 These ampicillin results predict results for amoxicillin      -  Ampicillin/Sulbactam: S <=4/2 Enterobacter, Citrobacter, and Serratia may develop resistance during prolonged therapy (3-4 days)      -  Aztreonam: S <=4      -  Cefazolin: S <=2 Enterobacter, Citrobacter, and Serratia may develop resistance during prolonged therapy (3-4 days)      -  Cefepime: S <=2      -  Cefoxitin: S <=8      -  Ceftriaxone: S <=1 Enterobacter, Citrobacter, and Serratia may develop resistance during prolonged therapy      -  Ciprofloxacin: R >2      -  Ertapenem: S <=0.5      -  Gentamicin: S <=2      -  Imipenem: S <=1      -  Levofloxacin: R >4      -  Meropenem: S <=1      -  Piperacillin/Tazobactam: S <=8      -  Tobramycin: S <=2      -  Trimethoprim/Sulfamethoxazole: S <=0.5/9.5      Method Type: ARELI  Organism: Blood Culture PCR (09-09-21 @ 16:27)      -  Escherichia coli: Detec      Method Type: PCR        I&O's Summary    10 Sep 2021 07:01  -  11 Sep 2021 07:00  --------------------------------------------------------  IN: 360 mL / OUT: 1000 mL / NET: -640 mL        RADIOLOGY & ADDITIONAL TESTS:    Imaging Personally Reviewed:  [x ] YES  [ ] NO    Consultant(s) Notes Reviewed:  [x ] YES  [ ] NO    Care Discussed with Consultants/Other Providers [ x] YES  [ ] NO
Patient is a 65y old  Female who presents with a chief complaint of sob (14 Sep 2021 06:40)      INTERVAL HPI/OVERNIGHT EVENTS:overnight events noted    Home Medications:  anastrozole 1 mg oral tablet: 1 tab(s) orally once a day (07 Sep 2021 01:37)  aspirin 81 mg oral tablet: 1 tab(s) orally once a day (07 Sep 2021 00:59)  atorvastatin 20 mg oral tablet: 1 tab(s) orally once a day (07 Sep 2021 00:59)  carvedilol 25 mg oral tablet: 1 tab(s) orally 2 times a day (07 Sep 2021 00:59)  cloNIDine 0.2 mg oral tablet: orally 3 times a day (07 Sep 2021 01:37)  furosemide 40 mg oral tablet: 1 tab(s) orally once a day (07 Sep 2021 00:59)  hydrALAZINE 100 mg oral tablet: orally 3 times a day (07 Sep 2021 01:37)  levothyroxine 150 mcg (0.15 mg) oral tablet: 1 tab(s) orally once a day (07 Sep 2021 00:59)  losartan 100 mg oral tablet: 1 tab(s) orally once a day (07 Sep 2021 00:59)  metFORMIN 500 mg oral tablet, extended release: 1 tab(s) orally once a day (07 Sep 2021 00:59)  potassium chloride 20 mEq oral tablet, extended release: orally 2 times a day (07 Sep 2021 01:37)  Senna 8.6 mg oral tablet: 2 tab(s) orally once a day (at bedtime) (07 Sep 2021 00:59)      MEDICATIONS  (STANDING):  anastrozole 1 milliGRAM(s) Oral daily  aspirin  chewable 81 milliGRAM(s) Oral daily  atorvastatin 20 milliGRAM(s) Oral at bedtime  carvedilol 25 milliGRAM(s) Oral every 12 hours  cefuroxime   Tablet 500 milliGRAM(s) Oral every 12 hours  cloNIDine 0.2 milliGRAM(s) Oral three times a day  dextrose 40% Gel 15 Gram(s) Oral once  dextrose 5%. 1000 milliLiter(s) (50 mL/Hr) IV Continuous <Continuous>  dextrose 5%. 1000 milliLiter(s) (100 mL/Hr) IV Continuous <Continuous>  dextrose 50% Injectable 12.5 Gram(s) IV Push once  dextrose 50% Injectable 25 Gram(s) IV Push once  dextrose 50% Injectable 25 Gram(s) IV Push once  enoxaparin Injectable 40 milliGRAM(s) SubCutaneous daily  furosemide    Tablet 40 milliGRAM(s) Oral daily  glucagon  Injectable 1 milliGRAM(s) IntraMuscular once  hydrALAZINE 100 milliGRAM(s) Oral three times a day  insulin lispro (ADMELOG) corrective regimen sliding scale   SubCutaneous three times a day before meals  insulin lispro (ADMELOG) corrective regimen sliding scale   SubCutaneous at bedtime  lactobacillus acidophilus 1 Tablet(s) Oral three times a day  levothyroxine 150 MICROGram(s) Oral daily  losartan 100 milliGRAM(s) Oral daily  metFORMIN 500 milliGRAM(s) Oral two times a day  senna 2 Tablet(s) Oral at bedtime    MEDICATIONS  (PRN):  acetaminophen   Tablet .. 650 milliGRAM(s) Oral every 6 hours PRN Temp greater or equal to 38C (100.4F), Moderate Pain (4 - 6)  ALBUTerol    90 MICROgram(s) HFA Inhaler 2 Puff(s) Inhalation every 6 hours PRN Shortness of Breath and/or Wheezing      Allergies    No Known Allergies    Intolerances        REVIEW OF SYSTEMS:  CONSTITUTIONAL: No fever, weight loss, has fatigue  EYES: No eye pain, visual disturbances, or discharge  ENMT:  No difficulty hearing, tinnitus, vertigo; No sinus or throat pain  NECK: No pain or stiffness  BREASTS: No pain, masses, or nipple discharge  RESPIRATORY: No cough, wheezing, chills or hemoptysis; No shortness of breath  CARDIOVASCULAR: No chest pain, palpitations, dizziness, or leg swelling  GASTROINTESTINAL: No abdominal or epigastric pain. No nausea, vomiting, or hematemesis; No diarrhea or constipation.   GENITOURINARY: No dysuria, frequency, hematuria, or incontinence  NEUROLOGICAL: No headaches, memory loss, loss of strength, numbness, or tremors  SKIN: No itching, burning, rashes, or lesions   LYMPH NODES: No enlarged glands  ENDOCRINE: No heat or cold intolerance; No hair loss  MUSCULOSKELETAL: No joint pain or swelling; No muscle, back, or extremity pain  PSYCHIATRIC: No depression, anxiety, mood swings, or difficulty sleeping  HEME/LYMPH: No easy bruising, or bleeding gums  ALLERGY AND IMMUNOLOGIC: No hives or eczema    Vital Signs Last 24 Hrs  T(C): 36.5 (14 Sep 2021 10:17), Max: 37 (14 Sep 2021 01:25)  T(F): 97.7 (14 Sep 2021 10:17), Max: 98.6 (14 Sep 2021 01:25)  HR: 74 (14 Sep 2021 10:17) (71 - 87)  BP: 175/90 (14 Sep 2021 10:17) (143/81 - 205/113)  BP(mean): --  RR: 18 (14 Sep 2021 10:17) (16 - 18)  SpO2: 95% (14 Sep 2021 10:17) (87% - 96%)    PHYSICAL EXAM:  GENERAL:  well-groomed, well-developed  HEAD:  Atraumatic, Normocephalic  EYES: EOMI, PERRLA, conjunctiva and sclera clear  ENMT: Moist mucous membranes,   NECK: Supple, No JVD, Normal thyroid  NERVOUS SYSTEM:  Alert & Oriented X2, non focal  CHEST/LUNG: Clear to percussion bilaterally; No rales, rhonchi, wheezing, or rubs  HEART: Regular rate and rhythm; No murmurs, rubs, or gallops  ABDOMEN: Soft, Nontender, Nondistended; Bowel sounds present  EXTREMITIES:  2+ Peripheral Pulses, No clubbing, cyanosis, or edema  LYMPH: No lymphadenopathy noted  SKIN: No rashes or lesions    LABS:                        11.1   9.33  )-----------( 213      ( 14 Sep 2021 07:17 )             36.0     09-14    143  |  104  |  19  ----------------------------<  130<H>  3.2<L>   |  33<H>  |  0.88    Ca    8.5      14 Sep 2021 07:17    TPro  5.8<L>  /  Alb  2.9<L>  /  TBili  0.4  /  DBili  x   /  AST  25  /  ALT  46  /  AlkPhos  73  09-14        CAPILLARY BLOOD GLUCOSE      POCT Blood Glucose.: 125 mg/dL (14 Sep 2021 07:34)  POCT Blood Glucose.: 167 mg/dL (13 Sep 2021 21:32)  POCT Blood Glucose.: 125 mg/dL (13 Sep 2021 16:44)  POCT Blood Glucose.: 145 mg/dL (13 Sep 2021 11:56)        Culture - Blood (collected 09-09-21 @ 13:20)  Source: .Blood Blood-Peripheral  Preliminary Report (09-10-21 @ 14:01):    No growth to date.    Culture - Blood (collected 09-08-21 @ 13:16)  Source: .Blood Blood-Peripheral  Final Report (09-13-21 @ 14:00):    No Growth Final    Culture - Urine (collected 09-07-21 @ 13:17)  Source: Clean Catch Clean Catch (Midstream)  Final Report (09-09-21 @ 06:07):    >100,000 CFU/ml Escherichia coli  Organism: Escherichia coli (09-09-21 @ 06:07)  Organism: Escherichia coli (09-09-21 @ 06:07)      -  Amikacin: S <=16      -  Amoxicillin/Clavulanic Acid: S <=8/4      -  Ampicillin: S <=8 These ampicillin results predict results for amoxicillin      -  Ampicillin/Sulbactam: S <=4/2 Enterobacter, Citrobacter, and Serratia may develop resistance during prolonged therapy (3-4 days)      -  Aztreonam: S <=4      -  Cefazolin: S <=2 (MIC_CL_COM_ENTERIC_CEFAZU) For uncomplicated UTI with K. pneumoniae, E. coli, or P. mirablis: ARELI <=16 is sensitive and ARELI >=32 is resistant. This also predicts results for oral agents cefaclor, cefdinir, cefpodoxime, cefprozil, cefuroxime axetil, cephalexin and locarbef for uncomplicated UTI. Note that some isolates may be susceptible to these agents while testing resistant to cefazolin.      -  Cefepime: S <=2      -  Cefoxitin: S <=8      -  Ceftriaxone: S <=1 Enterobacter, Citrobacter, and Serratia may develop resistance during prolonged therapy      -  Ciprofloxacin: R >2      -  Ertapenem: S <=0.5      -  Gentamicin: S <=2      -  Imipenem: S <=1      -  Levofloxacin: R >4      -  Meropenem: S <=1      -  Nitrofurantoin: S <=32 Should not be used to treat pyelonephritis      -  Piperacillin/Tazobactam: S <=8      -  Tigecycline: S <=2      -  Tobramycin: S <=2      -  Trimethoprim/Sulfamethoxazole: S <=0.5/9.5      Method Type: ARELI    Culture - Blood (collected 09-07-21 @ 13:17)  Source: .Blood Blood  Gram Stain (09-08-21 @ 01:22):    Growth in aerobic and anaerobic bottles: Gram Negative Rods  Final Report (09-09-21 @ 17:28):    Growth in aerobic and anaerobic bottles: Escherichia coli    See previous culture [id number]    Culture - Blood (collected 09-07-21 @ 13:17)  Source: .Blood Blood  Gram Stain (09-08-21 @ 01:21):    Growth in aerobic bottle: Gram Variable Rods    Growth in anaerobic bottle: Gram Negative Rods  Final Report (09-09-21 @ 16:27):    Growth in aerobic and anaerobic bottles: Escherichia coli    ***Blood Panel PCR results on this specimen are available    approximately 3 hours after the Gram stain result.***    Gram stain, PCR, and/or culture results may not always    correspond due to difference in methodologies.    ************************************************************    This PCR assay was performed by multiplex PCR. This    Assay tests for 66 bacterial and resistance gene targets.    Please refer to the [facility] test directory    at [email or web address] for details.  Organism: Blood Culture PCR  Escherichia coli (09-09-21 @ 16:27)  Organism: Escherichia coli (09-09-21 @ 16:27)      -  Amikacin: S <=16      -  Ampicillin: S <=8 These ampicillin results predict results for amoxicillin      -  Ampicillin/Sulbactam: S <=4/2 Enterobacter, Citrobacter, and Serratia may develop resistance during prolonged therapy (3-4 days)      -  Aztreonam: S <=4      -  Cefazolin: S <=2 Enterobacter, Citrobacter, and Serratia may develop resistance during prolonged therapy (3-4 days)      -  Cefepime: S <=2      -  Cefoxitin: S <=8      -  Ceftriaxone: S <=1 Enterobacter, Citrobacter, and Serratia may develop resistance during prolonged therapy      -  Ciprofloxacin: R >2      -  Ertapenem: S <=0.5      -  Gentamicin: S <=2      -  Imipenem: S <=1      -  Levofloxacin: R >4      -  Meropenem: S <=1      -  Piperacillin/Tazobactam: S <=8      -  Tobramycin: S <=2      -  Trimethoprim/Sulfamethoxazole: S <=0.5/9.5      Method Type: ARELI  Organism: Blood Culture PCR (09-09-21 @ 16:27)      -  Escherichia coli: Detec      Method Type: PCR        I&O's Summary    13 Sep 2021 07:01  -  14 Sep 2021 07:00  --------------------------------------------------------  IN: 780 mL / OUT: 1300 mL / NET: -520 mL        RADIOLOGY & ADDITIONAL TESTS:    Imaging Personally Reviewed:  [ x] YES  [ ] NO    Consultant(s) Notes Reviewed:  [x ] YES  [ ] NO    Care Discussed with Consultants/Other Providers [ x] YES  [ ] NO
Patient is a 65y old  Female who presents with a chief complaint of sob (15 Sep 2021 06:29)      INTERVAL HPI/OVERNIGHT EVENTS:no ac event overnight    Home Medications:  anastrozole 1 mg oral tablet: 1 tab(s) orally once a day (07 Sep 2021 01:37)  aspirin 81 mg oral tablet: 1 tab(s) orally once a day (07 Sep 2021 00:59)  atorvastatin 20 mg oral tablet: 1 tab(s) orally once a day (07 Sep 2021 00:59)  carvedilol 25 mg oral tablet: 1 tab(s) orally 2 times a day (07 Sep 2021 00:59)  cloNIDine 0.2 mg oral tablet: orally 3 times a day (07 Sep 2021 01:37)  furosemide 40 mg oral tablet: 1 tab(s) orally once a day (07 Sep 2021 00:59)  hydrALAZINE 100 mg oral tablet: orally 3 times a day (07 Sep 2021 01:37)  levothyroxine 150 mcg (0.15 mg) oral tablet: 1 tab(s) orally once a day (07 Sep 2021 00:59)  losartan 100 mg oral tablet: 1 tab(s) orally once a day (07 Sep 2021 00:59)  metFORMIN 500 mg oral tablet, extended release: 1 tab(s) orally once a day (07 Sep 2021 00:59)  potassium chloride 20 mEq oral tablet, extended release: orally 2 times a day (07 Sep 2021 01:37)  Senna 8.6 mg oral tablet: 2 tab(s) orally once a day (at bedtime) (07 Sep 2021 00:59)      MEDICATIONS  (STANDING):  amLODIPine   Tablet 10 milliGRAM(s) Oral daily  anastrozole 1 milliGRAM(s) Oral daily  aspirin  chewable 81 milliGRAM(s) Oral daily  atorvastatin 20 milliGRAM(s) Oral at bedtime  carvedilol 25 milliGRAM(s) Oral every 12 hours  cefuroxime   Tablet 500 milliGRAM(s) Oral every 12 hours  cloNIDine 0.2 milliGRAM(s) Oral three times a day  dextrose 40% Gel 15 Gram(s) Oral once  dextrose 5%. 1000 milliLiter(s) (50 mL/Hr) IV Continuous <Continuous>  dextrose 5%. 1000 milliLiter(s) (100 mL/Hr) IV Continuous <Continuous>  dextrose 50% Injectable 12.5 Gram(s) IV Push once  dextrose 50% Injectable 25 Gram(s) IV Push once  dextrose 50% Injectable 25 Gram(s) IV Push once  enoxaparin Injectable 40 milliGRAM(s) SubCutaneous daily  furosemide    Tablet 40 milliGRAM(s) Oral daily  glucagon  Injectable 1 milliGRAM(s) IntraMuscular once  hydrALAZINE 100 milliGRAM(s) Oral three times a day  insulin lispro (ADMELOG) corrective regimen sliding scale   SubCutaneous three times a day before meals  insulin lispro (ADMELOG) corrective regimen sliding scale   SubCutaneous at bedtime  lactobacillus acidophilus 1 Tablet(s) Oral three times a day  levothyroxine 150 MICROGram(s) Oral daily  losartan 100 milliGRAM(s) Oral daily  metFORMIN 500 milliGRAM(s) Oral two times a day  potassium chloride    Tablet ER 40 milliEquivalent(s) Oral once  senna 2 Tablet(s) Oral at bedtime    MEDICATIONS  (PRN):  acetaminophen   Tablet .. 650 milliGRAM(s) Oral every 6 hours PRN Temp greater or equal to 38C (100.4F), Moderate Pain (4 - 6)  ALBUTerol    90 MICROgram(s) HFA Inhaler 2 Puff(s) Inhalation every 6 hours PRN Shortness of Breath and/or Wheezing      Allergies    No Known Allergies    Intolerances        REVIEW OF SYSTEMS:  CONSTITUTIONAL: No fever, weight loss, or fatigue  EYES: No eye pain, visual disturbances, or discharge  ENMT:  No difficulty hearing, tinnitus, vertigo; No sinus or throat pain  NECK: No pain or stiffness  BREASTS: No pain, masses, or nipple discharge  RESPIRATORY: No cough, wheezing, chills or hemoptysis; No shortness of breath  CARDIOVASCULAR: No chest pain, palpitations, dizziness, or leg swelling  GASTROINTESTINAL: No abdominal or epigastric pain. No nausea, vomiting, or hematemesis; No diarrhea or constipation. No melena or hematochezia.  GENITOURINARY: No dysuria, frequency, hematuria, or incontinence  NEUROLOGICAL: No headaches, memory loss, loss of strength, numbness, or tremors  SKIN: No itching, burning, rashes, or lesions   LYMPH NODES: No enlarged glands  ENDOCRINE: No heat or cold intolerance; No hair loss  MUSCULOSKELETAL: No joint pain or swelling; No muscle, back, or extremity pain  PSYCHIATRIC: No depression, anxiety, mood swings, or difficulty sleeping  HEME/LYMPH: No easy bruising, or bleeding gums  ALLERGY AND IMMUNOLOGIC: No hives or eczema    Vital Signs Last 24 Hrs  T(C): 37.1 (15 Sep 2021 09:19), Max: 37.1 (14 Sep 2021 21:11)  T(F): 98.7 (15 Sep 2021 09:19), Max: 98.7 (14 Sep 2021 21:11)  HR: 77 (15 Sep 2021 09:19) (64 - 90)  BP: 101/67 (15 Sep 2021 09:19) (101/67 - 181/105)  BP(mean): --  RR: 17 (15 Sep 2021 09:19) (15 - 18)  SpO2: 94% (15 Sep 2021 09:19) (90% - 97%)    PHYSICAL EXAM:  GENERAL: well-groomed, well-developed  HEAD:  Atraumatic, Normocephalic  EYES: EOMI, PERRLA, conjunctiva and sclera clear  ENMT: Moist mucous membranes,   NECK: Supple, No JVD, Normal thyroid  NERVOUS SYSTEM:  Alert & Oriented X2, confused non focal  CHEST/LUNG: Clear to percussion bilaterally;   HEART: Regular rate and rhythm; No murmurs, rubs, or gallops  ABDOMEN: Soft, Nontender, Nondistended; Bowel sounds present  EXTREMITIES:  2+ Peripheral Pulses, No clubbing, cyanosis, or edema  LYMPH: No lymphadenopathy noted  SKIN: No rashes or lesions    LABS:                        11.1   8.67  )-----------( 228      ( 15 Sep 2021 07:13 )             36.1     09-15    144  |  104  |  23  ----------------------------<  131<H>  3.4<L>   |  32<H>  |  1.00    Ca    8.6      15 Sep 2021 07:13    TPro  6.5  /  Alb  3.0<L>  /  TBili  1.6<H>  /  DBili  x   /  AST  25  /  ALT  45  /  AlkPhos  72  09-15        CAPILLARY BLOOD GLUCOSE      POCT Blood Glucose.: 120 mg/dL (15 Sep 2021 07:43)  POCT Blood Glucose.: 129 mg/dL (14 Sep 2021 21:06)  POCT Blood Glucose.: 141 mg/dL (14 Sep 2021 17:10)  POCT Blood Glucose.: 97 mg/dL (14 Sep 2021 11:59)        Culture - Blood (collected 09-09-21 @ 13:20)  Source: .Blood Blood-Peripheral  Final Report (09-14-21 @ 14:01):    No Growth Final    Culture - Blood (collected 09-08-21 @ 13:16)  Source: .Blood Blood-Peripheral  Final Report (09-13-21 @ 14:00):    No Growth Final        I&O's Summary    14 Sep 2021 07:01  -  15 Sep 2021 07:00  --------------------------------------------------------  IN: 840 mL / OUT: 2150 mL / NET: -1310 mL        RADIOLOGY & ADDITIONAL TESTS:    Imaging Personally Reviewed:  [ x] YES  [ ] NO    Consultant(s) Notes Reviewed:  [x ] YES  [ ] NO    Care Discussed with Consultants/Other Providers [x ] YES  [ ] NO
Patient is a 65y old  Female who presents with a chief complaint of sob (09 Sep 2021 11:09)      INTERVAL HPI/OVERNIGHT EVENTS:overnight events noted    Home Medications:  anastrozole 1 mg oral tablet: 1 tab(s) orally once a day (07 Sep 2021 01:37)  aspirin 81 mg oral tablet: 1 tab(s) orally once a day (07 Sep 2021 00:59)  atorvastatin 20 mg oral tablet: 1 tab(s) orally once a day (07 Sep 2021 00:59)  carvedilol 25 mg oral tablet: 1 tab(s) orally 2 times a day (07 Sep 2021 00:59)  cloNIDine 0.2 mg oral tablet: orally 3 times a day (07 Sep 2021 01:37)  furosemide 40 mg oral tablet: 1 tab(s) orally once a day (07 Sep 2021 00:59)  hydrALAZINE 100 mg oral tablet: orally 3 times a day (07 Sep 2021 01:37)  levothyroxine 150 mcg (0.15 mg) oral tablet: 1 tab(s) orally once a day (07 Sep 2021 00:59)  losartan 100 mg oral tablet: 1 tab(s) orally once a day (07 Sep 2021 00:59)  metFORMIN 500 mg oral tablet, extended release: 1 tab(s) orally once a day (07 Sep 2021 00:59)  potassium chloride 20 mEq oral tablet, extended release: orally 2 times a day (07 Sep 2021 01:37)  Senna 8.6 mg oral tablet: 2 tab(s) orally once a day (at bedtime) (07 Sep 2021 00:59)      MEDICATIONS  (STANDING):  albuterol/ipratropium for Nebulization 3 milliLiter(s) Nebulizer every 6 hours  anastrozole 1 milliGRAM(s) Oral daily  aspirin  chewable 81 milliGRAM(s) Oral daily  atorvastatin 20 milliGRAM(s) Oral at bedtime  carvedilol 25 milliGRAM(s) Oral every 12 hours  cefTRIAXone   IVPB 1000 milliGRAM(s) IV Intermittent every 24 hours  cloNIDine 0.2 milliGRAM(s) Oral three times a day  dextrose 40% Gel 15 Gram(s) Oral once  dextrose 5%. 1000 milliLiter(s) (50 mL/Hr) IV Continuous <Continuous>  dextrose 5%. 1000 milliLiter(s) (100 mL/Hr) IV Continuous <Continuous>  dextrose 50% Injectable 25 Gram(s) IV Push once  dextrose 50% Injectable 12.5 Gram(s) IV Push once  dextrose 50% Injectable 25 Gram(s) IV Push once  enoxaparin Injectable 40 milliGRAM(s) SubCutaneous daily  furosemide    Tablet 40 milliGRAM(s) Oral daily  glucagon  Injectable 1 milliGRAM(s) IntraMuscular once  hydrALAZINE 100 milliGRAM(s) Oral three times a day  insulin lispro (ADMELOG) corrective regimen sliding scale   SubCutaneous three times a day before meals  insulin lispro (ADMELOG) corrective regimen sliding scale   SubCutaneous at bedtime  lactobacillus acidophilus 1 Tablet(s) Oral three times a day  levothyroxine 150 MICROGram(s) Oral daily  losartan 100 milliGRAM(s) Oral daily  metFORMIN 500 milliGRAM(s) Oral two times a day  senna 2 Tablet(s) Oral at bedtime    MEDICATIONS  (PRN):  acetaminophen   Tablet .. 650 milliGRAM(s) Oral every 6 hours PRN Temp greater or equal to 38C (100.4F), Moderate Pain (4 - 6)      Allergies    No Known Allergies    Intolerances        REVIEW OF SYSTEMS:  CONSTITUTIONAL: No fever, weight loss, or fatigue  EYES: No eye pain, visual disturbances, or discharge  ENMT:  No difficulty hearing, tinnitus, vertigo; No sinus or throat pain  NECK: No pain or stiffness  BREASTS: No pain, masses, or nipple discharge  RESPIRATORY: No cough, wheezing, chills or hemoptysis; No shortness of breath  CARDIOVASCULAR: No chest pain, palpitations, dizziness, or leg swelling  GASTROINTESTINAL: No abdominal or epigastric pain. No nausea, vomiting, or hematemesis; No diarrhea or constipation. No melena or hematochezia.  GENITOURINARY: No dysuria, frequency, hematuria, or incontinence  NEUROLOGICAL: No headaches, memory loss, loss of strength, numbness, or tremors  SKIN: No itching, burning, rashes, or lesions   LYMPH NODES: No enlarged glands  ENDOCRINE: No heat or cold intolerance; No hair loss  MUSCULOSKELETAL: No joint pain or swelling; No muscle, back, or extremity pain  PSYCHIATRIC: No depression, anxiety, mood swings, or difficulty sleeping  HEME/LYMPH: No easy bruising, or bleeding gums  ALLERGY AND IMMUNOLOGIC: No hives or eczema    Vital Signs Last 24 Hrs  T(C): 36.4 (09 Sep 2021 09:39), Max: 37.1 (08 Sep 2021 14:45)  T(F): 97.5 (09 Sep 2021 09:39), Max: 98.7 (08 Sep 2021 14:45)  HR: 77 (09 Sep 2021 09:39) (68 - 82)  BP: 155/85 (09 Sep 2021 09:39) (155/85 - 188/98)  BP(mean): --  RR: 18 (09 Sep 2021 09:39) (16 - 20)  SpO2: 95% (09 Sep 2021 09:39) (93% - 99%)    PHYSICAL EXAM:  GENERAL: NAD, well-groomed, well-developed  HEAD:  Atraumatic, Normocephalic  EYES: EOMI, PERRLA, conjunctiva and sclera clear  ENMT: Moist mucous membranes,   NECK: Supple, No JVD, Normal thyroid  NERVOUS SYSTEM:  Alert & Oriented X2, non focal  CHEST/LUNG: Clear to percussion bilaterally; No rales, rhonchi, wheezing, or rubs  HEART: Regular rate and rhythm; No murmurs, rubs, or gallops  ABDOMEN: Soft, Nontender, Nondistended; Bowel sounds present  EXTREMITIES:  2+ Peripheral Pulses, No clubbing, cyanosis, or edema  LYMPH: No lymphadenopathy noted  SKIN: No rashes or lesions    LABS:                        10.3   7.73  )-----------( 184      ( 09 Sep 2021 07:21 )             33.2     09-09    145  |  105  |  17  ----------------------------<  126<H>  3.4<L>   |  32<H>  |  1.04    Ca    8.2<L>      09 Sep 2021 07:21    TPro  5.6<L>  /  Alb  2.9<L>  /  TBili  0.8  /  DBili  x   /  AST  11  /  ALT  22  /  AlkPhos  68  09-09        CAPILLARY BLOOD GLUCOSE      POCT Blood Glucose.: 140 mg/dL (09 Sep 2021 07:50)  POCT Blood Glucose.: 135 mg/dL (08 Sep 2021 21:02)  POCT Blood Glucose.: 124 mg/dL (08 Sep 2021 16:52)        Culture - Urine (collected 09-07-21 @ 13:17)  Source: Clean Catch Clean Catch (Midstream)  Final Report (09-09-21 @ 06:07):    >100,000 CFU/ml Escherichia coli  Organism: Escherichia coli (09-09-21 @ 06:07)  Organism: Escherichia coli (09-09-21 @ 06:07)      -  Amikacin: S <=16      -  Amoxicillin/Clavulanic Acid: S <=8/4      -  Ampicillin: S <=8 These ampicillin results predict results for amoxicillin      -  Ampicillin/Sulbactam: S <=4/2 Enterobacter, Citrobacter, and Serratia may develop resistance during prolonged therapy (3-4 days)      -  Aztreonam: S <=4      -  Cefazolin: S <=2 (MIC_CL_COM_ENTERIC_CEFAZU) For uncomplicated UTI with K. pneumoniae, E. coli, or P. mirablis: ARELI <=16 is sensitive and ARELI >=32 is resistant. This also predicts results for oral agents cefaclor, cefdinir, cefpodoxime, cefprozil, cefuroxime axetil, cephalexin and locarbef for uncomplicated UTI. Note that some isolates may be susceptible to these agents while testing resistant to cefazolin.      -  Cefepime: S <=2      -  Cefoxitin: S <=8      -  Ceftriaxone: S <=1 Enterobacter, Citrobacter, and Serratia may develop resistance during prolonged therapy      -  Ciprofloxacin: R >2      -  Ertapenem: S <=0.5      -  Gentamicin: S <=2      -  Imipenem: S <=1      -  Levofloxacin: R >4      -  Meropenem: S <=1      -  Nitrofurantoin: S <=32 Should not be used to treat pyelonephritis      -  Piperacillin/Tazobactam: S <=8      -  Tigecycline: S <=2      -  Tobramycin: S <=2      -  Trimethoprim/Sulfamethoxazole: S <=0.5/9.5      Method Type: ARELI    Culture - Blood (collected 09-07-21 @ 13:17)  Source: .Blood Blood  Gram Stain (09-08-21 @ 01:22):    Growth in aerobic and anaerobic bottles: Gram Negative Rods  Preliminary Report (09-08-21 @ 21:44):    Growth in aerobic and anaerobic bottles: Escherichia coli    See previous culture [id number]    Culture - Blood (collected 09-07-21 @ 13:17)  Source: .Blood Blood  Gram Stain (09-08-21 @ 01:21):    Growth in aerobic bottle: Gram Variable Rods    Growth in anaerobic bottle: Gram Negative Rods  Preliminary Report (09-08-21 @ 20:10):    Growth in aerobic and anaerobic bottles: Escherichia coli    ***Blood Panel PCR results on this specimen are available    approximately 3 hours after the Gram stain result.***    Gram stain, PCR, and/or culture results may not always    correspond due to difference in methodologies.    ************************************************************    This PCR assay was performed by multiplex PCR. This    Assay tests for 66 bacterial and resistance gene targets.    Please refer to the [facility] test directory    at [email or web address] for details.  Organism: Blood Culture PCR (09-07-21 @ 22:57)  Organism: Blood Culture PCR (09-07-21 @ 22:57)      -  Escherichia coli: Detec      Method Type: PCR        I&O's Summary    08 Sep 2021 07:01  -  09 Sep 2021 07:00  --------------------------------------------------------  IN: 460 mL / OUT: 2450 mL / NET: -1990 mL    09 Sep 2021 07:01  -  09 Sep 2021 12:24  --------------------------------------------------------  IN: 260 mL / OUT: 0 mL / NET: 260 mL        RADIOLOGY & ADDITIONAL TESTS:< from: CT Chest No Cont (09.07.21 @ 14:10) >  Left basilar pneumonia and atelectasis.  Trace basilar pleural effusions. Trace pericardial effusion.  Asymmetric soft tissue density right breast. Correlate clinically and with dedicated breast imaging.  Indeterminate left adrenal nodule with interval increase in size compared to 4/19/2019. Correlate with MR      < end of copied text >      Imaging Personally Reviewed:  [x ] YES  [ ] NO    Consultant(s) Notes Reviewed:  [x ] YES  [ ] NO    Care Discussed with Consultants/Other Providers [x ] YES  [ ] NO
Patient is a 65y old  Female who presents with a chief complaint of sob (12 Sep 2021 10:33)      INTERVAL HPI/OVERNIGHT EVENTS:no ac event overnight    Home Medications:  anastrozole 1 mg oral tablet: 1 tab(s) orally once a day (07 Sep 2021 01:37)  aspirin 81 mg oral tablet: 1 tab(s) orally once a day (07 Sep 2021 00:59)  atorvastatin 20 mg oral tablet: 1 tab(s) orally once a day (07 Sep 2021 00:59)  carvedilol 25 mg oral tablet: 1 tab(s) orally 2 times a day (07 Sep 2021 00:59)  cloNIDine 0.2 mg oral tablet: orally 3 times a day (07 Sep 2021 01:37)  furosemide 40 mg oral tablet: 1 tab(s) orally once a day (07 Sep 2021 00:59)  hydrALAZINE 100 mg oral tablet: orally 3 times a day (07 Sep 2021 01:37)  levothyroxine 150 mcg (0.15 mg) oral tablet: 1 tab(s) orally once a day (07 Sep 2021 00:59)  losartan 100 mg oral tablet: 1 tab(s) orally once a day (07 Sep 2021 00:59)  metFORMIN 500 mg oral tablet, extended release: 1 tab(s) orally once a day (07 Sep 2021 00:59)  potassium chloride 20 mEq oral tablet, extended release: orally 2 times a day (07 Sep 2021 01:37)  Senna 8.6 mg oral tablet: 2 tab(s) orally once a day (at bedtime) (07 Sep 2021 00:59)      MEDICATIONS  (STANDING):  albuterol/ipratropium for Nebulization 3 milliLiter(s) Nebulizer every 6 hours  anastrozole 1 milliGRAM(s) Oral daily  aspirin  chewable 81 milliGRAM(s) Oral daily  atorvastatin 20 milliGRAM(s) Oral at bedtime  carvedilol 25 milliGRAM(s) Oral every 12 hours  cefTRIAXone   IVPB 1000 milliGRAM(s) IV Intermittent every 24 hours  cloNIDine 0.2 milliGRAM(s) Oral three times a day  dextrose 40% Gel 15 Gram(s) Oral once  dextrose 5%. 1000 milliLiter(s) (50 mL/Hr) IV Continuous <Continuous>  dextrose 5%. 1000 milliLiter(s) (100 mL/Hr) IV Continuous <Continuous>  dextrose 50% Injectable 25 Gram(s) IV Push once  dextrose 50% Injectable 12.5 Gram(s) IV Push once  dextrose 50% Injectable 25 Gram(s) IV Push once  enoxaparin Injectable 40 milliGRAM(s) SubCutaneous daily  furosemide    Tablet 40 milliGRAM(s) Oral daily  glucagon  Injectable 1 milliGRAM(s) IntraMuscular once  hydrALAZINE 100 milliGRAM(s) Oral three times a day  insulin lispro (ADMELOG) corrective regimen sliding scale   SubCutaneous three times a day before meals  insulin lispro (ADMELOG) corrective regimen sliding scale   SubCutaneous at bedtime  lactobacillus acidophilus 1 Tablet(s) Oral three times a day  levothyroxine 150 MICROGram(s) Oral daily  losartan 100 milliGRAM(s) Oral daily  metFORMIN 500 milliGRAM(s) Oral two times a day  senna 2 Tablet(s) Oral at bedtime    MEDICATIONS  (PRN):  acetaminophen   Tablet .. 650 milliGRAM(s) Oral every 6 hours PRN Temp greater or equal to 38C (100.4F), Moderate Pain (4 - 6)      Allergies    No Known Allergies    Intolerances        REVIEW OF SYSTEMS:  CONSTITUTIONAL: No fever, weight loss, has fatigue  EYES: No eye pain, visual disturbances, or discharge  ENMT:  No difficulty hearing, tinnitus, vertigo; No sinus or throat pain  NECK: No pain or stiffness  BREASTS: No pain, masses, or nipple discharge  RESPIRATORY: No cough, wheezing, chills or hemoptysis; No shortness of breath  CARDIOVASCULAR: No chest pain, palpitations, dizziness, or leg swelling  GASTROINTESTINAL: No abdominal or epigastric pain. No nausea, vomiting,   GENITOURINARY: No dysuria, frequency, hematuria, or incontinence  NEUROLOGICAL: No headaches, memory loss, loss of strength, numbness, or tremors  SKIN: No itching, burning, rashes, or lesions   LYMPH NODES: No enlarged glands  ENDOCRINE: No heat or cold intolerance; No hair loss  MUSCULOSKELETAL: No joint pain or swelling; No muscle, back, or extremity pain  PSYCHIATRIC: No depression, anxiety, mood swings, or difficulty sleeping  HEME/LYMPH: No easy bruising, or bleeding gums  ALLERGY AND IMMUNOLOGIC: No hives or eczema    Vital Signs Last 24 Hrs  T(C): 36.7 (12 Sep 2021 10:22), Max: 37 (11 Sep 2021 14:00)  T(F): 98 (12 Sep 2021 10:22), Max: 98.6 (11 Sep 2021 14:00)  HR: 77 (12 Sep 2021 10:22) (69 - 86)  BP: 129/79 (12 Sep 2021 10:22) (129/79 - 208/114)  BP(mean): --  RR: 18 (12 Sep 2021 10:22) (16 - 18)  SpO2: 95% (12 Sep 2021 10:22) (93% - 98%)    PHYSICAL EXAM:  GENERAL:  well-groomed, well-developed  HEAD:  Atraumatic, Normocephalic  EYES: EOMI, PERRLA, conjunctiva and sclera clear  ENMT: Moist mucous membranes,   NECK: Supple, No JVD, Normal thyroid  NERVOUS SYSTEM:  Alert & Oriented X2, confused non focal  CHEST/LUNG: Clear to percussion bilaterally; No rales, rhonchi, wheezing, or rubs  HEART: Regular rate and rhythm; No murmurs, rubs, or gallops  ABDOMEN: Soft, Nontender, Nondistended; Bowel sounds present  EXTREMITIES:  2+ Peripheral Pulses, No clubbing, cyanosis, or edema  LYMPH: No lymphadenopathy noted  SKIN: No rashes or lesions    LABS:                        11.1   9.14  )-----------( 202      ( 12 Sep 2021 07:31 )             35.7     09-12    144  |  104  |  19  ----------------------------<  123<H>  3.7   |  29  |  0.90    Ca    8.3<L>      12 Sep 2021 07:31    TPro  6.2  /  Alb  2.8<L>  /  TBili  0.6  /  DBili  x   /  AST  25  /  ALT  34  /  AlkPhos  60  09-12        CAPILLARY BLOOD GLUCOSE      POCT Blood Glucose.: 119 mg/dL (12 Sep 2021 07:39)  POCT Blood Glucose.: 136 mg/dL (11 Sep 2021 21:09)  POCT Blood Glucose.: 125 mg/dL (11 Sep 2021 16:40)  POCT Blood Glucose.: 110 mg/dL (11 Sep 2021 12:05)        Culture - Blood (collected 09-09-21 @ 13:20)  Source: .Blood Blood-Peripheral  Preliminary Report (09-10-21 @ 14:01):    No growth to date.    Culture - Blood (collected 09-08-21 @ 13:16)  Source: .Blood Blood-Peripheral  Preliminary Report (09-09-21 @ 14:02):    No growth to date.    Culture - Urine (collected 09-07-21 @ 13:17)  Source: Clean Catch Clean Catch (Midstream)  Final Report (09-09-21 @ 06:07):    >100,000 CFU/ml Escherichia coli  Organism: Escherichia coli (09-09-21 @ 06:07)  Organism: Escherichia coli (09-09-21 @ 06:07)      -  Amikacin: S <=16      -  Amoxicillin/Clavulanic Acid: S <=8/4      -  Ampicillin: S <=8 These ampicillin results predict results for amoxicillin      -  Ampicillin/Sulbactam: S <=4/2 Enterobacter, Citrobacter, and Serratia may develop resistance during prolonged therapy (3-4 days)      -  Aztreonam: S <=4      -  Cefazolin: S <=2 (MIC_CL_COM_ENTERIC_CEFAZU) For uncomplicated UTI with K. pneumoniae, E. coli, or P. mirablis: ARELI <=16 is sensitive and ARELI >=32 is resistant. This also predicts results for oral agents cefaclor, cefdinir, cefpodoxime, cefprozil, cefuroxime axetil, cephalexin and locarbef for uncomplicated UTI. Note that some isolates may be susceptible to these agents while testing resistant to cefazolin.      -  Cefepime: S <=2      -  Cefoxitin: S <=8      -  Ceftriaxone: S <=1 Enterobacter, Citrobacter, and Serratia may develop resistance during prolonged therapy      -  Ciprofloxacin: R >2      -  Ertapenem: S <=0.5      -  Gentamicin: S <=2      -  Imipenem: S <=1      -  Levofloxacin: R >4      -  Meropenem: S <=1      -  Nitrofurantoin: S <=32 Should not be used to treat pyelonephritis      -  Piperacillin/Tazobactam: S <=8      -  Tigecycline: S <=2      -  Tobramycin: S <=2      -  Trimethoprim/Sulfamethoxazole: S <=0.5/9.5      Method Type: ARELI    Culture - Blood (collected 09-07-21 @ 13:17)  Source: .Blood Blood  Gram Stain (09-08-21 @ 01:22):    Growth in aerobic and anaerobic bottles: Gram Negative Rods  Final Report (09-09-21 @ 17:28):    Growth in aerobic and anaerobic bottles: Escherichia coli    See previous culture [id number]    Culture - Blood (collected 09-07-21 @ 13:17)  Source: .Blood Blood  Gram Stain (09-08-21 @ 01:21):    Growth in aerobic bottle: Gram Variable Rods    Growth in anaerobic bottle: Gram Negative Rods  Final Report (09-09-21 @ 16:27):    Growth in aerobic and anaerobic bottles: Escherichia coli    ***Blood Panel PCR results on this specimen are available    approximately 3 hours after the Gram stain result.***    Gram stain, PCR, and/or culture results may not always    correspond due to difference in methodologies.    ************************************************************    This PCR assay was performed by multiplex PCR. This    Assay tests for 66 bacterial and resistance gene targets.    Please refer to the [facility] test directory    at [email or web address] for details.  Organism: Blood Culture PCR  Escherichia coli (09-09-21 @ 16:27)  Organism: Escherichia coli (09-09-21 @ 16:27)      -  Amikacin: S <=16      -  Ampicillin: S <=8 These ampicillin results predict results for amoxicillin      -  Ampicillin/Sulbactam: S <=4/2 Enterobacter, Citrobacter, and Serratia may develop resistance during prolonged therapy (3-4 days)      -  Aztreonam: S <=4      -  Cefazolin: S <=2 Enterobacter, Citrobacter, and Serratia may develop resistance during prolonged therapy (3-4 days)      -  Cefepime: S <=2      -  Cefoxitin: S <=8      -  Ceftriaxone: S <=1 Enterobacter, Citrobacter, and Serratia may develop resistance during prolonged therapy      -  Ciprofloxacin: R >2      -  Ertapenem: S <=0.5      -  Gentamicin: S <=2      -  Imipenem: S <=1      -  Levofloxacin: R >4      -  Meropenem: S <=1      -  Piperacillin/Tazobactam: S <=8      -  Tobramycin: S <=2      -  Trimethoprim/Sulfamethoxazole: S <=0.5/9.5      Method Type: ARELI  Organism: Blood Culture PCR (09-09-21 @ 16:27)      -  Escherichia coli: Detec      Method Type: PCR        I&O's Summary    11 Sep 2021 07:01  -  12 Sep 2021 07:00  --------------------------------------------------------  IN: 0 mL / OUT: 700 mL / NET: -700 mL        RADIOLOGY & ADDITIONAL TESTS:    Imaging Personally Reviewed:  [x ] YES  [ ] NO    Consultant(s) Notes Reviewed:  [x ] YES  [ ] NO    Care Discussed with Consultants/Other Providers [x ] YES  [ ] NO

## 2021-09-15 NOTE — PROGRESS NOTE ADULT - PROBLEM SELECTOR PROBLEM 8
Cognitive impairment

## 2021-09-15 NOTE — PROGRESS NOTE ADULT - PROBLEM SELECTOR PLAN 1
ceftriaxone Id f up noted, improving , changed to PO ceftin
vanc zosyn, o2, nebs prn, pulmonary consult
ceftriaxone
ceftriaxone Id f up noted
ceftriaxone Id f up noted, improving , cont current tt
ceftriaxone Id f up noted
ceftriaxone Id f up noted, improving , cont current tt
ceftriaxone Id f up noted, improving , changed to PO ceftin

## 2021-09-15 NOTE — PROGRESS NOTE ADULT - PROBLEM SELECTOR PROBLEM 6
Benign essential HTN
Cognitive impairment
Benign essential HTN

## 2021-09-15 NOTE — PROGRESS NOTE ADULT - PROBLEM SELECTOR PROBLEM 5
Hypothyroidism
Diabetes mellitus

## 2021-09-15 NOTE — PROGRESS NOTE ADULT - PROBLEM SELECTOR PLAN 8
supportive care

## 2021-09-19 NOTE — ED ADULT TRIAGE NOTE - HEIGHT IN CM
170.18
I have personally performed a face to face diagnostic evaluation on this patient. I have reviewed the ACP note and agree with the history, exam and plan of care, except as noted.

## 2021-09-19 NOTE — ED ADULT TRIAGE NOTE - CHIEF COMPLAINT QUOTE
"I fell in the bathroom while getting up from the toilet and cut the back of my head. I also have pain to my right thigh."

## 2021-09-19 NOTE — ED ADULT NURSE NOTE - CAS DISCH BELONGINGS RETURNED
Soiled clothing in a bag with patient on her bed,. Patient wearing her earrings, watch and 2 rings./Not applicable

## 2021-09-19 NOTE — ED PROVIDER NOTE - MUSCULOSKELETAL, MLM
abdominal, L flank pain. no spinal ttp, +ttp medial right thigh, no bony ttp, no deformities, FROM UEs, equal ROM LEs

## 2021-09-19 NOTE — ED PROVIDER NOTE - OBJECTIVE STATEMENT
65 y.o. F BIBEMS s/p fall, had walked to bathroom with walker, was trying to sit and lost balance and fell, hit head on doorway, no LOC, no n/v, only c/o pain back of head - has lac, and had pain medial right thigh, which seems to have mostly improved,

## 2021-09-19 NOTE — ED PROVIDER NOTE - PATIENT PORTAL LINK FT
You can access the FollowMyHealth Patient Portal offered by [facility] by registering at the following website: [email or web address]. By joining [employer]’s FollowMyHealth portal, you will also be able to view your health information using other applications (apps) compatible with our system.

## 2021-09-19 NOTE — ED CLERICAL - NS ED CARE COORDINATION INFORMATION
This patient is eligible for (or currently enrolled in) an outpatient care management program available through [employer]. This program can coordinate outpatient follow up and assist the patient in accessing a variety of outpatient resources.  If discharged from the ED, the patient will be contacted to see if any additional resources are needed.                                                                                    Please call the Nurse Clinical Call Center at [phone] with any questions or for assistance in discharge planning.

## 2021-09-19 NOTE — ED PROVIDER NOTE - NSFOLLOWUPINSTRUCTIONS_ED_ALL_ED_FT
STAPLE REMOVAL 7-10 DAYS        Laceration Care, Adult      A laceration is a cut that may go through all layers of the skin and into the tissue that is right under the skin. Some lacerations heal on their own. Others need to be closed with stitches (sutures), staples, skin adhesive strips, or skin glue. Proper care of a laceration reduces the risk for infection, helps the laceration heal better, and may prevent scarring.      How to care for your laceration    Wash your hands with soap and water before touching your wound or changing your bandage (dressing). If soap and water are not available, use hand .    Keep the wound clean and dry.    If you were given a dressing, you should change it at least once a day, or as told by your health care provider. You should also change it if it becomes wet or dirty.    If sutures or staples were used:     •Keep the wound completely dry for the first 24 hours, or as told by your health care provider. After that time, you may shower or bathe. However, make sure that the wound is not soaked in water until after the sutures or staples have been removed.    •Clean the wound once each day, or as told by your health care provider:  •Wash the wound with soap and water.      •Rinse the wound with water to remove all soap.      •Pat the wound dry with a clean towel. Do not rub the wound.        •After cleaning the wound, apply a thin layer of antibiotic ointment as told by your health care provider. This will help prevent infection and keep the dressing from sticking to the wound.      •Have the sutures or staples removed as told by your health care provider.      If skin adhesive strips were used:     • Do not get the skin adhesive strips wet. You may shower or bathe, but be careful to keep the wound dry.      •If the wound gets wet, pat it dry with a clean towel. Do not rub the wound.      •Skin adhesive strips fall off on their own. You may trim the strips as the wound heals. Do not remove skin adhesive strips that are still stuck to the wound. They will fall off in time.      If skin glue was used:     •Try to keep the wound dry, but you may briefly wet it in the shower or bath. Do not soak the wound in water, such as by swimming.      •After you have showered or bathed, gently pat the wound dry with a clean towel. Do not rub the wound.      • Do not do any activities that will make you sweat heavily until the skin glue has fallen off on its own.      • Do not apply liquid, cream, or ointment medicine to the wound while the skin glue is in place. Using those may loosen the film before the wound has healed.      •If a dressing is placed over the wound, be careful not to apply tape directly over the skin glue. Doing that may cause the glue to be pulled off before the wound has healed.      • Do not pick at the glue. Skin glue usually remains in place for 5–10 days and then falls off the skin.        General instructions      •Take over-the-counter and prescription medicines only as told by your health care provider.      •If you were prescribed an antibiotic medicine or ointment, take or apply it as told by your health care provider. Do not stop using it even if your condition improves.      • Do not scratch or pick at the wound.    •Check your wound every day for signs of infection. Watch for:  •Redness, swelling, or pain.      •Fluid, blood, or pus.        •Raise (elevate) the injured area above the level of your heart while you are sitting or lying down for the first 24–48 hours after the laceration is repaired.    •If directed, put ice on the affected area:  •Put ice in a plastic bag.      •Place a towel between your skin and the bag.      •Leave the ice on for 20 minutes, 2–3 times a day.        •Keep all follow-up visits as told by your health care provider. This is important.        Contact a health care provider if:    •You received a tetanus shot and you have swelling, severe pain, redness, or bleeding at the injection site.      •You have a fever.      •A wound that was closed breaks open.      •You notice a bad smell coming from your wound or your dressing.      •You notice something coming out of the wound, such as wood or glass.      •Your pain is not controlled with medicine.      •You have increased redness, swelling, or pain at the site of your wound.      •You have fluid, blood, or pus coming from your wound.      •You need to change the dressing often due to fluid, blood, or pus that is draining from the wound.      •You develop a new rash.      •You develop numbness around the wound.        Get help right away if:    •You develop severe swelling around the wound.      •Your pain suddenly increases and is severe.      •You develop painful lumps near the wound or on skin anywhere else on your body.      •You have a red streak going away from your wound.      •The wound is on your hand or foot and you cannot properly move a finger or toe.      •The wound is on your hand or foot, and you notice that your fingers or toes look pale or bluish.        Summary    •A laceration is a cut that may go through all layers of the skin and into the tissue that is right under the skin.      •Some lacerations heal on their own. Others need to be closed with stitches (sutures), staples, skin adhesive strips, or skin glue.      •Proper care of a laceration reduces the risk of infection, helps the laceration heal better, and prevents scarring.      This information is not intended to replace advice given to you by your health care provider. Make sure you discuss any questions you have with your health care provider.        Groin Strain    AMBULATORY CARE:    A groin strain happens when a muscle or tendon is stretched or torn. Tendons are cords of tissue that attach muscle to bone.    Common symptoms include the following:   •Pain and tenderness in the inside of your thigh.      •Pain when you bring your legs together.      •Pain when you lift your knee.      Call your doctor if:   •You have increased swelling and pain in your injured area.      •You have increased groin pain when standing or with movement.      •You have questions or concerns about your injury or treatment.      Treatment for a groin strain may include pain medicine. You may also need a support device, such as crutches or a cane, to decrease pain as you move around.    Care for your groin strain:   •Rest to help decrease the risk of more damage to your groin. Avoid activities that cause you pain. Use crutches or a cane as directed.      •Apply ice to help decrease swelling and pain. Use an ice pack, or put crushed ice in a plastic bag. Cover it with a towel before you put it on your groin. Apply ice for 15 to 20 minutes every hour, or as directed.      •Wrap your groin. Your healthcare provider will instruct you on how to wrap your groin with an elastic bandage or tape. When you wrap your groin, it becomes more stable. Wrapping your groin can help decrease your pain.  How to Wrap an Elastic Bandage           •Elevate the leg on your injured side as often as you can. This will help decrease swelling and pain in your groin. Prop your leg on pillows or blankets to keep it elevated comfortably.  Elevate Leg           Activity: You may need to exercise the injured area after your pain and swelling have decreased. Exercises will help prevent stiffness in the injured area and increase strength. Return to your normal activities slowly. You could injure yourself again if you try to return to normal activity too soon. Return to your normal level of activity when:   •You do not have pain when you walk, run, or jump.      •Your injured leg moves like your other leg.      •Your injured leg feels as strong as your other leg.      Prevent another groin strain:   •Warm up and stretch before you exercise.      •Wear shoes that fit well.      •Wear equipment to protect yourself when you play sports.      •Do exercises as directed to build muscle strength.      Follow up with your doctor as directed: Write down your questions so you remember to ask them during your visits.

## 2021-09-19 NOTE — ED PROVIDER NOTE - SKIN, MLM
Skin normal color for race, warm, dry and intact. No evidence of rash. occipital 2cm laceration, mild active bleeding

## 2021-09-19 NOTE — ED ADULT NURSE NOTE - OBJECTIVE STATEMENT
fell backward walking to the bathroom without her walker Hit back of head + 6cm lineal laceration noted, denies N/V and denies LOC. fell backward walking to the bathroom without her walker Hit back of head + 6cm lineal laceration noted, denies N/V and denies LOC. Old hematoma noted to left forearm, patient states she had an IV there and had also hit that site about 3 weeks ago. Denies pain at this time to the area

## 2021-09-19 NOTE — ED ADULT NURSE NOTE - INTERVENTIONS DEFINITIONS
[place] to call system/Call bell, personal items and telephone within reach/Instruct patient to call for assistance/Room bathroom lighting operational/Non-slip footwear when patient is off stretcher/Physically safe environment: no spills, clutter or unnecessary equipment/Stretcher in lowest position, wheels locked, appropriate side rails in place/Provide visual cue, wrist band, yellow gown, etc./Monitor gait and stability/Monitor for mental status changes and reorient to person, place, and time/Review medications for side effects contributing to fall risk/Reinforce activity limits and safety measures with patient and family/Provide visual clues: red socks

## 2021-09-19 NOTE — ED PROVIDER NOTE - CLINICAL SUMMARY MEDICAL DECISION MAKING FREE TEXT BOX
fall, scalp laceration - need ct head r/o ich, right groin pain appears muscular - good ROM and no bony ttp

## 2021-10-06 NOTE — ED ADULT NURSE NOTE - NSIMPLEMENTINTERV_GEN_ALL_ED
Implemented All Fall with Harm Risk Interventions:  [place] to call system. Call bell, personal items and telephone within reach. Instruct patient to call for assistance. Room bathroom lighting operational. Non-slip footwear when patient is off stretcher. Physically safe environment: no spills, clutter or unnecessary equipment. Stretcher in lowest position, wheels locked, appropriate side rails in place. Provide visual cue, wrist band, yellow gown, etc. Monitor gait and stability. Monitor for mental status changes and reorient to person, place, and time. Review medications for side effects contributing to fall risk. Reinforce activity limits and safety measures with patient and family. Provide visual clues: red socks.

## 2021-10-06 NOTE — ED ADULT NURSE NOTE - OBJECTIVE STATEMENT
C/O abdominal pain, denies n/v/d. Patient noted to be tachypneic, was to use O2 at home and hasn't used it in 2 days. VSS. Denies chest pain. O2 in place. VSS. BV/L LE edema noted. CR<2 sec

## 2021-10-06 NOTE — ED PROVIDER NOTE - CLINICAL SUMMARY MEDICAL DECISION MAKING FREE TEXT BOX
65 y.o. F from assisted living c/o lower abd pain and appears in respiratory distress - for abd pain iv, labs, ua/cx, ct a/p - for respiratory distress, possibly cardiac, pt appears fluid overloaded, will check cardiac labs, dimer, give lasix, monitor on tele - pt will require admission

## 2021-10-06 NOTE — ED PROVIDER NOTE - CARE PLAN
Principal Discharge DX:	Acute on chronic systolic congestive heart failure  Secondary Diagnosis:	Bacterial UTI   1

## 2021-10-06 NOTE — ED ADULT NURSE NOTE - NS TRANSFER PATIENT BELONGINGS
ring, plastic necklace/Jewelry/Money (specify)/Clothing 2 metal rings, earing, watch , plastic necklace/Jewelry/Money (specify)/Clothing

## 2021-10-06 NOTE — ED PROVIDER NOTE - OBJECTIVE STATEMENT
64 y/o F presents to ED c/o abd pain since this morning. Pt reports she stopped using at home O2. Pt is a resident of [facility]. Pt reports b/l edema is at baseline. Pt walks w/ walker at baseline. Denies n/v/d. Lat BM this morning. Pt states BM was thin, unsure if she is constipated. Denies hx of abd surgery. Denies hx of asthma or COPD. 66 y/o F BIBEMS from [facility] c/o lower abd pain since this morning. Denies n/v/d. Last BM this morning, unsure if she is constipated. Denies hx of abd surgery. no known fever, no chills. Pt reports she stopped using at home O2 2d ago and EMS found pt to be hypoxic in the 80s with no O2, also EMS did not see O2 as order on pt's medical papers. Pt states she walks w/ walker at baseline, but EMS state they tried to stand her to get onto stretcher and pt could not support herself. Denies hx of asthma or COPD.

## 2021-10-07 NOTE — CONSULT NOTE ADULT - SUBJECTIVE AND OBJECTIVE BOX
HPI:  65F with PMH Type 2 DM, HTN,hypothyroid, cognitive impairment,recurrent falls,recurrent uti, right breast malignancy on chemo sent from [address] abd pain and incontinence, No fever chills n/v/d. Pt is a poor historian.  Afebrile. WBC 13K . CT CAP LLL atelectasis v pna. Procal 13.6. Also with elevated pro BNP.    Infectious Disease consult was called to evaluate pt and for antibiotic management.      Past Medical & Surgical Hx:  PAST MEDICAL & SURGICAL HISTORY:  HTN (hypertension)  Anxiety  HLD (hyperlipidemia)  Hypothyroid  Breast CA  right  Poor historian  Cancer of thyroid  unsure of dates but prior to   Coronary artery disease  Cognitive impairment  Osteoarthritis  Type 2 diabetes mellitus  Urinary frequency  History of adrenal adenoma  Diabetes  H/O total thyroidectomy  H/O bilateral breast biopsy  2018 and 2018  S/P angioplasty with stent  drug eluting stent in first diagonal on 19  S/P dilation and curettage  TOP x2      Social History--  EtOH: denies   Tobacco: denies   Drug Use: denies       FAMILY HISTORY:  FHx: suicide    FHx: lymphoma    Family history of brain damage (Sibling)  at birth      Allergies  No Known Allergies    Intolerances  NONE      Home Medications:  Acidophilus oral capsule: 1 cap(s) orally 2 times a day (06 Oct 2021 21:25)  anastrozole 1 mg oral tablet: 1 tab(s) orally once a day (06 Oct 2021 21:25)  aspirin 81 mg oral tablet: 1 tab(s) orally once a day (06 Oct 2021 21:25)  atorvastatin 20 mg oral tablet: 1 tab(s) orally once a day (06 Oct 2021 21:25)  carvedilol 25 mg oral tablet: 1 tab(s) orally 2 times a day (06 Oct 2021 21:25)  cloNIDine 0.2 mg oral tablet: orally 3 times a day (06 Oct 2021 21:25)  furosemide 40 mg oral tablet: 1 tab(s) orally once a day (06 Oct 2021 21:25)  hydrALAZINE 100 mg oral tablet: orally 3 times a day (06 Oct 2021 21:25)  levothyroxine 150 mcg (0.15 mg) oral tablet: 1 tab(s) orally once a day (06 Oct 2021 21:25)  losartan 100 mg oral tablet: 1 tab(s) orally once a day (06 Oct 2021 21:25)  metFORMIN 500 mg oral tablet, extended release: 1 tab(s) orally once a day (06 Oct 2021 21:25)  potassium chloride 20 mEq oral tablet, extended release: orally 2 times a day (06 Oct 2021 21:25)  senna 8.6 mg oral tablet: 2 tab(s) orally once a day (at bedtime) (06 Oct 2021 21:25)    Current Inpatient Medications :    ANTIBIOTICS:   cefTRIAXone   IVPB 1000 milliGRAM(s) IV Intermittent every 24 hours      OTHER RELEVANT MEDICATIONS :  acetaminophen   Tablet .. 650 milliGRAM(s) Oral every 6 hours PRN  ALBUTerol    90 MICROgram(s) HFA Inhaler 2 Puff(s) Inhalation every 6 hours PRN  amLODIPine   Tablet 10 milliGRAM(s) Oral daily  aspirin enteric coated 81 milliGRAM(s) Oral daily  atorvastatin 20 milliGRAM(s) Oral at bedtime  carvedilol 25 milliGRAM(s) Oral every 12 hours  cloNIDine 0.2 milliGRAM(s) Oral three times a day  dextrose 40% Gel 15 Gram(s) Oral once  dextrose 5%. 1000 milliLiter(s) IV Continuous <Continuous>  dextrose 5%. 1000 milliLiter(s) IV Continuous <Continuous>  dextrose 50% Injectable 25 Gram(s) IV Push once  dextrose 50% Injectable 12.5 Gram(s) IV Push once  dextrose 50% Injectable 25 Gram(s) IV Push once  enoxaparin Injectable 40 milliGRAM(s) SubCutaneous daily  furosemide   Injectable 40 milliGRAM(s) IV Push daily  glucagon  Injectable 1 milliGRAM(s) IntraMuscular once  hydrALAZINE 100 milliGRAM(s) Oral three times a day  insulin lispro (ADMELOG) corrective regimen sliding scale   SubCutaneous three times a day before meals  insulin lispro (ADMELOG) corrective regimen sliding scale   SubCutaneous at bedtime  levothyroxine 150 MICROGram(s) Oral daily  losartan 100 milliGRAM(s) Oral daily  metFORMIN 500 milliGRAM(s) Oral two times a day  morphine  - Injectable 2 milliGRAM(s) IV Push every 6 hours PRN  phenazopyridine 100 milliGRAM(s) Oral every 8 hours  potassium chloride    Tablet ER 20 milliEquivalent(s) Oral daily  traMADol 50 milliGRAM(s) Oral every 6 hours PRN      ROS:  CONSTITUTIONAL:  Negative fever or chills  EYES:  Negative  blurry vision or double vision  CARDIOVASCULAR:  Negative for chest pain or palpitations  RESPIRATORY:  Negative for cough, wheezing, or SOB   GASTROINTESTINAL:  Negative for nausea, vomiting, diarrhea, constipation, + lower abdominal pain  GENITOURINARY:  Negative urgency or hematuria + dysuria frequency,   NEUROLOGIC:  No headache, dizziness, lightheadedness +confusion,   All other systems were reviewed and are negative        I&O's Detail    06 Oct 2021 07:01  -  07 Oct 2021 07:00  --------------------------------------------------------  IN:  Total IN: 0 mL    OUT:    Voided (mL): 500 mL  Total OUT: 500 mL    Total NET: -500 mL      07 Oct 2021 07:01  -  07 Oct 2021 22:09  --------------------------------------------------------  IN:  Total IN: 0 mL    OUT:    Voided (mL): 1000 mL  Total OUT: 1000 mL    Total NET: -1000 mL          Physical Exam:  Vital Signs Last 24 Hrs  T(C): 37.3 (07 Oct 2021 21:13), Max: 37.3 (07 Oct 2021 21:13)  T(F): 99.2 (07 Oct 2021 21:13), Max: 99.2 (07 Oct 2021 21:13)  HR: 87 (07 Oct 2021 21:13) (75 - 90)  BP: 145/77 (07 Oct 2021 21:13) (117/61 - 159/73)  BP(mean): --  RR: 20 (07 Oct 2021 21:13) (18 - 25)  SpO2: 93% (07 Oct 2021 21:13) (93% - 98%)      General: awake not comfortable c/o abd pain  Neck: supple, trachea midline  Lungs: Decreased, no wheeze/rhonchi  Cardiovascular: regular rate and rhythm, S1 S2  Abdomen: soft, lower abd tender, ND, bowel sounds normal  Neurological:  awake confused  Skin: no rash  Extremities: + edema    Labs:                         10.2   13.17 )-----------( 211      ( 06 Oct 2021 21:43 )             32.5   10    143  |  104  |  22  ----------------------------<  169<H>  3.3<L>   |  30  |  1.08    Ca    8.1<L>      06 Oct 2021 21:43    TPro  6.5  /  Alb  3.0<L>  /  TBili  0.9  /  DBili  x   /  AST  17  /  ALT  25  /  AlkPhos  71  10-06    Urinalysis Basic - ( 07 Oct 2021 02:14 )    Color: Yellow / Appearance: Clear / S.010 / pH: x  Gluc: x / Ketone: Negative  / Bili: Negative / Urobili: Negative mg/dL   Blood: x / Protein: Negative mg/dL / Nitrite: Positive   Leuk Esterase: Negative / RBC: x / WBC 0-2   Sq Epi: x / Non Sq Epi: Occasional / Bacteria: Moderate      RECENT CULTURES:          RADIOLOGY & ADDITIONAL STUDIES:    EXAM:  CT ABDOMEN AND PELVIS IC                          [facility]                                  PROCEDURE DATE:  10/06/2021          INTERPRETATION:  CLINICAL INFORMATION: Abdominal pain. Bilateral leg edema.    COMPARISON: None.    CONTRAST/COMPLICATIONS:  IV Contrast: Omnipaque 350 (accession [id number]), IV contrast documented in associated exam (accession [id number])  90 cc administered   10 cc discarded  Oral Contrast: NONE  Complications: None reported at time of study completion    PROCEDURE:  CT Angiography of the Chest was performed followed by portal venous phase imaging of the Abdomen and Pelvis.  Sagittal and coronal reformats were performed as well as 3D (MIP) reconstructions.    FINDINGS:  CHEST:  LUNGS AND LARGE AIRWAYS: Patent central airways. Elevated right diaphragm. Subsegmental atelectasis in the right upper lobe and bilateral lung bases. Subsegmental left lower lobe atelectasis versus pneumonia.  PLEURA: No pleural effusion.  VESSELS: No pulmonary embolus up to and including the segmental branches. Subsegmental branches are not well evaluated due to respiratory motion artifact. Atherosclerotic calcifications of the aorta and coronary arteries.  HEART: Heart size is normal. No pericardial effusion.  MEDIASTINUM AND FRANCISCA: No lymphadenopathy.  CHEST WALL AND LOWER NECK: Diffuse right breast skin thickening and prominent soft tissue in the retroareolar region, again noted.    ABDOMEN AND PELVIS:  LIVER: Within normal limits.  BILE DUCTS: Normal caliber.  GALLBLADDER: Within normal limits.  SPLEEN: Within normal limits.  PANCREAS: Within normal limits.  ADRENALS: Left adrenal adenoma measures 3.2 x 2.5 cm, unchanged.  KIDNEYS/URETERS: Bilateral renal cysts. No hydronephrosis..    BLADDER: Within normal limits.  REPRODUCTIVE ORGANS: Fibroid uterus.    BOWEL: No bowel obstruction. Appendix is normal.  PERITONEUM: No ascites.  VESSELS: Within normal limits.  RETROPERITONEUM/LYMPH NODES: No lymphadenopathy.  ABDOMINAL WALL: Within normal limits.  BONES: Degenerative changes.    IMPRESSION:  No pulmonary embolus up to and including segmental branches. Stable subsegmental left lower lobe atelectasis versus pneumonia.    No acute abdominal pathology.        Assessment :   65F with PMH Type 2 DM, HTN,hypothyroid, cognitive impairment,recurrent falls,recurrent uti, right breast malignancy on chemo sent from [facility] abd pain and incontinence.   Rule out AUR  CT CAP LLL atelectasis v pna. likely atelectasis though unclear why Procal 13.6.  Afebrile. WBC 13K .   Also with elevated pro BNP.      Plan :   Cont Empiric Rocephin  Trend temps and cbc  Bladder scan  Fu cultures  Asp precautions      Continue with present regiment .  Approptiate use of antibiotics and adverse effects reviewed.        > 45 minutes spent in direct patient care reviewing  the notes, lab data/ imaging , discussion with multidisciplinary team. All questions were addressed and answered to the best of my capacity .    Thank you for allowing me to participate in the care of your patient .      Sunni Espinoza MD  Infectious Disease  [phone]

## 2021-10-07 NOTE — H&P ADULT - ASSESSMENT
65F with PMH Type 2 DM, HTN,hypothyroid, cognitive impairment,recurrent falls,recurrent uti, right breast malignancy on chemo  sent from [facility] abd pain since this morning. Pt reports she stopped using at home O2. Pt is a resident of [facility]. Pt reports b/l edema is at baseline. Pt walks w/ walker at baseline. Denies n/v/d. Lat BM this morning. Pt states BM was thin, unsure if she is constipated. Denies hx of abd surgery.   IN ED temp 99, bp150/83,HR96,on o2 2 litre via nasal cannula 96%,has b/l leg edema,had CTA< from: CT Angio Chest PE Protocol w/ IV Cont (10.06.21 @ 23:47) >  No pulmonary embolus up to and including segmental branches. Stable subsegmental left lower lobe atelectasis v pna  has rt breat changes, adrenal adenoma and fibroid ut, UA is suspicious of UTI, started on ceftriaxone, and received lasix with suspicion of chf with edema hypoxia .  < from: US Transthoracic Echocardiogram w/Doppler Complete (09.07.21 @ 11:29) >   moderate asymmetric septal hypertrophy with the normal overall systolic function with significant outflow tract obstruction  admitted for further management for  abd pain suspected UTI  likely chf with outflow tract obstruction with septal hypertrophy  hypoxia, ch   Ess HTN  breat ca  pressure ulcers POA  goals of care discussion  hypothyroid  rec falls  DM2  cognitive impairment  ambulatory dysfunction  respiratory distress likely ac on ch with possible chf diastolic

## 2021-10-07 NOTE — CONSULT NOTE ADULT - SUBJECTIVE AND OBJECTIVE BOX
Date/Time Patient Seen:  		  Referring MD:   Data Reviewed	       Patient is a 65y old  Female who presents with a chief complaint of abd pain (07 Oct 2021 05:55)      Subjective/HPI  vs noted  labs reviewed  H and P reviewed  ER provider note reviewed  ct chest reviewed  known to me from prior admission       65F with PMH Type 2 DM, HTN,hypothyroid, cognitive impairment,recurrent falls,recurrent uti, right breast malignancy on chemo  sent from [place] abd pain since this morning. Pt reports she stopped using at home O2. Pt is a resident of [facility]. Pt reports b/l edema is at baseline. Pt walks w/ walker at baseline. Denies n/v/d. Lat BM this morning. Pt states BM was thin, unsure if she is constipated. Denies hx of abd surgery.   IN ED temp 99, bp150/83,HR96,on o2 2 litre via nasal cannula 96%,has b/l leg edema,had CTA< from: CT Angio Chest PE Protocol w/ IV Cont (10.06.21 @ 23:47) >  No pulmonary embolus up to and including segmental branches. Stable subsegmental left lower lobe atelectasis v pna  has rt breat changes, adrenal adenoma and fibroid ut, UA is suspicious of UTI, started on ceftriaxone, and received lasix with suspicion of chf with edema hypoxia .  < from: US Transthoracic Echocardiogram w/Doppler Complete (09.07.21 @ 11:29) >   moderate asymmetric septal hypertrophy with the normal overall systolic function with significant outflow tract obstruction  admitted for further management  PAST MEDICAL & SURGICAL HISTORY:  HTN (hypertension)    HLD (hyperlipidemia)    Anxiety    HLD (hyperlipidemia)    Hypothyroid    Adrenal nodule    Breast CA  right    Poor historian    Cancer of thyroid  unsure of dates but prior to 2005    Coronary artery disease    Cognitive impairment    Osteoarthritis    Type 2 diabetes mellitus    Urinary frequency    History of adrenal adenoma    Diabetes    H/O total thyroidectomy    H/O bilateral breast biopsy  November 2018 and December 2018    S/P angioplasty with stent  drug eluting stent in first diagonal on 2/21/19    S/P dilation and curettage  TOP x2  FAMILY HISTORY:  FHx: lymphoma  FHx: suicide    Sibling  Still living? Yes, Estimated age: 51-60  Family history of brain damage, Age at diagnosis: Age Unknown.     Tobacco Screening:  · Core Measure Site	No    Risk Assessment:    Heart Failure:  Does this patient have a history of or has been diagnosed with heart failure? yes.     LV Function Assessment (LVS function was evaluated before arrival and/or during hospitalization) yes.     Is the Ejection Fraction >40% ? yes.          Medication list         MEDICATIONS  (STANDING):  amLODIPine   Tablet 10 milliGRAM(s) Oral daily  anastrozole 1 milliGRAM(s) Oral daily  aspirin enteric coated 81 milliGRAM(s) Oral daily  atorvastatin 20 milliGRAM(s) Oral at bedtime  carvedilol 25 milliGRAM(s) Oral every 12 hours  cefTRIAXone   IVPB 1000 milliGRAM(s) IV Intermittent every 24 hours  cloNIDine 0.2 milliGRAM(s) Oral three times a day  dextrose 40% Gel 15 Gram(s) Oral once  dextrose 5%. 1000 milliLiter(s) (50 mL/Hr) IV Continuous <Continuous>  dextrose 5%. 1000 milliLiter(s) (100 mL/Hr) IV Continuous <Continuous>  dextrose 50% Injectable 25 Gram(s) IV Push once  dextrose 50% Injectable 12.5 Gram(s) IV Push once  dextrose 50% Injectable 25 Gram(s) IV Push once  enoxaparin Injectable 40 milliGRAM(s) SubCutaneous daily  furosemide   Injectable 40 milliGRAM(s) IV Push daily  glucagon  Injectable 1 milliGRAM(s) IntraMuscular once  hydrALAZINE 100 milliGRAM(s) Oral three times a day  insulin lispro (ADMELOG) corrective regimen sliding scale   SubCutaneous three times a day before meals  insulin lispro (ADMELOG) corrective regimen sliding scale   SubCutaneous at bedtime  lactobacillus acidophilus 1 Tablet(s) Oral three times a day  levothyroxine 150 MICROGram(s) Oral daily  losartan 100 milliGRAM(s) Oral daily  metFORMIN 500 milliGRAM(s) Oral two times a day  potassium chloride    Tablet ER 20 milliEquivalent(s) Oral daily    MEDICATIONS  (PRN):  acetaminophen   Tablet .. 650 milliGRAM(s) Oral every 6 hours PRN Temp greater or equal to 38C (100.4F), Mild Pain (1 - 3)  ALBUTerol    90 MICROgram(s) HFA Inhaler 2 Puff(s) Inhalation every 6 hours PRN Shortness of Breath and/or Wheezing  morphine  - Injectable 2 milliGRAM(s) IV Push every 6 hours PRN Severe Pain (7 - 10)  traMADol 50 milliGRAM(s) Oral every 6 hours PRN Moderate Pain (4 - 6)         Vitals log        ICU Vital Signs Last 24 Hrs  T(C): 36.8 (06 Oct 2021 23:53), Max: 37.2 (06 Oct 2021 20:32)  T(F): 98.3 (06 Oct 2021 23:53), Max: 99 (06 Oct 2021 20:32)  HR: 78 (07 Oct 2021 02:07) (78 - 97)  BP: 145/78 (07 Oct 2021 02:07) (130/74 - 150/83)  BP(mean): --  ABP: --  ABP(mean): --  RR: 18 (07 Oct 2021 02:07) (18 - 28)  SpO2: 98% (07 Oct 2021 02:07) (96% - 98%)           Input and Output:  I&O's Detail    06 Oct 2021 07:01  -  07 Oct 2021 06:42  --------------------------------------------------------  IN:  Total IN: 0 mL    OUT:    Voided (mL): 500 mL  Total OUT: 500 mL    Total NET: -500 mL          Lab Data                        10.2   13.17 )-----------( 211      ( 06 Oct 2021 21:43 )             32.5     10-06    143  |  104  |  22  ----------------------------<  169<H>  3.3<L>   |  30  |  1.08    Ca    8.1<L>      06 Oct 2021 21:43    TPro  6.5  /  Alb  3.0<L>  /  TBili  0.9  /  DBili  x   /  AST  17  /  ALT  25  /  AlkPhos  71  10-06      CARDIAC MARKERS ( 06 Oct 2021 21:43 )  .034 ng/mL / x     / x     / x     / x            Review of Systems	      Objective     Physical Examination        Pertinent Lab findings & Imaging      Miguel Angel:  NO   Adequate UO     I&O's Detail    06 Oct 2021 07:01  -  07 Oct 2021 06:42  --------------------------------------------------------  IN:  Total IN: 0 mL    OUT:    Voided (mL): 500 mL  Total OUT: 500 mL    Total NET: -500 mL               Discussed with:     Cultures:	        Radiology    EXAM:  CT ABDOMEN AND PELVIS IC                          EXAM:  CT ANGIO CHEST PULM ART [facility]                                  PROCEDURE DATE:  10/06/2021          INTERPRETATION:  CLINICAL INFORMATION: Abdominal pain. Bilateral leg edema.    COMPARISON: None.    CONTRAST/COMPLICATIONS:  IV Contrast: Omnipaque 350 (accession [id number]), IV contrast documented in associated exam (accession [id number])  90 cc administered   10 cc discarded  Oral Contrast: NONE  Complications: None reported at time of study completion    PROCEDURE:  CT Angiography of the Chest was performed followed by portal venous phase imaging of the Abdomen and Pelvis.  Sagittal and coronal reformats were performed as well as 3D (MIP) reconstructions.    FINDINGS:  CHEST:  LUNGS AND LARGE AIRWAYS: Patent central airways. Elevated right diaphragm. Subsegmental atelectasis in the right upper lobe and bilateral lung bases. Subsegmental left lower lobe atelectasis versus pneumonia.  PLEURA: No pleural effusion.  VESSELS: No pulmonary embolus up to and including the segmental branches. Subsegmental branches are not well evaluated due to respiratory motion artifact. Atherosclerotic calcifications of the aorta and coronary arteries.  HEART: Heart size is normal. No pericardial effusion.  MEDIASTINUM AND FRANCISCA: No lymphadenopathy.  CHEST WALL AND LOWER NECK: Diffuse right breast skin thickening and prominent soft tissue in the retroareolar region, again noted.    ABDOMEN AND PELVIS:  LIVER: Within normal limits.  BILE DUCTS: Normal caliber.  GALLBLADDER: Within normal limits.  SPLEEN: Within normal limits.  PANCREAS: Within normal limits.  ADRENALS: Left adrenal adenoma measures 3.2 x 2.5 cm, unchanged.  KIDNEYS/URETERS: Bilateral renal cysts. No hydronephrosis..    BLADDER: Within normal limits.  REPRODUCTIVE ORGANS: Fibroid uterus.    BOWEL: No bowel obstruction. Appendix is normal.  PERITONEUM: No ascites.  VESSELS: Within normal limits.  RETROPERITONEUM/LYMPH NODES: No lymphadenopathy.  ABDOMINAL WALL: Within normal limits.  BONES: Degenerative changes.    IMPRESSION:  No pulmonary embolus up to and including segmental branches. Stable subsegmental left lower lobe atelectasis versus pneumonia.    No acute abdominal pathology.        --- End of Report ---              HEATHER CREWS MD; Attending Radiologist  This document has been electronically signed. Oct  7 2021  1:11AM                           Date/Time Patient Seen:  		  Referring MD:   Data Reviewed	       Patient is a 65y old  Female who presents with a chief complaint of abd pain (07 Oct 2021 05:55)      Subjective/HPI  vs noted  labs reviewed  H and P reviewed  ER provider note reviewed  ct chest reviewed  known to me from prior admission       65F with PMH Type 2 DM, HTN,hypothyroid, cognitive impairment,recurrent falls,recurrent uti, right breast malignancy on chemo  sent from [email or web address] abd pain since this morning. Pt reports she stopped using at home O2. Pt is a resident of [facility]. Pt reports b/l edema is at baseline. Pt walks w/ walker at baseline. Denies n/v/d. Lat BM this morning. Pt states BM was thin, unsure if she is constipated. Denies hx of abd surgery.   IN ED temp 99, bp150/83,HR96,on o2 2 litre via nasal cannula 96%,has b/l leg edema,had CTA< from: CT Angio Chest PE Protocol w/ IV Cont (10.06.21 @ 23:47) >  No pulmonary embolus up to and including segmental branches. Stable subsegmental left lower lobe atelectasis v pna  has rt breat changes, adrenal adenoma and fibroid ut, UA is suspicious of UTI, started on ceftriaxone, and received lasix with suspicion of chf with edema hypoxia .  < from: US Transthoracic Echocardiogram w/Doppler Complete (09.07.21 @ 11:29) >   moderate asymmetric septal hypertrophy with the normal overall systolic function with significant outflow tract obstruction  admitted for further management  PAST MEDICAL & SURGICAL HISTORY:  HTN (hypertension)    HLD (hyperlipidemia)    Anxiety    HLD (hyperlipidemia)    Hypothyroid    Adrenal nodule    Breast CA  right    Poor historian    Cancer of thyroid  unsure of dates but prior to 2005    Coronary artery disease    Cognitive impairment    Osteoarthritis    Type 2 diabetes mellitus    Urinary frequency    History of adrenal adenoma    Diabetes    H/O total thyroidectomy    H/O bilateral breast biopsy  November 2018 and December 2018    S/P angioplasty with stent  drug eluting stent in first diagonal on 2/21/19    S/P dilation and curettage  TOP x2  FAMILY HISTORY:  FHx: lymphoma  FHx: suicide    Sibling  Still living? Yes, Estimated age: 51-60  Family history of brain damage, Age at diagnosis: Age Unknown.     Tobacco Screening:  · Core Measure Site	No    Risk Assessment:    Heart Failure:  Does this patient have a history of or has been diagnosed with heart failure? yes.     LV Function Assessment (LVS function was evaluated before arrival and/or during hospitalization) yes.     Is the Ejection Fraction >40% ? yes.          Medication list         MEDICATIONS  (STANDING):  amLODIPine   Tablet 10 milliGRAM(s) Oral daily  anastrozole 1 milliGRAM(s) Oral daily  aspirin enteric coated 81 milliGRAM(s) Oral daily  atorvastatin 20 milliGRAM(s) Oral at bedtime  carvedilol 25 milliGRAM(s) Oral every 12 hours  cefTRIAXone   IVPB 1000 milliGRAM(s) IV Intermittent every 24 hours  cloNIDine 0.2 milliGRAM(s) Oral three times a day  dextrose 40% Gel 15 Gram(s) Oral once  dextrose 5%. 1000 milliLiter(s) (50 mL/Hr) IV Continuous <Continuous>  dextrose 5%. 1000 milliLiter(s) (100 mL/Hr) IV Continuous <Continuous>  dextrose 50% Injectable 25 Gram(s) IV Push once  dextrose 50% Injectable 12.5 Gram(s) IV Push once  dextrose 50% Injectable 25 Gram(s) IV Push once  enoxaparin Injectable 40 milliGRAM(s) SubCutaneous daily  furosemide   Injectable 40 milliGRAM(s) IV Push daily  glucagon  Injectable 1 milliGRAM(s) IntraMuscular once  hydrALAZINE 100 milliGRAM(s) Oral three times a day  insulin lispro (ADMELOG) corrective regimen sliding scale   SubCutaneous three times a day before meals  insulin lispro (ADMELOG) corrective regimen sliding scale   SubCutaneous at bedtime  lactobacillus acidophilus 1 Tablet(s) Oral three times a day  levothyroxine 150 MICROGram(s) Oral daily  losartan 100 milliGRAM(s) Oral daily  metFORMIN 500 milliGRAM(s) Oral two times a day  potassium chloride    Tablet ER 20 milliEquivalent(s) Oral daily    MEDICATIONS  (PRN):  acetaminophen   Tablet .. 650 milliGRAM(s) Oral every 6 hours PRN Temp greater or equal to 38C (100.4F), Mild Pain (1 - 3)  ALBUTerol    90 MICROgram(s) HFA Inhaler 2 Puff(s) Inhalation every 6 hours PRN Shortness of Breath and/or Wheezing  morphine  - Injectable 2 milliGRAM(s) IV Push every 6 hours PRN Severe Pain (7 - 10)  traMADol 50 milliGRAM(s) Oral every 6 hours PRN Moderate Pain (4 - 6)         Vitals log        ICU Vital Signs Last 24 Hrs  T(C): 36.8 (06 Oct 2021 23:53), Max: 37.2 (06 Oct 2021 20:32)  T(F): 98.3 (06 Oct 2021 23:53), Max: 99 (06 Oct 2021 20:32)  HR: 78 (07 Oct 2021 02:07) (78 - 97)  BP: 145/78 (07 Oct 2021 02:07) (130/74 - 150/83)  BP(mean): --  ABP: --  ABP(mean): --  RR: 18 (07 Oct 2021 02:07) (18 - 28)  SpO2: 98% (07 Oct 2021 02:07) (96% - 98%)           Input and Output:  I&O's Detail    06 Oct 2021 07:01  -  07 Oct 2021 06:42  --------------------------------------------------------  IN:  Total IN: 0 mL    OUT:    Voided (mL): 500 mL  Total OUT: 500 mL    Total NET: -500 mL          Lab Data                        10.2   13.17 )-----------( 211      ( 06 Oct 2021 21:43 )             32.5     10-06    143  |  104  |  22  ----------------------------<  169<H>  3.3<L>   |  30  |  1.08    Ca    8.1<L>      06 Oct 2021 21:43    TPro  6.5  /  Alb  3.0<L>  /  TBili  0.9  /  DBili  x   /  AST  17  /  ALT  25  /  AlkPhos  71  10-06      CARDIAC MARKERS ( 06 Oct 2021 21:43 )  .034 ng/mL / x     / x     / x     / x            Review of Systems	  weakness      Objective     Physical Examination    heart s1s2  lung dec BS  abd soft  obese  on o2 support      Pertinent Lab findings & Imaging      Miguel Angel:  NO   Adequate UO     I&O's Detail    06 Oct 2021 07:01  -  07 Oct 2021 06:42  --------------------------------------------------------  IN:  Total IN: 0 mL    OUT:    Voided (mL): 500 mL  Total OUT: 500 mL    Total NET: -500 mL               Discussed with:     Cultures:	        Radiology    EXAM:  CT ABDOMEN AND PELVIS IC                          EXAM:  CT ANGIO CHEST PULM [facility]                                  PROCEDURE DATE:  10/06/2021          INTERPRETATION:  CLINICAL INFORMATION: Abdominal pain. Bilateral leg edema.    COMPARISON: None.    CONTRAST/COMPLICATIONS:  IV Contrast: Omnipaque 350 (accession [id number]), IV contrast documented in associated exam (accession [id number])  90 cc administered   10 cc discarded  Oral Contrast: NONE  Complications: None reported at time of study completion    PROCEDURE:  CT Angiography of the Chest was performed followed by portal venous phase imaging of the Abdomen and Pelvis.  Sagittal and coronal reformats were performed as well as 3D (MIP) reconstructions.    FINDINGS:  CHEST:  LUNGS AND LARGE AIRWAYS: Patent central airways. Elevated right diaphragm. Subsegmental atelectasis in the right upper lobe and bilateral lung bases. Subsegmental left lower lobe atelectasis versus pneumonia.  PLEURA: No pleural effusion.  VESSELS: No pulmonary embolus up to and including the segmental branches. Subsegmental branches are not well evaluated due to respiratory motion artifact. Atherosclerotic calcifications of the aorta and coronary arteries.  HEART: Heart size is normal. No pericardial effusion.  MEDIASTINUM AND FRANCISCA: No lymphadenopathy.  CHEST WALL AND LOWER NECK: Diffuse right breast skin thickening and prominent soft tissue in the retroareolar region, again noted.    ABDOMEN AND PELVIS:  LIVER: Within normal limits.  BILE DUCTS: Normal caliber.  GALLBLADDER: Within normal limits.  SPLEEN: Within normal limits.  PANCREAS: Within normal limits.  ADRENALS: Left adrenal adenoma measures 3.2 x 2.5 cm, unchanged.  KIDNEYS/URETERS: Bilateral renal cysts. No hydronephrosis..    BLADDER: Within normal limits.  REPRODUCTIVE ORGANS: Fibroid uterus.    BOWEL: No bowel obstruction. Appendix is normal.  PERITONEUM: No ascites.  VESSELS: Within normal limits.  RETROPERITONEUM/LYMPH NODES: No lymphadenopathy.  ABDOMINAL WALL: Within normal limits.  BONES: Degenerative changes.    IMPRESSION:  No pulmonary embolus up to and including segmental branches. Stable subsegmental left lower lobe atelectasis versus pneumonia.    No acute abdominal pathology.        --- End of Report ---              HEATHER CREWS MD; Attending Radiologist  This document has been electronically signed. Oct  7 2021  1:11AM

## 2021-10-07 NOTE — CONSULT NOTE ADULT - ASSESSMENT
64 y/o F BIBEMS from [facility] c/o lower abd pain since this morning. Denies n/v/d. Last BM this morning, unsure if she is constipated. Denies hx of abd surgery. no known fever, no chills. Pt reports she stopped using at home O2 2d ago and EMS found pt to be hypoxic in the 80s with no O2, also EMS did not see O2 as order on pt's medical papers. Pt states she walks w/ walker at baseline, but EMS state they tried to stand her to get onto stretcher and pt could not support herself 64 y/o F BIBEMS from [facility] c/o lower abd pain since this morning. Denies n/v/d. Last BM this morning, unsure if she is constipated. Denies hx of abd surgery. no known fever, no chills. Pt reports she stopped using at home O2 2d ago and EMS found pt to be hypoxic in the 80s with no O2, also EMS did not see O2 as order on pt's medical papers. Pt states she walks w/ walker at baseline, but EMS state they tried to stand her to get onto stretcher and pt could not support herself    pna eval  biomarkers  cx  SLP eval noted  Dysphagia diet  cardio eval noted - cvs rx regimen and BP control  I and O  monitor VS and HD and Sat  MEKA eval as outpatient  CT chest - neg for PE - infiltrate vs atelectasis - pt is on ABX  VBG noted  wean fio2 support as tolerated - keep sat > 88 pct  pt lives in [place]  assist with needs - ADL -

## 2021-10-07 NOTE — CONSULT NOTE ADULT - ASSESSMENT
The patient is a 65 year old female with a history of HTN, DM, hypothyroidism, cognitive impairment, right breast cancer, HCM, chronic diastolic heart failure who presents with abdominal pain, UTI.    Plan:  - ECG with LVH related changes  - Echo last admission with hyperdynamic LV systolic function, HOCM  - Continue carvedilol 25 mg bid  - Continue clonidine 0.2 mg tid  - Continue hydralazine 100 mg tid  - Continue losartan 100 mg daily  - Continue aspirin 81 mg daily  - Continue atorvastatin 20 mg daily  - BNP 1172  - CTA chest negative for PE, pulm edema, pleural effusions - atelectasis vs. PNA noted  - Legs swollen - chronic issue -continue furosemide 40 mg IV daily and transition back to 40 mg PO daily in 1-2 days  - On ceftriaxone for UTI

## 2021-10-07 NOTE — CONSULT NOTE ADULT - ASSESSMENT
History of breast cancer on adjuvant arimidex now admitted with CHF and UTI  CT chest abd and pelvis without evidence of metastatic disease  patient is a poor historian  MIld anemia most likely related to chronic disease and acute illness    Recommendations  1.  follow CBC  2.  continue armidex  3.  continue cardiology and pulmonary evaluation  4.  no additional metastatic workup needed  5.  to check fe studies  6.  additional heme/onc recommendations pending above  discussed with Dr. Garcia

## 2021-10-07 NOTE — SWALLOW BEDSIDE ASSESSMENT ADULT - ASR SWALLOW RECOMMEND DIAG
A Modified Barium Swallow Study is recommended to determine safest and least restrictive PO diet./VFSS/MBS

## 2021-10-07 NOTE — SWALLOW BEDSIDE ASSESSMENT ADULT - COMMENTS
Consult received and chart reviewed. The patient was seen at bedside Consult received and chart reviewed. The patient was seen at bedside this AM for an initial assessment of swallow function, at which time she was awake and cooperative. The patient is denying any swallowing difficulties at this time. Patient currently receiving supplemental O2 via 3L NC in place. SpO2 remained at 93% throughout today's evaluation. Patient able to follow directives and denied pain pre/post today's evaluation.    Per charting, the patient is a "65F with PMH Type 2 DM, HTN,hypothyroid, cognitive impairment,recurrent falls,recurrent uti, right breast malignancy on chemo  sent from [facility] abd pain since this morning. Pt reports she stopped using at home O2. Pt is a resident of [facility]. Pt reports b/l edema is at baseline. Pt walks w/ walker at baseline."    WBC is elevated. No fever. CT angio of the chest revealed, "No pulmonary embolus up to and including segmental branches. Stable subsegmental left lower lobe atelectasis versus pneumonia. No acute abdominal pathology."    Discussed results and recommendations from this evaluation with the patient, RN, and MD. Consult received and chart reviewed. The patient was seen at bedside this AM for an initial assessment of swallow function, at which time she was awake and cooperative. The patient is denying any swallowing difficulties at this time. Patient currently receiving supplemental O2 via 3L NC in place. SpO2 remained at 93% throughout today's evaluation. Patient able to follow directives and denied pain pre/post today's evaluation.    The patient is known to this department from a previous hospital admission on 9/7/21, at which time a dysphagia 3 with thin liquid diet and a MBSS were recommended. It should be noted that the MBSS was not performed during that hospital admission.    Per charting, the patient is a "65F with PMH Type 2 DM, HTN,hypothyroid, cognitive impairment,recurrent falls,recurrent uti, right breast malignancy on chemo  sent from [facility] abd pain since this morning. Pt reports she stopped using at home O2. Pt is a resident of [facility]. Pt reports b/l edema is at baseline. Pt walks w/ walker at baseline."    WBC is elevated. No fever. CT angio of the chest revealed, "No pulmonary embolus up to and including segmental branches. Stable subsegmental left lower lobe atelectasis versus pneumonia. No acute abdominal pathology."    Discussed results and recommendations from this evaluation with the patient, RN, and MD.

## 2021-10-07 NOTE — CONSULT NOTE ADULT - CONSULT REASON
HCM, chronic diastolic heart failure
Rule out infection
evaluation of breast cancer C50.816
pain  abd pain  atelectasis  sob  ashraf  weakness

## 2021-10-07 NOTE — SWALLOW BEDSIDE ASSESSMENT ADULT - SWALLOW EVAL: DIAGNOSIS
1. The patient demonstrated a mild oral dysphagia for puree, nectar thick, and thin liquid textures marked by delayed bolus collection, transfer, and posterior transport. 2. The patient demonstrated a mild-moderate oral dysphagia for solids marked by prolonged oral manipulation and increased mastication time resulting in delayed bolus collection, transfer, and posterior transport. Mild lingual residue noted subsequent to deglutition which reduced with a liquid wash. 3. The patient demonstrated a mild pharyngeal dysphagia for puree, solid, nectar thick, and thin liquid textures marked by a suspected delayed pharyngeal swallow trigger with hyolaryngeal elevation noted upon digital palpation w/o evidence of airway penetration.

## 2021-10-07 NOTE — CONSULT NOTE ADULT - SUBJECTIVE AND OBJECTIVE BOX
Patient is a 65y old  Female who presents with a chief complaint of abd pain (07 Oct 2021 06:41)      HPI:    65F with PMH Type 2 DM, HTN,hypothyroid, cognitive impairment,recurrent falls,recurrent uti, right breast malignancy on chemo  sent from [facility] abd pain since this morning. Pt reports she stopped using at home O2. Pt is a resident of [facility]. Pt reports b/l edema is at baseline. Pt walks w/ walker at baseline. Denies n/v/d. Lat BM this morning. Pt states BM was thin, unsure if she is constipated. Denies hx of abd surgery.   IN ED temp 99, bp150/83,HR96,on o2 2 litre via nasal cannula 96%,has b/l leg edema,had CTA< from: CT Angio Chest PE Protocol w/ IV Cont (10.06.21 @ 23:47) >  No pulmonary embolus up to and including segmental branches. Stable subsegmental left lower lobe atelectasis v pna  has rt breat changes, adrenal adenoma and fibroid ut, UA is suspicious of UTI, started on ceftriaxone, and received lasix with suspicion of chf with edema hypoxia .  < from: US Transthoracic Echocardiogram w/Doppler Complete (09.07.21 @ 11:29) >   moderate asymmetric septal hypertrophy with the normal overall systolic function with significant outflow tract obstruction  admitted for further management       (07 Oct 2021 05:55)       ROS: review of chart notes patient received adjuvant chemotherapy with taxotere and cytoxan 2019  Negative except for:    PAST MEDICAL & SURGICAL HISTORY:  HTN (hypertension)    Anxiety    HLD (hyperlipidemia)    Hypothyroid    Breast CA  right    Poor historian    Cancer of thyroid  unsure of dates but prior to 2005    Coronary artery disease    Cognitive impairment    Osteoarthritis    Type 2 diabetes mellitus    Urinary frequency    History of adrenal adenoma    Diabetes    H/O total thyroidectomy    H/O bilateral breast biopsy  November 2018 and December 2018    S/P angioplasty with stent  drug eluting stent in first diagonal on 2/21/19    S/P dilation and curettage  TOP x2        SOCIAL HISTORY:    FAMILY HISTORY:  FHx: suicide    FHx: lymphoma    Family history of brain damage (Sibling)  at birth        MEDICATIONS  (STANDING):  amLODIPine   Tablet 10 milliGRAM(s) Oral daily  anastrozole 1 milliGRAM(s) Oral daily  aspirin enteric coated 81 milliGRAM(s) Oral daily  atorvastatin 20 milliGRAM(s) Oral at bedtime  carvedilol 25 milliGRAM(s) Oral every 12 hours  cefTRIAXone   IVPB 1000 milliGRAM(s) IV Intermittent every 24 hours  cloNIDine 0.2 milliGRAM(s) Oral three times a day  dextrose 40% Gel 15 Gram(s) Oral once  dextrose 5%. 1000 milliLiter(s) (50 mL/Hr) IV Continuous <Continuous>  dextrose 5%. 1000 milliLiter(s) (100 mL/Hr) IV Continuous <Continuous>  dextrose 50% Injectable 25 Gram(s) IV Push once  dextrose 50% Injectable 12.5 Gram(s) IV Push once  dextrose 50% Injectable 25 Gram(s) IV Push once  enoxaparin Injectable 40 milliGRAM(s) SubCutaneous daily  furosemide   Injectable 40 milliGRAM(s) IV Push daily  glucagon  Injectable 1 milliGRAM(s) IntraMuscular once  hydrALAZINE 100 milliGRAM(s) Oral three times a day  insulin lispro (ADMELOG) corrective regimen sliding scale   SubCutaneous three times a day before meals  insulin lispro (ADMELOG) corrective regimen sliding scale   SubCutaneous at bedtime  lactobacillus acidophilus 1 Tablet(s) Oral three times a day  levothyroxine 150 MICROGram(s) Oral daily  losartan 100 milliGRAM(s) Oral daily  metFORMIN 500 milliGRAM(s) Oral two times a day  phenazopyridine 100 milliGRAM(s) Oral every 8 hours  potassium chloride    Tablet ER 20 milliEquivalent(s) Oral daily    MEDICATIONS  (PRN):  acetaminophen   Tablet .. 650 milliGRAM(s) Oral every 6 hours PRN Temp greater or equal to 38C (100.4F), Mild Pain (1 - 3)  ALBUTerol    90 MICROgram(s) HFA Inhaler 2 Puff(s) Inhalation every 6 hours PRN Shortness of Breath and/or Wheezing  morphine  - Injectable 2 milliGRAM(s) IV Push every 6 hours PRN Severe Pain (7 - 10)  traMADol 50 milliGRAM(s) Oral every 6 hours PRN Moderate Pain (4 - 6)      Allergies    No Known Allergies    Intolerances        Vital Signs Last 24 Hrs  T(C): 37.3 (07 Oct 2021 21:13), Max: 37.3 (07 Oct 2021 21:13)  T(F): 99.2 (07 Oct 2021 21:13), Max: 99.2 (07 Oct 2021 21:13)  HR: 87 (07 Oct 2021 21:13) (75 - 90)  BP: 145/77 (07 Oct 2021 21:13) (117/61 - 159/73)  BP(mean): --  RR: 20 (07 Oct 2021 21:13) (18 - 25)  SpO2: 93% (07 Oct 2021 21:13) (93% - 98%)    PHYSICAL EXAM  General: adult in NAD  HEENT: clear oropharynx, anicteric sclera, pink conjunctivae  Neck: supple  CV: normal S1S2 with no murmur rubs or gallops  Lungs: clear to auscultation, no wheezes, no rhales  Abdomen: soft non-tender non-distended, no hepato/splenomegaly  Ext: no clubbing cyanosis or edema  Skin: no rashes and no petichiae  Neuro: alert and oriented X3 no focal deficits      LABS:    CBC Full  -  ( 06 Oct 2021 21:43 )  WBC Count : 13.17 K/uL  RBC Count : 3.43 M/uL  Hemoglobin : 10.2 g/dL  Hematocrit : 32.5 %  Platelet Count - Automated : 211 K/uL  Mean Cell Volume : 94.8 fl  Mean Cell Hemoglobin : 29.7 pg  Mean Cell Hemoglobin Concentration : 31.4 gm/dL  Auto Neutrophil # : 11.65 K/uL  Auto Lymphocyte # : 0.33 K/uL  Auto Monocyte # : 0.98 K/uL  Auto Eosinophil # : 0.03 K/uL  Auto Basophil # : 0.04 K/uL  Auto Neutrophil % : 88.5 %  Auto Lymphocyte % : 2.5 %  Auto Monocyte % : 7.4 %  Auto Eosinophil % : 0.2 %  Auto Basophil % : 0.3 %    10-06    143  |  104  |  22  ----------------------------<  169<H>  3.3<L>   |  30  |  1.08    Ca    8.1<L>      06 Oct 2021 21:43    TPro  6.5  /  Alb  3.0<L>  /  TBili  0.9  /  DBili  x   /  AST  17  /  ALT  25  /  AlkPhos  71  10-06    PT/INR - ( 06 Oct 2021 21:43 )   PT: 14.2 sec;   INR: 1.18 ratio         PTT - ( 06 Oct 2021 21:43 )  PTT:25.3 sec          BLOOD SMEAR INTERPRETATION:    RADIOLOGY & ADDITIONAL STUDIES:    < from: CT Angio Chest PE Protocol w/ IV Cont (10.06.21 @ 23:47) >  EXAM:  CT ABDOMEN AND PELVIS IC                          EXAM:  CT ANGIO CHEST PULM GABRIELE FRANCIS                                  PROCEDURE DATE:  10/06/2021          INTERPRETATION:  CLINICAL INFORMATION: Abdominal pain. Bilateral leg edema.    COMPARISON: None.    CONTRAST/COMPLICATIONS:  IV Contrast: Omnipaque 350 (accession [id number]), IV contrast documented in associated exam (accession [id number])  90 cc administered   10 cc discarded  Oral Contrast: NONE  Complications: None reported at time of study completion    PROCEDURE:  CT Angiography of the Chest was performed followed by portal venous phase imaging of the Abdomen and Pelvis.  Sagittal and coronal reformats were performed as well as 3D (MIP) reconstructions.    FINDINGS:  CHEST:  LUNGS AND LARGE AIRWAYS: Patent central airways. Elevated right diaphragm. Subsegmental atelectasis in the right upper lobe and bilateral lung bases. Subsegmental left lower lobe atelectasis versus pneumonia.  PLEURA: No pleural effusion.  VESSELS: No pulmonary embolus up to and including the segmental branches. Subsegmental branches are not well evaluated due to respiratory motion artifact. Atherosclerotic calcifications of the aorta and coronary arteries.  HEART: Heart size is normal. No pericardial effusion.  MEDIASTINUM AND FRANCISCA: No lymphadenopathy.  CHEST WALL AND LOWER NECK: Diffuse right breast skin thickening and prominent soft tissue in the retroareolar region, again noted.    ABDOMEN AND PELVIS:  LIVER: Within normal limits.  BILE DUCTS: Normal caliber.  GALLBLADDER: Within normal limits.  SPLEEN: Within normal limits.  PANCREAS: Within normal limits.  ADRENALS: Left adrenal adenoma measures 3.2 x 2.5 cm, unchanged.  KIDNEYS/URETERS: Bilateral renal cysts. No hydronephrosis..    BLADDER: Within normal limits.  REPRODUCTIVE ORGANS: Fibroid uterus.    BOWEL: No bowel obstruction. Appendix is normal.  PERITONEUM: No ascites.  VESSELS: Within normal limits.  RETROPERITONEUM/LYMPH NODES: No lymphadenopathy.  ABDOMINAL WALL: Within normal limits.  BONES: Degenerative changes.    IMPRESSION:  No pulmonary embolus up to and including segmental branches. Stable subsegmental left lower lobe atelectasis versus pneumonia.    No acute abdominal pathology.        --- End of Report ---              HEATHER CREWS MD; Attending Radiologist  This document has been electronically signed. Oct  7 2021  1:11AM    < end of copied text >

## 2021-10-07 NOTE — H&P ADULT - TIME-BASED
[General Appearance - In No Acute Distress] : no acute distress [Abdomen Soft] : soft [Costovertebral Angle Tenderness] : no ~M costovertebral angle tenderness [Abdomen Tenderness] : non-tender [Heart Rate And Rhythm] : Heart rate and rhythm were normal [Normal Station and Gait] : the gait and station were normal for the patient's age 74

## 2021-10-07 NOTE — CONSULT NOTE ADULT - SUBJECTIVE AND OBJECTIVE BOX
History of Present Illness: The patient is a 65 year old female with a history of HTN, DM, hypothyroidism, cognitive impairment, right breast cancer, HCM, chronic diastolic heart failure who presents with abdominal pain. She noted lower abdominal pain yesterday; she currently has no complaints. No chest pain, dizziness, shortness of breath. Her legs are chronically swollen.    Past Medical/Surgical History:  HTN, DM, hypothyroidism, cognitive impairment, right breast cancer, HCM, chronic diastolic heart failure    Medications:  Home Medications:  Acidophilus oral capsule: 1 cap(s) orally 2 times a day (06 Oct 2021 21:25)  anastrozole 1 mg oral tablet: 1 tab(s) orally once a day (06 Oct 2021 21:25)  aspirin 81 mg oral tablet: 1 tab(s) orally once a day (06 Oct 2021 21:25)  atorvastatin 20 mg oral tablet: 1 tab(s) orally once a day (06 Oct 2021 21:25)  carvedilol 25 mg oral tablet: 1 tab(s) orally 2 times a day (06 Oct 2021 21:25)  cloNIDine 0.2 mg oral tablet: orally 3 times a day (06 Oct 2021 21:25)  furosemide 40 mg oral tablet: 1 tab(s) orally once a day (06 Oct 2021 21:25)  hydrALAZINE 100 mg oral tablet: orally 3 times a day (06 Oct 2021 21:25)  levothyroxine 150 mcg (0.15 mg) oral tablet: 1 tab(s) orally once a day (06 Oct 2021 21:25)  losartan 100 mg oral tablet: 1 tab(s) orally once a day (06 Oct 2021 21:25)  metFORMIN 500 mg oral tablet, extended release: 1 tab(s) orally once a day (06 Oct 2021 21:25)  potassium chloride 20 mEq oral tablet, extended release: orally 2 times a day (06 Oct 2021 21:25)  senna 8.6 mg oral tablet: 2 tab(s) orally once a day (at bedtime) (06 Oct 2021 21:25)      Family History: Non-contributory family history of premature cardiovascular atherosclerotic disease    Social History: No tobacco, alcohol or drug use    Review of Systems:  General: No fevers, chills, weight loss or gain  Skin: No rashes, color changes  Cardiovascular: No chest pain, orthopnea  Respiratory: No shortness of breath, cough  Gastrointestinal: No nausea, abdominal pain  Genitourinary: No incontinence, pain with urination  Musculoskeletal: No pain, swelling, decreased range of motion  Neurological: No headache, weakness  Psychiatric: No depression, anxiety  Endocrine: No weight loss or gain, increased thirst  All other systems are comprehensively negative.    Physical Exam:  Vitals:        Vital Signs Last 24 Hrs  T(C): 36.9 (07 Oct 2021 07:45), Max: 37.2 (06 Oct 2021 20:32)  T(F): 98.5 (07 Oct 2021 07:45), Max: 99 (06 Oct 2021 20:32)  HR: 75 (07 Oct 2021 07:45) (75 - 97)  BP: 153/76 (07 Oct 2021 07:45) (130/74 - 153/76)  BP(mean): --  RR: 20 (07 Oct 2021 07:45) (18 - 28)  SpO2: 96% (07 Oct 2021 07:45) (96% - 98%)  General: NAD  HEENT: MMM  Neck: No JVD, no carotid bruit  Lungs: CTAB  CV: RRR, nl S1/S2, no M/R/G  Abdomen: S/NT/ND, +BS  Extremities: 2+ LE edema, no cyanosis  Neuro: AAOx3, non-focal  Skin: No rash    Labs:                        10.2   13.17 )-----------( 211      ( 06 Oct 2021 21:43 )             32.5     10-06    143  |  104  |  22  ----------------------------<  169<H>  3.3<L>   |  30  |  1.08    Ca    8.1<L>      06 Oct 2021 21:43    TPro  6.5  /  Alb  3.0<L>  /  TBili  0.9  /  DBili  x   /  AST  17  /  ALT  25  /  AlkPhos  71  10-06    CARDIAC MARKERS ( 06 Oct 2021 21:43 )  .034 ng/mL / x     / x     / x     / x          PT/INR - ( 06 Oct 2021 21:43 )   PT: 14.2 sec;   INR: 1.18 ratio         PTT - ( 06 Oct 2021 21:43 )  PTT:25.3 sec    ECG: NSR, LVH with secondary repolarization abnormality

## 2021-10-08 NOTE — GOALS OF CARE CONVERSATION - ADVANCED CARE PLANNING - CONVERSATION DETAILS
patient is alert and aware of her diagnosis and prognosis, and continues to be DNR , DNI , no feeding tube

## 2021-10-08 NOTE — PROGRESS NOTE ADULT - ASSESSMENT
The patient is a 65 year old female with a history of HTN, DM, hypothyroidism, cognitive impairment, right breast cancer, HCM, chronic diastolic heart failure who presents with abdominal pain, UTI.    Plan:  - ECG with LVH related changes  - Echo last admission with hyperdynamic LV systolic function, HOCM  - Continue carvedilol 25 mg bid  - Continue clonidine 0.2 mg tid  - Continue hydralazine 100 mg tid  - Continue losartan 100 mg daily  - Continue aspirin 81 mg daily  - Continue atorvastatin 20 mg daily  - BNP 1172  - CTA chest negative for PE, pulm edema, pleural effusions - atelectasis vs. PNA noted  - Lower furosemide to 40 mg PO daily  - On ceftriaxone for UTI

## 2021-10-08 NOTE — PROGRESS NOTE ADULT - ASSESSMENT
65F with PMH Type 2 DM, HTN,hypothyroid, cognitive impairment,recurrent falls,recurrent uti, right breast malignancy on chemo  sent from [facility] abd pain since this morning. Pt reports she stopped using at home O2. Pt is a resident of [facility]. Pt reports b/l edema is at baseline. Pt walks w/ walker at baseline. Denies n/v/d. Lat BM this morning. Pt states BM was thin, unsure if she is constipated. Denies hx of abd surgery.   IN ED temp 99, bp150/83,HR96,on o2 2 litre via nasal cannula 96%,has b/l leg edema,had CTA< from: CT Angio Chest PE Protocol w/ IV Cont (10.06.21 @ 23:47) >  No pulmonary embolus up to and including segmental branches. Stable subsegmental left lower lobe atelectasis v pna  has rt breat changes, adrenal adenoma and fibroid ut, UA is suspicious of UTI, started on ceftriaxone, and received lasix with suspicion of chf with edema hypoxia .  < from: US Transthoracic Echocardiogram w/Doppler Complete (09.07.21 @ 11:29) >   moderate asymmetric septal hypertrophy with the normal overall systolic function with significant outflow tract obstruction  admitted for further management for  abd pain suspected UTI  likely chf with outflow tract obstruction with septal hypertrophy  hypoxia, ch   Ess HTN  breat ca  pressure ulcers POA  goals of care discussion  hypothyroid  rec falls  DM2  cognitive impairment  ambulatory dysfunction  respiratory distress likely ac on ch with possible chf diastolic  bactremia E coli  pt on ceftriaxone  had retention of urine, had staight cath will observe - bladder scan  Advanced care planning discussed with patient/family. Advaced care planning forms reviewed,discussed /completed, 20 min spent  dnr dni  HCP [id number]

## 2021-10-08 NOTE — CHART NOTE - NSCHARTNOTEFT_GEN_A_CORE
Called by RN re: blood Cx growing GNR.  Admitted with abdominal pain and incontinence.  On empiric rocephin for suspected infection. Has recurrent UTIs.  Reviewed past records, has had hx of E.coli UTI with bacteremia.  Has been sensitive to rocephin in the past.    Vital Signs Last 24 Hrs  T(C): 37.3 (07 Oct 2021 21:13), Max: 37.3 (07 Oct 2021 21:13)  T(F): 99.2 (07 Oct 2021 21:13), Max: 99.2 (07 Oct 2021 21:13)  HR: 87 (07 Oct 2021 21:13) (75 - 90)  BP: 145/77 (07 Oct 2021 21:13) (117/61 - 159/73)  RR: 20 (07 Oct 2021 21:13) (19 - 25)  SpO2: 93% (07 Oct 2021 21:13) (93% - 96%)    Impression: E. Coli Bacteremia  - continue IV rocephin.  - follow up urine culture.  - check repeat blood cultures.  - infectious disease Dr. Espinoza following.

## 2021-10-08 NOTE — PROGRESS NOTE ADULT - SUBJECTIVE AND OBJECTIVE BOX
[INTERVAL HX: ]  Patient seen and examined;  Chart reviewed and events noted;   feeling better  abd pain better  no diarrhea    Blood cx with GNR    Patient is a 65y Female with a known history of :  Acute on chronic systolic congestive heart failure [I50.23]  Pneumonia due to organism [J18.9]  HTN (hypertension) [401.9]  HLD (hyperlipidemia) [E78.5]  Anxiety [F41.9]  HLD (hyperlipidemia) [E78.5]  Hypothyroid [E03.9]  Adrenal nodule [E27.9]  Breast CA [C50.919]  Poor historian [Z78.9]  Cancer of thyroid [C73]  Coronary artery disease [I25.10]  Cognitive impairment [R41.89]  Osteoarthritis [M19.90]  Type 2 diabetes mellitus [E11.9]  Urinary frequency [R35.0]  History of adrenal adenoma [Z86.018]  Diabetes [E11.9]  H/O total thyroidectomy [246.8]  H/O bilateral breast biopsy [Z98.890]  S/P angioplasty with stent [Z95.820]  S/P dilation and curettage [Z98.890]      HPI:    65F with PMH Type 2 DM, HTN,hypothyroid, cognitive impairment,recurrent falls,recurrent uti, right breast malignancy on chemo  sent from [facility] abd pain since this morning. Pt reports she stopped using at home O2. Pt is a resident of [facility]. Pt reports b/l edema is at baseline. Pt walks w/ walker at baseline. Denies n/v/d. Lat BM this morning. Pt states BM was thin, unsure if she is constipated. Denies hx of abd surgery.   IN ED temp 99, bp150/83,HR96,on o2 2 litre via nasal cannula 96%,has b/l leg edema,had CTA< from: CT Angio Chest PE Protocol w/ IV Cont (10.06.21 @ 23:47) >  No pulmonary embolus up to and including segmental branches. Stable subsegmental left lower lobe atelectasis v pna  has rt breat changes, adrenal adenoma and fibroid ut, UA is suspicious of UTI, started on ceftriaxone, and received lasix with suspicion of chf with edema hypoxia .  < from: US Transthoracic Echocardiogram w/Doppler Complete (21 @ 11:29) >   moderate asymmetric septal hypertrophy with the normal overall systolic function with significant outflow tract obstruction  admitted for further management       (07 Oct 2021 05:55)        MEDICATIONS  (STANDING):  amLODIPine   Tablet 10 milliGRAM(s) Oral daily  anastrozole 1 milliGRAM(s) Oral daily  aspirin enteric coated 81 milliGRAM(s) Oral daily  atorvastatin 20 milliGRAM(s) Oral at bedtime  carvedilol 25 milliGRAM(s) Oral every 12 hours  cefTRIAXone   IVPB 1000 milliGRAM(s) IV Intermittent every 24 hours  cloNIDine 0.2 milliGRAM(s) Oral three times a day  dextrose 40% Gel 15 Gram(s) Oral once  dextrose 5%. 1000 milliLiter(s) (50 mL/Hr) IV Continuous <Continuous>  dextrose 5%. 1000 milliLiter(s) (100 mL/Hr) IV Continuous <Continuous>  dextrose 50% Injectable 25 Gram(s) IV Push once  dextrose 50% Injectable 12.5 Gram(s) IV Push once  dextrose 50% Injectable 25 Gram(s) IV Push once  enoxaparin Injectable 40 milliGRAM(s) SubCutaneous daily  glucagon  Injectable 1 milliGRAM(s) IntraMuscular once  hydrALAZINE 100 milliGRAM(s) Oral three times a day  insulin lispro (ADMELOG) corrective regimen sliding scale   SubCutaneous three times a day before meals  insulin lispro (ADMELOG) corrective regimen sliding scale   SubCutaneous at bedtime  lactobacillus acidophilus 1 Tablet(s) Oral three times a day  levothyroxine 150 MICROGram(s) Oral daily  losartan 100 milliGRAM(s) Oral daily  metFORMIN 500 milliGRAM(s) Oral two times a day  phenazopyridine 100 milliGRAM(s) Oral every 8 hours  potassium chloride    Tablet ER 20 milliEquivalent(s) Oral daily    MEDICATIONS  (PRN):  acetaminophen   Tablet .. 650 milliGRAM(s) Oral every 6 hours PRN Temp greater or equal to 38C (100.4F), Mild Pain (1 - 3)  ALBUTerol    90 MICROgram(s) HFA Inhaler 2 Puff(s) Inhalation every 6 hours PRN Shortness of Breath and/or Wheezing  morphine  - Injectable 2 milliGRAM(s) IV Push every 6 hours PRN Severe Pain (7 - 10)  traMADol 50 milliGRAM(s) Oral every 6 hours PRN Moderate Pain (4 - 6)    Vital Signs Last 24 Hrs  T(C): 36.9 (08 Oct 2021 09:26), Max: 37.3 (07 Oct 2021 21:13)  T(F): 98.4 (08 Oct 2021 09:26), Max: 99.2 (07 Oct 2021 21:13)  HR: 74 (08 Oct 2021 09:26) (74 - 90)  BP: 103/55 (08 Oct 2021 09:26) (103/55 - 159/73)  BP(mean): --  RR: 19 (08 Oct 2021 09:26) (19 - 25)  SpO2: 93% (08 Oct 2021 09:26) (93% - 94%)  Daily     Daily   Last Menstrual Period          [PHYSICAL EXAM]  General: adult in NAD,  WN,  WD.  HEENT: clear oropharynx, anicteric sclera, pink conjunctivae.  Neck: supple, no masses.  CV: normal S1S2, no murmur, no rubs, no gallops.  Lungs: clear to auscultation, no wheezes, no rales, no rhonchi.  Abdomen: soft, non-tender, non-distended, no hepatosplenomegaly, normal BS, no guarding.  Ext: no clubbing, no cyanosis, no edema.  Skin: no rashes,  no petechiae, no venous stasis changes.  Neuro: alert and oriented X3  , no focal motor deficits.  LN: no SC KARL.      [LABS:]                        10.2   8.22  )-----------( 231      ( 08 Oct 2021 07:47 )             33.2     10-    145  |  103  |  18  ----------------------------<  139<H>  2.7<LL>   |  34<H>  |  1.13    Ca    8.4      08 Oct 2021 07:47  Mg     2.4     10-08    TPro  6.3  /  Alb  2.7<L>  /  TBili  0.9  /  DBili  x   /  AST  16  /  ALT  20  /  AlkPhos  61  10-08    PT/INR - ( 06 Oct 2021 21:43 )   PT: 14.2 sec;   INR: 1.18 ratio         PTT - ( 06 Oct 2021 21:43 )  PTT:25.3 sec    Culture - Blood (collected 10-07-21 @ 14:17)  Source: .Blood Blood-Peripheral  Gram Stain (10-08-21 @ 01:27):    Growth in aerobic bottle: Gram Negative Rods    Growth in anaerobic bottle: Gram Negative Rods  Preliminary Report (10-08-21 @ 01:28):    Growth in aerobic bottle: Gram Negative Rods    Growth in anaerobic bottle: Gram Negative Rods    ***Blood Panel PCR results on this specimen are available    approximately 3 hours after the Gram stain result.***    Gram stain, PCR, and/or culture results may not always    correspond due to difference in methodologies.    ************************************************************    This PCR assay was performed by multiplex PCR. This    Assay tests for 66 bacterial and resistance gene targets.    Please refer to the [facility] test directory    at [email or web address] for details.  Organism: Blood Culture PCR (10-08-21 @ 04:35)  Organism: Blood Culture PCR (10-08-21 @ 04:35)      -  Escherichia coli: Detec      Method Type: PCR          Urinalysis Basic - ( 07 Oct 2021 02:14 )    Color: Yellow / Appearance: Clear / S.010 / pH: x  Gluc: x / Ketone: Negative  / Bili: Negative / Urobili: Negative mg/dL   Blood: x / Protein: Negative mg/dL / Nitrite: Positive   Leuk Esterase: Negative / RBC: x / WBC 0-2   Sq Epi: x / Non Sq Epi: Occasional / Bacteria: Moderate        Culture - Blood (collected 07 Oct 2021 14:17)  Source: .Blood Blood-Peripheral  Gram Stain (08 Oct 2021 01:27):    Growth in aerobic bottle: Gram Negative Rods    Growth in anaerobic bottle: Gram Negative Rods  Preliminary Report (08 Oct 2021 01:28):    Growth in aerobic bottle: Gram Negative Rods    Growth in anaerobic bottle: Gram Negative Rods    ***Blood Panel PCR results on this specimen are available    approximately 3 hours after the Gram stain result.***    Gram stain, PCR, and/or culture results may not always    correspond due to difference in methodologies.    ************************************************************    This PCR assay was performed by multiplex PCR. This    Assay tests for 66 bacterial and resistance gene targets.    Please refer to the [facility] test directory    at [email or web address] for details.  Organism: Blood Culture PCR (08 Oct 2021 04:35)  Organism: Blood Culture PCR (08 Oct 2021 04:35)      SARS-CoV-2: NotDetec (06 Oct 2021 21:43)  COVID-19 PCR: NotDetec (13 Sep 2021 13:46)  SARS-CoV-2: NotDetec (07 Sep 2021 00:59)        [RADIOLOGY STUDIES:]

## 2021-10-08 NOTE — PROGRESS NOTE ADULT - SUBJECTIVE AND OBJECTIVE BOX
Chief Complaint: Abdominal pain    Interval Events: No events overnight.    Review of Systems:  General: No fevers, chills, weight loss or gain  Skin: No rashes, color changes  Cardiovascular: No chest pain, orthopnea  Respiratory: No shortness of breath, cough  Gastrointestinal: No nausea, abdominal pain  Genitourinary: No incontinence, pain with urination  Musculoskeletal: No pain, swelling, decreased range of motion  Neurological: No headache, weakness  Psychiatric: No depression, anxiety  Endocrine: No weight loss or gain, increased thirst  All other systems are comprehensively negative.    Physical Exam:  Vitals:        Vital Signs Last 24 Hrs  T(C): 36.9 (08 Oct 2021 09:26), Max: 37.3 (07 Oct 2021 21:13)  T(F): 98.4 (08 Oct 2021 09:26), Max: 99.2 (07 Oct 2021 21:13)  HR: 74 (08 Oct 2021 09:26) (74 - 90)  BP: 103/55 (08 Oct 2021 09:26) (103/55 - 159/73)  BP(mean): --  RR: 19 (08 Oct 2021 09:26) (19 - 25)  SpO2: 93% (08 Oct 2021 09:26) (93% - 94%)  General: NAD  HEENT: MMM  Neck: No JVD, no carotid bruit  Lungs: CTAB  CV: RRR, nl S1/S2, no M/R/G  Abdomen: S/NT/ND, +BS  Extremities: 2+ LE edema, no cyanosis  Neuro: AAOx3, non-focal  Skin: No rash    Labs:                        10.2   8.22  )-----------( 231      ( 08 Oct 2021 07:47 )             33.2     10-06    143  |  104  |  22  ----------------------------<  169<H>  3.3<L>   |  30  |  1.08    Ca    8.1<L>      06 Oct 2021 21:43    TPro  6.5  /  Alb  3.0<L>  /  TBili  0.9  /  DBili  x   /  AST  17  /  ALT  25  /  AlkPhos  71  10-06    CARDIAC MARKERS ( 06 Oct 2021 21:43 )  .034 ng/mL / x     / x     / x     / x          PT/INR - ( 06 Oct 2021 21:43 )   PT: 14.2 sec;   INR: 1.18 ratio         PTT - ( 06 Oct 2021 21:43 )  PTT:25.3 sec    Telemetry:

## 2021-10-08 NOTE — DIETITIAN INITIAL EVALUATION ADULT. - OTHER INFO
65F with PMH Type 2 DM, HTN,hypothyroid, cognitive impairment,recurrent falls,recurrent uti, right breast malignancy on chemo  sent from [facility] abd pain since this morning. Pt reports she stopped using at home O2. Pt is a resident of [facility]. Pt reports b/l edema is at baseline.  Admitted with Acute on chronic diastolic congestive heart failure as well as UTI.  Pt walks w/ walker at baseline. Denies n/v/d. Lat BM this morning. Pt states BM was thin, unsure if she is constipated. Denies hx of abd surgery.    Pt presently on dysphagia 3 soft diet with thin liquids, Con CHO diet.  Pt seen for SLP evaluation on 10/7, recommendation made for mechanical soft diet with thin liquids; discussed with md, diet consistency downgrade pending.  Per transfer documents from [facility], pt on NCS, NUHA, regular consistency diet.  Pt noted to be tolerating meals, reports she ate breakfast.  Meal preferences to be obtained daily to optimize po intake and tolerance.  Per pt, UbW 200-250# (adm wt 218#), consistent with recent adm Sept 2021, suggesting wt stability.  Skin intact.  Diet education not appropriate at time of visit as pt appears to be poor historian (noted pmhx includes cognitive impairment).  NKFA.  RD to follow up and will continue to monitor pt's nutrition status.

## 2021-10-08 NOTE — PROGRESS NOTE ADULT - SUBJECTIVE AND OBJECTIVE BOX
Date/Time Patient Seen:  		  Referring MD:   Data Reviewed	       Patient is a 65y old  Female who presents with a chief complaint of abd pain (07 Oct 2021 21:57)      Subjective/HPI     PAST MEDICAL & SURGICAL HISTORY:  HTN (hypertension)    HLD (hyperlipidemia)    Anxiety    HLD (hyperlipidemia)    Hypothyroid    Adrenal nodule    Breast CA  right    Poor historian    Cancer of thyroid  unsure of dates but prior to 2005    Coronary artery disease    Cognitive impairment    Osteoarthritis    Type 2 diabetes mellitus    Urinary frequency    History of adrenal adenoma    Diabetes    H/O total thyroidectomy    H/O bilateral breast biopsy  November 2018 and December 2018    S/P angioplasty with stent  drug eluting stent in first diagonal on 2/21/19    S/P dilation and curettage  TOP x2          Medication list         MEDICATIONS  (STANDING):  amLODIPine   Tablet 10 milliGRAM(s) Oral daily  anastrozole 1 milliGRAM(s) Oral daily  aspirin enteric coated 81 milliGRAM(s) Oral daily  atorvastatin 20 milliGRAM(s) Oral at bedtime  carvedilol 25 milliGRAM(s) Oral every 12 hours  cefTRIAXone   IVPB 1000 milliGRAM(s) IV Intermittent every 24 hours  cloNIDine 0.2 milliGRAM(s) Oral three times a day  dextrose 40% Gel 15 Gram(s) Oral once  dextrose 5%. 1000 milliLiter(s) (50 mL/Hr) IV Continuous <Continuous>  dextrose 5%. 1000 milliLiter(s) (100 mL/Hr) IV Continuous <Continuous>  dextrose 50% Injectable 25 Gram(s) IV Push once  dextrose 50% Injectable 12.5 Gram(s) IV Push once  dextrose 50% Injectable 25 Gram(s) IV Push once  enoxaparin Injectable 40 milliGRAM(s) SubCutaneous daily  furosemide   Injectable 40 milliGRAM(s) IV Push daily  glucagon  Injectable 1 milliGRAM(s) IntraMuscular once  hydrALAZINE 100 milliGRAM(s) Oral three times a day  insulin lispro (ADMELOG) corrective regimen sliding scale   SubCutaneous three times a day before meals  insulin lispro (ADMELOG) corrective regimen sliding scale   SubCutaneous at bedtime  lactobacillus acidophilus 1 Tablet(s) Oral three times a day  levothyroxine 150 MICROGram(s) Oral daily  losartan 100 milliGRAM(s) Oral daily  metFORMIN 500 milliGRAM(s) Oral two times a day  phenazopyridine 100 milliGRAM(s) Oral every 8 hours  potassium chloride    Tablet ER 20 milliEquivalent(s) Oral daily    MEDICATIONS  (PRN):  acetaminophen   Tablet .. 650 milliGRAM(s) Oral every 6 hours PRN Temp greater or equal to 38C (100.4F), Mild Pain (1 - 3)  ALBUTerol    90 MICROgram(s) HFA Inhaler 2 Puff(s) Inhalation every 6 hours PRN Shortness of Breath and/or Wheezing  morphine  - Injectable 2 milliGRAM(s) IV Push every 6 hours PRN Severe Pain (7 - 10)  traMADol 50 milliGRAM(s) Oral every 6 hours PRN Moderate Pain (4 - 6)         Vitals log        ICU Vital Signs Last 24 Hrs  T(C): 36.9 (08 Oct 2021 05:33), Max: 37.3 (07 Oct 2021 21:13)  T(F): 98.4 (08 Oct 2021 05:33), Max: 99.2 (07 Oct 2021 21:13)  HR: 74 (08 Oct 2021 05:33) (74 - 90)  BP: 137/65 (08 Oct 2021 05:33) (117/61 - 159/73)  BP(mean): --  ABP: --  ABP(mean): --  RR: 20 (08 Oct 2021 05:33) (19 - 25)  SpO2: 93% (08 Oct 2021 05:33) (93% - 96%)           Input and Output:  I&O's Detail    06 Oct 2021 07:01  -  07 Oct 2021 07:00  --------------------------------------------------------  IN:  Total IN: 0 mL    OUT:    Voided (mL): 500 mL  Total OUT: 500 mL    Total NET: -500 mL      07 Oct 2021 07:01  -  08 Oct 2021 06:38  --------------------------------------------------------  IN:    Oral Fluid: 130 mL  Total IN: 130 mL    OUT:    Voided (mL): 1100 mL  Total OUT: 1100 mL    Total NET: -970 mL          Lab Data                        10.2   13.17 )-----------( 211      ( 06 Oct 2021 21:43 )             32.5     10-06    143  |  104  |  22  ----------------------------<  169<H>  3.3<L>   |  30  |  1.08    Ca    8.1<L>      06 Oct 2021 21:43    TPro  6.5  /  Alb  3.0<L>  /  TBili  0.9  /  DBili  x   /  AST  17  /  ALT  25  /  AlkPhos  71  10-06      CARDIAC MARKERS ( 06 Oct 2021 21:43 )  .034 ng/mL / x     / x     / x     / x            Review of Systems	      Objective     Physical Examination    heart s1s2  lung dec BS  abd soft      Pertinent Lab findings & Imaging      Miguel Angel:  NO   Adequate UO     I&O's Detail    06 Oct 2021 07:01  -  07 Oct 2021 07:00  --------------------------------------------------------  IN:  Total IN: 0 mL    OUT:    Voided (mL): 500 mL  Total OUT: 500 mL    Total NET: -500 mL      07 Oct 2021 07:01  -  08 Oct 2021 06:38  --------------------------------------------------------  IN:    Oral Fluid: 130 mL  Total IN: 130 mL    OUT:    Voided (mL): 1100 mL  Total OUT: 1100 mL    Total NET: -970 mL               Discussed with:     Cultures:	  Culture Results:   Growth in aerobic bottle: Gram Negative Rods  Growth in anaerobic bottle: Gram Negative Rods  ***Blood Panel PCR results on this specimen are available  approximately 3 hours after the Gram stain result.***  Gram stain, PCR, and/or culture results may not always  correspond due to difference in methodologies.  ************************************************************  This PCR assay was performed by multiplex PCR. This  Assay tests for 66 bacterial and resistance gene targets.  Please refer to the [facility] test directory  at [email or web address] for details. (10-07 @ 14:17)        Radiology

## 2021-10-08 NOTE — PROGRESS NOTE ADULT - SUBJECTIVE AND OBJECTIVE BOX
YOSI CORRAL is a 65yFemale , patient examined and chart reviewed.     INTERVAL HPI/ OVERNIGHT EVENTS:   Afebrile. Had >1000cc urine on bladder scan - straight cathed.   Blood cultures with GNR.    PAST MEDICAL & SURGICAL HISTORY:  HTN (hypertension)  Anxiety  HLD (hyperlipidemia)  Hypothyroid  Breast CA  right  Poor historian  Cancer of thyroid  unsure of dates but prior to   Coronary artery disease  Cognitive impairment  Osteoarthritis  Type 2 diabetes mellitus  Urinary frequency  History of adrenal adenoma  Diabetes  H/O total thyroidectomy  H/O bilateral breast biopsy  2018 and 2018  S/P angioplasty with stent  drug eluting stent in first diagonal on 19  S/P dilation and curettage  TOP x2        For details regarding the patient's social history, family history, and other miscellaneous elements, please refer the initial infectious diseases consultation and/or the admitting history and physical examination for this admission.    ROS:  CONSTITUTIONAL:  Negative fever or chills  EYES:  Negative  blurry vision or double vision  CARDIOVASCULAR:  Negative for chest pain or palpitations  RESPIRATORY:  Negative for cough, wheezing, or SOB   GASTROINTESTINAL:  Negative for nausea, vomiting, diarrhea, constipation, or abdominal pain  GENITOURINARY:  Negative frequency, urgency or dysuria  NEUROLOGIC:  No headache, dizziness, lightheadedness +confusion  All other systems were reviewed and are negative         Current inpatient medications :    ANTIBIOTICS/RELEVANT:  cefTRIAXone   IVPB 1000 milliGRAM(s) IV Intermittent every 24 hours  lactobacillus acidophilus 1 Tablet(s) Oral three times a day      acetaminophen   Tablet .. 650 milliGRAM(s) Oral every 6 hours PRN  ALBUTerol    90 MICROgram(s) HFA Inhaler 2 Puff(s) Inhalation every 6 hours PRN  amLODIPine   Tablet 10 milliGRAM(s) Oral daily  anastrozole 1 milliGRAM(s) Oral daily  aspirin enteric coated 81 milliGRAM(s) Oral daily  atorvastatin 20 milliGRAM(s) Oral at bedtime  carvedilol 25 milliGRAM(s) Oral every 12 hours  cloNIDine 0.2 milliGRAM(s) Oral three times a day  dextrose 40% Gel 15 Gram(s) Oral once  dextrose 5%. 1000 milliLiter(s) IV Continuous <Continuous>  dextrose 5%. 1000 milliLiter(s) IV Continuous <Continuous>  dextrose 50% Injectable 25 Gram(s) IV Push once  dextrose 50% Injectable 25 Gram(s) IV Push once  dextrose 50% Injectable 12.5 Gram(s) IV Push once  enoxaparin Injectable 40 milliGRAM(s) SubCutaneous daily  glucagon  Injectable 1 milliGRAM(s) IntraMuscular once  hydrALAZINE 100 milliGRAM(s) Oral three times a day  insulin lispro (ADMELOG) corrective regimen sliding scale   SubCutaneous three times a day before meals  insulin lispro (ADMELOG) corrective regimen sliding scale   SubCutaneous at bedtime  levothyroxine 150 MICROGram(s) Oral daily  losartan 100 milliGRAM(s) Oral daily  metFORMIN 500 milliGRAM(s) Oral two times a day  morphine  - Injectable 2 milliGRAM(s) IV Push every 6 hours PRN  phenazopyridine 100 milliGRAM(s) Oral every 8 hours  potassium chloride    Tablet ER 20 milliEquivalent(s) Oral daily  potassium chloride  10 mEq/100 mL IVPB 10 milliEquivalent(s) IV Intermittent every 1 hour  traMADol 50 milliGRAM(s) Oral every 6 hours PRN      Objective:    10-07 @ 07:01  -  10-08 @ 07:00  --------------------------------------------------------  IN: 130 mL / OUT: 1100 mL / NET: -970 mL      T(C): 36.9 (10-08-21 @ 09:26), Max: 37.3 (10-07-21 @ 21:13)  HR: 74 (10-08-21 @ 09:26) (74 - 90)  BP: 103/55 (10-08-21 @ 09:26) (103/55 - 159/73)  RR: 19 (10-08-21 @ 09:26) (19 - 25)  SpO2: 93% (10-08-21 @ 09:26) (93% - 94%)  Wt(kg): --      Physical Exam:  General: no acute distress  Neck: supple, trachea midline  Lungs: Decreased, no wheeze/rhonchi  Cardiovascular: regular rate and rhythm, S1 S2  Abdomen: soft, nontender,  bowel sounds normal  Neurological: alert and oriented x2  Skin: no rash  Extremities: + edema      LABS:                          10.2   8.22  )-----------( 231      ( 08 Oct 2021 07:47 )             33.2       10-08    145  |  103  |  18  ----------------------------<  139<H>  2.7<LL>   |  34<H>  |  1.13    Ca    8.4      08 Oct 2021 07:47  Mg     2.4     10-08    TPro  6.3  /  Alb  2.7<L>  /  TBili  0.9  /  DBili  x   /  AST  16  /  ALT  20  /  AlkPhos  61  10-08      PT/INR - ( 06 Oct 2021 21:43 )   PT: 14.2 sec;   INR: 1.18 ratio         PTT - ( 06 Oct 2021 21:43 )  PTT:25.3 sec  Urinalysis Basic - ( 07 Oct 2021 02:14 )    Color: Yellow / Appearance: Clear / S.010 / pH: x  Gluc: x / Ketone: Negative  / Bili: Negative / Urobili: Negative mg/dL   Blood: x / Protein: Negative mg/dL / Nitrite: Positive   Leuk Esterase: Negative / RBC: x / WBC 0-2   Sq Epi: x / Non Sq Epi: Occasional / Bacteria: Moderate        MICROBIOLOGY:    Culture - Blood (collected 07 Oct 2021 14:17)  Source: .Blood Blood-Peripheral  Gram Stain (08 Oct 2021 01:27):    Growth in aerobic bottle: Gram Negative Rods    Growth in anaerobic bottle: Gram Negative Rods  Preliminary Report (08 Oct 2021 01:28):    Growth in aerobic bottle: Gram Negative Rods    Growth in anaerobic bottle: Gram Negative Rods    ***Blood Panel PCR results on this specimen are available    approximately 3 hours after the Gram stain result.***    Gram stain, PCR, and/or culture results may not always    correspond due to difference in methodologies.    ************************************************************    This PCR assay was performed by multiplex PCR. This    Assay tests for 66 bacterial and resistance gene targets.    Please refer to the [facility] test directory    at [email or web address] for details.  Organism: Blood Culture PCR (08 Oct 2021 04:35)  Organism: Blood Culture PCR (08 Oct 2021 04:35)      -  Escherichia coli: Detec      Method Type: PCR      RADIOLOGY & ADDITIONAL STUDIES:    EXAM:  CT ABDOMEN AND PELVIS IC                          EXAM:  CT ANGIO CHEST PULM ART WAWI                                  PROCEDURE DATE:  10/06/2021          INTERPRETATION:  CLINICAL INFORMATION: Abdominal pain. Bilateral leg edema.    COMPARISON: None.    CONTRAST/COMPLICATIONS:  IV Contrast: Omnipaque 350 (accession [id number]), IV contrast documented in associated exam (accession [id number])  90 cc administered   10 cc discarded  Oral Contrast: NONE  Complications: None reported at time of study completion    PROCEDURE:  CT Angiography of the Chest was performed followed by portal venous phase imaging of the Abdomen and Pelvis.  Sagittal and coronal reformats were performed as well as 3D (MIP) reconstructions.    FINDINGS:  CHEST:  LUNGS AND LARGE AIRWAYS: Patent central airways. Elevated right diaphragm. Subsegmental atelectasis in the right upper lobe and bilateral lung bases. Subsegmental left lower lobe atelectasis versus pneumonia.  PLEURA: No pleural effusion.  VESSELS: No pulmonary embolus up to and including the segmental branches. Subsegmental branches are not well evaluated due to respiratory motion artifact. Atherosclerotic calcifications of the aorta and coronary arteries.  HEART: Heart size is normal. No pericardial effusion.  MEDIASTINUM AND FRANCISCA: No lymphadenopathy.  CHEST WALL AND LOWER NECK: Diffuse right breast skin thickening and prominent soft tissue in the retroareolar region, again noted.    ABDOMEN AND PELVIS:  LIVER: Within normal limits.  BILE DUCTS: Normal caliber.  GALLBLADDER: Within normal limits.  SPLEEN: Within normal limits.  PANCREAS: Within normal limits.  ADRENALS: Left adrenal adenoma measures 3.2 x 2.5 cm, unchanged.  KIDNEYS/URETERS: Bilateral renal cysts. No hydronephrosis..    BLADDER: Within normal limits.  REPRODUCTIVE ORGANS: Fibroid uterus.    BOWEL: No bowel obstruction. Appendix is normal.  PERITONEUM: No ascites.  VESSELS: Within normal limits.  RETROPERITONEUM/LYMPH NODES: No lymphadenopathy.  ABDOMINAL WALL: Within normal limits.  BONES: Degenerative changes.    IMPRESSION:  No pulmonary embolus up to and including segmental branches. Stable subsegmental left lower lobe atelectasis versus pneumonia.    No acute abdominal pathology.          Assessment :  65F with PMH Type 2 DM, HTN,hypothyroid, cognitive impairment,recurrent falls,recurrent uti, right breast malignancy on chemo sent from [place] [facility] abd pain and incontinence.   Found to have AUR -Had >1000cc urine on bladder scan - straight cathed. Repeat bladder scan <100cc  GNR sepsis sec AUR  Doubt pneumonia likely atelectasis    Plan :   Cont Rocephin  Fu cultures  Repeat blood cultures  Trend temps and cbc  Monitor closely for recurrent AUR  Asp precautions    D/w Dr Garcia     Continue with present regiment.  Appropriate use of antibiotics and adverse effects reviewed.        > 35 minutes were spent in direct patient care reviewing notes, medications ,labs data/ imaging , discussion with multidisciplinary team.    Thank you for allowing me to participate in care of your patient .    Sunni Espinoza MD  Infectious Disease  [phone]

## 2021-10-08 NOTE — PROGRESS NOTE ADULT - SUBJECTIVE AND OBJECTIVE BOX
Patient is a 65y old  Female who presents with a chief complaint of abd pain (08 Oct 2021 11:24)      INTERVAL HPI/OVERNIGHT EVENTS:overnight events noted    Home Medications:  Acidophilus oral capsule: 1 cap(s) orally 2 times a day (06 Oct 2021 21:25)  anastrozole 1 mg oral tablet: 1 tab(s) orally once a day (06 Oct 2021 21:25)  aspirin 81 mg oral tablet: 1 tab(s) orally once a day (06 Oct 2021 21:25)  atorvastatin 20 mg oral tablet: 1 tab(s) orally once a day (06 Oct 2021 21:25)  carvedilol 25 mg oral tablet: 1 tab(s) orally 2 times a day (06 Oct 2021 21:25)  cloNIDine 0.2 mg oral tablet: orally 3 times a day (06 Oct 2021 21:25)  furosemide 40 mg oral tablet: 1 tab(s) orally once a day (06 Oct 2021 21:25)  hydrALAZINE 100 mg oral tablet: orally 3 times a day (06 Oct 2021 21:25)  levothyroxine 150 mcg (0.15 mg) oral tablet: 1 tab(s) orally once a day (06 Oct 2021 21:25)  losartan 100 mg oral tablet: 1 tab(s) orally once a day (06 Oct 2021 21:25)  metFORMIN 500 mg oral tablet, extended release: 1 tab(s) orally once a day (06 Oct 2021 21:25)  potassium chloride 20 mEq oral tablet, extended release: orally 2 times a day (06 Oct 2021 21:25)  senna 8.6 mg oral tablet: 2 tab(s) orally once a day (at bedtime) (06 Oct 2021 21:25)      MEDICATIONS  (STANDING):  amLODIPine   Tablet 10 milliGRAM(s) Oral daily  anastrozole 1 milliGRAM(s) Oral daily  aspirin enteric coated 81 milliGRAM(s) Oral daily  atorvastatin 20 milliGRAM(s) Oral at bedtime  carvedilol 25 milliGRAM(s) Oral every 12 hours  cefTRIAXone   IVPB 1000 milliGRAM(s) IV Intermittent every 24 hours  cloNIDine 0.2 milliGRAM(s) Oral three times a day  dextrose 40% Gel 15 Gram(s) Oral once  dextrose 5%. 1000 milliLiter(s) (50 mL/Hr) IV Continuous <Continuous>  dextrose 5%. 1000 milliLiter(s) (100 mL/Hr) IV Continuous <Continuous>  dextrose 50% Injectable 25 Gram(s) IV Push once  dextrose 50% Injectable 12.5 Gram(s) IV Push once  dextrose 50% Injectable 25 Gram(s) IV Push once  enoxaparin Injectable 40 milliGRAM(s) SubCutaneous daily  glucagon  Injectable 1 milliGRAM(s) IntraMuscular once  hydrALAZINE 100 milliGRAM(s) Oral three times a day  insulin lispro (ADMELOG) corrective regimen sliding scale   SubCutaneous three times a day before meals  insulin lispro (ADMELOG) corrective regimen sliding scale   SubCutaneous at bedtime  lactobacillus acidophilus 1 Tablet(s) Oral three times a day  levothyroxine 150 MICROGram(s) Oral daily  losartan 100 milliGRAM(s) Oral daily  metFORMIN 500 milliGRAM(s) Oral two times a day  phenazopyridine 100 milliGRAM(s) Oral every 8 hours  potassium chloride    Tablet ER 20 milliEquivalent(s) Oral daily  potassium chloride  10 mEq/100 mL IVPB 10 milliEquivalent(s) IV Intermittent every 1 hour    MEDICATIONS  (PRN):  acetaminophen   Tablet .. 650 milliGRAM(s) Oral every 6 hours PRN Temp greater or equal to 38C (100.4F), Mild Pain (1 - 3)  ALBUTerol    90 MICROgram(s) HFA Inhaler 2 Puff(s) Inhalation every 6 hours PRN Shortness of Breath and/or Wheezing  morphine  - Injectable 2 milliGRAM(s) IV Push every 6 hours PRN Severe Pain (7 - 10)  traMADol 50 milliGRAM(s) Oral every 6 hours PRN Moderate Pain (4 - 6)      Allergies    No Known Allergies    Intolerances        REVIEW OF SYSTEMS:  CONSTITUTIONAL: No fever, weight loss, has fatigue  EYES: No eye pain, visual disturbances, or discharge  ENMT:  No difficulty hearing, tinnitus, vertigo; No sinus or throat pain  NECK: No pain or stiffness  BREASTS: No pain, masses, or nipple discharge  RESPIRATORY: No cough, wheezing, chills or hemoptysis; No shortness of breath  CARDIOVASCULAR: No chest pain, palpitations, dizziness, or leg swelling  GASTROINTESTINAL: No abdominal or epigastric pain. No nausea, vomiting, or hematemesis; No diarrhea or constipation. No melena or hematochezia.  GENITOURINARY: has dysuria, frequency,no  hematuria, or incontinence  NEUROLOGICAL: No headaches, memory loss, loss of strength, numbness, or tremors  SKIN: No itching, burning, rashes, or lesions   LYMPH NODES: No enlarged glands  ENDOCRINE: No heat or cold intolerance; No hair loss  MUSCULOSKELETAL: No joint pain or swelling; No muscle, back, or extremity pain  PSYCHIATRIC: No depression, anxiety, mood swings, or difficulty sleeping  HEME/LYMPH: No easy bruising, or bleeding gums  ALLERGY AND IMMUNOLOGIC: No hives or eczema    Vital Signs Last 24 Hrs  T(C): 36.9 (08 Oct 2021 09:26), Max: 37.3 (07 Oct 2021 21:13)  T(F): 98.4 (08 Oct 2021 09:26), Max: 99.2 (07 Oct 2021 21:13)  HR: 74 (08 Oct 2021 09:26) (74 - 90)  BP: 103/55 (08 Oct 2021 09:26) (103/55 - 159/73)  BP(mean): --  RR: 19 (08 Oct 2021 09:26) (19 - 25)  SpO2: 93% (08 Oct 2021 09:26) (93% - 94%)    PHYSICAL EXAM:  GENERAL: NAD, well-groomed, well-developed  HEAD:  Atraumatic, Normocephalic  EYES: EOMI, PERRLA, conjunctiva and sclera clear  ENMT: Moist mucous membranes,   NECK: Supple, No JVD, Normal thyroid  NERVOUS SYSTEM:  Alert & Oriented X2, non focal  CHEST/LUNG: Clear to percussion bilaterally; No rales, rhonchi, wheezing, or rubs  HEART: Regular rate and rhythm; No murmurs, rubs, or gallops  ABDOMEN: Soft, Nontender, Nondistended; Bowel sounds present  EXTREMITIES:  2+ Peripheral Pulses, No clubbing, cyanosis, or edema  LYMPH: No lymphadenopathy noted  SKIN: No rashes or lesions    LABS:                        10.2   8.22  )-----------( 231      ( 08 Oct 2021 07:47 )             33.2     10-    145  |  103  |  18  ----------------------------<  139<H>  2.7<LL>   |  34<H>  |  1.13    Ca    8.4      08 Oct 2021 07:47  Mg     2.4     10-08    TPro  6.3  /  Alb  2.7<L>  /  TBili  0.9  /  DBili  x   /  AST  16  /  ALT  20  /  AlkPhos  61  10-    PT/INR - ( 06 Oct 2021 21:43 )   PT: 14.2 sec;   INR: 1.18 ratio         PTT - ( 06 Oct 2021 21:43 )  PTT:25.3 sec  Urinalysis Basic - ( 07 Oct 2021 02:14 )    Color: Yellow / Appearance: Clear / S.010 / pH: x  Gluc: x / Ketone: Negative  / Bili: Negative / Urobili: Negative mg/dL   Blood: x / Protein: Negative mg/dL / Nitrite: Positive   Leuk Esterase: Negative / RBC: x / WBC 0-2   Sq Epi: x / Non Sq Epi: Occasional / Bacteria: Moderate      CAPILLARY BLOOD GLUCOSE      POCT Blood Glucose.: 133 mg/dL (08 Oct 2021 07:44)  POCT Blood Glucose.: 144 mg/dL (07 Oct 2021 21:35)  POCT Blood Glucose.: 130 mg/dL (07 Oct 2021 17:13)        Culture - Blood (collected 10-07-21 @ 14:17)  Source: .Blood Blood-Peripheral  Gram Stain (10-08-21 @ 01:27):    Growth in aerobic bottle: Gram Negative Rods    Growth in anaerobic bottle: Gram Negative Rods  Preliminary Report (10-08-21 @ 01:28):    Growth in aerobic bottle: Gram Negative Rods    Growth in anaerobic bottle: Gram Negative Rods    ***Blood Panel PCR results on this specimen are available    approximately 3 hours after the Gram stain result.***    Gram stain, PCR, and/or culture results may not always    correspond due to difference in methodologies.    ************************************************************    This PCR assay was performed by multiplex PCR. This    Assay tests for 66 bacterial and resistance gene targets.    Please refer to the [facility] test directory    at [email or web address] for details.  Organism: Blood Culture PCR (10-08-21 @ 04:35)  Organism: Blood Culture PCR (10-08-21 @ 04:35)      -  Escherichia coli: Detec      Method Type: PCR        I&O's Summary    07 Oct 2021 07:01  -  08 Oct 2021 07:00  --------------------------------------------------------  IN: 130 mL / OUT: 1100 mL / NET: -970 mL        RADIOLOGY & ADDITIONAL TESTS:    Imaging Personally Reviewed:  [x ] YES  [ ] NO    Consultant(s) Notes Reviewed:  [x] YES  [ ] NO    Care Discussed with Consultants/Other Providers [ x] YES  [ ] NO

## 2021-10-08 NOTE — DIETITIAN INITIAL EVALUATION ADULT. - PERTINENT MEDS FT
MEDICATIONS  (STANDING):  amLODIPine   Tablet 10 milliGRAM(s) Oral daily  anastrozole 1 milliGRAM(s) Oral daily  aspirin enteric coated 81 milliGRAM(s) Oral daily  atorvastatin 20 milliGRAM(s) Oral at bedtime  carvedilol 25 milliGRAM(s) Oral every 12 hours  cefTRIAXone   IVPB 1000 milliGRAM(s) IV Intermittent every 24 hours  cloNIDine 0.2 milliGRAM(s) Oral three times a day  dextrose 40% Gel 15 Gram(s) Oral once  dextrose 5%. 1000 milliLiter(s) (50 mL/Hr) IV Continuous <Continuous>  dextrose 5%. 1000 milliLiter(s) (100 mL/Hr) IV Continuous <Continuous>  dextrose 50% Injectable 25 Gram(s) IV Push once  dextrose 50% Injectable 25 Gram(s) IV Push once  dextrose 50% Injectable 12.5 Gram(s) IV Push once  enoxaparin Injectable 40 milliGRAM(s) SubCutaneous daily  glucagon  Injectable 1 milliGRAM(s) IntraMuscular once  hydrALAZINE 100 milliGRAM(s) Oral three times a day  insulin lispro (ADMELOG) corrective regimen sliding scale   SubCutaneous three times a day before meals  insulin lispro (ADMELOG) corrective regimen sliding scale   SubCutaneous at bedtime  lactobacillus acidophilus 1 Tablet(s) Oral three times a day  levothyroxine 150 MICROGram(s) Oral daily  losartan 100 milliGRAM(s) Oral daily  metFORMIN 500 milliGRAM(s) Oral two times a day  phenazopyridine 100 milliGRAM(s) Oral every 8 hours  potassium chloride    Tablet ER 20 milliEquivalent(s) Oral daily  potassium chloride  10 mEq/100 mL IVPB 10 milliEquivalent(s) IV Intermittent every 1 hour    MEDICATIONS  (PRN):  acetaminophen   Tablet .. 650 milliGRAM(s) Oral every 6 hours PRN Temp greater or equal to 38C (100.4F), Mild Pain (1 - 3)  ALBUTerol    90 MICROgram(s) HFA Inhaler 2 Puff(s) Inhalation every 6 hours PRN Shortness of Breath and/or Wheezing  morphine  - Injectable 2 milliGRAM(s) IV Push every 6 hours PRN Severe Pain (7 - 10)  traMADol 50 milliGRAM(s) Oral every 6 hours PRN Moderate Pain (4 - 6)

## 2021-10-08 NOTE — PROGRESS NOTE ADULT - ASSESSMENT
64 y/o F BIBEMS from [facility] c/o lower abd pain since this morning. Denies n/v/d. Last BM this morning, unsure if she is constipated. Denies hx of abd surgery. no known fever, no chills. Pt reports she stopped using at home O2 2d ago and EMS found pt to be hypoxic in the 80s with no O2, also EMS did not see O2 as order on pt's medical papers. Pt states she walks w/ walker at baseline, but EMS state they tried to stand her to get onto stretcher and pt could not support herself    sepsis - bacteremia - procalcitonin noted -   overnight events noted  on ABX  ID follow up     pna eval  biomarkers  cx  SLP eval noted  Dysphagia diet  cardio eval noted - cvs rx regimen and BP control  I and O  monitor VS and HD and Sat  MEKA eval as outpatient  CT chest - neg for PE - infiltrate vs atelectasis - pt is on ABX  VBG noted  wean fio2 support as tolerated - keep sat > 88 pct  pt lives in [place]  assist with needs - ADL -

## 2021-10-08 NOTE — PROGRESS NOTE ADULT - ASSESSMENT
History of breast cancer on adjuvant arimidex now admitted with CHF and UTI  CT chest abd and pelvis without evidence of metastatic disease    Blood cultures with Gram negative rods.  GNB Sepsis    Patient was poor historian at admission, likely delerium. Seems better.     MIld anemia likely related to chronic disease and acute illness  Await workup    Recommendations  1.  follow CBC, anemia workup.   2.  continue armidex  3.  continue cardiology and pulmonary evaluation  4.  no additional metastatic workup needed  6.  additional heme/onc recommendations pending workup and clinica course.

## 2021-10-09 NOTE — PROGRESS NOTE ADULT - ASSESSMENT
The patient is a 65 year old female with a history of HTN, DM, hypothyroidism, cognitive impairment, right breast cancer, HCM, chronic diastolic heart failure who presents with abdominal pain, UTI.    10/9/21  Seen at [facility] telemetry  Patient sitting up, eating lunch  No complaints offered  Monitor-NSR, [facility]  Potassium-    Plan:  - ECG with LVH related changes  - Echo last admission with hyperdynamic LV systolic function, HOCM  - Continue carvedilol 25 mg bid  - Continue clonidine 0.2 mg tid  - Continue hydralazine 100 mg tid  - Continue losartan 100 mg daily  - Continue aspirin 81 mg daily  - Continue atorvastatin 20 mg daily  - Continue Furosemide-40mg OD  - Continue Amlodipine-10mg OD  - BNP 1172  - CTA chest negative for PE, pulm edema, pleural effusions - atelectasis vs. PNA noted  - On ceftriaxone for UTI The patient is a 65 year old female with a history of HTN, DM, hypothyroidism, cognitive impairment, right breast cancer, HCM, chronic diastolic heart failure who presents with abdominal pain, UTI.    10/9/21  Seen at [facility] telemetry  Patient sitting up, eating lunch  No complaints offered  Monitor-NSR, C  Potassium-3.1    Plan:  - ECG with LVH related changes  - Echo last admission with hyperdynamic LV systolic function, HOCM  - Continue carvedilol 25 mg bid  - Continue clonidine 0.2 mg tid  - Continue hydralazine 100 mg tid  - Continue losartan 100 mg daily  - Continue aspirin 81 mg daily  - Continue atorvastatin 20 mg daily  - Continue Furosemide-40mg OD  - Continue Amlodipine-10mg OD  - K-supps and follow labs  - BNP 1172  - CTA chest negative for PE, pulm edema, pleural effusions - atelectasis vs. PNA noted  - On ceftriaxone for UTI

## 2021-10-09 NOTE — PROGRESS NOTE ADULT - SUBJECTIVE AND OBJECTIVE BOX
[facility] DIVISION of INFECTIOUS DISEASE  Tacos Rain MD PhD, Fanny Gomez MD, Emily Tanner MD, Amy Roa MD, Jerzy Fan MD  and providing coverage with Kat Lim MD and Sunni Espinoza MD  Providing Infectious Disease Consultations at [facility], [facility]    Office# [phone] to schedule follow up appointments  Answering Service for urgent calls or New Consults [phone]  Cell# to text for urgent issues Tacos Rain [phone]     infectious diseases progress note:    YOSI CORRAL is a 65y y. o. Female patient    No concerning overnight events    Allergies    No Known Allergies    Intolerances        ANTIBIOTICS/RELEVANT:  antimicrobials  cefTRIAXone   IVPB 1000 milliGRAM(s) IV Intermittent every 24 hours    immunologic:    OTHER:  acetaminophen   Tablet .. 650 milliGRAM(s) Oral every 6 hours PRN  ALBUTerol    90 MICROgram(s) HFA Inhaler 2 Puff(s) Inhalation every 6 hours PRN  amLODIPine   Tablet 10 milliGRAM(s) Oral daily  anastrozole 1 milliGRAM(s) Oral daily  aspirin enteric coated 81 milliGRAM(s) Oral daily  atorvastatin 20 milliGRAM(s) Oral at bedtime  carvedilol 25 milliGRAM(s) Oral every 12 hours  cloNIDine 0.2 milliGRAM(s) Oral three times a day  cyanocobalamin Injectable 1000 MICROGram(s) IntraMuscular daily  dextrose 40% Gel 15 Gram(s) Oral once  dextrose 5%. 1000 milliLiter(s) IV Continuous <Continuous>  dextrose 5%. 1000 milliLiter(s) IV Continuous <Continuous>  dextrose 50% Injectable 25 Gram(s) IV Push once  dextrose 50% Injectable 12.5 Gram(s) IV Push once  dextrose 50% Injectable 25 Gram(s) IV Push once  enoxaparin Injectable 40 milliGRAM(s) SubCutaneous daily  furosemide    Tablet 40 milliGRAM(s) Oral daily  glucagon  Injectable 1 milliGRAM(s) IntraMuscular once  hydrALAZINE 100 milliGRAM(s) Oral three times a day  insulin lispro (ADMELOG) corrective regimen sliding scale   SubCutaneous three times a day before meals  insulin lispro (ADMELOG) corrective regimen sliding scale   SubCutaneous at bedtime  lactobacillus acidophilus 1 Tablet(s) Oral three times a day  levothyroxine 150 MICROGram(s) Oral daily  losartan 100 milliGRAM(s) Oral daily  metFORMIN 500 milliGRAM(s) Oral two times a day  morphine  - Injectable 2 milliGRAM(s) IV Push every 6 hours PRN  phenazopyridine 100 milliGRAM(s) Oral every 8 hours  potassium chloride    Tablet ER 20 milliEquivalent(s) Oral daily  traMADol 50 milliGRAM(s) Oral every 6 hours PRN      Objective:  Vital Signs Last 24 Hrs  T(C): 36.9 (09 Oct 2021 05:43), Max: 37.3 (08 Oct 2021 18:59)  T(F): 98.5 (09 Oct 2021 05:43), Max: 99.1 (08 Oct 2021 18:59)  HR: 68 (09 Oct 2021 05:43) (68 - 104)  BP: 132/74 (09 Oct 2021 05:43) (107/69 - 133/66)  BP(mean): --  RR: 18 (09 Oct 2021 05:43) (17 - 18)  SpO2: 93% (09 Oct 2021 05:43) (93% - 100%)    T(C): 36.9 (10-09-21 @ 05:43), Max: 37.3 (10-07-21 @ 21:13)  T(C): 36.9 (10-09-21 @ 05:43), Max: 37.3 (10-07-21 @ 21:13)  T(C): 36.9 (10-09-21 @ 05:43), Max: 37.3 (10-07-21 @ 21:13)    PHYSICAL EXAM:  HEENT: NC atraumatic  Neck: supple  Respiratory: no accessory muscle use, breathing comfortably  Cardiovascular: distant  Gastrointestinal: normal appearing, nondistended  Extremities: no clubbing, no cyanosis,        LABS:                          10.4   7.49  )-----------( 237      ( 09 Oct 2021 07:46 )             33.5       7.49 10-09 @ 07:46  8.22 10-08 @ 07:47  13.17 10-06 @ 21:43      10-09    143  |  104  |  17  ----------------------------<  125<H>  3.1<L>   |  31  |  0.90    Ca    8.2<L>      09 Oct 2021 07:46  Mg     2.4     10-08    TPro  6.1  /  Alb  2.5<L>  /  TBili  0.6  /  DBili  x   /  AST  13  /  ALT  19  /  AlkPhos  54  10-09      Creatinine, Serum: 0.90 mg/dL (10-09-21 @ 07:46)  Creatinine, Serum: 1.13 mg/dL (10-08-21 @ 07:47)  Creatinine, Serum: 1.08 mg/dL (10-06-21 @ 21:43)                INFLAMMATORY MARKERS  Auto Neutrophil #: 6.92 K/uL (10-08-21 @ 07:47)  Auto Lymphocyte #: 0.45 K/uL (10-08-21 @ 07:47)  Auto Lymphocyte #: 0.33 K/uL (10-06-21 @ 21:43)  Auto Neutrophil #: 11.65 K/uL (10-06-21 @ 21:43)    Lactate, Blood: 1.4 mmol/L (10-07-21 @ 02:14)    Auto Eosinophil #: 0.06 K/uL (10-08-21 @ 07:47)  Auto Eosinophil #: 0.03 K/uL (10-06-21 @ 21:43)      Sedimentation Rate, Erythrocyte: 94 mm/Hr (10-08-21 @ 12:04)    Procalcitonin, Serum: 13.60 ng/mL (10-07-21 @ 13:14)    Troponin I, Serum: .034 ng/mL (10-06-21 @ 21:43)          Ferritin, Serum: 90 ng/mL (10-08-21 @ 15:35)        INR: 1.18 ratio (10-06-21 @ 21:43)  Activated Partial Thromboplastin Time: 25.3 sec (10-06-21 @ 21:43)    D-Dimer Assay, Quantitative: 2107 ng/mL DDU (10-06-21 @ 21:43)        MICROBIOLOGY:      Culture - Urine (10.07.21 @ 14:32)    Specimen Source: Clean Catch Clean Catch (Midstream)    Culture Results:   >100,000 CFU/ml Gram Negative Rods    Culture - Blood (10.07.21 @ 14:17)    -  Escherichia coli: Detec    Gram Stain:   Growth in aerobic bottle: Gram Negative Rods  Growth in anaerobic bottle: Gram Negative Rods    Specimen Source: .Blood Blood-Peripheral    Organism: Blood Culture PCR    Culture Results:   Growth in aerobic and anaerobic bottles: Escherichia coli        RADIOLOGY & ADDITIONAL STUDIES:    < from: CT Abdomen and Pelvis w/ IV Cont (10.06.21 @ 23:47) >    EXAM:  CT ABDOMEN AND PELVIS IC                          EXAM:  CT ANGIO CHEST PULM ART [facility]                                  PROCEDURE DATE:  10/06/2021          INTERPRETATION:  CLINICAL INFORMATION: Abdominal pain. Bilateral leg edema.    COMPARISON: None.    CONTRAST/COMPLICATIONS:  IV Contrast: Omnipaque 350 (accession [id number]), IV contrast documented in associated exam (accession [id number])  90 cc administered   10 cc discarded  Oral Contrast: NONE  Complications: None reported at time of study completion    PROCEDURE:  CT Angiography of the Chest was performed followed by portal venous phase imaging of the Abdomen and Pelvis.  Sagittal and coronal reformats were performed as well as 3D (MIP) reconstructions.    FINDINGS:  CHEST:  LUNGS AND LARGE AIRWAYS: Patent central airways. Elevated right diaphragm. Subsegmental atelectasis in the right upper lobe and bilateral lung bases. Subsegmental left lower lobe atelectasis versus pneumonia.  PLEURA: No pleural effusion.  VESSELS: No pulmonary embolus up to and including the segmental branches. Subsegmental branches are not well evaluated due to respiratory motion artifact. Atherosclerotic calcifications of the aorta and coronary arteries.  HEART: Heart size is normal. No pericardial effusion.  MEDIASTINUM AND FRANCISCA: No lymphadenopathy.  CHEST WALL AND LOWER NECK: Diffuse right breast skin thickening and prominent soft tissue in the retroareolar region, again noted.    ABDOMEN AND PELVIS:  LIVER: Within normal limits.  BILE DUCTS: Normal caliber.  GALLBLADDER: Within normal limits.  SPLEEN: Within normal limits.  PANCREAS: Within normal limits.  ADRENALS: Left adrenal adenoma measures 3.2 x 2.5 cm, unchanged.  KIDNEYS/URETERS: Bilateral renal cysts. No hydronephrosis..    BLADDER: Within normal limits.  REPRODUCTIVE ORGANS: Fibroid uterus.    BOWEL: No bowel obstruction. Appendix is normal.  PERITONEUM: No ascites.  VESSELS: Within normal limits.  RETROPERITONEUM/LYMPH NODES: No lymphadenopathy.  ABDOMINAL WALL: Within normal limits.  BONES: Degenerative changes.    IMPRESSION:  No pulmonary embolus up to and including segmental branches. Stable subsegmental left lower lobe atelectasis versus pneumonia.    No acute abdominal pathology.

## 2021-10-09 NOTE — PROVIDER CONTACT NOTE (CRITICAL VALUE NOTIFICATION) - ASSESSMENT
Potassium repletion orders indicated
Alert, oriented x4  on 3 L O2 nc, VSS, afebrile, not in distress  Blood culture taken 10/7/21 : aerobic bottle grew gram (-) rods
Blood culture result taken 10/7/21 preliminary result on both aerobic and anaerobic bottle grew gram (-) rods

## 2021-10-09 NOTE — PROGRESS NOTE ADULT - ASSESSMENT
64 yo woman History of breast cancer on adjuvant arimidex now admitted with  UTI and found to have E-coli bacteremia due to pyelonephritis    - CT chest abd and pelvis without evidence of metastatic disease  - to continue anastrazole as adjuvant therapy for breast cancer  - anemia is multifactorial; noted B12 deficiency; started on IM injections of B12; iron studies equivocal and would expect ferritin to be much high in setting of bacteremia; suspect component of iron deficiency; will give IV iron for 3 days  - continue to monitor CBC  - will follow

## 2021-10-09 NOTE — PROGRESS NOTE ADULT - SUBJECTIVE AND OBJECTIVE BOX
Patient is a 65y old  Female who presents with a chief complaint of abd pain (09 Oct 2021 06:18)      INTERVAL HPI/OVERNIGHT EVENTS:overnight events noted    Home Medications:  Acidophilus oral capsule: 1 cap(s) orally 2 times a day (06 Oct 2021 21:25)  anastrozole 1 mg oral tablet: 1 tab(s) orally once a day (06 Oct 2021 21:25)  aspirin 81 mg oral tablet: 1 tab(s) orally once a day (06 Oct 2021 21:25)  atorvastatin 20 mg oral tablet: 1 tab(s) orally once a day (06 Oct 2021 21:25)  carvedilol 25 mg oral tablet: 1 tab(s) orally 2 times a day (06 Oct 2021 21:25)  cloNIDine 0.2 mg oral tablet: orally 3 times a day (06 Oct 2021 21:25)  furosemide 40 mg oral tablet: 1 tab(s) orally once a day (06 Oct 2021 21:25)  hydrALAZINE 100 mg oral tablet: orally 3 times a day (06 Oct 2021 21:25)  levothyroxine 150 mcg (0.15 mg) oral tablet: 1 tab(s) orally once a day (06 Oct 2021 21:25)  losartan 100 mg oral tablet: 1 tab(s) orally once a day (06 Oct 2021 21:25)  metFORMIN 500 mg oral tablet, extended release: 1 tab(s) orally once a day (06 Oct 2021 21:25)  potassium chloride 20 mEq oral tablet, extended release: orally 2 times a day (06 Oct 2021 21:25)  senna 8.6 mg oral tablet: 2 tab(s) orally once a day (at bedtime) (06 Oct 2021 21:25)      MEDICATIONS  (STANDING):  amLODIPine   Tablet 10 milliGRAM(s) Oral daily  anastrozole 1 milliGRAM(s) Oral daily  aspirin enteric coated 81 milliGRAM(s) Oral daily  atorvastatin 20 milliGRAM(s) Oral at bedtime  carvedilol 25 milliGRAM(s) Oral every 12 hours  cefTRIAXone   IVPB 1000 milliGRAM(s) IV Intermittent every 24 hours  cloNIDine 0.2 milliGRAM(s) Oral three times a day  cyanocobalamin Injectable 1000 MICROGram(s) IntraMuscular daily  dextrose 40% Gel 15 Gram(s) Oral once  dextrose 5%. 1000 milliLiter(s) (50 mL/Hr) IV Continuous <Continuous>  dextrose 5%. 1000 milliLiter(s) (100 mL/Hr) IV Continuous <Continuous>  dextrose 50% Injectable 25 Gram(s) IV Push once  dextrose 50% Injectable 12.5 Gram(s) IV Push once  dextrose 50% Injectable 25 Gram(s) IV Push once  enoxaparin Injectable 40 milliGRAM(s) SubCutaneous daily  furosemide    Tablet 40 milliGRAM(s) Oral daily  glucagon  Injectable 1 milliGRAM(s) IntraMuscular once  hydrALAZINE 100 milliGRAM(s) Oral three times a day  insulin lispro (ADMELOG) corrective regimen sliding scale   SubCutaneous three times a day before meals  insulin lispro (ADMELOG) corrective regimen sliding scale   SubCutaneous at bedtime  lactobacillus acidophilus 1 Tablet(s) Oral three times a day  levothyroxine 150 MICROGram(s) Oral daily  losartan 100 milliGRAM(s) Oral daily  metFORMIN 500 milliGRAM(s) Oral two times a day  phenazopyridine 100 milliGRAM(s) Oral every 8 hours  potassium chloride    Tablet ER 20 milliEquivalent(s) Oral daily    MEDICATIONS  (PRN):  acetaminophen   Tablet .. 650 milliGRAM(s) Oral every 6 hours PRN Temp greater or equal to 38C (100.4F), Mild Pain (1 - 3)  ALBUTerol    90 MICROgram(s) HFA Inhaler 2 Puff(s) Inhalation every 6 hours PRN Shortness of Breath and/or Wheezing  morphine  - Injectable 2 milliGRAM(s) IV Push every 6 hours PRN Severe Pain (7 - 10)  traMADol 50 milliGRAM(s) Oral every 6 hours PRN Moderate Pain (4 - 6)      Allergies    No Known Allergies    Intolerances        REVIEW OF SYSTEMS:  CONSTITUTIONAL: No fever, weight loss, or fatigue  EYES: No eye pain, visual disturbances, or discharge  ENMT:  No difficulty hearing, tinnitus, vertigo; No sinus or throat pain  NECK: No pain or stiffness  BREASTS: No pain, masses, or nipple discharge  RESPIRATORY: No cough, wheezing, chills or hemoptysis; No shortness of breath  CARDIOVASCULAR: No chest pain, palpitations, dizziness, or leg swelling  GASTROINTESTINAL: No abdominal or epigastric pain. No nausea, vomiting, or hematemesis; No diarrhea or constipation. No melena or hematochezia.  GENITOURINARY: No dysuria, frequency, hematuria, or incontinence  NEUROLOGICAL: No headaches, memory loss, loss of strength, numbness, or tremors  SKIN: No itching, burning, rashes, or lesions   LYMPH NODES: No enlarged glands  ENDOCRINE: No heat or cold intolerance; No hair loss  MUSCULOSKELETAL: No joint pain or swelling; No muscle, back, or extremity pain  PSYCHIATRIC: No depression, anxiety, mood swings, or difficulty sleeping  HEME/LYMPH: No easy bruising, or bleeding gums  ALLERGY AND IMMUNOLOGIC: No hives or eczema    Vital Signs Last 24 Hrs  T(C): 36.9 (09 Oct 2021 05:43), Max: 37.3 (08 Oct 2021 18:59)  T(F): 98.5 (09 Oct 2021 05:43), Max: 99.1 (08 Oct 2021 18:59)  HR: 68 (09 Oct 2021 05:43) (68 - 104)  BP: 132/74 (09 Oct 2021 05:43) (103/55 - 133/66)  BP(mean): --  RR: 18 (09 Oct 2021 05:43) (17 - 19)  SpO2: 93% (09 Oct 2021 05:43) (93% - 100%)    PHYSICAL EXAM:  GENERAL: NAD, well-groomed, well-developed  HEAD:  Atraumatic, Normocephalic  EYES: EOMI, PERRLA, conjunctiva and sclera clear  ENMT: Moist mucous membranes,   NECK: Supple, No JVD, Normal thyroid  NERVOUS SYSTEM:  Alert & Oriented X2, non focal  CHEST/LUNG: Clear to percussion bilaterally; No rales, rhonchi, wheezing, or rubs  HEART: Regular rate and rhythm; No murmurs, rubs, or gallops  ABDOMEN: Soft, Nontender, Nondistended; Bowel sounds present  EXTREMITIES:  2+ Peripheral Pulses, No clubbing, cyanosis, or edema  LYMPH: No lymphadenopathy noted  SKIN: No rashes or lesions    LABS:                        10.2   8.22  )-----------( 231      ( 08 Oct 2021 07:47 )             33.2     10-08    145  |  103  |  18  ----------------------------<  139<H>  2.7<LL>   |  34<H>  |  1.13    Ca    8.4      08 Oct 2021 07:47  Mg     2.4     10-08    TPro  6.3  /  Alb  2.7<L>  /  TBili  0.9  /  DBili  x   /  AST  16  /  ALT  20  /  AlkPhos  61  10-08        CAPILLARY BLOOD GLUCOSE      POCT Blood Glucose.: 130 mg/dL (09 Oct 2021 07:27)  POCT Blood Glucose.: 138 mg/dL (08 Oct 2021 21:52)  POCT Blood Glucose.: 120 mg/dL (08 Oct 2021 16:41)  POCT Blood Glucose.: 182 mg/dL (08 Oct 2021 12:06)  POCT Blood Glucose.: 133 mg/dL (08 Oct 2021 07:44)        Culture - Urine (collected 10-07-21 @ 14:32)  Source: Clean Catch Clean Catch (Midstream)  Preliminary Report (10-09-21 @ 07:23):    >100,000 CFU/ml Gram Negative Rods    Culture - Blood (collected 10-07-21 @ 14:17)  Source: .Blood Blood-Peripheral  Gram Stain (10-08-21 @ 01:27):    Growth in aerobic bottle: Gram Negative Rods    Growth in anaerobic bottle: Gram Negative Rods  Preliminary Report (10-08-21 @ 23:03):    Growth in aerobic and anaerobic bottles: Escherichia coli    ***Blood Panel PCR results on this specimen are available    approximately 3 hours after the Gram stain result.***    Gram stain, PCR, and/or culture results may not always    correspond due to difference in methodologies.    ************************************************************    This PCR assay was performed by multiplex PCR. This    Assay tests for 66 bacterial and resistance gene targets.    Please refer to the [facility] test directory    at [email or web address] for details.  Organism: Blood Culture PCR (10-08-21 @ 04:35)  Organism: Blood Culture PCR (10-08-21 @ 04:35)      -  Escherichia coli: Detec      Method Type: PCR    Culture - Blood (collected 10-07-21 @ 14:17)  Source: .Blood Blood-Peripheral  Gram Stain (10-09-21 @ 05:33):    Growth in aerobic bottle: Gram Negative Rods  Preliminary Report (10-09-21 @ 05:33):    Growth in aerobic bottle: Gram Negative Rods        I&O's Summary    08 Oct 2021 07:01  -  09 Oct 2021 07:00  --------------------------------------------------------  IN: 450 mL / OUT: 1000 mL / NET: -550 mL        RADIOLOGY & ADDITIONAL TESTS:    Imaging Personally Reviewed:  [x ] YES  [ ] NO    Consultant(s) Notes Reviewed:  [ x] YES  [ ] NO    Care Discussed with Consultants/Other Providers [x ] YES  [ ] NO

## 2021-10-09 NOTE — PROGRESS NOTE ADULT - ASSESSMENT
66 y/o F BIBEMS from [facility] c/o lower abd pain since this morning. Denies n/v/d. Last BM this morning, unsure if she is constipated. Denies hx of abd surgery. no known fever, no chills. Pt reports she stopped using at home O2 2d ago and EMS found pt to be hypoxic in the 80s with no O2, also EMS did not see O2 as order on pt's medical papers. Pt states she walks w/ walker at baseline, but EMS state they tried to stand her to get onto stretcher and pt could not support herself    sepsis - bacteremia - procalcitonin noted -   overnight events noted  on ABX  ID follow up     pna eval  biomarkers  cx  SLP eval noted  Dysphagia diet  cardio eval noted - cvs rx regimen and BP control - LASIX -   I and O  monitor VS and HD and Sat  MEKA eval as outpatient  CT chest - neg for PE - infiltrate vs atelectasis - pt is on ABX  VBG noted  wean fio2 support as tolerated - keep sat > 88 pct  pt lives in halfway  assist with needs - ADL -

## 2021-10-09 NOTE — PROVIDER CONTACT NOTE (CRITICAL VALUE NOTIFICATION) - ACTION/TREATMENT ORDERED:
Continue Ceftriaxone   Dr. Campbell will put additional order in the CPOE (repeat bllod culture).
No new orders  continue current antibiotic
Dr. Garcia called, message left- awaiting response with orders.

## 2021-10-09 NOTE — PROGRESS NOTE ADULT - ASSESSMENT
65F with PMH Type 2 DM, HTN,hypothyroid, cognitive impairment,recurrent falls,recurrent uti, right breast malignancy on chemo  sent from [facility] abd pain since this morning. Pt reports she stopped using at home O2. Pt is a resident of [facility]. Pt reports b/l edema is at baseline. Pt walks w/ walker at baseline. Denies n/v/d. Lat BM this morning. Pt states BM was thin, unsure if she is constipated. Denies hx of abd surgery.   IN ED temp 99, bp150/83,HR96,on o2 2 litre via nasal cannula 96%,has b/l leg edema,had CTA< from: CT Angio Chest PE Protocol w/ IV Cont (10.06.21 @ 23:47) >  No pulmonary embolus up to and including segmental branches. Stable subsegmental left lower lobe atelectasis v pna  has rt breast changes, adrenal adenoma and fibroid ut, UA is suspicious of UTI, started on ceftriaxone, and received lasix with suspicion of chf with edema hypoxia .  < from: US Transthoracic Echocardiogram w/Doppler Complete (09.07.21 @ 11:29) >   moderate asymmetric septal hypertrophy with the normal overall systolic function with significant outflow tract obstruction  admitted for further management for  abd pain suspected UTI, likely sepsis Gr neg with UTI  likely ac on ch  chf with outflow tract obstruction with septal hypertrophy  hypoxia, ch   Ess HTN  breat ca  pressure ulcers POA  goals of care discussion  hypothyroid  rec falls  DM2  cognitive impairment  ambulatory dysfunction  respiratory distress likely ac on ch with possible chf diastolic  bactremia E coli-on ceftriaxone  pt on ceftriaxone  had retention of urine, had staight cath  resolved, no more retention  Advanced care planning discussed with patient/family. Advaced care planning forms reviewed,discussed /completed, 20 min spent  dnr dni  HCP [id number]

## 2021-10-09 NOTE — PROVIDER CONTACT NOTE (CRITICAL VALUE NOTIFICATION) - SITUATION
Blood culture taken 10/7/21 : aerobic bottle grew gram (-) rods
Potassium level 2.7 results received from [facility]
Blood culture result taken 10/7/21 preliminary result on both aerobic and anaerobic bottle grew gram (-) rods

## 2021-10-09 NOTE — PROGRESS NOTE ADULT - SUBJECTIVE AND OBJECTIVE BOX
Patient seen and examined;  Chart reviewed and events noted;   No new complaints; patient with flat affect; does not offer any complaints other than feeling cold    MEDICATIONS  (STANDING):  amLODIPine   Tablet 10 milliGRAM(s) Oral daily  anastrozole 1 milliGRAM(s) Oral daily  aspirin enteric coated 81 milliGRAM(s) Oral daily  atorvastatin 20 milliGRAM(s) Oral at bedtime  carvedilol 25 milliGRAM(s) Oral every 12 hours  cefTRIAXone   IVPB 1000 milliGRAM(s) IV Intermittent every 24 hours  cloNIDine 0.2 milliGRAM(s) Oral three times a day  cyanocobalamin Injectable 1000 MICROGram(s) IntraMuscular daily    enoxaparin Injectable 40 milliGRAM(s) SubCutaneous daily  furosemide    Tablet 40 milliGRAM(s) Oral daily  glucagon  Injectable 1 milliGRAM(s) IntraMuscular once  hydrALAZINE 100 milliGRAM(s) Oral three times a day  insulin lispro (ADMELOG) corrective regimen sliding scale   SubCutaneous three times a day before meals  insulin lispro (ADMELOG) corrective regimen sliding scale   SubCutaneous at bedtime  lactobacillus acidophilus 1 Tablet(s) Oral three times a day  levothyroxine 150 MICROGram(s) Oral daily  losartan 100 milliGRAM(s) Oral daily  metFORMIN 500 milliGRAM(s) Oral two times a day  phenazopyridine 100 milliGRAM(s) Oral every 8 hours  potassium chloride    Tablet ER 20 milliEquivalent(s) Oral every 2 hours  potassium chloride    Tablet ER 20 milliEquivalent(s) Oral daily    MEDICATIONS  (PRN):  acetaminophen   Tablet .. 650 milliGRAM(s) Oral every 6 hours PRN Temp greater or equal to 38C (100.4F), Mild Pain (1 - 3)  ALBUTerol    90 MICROgram(s) HFA Inhaler 2 Puff(s) Inhalation every 6 hours PRN Shortness of Breath and/or Wheezing  morphine  - Injectable 2 milliGRAM(s) IV Push every 6 hours PRN Severe Pain (7 - 10)  traMADol 50 milliGRAM(s) Oral every 6 hours PRN Moderate Pain (4 - 6)      Vital Signs Last 24 Hrs  T(C): 36.7 (09 Oct 2021 13:30), Max: 37.3 (08 Oct 2021 18:59)  T(F): 98.1 (09 Oct 2021 13:30), Max: 99.1 (08 Oct 2021 18:59)  HR: 79 (09 Oct 2021 13:30) (68 - 104)  BP: 122/69 (09 Oct 2021 13:30) (100/63 - 133/66)  RR: 18 (09 Oct 2021 13:30) (17 - 18)  SpO2: 93% (09 Oct 2021 13:30) (93% - 100%)    PHYSICAL EXAM  General: adult woman with intellectual disability in NAD  HEENT: clear oropharynx, anicteric sclera, pink conjunctivae  Neck: supple  CV: normal S1S2 with no murmur rubs or gallops  Lungs: clear to auscultation, no wheezes, no rhales  Abdomen: soft non-tender non-distended, no hepato/splenomegaly  Ext: no clubbing cyanosis or edema  Skin: no rashes and no petichiae    LABS:                        10.4   7.49  )-----------( 237      ( 09 Oct 2021 07:46 )             33.5     Hemoglobin: 10.4 g/dL (10-09 @ 07:46)  Hemoglobin: 10.2 g/dL (10-08 @ 07:47)  Hemoglobin: 10.2 g/dL (10-06 @ 21:43)    10-09    143  |  104  |  17  ----------------------------<  125<H>  3.1<L>   |  31  |  0.90    Ca    8.2<L>      09 Oct 2021 07:46  Mg     2.4     10-08    TPro  6.1  /  Alb  2.5<L>  /  TBili  0.6  /  DBili  x   /  AST  13  /  ALT  19  /  AlkPhos  54  10-09    Iron - 35, TIBC 240, Iron sat 15%, Feritin 90  B12 237  Folate 10.2

## 2021-10-09 NOTE — PROGRESS NOTE ADULT - SUBJECTIVE AND OBJECTIVE BOX
Patient is a 65y Female with a known history of :  Acute UTI [N39.0]    Acute respiratory failure with hypoxia [J96.01]    Acute on chronic diastolic congestive heart failure [I50.33]    Diabetes mellitus [E11.9]    Benign essential HTN [I10]    Hypothyroid [E03.9]    Breast cancer [C50.919]      HPI:    65F with PMH Type 2 DM, HTN,hypothyroid, cognitive impairment,recurrent falls,recurrent uti, right breast malignancy on chemo  sent from [facility] abd pain since this morning. Pt reports she stopped using at home O2. Pt is a resident of [place]. Pt reports b/l edema is at baseline. Pt walks w/ walker at baseline. Denies n/v/d. Lat BM this morning. Pt states BM was thin, unsure if she is constipated. Denies hx of abd surgery.   IN ED temp 99, bp150/83,HR96,on o2 2 litre via nasal cannula 96%,has b/l leg edema,had CTA< from: CT Angio Chest PE Protocol w/ IV Cont (10.06.21 @ 23:47) >  No pulmonary embolus up to and including segmental branches. Stable subsegmental left lower lobe atelectasis v pna  has rt breat changes, adrenal adenoma and fibroid ut, UA is suspicious of UTI, started on ceftriaxone, and received lasix with suspicion of chf with edema hypoxia .  < from: US Transthoracic Echocardiogram w/Doppler Complete (09.07.21 @ 11:29) >   moderate asymmetric septal hypertrophy with the normal overall systolic function with significant outflow tract obstruction  admitted for further management       (07 Oct 2021 05:55)      REVIEW OF SYSTEMS:    CONSTITUTIONAL: No fever, weight loss, or fatigue  EYES: No eye pain, visual disturbances, or discharge  ENMT:  No difficulty hearing, tinnitus, vertigo; No sinus or throat pain  NECK: No pain or stiffness  BREASTS: No pain, masses, or nipple discharge  RESPIRATORY: No cough, wheezing, chills or hemoptysis; No shortness of breath  CARDIOVASCULAR: No chest pain, palpitations, dizziness, or leg swelling  GASTROINTESTINAL: No abdominal or epigastric pain. No nausea, vomiting, or hematemesis; No diarrhea or constipation. No melena or hematochezia.  GENITOURINARY: No dysuria, frequency, hematuria, or incontinence  NEUROLOGICAL: No headaches, memory loss, loss of strength, numbness, or tremors  SKIN: No itching, burning, rashes, or lesions   LYMPH NODES: No enlarged glands  ENDOCRINE: No heat or cold intolerance; No hair loss  MUSCULOSKELETAL: No joint pain or swelling; No muscle, back, or extremity pain  PSYCHIATRIC: No depression, anxiety, mood swings, or difficulty sleeping  HEME/LYMPH: No easy bruising, or bleeding gums  ALLERGY AND IMMUNOLOGIC: No hives or eczema    MEDICATIONS  (STANDING):  amLODIPine   Tablet 10 milliGRAM(s) Oral daily  anastrozole 1 milliGRAM(s) Oral daily  aspirin enteric coated 81 milliGRAM(s) Oral daily  atorvastatin 20 milliGRAM(s) Oral at bedtime  carvedilol 25 milliGRAM(s) Oral every 12 hours  cefTRIAXone   IVPB 1000 milliGRAM(s) IV Intermittent every 24 hours  cloNIDine 0.2 milliGRAM(s) Oral three times a day  cyanocobalamin Injectable 1000 MICROGram(s) IntraMuscular daily  dextrose 40% Gel 15 Gram(s) Oral once  dextrose 5%. 1000 milliLiter(s) (50 mL/Hr) IV Continuous <Continuous>  dextrose 5%. 1000 milliLiter(s) (100 mL/Hr) IV Continuous <Continuous>  dextrose 50% Injectable 25 Gram(s) IV Push once  dextrose 50% Injectable 12.5 Gram(s) IV Push once  dextrose 50% Injectable 25 Gram(s) IV Push once  enoxaparin Injectable 40 milliGRAM(s) SubCutaneous daily  furosemide    Tablet 40 milliGRAM(s) Oral daily  glucagon  Injectable 1 milliGRAM(s) IntraMuscular once  hydrALAZINE 100 milliGRAM(s) Oral three times a day  insulin lispro (ADMELOG) corrective regimen sliding scale   SubCutaneous three times a day before meals  insulin lispro (ADMELOG) corrective regimen sliding scale   SubCutaneous at bedtime  lactobacillus acidophilus 1 Tablet(s) Oral three times a day  levothyroxine 150 MICROGram(s) Oral daily  losartan 100 milliGRAM(s) Oral daily  metFORMIN 500 milliGRAM(s) Oral two times a day  phenazopyridine 100 milliGRAM(s) Oral every 8 hours  potassium chloride    Tablet ER 20 milliEquivalent(s) Oral daily    MEDICATIONS  (PRN):  acetaminophen   Tablet .. 650 milliGRAM(s) Oral every 6 hours PRN Temp greater or equal to 38C (100.4F), Mild Pain (1 - 3)  ALBUTerol    90 MICROgram(s) HFA Inhaler 2 Puff(s) Inhalation every 6 hours PRN Shortness of Breath and/or Wheezing  morphine  - Injectable 2 milliGRAM(s) IV Push every 6 hours PRN Severe Pain (7 - 10)  traMADol 50 milliGRAM(s) Oral every 6 hours PRN Moderate Pain (4 - 6)      ALLERGIES: No Known Allergies      FAMILY HISTORY:  FHx: suicide    FHx: lymphoma    Family history of brain damage (Sibling)  at birth        Social history:  Alochol:   Smoking:   Drug Use:   Marital Status:     PHYSICAL EXAMINATION:  -----------------------------  T(C): 36.4 (10-09-21 @ 10:31), Max: 37.3 (10-08-21 @ 18:59)  HR: 68 (10-09-21 @ 10:40) (68 - 104)  BP: 100/63 (10-09-21 @ 10:31) (100/63 - 133/66)  RR: 18 (10-09-21 @ 10:31) (17 - 18)  SpO2: 94% (10-09-21 @ 10:40) (93% - 100%)  Wt(kg): --    10-08 @ 07:01  -  10-09 @ 07:00  --------------------------------------------------------  IN:    IV PiggyBack: 300 mL    Oral Fluid: 300 mL  Total IN: 600 mL    OUT:    Voided (mL): 1000 mL  Total OUT: 1000 mL    Total NET: -400 mL            Constitutional: well developed, normal appearance, well groomed, well nourished, no deformities and no acute distress.   Eyes: the conjunctiva exhibited no abnormalities and the eyelids demonstrated no xanthelasmas.   HEENT: normal oral mucosa, no oral pallor and no oral cyanosis.   Neck: normal jugular venous A waves present, normal jugular venous V waves present and no jugular venous villarreal A waves.   Pulmonary: no respiratory distress, normal respiratory rhythm and effort, no accessory muscle use and lungs were clear to auscultation bilaterally.   Cardiovascular: heart rate and rhythm were normal, normal S1 and S2 and no murmur, gallop, rub, heave or thrill are present.   Musculoskeletal: the gait could not be assessed.   Extremities: no clubbing of the fingernails, no localized cyanosis, no petechial hemorrhages and no ischemic changes.   Skin: normal skin color and pigmentation, no rash, no venous stasis, no skin lesions, no skin ulcer and no xanthoma was observed.      LABS:   --------  10-09    143  |  104  |  17  ----------------------------<  125<H>  3.1<L>   |  31  |  0.90    Ca    8.2<L>      09 Oct 2021 07:46  Mg     2.4     10-08    TPro  6.1  /  Alb  2.5<L>  /  TBili  0.6  /  DBili  x   /  AST  13  /  ALT  19  /  AlkPhos  54  10-09                         10.4   7.49  )-----------( 237      ( 09 Oct 2021 07:46 )             33.5             Culture Results:   >100,000 CFU/ml Gram Negative Rods (10-07 @ 14:32)  Culture Results:   Growth in aerobic bottle: Gram Negative Rods (10-07 @ 14:17)  Culture Results:   Growth in aerobic and anaerobic bottles: Escherichia coli  ***Blood Panel PCR results on this specimen are available  approximately 3 hours after the Gram stain result.***  Gram stain, PCR, and/or culture results may not always  correspond due to difference in methodologies.  ************************************************************  This PCR assay was performed by multiplex PCR. This  Assay tests for 66 bacterial and resistance gene targets.  Please refer to the [facility] test directory  at [email or web address] for details. (10-07 @ 14:17)    10-07 @ 14:32    Organism --   Gram Stain Blood -- Gram Stain --  Specimen Source Clean Catch Clean Catch (Midstream)  Culture-Blood --    10-07 @ 14:17    Organism Blood Culture PCR   Gram Stain Blood -- Gram Stain   Growth in aerobic bottle: Gram Negative Rods  Specimen Source .Blood Blood-Peripheral  Culture-Blood --        Radiology:

## 2021-10-09 NOTE — PROVIDER CONTACT NOTE (CRITICAL VALUE NOTIFICATION) - PERSON GIVING RESULT:
[facility]
Denis Shahid ([facility])
[facility]

## 2021-10-09 NOTE — PROGRESS NOTE ADULT - ASSESSMENT
65F with PMH Type 2 DM, HTN, hypothyroid, cognitive impairment, recurrent falls, recurrent uti, right breast malignancy on chemo sent from [facility] abd pain and incontinence.   Found to have AUR -Had >1000cc urine on bladder scan - straight cathed. Repeat bladder scan <100cc  GNR sepsis sec AUR  Doubt pneumonia likely atelectasis  Highest suspicion is for E coli bacteremia (10/7), secondary to pyelonephritis    RECOMMENDATIONS  Cont Rocephin pending micro data  Fu cultures  10/8-Repeat blood cultures-NGTD  Trend temps and cbc    We will follow along in the care of this patient. Please call us at [phone] or text me directly on my cell# at [phone] with any concerns.

## 2021-10-09 NOTE — PROGRESS NOTE ADULT - SUBJECTIVE AND OBJECTIVE BOX
Date/Time Patient Seen:  		  Referring MD:   Data Reviewed	       Patient is a 65y old  Female who presents with a chief complaint of abd pain (08 Oct 2021 13:16)      Subjective/HPI     PAST MEDICAL & SURGICAL HISTORY:  HTN (hypertension)    HLD (hyperlipidemia)    Anxiety    HLD (hyperlipidemia)    Hypothyroid    Adrenal nodule    Breast CA  right    Poor historian    Cancer of thyroid  unsure of dates but prior to 2005    Coronary artery disease    Cognitive impairment    Osteoarthritis    Type 2 diabetes mellitus    Urinary frequency    History of adrenal adenoma    Diabetes    H/O total thyroidectomy    H/O bilateral breast biopsy  November 2018 and December 2018    S/P angioplasty with stent  drug eluting stent in first diagonal on 2/21/19    S/P dilation and curettage  TOP x2          Medication list         MEDICATIONS  (STANDING):  amLODIPine   Tablet 10 milliGRAM(s) Oral daily  anastrozole 1 milliGRAM(s) Oral daily  aspirin enteric coated 81 milliGRAM(s) Oral daily  atorvastatin 20 milliGRAM(s) Oral at bedtime  carvedilol 25 milliGRAM(s) Oral every 12 hours  cefTRIAXone   IVPB 1000 milliGRAM(s) IV Intermittent every 24 hours  cloNIDine 0.2 milliGRAM(s) Oral three times a day  cyanocobalamin Injectable 1000 MICROGram(s) IntraMuscular daily  dextrose 40% Gel 15 Gram(s) Oral once  dextrose 5%. 1000 milliLiter(s) (50 mL/Hr) IV Continuous <Continuous>  dextrose 5%. 1000 milliLiter(s) (100 mL/Hr) IV Continuous <Continuous>  dextrose 50% Injectable 12.5 Gram(s) IV Push once  dextrose 50% Injectable 25 Gram(s) IV Push once  dextrose 50% Injectable 25 Gram(s) IV Push once  enoxaparin Injectable 40 milliGRAM(s) SubCutaneous daily  furosemide    Tablet 40 milliGRAM(s) Oral daily  glucagon  Injectable 1 milliGRAM(s) IntraMuscular once  hydrALAZINE 100 milliGRAM(s) Oral three times a day  insulin lispro (ADMELOG) corrective regimen sliding scale   SubCutaneous three times a day before meals  insulin lispro (ADMELOG) corrective regimen sliding scale   SubCutaneous at bedtime  lactobacillus acidophilus 1 Tablet(s) Oral three times a day  levothyroxine 150 MICROGram(s) Oral daily  losartan 100 milliGRAM(s) Oral daily  metFORMIN 500 milliGRAM(s) Oral two times a day  phenazopyridine 100 milliGRAM(s) Oral every 8 hours  potassium chloride    Tablet ER 20 milliEquivalent(s) Oral daily    MEDICATIONS  (PRN):  acetaminophen   Tablet .. 650 milliGRAM(s) Oral every 6 hours PRN Temp greater or equal to 38C (100.4F), Mild Pain (1 - 3)  ALBUTerol    90 MICROgram(s) HFA Inhaler 2 Puff(s) Inhalation every 6 hours PRN Shortness of Breath and/or Wheezing  morphine  - Injectable 2 milliGRAM(s) IV Push every 6 hours PRN Severe Pain (7 - 10)  traMADol 50 milliGRAM(s) Oral every 6 hours PRN Moderate Pain (4 - 6)         Vitals log        ICU Vital Signs Last 24 Hrs  T(C): 36.9 (09 Oct 2021 05:43), Max: 37.3 (08 Oct 2021 18:59)  T(F): 98.5 (09 Oct 2021 05:43), Max: 99.1 (08 Oct 2021 18:59)  HR: 68 (09 Oct 2021 05:43) (68 - 104)  BP: 132/74 (09 Oct 2021 05:43) (103/55 - 133/66)  BP(mean): --  ABP: --  ABP(mean): --  RR: 18 (09 Oct 2021 05:43) (17 - 19)  SpO2: 93% (09 Oct 2021 05:43) (93% - 100%)           Input and Output:  I&O's Detail    07 Oct 2021 07:01  -  08 Oct 2021 07:00  --------------------------------------------------------  IN:    Oral Fluid: 130 mL  Total IN: 130 mL    OUT:    Voided (mL): 1100 mL  Total OUT: 1100 mL    Total NET: -970 mL      08 Oct 2021 07:01  -  09 Oct 2021 06:18  --------------------------------------------------------  IN:    IV PiggyBack: 300 mL    Oral Fluid: 150 mL  Total IN: 450 mL    OUT:    Voided (mL): 1000 mL  Total OUT: 1000 mL    Total NET: -550 mL          Lab Data                        10.2   8.22  )-----------( 231      ( 08 Oct 2021 07:47 )             33.2     10-08    145  |  103  |  18  ----------------------------<  139<H>  2.7<LL>   |  34<H>  |  1.13    Ca    8.4      08 Oct 2021 07:47  Mg     2.4     10-08    TPro  6.3  /  Alb  2.7<L>  /  TBili  0.9  /  DBili  x   /  AST  16  /  ALT  20  /  AlkPhos  61  10-08            Review of Systems	      Objective     Physical Examination  heart s1s2  lung dec BS  abd soft        Pertinent Lab findings & Imaging      Miguel Angel:  NO   Adequate UO     I&O's Detail    07 Oct 2021 07:01  -  08 Oct 2021 07:00  --------------------------------------------------------  IN:    Oral Fluid: 130 mL  Total IN: 130 mL    OUT:    Voided (mL): 1100 mL  Total OUT: 1100 mL    Total NET: -970 mL      08 Oct 2021 07:01  -  09 Oct 2021 06:18  --------------------------------------------------------  IN:    IV PiggyBack: 300 mL    Oral Fluid: 150 mL  Total IN: 450 mL    OUT:    Voided (mL): 1000 mL  Total OUT: 1000 mL    Total NET: -550 mL               Discussed with:     Cultures:	        Radiology

## 2021-10-09 NOTE — PROVIDER CONTACT NOTE (CRITICAL VALUE NOTIFICATION) - RECOMMENDATIONS
Notify MD of lab results
Same results given 10/08/21 Blood culture aerobic and anaerobic bottle: grew gram (-) rods   on Ceftriaxone  Current orders 10/9 6am cbc, cmp
Cont antibiotic

## 2021-10-09 NOTE — PROVIDER CONTACT NOTE (CRITICAL VALUE NOTIFICATION) - BACKGROUND
Pt admitted for pneumonia  PMHx: Diabetes, adrenal adenoma, osteoarthritis, cognitive impairment, thyroid cancer, breast cancer rt, hypothyroid, anxiety, htn  Currebtly on Ceftriaxone IVPB 1000mg every 24 hours for 5 days started yesturday
Pt admitted for pneumonia, CHF, UTI  On Ceftriaxone IVPB 1000mg every 24 hours for 5days
Admitted 10/7/21 treatment of pneumonia, Bacterial Uti.  Hx DM2, Chronic diastolic CHF. HTN, CKD

## 2021-10-10 NOTE — PROGRESS NOTE ADULT - ASSESSMENT
65F with PMH Type 2 DM, HTN, hypothyroid, cognitive impairment, recurrent falls, recurrent uti, right breast malignancy on chemo sent from [address] abd pain and incontinence.   Found to have AUR -Had >1000cc urine on bladder scan - straight cathed. Repeat bladder scan <100cc  GNR sepsis sec AUR  Doubt pneumonia likely atelectasis  Highest suspicion is for E coli bacteremia (10/7), secondary to pyelonephritis    RECOMMENDATIONS  Cont Rocephin     10/8-Repeat blood cultures-NGTD  Nascimento sensitive E coli in blood and E coli in urine.  If repeat blood cultures remain NGTD past 48 hours then fine whn ready for dc to complete course with  Keflex 500mg PO QID with last day 10/20    We will follow along in the care of this patient. Please call us at [phone] or text me directly on my cell# at [phone] with any concerns.

## 2021-10-10 NOTE — PROGRESS NOTE ADULT - ASSESSMENT
64 y/o F BIBEMS from [address] c/o lower abd pain since this morning. Denies n/v/d. Last BM this morning, unsure if she is constipated. Denies hx of abd surgery. no known fever, no chills. Pt reports she stopped using at home O2 2d ago and EMS found pt to be hypoxic in the 80s with no O2, also EMS did not see O2 as order on pt's medical papers. Pt states she walks w/ walker at baseline, but EMS state they tried to stand her to get onto stretcher and pt could not support herself    on LASIX - PO  sepsis - bacteremia - procalcitonin noted -   overnight events noted  on ABX  ID follow up     pna eval  biomarkers  cx  SLP eval noted  Dysphagia diet  cardio eval noted - cvs rx regimen and BP control - LASIX -   I and O  monitor VS and HD and Sat  MEKA eval as outpatient  CT chest - neg for PE - infiltrate vs atelectasis - pt is on ABX  VBG noted  wean fio2 support as tolerated - keep sat > 88 pct  pt lives in long term  assist with needs - ADL -

## 2021-10-10 NOTE — PROGRESS NOTE ADULT - SUBJECTIVE AND OBJECTIVE BOX
Patient seen and examined;  Chart reviewed and events noted;   Denies any new symptoms; does not recall who her surgeon was for the breast cancer and who prescribes her medications  reports abdominal pain is better    MEDICATIONS  (STANDING):  amLODIPine   Tablet 10 milliGRAM(s) Oral daily  anastrozole 1 milliGRAM(s) Oral daily  aspirin enteric coated 81 milliGRAM(s) Oral daily  atorvastatin 20 milliGRAM(s) Oral at bedtime  carvedilol 25 milliGRAM(s) Oral every 12 hours  cefTRIAXone   IVPB 1000 milliGRAM(s) IV Intermittent every 24 hours  cloNIDine 0.2 milliGRAM(s) Oral three times a day  cyanocobalamin Injectable 1000 MICROGram(s) IntraMuscular daily  dextrose 40% Gel 15 Gram(s) Oral once  enoxaparin Injectable 40 milliGRAM(s) SubCutaneous daily  furosemide    Tablet 40 milliGRAM(s) Oral daily  glucagon  Injectable 1 milliGRAM(s) IntraMuscular once  hydrALAZINE 100 milliGRAM(s) Oral three times a day  insulin lispro (ADMELOG) corrective regimen sliding scale   SubCutaneous three times a day before meals  insulin lispro (ADMELOG) corrective regimen sliding scale   SubCutaneous at bedtime  iron sucrose Injectable 100 milliGRAM(s) IV Push every 24 hours  lactobacillus acidophilus 1 Tablet(s) Oral three times a day  levothyroxine 150 MICROGram(s) Oral daily  losartan 100 milliGRAM(s) Oral daily  metFORMIN 500 milliGRAM(s) Oral two times a day  phenazopyridine 100 milliGRAM(s) Oral every 8 hours  potassium chloride    Tablet ER 20 milliEquivalent(s) Oral daily    MEDICATIONS  (PRN):  acetaminophen   Tablet .. 650 milliGRAM(s) Oral every 6 hours PRN Temp greater or equal to 38C (100.4F), Mild Pain (1 - 3)  ALBUTerol    90 MICROgram(s) HFA Inhaler 2 Puff(s) Inhalation every 6 hours PRN Shortness of Breath and/or Wheezing  morphine  - Injectable 2 milliGRAM(s) IV Push every 6 hours PRN Severe Pain (7 - 10)  traMADol 50 milliGRAM(s) Oral every 6 hours PRN Moderate Pain (4 - 6)      Vital Signs Last 24 Hrs  T(C): 36.8 (10 Oct 2021 06:15), Max: 37 (09 Oct 2021 21:16)  T(F): 98.2 (10 Oct 2021 06:15), Max: 98.6 (09 Oct 2021 21:16)  HR: 68 (10 Oct 2021 06:15) (68 - 82)  BP: 144/68 (10 Oct 2021 06:15) (100/63 - 144/68)  BP(mean): --  RR: 17 (10 Oct 2021 06:15) (17 - 18)  SpO2: 93% (10 Oct 2021 06:15) (93% - 96%)    PHYSICAL EXAM  General: adult in NAD; intellectual disability  HEENT: clear oropharynx, anicteric sclera, pink conjunctivae  Neck: supple  CV: normal S1S2 with no murmur rubs or gallops  Lungs: clear to auscultation, no wheezes, no rhales  Abdomen: soft non-tender non-distended, no hepato/splenomegaly  Ext: no clubbing cyanosis or edema  Skin: no rashes and no petichiae  Neuro: alert and oriented X3 no focal deficits      LABS:                        10.4   7.66  )-----------( 277      ( 10 Oct 2021 06:43 )             33.8     Hemoglobin: 10.4 g/dL (10-10 @ 06:43)  Hemoglobin: 10.4 g/dL (10-09 @ 07:46)  Hemoglobin: 10.2 g/dL (10-08 @ 07:47)  Hemoglobin: 10.2 g/dL (10-06 @ 21:43)    10-10    144  |  106  |  16  ----------------------------<  124<H>  3.6   |  33<H>  |  0.87    Ca    8.5      10 Oct 2021 06:43    TPro  6.1  /  Alb  2.6<L>  /  TBili  0.4  /  DBili  x   /  AST  11  /  ALT  17  /  AlkPhos  60  10-10    iron 35, TIBC 240, iron sat 15%, ferritin 90

## 2021-10-10 NOTE — PROGRESS NOTE ADULT - ASSESSMENT
65F with PMH Type 2 DM, HTN,hypothyroid, cognitive impairment,recurrent falls,recurrent uti, right breast malignancy on chemo  sent from [facility] abd pain since this morning. Pt reports she stopped using at home O2. Pt is a resident of [facility]. Pt reports b/l edema is at baseline. Pt walks w/ walker at baseline. Denies n/v/d. Lat BM this morning. Pt states BM was thin, unsure if she is constipated. Denies hx of abd surgery.   IN ED temp 99, bp150/83,HR96,on o2 2 litre via nasal cannula 96%,has b/l leg edema,had CTA< from: CT Angio Chest PE Protocol w/ IV Cont (10.06.21 @ 23:47) >  No pulmonary embolus up to and including segmental branches. Stable subsegmental left lower lobe atelectasis v pna  has rt breast changes, adrenal adenoma and fibroid ut, UA is suspicious of UTI, started on ceftriaxone, and received lasix with suspicion of chf with edema hypoxia .  < from: US Transthoracic Echocardiogram w/Doppler Complete (09.07.21 @ 11:29) >   moderate asymmetric septal hypertrophy with the normal overall systolic function with significant outflow tract obstruction  admitted for further management for  abd pain suspected UTI, likely sepsis Gr neg with UTI  likely ac on ch  chf with outflow tract obstruction with septal hypertrophy  hypoxia, ch   Ess HTN  breat ca  pressure ulcers POA  goals of care discussion  hypothyroid  rec falls  DM2  cognitive impairment  ambulatory dysfunction  respiratory distress likely ac on ch with possible chf diastolic  bactremia E coli-on ceftriaxone-If repeat blood cultures remain NGTD past 48 hours then fine whn ready for dc to complete course with  Keflex 500mg PO QID with last day 10/20Pan sensitive E coli in blood and E coli in urine.  pt on ceftriaxone  had retention of urine, had staight cath  resolved, no more retention  Advanced care planning discussed with patient/family. Advaced care planning forms reviewed,discussed /completed, 20 min spent  dnr dni  HCP [id number]

## 2021-10-10 NOTE — PROGRESS NOTE ADULT - ASSESSMENT
The patient is a 65 year old female with a history of HTN, DM, hypothyroidism, cognitive impairment, right breast cancer, HCM, chronic diastolic heart failure who presents with abdominal pain, UTI.    10/10/21  Seen at [facility] telemetry  Patient sitting up  No complaints offered  Monitor-earlier with run trigeminy and very brief ?PAF  Potassium-3.6       10/8/21 Mag-2.4    Plan:  - ECG with LVH related changes  - Echo last admission with hyperdynamic LV systolic function, HOCM  - Continue carvedilol 25 mg bid  - Continue clonidine 0.2 mg tid  - Continue hydralazine 100 mg tid  - Continue losartan 100 mg daily  - Continue aspirin 81 mg daily  - Continue atorvastatin 20 mg daily  - Continue Furosemide-40mg OD  - Continue Amlodipine-10mg OD  - BNP 1172  - CTA chest negative for PE, pulm edema, pleural effusions - atelectasis vs. PNA noted  - On ceftriaxone for UTI

## 2021-10-10 NOTE — PROGRESS NOTE ADULT - ASSESSMENT
64 yo woman initially diagnosed with multifocal right breast cancer underwent surgical excision in 3/2019 with final stage IIB (T2N1a) ER+/NM+/Her2 negative; adjuvant treatment included 4 cycles of chemotherapy with Taxotere/Cytoxan under the care of Dr. Caitlin Casarez and completed in 7/2019; unclear if she had radiation therapy;  Currently on  hormonal therapy with anastrazole. She was admitted with to [facility] on 10/7/21 with UTI and found to have E-coli bacteremia due to pyelonephritis    - CT chest abd and pelvis without evidence of metastatic disease  - to continue anastrazole as adjuvant therapy for breast cancer for total of 10 years (complete 7/2029)  - anemia is multifactorial; noted B12 deficiency; started on IM injections of B12; iron studies equivocal and would expect ferritin to be much high in setting of bacteremia; suspect component of iron deficiency; started on IV iron for 3 days  - blood indices remain stable  - supportive care from oncology standpoint; patient should follow up as outpatient with oncology (either our group or re-establish care with Dr. Casarez at [facility])  - will sign off; reconsult if needed; will discuss with Dr. Garcia

## 2021-10-10 NOTE — PROGRESS NOTE ADULT - SUBJECTIVE AND OBJECTIVE BOX
Date/Time Patient Seen:  		  Referring MD:   Data Reviewed	       Patient is a 65y old  Female who presents with a chief complaint of abd pain (09 Oct 2021 13:45)      Subjective/HPI     PAST MEDICAL & SURGICAL HISTORY:  HTN (hypertension)    HLD (hyperlipidemia)    Anxiety    HLD (hyperlipidemia)    Hypothyroid    Adrenal nodule    Breast CA  right    Poor historian    Cancer of thyroid  unsure of dates but prior to 2005    Coronary artery disease    Cognitive impairment    Osteoarthritis    Type 2 diabetes mellitus    Urinary frequency    History of adrenal adenoma    Diabetes    H/O total thyroidectomy    H/O bilateral breast biopsy  November 2018 and December 2018    S/P angioplasty with stent  drug eluting stent in first diagonal on 2/21/19    S/P dilation and curettage  TOP x2          Medication list         MEDICATIONS  (STANDING):  amLODIPine   Tablet 10 milliGRAM(s) Oral daily  anastrozole 1 milliGRAM(s) Oral daily  aspirin enteric coated 81 milliGRAM(s) Oral daily  atorvastatin 20 milliGRAM(s) Oral at bedtime  carvedilol 25 milliGRAM(s) Oral every 12 hours  cefTRIAXone   IVPB 1000 milliGRAM(s) IV Intermittent every 24 hours  cloNIDine 0.2 milliGRAM(s) Oral three times a day  cyanocobalamin Injectable 1000 MICROGram(s) IntraMuscular daily  dextrose 40% Gel 15 Gram(s) Oral once  dextrose 5%. 1000 milliLiter(s) (50 mL/Hr) IV Continuous <Continuous>  dextrose 5%. 1000 milliLiter(s) (100 mL/Hr) IV Continuous <Continuous>  dextrose 50% Injectable 25 Gram(s) IV Push once  dextrose 50% Injectable 12.5 Gram(s) IV Push once  dextrose 50% Injectable 25 Gram(s) IV Push once  enoxaparin Injectable 40 milliGRAM(s) SubCutaneous daily  furosemide    Tablet 40 milliGRAM(s) Oral daily  glucagon  Injectable 1 milliGRAM(s) IntraMuscular once  hydrALAZINE 100 milliGRAM(s) Oral three times a day  insulin lispro (ADMELOG) corrective regimen sliding scale   SubCutaneous three times a day before meals  insulin lispro (ADMELOG) corrective regimen sliding scale   SubCutaneous at bedtime  iron sucrose Injectable 100 milliGRAM(s) IV Push every 24 hours  lactobacillus acidophilus 1 Tablet(s) Oral three times a day  levothyroxine 150 MICROGram(s) Oral daily  losartan 100 milliGRAM(s) Oral daily  metFORMIN 500 milliGRAM(s) Oral two times a day  phenazopyridine 100 milliGRAM(s) Oral every 8 hours  potassium chloride    Tablet ER 20 milliEquivalent(s) Oral daily    MEDICATIONS  (PRN):  acetaminophen   Tablet .. 650 milliGRAM(s) Oral every 6 hours PRN Temp greater or equal to 38C (100.4F), Mild Pain (1 - 3)  ALBUTerol    90 MICROgram(s) HFA Inhaler 2 Puff(s) Inhalation every 6 hours PRN Shortness of Breath and/or Wheezing  morphine  - Injectable 2 milliGRAM(s) IV Push every 6 hours PRN Severe Pain (7 - 10)  traMADol 50 milliGRAM(s) Oral every 6 hours PRN Moderate Pain (4 - 6)         Vitals log        ICU Vital Signs Last 24 Hrs  T(C): 36.7 (10 Oct 2021 01:05), Max: 37 (09 Oct 2021 21:16)  T(F): 98 (10 Oct 2021 01:05), Max: 98.6 (09 Oct 2021 21:16)  HR: 71 (10 Oct 2021 01:05) (68 - 82)  BP: 130/79 (10 Oct 2021 01:05) (100/63 - 130/79)  BP(mean): --  ABP: --  ABP(mean): --  RR: 18 (10 Oct 2021 01:05) (18 - 18)  SpO2: 96% (10 Oct 2021 01:05) (93% - 96%)           Input and Output:  I&O's Detail    08 Oct 2021 07:01  -  09 Oct 2021 07:00  --------------------------------------------------------  IN:    IV PiggyBack: 300 mL    Oral Fluid: 300 mL  Total IN: 600 mL    OUT:    Voided (mL): 1000 mL  Total OUT: 1000 mL    Total NET: -400 mL      09 Oct 2021 07:01  -  10 Oct 2021 06:12  --------------------------------------------------------  IN:    Oral Fluid: 400 mL  Total IN: 400 mL    OUT:    Voided (mL): 900 mL  Total OUT: 900 mL    Total NET: -500 mL          Lab Data                        10.4   7.49  )-----------( 237      ( 09 Oct 2021 07:46 )             33.5     10-09    143  |  104  |  17  ----------------------------<  125<H>  3.1<L>   |  31  |  0.90    Ca    8.2<L>      09 Oct 2021 07:46  Mg     2.4     10-08    TPro  6.1  /  Alb  2.5<L>  /  TBili  0.6  /  DBili  x   /  AST  13  /  ALT  19  /  AlkPhos  54  10-09            Review of Systems	      Objective     Physical Examination    heart s1s2  lung dec BS  abd soft  head nc      Pertinent Lab findings & Imaging      Miguel Angel:  NO   Adequate UO     I&O's Detail    08 Oct 2021 07:01  -  09 Oct 2021 07:00  --------------------------------------------------------  IN:    IV PiggyBack: 300 mL    Oral Fluid: 300 mL  Total IN: 600 mL    OUT:    Voided (mL): 1000 mL  Total OUT: 1000 mL    Total NET: -400 mL      09 Oct 2021 07:01  -  10 Oct 2021 06:12  --------------------------------------------------------  IN:    Oral Fluid: 400 mL  Total IN: 400 mL    OUT:    Voided (mL): 900 mL  Total OUT: 900 mL    Total NET: -500 mL               Discussed with:     Cultures:	        Radiology

## 2021-10-10 NOTE — CHART NOTE - NSCHARTNOTEFT_GEN_A_CORE
Called regarding 18 secs of trigeminy.  Patient asymptomatic and was sleeping.  Cardiology following.  No urgent intervention needed at this time.  Continue to monitor.

## 2021-10-10 NOTE — PROGRESS NOTE ADULT - SUBJECTIVE AND OBJECTIVE BOX
Patient is a 65y Female with a known history of :  Acute UTI [N39.0]    Acute respiratory failure with hypoxia [J96.01]    Acute on chronic diastolic congestive heart failure [I50.33]    Diabetes mellitus [E11.9]    Benign essential HTN [I10]    Hypothyroid [E03.9]    Breast cancer [C50.919]      HPI:    65F with PMH Type 2 DM, HTN,hypothyroid, cognitive impairment,recurrent falls,recurrent uti, right breast malignancy on chemo  sent from [facility] abd pain since this morning. Pt reports she stopped using at home O2. Pt is a resident of [facility]. Pt reports b/l edema is at baseline. Pt walks w/ walker at baseline. Denies n/v/d. Lat BM this morning. Pt states BM was thin, unsure if she is constipated. Denies hx of abd surgery.   IN ED temp 99, bp150/83,HR96,on o2 2 litre via nasal cannula 96%,has b/l leg edema,had CTA< from: CT Angio Chest PE Protocol w/ IV Cont (10.06.21 @ 23:47) >  No pulmonary embolus up to and including segmental branches. Stable subsegmental left lower lobe atelectasis v pna  has rt breat changes, adrenal adenoma and fibroid ut, UA is suspicious of UTI, started on ceftriaxone, and received lasix with suspicion of chf with edema hypoxia .  < from: US Transthoracic Echocardiogram w/Doppler Complete (09.07.21 @ 11:29) >   moderate asymmetric septal hypertrophy with the normal overall systolic function with significant outflow tract obstruction  admitted for further management       (07 Oct 2021 05:55)      REVIEW OF SYSTEMS:    CONSTITUTIONAL: No fever, weight loss, or fatigue  EYES: No eye pain, visual disturbances, or discharge  ENMT:  No difficulty hearing, tinnitus, vertigo; No sinus or throat pain  NECK: No pain or stiffness  BREASTS: No pain, masses, or nipple discharge  RESPIRATORY: No cough, wheezing, chills or hemoptysis; No shortness of breath  CARDIOVASCULAR: No chest pain, palpitations, dizziness, or leg swelling  GASTROINTESTINAL: No abdominal or epigastric pain. No nausea, vomiting, or hematemesis; No diarrhea or constipation. No melena or hematochezia.  GENITOURINARY: No dysuria, frequency, hematuria, or incontinence  NEUROLOGICAL: No headaches, memory loss, loss of strength, numbness, or tremors  SKIN: No itching, burning, rashes, or lesions   LYMPH NODES: No enlarged glands  ENDOCRINE: No heat or cold intolerance; No hair loss  MUSCULOSKELETAL: No joint pain or swelling; No muscle, back, or extremity pain  PSYCHIATRIC: No depression, anxiety, mood swings, or difficulty sleeping  HEME/LYMPH: No easy bruising, or bleeding gums  ALLERGY AND IMMUNOLOGIC: No hives or eczema    MEDICATIONS  (STANDING):  amLODIPine   Tablet 10 milliGRAM(s) Oral daily  anastrozole 1 milliGRAM(s) Oral daily  aspirin enteric coated 81 milliGRAM(s) Oral daily  atorvastatin 20 milliGRAM(s) Oral at bedtime  carvedilol 25 milliGRAM(s) Oral every 12 hours  cefTRIAXone   IVPB 1000 milliGRAM(s) IV Intermittent every 24 hours  cloNIDine 0.2 milliGRAM(s) Oral three times a day  cyanocobalamin Injectable 1000 MICROGram(s) IntraMuscular daily  dextrose 40% Gel 15 Gram(s) Oral once  dextrose 5%. 1000 milliLiter(s) (50 mL/Hr) IV Continuous <Continuous>  dextrose 5%. 1000 milliLiter(s) (100 mL/Hr) IV Continuous <Continuous>  dextrose 50% Injectable 25 Gram(s) IV Push once  dextrose 50% Injectable 12.5 Gram(s) IV Push once  dextrose 50% Injectable 25 Gram(s) IV Push once  enoxaparin Injectable 40 milliGRAM(s) SubCutaneous daily  furosemide    Tablet 40 milliGRAM(s) Oral daily  glucagon  Injectable 1 milliGRAM(s) IntraMuscular once  hydrALAZINE 100 milliGRAM(s) Oral three times a day  insulin lispro (ADMELOG) corrective regimen sliding scale   SubCutaneous three times a day before meals  insulin lispro (ADMELOG) corrective regimen sliding scale   SubCutaneous at bedtime  iron sucrose Injectable 100 milliGRAM(s) IV Push every 24 hours  lactobacillus acidophilus 1 Tablet(s) Oral three times a day  levothyroxine 150 MICROGram(s) Oral daily  losartan 100 milliGRAM(s) Oral daily  metFORMIN 500 milliGRAM(s) Oral two times a day  phenazopyridine 100 milliGRAM(s) Oral every 8 hours  potassium chloride    Tablet ER 20 milliEquivalent(s) Oral daily    MEDICATIONS  (PRN):  acetaminophen   Tablet .. 650 milliGRAM(s) Oral every 6 hours PRN Temp greater or equal to 38C (100.4F), Mild Pain (1 - 3)  ALBUTerol    90 MICROgram(s) HFA Inhaler 2 Puff(s) Inhalation every 6 hours PRN Shortness of Breath and/or Wheezing  morphine  - Injectable 2 milliGRAM(s) IV Push every 6 hours PRN Severe Pain (7 - 10)  traMADol 50 milliGRAM(s) Oral every 6 hours PRN Moderate Pain (4 - 6)      ALLERGIES: No Known Allergies      FAMILY HISTORY:  FHx: suicide    FHx: lymphoma    Family history of brain damage (Sibling)  at birth        Social history:  Alochol:   Smoking:   Drug Use:   Marital Status:     PHYSICAL EXAMINATION:  -----------------------------  T(C): 36.5 (10-10-21 @ 09:30), Max: 37 (10-09-21 @ 21:16)  HR: 74 (10-10-21 @ 09:30) (68 - 82)  BP: 115/70 (10-10-21 @ 09:30) (115/70 - 144/68)  RR: 18 (10-10-21 @ 09:30) (17 - 18)  SpO2: 95% (10-10-21 @ 09:30) (93% - 96%)  Wt(kg): --    10-09 @ 07:01  -  10-10 @ 07:00  --------------------------------------------------------  IN:    Oral Fluid: 400 mL  Total IN: 400 mL    OUT:    Voided (mL): 900 mL  Total OUT: 900 mL    Total NET: -500 mL            Constitutional: well developed, normal appearance, well groomed, well nourished, no deformities and no acute distress.   Eyes: the conjunctiva exhibited no abnormalities and the eyelids demonstrated no xanthelasmas.   HEENT: normal oral mucosa, no oral pallor and no oral cyanosis.   Neck: normal jugular venous A waves present, normal jugular venous V waves present and no jugular venous villarreal A waves.   Pulmonary: no respiratory distress, normal respiratory rhythm and effort, no accessory muscle use and lungs were clear to auscultation bilaterally.   Cardiovascular: heart rate and rhythm were normal, normal S1 and S2 and no murmur, gallop, rub, heave or thrill are presen  Musculoskeletal: the gait could not be assessed.   Extremities: no clubbing of the fingernails, no localized cyanosis, no petechial hemorrhages and no ischemic changes.   Skin: normal skin color and pigmentation, no rash, no venous stasis, no skin lesions, no skin ulcer and no xanthoma was observed.     LABS:   --------  10-10    144  |  106  |  16  ----------------------------<  124<H>  3.6   |  33<H>  |  0.87    Ca    8.5      10 Oct 2021 06:43    TPro  6.1  /  Alb  2.6<L>  /  TBili  0.4  /  DBili  x   /  AST  11  /  ALT  17  /  AlkPhos  60  10-10                         10.4   7.66  )-----------( 277      ( 10 Oct 2021 06:43 )             33.8             Culture Results:   No growth to date. (10-08 @ 13:26)  Culture Results:   No growth to date. (10-08 @ 13:26)    10-08 @ 13:26    Organism --   Gram Stain Blood -- Gram Stain --  Specimen Source .Blood Blood-Peripheral  Culture-Blood --        Radiology:

## 2021-10-10 NOTE — PROGRESS NOTE ADULT - SUBJECTIVE AND OBJECTIVE BOX
Patient is a 65y old  Female who presents with a chief complaint of abd pain (10 Oct 2021 09:57)      INTERVAL HPI/OVERNIGHT EVENTS:overnight events noted    Home Medications:  Acidophilus oral capsule: 1 cap(s) orally 2 times a day (06 Oct 2021 21:25)  anastrozole 1 mg oral tablet: 1 tab(s) orally once a day (06 Oct 2021 21:25)  aspirin 81 mg oral tablet: 1 tab(s) orally once a day (06 Oct 2021 21:25)  atorvastatin 20 mg oral tablet: 1 tab(s) orally once a day (06 Oct 2021 21:25)  carvedilol 25 mg oral tablet: 1 tab(s) orally 2 times a day (06 Oct 2021 21:25)  cloNIDine 0.2 mg oral tablet: orally 3 times a day (06 Oct 2021 21:25)  furosemide 40 mg oral tablet: 1 tab(s) orally once a day (06 Oct 2021 21:25)  hydrALAZINE 100 mg oral tablet: orally 3 times a day (06 Oct 2021 21:25)  levothyroxine 150 mcg (0.15 mg) oral tablet: 1 tab(s) orally once a day (06 Oct 2021 21:25)  losartan 100 mg oral tablet: 1 tab(s) orally once a day (06 Oct 2021 21:25)  metFORMIN 500 mg oral tablet, extended release: 1 tab(s) orally once a day (06 Oct 2021 21:25)  potassium chloride 20 mEq oral tablet, extended release: orally 2 times a day (06 Oct 2021 21:25)  senna 8.6 mg oral tablet: 2 tab(s) orally once a day (at bedtime) (06 Oct 2021 21:25)      MEDICATIONS  (STANDING):  amLODIPine   Tablet 10 milliGRAM(s) Oral daily  anastrozole 1 milliGRAM(s) Oral daily  aspirin enteric coated 81 milliGRAM(s) Oral daily  atorvastatin 20 milliGRAM(s) Oral at bedtime  carvedilol 25 milliGRAM(s) Oral every 12 hours  cefTRIAXone   IVPB 1000 milliGRAM(s) IV Intermittent every 24 hours  cloNIDine 0.2 milliGRAM(s) Oral three times a day  cyanocobalamin Injectable 1000 MICROGram(s) IntraMuscular daily  dextrose 40% Gel 15 Gram(s) Oral once  dextrose 5%. 1000 milliLiter(s) (50 mL/Hr) IV Continuous <Continuous>  dextrose 5%. 1000 milliLiter(s) (100 mL/Hr) IV Continuous <Continuous>  dextrose 50% Injectable 25 Gram(s) IV Push once  dextrose 50% Injectable 12.5 Gram(s) IV Push once  dextrose 50% Injectable 25 Gram(s) IV Push once  enoxaparin Injectable 40 milliGRAM(s) SubCutaneous daily  furosemide    Tablet 40 milliGRAM(s) Oral daily  glucagon  Injectable 1 milliGRAM(s) IntraMuscular once  hydrALAZINE 100 milliGRAM(s) Oral three times a day  insulin lispro (ADMELOG) corrective regimen sliding scale   SubCutaneous three times a day before meals  insulin lispro (ADMELOG) corrective regimen sliding scale   SubCutaneous at bedtime  iron sucrose Injectable 100 milliGRAM(s) IV Push every 24 hours  lactobacillus acidophilus 1 Tablet(s) Oral three times a day  levothyroxine 150 MICROGram(s) Oral daily  losartan 100 milliGRAM(s) Oral daily  metFORMIN 500 milliGRAM(s) Oral two times a day  phenazopyridine 100 milliGRAM(s) Oral every 8 hours  potassium chloride    Tablet ER 20 milliEquivalent(s) Oral daily    MEDICATIONS  (PRN):  acetaminophen   Tablet .. 650 milliGRAM(s) Oral every 6 hours PRN Temp greater or equal to 38C (100.4F), Mild Pain (1 - 3)  ALBUTerol    90 MICROgram(s) HFA Inhaler 2 Puff(s) Inhalation every 6 hours PRN Shortness of Breath and/or Wheezing  morphine  - Injectable 2 milliGRAM(s) IV Push every 6 hours PRN Severe Pain (7 - 10)  traMADol 50 milliGRAM(s) Oral every 6 hours PRN Moderate Pain (4 - 6)      Allergies    No Known Allergies    Intolerances        REVIEW OF SYSTEMS:  CONSTITUTIONAL: No fever, weight loss, or fatigue  EYES: No eye pain, visual disturbances, or discharge  ENMT:  No difficulty hearing, tinnitus, vertigo; No sinus or throat pain  NECK: No pain or stiffness  BREASTS: No pain, masses, or nipple discharge  RESPIRATORY: No cough, wheezing, chills or hemoptysis; No shortness of breath  CARDIOVASCULAR: No chest pain, palpitations, dizziness, or leg swelling  GASTROINTESTINAL: No abdominal or epigastric pain. No nausea, vomiting, or hematemesis; No diarrhea or constipation. No melena or hematochezia.  GENITOURINARY: No dysuria, frequency, hematuria, or incontinence  NEUROLOGICAL: No headaches, memory loss, loss of strength, numbness, or tremors  SKIN: No itching, burning, rashes, or lesions   LYMPH NODES: No enlarged glands  ENDOCRINE: No heat or cold intolerance; No hair loss  MUSCULOSKELETAL: No joint pain or swelling; No muscle, back, or extremity pain  PSYCHIATRIC: No depression, anxiety, mood swings, or difficulty sleeping  HEME/LYMPH: No easy bruising, or bleeding gums  ALLERGY AND IMMUNOLOGIC: No hives or eczema    Vital Signs Last 24 Hrs  T(C): 36.5 (10 Oct 2021 09:30), Max: 37 (09 Oct 2021 21:16)  T(F): 97.7 (10 Oct 2021 09:30), Max: 98.6 (09 Oct 2021 21:16)  HR: 74 (10 Oct 2021 09:30) (68 - 82)  BP: 115/70 (10 Oct 2021 09:30) (115/70 - 144/68)  BP(mean): --  RR: 18 (10 Oct 2021 09:30) (17 - 18)  SpO2: 95% (10 Oct 2021 09:30) (93% - 96%)    PHYSICAL EXAM:  GENERAL: NAD, well-groomed, well-developed  HEAD:  Atraumatic, Normocephalic  EYES: EOMI, PERRLA, conjunctiva and sclera clear  ENMT: Moist mucous membranes,   NECK: Supple, No JVD, Normal thyroid  NERVOUS SYSTEM:  Alert & Oriented X32 non focal  CHEST/LUNG: Clear to percussion bilaterally; No rales, rhonchi, wheezing, or rubs  HEART: Regular rate and rhythm; No murmurs, rubs, or gallops  ABDOMEN: Soft, Nontender, Nondistended; Bowel sounds present  EXTREMITIES:  2+ Peripheral Pulses, No clubbing, cyanosis, or edema  LYMPH: No lymphadenopathy noted  SKIN: No rashes or lesions    LABS:                        10.4   7.66  )-----------( 277      ( 10 Oct 2021 06:43 )             33.8     10-10    144  |  106  |  16  ----------------------------<  124<H>  3.6   |  33<H>  |  0.87    Ca    8.5      10 Oct 2021 06:43    TPro  6.1  /  Alb  2.6<L>  /  TBili  0.4  /  DBili  x   /  AST  11  /  ALT  17  /  AlkPhos  60  10-10        CAPILLARY BLOOD GLUCOSE      POCT Blood Glucose.: 127 mg/dL (10 Oct 2021 07:45)  POCT Blood Glucose.: 133 mg/dL (09 Oct 2021 21:09)  POCT Blood Glucose.: 157 mg/dL (09 Oct 2021 16:45)  POCT Blood Glucose.: 135 mg/dL (09 Oct 2021 11:55)        Culture - Blood (collected 10-08-21 @ 13:26)  Source: .Blood Blood-Peripheral  Preliminary Report (10-09-21 @ 14:02):    No growth to date.    Culture - Blood (collected 10-08-21 @ 13:26)  Source: .Blood Blood-Peripheral  Preliminary Report (10-09-21 @ 14:02):    No growth to date.    Culture - Urine (collected 10-07-21 @ 14:32)  Source: Clean Catch Clean Catch (Midstream)  Preliminary Report (10-09-21 @ 14:02):    >100,000 CFU/ml Escherichia coli    Culture - Blood (collected 10-07-21 @ 14:17)  Source: .Blood Blood-Peripheral  Gram Stain (10-08-21 @ 01:27):    Growth in aerobic bottle: Gram Negative Rods    Growth in anaerobic bottle: Gram Negative Rods  Final Report (10-09-21 @ 16:23):    Growth in aerobic and anaerobic bottles: Escherichia coli    ***Blood Panel PCR results on this specimen are available    approximately 3 hours after the Gram stain result.***    Gram stain, PCR, and/or culture results may not always    correspond due to difference in methodologies.    ************************************************************    This PCR assay was performed by multiplex PCR. This    Assay tests for 66 bacterial and resistance gene targets.    Please refer to the [facility] test directory    at [email or web address] for details.  Organism: Blood Culture PCR  Escherichia coli (10-09-21 @ 16:23)  Organism: Escherichia coli (10-09-21 @ 16:23)      -  Amikacin: S <=16      -  Ampicillin: S <=8 These ampicillin results predict results for amoxicillin      -  Ampicillin/Sulbactam: S <=4/2 Enterobacter, Citrobacter, and Serratia may develop resistance during prolonged therapy (3-4 days)      -  Aztreonam: S <=4      -  Cefazolin: S <=2 Enterobacter, Citrobacter, and Serratia may develop resistance during prolonged therapy (3-4 days)      -  Cefepime: S <=2      -  Cefoxitin: S <=8      -  Ceftriaxone: S <=1 Enterobacter, Citrobacter, and Serratia may develop resistance during prolonged therapy      -  Ciprofloxacin: R >2      -  Ertapenem: S <=0.5      -  Gentamicin: S <=2      -  Imipenem: S <=1      -  Levofloxacin: R >4      -  Meropenem: S <=1      -  Piperacillin/Tazobactam: S <=8      -  Tobramycin: S <=2      -  Trimethoprim/Sulfamethoxazole: S <=0.5/9.5      Method Type: ARELI  Organism: Blood Culture PCR (10-09-21 @ 16:23)      -  Escherichia coli: Detec      Method Type: PCR    Culture - Blood (collected 10-07-21 @ 14:17)  Source: .Blood Blood-Peripheral  Gram Stain (10-09-21 @ 05:33):    Growth in aerobic bottle: Gram Negative Rods  Preliminary Report (10-09-21 @ 05:33):    Growth in aerobic bottle: Gram Negative Rods        I&O's Summary    09 Oct 2021 07:01  -  10 Oct 2021 07:00  --------------------------------------------------------  IN: 400 mL / OUT: 900 mL / NET: -500 mL        RADIOLOGY & ADDITIONAL TESTS:    Imaging Personally Reviewed:  [x ] YES  [ ] NO    Consultant(s) Notes Reviewed:  [x ] YES  [ ] NO    Care Discussed with Consultants/Other Providers [x ] YES  [ ] NO

## 2021-10-10 NOTE — PROGRESS NOTE ADULT - SUBJECTIVE AND OBJECTIVE BOX
[facility] DIVISION of INFECTIOUS DISEASE  Tacos Rain MD PhD, Fanny Gomez MD, Emily Tanner MD, Amy Roa MD, Jerzy Fan MD  and providing coverage with Kat Lim MD and Sunni Espinoza MD  Providing Infectious Disease Consultations at [facility], [facility]    Office# [phone] to schedule follow up appointments  Answering Service for urgent calls or New Consults [phone]  Cell# to text for urgent issues Tacos Rain [phone]     infectious diseases progress note:    YOIS CORRAL is a 65y y. o. Female patient    No concerning overnight events    Allergies    No Known Allergies    Intolerances        ANTIBIOTICS/RELEVANT:  antimicrobials  cefTRIAXone   IVPB 1000 milliGRAM(s) IV Intermittent every 24 hours    immunologic:    OTHER:  acetaminophen   Tablet .. 650 milliGRAM(s) Oral every 6 hours PRN  ALBUTerol    90 MICROgram(s) HFA Inhaler 2 Puff(s) Inhalation every 6 hours PRN  amLODIPine   Tablet 10 milliGRAM(s) Oral daily  anastrozole 1 milliGRAM(s) Oral daily  aspirin enteric coated 81 milliGRAM(s) Oral daily  atorvastatin 20 milliGRAM(s) Oral at bedtime  carvedilol 25 milliGRAM(s) Oral every 12 hours  cloNIDine 0.2 milliGRAM(s) Oral three times a day  cyanocobalamin Injectable 1000 MICROGram(s) IntraMuscular daily  dextrose 40% Gel 15 Gram(s) Oral once  dextrose 5%. 1000 milliLiter(s) IV Continuous <Continuous>  dextrose 5%. 1000 milliLiter(s) IV Continuous <Continuous>  dextrose 50% Injectable 25 Gram(s) IV Push once  dextrose 50% Injectable 12.5 Gram(s) IV Push once  dextrose 50% Injectable 25 Gram(s) IV Push once  enoxaparin Injectable 40 milliGRAM(s) SubCutaneous daily  furosemide    Tablet 40 milliGRAM(s) Oral daily  glucagon  Injectable 1 milliGRAM(s) IntraMuscular once  hydrALAZINE 100 milliGRAM(s) Oral three times a day  insulin lispro (ADMELOG) corrective regimen sliding scale   SubCutaneous three times a day before meals  insulin lispro (ADMELOG) corrective regimen sliding scale   SubCutaneous at bedtime  iron sucrose Injectable 100 milliGRAM(s) IV Push every 24 hours  lactobacillus acidophilus 1 Tablet(s) Oral three times a day  levothyroxine 150 MICROGram(s) Oral daily  losartan 100 milliGRAM(s) Oral daily  metFORMIN 500 milliGRAM(s) Oral two times a day  morphine  - Injectable 2 milliGRAM(s) IV Push every 6 hours PRN  phenazopyridine 100 milliGRAM(s) Oral every 8 hours  potassium chloride    Tablet ER 20 milliEquivalent(s) Oral daily  traMADol 50 milliGRAM(s) Oral every 6 hours PRN      Objective:  Vital Signs Last 24 Hrs  T(C): 36.5 (10 Oct 2021 09:30), Max: 37 (09 Oct 2021 21:16)  T(F): 97.7 (10 Oct 2021 09:30), Max: 98.6 (09 Oct 2021 21:16)  HR: 74 (10 Oct 2021 09:30) (68 - 82)  BP: 115/70 (10 Oct 2021 09:30) (100/63 - 144/68)  BP(mean): --  RR: 18 (10 Oct 2021 09:30) (17 - 18)  SpO2: 95% (10 Oct 2021 09:30) (93% - 96%)    T(C): 36.5 (10-10-21 @ 09:30), Max: 37.3 (10-08-21 @ 18:59)  T(C): 36.5 (10-10-21 @ 09:30), Max: 37.3 (10-07-21 @ 21:13)  T(C): 36.5 (10-10-21 @ 09:30), Max: 37.3 (10-07-21 @ 21:13)    PHYSICAL EXAM:  HEENT: NC atraumatic  Neck: supple  Respiratory: no accessory muscle use, breathing comfortably  Cardiovascular: distant  Gastrointestinal: normal appearing, nondistended  Extremities: no clubbing, no cyanosis,        LABS:                          10.4   7.66  )-----------( 277      ( 10 Oct 2021 06:43 )             33.8       7.66 10-10 @ 06:43  7.49 10-09 @ 07:46  8.22 10-08 @ 07:47  13.17 10-06 @ 21:43      10-10    144  |  106  |  16  ----------------------------<  124<H>  3.6   |  33<H>  |  0.87    Ca    8.5      10 Oct 2021 06:43    TPro  6.1  /  Alb  2.6<L>  /  TBili  0.4  /  DBili  x   /  AST  11  /  ALT  17  /  AlkPhos  60  10-10      Creatinine, Serum: 0.87 mg/dL (10-10-21 @ 06:43)  Creatinine, Serum: 0.90 mg/dL (10-09-21 @ 07:46)  Creatinine, Serum: 1.13 mg/dL (10-08-21 @ 07:47)  Creatinine, Serum: 1.08 mg/dL (10-06-21 @ 21:43)                INFLAMMATORY MARKERS  Auto Neutrophil #: 5.67 K/uL (10-10-21 @ 06:43)  Auto Lymphocyte #: 0.54 K/uL (10-10-21 @ 06:43)  Auto Neutrophil #: 6.92 K/uL (10-08-21 @ 07:47)  Auto Lymphocyte #: 0.45 K/uL (10-08-21 @ 07:47)  Auto Lymphocyte #: 0.33 K/uL (10-06-21 @ 21:43)  Auto Neutrophil #: 11.65 K/uL (10-06-21 @ 21:43)    Lactate, Blood: 1.4 mmol/L (10-07-21 @ 02:14)    Auto Eosinophil #: 0.00 K/uL (10-10-21 @ 06:43)  Auto Eosinophil #: 0.06 K/uL (10-08-21 @ 07:47)  Auto Eosinophil #: 0.03 K/uL (10-06-21 @ 21:43)      Sedimentation Rate, Erythrocyte: 94 mm/Hr (10-08-21 @ 12:04)    Procalcitonin, Serum: 13.60 ng/mL (10-07-21 @ 13:14)    Troponin I, Serum: .034 ng/mL (10-06-21 @ 21:43)          Ferritin, Serum: 90 ng/mL (10-08-21 @ 15:35)        INR: 1.18 ratio (10-06-21 @ 21:43)  Activated Partial Thromboplastin Time: 25.3 sec (10-06-21 @ 21:43)    D-Dimer Assay, Quantitative: 2107 ng/mL DDU (10-06-21 @ 21:43)        MICROBIOLOGY:    Culture - Blood (10.08.21 @ 13:26)    Specimen Source: .Blood Blood-Peripheral    Culture Results:   No growth to date.    Culture - Urine (10.07.21 @ 14:32)    Specimen Source: Clean Catch Clean Catch (Midstream)    Culture Results:   >100,000 CFU/ml Escherichia coli    Culture - Blood (10.07.21 @ 14:17)    -  Tobramycin: S <=2    -  Trimethoprim/Sulfamethoxazole: S <=0.5/9.5    -  Imipenem: S <=1    -  Levofloxacin: R >4    -  Meropenem: S <=1    -  Piperacillin/Tazobactam: S <=8    -  Escherichia coli: Detec    Gram Stain:   Growth in aerobic bottle: Gram Negative Rods  Growth in anaerobic bottle: Gram Negative Rods    -  Amikacin: S <=16    -  Ampicillin: S <=8 These ampicillin results predict results for amoxicillin    -  Ampicillin/Sulbactam: S <=4/2 Enterobacter, Citrobacter, and Serratia may develop resistance during prolonged therapy (3-4 days)    -  Aztreonam: S <=4    -  Cefazolin: S <=2 Enterobacter, Citrobacter, and Serratia may develop resistance during prolonged therapy (3-4 days)    -  Cefepime: S <=2    -  Cefoxitin: S <=8    -  Ceftriaxone: S <=1 Enterobacter, Citrobacter, and Serratia may develop resistance during prolonged therapy    -  Ciprofloxacin: R >2    -  Ertapenem: S <=0.5    -  Gentamicin: S <=2    Specimen Source: .Blood Blood-Peripheral    Organism: Blood Culture PCR    Organism: Escherichia coli    Culture Results:   Growth in aerobic and anaerobic bottles: Escherichia coli  ***Blood Panel PCR results on this specimen are available  approximately 3 hours after the Gram stain result.***  Gram stain, PCR, and/or culture results may not always  correspond due to difference in methodologies.  ************************************************************  This PCR assay was performed by multiplex PCR. This  Assay tests for 66 bacterial and resistance gene targets.  Please refer to the [facility] test directory  at [email or web address] for details.    Organism Identification: Blood Culture PCR  Escherichia coli    Method Type: PCR    Method Type: ARELI        RADIOLOGY & ADDITIONAL STUDIES:

## 2021-10-11 NOTE — PROGRESS NOTE ADULT - SUBJECTIVE AND OBJECTIVE BOX
Patient is a 65y old  Female who presents with a chief complaint of abd pain (11 Oct 2021 09:15)      INTERVAL HPI/OVERNIGHT EVENTS:overnight events noted    Home Medications:  Acidophilus oral capsule: 1 cap(s) orally 2 times a day (06 Oct 2021 21:25)  anastrozole 1 mg oral tablet: 1 tab(s) orally once a day (06 Oct 2021 21:25)  aspirin 81 mg oral tablet: 1 tab(s) orally once a day (06 Oct 2021 21:25)  atorvastatin 20 mg oral tablet: 1 tab(s) orally once a day (06 Oct 2021 21:25)  carvedilol 25 mg oral tablet: 1 tab(s) orally 2 times a day (06 Oct 2021 21:25)  cloNIDine 0.2 mg oral tablet: orally 3 times a day (06 Oct 2021 21:25)  furosemide 40 mg oral tablet: 1 tab(s) orally once a day (06 Oct 2021 21:25)  hydrALAZINE 100 mg oral tablet: orally 3 times a day (06 Oct 2021 21:25)  levothyroxine 150 mcg (0.15 mg) oral tablet: 1 tab(s) orally once a day (06 Oct 2021 21:25)  losartan 100 mg oral tablet: 1 tab(s) orally once a day (06 Oct 2021 21:25)  metFORMIN 500 mg oral tablet, extended release: 1 tab(s) orally once a day (06 Oct 2021 21:25)  potassium chloride 20 mEq oral tablet, extended release: orally 2 times a day (06 Oct 2021 21:25)  senna 8.6 mg oral tablet: 2 tab(s) orally once a day (at bedtime) (06 Oct 2021 21:25)      MEDICATIONS  (STANDING):  amLODIPine   Tablet 10 milliGRAM(s) Oral daily  anastrozole 1 milliGRAM(s) Oral daily  aspirin enteric coated 81 milliGRAM(s) Oral daily  atorvastatin 20 milliGRAM(s) Oral at bedtime  carvedilol 25 milliGRAM(s) Oral every 12 hours  cephalexin 500 milliGRAM(s) Oral four times a day  cloNIDine 0.2 milliGRAM(s) Oral three times a day  cyanocobalamin Injectable 1000 MICROGram(s) IntraMuscular daily  dextrose 40% Gel 15 Gram(s) Oral once  dextrose 5%. 1000 milliLiter(s) (50 mL/Hr) IV Continuous <Continuous>  dextrose 5%. 1000 milliLiter(s) (100 mL/Hr) IV Continuous <Continuous>  dextrose 50% Injectable 25 Gram(s) IV Push once  dextrose 50% Injectable 12.5 Gram(s) IV Push once  dextrose 50% Injectable 25 Gram(s) IV Push once  enoxaparin Injectable 40 milliGRAM(s) SubCutaneous daily  furosemide    Tablet 40 milliGRAM(s) Oral daily  glucagon  Injectable 1 milliGRAM(s) IntraMuscular once  hydrALAZINE 100 milliGRAM(s) Oral three times a day  insulin lispro (ADMELOG) corrective regimen sliding scale   SubCutaneous three times a day before meals  insulin lispro (ADMELOG) corrective regimen sliding scale   SubCutaneous at bedtime  iron sucrose Injectable 100 milliGRAM(s) IV Push every 24 hours  lactobacillus acidophilus 1 Tablet(s) Oral three times a day  levothyroxine 150 MICROGram(s) Oral daily  losartan 100 milliGRAM(s) Oral daily  metFORMIN 500 milliGRAM(s) Oral two times a day  phenazopyridine 100 milliGRAM(s) Oral every 8 hours  potassium chloride    Tablet ER 40 milliEquivalent(s) Oral once  potassium chloride    Tablet ER 20 milliEquivalent(s) Oral daily    MEDICATIONS  (PRN):  acetaminophen   Tablet .. 650 milliGRAM(s) Oral every 6 hours PRN Temp greater or equal to 38C (100.4F), Mild Pain (1 - 3)  ALBUTerol    90 MICROgram(s) HFA Inhaler 2 Puff(s) Inhalation every 6 hours PRN Shortness of Breath and/or Wheezing  morphine  - Injectable 2 milliGRAM(s) IV Push every 6 hours PRN Severe Pain (7 - 10)  traMADol 50 milliGRAM(s) Oral every 6 hours PRN Moderate Pain (4 - 6)      Allergies    No Known Allergies    Intolerances        REVIEW OF SYSTEMS:  CONSTITUTIONAL: No fever, weight loss, has fatigue  EYES: No eye pain, visual disturbances, or discharge  ENMT:  No difficulty hearing, tinnitus, vertigo; No sinus or throat pain  NECK: No pain or stiffness  BREASTS: No pain, masses, or nipple discharge  RESPIRATORY: No cough, wheezing, chills or hemoptysis; No shortness of breath  CARDIOVASCULAR: No chest pain, palpitations, dizziness, or leg swelling  GASTROINTESTINAL: No abdominal or epigastric pain. No nausea, vomiting, or hematemesis; No diarrhea or constipation. No melena or hematochezia.  GENITOURINARY: No dysuria, frequency, hematuria, or incontinence  NEUROLOGICAL: No headaches, memory loss, loss of strength, numbness, or tremors  SKIN: No itching, burning, rashes, or lesions   LYMPH NODES: No enlarged glands  ENDOCRINE: No heat or cold intolerance; No hair loss  MUSCULOSKELETAL: No joint pain or swelling; No muscle, back, or extremity pain  PSYCHIATRIC: No depression, anxiety, mood swings, or difficulty sleeping  HEME/LYMPH: No easy bruising, or bleeding gums  ALLERGY AND IMMUNOLOGIC: No hives or eczema    Vital Signs Last 24 Hrs  T(C): 36.7 (11 Oct 2021 05:20), Max: 37.2 (10 Oct 2021 20:57)  T(F): 98 (11 Oct 2021 05:20), Max: 98.9 (10 Oct 2021 20:57)  HR: 74 (11 Oct 2021 05:20) (74 - 81)  BP: 146/71 (11 Oct 2021 05:20) (119/63 - 156/79)  BP(mean): --  RR: 18 (11 Oct 2021 05:20) (16 - 18)  SpO2: 94% (11 Oct 2021 05:20) (93% - 97%)    PHYSICAL EXAM:  GENERAL: NAD, well-groomed, well-developed  HEAD:  Atraumatic, Normocephalic  EYES: EOMI, PERRLA, conjunctiva and sclera clear  ENMT: Moist mucous membranes,   NECK: Supple, No JVD, Normal thyroid  NERVOUS SYSTEM:  Alert & Oriented X2, non focal  CHEST/LUNG: Clear to percussion bilaterally; No rales, rhonchi, wheezing, or rubs, on O2 3 litre via nasal cannula  HEART: Regular rate and rhythm; No murmurs, rubs, or gallops  ABDOMEN: Soft, Nontender, Nondistended; Bowel sounds present  EXTREMITIES:  2+ Peripheral Pulses, No clubbing, cyanosis, or edema  LYMPH: No lymphadenopathy noted  SKIN: No rashes or lesions    LABS:                        10.1   8.63  )-----------( 293      ( 11 Oct 2021 07:31 )             33.1     10-11    144  |  106  |  18  ----------------------------<  136<H>  3.3<L>   |  32<H>  |  0.96    Ca    8.5      11 Oct 2021 07:31    TPro  6.0  /  Alb  2.6<L>  /  TBili  0.4  /  DBili  x   /  AST  32  /  ALT  35  /  AlkPhos  58  10-11        CAPILLARY BLOOD GLUCOSE      POCT Blood Glucose.: 142 mg/dL (11 Oct 2021 07:41)  POCT Blood Glucose.: 149 mg/dL (10 Oct 2021 20:56)  POCT Blood Glucose.: 133 mg/dL (10 Oct 2021 16:40)  POCT Blood Glucose.: 109 mg/dL (10 Oct 2021 11:55)        Culture - Blood (collected 10-08-21 @ 13:26)  Source: .Blood Blood-Peripheral  Preliminary Report (10-09-21 @ 14:02):    No growth to date.    Culture - Blood (collected 10-08-21 @ 13:26)  Source: .Blood Blood-Peripheral  Preliminary Report (10-09-21 @ 14:02):    No growth to date.    Culture - Urine (collected 10-07-21 @ 14:32)  Source: Clean Catch Clean Catch (Midstream)  Final Report (10-10-21 @ 10:53):    >100,000 CFU/ml Escherichia coli  Organism: Escherichia coli (10-10-21 @ 10:53)  Organism: Escherichia coli (10-10-21 @ 10:53)      -  Amikacin: S <=16      -  Amoxicillin/Clavulanic Acid: S <=8/4      -  Ampicillin: S <=8 These ampicillin results predict results for amoxicillin      -  Ampicillin/Sulbactam: S <=4/2 Enterobacter, Citrobacter, and Serratia may develop resistance during prolonged therapy (3-4 days)      -  Aztreonam: S <=4      -  Cefazolin: S <=2 (MIC_CL_COM_ENTERIC_CEFAZU) For uncomplicated UTI with K. pneumoniae, E. coli, or P. mirablis: ARELI <=16 is sensitive and ARELI >=32 is resistant. This also predicts results for oral agents cefaclor, cefdinir, cefpodoxime, cefprozil, cefuroxime axetil, cephalexin and locarbef for uncomplicated UTI. Note that some isolates may be susceptible to these agents while testing resistant to cefazolin.      -  Cefepime: S <=2      -  Cefoxitin: S <=8      -  Ceftriaxone: S <=1 Enterobacter, Citrobacter, and Serratia may develop resistance during prolonged therapy      -  Ciprofloxacin: R >2      -  Ertapenem: S <=0.5      -  Gentamicin: S <=2      -  Imipenem: S <=1      -  Levofloxacin: R >4      -  Meropenem: S <=1      -  Nitrofurantoin: S <=32 Should not be used to treat pyelonephritis      -  Piperacillin/Tazobactam: S <=8      -  Tigecycline: S <=2      -  Tobramycin: S <=2      -  Trimethoprim/Sulfamethoxazole: S <=0.5/9.5      Method Type: ARELI    Culture - Blood (collected 10-07-21 @ 14:17)  Source: .Blood Blood-Peripheral  Gram Stain (10-08-21 @ 01:27):    Growth in aerobic bottle: Gram Negative Rods    Growth in anaerobic bottle: Gram Negative Rods  Final Report (10-09-21 @ 16:23):    Growth in aerobic and anaerobic bottles: Escherichia coli    ***Blood Panel PCR results on this specimen are available    approximately 3 hours after the Gram stain result.***    Gram stain, PCR, and/or culture results may not always    correspond due to difference in methodologies.    ************************************************************    This PCR assay was performed by multiplex PCR. This    Assay tests for 66 bacterial and resistance gene targets.    Please refer to the [facility] test directory    at [email or web address] for details.  Organism: Blood Culture PCR  Escherichia coli (10-09-21 @ 16:23)  Organism: Escherichia coli (10-09-21 @ 16:23)      -  Amikacin: S <=16      -  Ampicillin: S <=8 These ampicillin results predict results for amoxicillin      -  Ampicillin/Sulbactam: S <=4/2 Enterobacter, Citrobacter, and Serratia may develop resistance during prolonged therapy (3-4 days)      -  Aztreonam: S <=4      -  Cefazolin: S <=2 Enterobacter, Citrobacter, and Serratia may develop resistance during prolonged therapy (3-4 days)      -  Cefepime: S <=2      -  Cefoxitin: S <=8      -  Ceftriaxone: S <=1 Enterobacter, Citrobacter, and Serratia may develop resistance during prolonged therapy      -  Ciprofloxacin: R >2      -  Ertapenem: S <=0.5      -  Gentamicin: S <=2      -  Imipenem: S <=1      -  Levofloxacin: R >4      -  Meropenem: S <=1      -  Piperacillin/Tazobactam: S <=8      -  Tobramycin: S <=2      -  Trimethoprim/Sulfamethoxazole: S <=0.5/9.5      Method Type: ARELI  Organism: Blood Culture PCR (10-09-21 @ 16:23)      -  Escherichia coli: Detec      Method Type: PCR    Culture - Blood (collected 10-07-21 @ 14:17)  Source: .Blood Blood-Peripheral  Gram Stain (10-09-21 @ 05:33):    Growth in aerobic bottle: Gram Negative Rods  Final Report (10-10-21 @ 16:22):    Growth in aerobic bottle: Escherichia coli    See previous culture [id number]        I&O's Summary    10 Oct 2021 07:01  -  11 Oct 2021 07:00  --------------------------------------------------------  IN: 400 mL / OUT: 1000 mL / NET: -600 mL        RADIOLOGY & ADDITIONAL TESTS:    Imaging Personally Reviewed:  [x ] YES  [ ] NO    Consultant(s) Notes Reviewed:  [ x] YES  [ ] NO    Care Discussed with Consultants/Other Providers [ x] YES  [ ] NO

## 2021-10-11 NOTE — DISCHARGE NOTE NURSING/CASE MANAGEMENT/SOCIAL WORK - PATIENT PORTAL LINK FT
You can access the FollowMyHealth Patient Portal offered by [facility] by registering at the following website: [email or web address]. By joining [facility]’s FollowMyHealth portal, you will also be able to view your health information using other applications (apps) compatible with our system.

## 2021-10-11 NOTE — DISCHARGE NOTE NURSING/CASE MANAGEMENT/SOCIAL WORK - NSDCVIVACCINE_GEN_ALL_CORE_FT
Tdap; 03-Oct-2020 15:40; Heydi Rojas (RN); [employer]; [id number] (Exp. Date: 31-Jul-2022); IntraMuscular; Deltoid Right.; 0.5 milliLiter(s); VIS (VIS Published: 09-May-2013, VIS Presented: 03-Oct-2020);   Tdap; 18-Nov-2020 13:08; Kandy Chaudhary (DAMEON); [employer]; [id number] (Exp. Date: 31-Jul-2022); IntraMuscular; Deltoid Left.; 0.5 milliLiter(s); VIS (VIS Published: 09-May-2013, VIS Presented: 18-Nov-2020);   Tdap; 19-Sep-2021 19:21; Heydi Rojas (RN); [employer]; [id number] (Exp. Date: 23-Jun-2023); IntraMuscular; Deltoid Left.; 0.5 milliLiter(s); VIS (VIS Published: 09-May-2013, VIS Presented: 19-Sep-2021);

## 2021-10-11 NOTE — PROGRESS NOTE ADULT - ASSESSMENT
65F with PMH Type 2 DM, HTN, hypothyroid, cognitive impairment, recurrent falls, recurrent uti, right breast malignancy on chemo sent from [facility] abd pain and incontinence.   Found to have AUR -Had >1000cc urine on bladder scan - straight cathed. Repeat bladder scan <100cc  GNR sepsis sec AUR  Doubt pneumonia likely atelectasis  Highest suspicion is for E coli bacteremia (10/7), secondary to pyelonephritis    RECOMMENDATIONS     10/8-Repeat blood cultures-NGTD  Nascimento sensitive E coli in blood and E coli in urine.  repeat blood cultures - NGTD past 48 hours then fine to complete course with  Keflex 500mg PO QID with last day 10/20    From an ID standpoint no further requirement for inpatient status for the management of ID issues. Fine with discharge from ID standpoint when other medical issues no longer require inpatient care and social issues allow for a safe discharge plan. To schedule an outpatient ID follow up appointment please call our office at [phone]      Thank you for consulting us and involving us in the management of this most interesting and challenging case.  Please call us at [phone] or text me directly on my cell#[phone] with any concerns or further questions.

## 2021-10-11 NOTE — DISCHARGE NOTE NURSING/CASE MANAGEMENT/SOCIAL WORK - NSSCNAMETXT_GEN_ALL_CORE
[facility] assisted living facility ([facility]) [phone] will arrange for home care services as indicated

## 2021-10-11 NOTE — DISCHARGE NOTE NURSING/CASE MANAGEMENT/SOCIAL WORK - NSDPFAC_GEN_ALL_CORE
[facility] assisted living facility ([facility]) [phone].

## 2021-10-11 NOTE — PROGRESS NOTE ADULT - SUBJECTIVE AND OBJECTIVE BOX
[facility] DIVISION of INFECTIOUS DISEASE  Tacos Rain MD PhD, Fanny Gomez MD, Emily Tanner MD, Amy Roa MD, Jerzy Fan MD  and providing coverage with Kat Lim MD and Sunni Espinoza MD  Providing Infectious Disease Consultations at [facility], [facility]    Office# [phone] to schedule follow up appointments  Answering Service for urgent calls or New Consults [phone]  Cell# to text for urgent issues Tacos Rain [phone]     infectious diseases progress note:    YOSI CORRAL is a 65y y. o. Female patient    No concerning overnight events    Allergies    No Known Allergies    Intolerances        ANTIBIOTICS/RELEVANT:  antimicrobials    immunologic:    OTHER:  acetaminophen   Tablet .. 650 milliGRAM(s) Oral every 6 hours PRN  ALBUTerol    90 MICROgram(s) HFA Inhaler 2 Puff(s) Inhalation every 6 hours PRN  amLODIPine   Tablet 10 milliGRAM(s) Oral daily  anastrozole 1 milliGRAM(s) Oral daily  aspirin enteric coated 81 milliGRAM(s) Oral daily  atorvastatin 20 milliGRAM(s) Oral at bedtime  carvedilol 25 milliGRAM(s) Oral every 12 hours  cloNIDine 0.2 milliGRAM(s) Oral three times a day  cyanocobalamin Injectable 1000 MICROGram(s) IntraMuscular daily  dextrose 40% Gel 15 Gram(s) Oral once  dextrose 5%. 1000 milliLiter(s) IV Continuous <Continuous>  dextrose 5%. 1000 milliLiter(s) IV Continuous <Continuous>  dextrose 50% Injectable 25 Gram(s) IV Push once  dextrose 50% Injectable 12.5 Gram(s) IV Push once  dextrose 50% Injectable 25 Gram(s) IV Push once  enoxaparin Injectable 40 milliGRAM(s) SubCutaneous daily  furosemide    Tablet 40 milliGRAM(s) Oral daily  glucagon  Injectable 1 milliGRAM(s) IntraMuscular once  hydrALAZINE 100 milliGRAM(s) Oral three times a day  insulin lispro (ADMELOG) corrective regimen sliding scale   SubCutaneous three times a day before meals  insulin lispro (ADMELOG) corrective regimen sliding scale   SubCutaneous at bedtime  iron sucrose Injectable 100 milliGRAM(s) IV Push every 24 hours  lactobacillus acidophilus 1 Tablet(s) Oral three times a day  levothyroxine 150 MICROGram(s) Oral daily  losartan 100 milliGRAM(s) Oral daily  metFORMIN 500 milliGRAM(s) Oral two times a day  morphine  - Injectable 2 milliGRAM(s) IV Push every 6 hours PRN  phenazopyridine 100 milliGRAM(s) Oral every 8 hours  potassium chloride    Tablet ER 20 milliEquivalent(s) Oral daily  traMADol 50 milliGRAM(s) Oral every 6 hours PRN      Objective:  Vital Signs Last 24 Hrs  T(C): 36.7 (11 Oct 2021 05:20), Max: 37.2 (10 Oct 2021 20:57)  T(F): 98 (11 Oct 2021 05:20), Max: 98.9 (10 Oct 2021 20:57)  HR: 74 (11 Oct 2021 05:20) (74 - 81)  BP: 146/71 (11 Oct 2021 05:20) (115/70 - 156/79)  BP(mean): --  RR: 18 (11 Oct 2021 05:20) (16 - 18)  SpO2: 94% (11 Oct 2021 05:20) (93% - 97%)    T(C): 36.7 (10-11-21 @ 05:20), Max: 37.2 (10-10-21 @ 20:57)  T(C): 36.7 (10-11-21 @ 05:20), Max: 37.3 (10-08-21 @ 18:59)  T(C): 36.7 (10-11-21 @ 05:20), Max: 37.3 (10-07-21 @ 21:13)    PHYSICAL EXAM:  HEENT: NC atraumatic  Neck: supple  Respiratory: no accessory muscle use, breathing comfortably  Cardiovascular: distant  Gastrointestinal: normal appearing, nondistended  Extremities: no clubbing, no cyanosis,        LABS:                          10.1   8.63  )-----------( 293      ( 11 Oct 2021 07:31 )             33.1       8.63 10-11 @ 07:31  7.66 10-10 @ 06:43  7.49 10-09 @ 07:46  8.22 10-08 @ 07:47  13.17 10-06 @ 21:43      10-11    144  |  106  |  18  ----------------------------<  136<H>  3.3<L>   |  32<H>  |  0.96    Ca    8.5      11 Oct 2021 07:31    TPro  6.0  /  Alb  2.6<L>  /  TBili  0.4  /  DBili  x   /  AST  32  /  ALT  35  /  AlkPhos  58  10-11      Creatinine, Serum: 0.96 mg/dL (10-11-21 @ 07:31)  Creatinine, Serum: 0.87 mg/dL (10-10-21 @ 06:43)  Creatinine, Serum: 0.90 mg/dL (10-09-21 @ 07:46)  Creatinine, Serum: 1.13 mg/dL (10-08-21 @ 07:47)  Creatinine, Serum: 1.08 mg/dL (10-06-21 @ 21:43)                INFLAMMATORY MARKERS  Auto Neutrophil #: 6.97 K/uL (10-11-21 @ 07:31)  Auto Lymphocyte #: 0.55 K/uL (10-11-21 @ 07:31)  Auto Neutrophil #: 5.67 K/uL (10-10-21 @ 06:43)  Auto Lymphocyte #: 0.54 K/uL (10-10-21 @ 06:43)  Auto Neutrophil #: 6.92 K/uL (10-08-21 @ 07:47)  Auto Lymphocyte #: 0.45 K/uL (10-08-21 @ 07:47)  Auto Lymphocyte #: 0.33 K/uL (10-06-21 @ 21:43)  Auto Neutrophil #: 11.65 K/uL (10-06-21 @ 21:43)    Lactate, Blood: 1.4 mmol/L (10-07-21 @ 02:14)    Auto Eosinophil #: 0.11 K/uL (10-11-21 @ 07:31)  Auto Eosinophil #: 0.00 K/uL (10-10-21 @ 06:43)  Auto Eosinophil #: 0.06 K/uL (10-08-21 @ 07:47)  Auto Eosinophil #: 0.03 K/uL (10-06-21 @ 21:43)      Sedimentation Rate, Erythrocyte: 94 mm/Hr (10-08-21 @ 12:04)    Procalcitonin, Serum: 13.60 ng/mL (10-07-21 @ 13:14)    Troponin I, Serum: .034 ng/mL (10-06-21 @ 21:43)          Ferritin, Serum: 90 ng/mL (10-08-21 @ 15:35)        INR: 1.18 ratio (10-06-21 @ 21:43)  Activated Partial Thromboplastin Time: 25.3 sec (10-06-21 @ 21:43)    D-Dimer Assay, Quantitative: 2107 ng/mL DDU (10-06-21 @ 21:43)        MICROBIOLOGY:    Culture - Blood (10.08.21 @ 13:26)    Specimen Source: .Blood Blood-Peripheral    Culture Results:   No growth to date.        RADIOLOGY & ADDITIONAL STUDIES:

## 2021-10-11 NOTE — PROGRESS NOTE ADULT - ASSESSMENT
66 y/o F BIBEMS from [address] c/o lower abd pain since this morning. Denies n/v/d. Last BM this morning, unsure if she is constipated. Denies hx of abd surgery. no known fever, no chills. Pt reports she stopped using at home O2 2d ago and EMS found pt to be hypoxic in the 80s with no O2, also EMS did not see O2 as order on pt's medical papers. Pt states she walks w/ walker at baseline, but EMS state they tried to stand her to get onto stretcher and pt could not support herself    on LASIX - PO  sepsis - bacteremia - procalcitonin noted -     on ABX  ID follow up     pna eval  biomarkers  cx  SLP eval noted  Dysphagia diet  cardio eval noted - cvs rx regimen and BP control - LASIX -   I and O  monitor VS and HD and Sat  MEKA eval as outpatient  CT chest - neg for PE - infiltrate vs atelectasis - pt is on ABX  VBG noted  wean fio2 support as tolerated - keep sat > 88 pct  pt lives in [place]  assist with needs - ADL -

## 2021-10-11 NOTE — PROGRESS NOTE ADULT - SUBJECTIVE AND OBJECTIVE BOX
Date/Time Patient Seen:  		  Referring MD:   Data Reviewed	       Patient is a 65y old  Female who presents with a chief complaint of abd pain (10 Oct 2021 11:49)      Subjective/HPI     PAST MEDICAL & SURGICAL HISTORY:  HTN (hypertension)    HLD (hyperlipidemia)    Anxiety    HLD (hyperlipidemia)    Hypothyroid    Adrenal nodule    Breast CA  right    Poor historian    Cancer of thyroid  unsure of dates but prior to 2005    Coronary artery disease    Cognitive impairment    Osteoarthritis    Type 2 diabetes mellitus    Urinary frequency    History of adrenal adenoma    Diabetes    H/O total thyroidectomy    H/O bilateral breast biopsy  November 2018 and December 2018    S/P angioplasty with stent  drug eluting stent in first diagonal on 2/21/19    S/P dilation and curettage  TOP x2          Medication list         MEDICATIONS  (STANDING):  amLODIPine   Tablet 10 milliGRAM(s) Oral daily  anastrozole 1 milliGRAM(s) Oral daily  aspirin enteric coated 81 milliGRAM(s) Oral daily  atorvastatin 20 milliGRAM(s) Oral at bedtime  carvedilol 25 milliGRAM(s) Oral every 12 hours  cefTRIAXone   IVPB 1000 milliGRAM(s) IV Intermittent every 24 hours  cloNIDine 0.2 milliGRAM(s) Oral three times a day  cyanocobalamin Injectable 1000 MICROGram(s) IntraMuscular daily  dextrose 40% Gel 15 Gram(s) Oral once  dextrose 5%. 1000 milliLiter(s) (50 mL/Hr) IV Continuous <Continuous>  dextrose 5%. 1000 milliLiter(s) (100 mL/Hr) IV Continuous <Continuous>  dextrose 50% Injectable 12.5 Gram(s) IV Push once  dextrose 50% Injectable 25 Gram(s) IV Push once  dextrose 50% Injectable 25 Gram(s) IV Push once  enoxaparin Injectable 40 milliGRAM(s) SubCutaneous daily  furosemide    Tablet 40 milliGRAM(s) Oral daily  glucagon  Injectable 1 milliGRAM(s) IntraMuscular once  hydrALAZINE 100 milliGRAM(s) Oral three times a day  insulin lispro (ADMELOG) corrective regimen sliding scale   SubCutaneous three times a day before meals  insulin lispro (ADMELOG) corrective regimen sliding scale   SubCutaneous at bedtime  iron sucrose Injectable 100 milliGRAM(s) IV Push every 24 hours  lactobacillus acidophilus 1 Tablet(s) Oral three times a day  levothyroxine 150 MICROGram(s) Oral daily  losartan 100 milliGRAM(s) Oral daily  metFORMIN 500 milliGRAM(s) Oral two times a day  phenazopyridine 100 milliGRAM(s) Oral every 8 hours  potassium chloride    Tablet ER 20 milliEquivalent(s) Oral daily    MEDICATIONS  (PRN):  acetaminophen   Tablet .. 650 milliGRAM(s) Oral every 6 hours PRN Temp greater or equal to 38C (100.4F), Mild Pain (1 - 3)  ALBUTerol    90 MICROgram(s) HFA Inhaler 2 Puff(s) Inhalation every 6 hours PRN Shortness of Breath and/or Wheezing  morphine  - Injectable 2 milliGRAM(s) IV Push every 6 hours PRN Severe Pain (7 - 10)  traMADol 50 milliGRAM(s) Oral every 6 hours PRN Moderate Pain (4 - 6)         Vitals log        ICU Vital Signs Last 24 Hrs  T(C): 36.7 (11 Oct 2021 05:20), Max: 37.2 (10 Oct 2021 20:57)  T(F): 98 (11 Oct 2021 05:20), Max: 98.9 (10 Oct 2021 20:57)  HR: 74 (11 Oct 2021 05:20) (68 - 81)  BP: 146/71 (11 Oct 2021 05:20) (115/70 - 156/79)  BP(mean): --  ABP: --  ABP(mean): --  RR: 18 (11 Oct 2021 05:20) (16 - 18)  SpO2: 94% (11 Oct 2021 05:20) (93% - 97%)           Input and Output:  I&O's Detail    09 Oct 2021 07:01  -  10 Oct 2021 07:00  --------------------------------------------------------  IN:    Oral Fluid: 400 mL  Total IN: 400 mL    OUT:    Voided (mL): 900 mL  Total OUT: 900 mL    Total NET: -500 mL      10 Oct 2021 07:01  -  11 Oct 2021 06:06  --------------------------------------------------------  IN:    Oral Fluid: 400 mL  Total IN: 400 mL    OUT:    Voided (mL): 1000 mL  Total OUT: 1000 mL    Total NET: -600 mL          Lab Data                        10.4   7.66  )-----------( 277      ( 10 Oct 2021 06:43 )             33.8     10-10    144  |  106  |  16  ----------------------------<  124<H>  3.6   |  33<H>  |  0.87    Ca    8.5      10 Oct 2021 06:43    TPro  6.1  /  Alb  2.6<L>  /  TBili  0.4  /  DBili  x   /  AST  11  /  ALT  17  /  AlkPhos  60  10-10            Review of Systems	      Objective     Physical Examination    heart s1s2  lung dec BS      Pertinent Lab findings & Imaging      Miguel Angel:  NO   Adequate UO     I&O's Detail    09 Oct 2021 07:01  -  10 Oct 2021 07:00  --------------------------------------------------------  IN:    Oral Fluid: 400 mL  Total IN: 400 mL    OUT:    Voided (mL): 900 mL  Total OUT: 900 mL    Total NET: -500 mL      10 Oct 2021 07:01  -  11 Oct 2021 06:06  --------------------------------------------------------  IN:    Oral Fluid: 400 mL  Total IN: 400 mL    OUT:    Voided (mL): 1000 mL  Total OUT: 1000 mL    Total NET: -600 mL               Discussed with:     Cultures:	        Radiology

## 2021-10-11 NOTE — PROGRESS NOTE ADULT - SUBJECTIVE AND OBJECTIVE BOX
Chief Complaint: Abdominal pain    Interval Events: No events overnight.    Review of Systems:  General: No fevers, chills, weight loss or gain  Skin: No rashes, color changes  Cardiovascular: No chest pain, orthopnea  Respiratory: No shortness of breath, cough  Gastrointestinal: No nausea, abdominal pain  Genitourinary: No incontinence, pain with urination  Musculoskeletal: No pain, swelling, decreased range of motion  Neurological: No headache, weakness  Psychiatric: No depression, anxiety  Endocrine: No weight loss or gain, increased thirst  All other systems are comprehensively negative.    Physical Exam:  Vital Signs Last 24 Hrs  T(C): 36.7 (11 Oct 2021 05:20), Max: 37.2 (10 Oct 2021 20:57)  T(F): 98 (11 Oct 2021 05:20), Max: 98.9 (10 Oct 2021 20:57)  HR: 74 (11 Oct 2021 05:20) (74 - 81)  BP: 146/71 (11 Oct 2021 05:20) (115/70 - 156/79)  BP(mean): --  RR: 18 (11 Oct 2021 05:20) (16 - 18)  SpO2: 94% (11 Oct 2021 05:20) (93% - 97%)  General: NAD  HEENT: MMM  Neck: No JVD, no carotid bruit  Lungs: CTAB  CV: RRR, nl S1/S2, no M/R/G  Abdomen: S/NT/ND, +BS  Extremities: 2+ LE edema, no cyanosis  Neuro: AAOx3, non-focal  Skin: No rash    Labs:             10-11    144  |  106  |  18  ----------------------------<  136<H>  3.3<L>   |  32<H>  |  0.96    Ca    8.5      11 Oct 2021 07:31    TPro  6.0  /  Alb  2.6<L>  /  TBili  0.4  /  DBili  x   /  AST  32  /  ALT  35  /  AlkPhos  58  10-11                        10.1   8.63  )-----------( 293      ( 11 Oct 2021 07:31 )             33.1    Chief Complaint: Abdominal pain    Interval Events: No events overnight.    Review of Systems:  General: No fevers, chills, weight loss or gain  Skin: No rashes, color changes  Cardiovascular: No chest pain, orthopnea  Respiratory: No shortness of breath, cough  Gastrointestinal: No nausea, abdominal pain  Genitourinary: No incontinence, pain with urination  Musculoskeletal: No pain, swelling, decreased range of motion  Neurological: No headache, weakness  Psychiatric: No depression, anxiety  Endocrine: No weight loss or gain, increased thirst  All other systems are comprehensively negative.    Physical Exam:  Vital Signs Last 24 Hrs  T(C): 36.7 (11 Oct 2021 05:20), Max: 37.2 (10 Oct 2021 20:57)  T(F): 98 (11 Oct 2021 05:20), Max: 98.9 (10 Oct 2021 20:57)  HR: 74 (11 Oct 2021 05:20) (74 - 81)  BP: 146/71 (11 Oct 2021 05:20) (115/70 - 156/79)  BP(mean): --  RR: 18 (11 Oct 2021 05:20) (16 - 18)  SpO2: 94% (11 Oct 2021 05:20) (93% - 97%)  General: NAD  HEENT: MMM  Neck: No JVD, no carotid bruit  Lungs: CTAB  CV: RRR, nl S1/S2, no M/R/G  Abdomen: S/NT/ND, +BS  Extremities: 2+ LE edema, no cyanosis  Neuro: AAOx3, non-focal  Skin: No rash    Labs:             10-11    144  |  106  |  18  ----------------------------<  136<H>  3.3<L>   |  32<H>  |  0.96    Ca    8.5      11 Oct 2021 07:31    TPro  6.0  /  Alb  2.6<L>  /  TBili  0.4  /  DBili  x   /  AST  32  /  ALT  35  /  AlkPhos  58  10-11                        10.1   8.63  )-----------( 293      ( 11 Oct 2021 07:31 )             33.1     Telemetry: Sinus rhythm

## 2021-10-11 NOTE — PROGRESS NOTE ADULT - ASSESSMENT
The patient is a 65 year old female with a history of HTN, DM, hypothyroidism, cognitive impairment, right breast cancer, HCM, chronic diastolic heart failure who presents with abdominal pain, UTI.    Plan:  - ECG with LVH related changes  - Echo last admission with hyperdynamic LV systolic function, HOCM  - BNP 1172  - CTA chest negative for PE, pulm edema, pleural effusions - atelectasis vs. PNA noted  - Continue carvedilol 25 mg bid  - Continue clonidine 0.2 mg tid  - Continue hydralazine 100 mg tid  - Continue losartan 100 mg daily  - Continue aspirin 81 mg daily  - Continue atorvastatin 20 mg daily  - Continue furosemide 40 mg PO daily  - On ceftriaxone for UTI

## 2021-10-11 NOTE — DISCHARGE NOTE NURSING/CASE MANAGEMENT/SOCIAL WORK - NSDCCRTYPESERV_GEN_ALL_CORE_FT
You are a resident of above assisted living facility ([facility]) and receive assistance from [facility] staff

## 2021-10-11 NOTE — PROGRESS NOTE ADULT - ASSESSMENT
65F with PMH Type 2 DM, HTN,hypothyroid, cognitive impairment,recurrent falls,recurrent uti, right breast malignancy on chemo  sent from [facility] abd pain since this morning. Pt reports she stopped using at home O2. Pt is a resident of [facility]. Pt reports b/l edema is at baseline. Pt walks w/ walker at baseline. Denies n/v/d. Lat BM this morning. Pt states BM was thin, unsure if she is constipated. Denies hx of abd surgery.   IN ED temp 99, bp150/83,HR96,on o2 2 litre via nasal cannula 96%,has b/l leg edema,had CTA< from: CT Angio Chest PE Protocol w/ IV Cont (10.06.21 @ 23:47) >  No pulmonary embolus up to and including segmental branches. Stable subsegmental left lower lobe atelectasis v pna  has rt breast changes, adrenal adenoma and fibroid ut, UA is suspicious of UTI, started on ceftriaxone, and received lasix with suspicion of chf with edema hypoxia .  < from: US Transthoracic Echocardiogram w/Doppler Complete (09.07.21 @ 11:29) >   moderate asymmetric septal hypertrophy with the normal overall systolic function with significant outflow tract obstruction  admitted for further management for  abd pain suspected UTI, likely sepsis Gr neg with UTI with pyelonephritis  likely ac on ch  chf with outflow tract obstruction with septal hypertrophy, with ac on ch hypoxic respiratory failure, on o2 chronically  hypoxia, ch   Ess HTN  breat ca  pressure ulcers POA stage 1  goals of care discussion done and MOLST updated  hypothyroid  rec falls  DM2  mild cognitive impairment  ambulatory dysfunction  respiratory distress likely ac on ch with possible chf diastolic HF  bactremia E coli-on ceftriaxone-If repeat blood cultures remain NGTD past 48 hours then fine whn ready for dc to complete course with  Keflex 500mg PO QID with last day 10/20Pan sensitive E coli in blood and E coli in urine.  received ceftriaxone ceftriaxone  had retention of urine, had staight cath  resolved, no more retention  hypokalemia repleted   PT eval   DC plan   Advanced care planning discussed with patient/family. Advaced care planning forms reviewed,discussed /completed, 20 min spent  dnr dni  HCP [id number]

## 2021-10-11 NOTE — DISCHARGE NOTE NURSING/CASE MANAGEMENT/SOCIAL WORK - NSDCCRNAME_GEN_ALL_CORE_FT
[facility] assisted living facility ([facility]) [phone]. [facility] [phone].

## 2021-10-12 NOTE — PROGRESS NOTE ADULT - PROBLEM SELECTOR PLAN 2
lasix, o2, cardio and pulmonary consult
lasix, o2, cardio and pulmonary consult noted

## 2021-10-12 NOTE — PROGRESS NOTE ADULT - PROBLEM SELECTOR PROBLEM 5
Benign essential HTN

## 2021-10-12 NOTE — PROGRESS NOTE ADULT - PROBLEM SELECTOR PLAN 5
cont current meds , monitoring and cardiology consult noted
cont current meds , monitoring and cardiology consult
yes...

## 2021-10-12 NOTE — PROGRESS NOTE ADULT - ASSESSMENT
66 y/o F BIBEMS from [facility] c/o lower abd pain since this morning. Denies n/v/d. Last BM this morning, unsure if she is constipated. Denies hx of abd surgery. no known fever, no chills. Pt reports she stopped using at home O2 2d ago and EMS found pt to be hypoxic in the 80s with no O2, also EMS did not see O2 as order on pt's medical papers. Pt states she walks w/ walker at baseline, but EMS state they tried to stand her to get onto stretcher and pt could not support herself    on PO keflex  pt has home o2 at shelter  on PO LASIX  dc plnned      pna eval  biomarkers  cx  SLP eval noted  Dysphagia diet  cardio eval noted - cvs rx regimen and BP control - LASIX -   I and O  monitor VS and HD and Sat  MEKA eval as outpatient  CT chest - neg for PE - infiltrate vs atelectasis - pt is on ABX  VBG noted  wean fio2 support as tolerated - keep sat > 88 pct  pt lives in shelter  assist with needs - ADL -

## 2021-10-12 NOTE — PROGRESS NOTE ADULT - PROBLEM SELECTOR PLAN 3
lasix, cont cardiac meds cardiology consult, o2, improved
lasix, cont cardiac meds cardiology consult, o2

## 2021-10-12 NOTE — DISCHARGE NOTE PROVIDER - HOSPITAL COURSE
65F with PMH Type 2 DM, HTN,hypothyroid, cognitive impairment,recurrent falls,recurrent uti, right breast malignancy on chemo  sent from [facility] abd pain since this morning. Pt reports she stopped using at home O2. Pt is a resident of [facility]. Pt reports b/l edema is at baseline. Pt walks w/ walker at baseline. Denies n/v/d. Lat BM this morning. Pt states BM was thin, unsure if she is constipated. Denies hx of abd surgery.   IN ED temp 99, bp150/83,HR96,on o2 2 litre via nasal cannula 96%,has b/l leg edema,had CTA< from: CT Angio Chest PE Protocol w/ IV Cont (10.06.21 @ 23:47) >  No pulmonary embolus up to and including segmental branches. Stable subsegmental left lower lobe atelectasis v pna  has rt breast changes, adrenal adenoma and fibroid ut, UA is suspicious of UTI, started on ceftriaxone, and received lasix with suspicion of chf with edema hypoxia .  < from: US Transthoracic Echocardiogram w/Doppler Complete (09.07.21 @ 11:29) >   moderate asymmetric septal hypertrophy with the normal overall systolic function with significant outflow tract obstruction  admitted for further management for   sepsis Gr neg with UTI with pyelonephritis  likely ac on ch  chf with outflow tract obstruction with septal hypertrophy, with ac on ch hypoxic respiratory failure, on o2 chronically  hypoxia, ch   Ess HTN  breat ca  pressure ulcers POA stage 1  goals of care discussion done and MOLST updated  hypothyroid  rec fallsDM2  mild cognitive impairment  ambulatory dysfunction  anemia-multifactorial; noted B12 deficiency;  iron deficiency, received inj in hospital , cont PO at home  respiratory distress likely ac on ch with possible chf diastolic HF  bactremia E coli-complete course with  Keflex 500mg PO QID with last day 10/20, pan sensitive E coli in blood and E coli in urine.  received ceftriaxone in hospital  had retention of urine, had staight cath  resolved, no more retention  hypokalemia repleted   PT eval   DC plan   Advanced care planning discussed with patient/family. Advaced care planning forms reviewed,discussed /completed, 20 min spent 10/8/21  dnr dni  HCP [id number]

## 2021-10-12 NOTE — PROGRESS NOTE ADULT - PROVIDER SPECIALTY LIST ADULT
Cardiology
Infectious Disease
Pulmonology
Cardiology
Heme/Onc
Heme/Onc
Infectious Disease
Infectious Disease
Pulmonology
Cardiology
Infectious Disease
Infectious Disease
Internal Medicine
Pulmonology
Heme/Onc
Internal Medicine

## 2021-10-12 NOTE — PROGRESS NOTE ADULT - SUBJECTIVE AND OBJECTIVE BOX
Date/Time Patient Seen:  		  Referring MD:   Data Reviewed	       Patient is a 65y old  Female who presents with a chief complaint of abd pain (11 Oct 2021 10:08)      Subjective/HPI     PAST MEDICAL & SURGICAL HISTORY:  HTN (hypertension)    HLD (hyperlipidemia)    Anxiety    HLD (hyperlipidemia)    Hypothyroid    Adrenal nodule    Breast CA  right    Poor historian    Cancer of thyroid  unsure of dates but prior to 2005    Coronary artery disease    Cognitive impairment    Osteoarthritis    Type 2 diabetes mellitus    Urinary frequency    History of adrenal adenoma    Diabetes    H/O total thyroidectomy    H/O bilateral breast biopsy  November 2018 and December 2018    S/P angioplasty with stent  drug eluting stent in first diagonal on 2/21/19    S/P dilation and curettage  TOP x2          Medication list         MEDICATIONS  (STANDING):  amLODIPine   Tablet 10 milliGRAM(s) Oral daily  anastrozole 1 milliGRAM(s) Oral daily  aspirin enteric coated 81 milliGRAM(s) Oral daily  atorvastatin 20 milliGRAM(s) Oral at bedtime  carvedilol 25 milliGRAM(s) Oral every 12 hours  cephalexin 500 milliGRAM(s) Oral four times a day  cloNIDine 0.2 milliGRAM(s) Oral three times a day  cyanocobalamin Injectable 1000 MICROGram(s) IntraMuscular daily  dextrose 40% Gel 15 Gram(s) Oral once  dextrose 5%. 1000 milliLiter(s) (50 mL/Hr) IV Continuous <Continuous>  dextrose 5%. 1000 milliLiter(s) (100 mL/Hr) IV Continuous <Continuous>  dextrose 50% Injectable 12.5 Gram(s) IV Push once  dextrose 50% Injectable 25 Gram(s) IV Push once  dextrose 50% Injectable 25 Gram(s) IV Push once  enoxaparin Injectable 40 milliGRAM(s) SubCutaneous daily  furosemide    Tablet 40 milliGRAM(s) Oral daily  glucagon  Injectable 1 milliGRAM(s) IntraMuscular once  hydrALAZINE 100 milliGRAM(s) Oral three times a day  insulin lispro (ADMELOG) corrective regimen sliding scale   SubCutaneous three times a day before meals  insulin lispro (ADMELOG) corrective regimen sliding scale   SubCutaneous at bedtime  lactobacillus acidophilus 1 Tablet(s) Oral three times a day  levothyroxine 150 MICROGram(s) Oral daily  losartan 100 milliGRAM(s) Oral daily  metFORMIN 500 milliGRAM(s) Oral two times a day  phenazopyridine 100 milliGRAM(s) Oral every 8 hours  potassium chloride    Tablet ER 20 milliEquivalent(s) Oral daily    MEDICATIONS  (PRN):  acetaminophen   Tablet .. 650 milliGRAM(s) Oral every 6 hours PRN Temp greater or equal to 38C (100.4F), Mild Pain (1 - 3)  ALBUTerol    90 MICROgram(s) HFA Inhaler 2 Puff(s) Inhalation every 6 hours PRN Shortness of Breath and/or Wheezing  morphine  - Injectable 2 milliGRAM(s) IV Push every 6 hours PRN Severe Pain (7 - 10)  traMADol 50 milliGRAM(s) Oral every 6 hours PRN Moderate Pain (4 - 6)         Vitals log        ICU Vital Signs Last 24 Hrs  T(C): 36.9 (12 Oct 2021 05:29), Max: 37.1 (11 Oct 2021 22:14)  T(F): 98.4 (12 Oct 2021 05:29), Max: 98.7 (11 Oct 2021 22:14)  HR: 88 (12 Oct 2021 05:29) (75 - 154)  BP: 161/78 (12 Oct 2021 05:29) (120/67 - 161/78)  BP(mean): --  ABP: --  ABP(mean): --  RR: 18 (12 Oct 2021 05:29) (18 - 19)  SpO2: 93% (12 Oct 2021 05:29) (93% - 97%)           Input and Output:  I&O's Detail    11 Oct 2021 07:01  -  12 Oct 2021 07:00  --------------------------------------------------------  IN:    Oral Fluid: 300 mL  Total IN: 300 mL    OUT:    Voided (mL): 1550 mL  Total OUT: 1550 mL    Total NET: -1250 mL          Lab Data                        10.1   8.63  )-----------( 293      ( 11 Oct 2021 07:31 )             33.1     10-11    144  |  106  |  18  ----------------------------<  136<H>  3.3<L>   |  32<H>  |  0.96    Ca    8.5      11 Oct 2021 07:31    TPro  6.0  /  Alb  2.6<L>  /  TBili  0.4  /  DBili  x   /  AST  32  /  ALT  35  /  AlkPhos  58  10-11            Review of Systems	      Objective     Physical Examination    heart s1s2  lung dec BS  abd soft  head nc      Pertinent Lab findings & Imaging      Miguel Angel:  NO   Adequate UO     I&O's Detail    11 Oct 2021 07:01  -  12 Oct 2021 07:00  --------------------------------------------------------  IN:    Oral Fluid: 300 mL  Total IN: 300 mL    OUT:    Voided (mL): 1550 mL  Total OUT: 1550 mL    Total NET: -1250 mL               Discussed with:     Cultures:	        Radiology

## 2021-10-12 NOTE — DISCHARGE NOTE PROVIDER - NSDCMRMEDTOKEN_GEN_ALL_CORE_FT
Acidophilus oral capsule: 1 cap(s) orally 2 times a day  albuterol 90 mcg/inh inhalation aerosol: 2 puff(s) inhaled every 6 hours, As needed, Shortness of Breath and/or Wheezing  amLODIPine 10 mg oral tablet: 1 tab(s) orally once a day  anastrozole 1 mg oral tablet: 1 tab(s) orally once a day  aspirin 81 mg oral tablet: 1 tab(s) orally once a day  atorvastatin 20 mg oral tablet: 1 tab(s) orally once a day  carvedilol 25 mg oral tablet: 1 tab(s) orally 2 times a day  cephalexin 500 mg oral tablet: 1 tab(s) orally 4 times a day   cloNIDine 0.2 mg oral tablet: orally 3 times a day  cyanocobalamin 1000 mcg sublingual tablet: 1 tab(s) sublingually once a day   ferrous sulfate 324 mg (65 mg elemental iron) oral delayed release tablet: 1 tab(s) orally once a day   furosemide 40 mg oral tablet: 1 tab(s) orally once a day  hydrALAZINE 100 mg oral tablet: orally 3 times a day  levothyroxine 150 mcg (0.15 mg) oral tablet: 1 tab(s) orally once a day  losartan 100 mg oral tablet: 1 tab(s) orally once a day  metFORMIN 500 mg oral tablet, extended release: 1 tab(s) orally once a day  phenazopyridine 100 mg oral tablet: 1 tab(s) orally every 8 hours  potassium chloride 20 mEq oral tablet, extended release: orally 2 times a day  senna 8.6 mg oral tablet: 2 tab(s) orally once a day (at bedtime)

## 2021-10-12 NOTE — PROGRESS NOTE ADULT - ASSESSMENT
65F with PMH Type 2 DM, HTN,hypothyroid, cognitive impairment,recurrent falls,recurrent uti, right breast malignancy on chemo  sent from [facility] abd pain since this morning. Pt reports she stopped using at home O2. Pt is a resident of [facility]. Pt reports b/l edema is at baseline. Pt walks w/ walker at baseline. Denies n/v/d. Lat BM this morning. Pt states BM was thin, unsure if she is constipated. Denies hx of abd surgery.   IN ED temp 99, bp150/83,HR96,on o2 2 litre via nasal cannula 96%,has b/l leg edema,had CTA< from: CT Angio Chest PE Protocol w/ IV Cont (10.06.21 @ 23:47) >  No pulmonary embolus up to and including segmental branches. Stable subsegmental left lower lobe atelectasis v pna  has rt breast changes, adrenal adenoma and fibroid ut, UA is suspicious of UTI, started on ceftriaxone, and received lasix with suspicion of chf with edema hypoxia .  < from: US Transthoracic Echocardiogram w/Doppler Complete (09.07.21 @ 11:29) >   moderate asymmetric septal hypertrophy with the normal overall systolic function with significant outflow tract obstruction  admitted for further management for   sepsis Gr neg with UTI with pyelonephritis  likely ac on ch  chf with outflow tract obstruction with septal hypertrophy, with ac on ch hypoxic respiratory failure, on o2 chronically  hypoxia, ch   Ess HTN  breat ca  pressure ulcers POA stage 1  goals of care discussion done and MOLST updated  hypothyroid  rec falls  DM2  mild cognitive impairment  ambulatory dysfunction  respiratory distress likely ac on ch with possible chf diastolic HF  bactremia E coli-complete course with  Keflex 500mg PO QID with last day 10/20, pan sensitive E coli in blood and E coli in urine.  received ceftriaxone in hospital  had retention of urine, had staight cath  resolved, no more retention  hypokalemia repleted   PT eval   DC plan   Advanced care planning discussed with patient/family. Advaced care planning forms reviewed,discussed /completed, 20 min spent 10/8/21  dnr dni  HCP [id number]

## 2021-10-12 NOTE — PROGRESS NOTE ADULT - PROBLEM SELECTOR PLAN 1
ceftriaxone, f up cultures and ID consult noted, pan sensitive, change to keflex
ceftriaxone, f up cultures and ID consult noted
ceftriaxone, f up cultures and ID consult noted, pan sensitive, change to keflex

## 2021-10-12 NOTE — PROGRESS NOTE ADULT - PROBLEM SELECTOR PLAN 4
consistent carb and ssi

## 2021-10-12 NOTE — PROGRESS NOTE ADULT - PROBLEM SELECTOR PROBLEM 2
Acute on chronic diastolic congestive heart failure
Acute respiratory failure with hypoxia

## 2021-10-12 NOTE — PROGRESS NOTE ADULT - SUBJECTIVE AND OBJECTIVE BOX
YOSI CORRAL is a 65yFemale , patient examined and chart reviewed.     INTERVAL HPI/ OVERNIGHT EVENTS:   No events. Afebrile.    PAST MEDICAL & SURGICAL HISTORY:  HTN (hypertension)  Anxiety  HLD (hyperlipidemia)  Hypothyroid  Breast CA  right  Poor historian  Cancer of thyroid  unsure of dates but prior to   Coronary artery disease  Cognitive impairment  Osteoarthritis  Type 2 diabetes mellitus  Urinary frequency  History of adrenal adenoma  Diabetes  H/O total thyroidectomy  H/O bilateral breast biopsy  2018 and 2018  S/P angioplasty with stent  drug eluting stent in first diagonal on 19  S/P dilation and curettage  TOP x2        For details regarding the patient's social history, family history, and other miscellaneous elements, please refer the initial infectious diseases consultation and/or the admitting history and physical examination for this admission.    ROS:  CONSTITUTIONAL:  Negative fever or chills  EYES:  Negative  blurry vision or double vision  CARDIOVASCULAR:  Negative for chest pain or palpitations  RESPIRATORY:  Negative for cough, wheezing, or SOB   GASTROINTESTINAL:  Negative for nausea, vomiting, diarrhea, constipation, or abdominal pain  GENITOURINARY:  Negative frequency, urgency or dysuria  NEUROLOGIC:  No headache, dizziness, lightheadedness +confusion  All other systems were reviewed and are negative         Current inpatient medications :    ANTIBIOTICS/RELEVANT:  cephalexin 500 milliGRAM(s) Oral four times a day    MEDICATIONS  (STANDING):  amLODIPine   Tablet 10 milliGRAM(s) Oral daily  anastrozole 1 milliGRAM(s) Oral daily  aspirin enteric coated 81 milliGRAM(s) Oral daily  atorvastatin 20 milliGRAM(s) Oral at bedtime  carvedilol 25 milliGRAM(s) Oral every 12 hours  cloNIDine 0.2 milliGRAM(s) Oral three times a day  cyanocobalamin Injectable 1000 MICROGram(s) IntraMuscular daily  dextrose 40% Gel 15 Gram(s) Oral once  dextrose 5%. 1000 milliLiter(s) (50 mL/Hr) IV Continuous <Continuous>  dextrose 5%. 1000 milliLiter(s) (100 mL/Hr) IV Continuous <Continuous>  dextrose 50% Injectable 25 Gram(s) IV Push once  dextrose 50% Injectable 12.5 Gram(s) IV Push once  dextrose 50% Injectable 25 Gram(s) IV Push once  enoxaparin Injectable 40 milliGRAM(s) SubCutaneous daily  furosemide    Tablet 40 milliGRAM(s) Oral daily  glucagon  Injectable 1 milliGRAM(s) IntraMuscular once  hydrALAZINE 100 milliGRAM(s) Oral three times a day  insulin lispro (ADMELOG) corrective regimen sliding scale   SubCutaneous three times a day before meals  insulin lispro (ADMELOG) corrective regimen sliding scale   SubCutaneous at bedtime  lactobacillus acidophilus 1 Tablet(s) Oral three times a day  levothyroxine 150 MICROGram(s) Oral daily  losartan 100 milliGRAM(s) Oral daily  metFORMIN 500 milliGRAM(s) Oral two times a day  phenazopyridine 100 milliGRAM(s) Oral every 8 hours  potassium chloride    Tablet ER 20 milliEquivalent(s) Oral daily    MEDICATIONS  (PRN):  acetaminophen   Tablet .. 650 milliGRAM(s) Oral every 6 hours PRN Temp greater or equal to 38C (100.4F), Mild Pain (1 - 3)  ALBUTerol    90 MICROgram(s) HFA Inhaler 2 Puff(s) Inhalation every 6 hours PRN Shortness of Breath and/or Wheezing  morphine  - Injectable 2 milliGRAM(s) IV Push every 6 hours PRN Severe Pain (7 - 10)  traMADol 50 milliGRAM(s) Oral every 6 hours PRN Moderate Pain (4 - 6)        Objective:  Vital Signs Last 24 Hrs  T(C): 36.9 (12 Oct 2021 16:25), Max: 37.1 (11 Oct 2021 22:14)  T(F): 98.5 (12 Oct 2021 16:25), Max: 98.7 (11 Oct 2021 22:14)  HR: 95 (12 Oct 2021 16:25) (76 - 154)  BP: 159/83 (12 Oct 2021 16:25) (99/61 - 161/78)  RR: 16 (12 Oct 2021 16:25) (16 - 18)  SpO2: 93% (12 Oct 2021 16:25) (93% - 97%)    Physical Exam:  General: no acute distress  Neck: supple, trachea midline  Lungs: Decreased, no wheeze/rhonchi  Cardiovascular: regular rate and rhythm, S1 S2  Abdomen: soft, nontender,  bowel sounds normal  Neurological: alert and oriented x2  Skin: no rash  Extremities: + edema      LABS:                        10.1   8.63  )-----------( 293      ( 11 Oct 2021 07:31 )             33.1   10-11    144  |  106  |  18  ----------------------------<  136<H>  3.3<L>   |  32<H>  |  0.96    Ca    8.5      11 Oct 2021 07:31    TPro  6.0  /  Alb  2.6<L>  /  TBili  0.4  /  DBili  x   /  AST  32  /  ALT  35  /  AlkPhos  58  10-11      Urinalysis Basic - ( 07 Oct 2021 02:14 )    Color: Yellow / Appearance: Clear / S.010 / pH: x  Gluc: x / Ketone: Negative  / Bili: Negative / Urobili: Negative mg/dL   Blood: x / Protein: Negative mg/dL / Nitrite: Positive   Leuk Esterase: Negative / RBC: x / WBC 0-2   Sq Epi: x / Non Sq Epi: Occasional / Bacteria: Moderate      MICROBIOLOGY:  Culture - Blood (10.08.21 @ 13:26)    Specimen Source: .Blood Blood-Peripheral    Culture Results:   No growth to date.    Culture - Blood (10.07.21 @ 14:17)    -  Amikacin: S <=16    Gram Stain:   Growth in aerobic bottle: Gram Negative Rods  Growth in anaerobic bottle: Gram Negative Rods    -  Ertapenem: S <=0.5    -  Ceftriaxone: S <=1 Enterobacter, Citrobacter, and Serratia may develop resistance during prolonged therapy    -  Ciprofloxacin: R >2    -  Cefazolin: S <=2 Enterobacter, Citrobacter, and Serratia may develop resistance during prolonged therapy (3-4 days)    -  Cefepime: S <=2    -  Cefoxitin: S <=8    -  Aztreonam: S <=4    -  Ampicillin/Sulbactam: S <=4/2 Enterobacter, Citrobacter, and Serratia may develop resistance during prolonged therapy (3-4 days)    -  Ampicillin: S <=8 These ampicillin results predict results for amoxicillin    -  Trimethoprim/Sulfamethoxazole: S <=0.5/9.5    -  Tobramycin: S <=2    -  Piperacillin/Tazobactam: S <=8    -  Meropenem: S <=1    -  Levofloxacin: R >4    -  Imipenem: S <=1    -  Gentamicin: S <=2    -  Escherichia coli: Detec    Specimen Source: .Blood Blood-Peripheral    Organism: Blood Culture PCR    Organism: Escherichia coli    Culture Results:   Growth in aerobic and anaerobic bottles: Escherichia coli  ***Blood Panel PCR results on this specimen are available  approximately 3 hours after the Gram stain result.***  Gram stain, PCR, and/or culture results may not always  correspond due to difference in methodologies.  ************************************************************  This PCR assay was performed by multiplex PCR. This  Assay tests for 66 bacterial and resistance gene targets.  Please refer to the [facility] test directory  at [email or web address] for details.    Organism Identification: Blood Culture PCR  Escherichia coli    Method Type: ARELI    Method Type: PCR    Culture - Urine (10.07.21 @ 14:32)    -  Trimethoprim/Sulfamethoxazole: S <=0.5/9.5    -  Tigecycline: S <=2    -  Tobramycin: S <=2    -  Gentamicin: S <=2    -  Imipenem: S <=1    -  Levofloxacin: R >4    -  Meropenem: S <=1    -  Nitrofurantoin: S <=32 Should not be used to treat pyelonephritis    -  Piperacillin/Tazobactam: S <=8    -  Amikacin: S <=16    -  Amoxicillin/Clavulanic Acid: S <=8/4    -  Ampicillin: S <=8 These ampicillin results predict results for amoxicillin    -  Ampicillin/Sulbactam: S <=4/2 Enterobacter, Citrobacter, and Serratia may develop resistance during prolonged therapy (3-4 days)    -  Aztreonam: S <=4    -  Cefazolin: S <=2 (MIC_CL_COM_ENTERIC_CEFAZU) For uncomplicated UTI with K. pneumoniae, E. coli, or P. mirablis: ARELI <=16 is sensitive and ARELI >=32 is resistant. This also predicts results for oral agents cefaclor, cefdinir, cefpodoxime, cefprozil, cefuroxime axetil, cephalexin and locarbef for uncomplicated UTI. Note that some isolates may be susceptible to these agents while testing resistant to cefazolin.    -  Cefepime: S <=2    -  Cefoxitin: S <=8    -  Ceftriaxone: S <=1 Enterobacter, Citrobacter, and Serratia may develop resistance during prolonged therapy    -  Ciprofloxacin: R >2    -  Ertapenem: S <=0.5    Specimen Source: Clean Catch Clean Catch (Midstream)    Culture Results:   >100,000 CFU/ml Escherichia coli    Organism Identification: Escherichia coli    Organism: Escherichia coli    Method Type: ARELI      RADIOLOGY & ADDITIONAL STUDIES:    EXAM:  CT ABDOMEN AND PELVIS IC                          EXAM:  CT ANGIO CHEST PULM ART [facility]                                  PROCEDURE DATE:  10/06/2021          INTERPRETATION:  CLINICAL INFORMATION: Abdominal pain. Bilateral leg edema.    COMPARISON: None.    CONTRAST/COMPLICATIONS:  IV Contrast: Omnipaque 350 (accession [id number]), IV contrast documented in associated exam (accession [id number])  90 cc administered   10 cc discarded  Oral Contrast: NONE  Complications: None reported at time of study completion    PROCEDURE:  CT Angiography of the Chest was performed followed by portal venous phase imaging of the Abdomen and Pelvis.  Sagittal and coronal reformats were performed as well as 3D (MIP) reconstructions.    FINDINGS:  CHEST:  LUNGS AND LARGE AIRWAYS: Patent central airways. Elevated right diaphragm. Subsegmental atelectasis in the right upper lobe and bilateral lung bases. Subsegmental left lower lobe atelectasis versus pneumonia.  PLEURA: No pleural effusion.  VESSELS: No pulmonary embolus up to and including the segmental branches. Subsegmental branches are not well evaluated due to respiratory motion artifact. Atherosclerotic calcifications of the aorta and coronary arteries.  HEART: Heart size is normal. No pericardial effusion.  MEDIASTINUM AND FRANCISCA: No lymphadenopathy.  CHEST WALL AND LOWER NECK: Diffuse right breast skin thickening and prominent soft tissue in the retroareolar region, again noted.    ABDOMEN AND PELVIS:  LIVER: Within normal limits.  BILE DUCTS: Normal caliber.  GALLBLADDER: Within normal limits.  SPLEEN: Within normal limits.  PANCREAS: Within normal limits.  ADRENALS: Left adrenal adenoma measures 3.2 x 2.5 cm, unchanged.  KIDNEYS/URETERS: Bilateral renal cysts. No hydronephrosis..    BLADDER: Within normal limits.  REPRODUCTIVE ORGANS: Fibroid uterus.    BOWEL: No bowel obstruction. Appendix is normal.  PERITONEUM: No ascites.  VESSELS: Within normal limits.  RETROPERITONEUM/LYMPH NODES: No lymphadenopathy.  ABDOMINAL WALL: Within normal limits.  BONES: Degenerative changes.    IMPRESSION:  No pulmonary embolus up to and including segmental branches. Stable subsegmental left lower lobe atelectasis versus pneumonia.    No acute abdominal pathology.      Assessment :  65F with PMH Type 2 DM, HTN,hypothyroid, cognitive impairment,recurrent falls,recurrent uti, right breast malignancy on chemo sent from [place] abd pain and incontinence.   Found to have AUR -Had >1000cc urine on bladder scan - straight cathed. Repeat bladder scan <100cc  Ecoli sepsis sec AUR/Pyelo  Doubt pneumonia likely atelectasis  10/8-Repeat blood cultures-NGTD  Nascimento sensitive E coli in blood and E coli in urine.  Overall stable    Plan :   Off Rocephin  Keflex 500mg PO QID with last day 10/20  Trend temps and cbc  Asp precautions  Dc planning    D/w Dr Garcia     Continue with present regiment.  Appropriate use of antibiotics and adverse effects reviewed.        > 35 minutes were spent in direct patient care reviewing notes, medications ,labs data/ imaging , discussion with multidisciplinary team.    Thank you for allowing me to participate in care of your patient .    Sunni Espinoza MD  Infectious Disease  [phone]

## 2021-10-12 NOTE — DISCHARGE NOTE PROVIDER - NSFTFHOMEHTHYNRD_GEN_ALL_CORE
Yes
I will STOP taking the medications listed below when I get home from the hospital:    amLODIPine 10 mg oral tablet  -- 1 tab(s) by mouth once a day    valsartan 160 mg oral tablet  -- 1 tab(s) by mouth once a day    nebivolol 5 mg oral tablet  -- 1 tab(s) by mouth once a day

## 2021-10-12 NOTE — PROGRESS NOTE ADULT - SUBJECTIVE AND OBJECTIVE BOX
Patient is a 65y old  Female who presents with a chief complaint of abd pain (12 Oct 2021 08:40)      INTERVAL HPI/OVERNIGHT EVENTS:overnight events noted    Home Medications:  Acidophilus oral capsule: 1 cap(s) orally 2 times a day (06 Oct 2021 21:25)  anastrozole 1 mg oral tablet: 1 tab(s) orally once a day (06 Oct 2021 21:25)  aspirin 81 mg oral tablet: 1 tab(s) orally once a day (06 Oct 2021 21:25)  atorvastatin 20 mg oral tablet: 1 tab(s) orally once a day (06 Oct 2021 21:25)  carvedilol 25 mg oral tablet: 1 tab(s) orally 2 times a day (06 Oct 2021 21:25)  cloNIDine 0.2 mg oral tablet: orally 3 times a day (06 Oct 2021 21:25)  furosemide 40 mg oral tablet: 1 tab(s) orally once a day (06 Oct 2021 21:25)  hydrALAZINE 100 mg oral tablet: orally 3 times a day (06 Oct 2021 21:25)  levothyroxine 150 mcg (0.15 mg) oral tablet: 1 tab(s) orally once a day (06 Oct 2021 21:25)  losartan 100 mg oral tablet: 1 tab(s) orally once a day (06 Oct 2021 21:25)  metFORMIN 500 mg oral tablet, extended release: 1 tab(s) orally once a day (06 Oct 2021 21:25)  potassium chloride 20 mEq oral tablet, extended release: orally 2 times a day (06 Oct 2021 21:25)  senna 8.6 mg oral tablet: 2 tab(s) orally once a day (at bedtime) (06 Oct 2021 21:25)      MEDICATIONS  (STANDING):  amLODIPine   Tablet 10 milliGRAM(s) Oral daily  anastrozole 1 milliGRAM(s) Oral daily  aspirin enteric coated 81 milliGRAM(s) Oral daily  atorvastatin 20 milliGRAM(s) Oral at bedtime  carvedilol 25 milliGRAM(s) Oral every 12 hours  cephalexin 500 milliGRAM(s) Oral four times a day  cloNIDine 0.2 milliGRAM(s) Oral three times a day  cyanocobalamin Injectable 1000 MICROGram(s) IntraMuscular daily  dextrose 40% Gel 15 Gram(s) Oral once  dextrose 5%. 1000 milliLiter(s) (50 mL/Hr) IV Continuous <Continuous>  dextrose 5%. 1000 milliLiter(s) (100 mL/Hr) IV Continuous <Continuous>  dextrose 50% Injectable 12.5 Gram(s) IV Push once  dextrose 50% Injectable 25 Gram(s) IV Push once  dextrose 50% Injectable 25 Gram(s) IV Push once  enoxaparin Injectable 40 milliGRAM(s) SubCutaneous daily  furosemide    Tablet 40 milliGRAM(s) Oral daily  glucagon  Injectable 1 milliGRAM(s) IntraMuscular once  hydrALAZINE 100 milliGRAM(s) Oral three times a day  insulin lispro (ADMELOG) corrective regimen sliding scale   SubCutaneous three times a day before meals  insulin lispro (ADMELOG) corrective regimen sliding scale   SubCutaneous at bedtime  lactobacillus acidophilus 1 Tablet(s) Oral three times a day  levothyroxine 150 MICROGram(s) Oral daily  losartan 100 milliGRAM(s) Oral daily  metFORMIN 500 milliGRAM(s) Oral two times a day  phenazopyridine 100 milliGRAM(s) Oral every 8 hours  potassium chloride    Tablet ER 20 milliEquivalent(s) Oral daily    MEDICATIONS  (PRN):  acetaminophen   Tablet .. 650 milliGRAM(s) Oral every 6 hours PRN Temp greater or equal to 38C (100.4F), Mild Pain (1 - 3)  ALBUTerol    90 MICROgram(s) HFA Inhaler 2 Puff(s) Inhalation every 6 hours PRN Shortness of Breath and/or Wheezing  morphine  - Injectable 2 milliGRAM(s) IV Push every 6 hours PRN Severe Pain (7 - 10)  traMADol 50 milliGRAM(s) Oral every 6 hours PRN Moderate Pain (4 - 6)      Allergies    No Known Allergies    Intolerances        REVIEW OF SYSTEMS:  CONSTITUTIONAL: No fever, weight loss, or fatigue  EYES: No eye pain, visual disturbances, or discharge  ENMT:  No difficulty hearing, tinnitus, vertigo; No sinus or throat pain  NECK: No pain or stiffness  BREASTS: No pain, masses, or nipple discharge  RESPIRATORY: No cough, wheezing, chills or hemoptysis; No shortness of breath  CARDIOVASCULAR: No chest pain, palpitations, dizziness, or leg swelling  GASTROINTESTINAL: No abdominal or epigastric pain. No nausea, vomiting, or hematemesis; No diarrhea or constipation. No melena or hematochezia.  GENITOURINARY: No dysuria, frequency, hematuria, or incontinence  NEUROLOGICAL: No headaches, memory loss, loss of strength, numbness, or tremors  SKIN: No itching, burning, rashes, or lesions   LYMPH NODES: No enlarged glands  ENDOCRINE: No heat or cold intolerance; No hair loss  MUSCULOSKELETAL: No joint pain or swelling; No muscle, back, or extremity pain  PSYCHIATRIC: No depression, anxiety, mood swings, or difficulty sleeping  HEME/LYMPH: No easy bruising, or bleeding gums  ALLERGY AND IMMUNOLOGIC: No hives or eczema    Vital Signs Last 24 Hrs  T(C): 36.6 (12 Oct 2021 09:43), Max: 37.1 (11 Oct 2021 22:14)  T(F): 97.9 (12 Oct 2021 09:43), Max: 98.7 (11 Oct 2021 22:14)  HR: 77 (12 Oct 2021 09:43) (76 - 154)  BP: 99/61 (12 Oct 2021 09:43) (99/61 - 161/78)  BP(mean): --  RR: 18 (12 Oct 2021 09:43) (18 - 18)  SpO2: 95% (12 Oct 2021 09:43) (93% - 97%)    PHYSICAL EXAM:  GENERAL: NAD, well-groomed, well-developed  HEAD:  Atraumatic, Normocephalic  EYES: EOMI, PERRLA, conjunctiva and sclera clear  ENMT: Moist mucous membranes,   NECK: Supple, No JVD, Normal thyroid  NERVOUS SYSTEM:  Alert & Oriented X2, non focal  CHEST/LUNG: Clear to percussion bilaterally; No rales, rhonchi, wheezing, or rubs  HEART: Regular rate and rhythm; No murmurs, rubs, or gallops  ABDOMEN: Soft, Nontender, Nondistended; Bowel sounds present  EXTREMITIES:  2+ Peripheral Pulses, No clubbing, cyanosis, or edema  LYMPH: No lymphadenopathy noted  SKIN: No rashes or lesions    LABS:                        10.1   8.63  )-----------( 293      ( 11 Oct 2021 07:31 )             33.1     10-11    144  |  106  |  18  ----------------------------<  136<H>  3.3<L>   |  32<H>  |  0.96    Ca    8.5      11 Oct 2021 07:31    TPro  6.0  /  Alb  2.6<L>  /  TBili  0.4  /  DBili  x   /  AST  32  /  ALT  35  /  AlkPhos  58  10-11        CAPILLARY BLOOD GLUCOSE      POCT Blood Glucose.: 147 mg/dL (12 Oct 2021 07:38)  POCT Blood Glucose.: 115 mg/dL (11 Oct 2021 21:47)  POCT Blood Glucose.: 125 mg/dL (11 Oct 2021 16:50)  POCT Blood Glucose.: 123 mg/dL (11 Oct 2021 12:19)        Culture - Blood (collected 10-08-21 @ 13:26)  Source: .Blood Blood-Peripheral  Preliminary Report (10-09-21 @ 14:02):    No growth to date.    Culture - Blood (collected 10-08-21 @ 13:26)  Source: .Blood Blood-Peripheral  Preliminary Report (10-09-21 @ 14:02):    No growth to date.    Culture - Urine (collected 10-07-21 @ 14:32)  Source: Clean Catch Clean Catch (Midstream)  Final Report (10-10-21 @ 10:53):    >100,000 CFU/ml Escherichia coli  Organism: Escherichia coli (10-10-21 @ 10:53)  Organism: Escherichia coli (10-10-21 @ 10:53)      -  Amikacin: S <=16      -  Amoxicillin/Clavulanic Acid: S <=8/4      -  Ampicillin: S <=8 These ampicillin results predict results for amoxicillin      -  Ampicillin/Sulbactam: S <=4/2 Enterobacter, Citrobacter, and Serratia may develop resistance during prolonged therapy (3-4 days)      -  Aztreonam: S <=4      -  Cefazolin: S <=2 (MIC_CL_COM_ENTERIC_CEFAZU) For uncomplicated UTI with K. pneumoniae, E. coli, or P. mirablis: ARELI <=16 is sensitive and ARELI >=32 is resistant. This also predicts results for oral agents cefaclor, cefdinir, cefpodoxime, cefprozil, cefuroxime axetil, cephalexin and locarbef for uncomplicated UTI. Note that some isolates may be susceptible to these agents while testing resistant to cefazolin.      -  Cefepime: S <=2      -  Cefoxitin: S <=8      -  Ceftriaxone: S <=1 Enterobacter, Citrobacter, and Serratia may develop resistance during prolonged therapy      -  Ciprofloxacin: R >2      -  Ertapenem: S <=0.5      -  Gentamicin: S <=2      -  Imipenem: S <=1      -  Levofloxacin: R >4      -  Meropenem: S <=1      -  Nitrofurantoin: S <=32 Should not be used to treat pyelonephritis      -  Piperacillin/Tazobactam: S <=8      -  Tigecycline: S <=2      -  Tobramycin: S <=2      -  Trimethoprim/Sulfamethoxazole: S <=0.5/9.5      Method Type: ARELI    Culture - Blood (collected 10-07-21 @ 14:17)  Source: .Blood Blood-Peripheral  Gram Stain (10-08-21 @ 01:27):    Growth in aerobic bottle: Gram Negative Rods    Growth in anaerobic bottle: Gram Negative Rods  Final Report (10-09-21 @ 16:23):    Growth in aerobic and anaerobic bottles: Escherichia coli    ***Blood Panel PCR results on this specimen are available    approximately 3 hours after the Gram stain result.***    Gram stain, PCR, and/or culture results may not always    correspond due to difference in methodologies.    ************************************************************    This PCR assay was performed by multiplex PCR. This    Assay tests for 66 bacterial and resistance gene targets.    Please refer to the [facility] test directory    at [email or web address] for details.  Organism: Blood Culture PCR  Escherichia coli (10-09-21 @ 16:23)  Organism: Escherichia coli (10-09-21 @ 16:23)      -  Amikacin: S <=16      -  Ampicillin: S <=8 These ampicillin results predict results for amoxicillin      -  Ampicillin/Sulbactam: S <=4/2 Enterobacter, Citrobacter, and Serratia may develop resistance during prolonged therapy (3-4 days)      -  Aztreonam: S <=4      -  Cefazolin: S <=2 Enterobacter, Citrobacter, and Serratia may develop resistance during prolonged therapy (3-4 days)      -  Cefepime: S <=2      -  Cefoxitin: S <=8      -  Ceftriaxone: S <=1 Enterobacter, Citrobacter, and Serratia may develop resistance during prolonged therapy      -  Ciprofloxacin: R >2      -  Ertapenem: S <=0.5      -  Gentamicin: S <=2      -  Imipenem: S <=1      -  Levofloxacin: R >4      -  Meropenem: S <=1      -  Piperacillin/Tazobactam: S <=8      -  Tobramycin: S <=2      -  Trimethoprim/Sulfamethoxazole: S <=0.5/9.5      Method Type: ARELI  Organism: Blood Culture PCR (10-09-21 @ 16:23)      -  Escherichia coli: Detec      Method Type: PCR    Culture - Blood (collected 10-07-21 @ 14:17)  Source: .Blood Blood-Peripheral  Gram Stain (10-09-21 @ 05:33):    Growth in aerobic bottle: Gram Negative Rods  Final Report (10-10-21 @ 16:22):    Growth in aerobic bottle: Escherichia coli    See previous culture [id number]        I&O's Summary    11 Oct 2021 07:01  -  12 Oct 2021 07:00  --------------------------------------------------------  IN: 300 mL / OUT: 1550 mL / NET: -1250 mL        RADIOLOGY & ADDITIONAL TESTS:    Imaging Personally Reviewed:  [ x] YES  [ ] NO    Consultant(s) Notes Reviewed:  [x ] YES  [ ] NO    Care Discussed with Consultants/Other Providers [x ] YES  [ ] NO

## 2021-10-12 NOTE — DISCHARGE NOTE PROVIDER - NSDCCPCAREPLAN_GEN_ALL_CORE_FT
PRINCIPAL DISCHARGE DIAGNOSIS  Diagnosis: Acute on chronic systolic congestive heart failure  Assessment and Plan of Treatment: improved cont lasix      SECONDARY DISCHARGE DIAGNOSES  Diagnosis: Bacterial UTI  Assessment and Plan of Treatment: keflex till 10/20, had e coli bactremia

## 2021-10-12 NOTE — PROGRESS NOTE ADULT - SUBJECTIVE AND OBJECTIVE BOX
Chief Complaint: Abdominal pain    Interval Events: No events overnight.    Review of Systems:  General: No fevers, chills, weight loss or gain  Skin: No rashes, color changes  Cardiovascular: No chest pain, orthopnea  Respiratory: No shortness of breath, cough  Gastrointestinal: No nausea, abdominal pain  Genitourinary: No incontinence, pain with urination  Musculoskeletal: No pain, swelling, decreased range of motion  Neurological: No headache, weakness  Psychiatric: No depression, anxiety  Endocrine: No weight loss or gain, increased thirst  All other systems are comprehensively negative.    Physical Exam:  Vital Signs Last 24 Hrs  T(C): 36.9 (12 Oct 2021 05:29), Max: 37.1 (11 Oct 2021 22:14)  T(F): 98.4 (12 Oct 2021 05:29), Max: 98.7 (11 Oct 2021 22:14)  HR: 88 (12 Oct 2021 05:29) (76 - 154)  BP: 161/78 (12 Oct 2021 05:29) (122/72 - 161/78)  BP(mean): --  RR: 18 (12 Oct 2021 05:29) (18 - 18)  SpO2: 93% (12 Oct 2021 05:29) (93% - 97%)  General: NAD  HEENT: MMM  Neck: No JVD, no carotid bruit  Lungs: CTAB  CV: RRR, nl S1/S2, no M/R/G  Abdomen: S/NT/ND, +BS  Extremities: 2+ LE edema, no cyanosis  Neuro: AAOx3, non-focal  Skin: No rash    Labs:             10-11    144  |  106  |  18  ----------------------------<  136<H>  3.3<L>   |  32<H>  |  0.96    Ca    8.5      11 Oct 2021 07:31    TPro  6.0  /  Alb  2.6<L>  /  TBili  0.4  /  DBili  x   /  AST  32  /  ALT  35  /  AlkPhos  58  10-11                        10.1   8.63  )-----------( 293      ( 11 Oct 2021 07:31 )             33.1     Telemetry: Sinus rhythm, aberrantly conducted beats

## 2021-10-12 NOTE — PROGRESS NOTE ADULT - ASSESSMENT
The patient is a 65 year old female with a history of HTN, DM, hypothyroidism, cognitive impairment, right breast cancer, HCM, chronic diastolic heart failure who presents with abdominal pain, UTI.    Plan:  - ECG with LVH related changes  - Echo last admission with hyperdynamic LV systolic function, HOCM  - Continue carvedilol 25 mg bid  - Continue clonidine 0.2 mg tid  - Continue hydralazine 100 mg tid  - Continue losartan 100 mg daily  - Continue aspirin 81 mg daily  - Continue atorvastatin 20 mg daily  - Continue furosemide 40 mg PO daily  - On keflex  - Discontinue telemetry  - Discharge planning

## 2021-10-12 NOTE — PROGRESS NOTE ADULT - REASON FOR ADMISSION
abd pain

## 2021-10-12 NOTE — PROGRESS NOTE ADULT - PROBLEM SELECTOR PROBLEM 3
Breast cancer
Acute on chronic diastolic congestive heart failure

## 2021-11-18 NOTE — ED CLERICAL - CLERICAL COMMENTS
called [facility] ems for transport back to [facility].  [facility] will  patient between 1945 and 2015.

## 2021-11-18 NOTE — ED PROVIDER NOTE - PATIENT PORTAL LINK FT
You can access the FollowMyHealth Patient Portal offered by [facility] by registering at the following website: [email or web address]. By joining [facility]’s FollowMyHealth portal, you will also be able to view your health information using other applications (apps) compatible with our system.

## 2021-11-18 NOTE — ED PROVIDER NOTE - CLINICAL SUMMARY MEDICAL DECISION MAKING FREE TEXT BOX
64 yo female with h/o anxiety, CAD, DM, HTN, HLD, hypothyroidism BIBA from [address] s/p mechanical fall. 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 ?head trauma. Patient c/o neck pain x 1 month and right knee pain s/p fall. + ASA, no additional AC. Denies fever, chills, chest pain, sob, abd pain, N/V, UE/LE weakness or paresthesias, dizziness, visual changes. no abrasions or lacerations. PE: as above. A/P: CT head/neck, Xray right knee, tylenol, reassess.

## 2021-11-18 NOTE — ED PROVIDER NOTE - OBJECTIVE STATEMENT
64 yo female with h/o anxiety, CAD, DM, HTN, HLD, hypothyroidism BIBA from [facility] s/p mechanical fall. 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 ?head trauma. Patient c/o neck pain x 1 month and right knee pain s/p fall. + ASA, no additional AC. Denies fever, chills, chest pain, sob, abd pain, N/V, UE/LE weakness or paresthesias, dizziness, visual changes. no abrasions or lacerations. Patient DNR/DNI.     pmd: Dr. Gonzalez at [facility]

## 2021-11-18 NOTE — ED PROVIDER NOTE - CARE PROVIDER_API CALL
Antonio Ragsdale (DO)  Orthopaedic Surgery  [address] [place] [address]  Phone: [phone]  Fax: [phone]  Follow Up Time: 1-3 Days

## 2021-11-18 NOTE — ED PROVIDER NOTE - MUSCULOSKELETAL MINIMAL EXAM
+ TTP right anterior medial knee, + bilateral cervical paraspinal tenderness, no midline cervical tenderness with FROM of neck. no chest wall tenderness. no pelvic tenderness. FROM of all extremities. dp pulses equal and intact bilaterally.

## 2021-11-18 NOTE — ED ADULT NURSE NOTE - INTERVENTIONS DEFINITIONS
Alert-The patient is alert, awake and responds to voice. The patient is oriented to time, place, and person. The triage nurse is able to obtain subjective information.
no
[place] to call system/Call bell, personal items and telephone within reach/Instruct patient to call for assistance/Room bathroom lighting operational/Non-slip footwear when patient is off stretcher/Physically safe environment: no spills, clutter or unnecessary equipment/Stretcher in lowest position, wheels locked, appropriate side rails in place/Provide visual cue, wrist band, yellow gown, etc./Monitor gait and stability/Monitor for mental status changes and reorient to person, place, and time/Review medications for side effects contributing to fall risk/Reinforce activity limits and safety measures with patient and family/Provide visual clues: red socks

## 2021-11-18 NOTE — ED ADULT NURSE NOTE - NS ED NURSE DC INFO COMPLEXITY
Continue Regimen: - \\n- ketoconazole cream: apply to affected areas under the breasts BID. Initiate Treatment: - \\n- Recommend patient to apply an OTC Absorbent powder under the breasts daily. Render In Strict Bullet Format?: No Detail Level: Zone Continue Regimen: - \\n- clobetasol scalp solution: apply twice daily to affected areas of the scalp as needed for flares. Patient understands to use for no more than 2 weeks on and 1 week off and to avoid application to the face/underarms/groin. (3 refills sent today outside of note) \\n- ciclopirox shampoo: wash the scalp and alternate with OTC Neutrogena T-Gel shampoo. Lather and let sit several minutes before rinsing. (6 refills sent today outside of note) \\n- triamcinolone .025% topical cream: apply to affected areas of the face/ears BID, two weeks on and one week off. Repeat PRN flares. (2 refills sent today) Initiate Treatment: - \\n- hydroxyzine 10mg: take 1-2 tablets QHS PRN itching. Simple: Patient demonstrates quick and easy understanding Continue Regimen: - \\n- triamcinolone 0.1% topical cream: apply to affected areas of the arms BID, two weeks on and one week off. Repeat PRN flares. Avoid face, underarms and groin. (2 refills sent today) \\n- triamcinolone .025% topical cream: apply to affected areas of the face BID, two weeks on and one week off. Repeat PRN flares. (2 refills sent today) \\n- gentle skin care only: CeraVe hydrating cleanser and moisturizing cream throughout the day. Initiate Treatment: - \\n- mupirocin topical ointment: apply to affected area of the left dorsal hand BID PRN until healed.

## 2021-11-18 NOTE — ED PROVIDER NOTE - SKIN, MLM
Skin normal color for race, warm, dry and intact. No evidence of rash. no abrasions or lacerations. no ecchymosis

## 2021-11-18 NOTE — ED ADULT NURSE NOTE - OBJECTIVE STATEMENT
Sent for evaluation s/p mechanical fall, c/o right knee pain. Patient also c/o neck pain but states neck pain is chronic and has been present for several years. Denies LOC.

## 2021-11-18 NOTE — ED PROVIDER NOTE - NSFOLLOWUPINSTRUCTIONS_ED_ALL_ED_FT
Knee Pain    Knee pain is a very common symptom and can have many causes. Knee pain often goes away when you follow your health care provider's instructions for relieving pain and discomfort at home. However, knee pain can develop into a condition that needs treatment. Some conditions may include:     Arthritis caused by wear and tear (osteoarthritis).  Arthritis caused by swelling and irritation (rheumatoid arthritis or gout).  A cyst or growth in your knee.  An infection in your knee joint.  An injury that will not heal.  Damage, swelling, or irritation of the tissues that support your knee (torn ligaments or tendinitis).    If your knee pain continues, additional tests may be ordered to diagnose your condition. Tests may include X-rays or other imaging studies of your knee. You may also need to have fluid removed from your knee. Treatment for ongoing knee pain depends on the cause, but treatment may include:    Medicines to relieve pain or swelling.  Steroid injections in your knee.  Physical therapy.  Surgery.    HOME CARE INSTRUCTIONS  Take medicines only as directed by your health care provider.   Rest your knee and keep it raised (elevated) while you are resting.  Do not do things that cause or worsen pain.  Avoid high-impact activities or exercises, such as running, jumping rope, or doing jumping jacks.  Apply ice to the knee area:  Put ice in a plastic bag.  Place a towel between your skin and the bag.  Leave the ice on for 20 minutes, 2–3 times a day.  Ask your health care provider if you should wear an elastic knee support.  Keep a pillow under your knee when you sleep.  Lose weight if you are overweight. Extra weight can put pressure on your knee.  Do not use any tobacco products, including cigarettes, chewing tobacco, or electronic cigarettes. If you need help quitting, ask your health care provider. Smoking may slow the healing of any bone and joint problems that you may have.    SEEK MEDICAL CARE IF:  Your knee pain continues, changes, or gets worse.  You have a fever along with knee pain.  Your knee lynne or locks up.  Your knee becomes more swollen.    SEEK IMMEDIATE MEDICAL CARE IF:  Your knee joint feels hot to the touch.  You have chest pain or trouble breathing.      Cervical Sprain    A cervical sprain is when the tissues (ligaments) that hold the neck bones in place stretch or tear.     HOME CARE  Put ice on the injured area.  Put ice in a plastic bag.  Place a towel between your skin and the bag.  Leave the ice on for 15–20 minutes, 3–4 times a day.   You may have been given a collar to wear. This collar keeps your neck from moving while you heal.  Do not take the collar off unless told by your doctor.  If you have long hair, keep it outside of the collar.   Ask your doctor before changing the position of your collar. You may need to change its position over time to make it more comfortable.   If you are allowed to take off the collar for cleaning or bathing, follow your doctor's instructions on how to do it safely.  Keep your collar clean by wiping it with mild soap and water. Dry it completely. If the collar has removable pads, remove them every 1–2 days to hand wash them with soap and water. Allow them to air dry. They should be dry before you wear them in the collar.  Do not drive while wearing the collar.  Only take medicine as told by your doctor.  Keep all doctor visits as told.  Keep all physical therapy visits as told.  Adjust your work station so that you have good posture while you work.  Avoid positions and activities that make your problems worse.  Warm up and stretch before being active.     GET HELP IF:  Your pain is not controlled with medicine.  You cannot take less pain medicine over time as planned.  Your activity level does not improve as expected.    GET HELP RIGHT AWAY IF:  You are bleeding.  Your stomach is upset.  You have an allergic reaction to your medicine.  You develop new problems that you cannot explain.  You lose feeling (become numb) or you cannot move any part of your body (paralysis).  You have tingling or weakness in any part of your body.  Your symptoms get worse. Symptoms include:  Pain, soreness, stiffness, puffiness (swelling), or a burning feeling in your neck.   Pain when your neck is touched.  Shoulder or upper back pain.   Limited ability to move your neck.   Headache.   Dizziness.   Your hands or arms feel week, lose feeling, or tingle.   Muscle spasms.   Difficulty swallowing or chewing.     MAKE SURE YOU:  Understand these instructions.  Will watch your condition.  Will get help right away if you are not doing well or get worse.

## 2021-11-18 NOTE — ED PROVIDER NOTE - DR. NAME
Comment: Right Superior Eyebrow \\nPath dated: 07/31/2017\\nAccession #: [id number] Detail Level: Simple Comment: S/P shoulder surgery [place]

## 2021-12-13 NOTE — ED ADULT TRIAGE NOTE - SOURCE OF INFORMATION
Bactrim Pregnancy And Lactation Text: This medication is Pregnancy Category D and is known to cause fetal risk.  It is also excreted in breast milk. Tazorac Pregnancy And Lactation Text: This medication is not safe during pregnancy. It is unknown if this medication is excreted in breast milk. Isotretinoin Counseling: Patient should get monthly blood tests, not donate blood, not drive at night if vision affected, not share medication, and not undergo elective surgery for 6 months after tx completed. Side effects reviewed, pt to contact office should one occur. Spironolactone Pregnancy And Lactation Text: This medication can cause feminization of the male fetus and should be avoided during pregnancy. The active metabolite is also found in breast milk. Isotretinoin Pregnancy And Lactation Text: This medication is Pregnancy Category X and is considered extremely dangerous during pregnancy. It is unknown if it is excreted in breast milk. Topical Retinoid counseling:  Patient advised to apply a pea-sized amount only at bedtime and wait 30 minutes after washing their face before applying.  If too drying, patient may add a non-comedogenic moisturizer. The patient verbalized understanding of the proper use and possible adverse effects of retinoids.  All of the patient's questions and concerns were addressed. Benzoyl Peroxide Counseling: Patient counseled that medicine may cause skin irritation and bleach clothing.  In the event of skin irritation, the patient was advised to reduce the amount of the drug applied or use it less frequently.   The patient verbalized understanding of the proper use and possible adverse effects of benzoyl peroxide.  All of the patient's questions and concerns were addressed. Tetracycline Pregnancy And Lactation Text: This medication is Pregnancy Category D and not consider safe during pregnancy. It is also excreted in breast milk. Doxycycline Pregnancy And Lactation Text: This medication is Pregnancy Category D and not consider safe during pregnancy. It is also excreted in breast milk but is considered safe for shorter treatment courses. Benzoyl Peroxide Pregnancy And Lactation Text: This medication is Pregnancy Category C. It is unknown if benzoyl peroxide is excreted in breast milk. Erythromycin Counseling:  I discussed with the patient the risks of erythromycin including but not limited to GI upset, allergic reaction, drug rash, diarrhea, increase in liver enzymes, and yeast infections. Detail Level: Zone Azithromycin Pregnancy And Lactation Text: This medication is considered safe during pregnancy and is also secreted in breast milk. Erythromycin Pregnancy And Lactation Text: This medication is Pregnancy Category B and is considered safe during pregnancy. It is also excreted in breast milk. High Dose Vitamin A Pregnancy And Lactation Text: High dose vitamin A therapy is contraindicated during pregnancy and breast feeding. Topical Sulfur Applications Pregnancy And Lactation Text: This medication is Pregnancy Category C and has an unknown safety profile during pregnancy. It is unknown if this topical medication is excreted in breast milk. Topical Clindamycin Counseling: Patient counseled that this medication may cause skin irritation or allergic reactions.  In the event of skin irritation, the patient was advised to reduce the amount of the drug applied or use it less frequently.   The patient verbalized understanding of the proper use and possible adverse effects of clindamycin.  All of the patient's questions and concerns were addressed. Sarecycline Counseling: Patient advised regarding possible photosensitivity and discoloration of the teeth, skin, lips, tongue and gums.  Patient instructed to avoid sunlight, if possible.  When exposed to sunlight, patients should wear protective clothing, sunglasses, and sunscreen.  The patient was instructed to call the office immediately if the following severe adverse effects occur:  hearing changes, easy bruising/bleeding, severe headache, or vision changes.  The patient verbalized understanding of the proper use and possible adverse effects of sarecycline.  All of the patient's questions and concerns were addressed. EMS Tetracycline Counseling: Patient counseled regarding possible photosensitivity and increased risk for sunburn.  Patient instructed to avoid sunlight, if possible.  When exposed to sunlight, patients should wear protective clothing, sunglasses, and sunscreen.  The patient was instructed to call the office immediately if the following severe adverse effects occur:  hearing changes, easy bruising/bleeding, severe headache, or vision changes.  The patient verbalized understanding of the proper use and possible adverse effects of tetracycline.  All of the patient's questions and concerns were addressed. Patient understands to avoid pregnancy while on therapy due to potential birth defects. Bactrim Counseling:  I discussed with the patient the risks of sulfa antibiotics including but not limited to GI upset, allergic reaction, drug rash, diarrhea, dizziness, photosensitivity, and yeast infections.  Rarely, more serious reactions can occur including but not limited to aplastic anemia, agranulocytosis, methemoglobinemia, blood dyscrasias, liver or kidney failure, lung infiltrates or desquamative/blistering drug rashes. Spironolactone Counseling: Patient advised regarding risks of diarrhea, abdominal pain, hyperkalemia, birth defects (for female patients), liver toxicity and renal toxicity. The patient may need blood work to monitor liver and kidney function and potassium levels while on therapy. The patient verbalized understanding of the proper use and possible adverse effects of spironolactone.  All of the patient's questions and concerns were addressed. Topical Clindamycin Pregnancy And Lactation Text: This medication is Pregnancy Category B and is considered safe during pregnancy. It is unknown if it is excreted in breast milk. Tazorac Counseling:  Patient advised that medication is irritating and drying.  Patient may need to apply sparingly and wash off after an hour before eventually leaving it on overnight.  The patient verbalized understanding of the proper use and possible adverse effects of tazorac.  All of the patient's questions and concerns were addressed. Doxycycline Counseling:  Patient counseled regarding possible photosensitivity and increased risk for sunburn.  Patient instructed to avoid sunlight, if possible.  When exposed to sunlight, patients should wear protective clothing, sunglasses, and sunscreen.  The patient was instructed to call the office immediately if the following severe adverse effects occur:  hearing changes, easy bruising/bleeding, severe headache, or vision changes.  The patient verbalized understanding of the proper use and possible adverse effects of doxycycline.  All of the patient's questions and concerns were addressed. Use Enhanced Medication Counseling?: No Birth Control Pills Pregnancy And Lactation Text: This medication should be avoided if pregnant and for the first 30 days post-partum. Dapsone Pregnancy And Lactation Text: This medication is Pregnancy Category C and is not considered safe during pregnancy or breast feeding. Dapsone Counseling: I discussed with the patient the risks of dapsone including but not limited to hemolytic anemia, agranulocytosis, rashes, methemoglobinemia, kidney failure, peripheral neuropathy, headaches, GI upset, and liver toxicity.  Patients who start dapsone require monitoring including baseline LFTs and weekly CBCs for the first month, then every month thereafter.  The patient verbalized understanding of the proper use and possible adverse effects of dapsone.  All of the patient's questions and concerns were addressed. Topical Retinoid Pregnancy And Lactation Text: This medication is Pregnancy Category C. It is unknown if this medication is excreted in breast milk. Topical Sulfur Applications Counseling: Topical Sulfur Counseling: Patient counseled that this medication may cause skin irritation or allergic reactions.  In the event of skin irritation, the patient was advised to reduce the amount of the drug applied or use it less frequently.   The patient verbalized understanding of the proper use and possible adverse effects of topical sulfur application.  All of the patient's questions and concerns were addressed. High Dose Vitamin A Counseling: Side effects reviewed, pt to contact office should one occur. Birth Control Pills Counseling: Birth Control Pill Counseling: I discussed with the patient the potential side effects of OCPs including but not limited to increased risk of stroke, heart attack, thrombophlebitis, deep venous thrombosis, hepatic adenomas, breast changes, GI upset, headaches, and depression.  The patient verbalized understanding of the proper use and possible adverse effects of OCPs. All of the patient's questions and concerns were addressed. Azithromycin Counseling:  I discussed with the patient the risks of azithromycin including but not limited to GI upset, allergic reaction, drug rash, diarrhea, and yeast infections. Minocycline Counseling: Patient advised regarding possible photosensitivity and discoloration of the teeth, skin, lips, tongue and gums.  Patient instructed to avoid sunlight, if possible.  When exposed to sunlight, patients should wear protective clothing, sunglasses, and sunscreen.  The patient was instructed to call the office immediately if the following severe adverse effects occur:  hearing changes, easy bruising/bleeding, severe headache, or vision changes.  The patient verbalized understanding of the proper use and possible adverse effects of minocycline.  All of the patient's questions and concerns were addressed.

## 2022-01-01 ENCOUNTER — RESULT REVIEW (OUTPATIENT)
Age: 67
End: 2022-01-01

## 2022-01-01 ENCOUNTER — TRANSCRIPTION ENCOUNTER (OUTPATIENT)
Age: 67
End: 2022-01-01

## 2022-01-01 ENCOUNTER — INPATIENT (INPATIENT)
Facility: HOSPITAL | Age: 67
LOS: 0 days | DRG: 219 | End: 2022-07-14
Attending: THORACIC SURGERY (CARDIOTHORACIC VASCULAR SURGERY) | Admitting: THORACIC SURGERY (CARDIOTHORACIC VASCULAR SURGERY)
Payer: MEDICARE

## 2022-01-01 VITALS
RESPIRATION RATE: 18 BRPM | SYSTOLIC BLOOD PRESSURE: 109 MMHG | HEIGHT: 67 IN | OXYGEN SATURATION: 94 % | WEIGHT: 179.9 LBS | DIASTOLIC BLOOD PRESSURE: 81 MMHG | TEMPERATURE: 98 F | HEART RATE: 84 BPM

## 2022-01-01 VITALS — RESPIRATION RATE: 18 BRPM | OXYGEN SATURATION: 100 % | HEART RATE: 59 BPM

## 2022-01-01 DIAGNOSIS — Z98.890 OTHER SPECIFIED POSTPROCEDURAL STATES: Chronic | ICD-10-CM

## 2022-01-01 DIAGNOSIS — E07.89 OTHER SPECIFIED DISORDERS OF THYROID: Chronic | ICD-10-CM

## 2022-01-01 DIAGNOSIS — I71.03 DISSECTION OF THORACOABDOMINAL AORTA: ICD-10-CM

## 2022-01-01 DIAGNOSIS — Z95.820 PERIPHERAL VASCULAR ANGIOPLASTY STATUS WITH IMPLANTS AND GRAFTS: Chronic | ICD-10-CM

## 2022-01-01 LAB
A1C WITH ESTIMATED AVERAGE GLUCOSE RESULT: 5.8 % — HIGH (ref 4–5.6)
ALBUMIN SERPL ELPH-MCNC: 1.8 G/DL — LOW (ref 3.3–5)
ALBUMIN SERPL ELPH-MCNC: 2.5 G/DL — LOW (ref 3.3–5)
ALBUMIN SERPL ELPH-MCNC: 2.6 G/DL — LOW (ref 3.3–5)
ALBUMIN SERPL ELPH-MCNC: 2.9 G/DL — LOW (ref 3.3–5)
ALBUMIN SERPL ELPH-MCNC: 3.4 G/DL — SIGNIFICANT CHANGE UP (ref 3.3–5)
ALP SERPL-CCNC: 38 U/L — LOW (ref 40–120)
ALP SERPL-CCNC: 44 U/L — SIGNIFICANT CHANGE UP (ref 40–120)
ALP SERPL-CCNC: 61 U/L — SIGNIFICANT CHANGE UP (ref 30–120)
ALP SERPL-CCNC: 75 U/L — SIGNIFICANT CHANGE UP (ref 40–120)
ALP SERPL-CCNC: 92 U/L — SIGNIFICANT CHANGE UP (ref 40–120)
ALT FLD-CCNC: 1039 U/L — HIGH (ref 10–45)
ALT FLD-CCNC: 1060 U/L — HIGH (ref 10–45)
ALT FLD-CCNC: 37 U/L DA — SIGNIFICANT CHANGE UP (ref 10–60)
ALT FLD-CCNC: 703 U/L — HIGH (ref 10–45)
ALT FLD-CCNC: 828 U/L — HIGH (ref 10–45)
ANION GAP SERPL CALC-SCNC: 11 MMOL/L — SIGNIFICANT CHANGE UP (ref 5–17)
ANION GAP SERPL CALC-SCNC: 20 MMOL/L — HIGH (ref 5–17)
ANION GAP SERPL CALC-SCNC: 24 MMOL/L — HIGH (ref 5–17)
ANION GAP SERPL CALC-SCNC: 24 MMOL/L — HIGH (ref 5–17)
ANION GAP SERPL CALC-SCNC: 26 MMOL/L — HIGH (ref 5–17)
ANISOCYTOSIS BLD QL: SLIGHT — SIGNIFICANT CHANGE UP
APPEARANCE UR: CLEAR — SIGNIFICANT CHANGE UP
APTT BLD: 22.8 SEC — LOW (ref 27.5–35.5)
APTT BLD: 35.6 SEC — HIGH (ref 27.5–35.5)
APTT BLD: 36.1 SEC — HIGH (ref 27.5–35.5)
APTT BLD: 37.1 SEC — HIGH (ref 27.5–35.5)
APTT BLD: 47.2 SEC — HIGH (ref 27.5–35.5)
AST SERPL-CCNC: 1028 U/L — HIGH (ref 10–40)
AST SERPL-CCNC: 1384 U/L — HIGH (ref 10–40)
AST SERPL-CCNC: 1389 U/L — HIGH (ref 10–40)
AST SERPL-CCNC: 31 U/L — SIGNIFICANT CHANGE UP (ref 10–40)
AST SERPL-CCNC: 908 U/L — HIGH (ref 10–40)
BACTERIA # UR AUTO: ABNORMAL
BASE EXCESS BLDV CALC-SCNC: -19 MMOL/L — LOW (ref -2–2)
BASE EXCESS BLDV CALC-SCNC: -3.8 MMOL/L — LOW (ref -2–2)
BASE EXCESS BLDV CALC-SCNC: -7.1 MMOL/L — LOW (ref -2–2)
BASOPHILS # BLD AUTO: 0 K/UL — SIGNIFICANT CHANGE UP (ref 0–0.2)
BASOPHILS # BLD AUTO: 0 K/UL — SIGNIFICANT CHANGE UP (ref 0–0.2)
BASOPHILS # BLD AUTO: 0.15 K/UL — SIGNIFICANT CHANGE UP (ref 0–0.2)
BASOPHILS NFR BLD AUTO: 0 % — SIGNIFICANT CHANGE UP (ref 0–2)
BASOPHILS NFR BLD AUTO: 0 % — SIGNIFICANT CHANGE UP (ref 0–2)
BASOPHILS NFR BLD AUTO: 0.8 % — SIGNIFICANT CHANGE UP (ref 0–2)
BILIRUB SERPL-MCNC: 0.7 MG/DL — SIGNIFICANT CHANGE UP (ref 0.2–1.2)
BILIRUB SERPL-MCNC: 0.9 MG/DL — SIGNIFICANT CHANGE UP (ref 0.2–1.2)
BILIRUB SERPL-MCNC: 1.2 MG/DL — SIGNIFICANT CHANGE UP (ref 0.2–1.2)
BILIRUB SERPL-MCNC: 1.3 MG/DL — HIGH (ref 0.2–1.2)
BILIRUB SERPL-MCNC: 1.4 MG/DL — HIGH (ref 0.2–1.2)
BILIRUB UR-MCNC: ABNORMAL
BLOOD GAS VENOUS - CREATININE: SIGNIFICANT CHANGE UP MG/DL (ref 0.5–1.3)
BUN SERPL-MCNC: 20 MG/DL — SIGNIFICANT CHANGE UP (ref 7–23)
BUN SERPL-MCNC: 21 MG/DL — SIGNIFICANT CHANGE UP (ref 7–23)
BUN SERPL-MCNC: 25 MG/DL — HIGH (ref 7–23)
BUN SERPL-MCNC: 32 MG/DL — HIGH (ref 7–23)
BUN SERPL-MCNC: 35 MG/DL — HIGH (ref 7–23)
CA-I SERPL-SCNC: 0.81 MMOL/L — LOW (ref 1.15–1.33)
CA-I SERPL-SCNC: 1.1 MMOL/L — LOW (ref 1.15–1.33)
CALCIUM SERPL-MCNC: 8.3 MG/DL — LOW (ref 8.4–10.5)
CALCIUM SERPL-MCNC: 8.4 MG/DL — SIGNIFICANT CHANGE UP (ref 8.4–10.5)
CALCIUM SERPL-MCNC: 8.5 MG/DL — SIGNIFICANT CHANGE UP (ref 8.4–10.5)
CALCIUM SERPL-MCNC: 8.5 MG/DL — SIGNIFICANT CHANGE UP (ref 8.4–10.5)
CALCIUM SERPL-MCNC: 8.6 MG/DL — SIGNIFICANT CHANGE UP (ref 8.4–10.5)
CHLORIDE BLDV-SCNC: 108 MMOL/L — SIGNIFICANT CHANGE UP (ref 96–108)
CHLORIDE BLDV-SCNC: 113 MMOL/L — HIGH (ref 96–108)
CHLORIDE SERPL-SCNC: 106 MMOL/L — SIGNIFICANT CHANGE UP (ref 96–108)
CHLORIDE SERPL-SCNC: 112 MMOL/L — HIGH (ref 96–108)
CHLORIDE SERPL-SCNC: 113 MMOL/L — HIGH (ref 96–108)
CHLORIDE SERPL-SCNC: 114 MMOL/L — HIGH (ref 96–108)
CHLORIDE SERPL-SCNC: 117 MMOL/L — HIGH (ref 96–108)
CK SERPL-CCNC: 821 U/L — HIGH (ref 25–170)
CO2 BLDV-SCNC: 12 MMOL/L — LOW (ref 22–26)
CO2 BLDV-SCNC: 21 MMOL/L — LOW (ref 22–26)
CO2 BLDV-SCNC: 25 MMOL/L — SIGNIFICANT CHANGE UP (ref 22–26)
CO2 SERPL-SCNC: 16 MMOL/L — LOW (ref 22–31)
CO2 SERPL-SCNC: 17 MMOL/L — LOW (ref 22–31)
CO2 SERPL-SCNC: 18 MMOL/L — LOW (ref 22–31)
CO2 SERPL-SCNC: 21 MMOL/L — LOW (ref 22–31)
CO2 SERPL-SCNC: 29 MMOL/L — SIGNIFICANT CHANGE UP (ref 22–31)
COLOR SPEC: YELLOW — SIGNIFICANT CHANGE UP
CREAT SERPL-MCNC: 0.96 MG/DL — SIGNIFICANT CHANGE UP (ref 0.5–1.3)
CREAT SERPL-MCNC: 1.08 MG/DL — SIGNIFICANT CHANGE UP (ref 0.5–1.3)
CREAT SERPL-MCNC: 1.14 MG/DL — SIGNIFICANT CHANGE UP (ref 0.5–1.3)
CREAT SERPL-MCNC: 1.25 MG/DL — SIGNIFICANT CHANGE UP (ref 0.5–1.3)
CREAT SERPL-MCNC: 1.55 MG/DL — HIGH (ref 0.5–1.3)
DIFF PNL FLD: ABNORMAL
EGFR: 36 ML/MIN/1.73M2 — LOW
EGFR: 47 ML/MIN/1.73M2 — LOW
EGFR: 53 ML/MIN/1.73M2 — LOW
EGFR: 56 ML/MIN/1.73M2 — LOW
EGFR: 65 ML/MIN/1.73M2 — SIGNIFICANT CHANGE UP
EOSINOPHIL # BLD AUTO: 0 K/UL — SIGNIFICANT CHANGE UP (ref 0–0.5)
EOSINOPHIL # BLD AUTO: 0.13 K/UL — SIGNIFICANT CHANGE UP (ref 0–0.5)
EOSINOPHIL # BLD AUTO: 0.14 K/UL — SIGNIFICANT CHANGE UP (ref 0–0.5)
EOSINOPHIL NFR BLD AUTO: 0 % — SIGNIFICANT CHANGE UP (ref 0–6)
EOSINOPHIL NFR BLD AUTO: 0.7 % — SIGNIFICANT CHANGE UP (ref 0–6)
EOSINOPHIL NFR BLD AUTO: 1 % — SIGNIFICANT CHANGE UP (ref 0–6)
EPI CELLS # UR: SIGNIFICANT CHANGE UP
ESTIMATED AVERAGE GLUCOSE: 120 MG/DL — HIGH (ref 68–114)
FIBRINOGEN PPP-MCNC: 177 MG/DL — LOW (ref 330–520)
FIBRINOGEN PPP-MCNC: 248 MG/DL — LOW (ref 330–520)
GAS PNL BLDA: SIGNIFICANT CHANGE UP
GAS PNL BLDV: 139 MMOL/L — SIGNIFICANT CHANGE UP (ref 136–145)
GAS PNL BLDV: 147 MMOL/L — HIGH (ref 136–145)
GAS PNL BLDV: SIGNIFICANT CHANGE UP
GLUCOSE BLDC GLUCOMTR-MCNC: 110 MG/DL — HIGH (ref 70–99)
GLUCOSE BLDC GLUCOMTR-MCNC: 111 MG/DL — HIGH (ref 70–99)
GLUCOSE BLDC GLUCOMTR-MCNC: 115 MG/DL — HIGH (ref 70–99)
GLUCOSE BLDC GLUCOMTR-MCNC: 120 MG/DL — HIGH (ref 70–99)
GLUCOSE BLDC GLUCOMTR-MCNC: 122 MG/DL — HIGH (ref 70–99)
GLUCOSE BLDC GLUCOMTR-MCNC: 128 MG/DL — HIGH (ref 70–99)
GLUCOSE BLDC GLUCOMTR-MCNC: 128 MG/DL — HIGH (ref 70–99)
GLUCOSE BLDC GLUCOMTR-MCNC: 141 MG/DL — HIGH (ref 70–99)
GLUCOSE BLDC GLUCOMTR-MCNC: 155 MG/DL — HIGH (ref 70–99)
GLUCOSE BLDC GLUCOMTR-MCNC: 265 MG/DL — HIGH (ref 70–99)
GLUCOSE BLDV-MCNC: 174 MG/DL — HIGH (ref 70–99)
GLUCOSE BLDV-MCNC: 94 MG/DL — SIGNIFICANT CHANGE UP (ref 70–99)
GLUCOSE SERPL-MCNC: 105 MG/DL — HIGH (ref 70–99)
GLUCOSE SERPL-MCNC: 111 MG/DL — HIGH (ref 70–99)
GLUCOSE SERPL-MCNC: 112 MG/DL — HIGH (ref 70–99)
GLUCOSE SERPL-MCNC: 112 MG/DL — HIGH (ref 70–99)
GLUCOSE SERPL-MCNC: 208 MG/DL — HIGH (ref 70–99)
GLUCOSE UR QL: 100 MG/DL
HCO3 BLDV-SCNC: 11 MMOL/L — LOW (ref 22–29)
HCO3 BLDV-SCNC: 19 MMOL/L — LOW (ref 22–29)
HCO3 BLDV-SCNC: 23 MMOL/L — SIGNIFICANT CHANGE UP (ref 22–29)
HCT VFR BLD CALC: 17.1 % — CRITICAL LOW (ref 34.5–45)
HCT VFR BLD CALC: 21.8 % — LOW (ref 34.5–45)
HCT VFR BLD CALC: 26.3 % — LOW (ref 34.5–45)
HCT VFR BLD CALC: 26.3 % — LOW (ref 34.5–45)
HCT VFR BLD CALC: 26.8 % — LOW (ref 34.5–45)
HCT VFR BLD CALC: 39.1 % — SIGNIFICANT CHANGE UP (ref 34.5–45)
HCT VFR BLDA CALC: 25 % — LOW (ref 34.5–46.5)
HCT VFR BLDA CALC: 27 % — LOW (ref 34.5–46.5)
HEPARINASE TEG R TIME: 10.9 MIN (ref 4.3–8.3)
HGB BLD CALC-MCNC: 8.4 G/DL — LOW (ref 11.7–16.1)
HGB BLD CALC-MCNC: 9 G/DL — LOW (ref 11.7–16.1)
HGB BLD-MCNC: 12.3 G/DL — SIGNIFICANT CHANGE UP (ref 11.5–15.5)
HGB BLD-MCNC: 5.6 G/DL — CRITICAL LOW (ref 11.5–15.5)
HGB BLD-MCNC: 7.2 G/DL — LOW (ref 11.5–15.5)
HGB BLD-MCNC: 8.7 G/DL — LOW (ref 11.5–15.5)
HGB BLD-MCNC: 8.7 G/DL — LOW (ref 11.5–15.5)
HGB BLD-MCNC: 9 G/DL — LOW (ref 11.5–15.5)
HOROWITZ INDEX BLDV+IHG-RTO: 100 — SIGNIFICANT CHANGE UP
HOROWITZ INDEX BLDV+IHG-RTO: 60 — SIGNIFICANT CHANGE UP
IMM GRANULOCYTES NFR BLD AUTO: 4 % — HIGH (ref 0–1.5)
INR BLD: 1.05 RATIO — SIGNIFICANT CHANGE UP (ref 0.88–1.16)
INR BLD: 1.38 RATIO — HIGH (ref 0.88–1.16)
INR BLD: 1.51 RATIO — HIGH (ref 0.88–1.16)
INR BLD: 1.52 RATIO — HIGH (ref 0.88–1.16)
INR BLD: 1.62 RATIO — HIGH (ref 0.88–1.16)
KETONES UR-MCNC: NEGATIVE — SIGNIFICANT CHANGE UP
LACTATE BLDV-MCNC: 8.6 MMOL/L — CRITICAL HIGH (ref 0.7–2)
LACTATE BLDV-MCNC: 9.5 MMOL/L — CRITICAL HIGH (ref 0.7–2)
LACTATE SERPL-SCNC: 2.6 MMOL/L — HIGH (ref 0.7–2)
LEUKOCYTE ESTERASE UR-ACNC: ABNORMAL
LYMPHOCYTES # BLD AUTO: 0.26 K/UL — LOW (ref 1–3.3)
LYMPHOCYTES # BLD AUTO: 1.42 K/UL — SIGNIFICANT CHANGE UP (ref 1–3.3)
LYMPHOCYTES # BLD AUTO: 1.78 K/UL — SIGNIFICANT CHANGE UP (ref 1–3.3)
LYMPHOCYTES # BLD AUTO: 10 % — LOW (ref 13–44)
LYMPHOCYTES # BLD AUTO: 7 % — LOW (ref 13–44)
LYMPHOCYTES # BLD AUTO: 9 % — LOW (ref 13–44)
MAGNESIUM SERPL-MCNC: 2.7 MG/DL — HIGH (ref 1.6–2.6)
MAGNESIUM SERPL-MCNC: 2.8 MG/DL — HIGH (ref 1.6–2.6)
MAGNESIUM SERPL-MCNC: 2.9 MG/DL — HIGH (ref 1.6–2.6)
MANUAL SMEAR VERIFICATION: SIGNIFICANT CHANGE UP
MANUAL SMEAR VERIFICATION: SIGNIFICANT CHANGE UP
MCHC RBC-ENTMCNC: 29.9 PG — SIGNIFICANT CHANGE UP (ref 27–34)
MCHC RBC-ENTMCNC: 30.1 PG — SIGNIFICANT CHANGE UP (ref 27–34)
MCHC RBC-ENTMCNC: 30.2 PG — SIGNIFICANT CHANGE UP (ref 27–34)
MCHC RBC-ENTMCNC: 30.9 PG — SIGNIFICANT CHANGE UP (ref 27–34)
MCHC RBC-ENTMCNC: 31.4 PG — SIGNIFICANT CHANGE UP (ref 27–34)
MCHC RBC-ENTMCNC: 31.5 GM/DL — LOW (ref 32–36)
MCHC RBC-ENTMCNC: 32 PG — SIGNIFICANT CHANGE UP (ref 27–34)
MCHC RBC-ENTMCNC: 32.7 GM/DL — SIGNIFICANT CHANGE UP (ref 32–36)
MCHC RBC-ENTMCNC: 33 GM/DL — SIGNIFICANT CHANGE UP (ref 32–36)
MCHC RBC-ENTMCNC: 33.1 GM/DL — SIGNIFICANT CHANGE UP (ref 32–36)
MCHC RBC-ENTMCNC: 33.1 GM/DL — SIGNIFICANT CHANGE UP (ref 32–36)
MCHC RBC-ENTMCNC: 33.6 GM/DL — SIGNIFICANT CHANGE UP (ref 32–36)
MCV RBC AUTO: 90.4 FL — SIGNIFICANT CHANGE UP (ref 80–100)
MCV RBC AUTO: 91.2 FL — SIGNIFICANT CHANGE UP (ref 80–100)
MCV RBC AUTO: 91.3 FL — SIGNIFICANT CHANGE UP (ref 80–100)
MCV RBC AUTO: 94.5 FL — SIGNIFICANT CHANGE UP (ref 80–100)
MCV RBC AUTO: 95.4 FL — SIGNIFICANT CHANGE UP (ref 80–100)
MCV RBC AUTO: 99.7 FL — SIGNIFICANT CHANGE UP (ref 80–100)
METAMYELOCYTES # FLD: 2 % — HIGH (ref 0–0)
MONOCYTES # BLD AUTO: 0.08 K/UL — SIGNIFICANT CHANGE UP (ref 0–0.9)
MONOCYTES # BLD AUTO: 0.43 K/UL — SIGNIFICANT CHANGE UP (ref 0–0.9)
MONOCYTES # BLD AUTO: 0.92 K/UL — HIGH (ref 0–0.9)
MONOCYTES NFR BLD AUTO: 2 % — SIGNIFICANT CHANGE UP (ref 2–14)
MONOCYTES NFR BLD AUTO: 3 % — SIGNIFICANT CHANGE UP (ref 2–14)
MONOCYTES NFR BLD AUTO: 4.6 % — SIGNIFICANT CHANGE UP (ref 2–14)
MYELOCYTES NFR BLD: 1 % — HIGH (ref 0–0)
MYELOCYTES NFR BLD: 6 % — HIGH (ref 0–0)
NEUTROPHILS # BLD AUTO: 11.1 K/UL — HIGH (ref 1.8–7.4)
NEUTROPHILS # BLD AUTO: 16.1 K/UL — HIGH (ref 1.8–7.4)
NEUTROPHILS # BLD AUTO: 3.38 K/UL — SIGNIFICANT CHANGE UP (ref 1.8–7.4)
NEUTROPHILS NFR BLD AUTO: 65 % — SIGNIFICANT CHANGE UP (ref 43–77)
NEUTROPHILS NFR BLD AUTO: 80.9 % — HIGH (ref 43–77)
NEUTROPHILS NFR BLD AUTO: 86 % — HIGH (ref 43–77)
NEUTS BAND # BLD: 13 % — HIGH (ref 0–8)
NEUTS BAND # BLD: 4 % — SIGNIFICANT CHANGE UP (ref 0–8)
NITRITE UR-MCNC: POSITIVE
NRBC # BLD: 0 /100 WBCS — SIGNIFICANT CHANGE UP (ref 0–0)
NRBC # BLD: 0 /100 — SIGNIFICANT CHANGE UP (ref 0–0)
NRBC # BLD: 1 /100 WBCS — HIGH (ref 0–0)
NRBC # BLD: 5 /100 — HIGH (ref 0–0)
NT-PROBNP SERPL-SCNC: 4091 PG/ML — HIGH (ref 0–125)
OB PNL STL: POSITIVE
PCO2 BLDV: 42 MMHG — SIGNIFICANT CHANGE UP (ref 39–42)
PCO2 BLDV: 42 MMHG — SIGNIFICANT CHANGE UP (ref 39–42)
PCO2 BLDV: 52 MMHG — HIGH (ref 39–42)
PH BLDV: 7.02 — CRITICAL LOW (ref 7.32–7.43)
PH BLDV: 7.26 — LOW (ref 7.32–7.43)
PH BLDV: 7.27 — LOW (ref 7.32–7.43)
PH UR: 6 — SIGNIFICANT CHANGE UP (ref 5–8)
PHOSPHATE SERPL-MCNC: 4.8 MG/DL — HIGH (ref 2.5–4.5)
PHOSPHATE SERPL-MCNC: 5.8 MG/DL — HIGH (ref 2.5–4.5)
PHOSPHATE SERPL-MCNC: 7.3 MG/DL — HIGH (ref 2.5–4.5)
PLAT MORPH BLD: NORMAL — SIGNIFICANT CHANGE UP
PLAT MORPH BLD: NORMAL — SIGNIFICANT CHANGE UP
PLATELET # BLD AUTO: 103 K/UL — LOW (ref 150–400)
PLATELET # BLD AUTO: 104 K/UL — LOW (ref 150–400)
PLATELET # BLD AUTO: 108 K/UL — LOW (ref 150–400)
PLATELET # BLD AUTO: 125 K/UL — LOW (ref 150–400)
PLATELET # BLD AUTO: 132 K/UL — LOW (ref 150–400)
PLATELET # BLD AUTO: 256 K/UL — SIGNIFICANT CHANGE UP (ref 150–400)
PO2 BLDV: 35 MMHG — SIGNIFICANT CHANGE UP (ref 25–45)
PO2 BLDV: 43 MMHG — SIGNIFICANT CHANGE UP (ref 25–45)
PO2 BLDV: 64 MMHG — HIGH (ref 25–45)
POTASSIUM BLDV-SCNC: 5.4 MMOL/L — HIGH (ref 3.5–5.1)
POTASSIUM BLDV-SCNC: 6.9 MMOL/L — CRITICAL HIGH (ref 3.5–5.1)
POTASSIUM SERPL-MCNC: 3.7 MMOL/L — SIGNIFICANT CHANGE UP (ref 3.5–5.3)
POTASSIUM SERPL-MCNC: 4.1 MMOL/L — SIGNIFICANT CHANGE UP (ref 3.5–5.3)
POTASSIUM SERPL-MCNC: 4.6 MMOL/L — SIGNIFICANT CHANGE UP (ref 3.5–5.3)
POTASSIUM SERPL-MCNC: 5 MMOL/L — SIGNIFICANT CHANGE UP (ref 3.5–5.3)
POTASSIUM SERPL-MCNC: 5.7 MMOL/L — HIGH (ref 3.5–5.3)
POTASSIUM SERPL-SCNC: 3.7 MMOL/L — SIGNIFICANT CHANGE UP (ref 3.5–5.3)
POTASSIUM SERPL-SCNC: 4.1 MMOL/L — SIGNIFICANT CHANGE UP (ref 3.5–5.3)
POTASSIUM SERPL-SCNC: 4.6 MMOL/L — SIGNIFICANT CHANGE UP (ref 3.5–5.3)
POTASSIUM SERPL-SCNC: 5 MMOL/L — SIGNIFICANT CHANGE UP (ref 3.5–5.3)
POTASSIUM SERPL-SCNC: 5.7 MMOL/L — HIGH (ref 3.5–5.3)
PROT SERPL-MCNC: 3 G/DL — LOW (ref 6–8.3)
PROT SERPL-MCNC: 3.8 G/DL — LOW (ref 6–8.3)
PROT SERPL-MCNC: 3.9 G/DL — LOW (ref 6–8.3)
PROT SERPL-MCNC: 4.1 G/DL — LOW (ref 6–8.3)
PROT SERPL-MCNC: 6.5 G/DL — SIGNIFICANT CHANGE UP (ref 6–8.3)
PROT UR-MCNC: 500 MG/DL
PROTHROM AB SERPL-ACNC: 12.1 SEC — SIGNIFICANT CHANGE UP (ref 10.5–13.4)
PROTHROM AB SERPL-ACNC: 16.1 SEC — HIGH (ref 10.5–13.4)
PROTHROM AB SERPL-ACNC: 17.6 SEC — HIGH (ref 10.5–13.4)
PROTHROM AB SERPL-ACNC: 17.7 SEC — HIGH (ref 10.5–13.4)
PROTHROM AB SERPL-ACNC: 18.7 SEC — HIGH (ref 10.5–13.4)
RAPID RVP RESULT: SIGNIFICANT CHANGE UP
RAPIDTEG MAXIMUM AMPLITUDE: 59.4 MM — SIGNIFICANT CHANGE UP (ref 52–70)
RBC # BLD: 1.81 M/UL — LOW (ref 3.8–5.2)
RBC # BLD: 2.39 M/UL — LOW (ref 3.8–5.2)
RBC # BLD: 2.81 M/UL — LOW (ref 3.8–5.2)
RBC # BLD: 2.88 M/UL — LOW (ref 3.8–5.2)
RBC # BLD: 2.91 M/UL — LOW (ref 3.8–5.2)
RBC # BLD: 3.92 M/UL — SIGNIFICANT CHANGE UP (ref 3.8–5.2)
RBC # FLD: 14.8 % — HIGH (ref 10.3–14.5)
RBC # FLD: 15.8 % — HIGH (ref 10.3–14.5)
RBC # FLD: 15.9 % — HIGH (ref 10.3–14.5)
RBC # FLD: 16.3 % — HIGH (ref 10.3–14.5)
RBC # FLD: 16.6 % — HIGH (ref 10.3–14.5)
RBC # FLD: 17.2 % — HIGH (ref 10.3–14.5)
RBC BLD AUTO: SIGNIFICANT CHANGE UP
RBC BLD AUTO: SIGNIFICANT CHANGE UP
RBC CASTS # UR COMP ASSIST: SIGNIFICANT CHANGE UP /HPF (ref 0–4)
SAO2 % BLDV: 55.6 % — LOW (ref 67–88)
SAO2 % BLDV: 70.2 % — SIGNIFICANT CHANGE UP (ref 67–88)
SAO2 % BLDV: 89.8 % — HIGH (ref 67–88)
SARS-COV-2 RNA SPEC QL NAA+PROBE: SIGNIFICANT CHANGE UP
SODIUM SERPL-SCNC: 146 MMOL/L — HIGH (ref 135–145)
SODIUM SERPL-SCNC: 154 MMOL/L — HIGH (ref 135–145)
SODIUM SERPL-SCNC: 155 MMOL/L — HIGH (ref 135–145)
SODIUM SERPL-SCNC: 155 MMOL/L — HIGH (ref 135–145)
SODIUM SERPL-SCNC: 158 MMOL/L — HIGH (ref 135–145)
SP GR SPEC: 1.02 — SIGNIFICANT CHANGE UP (ref 1.01–1.02)
TEG FUNCTIONAL FIBRINOGEN: 19.9 MM — SIGNIFICANT CHANGE UP (ref 15–32)
TEG MAXIMUM AMPLITUDE: 60.6 MM — SIGNIFICANT CHANGE UP (ref 52–69)
TEG REACTION TIME: 13.2 MIN (ref 4.6–9.1)
TROPONIN I, HIGH SENSITIVITY RESULT: 43.7 NG/L — SIGNIFICANT CHANGE UP
TSH SERPL-MCNC: 1.84 UIU/ML — SIGNIFICANT CHANGE UP (ref 0.27–4.2)
UROBILINOGEN FLD QL: NEGATIVE MG/DL — SIGNIFICANT CHANGE UP
WBC # BLD: 10.02 K/UL — SIGNIFICANT CHANGE UP (ref 3.8–10.5)
WBC # BLD: 10.66 K/UL — HIGH (ref 3.8–10.5)
WBC # BLD: 14.23 K/UL — HIGH (ref 3.8–10.5)
WBC # BLD: 19.88 K/UL — HIGH (ref 3.8–10.5)
WBC # BLD: 3.75 K/UL — LOW (ref 3.8–10.5)
WBC # BLD: 7.3 K/UL — SIGNIFICANT CHANGE UP (ref 3.8–10.5)
WBC # FLD AUTO: 10.02 K/UL — SIGNIFICANT CHANGE UP (ref 3.8–10.5)
WBC # FLD AUTO: 10.66 K/UL — HIGH (ref 3.8–10.5)
WBC # FLD AUTO: 14.23 K/UL — HIGH (ref 3.8–10.5)
WBC # FLD AUTO: 19.88 K/UL — HIGH (ref 3.8–10.5)
WBC # FLD AUTO: 3.75 K/UL — LOW (ref 3.8–10.5)
WBC # FLD AUTO: 7.3 K/UL — SIGNIFICANT CHANGE UP (ref 3.8–10.5)
WBC UR QL: SIGNIFICANT CHANGE UP

## 2022-01-01 PROCEDURE — 93010 ELECTROCARDIOGRAM REPORT: CPT

## 2022-01-01 PROCEDURE — P9059: CPT

## 2022-01-01 PROCEDURE — 82803 BLOOD GASES ANY COMBINATION: CPT

## 2022-01-01 PROCEDURE — P9037: CPT

## 2022-01-01 PROCEDURE — 82550 ASSAY OF CK (CPK): CPT

## 2022-01-01 PROCEDURE — 83036 HEMOGLOBIN GLYCOSYLATED A1C: CPT

## 2022-01-01 PROCEDURE — 87086 URINE CULTURE/COLONY COUNT: CPT

## 2022-01-01 PROCEDURE — 85384 FIBRINOGEN ACTIVITY: CPT

## 2022-01-01 PROCEDURE — 99291 CRITICAL CARE FIRST HOUR: CPT | Mod: 25

## 2022-01-01 PROCEDURE — 74018 RADEX ABDOMEN 1 VIEW: CPT | Mod: 26

## 2022-01-01 PROCEDURE — C1768: CPT

## 2022-01-01 PROCEDURE — 93005 ELECTROCARDIOGRAM TRACING: CPT

## 2022-01-01 PROCEDURE — 33863 ASCENDING AORTIC GRAFT: CPT

## 2022-01-01 PROCEDURE — 82330 ASSAY OF CALCIUM: CPT

## 2022-01-01 PROCEDURE — 72125 CT NECK SPINE W/O DYE: CPT | Mod: MA

## 2022-01-01 PROCEDURE — 86891 AUTOLOGOUS BLOOD OP SALVAGE: CPT

## 2022-01-01 PROCEDURE — 96375 TX/PRO/DX INJ NEW DRUG ADDON: CPT

## 2022-01-01 PROCEDURE — P9045: CPT

## 2022-01-01 PROCEDURE — 83605 ASSAY OF LACTIC ACID: CPT

## 2022-01-01 PROCEDURE — 81001 URINALYSIS AUTO W/SCOPE: CPT

## 2022-01-01 PROCEDURE — 70486 CT MAXILLOFACIAL W/O DYE: CPT | Mod: 26,MA

## 2022-01-01 PROCEDURE — 70486 CT MAXILLOFACIAL W/O DYE: CPT | Mod: MA

## 2022-01-01 PROCEDURE — 86965 POOLING BLOOD PLATELETS: CPT

## 2022-01-01 PROCEDURE — 94002 VENT MGMT INPAT INIT DAY: CPT

## 2022-01-01 PROCEDURE — 71250 CT THORAX DX C-: CPT | Mod: 26,59,MA

## 2022-01-01 PROCEDURE — 85396 CLOTTING ASSAY WHOLE BLOOD: CPT

## 2022-01-01 PROCEDURE — P9012: CPT

## 2022-01-01 PROCEDURE — 71045 X-RAY EXAM CHEST 1 VIEW: CPT

## 2022-01-01 PROCEDURE — 31500 INSERT EMERGENCY AIRWAY: CPT

## 2022-01-01 PROCEDURE — 94799 UNLISTED PULMONARY SVC/PX: CPT

## 2022-01-01 PROCEDURE — 71250 CT THORAX DX C-: CPT | Mod: MA

## 2022-01-01 PROCEDURE — 96374 THER/PROPH/DIAG INJ IV PUSH: CPT | Mod: XU

## 2022-01-01 PROCEDURE — 99291 CRITICAL CARE FIRST HOUR: CPT | Mod: FT,25

## 2022-01-01 PROCEDURE — 86985 SPLIT BLOOD OR PRODUCTS: CPT

## 2022-01-01 PROCEDURE — P9047: CPT

## 2022-01-01 PROCEDURE — 84484 ASSAY OF TROPONIN QUANT: CPT

## 2022-01-01 PROCEDURE — 83880 ASSAY OF NATRIURETIC PEPTIDE: CPT

## 2022-01-01 PROCEDURE — 85025 COMPLETE CBC W/AUTO DIFF WBC: CPT

## 2022-01-01 PROCEDURE — 84295 ASSAY OF SERUM SODIUM: CPT

## 2022-01-01 PROCEDURE — 71045 X-RAY EXAM CHEST 1 VIEW: CPT | Mod: 26,77

## 2022-01-01 PROCEDURE — P9011: CPT

## 2022-01-01 PROCEDURE — 83735 ASSAY OF MAGNESIUM: CPT

## 2022-01-01 PROCEDURE — P9073: CPT

## 2022-01-01 PROCEDURE — 72125 CT NECK SPINE W/O DYE: CPT | Mod: 26,MA

## 2022-01-01 PROCEDURE — 80053 COMPREHEN METABOLIC PANEL: CPT

## 2022-01-01 PROCEDURE — 87186 SC STD MICRODIL/AGAR DIL: CPT

## 2022-01-01 PROCEDURE — 87040 BLOOD CULTURE FOR BACTERIA: CPT

## 2022-01-01 PROCEDURE — 31720 CLEARANCE OF AIRWAYS: CPT

## 2022-01-01 PROCEDURE — 82565 ASSAY OF CREATININE: CPT

## 2022-01-01 PROCEDURE — 82947 ASSAY GLUCOSE BLOOD QUANT: CPT

## 2022-01-01 PROCEDURE — 36430 TRANSFUSION BLD/BLD COMPNT: CPT

## 2022-01-01 PROCEDURE — 84132 ASSAY OF SERUM POTASSIUM: CPT

## 2022-01-01 PROCEDURE — 88305 TISSUE EXAM BY PATHOLOGIST: CPT | Mod: 26

## 2022-01-01 PROCEDURE — 75635 CT ANGIO ABDOMINAL ARTERIES: CPT | Mod: MA

## 2022-01-01 PROCEDURE — 86900 BLOOD TYPING SEROLOGIC ABO: CPT

## 2022-01-01 PROCEDURE — 85014 HEMATOCRIT: CPT

## 2022-01-01 PROCEDURE — 96376 TX/PRO/DX INJ SAME DRUG ADON: CPT

## 2022-01-01 PROCEDURE — 85610 PROTHROMBIN TIME: CPT

## 2022-01-01 PROCEDURE — 82962 GLUCOSE BLOOD TEST: CPT

## 2022-01-01 PROCEDURE — C1751: CPT

## 2022-01-01 PROCEDURE — 71045 X-RAY EXAM CHEST 1 VIEW: CPT | Mod: 26,76

## 2022-01-01 PROCEDURE — 86850 RBC ANTIBODY SCREEN: CPT

## 2022-01-01 PROCEDURE — 36800 INSERTION OF CANNULA: CPT

## 2022-01-01 PROCEDURE — 82435 ASSAY OF BLOOD CHLORIDE: CPT

## 2022-01-01 PROCEDURE — C1889: CPT

## 2022-01-01 PROCEDURE — 71275 CT ANGIOGRAPHY CHEST: CPT | Mod: 26,MA

## 2022-01-01 PROCEDURE — C1769: CPT

## 2022-01-01 PROCEDURE — 36415 COLL VENOUS BLD VENIPUNCTURE: CPT

## 2022-01-01 PROCEDURE — 94003 VENT MGMT INPAT SUBQ DAY: CPT

## 2022-01-01 PROCEDURE — 75635 CT ANGIO ABDOMINAL ARTERIES: CPT | Mod: 26,MA

## 2022-01-01 PROCEDURE — 86901 BLOOD TYPING SEROLOGIC RH(D): CPT

## 2022-01-01 PROCEDURE — P9100: CPT

## 2022-01-01 PROCEDURE — 82272 OCCULT BLD FECES 1-3 TESTS: CPT

## 2022-01-01 PROCEDURE — 85018 HEMOGLOBIN: CPT

## 2022-01-01 PROCEDURE — 99285 EMERGENCY DEPT VISIT HI MDM: CPT | Mod: 25

## 2022-01-01 PROCEDURE — 84443 ASSAY THYROID STIM HORMONE: CPT

## 2022-01-01 PROCEDURE — 86923 COMPATIBILITY TEST ELECTRIC: CPT

## 2022-01-01 PROCEDURE — 85730 THROMBOPLASTIN TIME PARTIAL: CPT

## 2022-01-01 PROCEDURE — 0225U NFCT DS DNA&RNA 21 SARSCOV2: CPT

## 2022-01-01 PROCEDURE — 99291 CRITICAL CARE FIRST HOUR: CPT

## 2022-01-01 PROCEDURE — 71275 CT ANGIOGRAPHY CHEST: CPT | Mod: MA

## 2022-01-01 PROCEDURE — 71045 X-RAY EXAM CHEST 1 VIEW: CPT | Mod: 26

## 2022-01-01 PROCEDURE — 70450 CT HEAD/BRAIN W/O DYE: CPT | Mod: 26,MA

## 2022-01-01 PROCEDURE — P9016: CPT

## 2022-01-01 PROCEDURE — 88305 TISSUE EXAM BY PATHOLOGIST: CPT

## 2022-01-01 PROCEDURE — 85027 COMPLETE CBC AUTOMATED: CPT

## 2022-01-01 PROCEDURE — 84100 ASSAY OF PHOSPHORUS: CPT

## 2022-01-01 PROCEDURE — 74018 RADEX ABDOMEN 1 VIEW: CPT

## 2022-01-01 PROCEDURE — 70450 CT HEAD/BRAIN W/O DYE: CPT | Mod: MA

## 2022-01-01 DEVICE — INTRODUCER PERCUTANEOUS INSERTION KIT: Type: IMPLANTABLE DEVICE | Status: FUNCTIONAL

## 2022-01-01 DEVICE — MEDIASTINAL CATH DRAIN 9MM: Type: IMPLANTABLE DEVICE | Status: FUNCTIONAL

## 2022-01-01 DEVICE — SURGIFOAM PAD 8CM X 12.5CM X 10MM (100): Type: IMPLANTABLE DEVICE | Status: FUNCTIONAL

## 2022-01-01 DEVICE — BIOGLUE 10ML SYR: Type: IMPLANTABLE DEVICE | Status: FUNCTIONAL

## 2022-01-01 DEVICE — CANNULA AORTIC PERFUSION EZ GLIDE STRAIGHT 21FR X 35CM VENTED: Type: IMPLANTABLE DEVICE | Status: FUNCTIONAL

## 2022-01-01 DEVICE — CHEST DRAIN PLEUR-EVAC 28FR STRAIGHT: Type: IMPLANTABLE DEVICE | Status: FUNCTIONAL

## 2022-01-01 DEVICE — CANNULA RCSP 15FR X 12.5" MANUAL-INFLATE CUFF WITH SOLID STYLET: Type: IMPLANTABLE DEVICE | Status: FUNCTIONAL

## 2022-01-01 DEVICE — LIGATING CLIPS WECK HORIZON MEDIUM (BLUE) 24: Type: IMPLANTABLE DEVICE | Status: FUNCTIONAL

## 2022-01-01 DEVICE — SURGICEL NU-KNIT 6 X 9": Type: IMPLANTABLE DEVICE | Status: FUNCTIONAL

## 2022-01-01 DEVICE — CANNULA AORTIC ROOT WITH VENT LINE 12G X 14CM FLANGED: Type: IMPLANTABLE DEVICE | Status: FUNCTIONAL

## 2022-01-01 DEVICE — PACING WIRE WHITE M-24 BAREWIRE 37MM X 89MM: Type: IMPLANTABLE DEVICE | Status: FUNCTIONAL

## 2022-01-01 DEVICE — STAPLER COVIDIEN TRI-STAPLE 45MM TAN RELOAD: Type: IMPLANTABLE DEVICE | Status: FUNCTIONAL

## 2022-01-01 DEVICE — IMPLANTABLE DEVICE: Type: IMPLANTABLE DEVICE | Status: FUNCTIONAL

## 2022-01-01 DEVICE — FELT PTFE 6 X 6": Type: IMPLANTABLE DEVICE | Status: FUNCTIONAL

## 2022-01-01 DEVICE — CANNULA ARTERIAL OPTISITE 20FR X 3/8" VENTED: Type: IMPLANTABLE DEVICE | Status: FUNCTIONAL

## 2022-01-01 DEVICE — KIT CVC 2LUM MAC 9FR CHG: Type: IMPLANTABLE DEVICE | Status: FUNCTIONAL

## 2022-01-01 DEVICE — KIT CVC 1LUM 16GX20CM BLU FLX TIP: Type: IMPLANTABLE DEVICE | Status: FUNCTIONAL

## 2022-01-01 DEVICE — CANNULA CORONARY SILICONE OSTIAL 20FR: Type: IMPLANTABLE DEVICE | Status: FUNCTIONAL

## 2022-01-01 DEVICE — PACING WIRE ORANGE M-25 WINGED WIRE 37MM X 89MM: Type: IMPLANTABLE DEVICE | Status: FUNCTIONAL

## 2022-01-01 DEVICE — CANNULA VENOUS 2 STAGE THIN FLEX 36/46FR X 1/2" WITH RETURN DIAL: Type: IMPLANTABLE DEVICE | Status: FUNCTIONAL

## 2022-01-01 DEVICE — CATH VENT VENTRICULAR PVC 18FR X 4.25" TIP PERFORATION: Type: IMPLANTABLE DEVICE | Status: FUNCTIONAL

## 2022-01-01 DEVICE — ARISTA 1GR: Type: IMPLANTABLE DEVICE | Status: FUNCTIONAL

## 2022-01-01 DEVICE — LIGATING CLIPS WECK HORIZON SMALL-WIDE (RED) 24: Type: IMPLANTABLE DEVICE | Status: FUNCTIONAL

## 2022-01-01 RX ORDER — GABAPENTIN 400 MG/1
100 CAPSULE ORAL EVERY 12 HOURS
Refills: 0 | Status: DISCONTINUED | OUTPATIENT
Start: 2022-01-01 | End: 2022-01-01

## 2022-01-01 RX ORDER — ASPIRIN/CALCIUM CARB/MAGNESIUM 324 MG
81 TABLET ORAL DAILY
Refills: 0 | Status: DISCONTINUED | OUTPATIENT
Start: 2022-01-01 | End: 2022-01-01

## 2022-01-01 RX ORDER — ACETAMINOPHEN 500 MG
650 TABLET ORAL ONCE
Refills: 0 | Status: COMPLETED | OUTPATIENT
Start: 2022-01-01 | End: 2022-01-01

## 2022-01-01 RX ORDER — DEXMEDETOMIDINE HYDROCHLORIDE IN 0.9% SODIUM CHLORIDE 4 UG/ML
0.5 INJECTION INTRAVENOUS
Qty: 200 | Refills: 0 | Status: DISCONTINUED | OUTPATIENT
Start: 2022-01-01 | End: 2022-01-01

## 2022-01-01 RX ORDER — OXYCODONE HYDROCHLORIDE 5 MG/1
5 TABLET ORAL EVERY 4 HOURS
Refills: 0 | Status: DISCONTINUED | OUTPATIENT
Start: 2022-01-01 | End: 2022-01-01

## 2022-01-01 RX ORDER — PHENYLEPHRINE HYDROCHLORIDE 10 MG/ML
1.5 INJECTION INTRAVENOUS
Qty: 40 | Refills: 0 | Status: DISCONTINUED | OUTPATIENT
Start: 2022-01-01 | End: 2022-01-01

## 2022-01-01 RX ORDER — ASPIRIN/CALCIUM CARB/MAGNESIUM 324 MG
300 TABLET ORAL ONCE
Refills: 0 | Status: DISCONTINUED | OUTPATIENT
Start: 2022-01-01 | End: 2022-01-01

## 2022-01-01 RX ORDER — MEPERIDINE HYDROCHLORIDE 50 MG/ML
25 INJECTION INTRAMUSCULAR; INTRAVENOUS; SUBCUTANEOUS ONCE
Refills: 0 | Status: DISCONTINUED | OUTPATIENT
Start: 2022-01-01 | End: 2022-01-01

## 2022-01-01 RX ORDER — FUROSEMIDE 40 MG
20 TABLET ORAL ONCE
Refills: 0 | Status: COMPLETED | OUTPATIENT
Start: 2022-01-01 | End: 2022-01-01

## 2022-01-01 RX ORDER — PIPERACILLIN AND TAZOBACTAM 4; .5 G/20ML; G/20ML
2.25 INJECTION, POWDER, LYOPHILIZED, FOR SOLUTION INTRAVENOUS EVERY 8 HOURS
Refills: 0 | Status: DISCONTINUED | OUTPATIENT
Start: 2022-01-01 | End: 2022-01-01

## 2022-01-01 RX ORDER — POTASSIUM CHLORIDE 20 MEQ
10 PACKET (EA) ORAL
Refills: 0 | Status: DISCONTINUED | OUTPATIENT
Start: 2022-01-01 | End: 2022-01-01

## 2022-01-01 RX ORDER — DIAZEPAM 5 MG
5 TABLET ORAL ONCE
Refills: 0 | Status: DISCONTINUED | OUTPATIENT
Start: 2022-01-01 | End: 2022-01-01

## 2022-01-01 RX ORDER — VASOPRESSIN 20 [USP'U]/ML
0.1 INJECTION INTRAVENOUS
Qty: 50 | Refills: 0 | Status: DISCONTINUED | OUTPATIENT
Start: 2022-01-01 | End: 2022-01-01

## 2022-01-01 RX ORDER — CEFUROXIME AXETIL 250 MG
1500 TABLET ORAL EVERY 8 HOURS
Refills: 0 | Status: DISCONTINUED | OUTPATIENT
Start: 2022-01-01 | End: 2022-01-01

## 2022-01-01 RX ORDER — PANTOPRAZOLE SODIUM 20 MG/1
40 TABLET, DELAYED RELEASE ORAL DAILY
Refills: 0 | Status: DISCONTINUED | OUTPATIENT
Start: 2022-01-01 | End: 2022-01-01

## 2022-01-01 RX ORDER — SODIUM CHLORIDE 9 MG/ML
1500 INJECTION INTRAMUSCULAR; INTRAVENOUS; SUBCUTANEOUS ONCE
Refills: 0 | Status: COMPLETED | OUTPATIENT
Start: 2022-01-01 | End: 2022-01-01

## 2022-01-01 RX ORDER — CHLORHEXIDINE GLUCONATE 213 G/1000ML
15 SOLUTION TOPICAL EVERY 12 HOURS
Refills: 0 | Status: DISCONTINUED | OUTPATIENT
Start: 2022-01-01 | End: 2022-01-01

## 2022-01-01 RX ORDER — PANTOPRAZOLE SODIUM 20 MG/1
8 TABLET, DELAYED RELEASE ORAL
Qty: 80 | Refills: 0 | Status: DISCONTINUED | OUTPATIENT
Start: 2022-01-01 | End: 2022-01-01

## 2022-01-01 RX ORDER — CALCIUM GLUCONATE 100 MG/ML
2 VIAL (ML) INTRAVENOUS ONCE
Refills: 0 | Status: COMPLETED | OUTPATIENT
Start: 2022-01-01 | End: 2022-01-01

## 2022-01-01 RX ORDER — FENTANYL CITRATE 50 UG/ML
2.5 INJECTION INTRAVENOUS
Qty: 2500 | Refills: 0 | Status: DISCONTINUED | OUTPATIENT
Start: 2022-01-01 | End: 2022-01-01

## 2022-01-01 RX ORDER — ONDANSETRON 8 MG/1
4 TABLET, FILM COATED ORAL ONCE
Refills: 0 | Status: COMPLETED | OUTPATIENT
Start: 2022-01-01 | End: 2022-01-01

## 2022-01-01 RX ORDER — LEVOTHYROXINE SODIUM 125 MCG
75 TABLET ORAL AT BEDTIME
Refills: 0 | Status: DISCONTINUED | OUTPATIENT
Start: 2022-01-01 | End: 2022-01-01

## 2022-01-01 RX ORDER — PIPERACILLIN AND TAZOBACTAM 4; .5 G/20ML; G/20ML
3.38 INJECTION, POWDER, LYOPHILIZED, FOR SOLUTION INTRAVENOUS ONCE
Refills: 0 | Status: COMPLETED | OUTPATIENT
Start: 2022-01-01 | End: 2022-01-01

## 2022-01-01 RX ORDER — AMIODARONE HYDROCHLORIDE 400 MG/1
400 TABLET ORAL
Refills: 0 | Status: DISCONTINUED | OUTPATIENT
Start: 2022-01-01 | End: 2022-01-01

## 2022-01-01 RX ORDER — MORPHINE SULFATE 50 MG/1
2 CAPSULE, EXTENDED RELEASE ORAL ONCE
Refills: 0 | Status: DISCONTINUED | OUTPATIENT
Start: 2022-01-01 | End: 2022-01-01

## 2022-01-01 RX ORDER — FENTANYL CITRATE 50 UG/ML
2.5 INJECTION INTRAVENOUS
Qty: 5000 | Refills: 0 | Status: DISCONTINUED | OUTPATIENT
Start: 2022-01-01 | End: 2022-01-01

## 2022-01-01 RX ORDER — ALBUMIN HUMAN 25 %
250 VIAL (ML) INTRAVENOUS ONCE
Refills: 0 | Status: COMPLETED | OUTPATIENT
Start: 2022-01-01 | End: 2022-01-01

## 2022-01-01 RX ORDER — INSULIN HUMAN 100 [IU]/ML
10 INJECTION, SOLUTION SUBCUTANEOUS ONCE
Refills: 0 | Status: COMPLETED | OUTPATIENT
Start: 2022-01-01 | End: 2022-01-01

## 2022-01-01 RX ORDER — SODIUM CHLORIDE 9 MG/ML
1000 INJECTION INTRAMUSCULAR; INTRAVENOUS; SUBCUTANEOUS ONCE
Refills: 0 | Status: COMPLETED | OUTPATIENT
Start: 2022-01-01 | End: 2022-01-01

## 2022-01-01 RX ORDER — POLYETHYLENE GLYCOL 3350 17 G/17G
17 POWDER, FOR SOLUTION ORAL DAILY
Refills: 0 | Status: DISCONTINUED | OUTPATIENT
Start: 2022-01-01 | End: 2022-01-01

## 2022-01-01 RX ORDER — PIPERACILLIN AND TAZOBACTAM 4; .5 G/20ML; G/20ML
2.25 INJECTION, POWDER, LYOPHILIZED, FOR SOLUTION INTRAVENOUS ONCE
Refills: 0 | Status: DISCONTINUED | OUTPATIENT
Start: 2022-01-01 | End: 2022-01-01

## 2022-01-01 RX ORDER — FAMOTIDINE 10 MG/ML
20 INJECTION INTRAVENOUS EVERY 12 HOURS
Refills: 0 | Status: DISCONTINUED | OUTPATIENT
Start: 2022-01-01 | End: 2022-01-01

## 2022-01-01 RX ORDER — NOREPINEPHRINE BITARTRATE/D5W 8 MG/250ML
0.05 PLASTIC BAG, INJECTION (ML) INTRAVENOUS
Qty: 8 | Refills: 0 | Status: DISCONTINUED | OUTPATIENT
Start: 2022-01-01 | End: 2022-01-01

## 2022-01-01 RX ORDER — SODIUM CHLORIDE 9 MG/ML
1000 INJECTION INTRAMUSCULAR; INTRAVENOUS; SUBCUTANEOUS
Refills: 0 | Status: DISCONTINUED | OUTPATIENT
Start: 2022-01-01 | End: 2022-01-01

## 2022-01-01 RX ORDER — ACETAMINOPHEN 500 MG
650 TABLET ORAL EVERY 6 HOURS
Refills: 0 | Status: DISCONTINUED | OUTPATIENT
Start: 2022-01-01 | End: 2022-01-01

## 2022-01-01 RX ORDER — FENTANYL CITRATE 50 UG/ML
100 INJECTION INTRAVENOUS ONCE
Refills: 0 | Status: DISCONTINUED | OUTPATIENT
Start: 2022-01-01 | End: 2022-01-01

## 2022-01-01 RX ORDER — POTASSIUM CHLORIDE 20 MEQ
10 PACKET (EA) ORAL
Refills: 0 | Status: COMPLETED | OUTPATIENT
Start: 2022-01-01 | End: 2022-01-01

## 2022-01-01 RX ORDER — DEXTROSE 50 % IN WATER 50 %
50 SYRINGE (ML) INTRAVENOUS ONCE
Refills: 0 | Status: COMPLETED | OUTPATIENT
Start: 2022-01-01 | End: 2022-01-01

## 2022-01-01 RX ORDER — SENNA PLUS 8.6 MG/1
2 TABLET ORAL AT BEDTIME
Refills: 0 | Status: DISCONTINUED | OUTPATIENT
Start: 2022-01-01 | End: 2022-01-01

## 2022-01-01 RX ORDER — CHLORHEXIDINE GLUCONATE 213 G/1000ML
1 SOLUTION TOPICAL DAILY
Refills: 0 | Status: DISCONTINUED | OUTPATIENT
Start: 2022-01-01 | End: 2022-01-01

## 2022-01-01 RX ORDER — DEXTROSE 50 % IN WATER 50 %
50 SYRINGE (ML) INTRAVENOUS
Refills: 0 | Status: DISCONTINUED | OUTPATIENT
Start: 2022-01-01 | End: 2022-01-01

## 2022-01-01 RX ORDER — INSULIN HUMAN 100 [IU]/ML
3 INJECTION, SOLUTION SUBCUTANEOUS
Qty: 100 | Refills: 0 | Status: DISCONTINUED | OUTPATIENT
Start: 2022-01-01 | End: 2022-01-01

## 2022-01-01 RX ORDER — ASCORBIC ACID 60 MG
500 TABLET,CHEWABLE ORAL
Refills: 0 | Status: DISCONTINUED | OUTPATIENT
Start: 2022-01-01 | End: 2022-01-01

## 2022-01-01 RX ORDER — AMINOCAPROIC ACID 500 MG/1
5 TABLET ORAL ONCE
Refills: 0 | Status: COMPLETED | OUTPATIENT
Start: 2022-01-01 | End: 2022-01-01

## 2022-01-01 RX ORDER — ALBUMIN HUMAN 25 %
250 VIAL (ML) INTRAVENOUS
Refills: 0 | Status: COMPLETED | OUTPATIENT
Start: 2022-01-01 | End: 2022-01-01

## 2022-01-01 RX ORDER — HYDROMORPHONE HYDROCHLORIDE 2 MG/ML
0.5 INJECTION INTRAMUSCULAR; INTRAVENOUS; SUBCUTANEOUS EVERY 6 HOURS
Refills: 0 | Status: DISCONTINUED | OUTPATIENT
Start: 2022-01-01 | End: 2022-01-01

## 2022-01-01 RX ADMIN — Medication 100 MILLIEQUIVALENT(S): at 21:45

## 2022-01-01 RX ADMIN — Medication 7.65 MICROGRAM(S)/KG/MIN: at 22:24

## 2022-01-01 RX ADMIN — Medication 200 GRAM(S): at 05:26

## 2022-01-01 RX ADMIN — Medication 100 MILLIEQUIVALENT(S): at 22:29

## 2022-01-01 RX ADMIN — MORPHINE SULFATE 2 MILLIGRAM(S): 50 CAPSULE, EXTENDED RELEASE ORAL at 10:22

## 2022-01-01 RX ADMIN — Medication 7.65 MICROGRAM(S)/KG/MIN: at 13:12

## 2022-01-01 RX ADMIN — SODIUM CHLORIDE 1000 MILLILITER(S): 9 INJECTION INTRAMUSCULAR; INTRAVENOUS; SUBCUTANEOUS at 07:50

## 2022-01-01 RX ADMIN — CHLORHEXIDINE GLUCONATE 15 MILLILITER(S): 213 SOLUTION TOPICAL at 05:42

## 2022-01-01 RX ADMIN — FENTANYL CITRATE 10.2 MICROGRAM(S)/KG/HR: 50 INJECTION INTRAVENOUS at 12:02

## 2022-01-01 RX ADMIN — SODIUM CHLORIDE 1500 MILLILITER(S): 9 INJECTION INTRAMUSCULAR; INTRAVENOUS; SUBCUTANEOUS at 09:53

## 2022-01-01 RX ADMIN — PIPERACILLIN AND TAZOBACTAM 200 GRAM(S): 4; .5 INJECTION, POWDER, LYOPHILIZED, FOR SOLUTION INTRAVENOUS at 10:47

## 2022-01-01 RX ADMIN — Medication 20 MILLIGRAM(S): at 05:18

## 2022-01-01 RX ADMIN — Medication 20 MILLIGRAM(S): at 05:48

## 2022-01-01 RX ADMIN — SODIUM CHLORIDE 150 MILLILITER(S): 9 INJECTION INTRAMUSCULAR; INTRAVENOUS; SUBCUTANEOUS at 13:19

## 2022-01-01 RX ADMIN — Medication 125 MILLILITER(S): at 04:59

## 2022-01-01 RX ADMIN — CHLORHEXIDINE GLUCONATE 1 APPLICATION(S): 213 SOLUTION TOPICAL at 11:17

## 2022-01-01 RX ADMIN — FENTANYL CITRATE 100 MICROGRAM(S): 50 INJECTION INTRAVENOUS at 10:10

## 2022-01-01 RX ADMIN — Medication 50 MILLILITER(S): at 05:18

## 2022-01-01 RX ADMIN — Medication 125 MILLILITER(S): at 05:43

## 2022-01-01 RX ADMIN — PIPERACILLIN AND TAZOBACTAM 3.38 GRAM(S): 4; .5 INJECTION, POWDER, LYOPHILIZED, FOR SOLUTION INTRAVENOUS at 11:10

## 2022-01-01 RX ADMIN — VASOPRESSIN 6 UNIT(S)/MIN: 20 INJECTION INTRAVENOUS at 22:22

## 2022-01-01 RX ADMIN — INSULIN HUMAN 10 UNIT(S): 100 INJECTION, SOLUTION SUBCUTANEOUS at 05:20

## 2022-01-01 RX ADMIN — Medication 100 MILLIEQUIVALENT(S): at 21:20

## 2022-01-01 RX ADMIN — Medication 100 MILLIGRAM(S): at 23:29

## 2022-01-01 RX ADMIN — SODIUM CHLORIDE 1000 MILLILITER(S): 9 INJECTION INTRAMUSCULAR; INTRAVENOUS; SUBCUTANEOUS at 08:50

## 2022-01-01 RX ADMIN — Medication 125 MILLILITER(S): at 21:10

## 2022-01-01 RX ADMIN — PHENYLEPHRINE HYDROCHLORIDE 45.9 MICROGRAM(S)/KG/MIN: 10 INJECTION INTRAVENOUS at 22:25

## 2022-01-01 RX ADMIN — Medication 5 MILLIGRAM(S): at 11:28

## 2022-01-01 RX ADMIN — FENTANYL CITRATE 100 MICROGRAM(S): 50 INJECTION INTRAVENOUS at 10:25

## 2022-01-01 RX ADMIN — Medication 650 MILLIGRAM(S): at 08:08

## 2022-01-01 RX ADMIN — DEXMEDETOMIDINE HYDROCHLORIDE IN 0.9% SODIUM CHLORIDE 10.2 MICROGRAM(S)/KG/HR: 4 INJECTION INTRAVENOUS at 22:25

## 2022-01-01 RX ADMIN — MORPHINE SULFATE 2 MILLIGRAM(S): 50 CAPSULE, EXTENDED RELEASE ORAL at 10:42

## 2022-01-01 RX ADMIN — Medication 650 MILLIGRAM(S): at 09:08

## 2022-01-01 RX ADMIN — AMINOCAPROIC ACID 250 GRAM(S): 500 TABLET ORAL at 04:16

## 2022-01-01 RX ADMIN — Medication 100 MILLIGRAM(S): at 07:48

## 2022-01-07 NOTE — H&P PST ADULT - PROBLEM/PLAN-2
normal appearance , without tenderness upon palpation , no deformities , trachea midline , Thyroid normal size , no thyroid nodules , no masses , no JVD , thyroid nontender
DISPLAY PLAN FREE TEXT

## 2022-01-24 NOTE — PATIENT PROFILE ADULT - DO YOU FEEL THREATENED BY OTHERS?
Inpatient Progress Note    Patient Name: Julia Cuevas  YOB: 1956  MRN: [id number]    PCP: Niharika Bishop MD    Subjective     Good response to bumex drip, SANA improving. No cath today. WBC 11.3 and complaining of cough with clear sputum production today. Now on 3 L NC. CXR clear. No fevers or chills. No dysuria. Some mild, vague abdominal discomfort, has not had a bowel movement since admission. No chest pain.     All other systems are negative.    Objective     Vitals:    01/24/22 0855   BP:    Pulse: 89   Resp:    Temp:        Physical Exam  General: AOx4, resting in bed    CV: RRR  Lungs: Mild crackles at bases  Abdomen: soft, nontender, nondistended   Extremities: +1 pitting edema bilaterally    Skin: warm, dry, intact      Intake/Output Summary (Last 24 hours) at 1/24/2022 0935  Last data filed at 1/24/2022 0600  Gross per 24 hour   Intake 760 ml   Output 2825 ml   Net -2065 ml        Recent Labs     01/21/22  1522 01/22/22  1005 01/24/22  0624   WBC 10.1 8.7 11.3*   RBC 4.68 4.90 5.14   HGB 14.3 15.0 15.4   HCT 42.7 44.4 46.7*    295 291   MCV 91.2 90.6 90.9   MCH 30.6 30.6 30.0   MCHC 33.5 33.8 33.0   NRBCRE 0 0 0       No results found    Recent Labs   Lab 01/24/22  0624 01/23/22  0658 01/22/22  1551 01/22/22  1004   SODIUM 136 137 137 137   POTASSIUM 3.7 3.9 3.6 3.8   CHLORIDE 103 105 102 103   CO2 23 23 25 24   GLUCOSE 207* 186* 212* 255*   BUN 47* 44* 38* 35*   CREATININE 1.86* 2.00* 1.97* 1.68*   CALCIUM 9.9 9.6 9.4 9.9   TOTPROTEIN 8.3* 7.9  --  8.3*   ALBUMIN 3.9 3.6  --  3.9   BILIRUBIN 1.0 1.0  --  1.4*   AST 20 10  --  12   GPT 19 13  --  16   ALKPT 128* 117  --  126*       Recent Labs   Lab 01/23/22  0658 01/21/22  1522 01/21/22  1248   NTPROB 1,257* 3,185* 3,324*       No results found    No results found    Inpatient Medications  • docusate sodium-sennosides  1 tablet Oral Nightly   • polyethylene glycol  17 g Oral Daily   • insulin lispro  5 Units Subcutaneous TID  WC   • digoxin  125 mcg Oral Daily   • insulin glargine  12 Units Subcutaneous Nightly   • insulin lispro   Subcutaneous 4x Daily WC   • metoPROLOL succinate  50 mg Oral Daily   • ticagrelor  60 mg Oral BID   • sodium chloride (PF)  2 mL Intracatheter 2 times per day   • allopurinol  50 mg Oral Q48H   • amLODIPine  10 mg Oral Daily   • apixaBAN  5 mg Oral BID   • atorvastatin  80 mg Oral Daily     • bumetanide (BUMEX) 12.5 mg/50 mL infusion 2 mg/hr (01/23/22 2101)     sodium chloride, dextrose, dextrose, glucagon, dextrose, dextrose, acetaminophen     Assessment and Plan   65 year old female PMH DM2, HTN, GERD, HFrEF (33% 7/21), CAD, CVA, afib on eliequis who presents to the ED today with increasing shortness of breath and leg swelling.      # Acute on chronic NYHA III, ACC/AHA Stage C systolic and diastolic heart failure  # Ischemic cardiomyopathy s/p ICD  # CAD s/p PCI  # HTN  - Follows actively with Dr. Pillai outpatient  - NT pro BNP 3324, baseline 1000. CXR with central congestion. Trop negative.   - EKG with afib, ventricular-paced complexes, LAFB, nonspecific ST and T wave abnormalities   - TTE 7/2021:  EF 33%, moderate TR.  - TTE 1/21/2022: EF 25%, G2DD, RVSP 46mmHg, moderate TR, trivial MR, thickened ventricular septum with diastolic and systolic flattening, decreased respirophasic changes of IVC  - Home meds: amlodipine 10 mg, metoprolol succinate 50 mg qd, Brilinta 60 BID, Spironolactone 25 mg qd, torsemide 100 mg daily and 50 mg nightly   Plan  - Cardiology following  - Bumex gtt 2mg/hr, good UOP and improving SANA. Will continue and consider de-esalate tomorrow   - Continue home metoprolol succinate 50 mg qd and Brilinta 60 BID  - Hold home Spironolactone 25 mg qd d/t SANA  - Strict I&O's and daily weights    #  Acute hypoxic respiratory failure, improving  - likely 2/2 above  - currently on 3 L NC, SpO2 %  - Wean O2 as tolerated    # SANA on CKD 3, improving  - Likely CRS1 2/2 above  - Creatinine  1.73-->1.68 since admission, baseline ~1.4 - 1.8  Plan  - Continue IV diuresis as above  - Aldactone on hold d/t SANA  - Avoid nephrotoxins as able  - Monitor I&Os      # Persistent Atrial Fibrillation   - EKG on admission noted atrial fibrillation with frequent ventricular-paced complexes  - Continue home meds: digoxin 0.25 mg qd, metoprolol succinate 50 mg qd, eliquis 5 mg BID     # Diabetes mellitus type 2  - Last A1c 8.6% in October 2021  - Home rx: Lantus 25u at bedtime, 13u lispro TID  - Lantus 15u at bedtime, Lispro 8u  TID, LDSSI     # Hyperbilirubinemia, resolved  # Hyperlipidemia - Continue atorvastatin 80mg  # Hx of CVA - continue statin & Brilinta  # Gout - Continue home allopurinol 50 mg q48 hrs    FENA  Fluids: None  Electrolytes: replace PRN  Nutrition: cardiac diet  Activity: advance to baseline     PPx:  VTE: Eliquis 5 mg BID  GI: not indicated     Code Status: FULL CODE     Dispo: Inpatient    Pt discussed with Dr Merrill Watson DO     no

## 2022-02-13 NOTE — ED PROVIDER NOTE - CONSTITUTIONAL NEGATIVE STATEMENT, MLM
-Telemetry  - controlled on metoprolol 12.5 mg BID  Will hold anticoagulant due to high risk of bleeding     no fever and no chills.

## 2022-03-09 NOTE — PHYSICAL THERAPY INITIAL EVALUATION ADULT - PRECAUTIONS/LIMITATIONS, REHAB EVAL
Assessment & Plan     (F33.1) Major depressive disorder, recurrent episode, moderate  (F10.21) Alcohol dependence in remission (H)  Plan: FLUoxetine (PROZAC) 20 MG capsule        Discussed risks and benefits of this medication.  Follow up in one month or sooner for worsening of symptoms or side effects.     (Z86.73) History of CVA (cerebrovascular accident)  (I25.10,  I25.84) Coronary artery calcification  (E78.5) Hyperlipidemia LDL goal <70  Plan: Lipid panel reflex to direct LDL Non-fasting,         atorvastatin (LIPITOR) 40 MG tablet          (F17.200) Smoker  Plan: Urged cessation and offered my support.     (D64.9) Anemia, unspecified type  (D69.6) Thrombocytopenia (H)  Plan: CBC with platelets and differential          (C34.90) Malignant neoplasm of lung, unspecified laterality, unspecified part of lung (H)  Comment: s/p resection, doing well   Plan: follow-up with oncology as planned     (J44.9) Chronic obstructive pulmonary disease, unspecified COPD type (H)  Comment: asymptomatic   Plan: see above     (I50.32) Chronic diastolic heart failure (H)  Comment: euvolemic   (I10) Benign essential hypertension  Plan: follow-up to consider addition of lisinopril 10 mg daily     (N40.1) Benign prostatic hyperplasia with lower urinary tract symptoms, symptom details unspecified  Plan: tamsulosin (FLOMAX) 0.4 MG capsule                       Tobacco Cessation:   reports that he has been smoking cigarettes. He started smoking about 50 years ago. He has a 25.00 pack-year smoking history. He has never used smokeless tobacco.  Tobacco Cessation Action Plan: Information offered: Patient not interested at this time    See Patient Instructions    Return in about 1 month (around 4/9/2022) for Wellness visit, blood pressure, depression.    Carolyne Cornejo MD  [facility] TERA Linder is a 65 year old who presents for the following health issues  accompanied by his spouse.    HPI     Abnormal Mood  Symptoms  Onset/Duration: Couple of months  Description: Memory issues  Depression (if yes, do PHQ-9): YES  Anxiety (if yes, do SEBASTIAN-7): YES  Accompanying Signs & Symptoms:  Still participating in activities that you used to enjoy: no  Fatigue: YES  Irritability: YES  Difficulty concentrating: YES  Changes in appetite: YES  Problems with sleep: YES  Heart racing/beating fast: no  Abnormally elevated, expansive, or irritable mood: YES  Persistently increased activity or energy: no  Thoughts of hurting yourself or others: no  History:  Recent stress or major life event: YES  Prior depression or anxiety: Yes  Family history of depression or anxiety: YES  Alcohol/drug use: no  Difficulty sleeping: YES  Precipitating or alleviating factors: None  Therapies tried and outcome: none  PHQ 3/24/2021 2/16/2022 3/9/2022   PHQ-9 Total Score 1 22 21   Q9: Thoughts of better off dead/self-harm past 2 weeks Not at all Not at all Not at all   F/U: Thoughts of suicide or self-harm - - -   F/U: Safety concerns - - -     SEBASTIAN-7 SCORE 4/10/2020 3/24/2021 3/9/2022   Total Score 8 (mild anxiety) - -   Total Score 8 0 9         Review of Systems   CONSTITUTIONAL: NEGATIVE for fever, chills, change in weight  INTEGUMENTARY/SKIN: NEGATIVE for worrisome rashes, moles or lesions  EYES: NEGATIVE for vision changes or irritation  ENT/MOUTH: NEGATIVE for ear, mouth and throat problems  RESP: NEGATIVE for significant cough or SOB  CV: NEGATIVE for chest pain, palpitations or peripheral edema  MUSCULOSKELETAL: NEGATIVE for significant arthralgias or myalgia  NEURO: Hx of CVA with difficulty getting dressed   ENDOCRINE: NEGATIVE for temperature intolerance, skin/hair changes  HEME: NEGATIVE for bleeding problems  PSYCHIATRIC: irritability, insomnia       Objective    BP (!) 154/60 (BP Location: Left arm, Patient Position: Sitting, Cuff Size: Adult Regular)   Pulse 59   Temp 98.6  F (37  C) (Oral)   Wt 74.4 kg (164 lb)   SpO2 96%   BMI 24.22 kg/m     Body mass index is 24.22 kg/m .  Physical Exam   GENERAL: healthy, alert and no distress  NECK: no adenopathy, no asymmetry, masses, or scars and thyroid normal to palpation  RESP: lungs clear to auscultation - no rales, rhonchi or wheezes  CV: regular rate and rhythm, normal S1 S2, no S3 or S4, no murmur, click or rub, no peripheral edema   MS: no gross musculoskeletal defects noted, no edema  PSYCH: mentation appears normal, affect normal/bright    Lab on 02/10/2022   Component Date Value Ref Range Status     Color Urine 02/10/2022 Yellow  Colorless, Straw, Light Yellow, Yellow Final     Appearance Urine 02/10/2022 Clear  Clear Final     Glucose Urine 02/10/2022 Negative  Negative mg/dL Final     Bilirubin Urine 02/10/2022 Negative  Negative Final     Ketones Urine 02/10/2022 Negative  Negative mg/dL Final     Specific Gravity Urine 02/10/2022 >=1.030  1.003 - 1.035 Final     Blood Urine 02/10/2022 Negative  Negative Final     pH Urine 02/10/2022 5.5  5.0 - 7.0 Final     Protein Albumin Urine 02/10/2022 30  (A) Negative mg/dL Final     Urobilinogen Urine 02/10/2022 0.2  0.2, 1.0 E.U./dL Final     Nitrite Urine 02/10/2022 Negative  Negative Final     Leukocyte Esterase Urine 02/10/2022 Negative  Negative Final     Sodium 02/10/2022 137  133 - 144 mmol/L Final     Potassium 02/10/2022 3.9  3.4 - 5.3 mmol/L Final     Chloride 02/10/2022 106  94 - 109 mmol/L Final     Carbon Dioxide (CO2) 02/10/2022 23  20 - 32 mmol/L Final     Anion Gap 02/10/2022 8  3 - 14 mmol/L Final     Urea Nitrogen 02/10/2022 20  7 - 30 mg/dL Final     Creatinine 02/10/2022 1.03  0.66 - 1.25 mg/dL Final     Calcium 02/10/2022 9.4  8.5 - 10.1 mg/dL Final     Glucose 02/10/2022 105 (A) 70 - 99 mg/dL Final     GFR Estimate 02/10/2022 80  >60 mL/min/1.73m2 Final    Effective December 21, 2021 eGFRcr in adults is calculated using the 2021 CKD-EPI creatinine equation which includes age and gender (Nehemias et al., NEJM, DOI: 10.1056/UGPCuz3274358)      WBC Count 02/10/2022 13.3 (A) 4.0 - 11.0 10e3/uL Final     RBC Count 02/10/2022 4.16 (A) 4.40 - 5.90 10e6/uL Final     Hemoglobin 02/10/2022 11.8 (A) 13.3 - 17.7 g/dL Final     Hematocrit 02/10/2022 35.8 (A) 40.0 - 53.0 % Final     MCV 02/10/2022 86  78 - 100 fL Final     MCH 02/10/2022 28.4  26.5 - 33.0 pg Final     MCHC 02/10/2022 33.0  31.5 - 36.5 g/dL Final     RDW 02/10/2022 14.8  10.0 - 15.0 % Final     Platelet Count 02/10/2022 91 (A) 150 - 450 10e3/uL Final     % Neutrophils 02/10/2022 71  % Final     % Lymphocytes 02/10/2022 13  % Final     % Monocytes 02/10/2022 8  % Final     % Eosinophils 02/10/2022 6  % Final     % Basophils 02/10/2022 1  % Final     % Immature Granulocytes 02/10/2022 1  % Final     NRBCs per 100 WBC 02/10/2022 0  <1 /100 Final     Absolute Neutrophils 02/10/2022 9.5 (A) 1.6 - 8.3 10e3/uL Final     Absolute Lymphocytes 02/10/2022 1.7  0.8 - 5.3 10e3/uL Final     Absolute Monocytes 02/10/2022 1.1  0.0 - 1.3 10e3/uL Final     Absolute Eosinophils 02/10/2022 0.7  0.0 - 0.7 10e3/uL Final     Absolute Basophils 02/10/2022 0.1  0.0 - 0.2 10e3/uL Final     Absolute Immature Granulocytes 02/10/2022 0.1  <=0.4 10e3/uL Final     Absolute NRBCs 02/10/2022 0.0  10e3/uL Final     Campylobacter group 02/12/2022 Not Detected  Not Detected Final     Salmonella species 02/12/2022 Not Detected  Not Detected Final     Shigella species 02/12/2022 Not Detected  Not Detected Final     Vibrio group 02/12/2022 Not Detected  Not Detected Final     Rotavirus 02/12/2022 Not Detected  Not Detected Final     Shiga toxin 1 gene 02/12/2022 Not Detected  Not Detected Final     Shiga toxin 2 gene 02/12/2022 Not Detected  Not Detected Final     Norovirus I and II 02/12/2022 Not Detected  Not Detected Final     Yersinia enterocolitica 02/12/2022 Not Detected  Not Detected Final     Bacteria Urine 02/10/2022 Many (A) None Seen /HPF Final     RBC Urine 02/10/2022 0-2  0-2 /HPF /HPF Final     WBC Urine 02/10/2022 0-5  0-5  /HPF /HPF Final     Squamous Epithelials Urine 02/10/2022 Few (A) None Seen /LPF Final     Mucus Urine 02/10/2022 Present (A) None Seen /LPF Final     Hyaline Casts Urine 02/10/2022 0-2 (A) None Seen /LPF Final     Granular Casts Urine 02/10/2022 0-2 (A) None Seen /LPF Final     Ferritin 02/10/2022 364  26 - 388 ng/mL Final     TSH 02/10/2022 1.76  0.40 - 4.00 mU/L Final     Iron 02/10/2022 37  35 - 180 ug/dL Final     Iron Binding Capacity 02/10/2022 285  240 - 430 ug/dL Final     Iron Sat Index 02/10/2022 13 (A) 15 - 46 % Final     Bilirubin Total 02/10/2022 0.7  0.2 - 1.3 mg/dL Final     Bilirubin Direct 02/10/2022 0.1  0.0 - 0.2 mg/dL Final     Protein Total 02/10/2022 7.9  6.8 - 8.8 g/dL Final     Albumin 02/10/2022 4.1  3.4 - 5.0 g/dL Final     Alkaline Phosphatase 02/10/2022 114  40 - 150 U/L Final     AST 02/10/2022 22  0 - 45 U/L Final     ALT 02/10/2022 17  0 - 70 U/L Final     Results for orders placed or performed in visit on 03/09/22 (from the past 24 hour(s))   CBC with platelets and differential    Narrative    The following orders were created for panel order CBC with platelets and differential.  Procedure                               Abnormality         Status                     ---------                               -----------         ------                     CBC with platelets and d...[[id number]]                      In process                   Please view results for these tests on the individual orders.               Answers for HPI/ROS submitted by the patient on 3/9/2022  If you checked off any problems, how difficult have these problems made it for you to do your work, take care of things at home, or get along with other people?: Extremely difficult  PHQ9 TOTAL SCORE: 21       cognitive impairment/fall precautions

## 2022-03-17 NOTE — ED PROVIDER NOTE - NS ED NOTE AC HIGH RISK COUNTRIES
Pharmacy Empiric Dose Change Per Policy - vancomycin  Original Dose Ordered: 2500 mg IV q24 hours  Dose Changed To: intermittent dosing  This dose change was based on the pharmacist's assessment of this patient's age, weight, concurrent drug therapy, treatment goals, whether patient's creatinine clearance adequately indicates renal function (factoring in age, muscle mass, fluid and clinical status), and, if applicable, prior pharmacokinetic data.      Estimated Creatinine Clearance: 44.1 mL/min (A) (based on SCr of 2.11 mg/dL (H)).  Will continue to follow and modify dosage according to levels, organ function and clinical condition    Andrea Phan, PharmD, BCCCP     No

## 2022-04-12 NOTE — H&P CARDIOLOGY - PASSIVE COMMENT
Set to eprescribe pending your approval    LAST SEEN: 4/7/22    NEXT APPOINTMENT: 9/7/22  
father - passed away

## 2022-07-13 NOTE — PROGRESS NOTE ADULT - SUBJECTIVE AND OBJECTIVE BOX
Patient seen and examined at the bedside.    Remained critically ill on continuous ICU monitoring.    OBJECTIVE:  Vital Signs Last 24 Hrs  T(C): 35 (13 Jul 2022 20:45), Max: 37.4 (13 Jul 2022 11:59)  T(F): 95 (13 Jul 2022 20:45), Max: 99.3 (13 Jul 2022 11:59)  HR: 72 (13 Jul 2022 22:00) (72 - 92)  BP: 89/53 (13 Jul 2022 17:03) (52/- - 127/55)  BP(mean): --  RR: 14 (13 Jul 2022 22:00) (12 - 32)  SpO2: 100% (13 Jul 2022 22:00) (94% - 100%)    Parameters below as of 13 Jul 2022 20:45  Patient On (Oxygen Delivery Method): ventilator    O2 Concentration (%): 100    REVIEW OF SYSTEMS:     [x ] N/A    Physical Exam:  General: intubated multiple lines gtt & tubes   Neurology: sedated   Eyes: bilaeral small pupils equal and reactive   ENT/Neck: +ETT midline, Neck supple, trachea midline, No JVD   Respiratory: Rales noted bilaterally   CV: RRR, S1S2, no murmurs        [x] Sternal dressing, [x] Mediastinal CT x 2         [x] Sinus rhythm,  [] Temporary pacing AV / BOX off  Abdominal: Soft, NT, ND +BS,   Extremities: 1-2+ pedal edema noted, + peripheral pulses   Skin: No Rashes, Hematoma, Ecchymosis                           Assessment:  66 yr F H/O DM2 / HTN / CAD S/P KAYLEIGH X1 2019 / S/P Total thyroidectomy / S/P R breast Ca lumpectomy on Aromatase inhibtors  Admitted with Acute Fall found to Type A-aortic dissection with hemopericardium & cardiac tamponade transferred from OSH intubated on high dose pressors   S/P Emergent Sternotomy with finding of ruptured Aorta Ascending & hemiarch replacement with circ arrest D # 0  Massive bleeding requiring multiple PRBC & Products including FEIBA  Multifactorial shock   Intra operative finding of HOCM / Severe MR     Post op coagulopathy , thrombocytopenia & hypofibrinogenemia   Post op hypoxia   Lactic acidosis   Hyperglycemia        Plan:   ***Neuro***    Post operative neuro assessment     ***Cardiovascular***  Post op unstable hemodynamics   Invasive hemodynamic monitoring, assess perfusion indices   SR 68-80 / CVP 6-8/ MAP 75-80  / SvO2 81 / Hct 26 / Lactate 6.9   [x] Levophed 0.02-0.05 mcg/kg/min  [x] Vasopressin 0.1 units/min [x] Regan gtt 1,5 mcg/kg/min   Volume: [x] Albumin 500 cc [ ] Crystalloid 500 cc [x] PRBC intra op 5  [x] Plts  intra op 2  [ ] Cryo intra op 5 + post op 5 [x] Plasma 5 intra op + 2 post op  [x] FEIBA 2000 units   Reassessment of hemodynamics post resuscitation   Monitor chest tube outputs   [x] Bleeding    Serial EKG and cardiac enzymes     ***Pulmonary***  [x] Nitric oxide 20 PPM for hypoxia    Post op vent management  Titration of FiO2 and PEEP, follow SpO2, CXR, blood gases     Mode: AC/ CMV (Assist Control/ Continuous Mandatory Ventilation)  RR (machine): 14  TV (machine): 500  FiO2: 100  PEEP: 7  ITime: 1  MAP: 11  PIP: 26              Anticipate prolong mechanical ventilation   PF ratio <76 severe oxygenation impairement    ***GI***  [x] NPO /     [x] Protonix      ***Renal***  GFR 60  At risk for hemodynamically mediated ATN  Continue to monitor I/Os, BUN/Creatinine.   Replete lytes PRN  Miguel Angel present     ***ID***  *Do NOT document temp/WBC, just the below statement is fine*  Perioperative antibitoics     ***Endocrine***  [X] Hyperglycemia  HbA1c PND              - [x] Insulin gtt               - Need tight glycemic control to prevent wound infection.  Resume LT4 75 mcg IV QD     ALL MEDICATIONS (delete after use)  MEDICATIONS  (STANDING):  acetaminophen     Tablet .. 650 milliGRAM(s) Oral every 6 hours  albumin human  5% IVPB 250 milliLiter(s) IV Intermittent once  aMIOdarone    Tablet 400 milliGRAM(s) Oral two times a day  ascorbic acid 500 milliGRAM(s) Oral two times a day  aspirin enteric coated 81 milliGRAM(s) Oral daily  aspirin Suppository 300 milliGRAM(s) Rectal once  cefuroxime  IVPB 1500 milliGRAM(s) IV Intermittent every 8 hours  chlorhexidine 0.12% Liquid 15 milliLiter(s) Oral Mucosa every 12 hours  chlorhexidine 2% Cloths 1 Application(s) Topical daily  dexMEDEtomidine Infusion 0.5 MICROgram(s)/kG/Hr (10.2 mL/Hr) IV Continuous <Continuous>  dextrose 50% Injectable 50 milliLiter(s) IV Push every 15 minutes  famotidine Injectable 20 milliGRAM(s) IV Push every 12 hours  gabapentin 100 milliGRAM(s) Oral every 12 hours  insulin regular Infusion 3 Unit(s)/Hr (3 mL/Hr) IV Continuous <Continuous>  meperidine     Injectable 25 milliGRAM(s) IV Push once  norepinephrine Infusion 0.05 MICROgram(s)/kG/Min (7.65 mL/Hr) IV Continuous <Continuous>  phenylephrine    Infusion 1.5 MICROgram(s)/kG/Min (45.9 mL/Hr) IV Continuous <Continuous>  potassium chloride  10 mEq/50 mL IVPB 10 milliEquivalent(s) IV Intermittent every 1 hour  potassium chloride  10 mEq/50 mL IVPB 10 milliEquivalent(s) IV Intermittent every 1 hour  potassium chloride  10 mEq/50 mL IVPB 10 milliEquivalent(s) IV Intermittent every 1 hour  sodium chloride 0.9%. 1000 milliLiter(s) (10 mL/Hr) IV Continuous <Continuous>  vasopressin Infusion 0.1 Unit(s)/Min (6 mL/Hr) IV Continuous <Continuous>    MEDICATIONS  (PRN):  HYDROmorphone  Injectable 0.5 milliGRAM(s) IV Push every 6 hours PRN Breakthrough Pain  oxyCODONE    IR 5 milliGRAM(s) Oral every 4 hours PRN Moderate Pain (4 - 6)      Patient requires continuous monitoring with bedside rhythm monitoring, pulse oximetry monitoring, and continuous central venous and arterial pressure monitoring; and intermittent blood gas analysis. Care plan discussed with the ICU care team.   Patient remained critical, at risk for life threatening decompensation.    I have spent 60 minutes providing critical care management to this patient.      I, Abelardo Moreno, personally performed the services described in this documentation. all medical record entries made by the scribe were at my direction and in my presence. I have reviewed the chart and agree that the record reflects my personal performance and is accurate and complete  Electronically signed: Abelardo Moreno MD  Patient seen and examined at the bedside.    Remained critically ill on continuous ICU monitoring.    OBJECTIVE:  Vital Signs Last 24 Hrs  T(C): 35 (13 Jul 2022 20:45), Max: 37.4 (13 Jul 2022 11:59)  T(F): 95 (13 Jul 2022 20:45), Max: 99.3 (13 Jul 2022 11:59)  HR: 72 (13 Jul 2022 22:00) (72 - 92)  BP: 89/53 (13 Jul 2022 17:03) (52/- - 127/55)  BP(mean): --  RR: 14 (13 Jul 2022 22:00) (12 - 32)  SpO2: 100% (13 Jul 2022 22:00) (94% - 100%)    Parameters below as of 13 Jul 2022 20:45  Patient On (Oxygen Delivery Method): ventilator    O2 Concentration (%): 100    REVIEW OF SYSTEMS:     [x ] N/A    Physical Exam:  General: intubated multiple lines gtt & tubes   Neurology: sedated   Eyes: bilaeral small pupils equal and reactive   ENT/Neck: +ETT midline, Neck supple, trachea midline, No JVD   Respiratory: Rales noted bilaterally   CV: RRR, S1S2, no murmurs        [x] Sternal dressing, [x] Mediastinal CT x 2         [x] Sinus rhythm,  [] Temporary pacing AV / BOX off  Abdominal: Soft, NT, ND +BS,   Extremities: 1-2+ pedal edema noted, + peripheral pulses   Skin: No Rashes, Hematoma, Ecchymosis                           Assessment:  66 yr F H/O DM2 / HTN / CAD S/P KAYLEIGH X1 2019 / S/P Total thyroidectomy / S/P R breast Ca lumpectomy on Aromatase inhibtors  Admitted with Acute Fall found to Type A-aortic dissection with hemopericardium & cardiac tamponade transferred from OSH intubated on high dose pressors   S/P Emergent Sternotomy with finding of ruptured Aorta Ascending & hemiarch replacement with circ arrest D # 0  Massive bleeding requiring multiple PRBC & Products including FEIBA  Multifactorial shock   Intra operative finding of HOCM / Severe MR     Post op coagulopathy , thrombocytopenia & hypofibrinogenemia   Post op hypoxia   Lactic acidosis   Hyperglycemia        Plan:   ***Neuro***    Post operative neuro assessment     ***Cardiovascular***  Post op unstable hemodynamics   Invasive hemodynamic monitoring, assess perfusion indices   SR 68-80 / CVP 6-8/ MAP 75-80  / SvO2 81 / Hct 26 / Lactate 6.9   [x] Levophed 0.02-0.05 mcg/kg/min  [x] Vasopressin 0.1 units/min [x] Regan gtt 1,5 mcg/kg/min   Volume: [x] Albumin 500 cc [ ] Crystalloid 500 cc [x] PRBC intra op 5  [x] Plts  intra op 2  [ ] Cryo intra op 5 + post op 5 [x] Plasma 5 intra op + 2 post op  [x] FEIBA 2000 units   Reassessment of hemodynamics post resuscitation   Monitor chest tube outputs   [x] Bleeding    Serial EKG and cardiac enzymes     ***Pulmonary***  Severe Oxygenation failure   Adjust TV 7cc/kg   Change  % /  / RR 18 / Peep 8   [x] Nitric oxide 20 PPM for hypoxia    Post op vent management  Titration of FiO2 and PEEP, follow SpO2, CXR, blood gases     Mode: AC/ CMV (Assist Control/ Continuous Mandatory Ventilation)  RR (machine): 14  TV (machine): 500  FiO2: 100  PEEP: 7  ITime: 1  MAP: 11  PIP: 26              Anticipate prolong mechanical ventilation   PF ratio <76 severe oxygenation impairment  Diuresis    ***GI***  [x] NPO /     [x] Protonix      ***Renal***  GFR 60  At risk for hemodynamically mediated ATN  Continue to monitor I/Os, BUN/Creatinine.   Replete lytes PRN  Miguel Angel present     ***ID***  *Do NOT document temp/WBC, just the below statement is fine*  Perioperative antibitoics     ***Endocrine***  [X] Hyperglycemia  HbA1c PND              - [x] Insulin gtt               - Need tight glycemic control to prevent wound infection.  Resume LT4 75 mcg IV QD     ALL MEDICATIONS (delete after use)  MEDICATIONS  (STANDING):  acetaminophen     Tablet .. 650 milliGRAM(s) Oral every 6 hours  albumin human  5% IVPB 250 milliLiter(s) IV Intermittent once  aMIOdarone    Tablet 400 milliGRAM(s) Oral two times a day  ascorbic acid 500 milliGRAM(s) Oral two times a day  aspirin enteric coated 81 milliGRAM(s) Oral daily  aspirin Suppository 300 milliGRAM(s) Rectal once  cefuroxime  IVPB 1500 milliGRAM(s) IV Intermittent every 8 hours  chlorhexidine 0.12% Liquid 15 milliLiter(s) Oral Mucosa every 12 hours  chlorhexidine 2% Cloths 1 Application(s) Topical daily  dexMEDEtomidine Infusion 0.5 MICROgram(s)/kG/Hr (10.2 mL/Hr) IV Continuous <Continuous>  dextrose 50% Injectable 50 milliLiter(s) IV Push every 15 minutes  famotidine Injectable 20 milliGRAM(s) IV Push every 12 hours  gabapentin 100 milliGRAM(s) Oral every 12 hours  insulin regular Infusion 3 Unit(s)/Hr (3 mL/Hr) IV Continuous <Continuous>  meperidine     Injectable 25 milliGRAM(s) IV Push once  norepinephrine Infusion 0.05 MICROgram(s)/kG/Min (7.65 mL/Hr) IV Continuous <Continuous>  phenylephrine    Infusion 1.5 MICROgram(s)/kG/Min (45.9 mL/Hr) IV Continuous <Continuous>  potassium chloride  10 mEq/50 mL IVPB 10 milliEquivalent(s) IV Intermittent every 1 hour  potassium chloride  10 mEq/50 mL IVPB 10 milliEquivalent(s) IV Intermittent every 1 hour  potassium chloride  10 mEq/50 mL IVPB 10 milliEquivalent(s) IV Intermittent every 1 hour  sodium chloride 0.9%. 1000 milliLiter(s) (10 mL/Hr) IV Continuous <Continuous>  vasopressin Infusion 0.1 Unit(s)/Min (6 mL/Hr) IV Continuous <Continuous>    MEDICATIONS  (PRN):  HYDROmorphone  Injectable 0.5 milliGRAM(s) IV Push every 6 hours PRN Breakthrough Pain  oxyCODONE    IR 5 milliGRAM(s) Oral every 4 hours PRN Moderate Pain (4 - 6)      Patient requires continuous monitoring with bedside rhythm monitoring, pulse oximetry monitoring, and continuous central venous and arterial pressure monitoring; and intermittent blood gas analysis. Care plan discussed with the ICU care team.   Patient remained critical, at risk for life threatening decompensation.    I have spent 60 minutes providing critical care management to this patient.      I, Abelardo Moreno, personally performed the services described in this documentation. all medical record entries made by the scribe were at my direction and in my presence. I have reviewed the chart and agree that the record reflects my personal performance and is accurate and complete  Electronically signed: Abelardo Moreno MD

## 2022-07-13 NOTE — PRE-ANESTHESIA EVALUATION ADULT - NSANTHADDINFOFT_GEN_ALL_CORE
Patient received from EMS to OR life threatening Type A dissection s/p arrest on Norepi 1.5 mcg/kg/min unable to obtain NIBPs. PIVs with Norepi infiltrated. Discussed with surgeon Dominik. Surgical telephone consent obtained by Dominik from brother. Due to ASA 5E emergent to OR no anesthesia consent was obtained.

## 2022-07-13 NOTE — CONSULT NOTE ADULT - SUBJECTIVE AND OBJECTIVE BOX
History of Present Illness: The patient is a 65 year old female with a history of HTN, DM, hypothyroidism, cognitive impairment, right breast cancer, HCM, chronic diastolic heart failure who presents with a fall. Upon my arrival the patient was being intubated so no history could be obtained from the patient. As per notes, she was getting out of bed, felt lightheaded, and fell. She had complained of neck and jaw pain. While in ED, ayon catheter was being placed, patient became unresponsive. No pulse felt, so CPR started - less than one minute and ROSC. Later she became unresponsive again so intubated.    Past Medical/Surgical History:  HTN, DM, hypothyroidism, cognitive impairment, right breast cancer, HCM, chronic diastolic heart failure    Medications:  Home Medications:  Acidophilus oral capsule: 1 cap(s) orally 2 times a day (18 Nov 2021 17:10)  anastrozole 1 mg oral tablet: 1 tab(s) orally once a day (18 Nov 2021 17:10)  aspirin 81 mg oral tablet: 1 tab(s) orally once a day (18 Nov 2021 17:10)  atorvastatin 20 mg oral tablet: 1 tab(s) orally once a day (18 Nov 2021 17:10)  carvedilol 25 mg oral tablet: 1 tab(s) orally 2 times a day (18 Nov 2021 17:10)  cloNIDine 0.2 mg oral tablet: orally 3 times a day (18 Nov 2021 17:10)  furosemide 40 mg oral tablet: 1 tab(s) orally once a day (18 Nov 2021 17:10)  hydrALAZINE 100 mg oral tablet: orally 3 times a day (18 Nov 2021 17:10)  levothyroxine 150 mcg (0.15 mg) oral tablet: 1 tab(s) orally once a day (18 Nov 2021 17:10)  losartan 100 mg oral tablet: 1 tab(s) orally once a day (18 Nov 2021 17:10)  metFORMIN 500 mg oral tablet, extended release: 1 tab(s) orally once a day (18 Nov 2021 17:10)  potassium chloride 20 mEq oral tablet, extended release: orally 2 times a day (18 Nov 2021 17:10)  senna 8.6 mg oral tablet: 2 tab(s) orally once a day (at bedtime) (18 Nov 2021 17:10)      Family History: Non-contributory family history of premature cardiovascular atherosclerotic disease    Social History: No tobacco, alcohol or drug use    Review of Systems:  Unable to obtain    Physical Exam:  Vitals:        Vital Signs Last 24 Hrs  T(C): 37.4 (13 Jul 2022 11:59), Max: 37.4 (13 Jul 2022 11:59)  T(F): 99.3 (13 Jul 2022 11:59), Max: 99.3 (13 Jul 2022 11:59)  HR: 84 (13 Jul 2022 07:11) (84 - 84)  BP: 109/81 (13 Jul 2022 07:11) (109/81 - 109/81)  BP(mean): --  RR: 18 (13 Jul 2022 07:11) (18 - 18)  SpO2: 94% (13 Jul 2022 07:11) (94% - 94%)  Parameters below as of 13 Jul 2022 07:11  Patient On (Oxygen Delivery Method): room air  General: Unresponsive  HEENT: Intubated  Neck: No JVD, no carotid bruit  Lungs: Coarse bilaterally  CV: RRR, nl S1/S2, no M/R/G  Abdomen: S/NT/ND, +BS  Extremities: No LE edema, no cyanosis  Neuro: AAOx0  Skin: No rash    Labs:                        12.3   19.88 )-----------( 256      ( 13 Jul 2022 08:26 )             39.1     07-13    146<H>  |  106  |  20  ----------------------------<  208<H>  4.1   |  29  |  1.08    Ca    8.6      13 Jul 2022 08:26    TPro  6.5  /  Alb  3.4  /  TBili  0.7  /  DBili  x   /  AST  31  /  ALT  37  /  AlkPhos  61  07-13        PT/INR - ( 13 Jul 2022 09:32 )   PT: 12.1 sec;   INR: 1.05 ratio         PTT - ( 13 Jul 2022 09:32 )  PTT:22.8 sec    ECG: NSR, LVH with secondary repolarization abnormality

## 2022-07-13 NOTE — ED ADULT TRIAGE NOTE - CHIEF COMPLAINT QUOTE
" I woke up dizzy and fell getting out of bed, hit my jaw on the floor, my jaw hurts, my neck hurts, my nose was bleeding "

## 2022-07-13 NOTE — ED ADULT NURSE REASSESSMENT NOTE - NS ED NURSE REASSESS COMMENT FT1
patient at CT Scan, became unresponsive rigid blank stare, respirations shallow code blue called patient brought straight to trauma room. Pulse 92 on monitor, Valium 5mg IVP given at 1228. Patient intubated by respirations therapist ET Tube 6.0, at 1229. at 1236 B/P 127/52 HR 96, RR-22, o2 Sat 99%. Patient taken to Ct Scan on monitor and vent with primary RN, and respirations therapist. patient at CT Scan, became unresponsive rigid blank stare, respirations shallow code blue called patient brought straight to trauma room. Pulse 92 on monitor, Valium 5mg IVP given at 1228. Patient intubated by respirations therapist ET Tube 6.0, at 1229. at 1236 B/P 127/52 HR 96, RR-22, o2 Sat 99%.   B/P 80/52  HR 91 RR 22 O2 sat% 99 intubated.   B/P 89/50 Patient taken to Ct Scan on monitor and vent with primary RN, and respirations therapist. patient at CT Scan, became unresponsive rigid blank stare, respirations shallow code blue called patient brought straight to trauma room. Pulse 92 on monitor, Valium 5mg IVP given at 1228. Patient intubated by respirations therapist ET Tube 6.0, at 1229. at 1236 B/P 127/52 HR 96, RR-22, o2 Sat 99%.   B/P 80/52  HR 91 RR 22 O2 sat% 99 intubated.   B/P 89/50 IV Fluids wide open with pressure bag  Patient taken to Ct Scan on monitor and vent with primary RN, and respirations therapist.

## 2022-07-13 NOTE — ED ADULT NURSE REASSESSMENT NOTE - NS ED NURSE REASSESS COMMENT FT1
Pt straight cathed using sterile technique.  Pt tolerated procedure well. Sterile specimen collected. UA and Culture sent.     12:21pm  pt became unresponsive while sitting after completion of straight catheterization. pt's complexion appeared blue.  pt placed on NRB then BVM. code blue called. respiratory, ED management, ED MD Borja at bedside. as per MD Borja pt pulseless for 30-60 seconds. CPR initiated. pt awoke. VSS. pt appeared diaphoretic, A&Ox2 able to answer most questions. pt changed to nasal cannula. pt arousable, awake and speaking. Pt straight cathed using sterile technique.  Pt tolerated procedure well. Sterile specimen collected. UA and Culture sent.     11:21pm  pt became unresponsive while sitting after completion of straight catheterization. pt's complexion appeared blue.  pt placed on NRB then BVM. code blue called. respiratory, ED management, ED MD Borja at bedside. as per MD Borja pt pulseless for 30-60 seconds. CPR initiated. pt awoke. VSS. pt appeared diaphoretic, A&Ox2 able to answer most questions. pt changed to nasal cannula. pt arousable, awake and speaking.

## 2022-07-13 NOTE — ED ADULT NURSE REASSESSMENT NOTE - NS ED NURSE REASSESS COMMENT FT1
pt being transferred with soft wrist restraints to [facility] OR. MD Borja gave order for restraints, restraints order on transfer paperwork

## 2022-07-13 NOTE — ED PROVIDER NOTE - PROGRESS NOTE DETAILS
case dw Dr. Collins (CT surgeon)  pending cta chest and abd As per CTC- accepted to OR [place] pt coded c rosc p about 1 minute of chest compressions. delta [facility]

## 2022-07-13 NOTE — ED PROVIDER NOTE - OBJECTIVE STATEMENT
Pt got out bed, got dizzy and fell on your left side and complaining neck pain.  No loc, headache. Pt got out bed, got dizzy and fell on your left side and complaining neck pain.  No loc, headache.  66-year-old female is brought in by EMS from [address].  bl jaw pain. No other complaints.

## 2022-07-13 NOTE — ED ADULT NURSE NOTE - OBJECTIVE STATEMENT
66 YOF brought in by EMS from [address] presents to ED s/p fall. pt states got dizzy and fell out of bed hitting face resulting in nosebleed. pt denies LOC. pt complains of neck and facial pain rated 9/10. pt noted to have dried blood of nares. pt denies chest pain, n/v/d. fall precautions in place. safety maintained.

## 2022-07-13 NOTE — ED ADULT NURSE NOTE - NSIMPLEMENTINTERV_GEN_ALL_ED
Implemented All Fall Risk Interventions:  [place] to call system. Call bell, personal items and telephone within reach. Instruct patient to call for assistance. Room bathroom lighting operational. Non-slip footwear when patient is off stretcher. Physically safe environment: no spills, clutter or unnecessary equipment. Stretcher in lowest position, wheels locked, appropriate side rails in place. Provide visual cue, wrist band, yellow gown, etc. Monitor gait and stability. Monitor for mental status changes and reorient to person, place, and time. Review medications for side effects contributing to fall risk. Reinforce activity limits and safety measures with patient and family.

## 2022-07-13 NOTE — ED PROVIDER NOTE - RESPIRATORY, MLM
cup;Dysphagia Pureed (Dysphagia I)    Current Diet level:  Current Diet : Regular  Current Liquid Diet : Thin    Oral Motor Deficits  Oral/Motor  Oral Motor: Within functional limits    Oral Phase Dysfunction  Oral Phase  Oral Phase: WFL  Oral Phase  Oral Phase - Comment: Pt presents with functional oral phase on this date with regular solid and thin liquid trials. Indicators of Pharyngeal Phase Dysfunction   Pharyngeal Phase  Pharyngeal Phase: WFL  Pharyngeal Phase   Pharyngeal: Pt presents with functional pharyngeal phase on this date with no overt s/s of aspiration on given trials of regular and thin. Impression  Dysphagia Diagnosis: Swallow function appears grossly intact  Dysphagia Impression : Swallowing function on this date is deemed safe and functional for regular solids and thin liquids. Dysphagia Outcome Severity Scale: Level 6: Within functional limits/Modified independence     Treatment Plan  Requires SLP Intervention: No  Duration/Frequency of Treatment: N/A  D/C Recommendations: To be determined     Prognosis  Individuals consulted  Consulted and agree with results and recommendations: RN(SELAM Dunne )    Education  Patient Education: Pt educated on results of BSE  Patient Education Response: Verbalizes understanding  Safety Devices in place: Yes  Type of devices: All fall risk precautions in place; Chair alarm in place    [x]  Mikey Dunne RN notified   []  Dr. Lawrence Ahuja notified via voicemail  []  OT/PT Departments notified via mailboxes    Pain:  Pain Assessment  Patient Currently in Pain: No  Pain Assessment: 0-10  Pain Level: (unable to rate;neck pain)  Pain Type: Chronic pain  Pain Location: Back, Neck  Pain Orientation: Lower, Right, Left  Pain Descriptors: [address] ([facility]):    SWALLOWING  Ratin    Therapy Time  SLP Individual Minutes  Time In: 1330  Time Out: 1400  Minutes: 30     Signature: Electronically signed by ARIELLE Zavala on 3/4/2020 at Breath sounds clear and equal bilaterally.

## 2022-07-13 NOTE — ED PROCEDURE NOTE - NS_ ATTENDINGSCRIBEDETAILS _ED_A_ED_FT
Fahad Borja MD - The scribe's documentation has been prepared under my direction and personally reviewed by me in its entirety. I confirm that the note above accurately reflects all work, treatment, procedures, and medical decision making performed by me.

## 2022-07-13 NOTE — CONSULT NOTE ADULT - ASSESSMENT
The patient is a 65 year old female with a history of HTN, DM, hypothyroidism, cognitive impairment, right breast cancer, HCM, chronic diastolic heart failure who presents with a fall, became unresponsive requiring intubation, with possible aortic dissection.    Plan:  - ECG with LVH related abnormalities  - CT chest with possible ascending thoracic aortic dissection and small pericardial effusion. Also noted is pulmonary edema.  - CTA chest pending  - BP on low side - continue aggressive IV fluids despite pulmonary edema - patient also has a history of HCM  - If MAP remains significantly low will need to start norepinephrine  - Patient intubated - continue mechanical ventilation  - Possible sepsis - received IV antibiotics  - Await CTA chest - if presence of dissection, the patient will need to be transferred for emergent CT surgery eval      35 minutes of critical care time spent with the patient and coordinating care with nursing, hospitalist, and consultants. Patient is critically ill requiring ICU care. The patient is high risk for deterioration and death. The patient is a 65 year old female with a history of HTN, DM, hypothyroidism, cognitive impairment, right breast cancer, HCM, chronic diastolic heart failure who presents with a fall, became unresponsive requiring intubation, with possible aortic dissection.    Plan:  - ECG with LVH related abnormalities  - CT chest with possible ascending thoracic aortic dissection and small pericardial effusion. Also noted is pulmonary edema.  - CTA chest pending  - BP on low side - continue aggressive IV fluids despite pulmonary edema - patient also has a history of HCM  - If MAP remains significantly low will need to start norepinephrine  - Patient intubated - continue mechanical ventilation  - Possible sepsis - received IV antibiotics  - Await CTA chest - if presence of dissection, the patient will need to be transferred for emergent CT surgery eval    ADDENDUM:  - Bedside echo performed. LV systolic function is grossly normal. There is no obvious involvement of dissection with the aortic valve - no significant aortic regurgitation noted. There is at least a moderate sized pericardial effusion.      35 minutes of critical care time spent with the patient and coordinating care with nursing, hospitalist, and consultants. Patient is critically ill requiring ICU care. The patient is high risk for deterioration and death.

## 2022-07-13 NOTE — PRE-ANESTHESIA EVALUATION ADULT - BP NONINVASIVE DIASTOLIC (MM HG)
"Surendra Mcclain is 1 year old 0 month old, here for a preventive care visit.     Assessment & Plan   Provider  Link to [facility] SmartSet :[id number]}  Surendra was seen today for well child.    Diagnoses and all orders for this visit:    Eczema, unspecified type  -     cetirizine (ZYRTEC) 5 MG/5ML solution; Take 2.5 mLs (2.5 mg) by mouth daily  -     childrens multivitamin w/iron (FLINTSTONES COMPLETE) 18 MG chewable tablet; 0.5 tablets po daily    Vaccine counseling  -     HEP A PED/ADOL  -     CHICKEN POX VACCINE,LIVE,SUBCUT    Encounter for routine child health examination w/o abnormal findings  -     DEVELOPMENTAL TEST, DORSEY  -     M-CHAT Development Testing  -     Lead Capillary; Future  -     sodium fluoride (VANISH) 5% white varnish 1 packet  -     NC APPLICATION TOPICAL FLUORIDE VARNISH BY PHS/QHP  -     HEP A PED/ADOL  -     CHICKEN POX VACCINE,LIVE,SUBCUT  -     Hemoglobin; Future    Eczema:    Eczema is very dry skin. It can cause severe itching and sores on the skin.      It can be treated but not cured. It will come and go throughout the year.      If you do not treat your child s skin everyday, expect the eczema to get worse.     For everyday skin care: Moisturizer    Put the creams on your child s skin when they are still wet from a bath.     Ideally it is used twice per day to prevent eczema from worsening.      Anything greasy will work the best.  Avoid \"lotions\".  Good moisturizers you can buy yourself are Vaseline, CeraVe, Eucerin, Aquaphor, Aveeno Eczema Care, Kimberly or Cetaphil.     For a bad flare-up a steroid cream can be used for a short period of time.     You can use the steroid cream for resistant patches twice a day for 7-10 days to avoid thinning of the skin.    Scratching can make the rash worse.  Sometimes using zyrtec once per day can decrease itching.     Other things that can help your child s eczema:   Keep your child s fingernails short   Avoid hot water in baths   Avoid Ivory   and deodorant " soaps (Dial, Safeguard, Japanese Spring, Romelia 2000)    Avoid bubble baths   Good soaps (key is to use unscented soaps) to use are Dove, Olay bar, Eucerin Bar, Cetaphil lotion cleanser, and Moisturel Sensitive Skin Cleanser    Use All Free & Clear, Cheer Free or Tide Free laundry detergents (perfumes in Dreft and Ivory Snow may worsen eczema)    Add zyrtec daily    Growth        No weight concerns.      Immunizations     Appropriate vaccinations were ordered.  I provided face to face vaccine counseling, answered questions, and explained the benefits and risks of the vaccine components ordered today including:  Hepatitis A - Pediatric 2 dose and Varicella - Chicken Pox      Anticipatory Guidance    Reviewed age appropriate anticipatory guidance.  The following topics were discussed:  SOCIAL/ FAMILY:    Imitation    Speech/language    Moving from parallel to interactive play    Reading to child    Given a book from Reach Out & Read  NUTRITION:    Variety at mealtime    Appetite fluctuation    Avoid food struggles    Calcium/ Iron sources  HEALTH/ SAFETY:    Dental hygiene    Exploration/ climbing    Car seat    Constant supervision      Referrals/Ongoing Specialty Care  Verbal referral for routine dental care  No    Follow Up      Return in 6 months (on 2/23/2022) for Preventive Care visit.    Patient has been advised of split billing requirements and indicates understanding: Yes      Subjective     Review of Systems:  Patient's eczema is poorly controlled. Some improvement with constant lotion application. Constitutional, eye, ENT, skin, respiratory, cardiac, and GI are normal except as otherwise noted.    PSFH:  No recent change to medical, surgical, family, or social history.    Additional Questions 8/23/2021   Do you have any questions today that you would like to discuss? No   Has your child had a surgery, major illness or injury since the last physical exam? No     Social 8/23/2021   Who does your child live with?  Parent(s)   Who takes care of your child? Parent(s)   Has your child experienced any stressful family events recently? None   In the past 12 months, has lack of transportation kept you from medical appointments or from getting medications? No   In the last 12 months, was there a time when you were not able to pay the mortgage or rent on time? No   In the last 12 months, was there a time when you did not have a steady place to sleep or slept in a shelter (including now)? No     Health Risks/Safety 8/23/2021   What type of car seat does your child use? Car seat with harness   Is your child's car seat forward or rear facing? Rear facing   Where does your child sit in the car?  Back seat   Do you use space heaters, wood stove, or a fireplace in your home? (!) YES   Are poisons/cleaning supplies and medications kept out of reach? Yes   Do you have a swimming pool? (!) YES   Does your child wear a bike/sports helmet for bike trailer or trike? Yes   Do you have guns/firearms in the home? No     TB Screening 8/23/2021   Since your last Well Child visit, have any of your child's family members or close contacts had tuberculosis or a positive tuberculosis test? No   Since your last Well Child Visit, has your child or any of their family members or close contacts traveled or lived outside of the [place]? No   Since your last Well Child visit, has your child lived in a high-risk group setting like a correctional facility, health care facility, homeless shelter, or refugee camp? No     TIP  Consider immunosuppression as a risk factor for TB:[id number]}  Dyslipidemia Screening 8/23/2021   Have any of the child's parents or grandparents had a stroke or heart attack before age 55 for males or before age 65 for females? No   Do either of the child's parents have high cholesterol or are currently taking medications to treat cholesterol? No   Risk Factors: None    Dental Screening 8/23/2021   Has your child seen a dentist? (!) NO    Has your child had cavities in the last 2 years? No   Has your child s parent(s), caregiver, or sibling(s) had any cavities in the last 2 years?  No     Dental Fluoride Varnish: Yes, fluoride varnish application risks and benefits were discussed, and verbal consent was received.     Diet 8/23/2021   Do you have questions about feeding your child? (!) YES   What questions do you have?  feeding   How does your child eat?  Cup, Self-feeding   What does your child regularly drink? Water, Cow's Milk   What type of milk?  Lactose free   What type of water? Tap   How often does your family eat meals together? Every day   How many snacks does your child eat per day 3-4 times daily   Are there types of foods your child won't eat? (!) YES   Please specify: patient can be picky at times   Within the past 12 months, you worried that your food would run out before you got money to buy more. Never true   Within the past 12 months, the food you bought just didn't last and you didn't have money to get more. Never true   Patient eats poorly. They only vegetables she eats are mashed potatoes and carrots. She doesn't like fruits or meat. For lunch she has noodles/rice, mashed potatoes, and carrots. For dinner she eats oatmeal. During they day she snacks on yogurt. They give her Pediasure.     Elimination 8/23/2021   Do you have any concerns about your child's bladder or bowels? No concerns   Toilet training status: Starting to toilet train     Media Use 8/23/2021   How many hours per day is your child viewing a screen for entertainment? 0   Does your child use a screen in their bedroom? No     Sleep 8/23/2021   Do you have any concerns about your child's sleep? No concerns, regular bedtime routine and sleeps well through the night     Vision/Hearing 8/23/2021   Do you have any concerns about your child's hearing or vision?  No concerns     Development/ Social-Emotional Screen 8/23/2021   Does your child receive any special services?  "No     Development  Screening tool used, reviewed with parent/guardian: M-CHAT: Incomplete.       Objective   Exam  Ht 2' 11.43\" (0.9 m)   Wt 27 lb 3.5 oz (12.3 kg)   HC 19.37\" (49.2 cm)   BMI 15.24 kg/m    88 %ile (Z= 1.19) based on CDC (Girls, 0-36 Months) head circumference-for-age based on Head Circumference recorded on 8/23/2021.  56 %ile (Z= 0.15) based on CDC (Girls, 2-20 Years) weight-for-age data using vitals from 8/23/2021.  90 %ile (Z= 1.30) based on CDC (Girls, 2-20 Years) Stature-for-age data based on Stature recorded on 8/23/2021.  25 %ile (Z= -0.66) based on CDC (Girls, 2-20 Years) weight-for-recumbent length data based on body measurements available as of 8/23/2021.  Constitutional: She appears well-developed and well-nourished.   HEENT: Head: Normocephalic.    Right Ear: Tympanic membrane, external ear and canal normal.    Left Ear: Tympanic membrane, external ear and canal normal.    Nose: Nose normal.    Mouth/Throat: Mucous membranes are moist. Dentition is normal. Oropharynx is clear.    Eyes: Conjunctivae and lids are normal. Red reflex is present bilaterally. Pupils are equal, round, and reactive to light.   Neck: Neck supple. No tenderness is present.   Cardiovascular: Normal rate and regular rhythm. No murmur heard.  Pulses: Femoral pulses are 2+ bilaterally.   Pulmonary/Chest: Effort normal and breath sounds normal. There is normal air entry.   Abdominal: Soft. Bowel sounds are normal. There is no hepatosplenomegaly. No umbilical or inguinal hernia.   Genitourinary: Normal external female genitalia.   Musculoskeletal: Normal range of motion. Normal strength and tone. Spine without abnormalities.   Neurological: She is alert. She has normal reflexes. No cranial nerve deficit.   Skin: Eczematous lesions upper arms    ADDITIONAL HISTORY SUMMARIZED (2): None.  DECISION TO OBTAIN EXTRA INFORMATION (1): None.   RADIOLOGY TESTS (1): None.  LABS (1): Labs ordered.  MEDICINE TESTS (1): " None.  INDEPENDENT REVIEW (2 each): None.       The visit consisted of 23 minutes spent on the date of the encounter doing chart review, history and exam, documentation, and further activities as noted above.     I, Shiloh Joseph, am scribing for and in the presence of, Dr. Valles.    I, Dr. Valles, personally performed the services described in this documentation, as scribed by Shiloh Joseph in my presence, and it is both accurate and complete.    Total data points: 1  Cori Valles MD  [facility]   53

## 2022-07-14 NOTE — CONSULT NOTE ADULT - SUBJECTIVE AND OBJECTIVE BOX
[facility]  [phone]  NEPHROLOGY      INITIAL CONSULT NOTE  --------------------------------------------------------------------------------  HPI:    66 yr F H/O DM2 / HTN / CAD S/P KAYLEIGH X1 2019 / S/P Total thyroidectomy / S/P R breast Ca lumpectomy on Aromatase inhibtors  Admitted with Acute Fall found to Type A-aortic dissection with hemopericardium & cardiac tamponade transferred from OSH intubated on high dose pressors initially now S/P Emergent Sternotomy with finding of ruptured Aorta Ascending & hemiarch replacement with circ arrest on 7/14/2022. pt has had Massive bleeding requiring multiple PRBC & Products including FEIBA/Multifactorial shock/Intra operative finding of HOCM / Severe MR. post op now. pt with JOEL hyperkalemia acidosis and decrease urination. renal consult called.       PAST HISTORY  --------------------------------------------------------------------------------  PAST MEDICAL & SURGICAL HISTORY:  HTN (hypertension)      Anxiety      HLD (hyperlipidemia)      Hypothyroid      Breast CA  right      Poor historian      Cancer of thyroid  unsure of dates but prior to 2005      Coronary artery disease      Cognitive impairment      Osteoarthritis      Type 2 diabetes mellitus      Urinary frequency      History of adrenal adenoma      Diabetes      H/O total thyroidectomy      H/O bilateral breast biopsy  November 2018 and December 2018      S/P angioplasty with stent  drug eluting stent in first diagonal on 2/21/19      S/P dilation and curettage  TOP x2        FAMILY HISTORY:  FHx: suicide    FHx: lymphoma    Family history of brain damage (Sibling)  at birth      PAST SOCIAL HISTORY:    ALLERGIES & MEDICATIONS  --------------------------------------------------------------------------------  Allergies    No Known Allergies    Intolerances      Standing Inpatient Medications  cefuroxime  IVPB 1500 milliGRAM(s) IV Intermittent every 8 hours  chlorhexidine 0.12% Liquid 15 milliLiter(s) Oral Mucosa every 12 hours  chlorhexidine 2% Cloths 1 Application(s) Topical daily  dexMEDEtomidine Infusion 0.5 MICROgram(s)/kG/Hr IV Continuous <Continuous>  dextrose 50% Injectable 50 milliLiter(s) IV Push every 15 minutes  insulin regular Infusion 3 Unit(s)/Hr IV Continuous <Continuous>  levothyroxine Injectable 75 MICROGram(s) IV Push at bedtime  norepinephrine Infusion 0.05 MICROgram(s)/kG/Min IV Continuous <Continuous>  pantoprazole Infusion 8 mG/Hr IV Continuous <Continuous>  piperacillin/tazobactam IVPB. 2.25 Gram(s) IV Intermittent once  piperacillin/tazobactam IVPB.. 2.25 Gram(s) IV Intermittent every 8 hours  sodium chloride 0.9%. 1000 milliLiter(s) IV Continuous <Continuous>  vasopressin Infusion 0.1 Unit(s)/Min IV Continuous <Continuous>    PRN Inpatient Medications  HYDROmorphone  Injectable 0.5 milliGRAM(s) IV Push every 6 hours PRN  oxyCODONE    IR 5 milliGRAM(s) Oral every 4 hours PRN      REVIEW OF SYSTEMS  --------------------------------------------------------------------------------  intubated   VITALS/PHYSICAL EXAM  --------------------------------------------------------------------------------  T(C): 35.6 (07-14-22 @ 08:00), Max: 37.4 (07-13-22 @ 11:59)  HR: 66 (07-14-22 @ 08:00) (62 - 92)  BP: 89/53 (07-13-22 @ 17:03) (52/- - 127/55)  RR: 16 (07-14-22 @ 08:00) (12 - 32)  SpO2: 99% (07-14-22 @ 08:00) (94% - 100%)  Wt(kg): --  Height (cm): 170.2 (07-13-22 @ 17:03)  Weight (kg): 81.6 (07-13-22 @ 17:03)  BMI (kg/m2): 28.2 (07-13-22 @ 17:03)  BSA (m2): 1.93 (07-13-22 @ 17:03)      07-13-22 @ 07:01  -  07-14-22 @ 07:00  --------------------------------------------------------  IN: 4390 mL / OUT: 1845 mL / NET: 2545 mL    07-14-22 @ 07:01  -  07-14-22 @ 08:28  --------------------------------------------------------  IN: 10 mL / OUT: 140 mL / NET: -130 mL      Physical Exam:  	Gen: intubated  	no jvd  	Pulm: decrease bs  no rales or ronchi or wheezing + chest tubes   	CV: RRR, S1S2; no rub  	Abd:  no bs + distended   	: No bladder distention palpable   	UE: Warm, no cyanosis  no clubbing,  non pitting edema   	LE: Warm, no cyanosis  no clubbing, non pitting  edema  	Neuro:  intubated  	  LABS/STUDIES  --------------------------------------------------------------------------------              8.7    7.30  >-----------<  103      [07-14-22 @ 03:43]              26.3     154  |  114  |  32  ----------------------------<  111      [07-14-22 @ 03:43]  5.7   |  16  |  1.25        Ca     8.3     [07-14-22 @ 03:43]      Mg     2.9     [07-14-22 @ 03:43]      Phos  7.3     [07-14-22 @ 03:43]    TPro  3.8  /  Alb  2.5  /  TBili  1.4  /  DBili  x   /  AST  1384  /  ALT  1039  /  AlkPhos  75  [07-14-22 @ 03:43]    PT/INR: PT 17.7 , INR 1.52       [07-14-22 @ 03:43]  PTT: 35.6       [07-14-22 @ 03:43]          [07-14-22 @ 03:43]    Creatinine Trend:  SCr 1.25 [07-14 @ 03:43]  SCr 1.14 [07-14 @ 00:33]  SCr 0.96 [07-13 @ 21:27]  SCr 1.08 [07-13 @ 08:26]    Urinalysis - [07-13-22 @ 12:16]      Color Yellow / Appearance Clear / SG 1.020 / pH 6.0      Gluc 100 / Ketone Negative  / Bili Small / Urobili Negative       Blood Trace / Protein 500 / Leuk Est Trace / Nitrite Positive      RBC 0-2 / WBC 3-5 / Hyaline  / Gran  / Sq Epi  / Non Sq Epi Occasional / Bacteria Moderate      Iron 35, TIBC 240, %sat 15      [10-08-21 @ 15:35]  Ferritin 90      [10-08-21 @ 15:35]  HbA1c 5.4      [07-25-19 @ 05:05]  TSH 1.84      [07-13-22 @ 21:22]    HCV 0.08, Nonreact      [07-25-19 @ 05:05]

## 2022-07-14 NOTE — BRIEF OPERATIVE NOTE - COMMENTS
aortic cross clamp time: 100 mins.    Unable to accurately assess blood loss due to use of cell-saver and CPB suction.

## 2022-07-14 NOTE — BRIEF OPERATIVE NOTE - NSICDXBRIEFPOSTOP_GEN_ALL_CORE_FT
POST-OP DIAGNOSIS:  Aortic dissection 14-Jul-2022 16:24:43  Mari Prescott  Hemopericardium 14-Jul-2022 16:25:54  Mari Prescott

## 2022-07-14 NOTE — PROVIDER CONTACT NOTE (OTHER) - REASON
Chest tube site exsanguineous profusely clots noted in tube bleeding profusely around chest tube site, clots noted in tube

## 2022-07-14 NOTE — PROGRESS NOTE ADULT - SUBJECTIVE AND OBJECTIVE BOX
HPI:      Patient seen and examined at the bedside.    Remained critically ill on continuous ICU monitoring.    OBJECTIVE:  Vital Signs Last 24 Hrs  T(C): 35.6 (14 Jul 2022 08:00), Max: 37.4 (13 Jul 2022 13:45)  T(F): 96.1 (14 Jul 2022 08:00), Max: 99.3 (13 Jul 2022 13:45)  HR: 60 (14 Jul 2022 11:45) (58 - 92)  BP: 71/47 (14 Jul 2022 11:15) (52/- - 89/53)  BP(mean): 55 (14 Jul 2022 11:15) (55 - 55)  RR: 18 (14 Jul 2022 11:45) (12 - 32)  SpO2: 100% (14 Jul 2022 11:45) (94% - 100%)    Parameters below as of 14 Jul 2022 08:00  Patient On (Oxygen Delivery Method): ventilator        Physical Exam:   General: Intubated, multiple lines gtt  Neurology: sedated   Eyes: bilateral pupils equal and reactive   ENT/Neck: +ETT midline, Neck supple, trachea midline, No JVD   Respiratory: Clear bilaterally   CV: S1S2, no murmurs        [x] Sternal dressing, [x] Mediastinal CT x2,         [x] Paced rhythm, [x] Temporary pacing- AAI 80  Abdominal: Soft, NT, ND +BS  Extremities: 1-2+ pedal edema noted, + peripheral pulses   Skin: No Rashes, Hematoma, Ecchymosis                           Assessment:  65F with PMH Type 2 DM, HTN,hypothyroid, cognitive impairment,recurrent falls,recurrent uti, right breast malignancy on chemo  sent from [facility] abd pain 10/07/2021    7/13 Emergent ascending aortic and hemiarch repair  Breast cancer s/p Biopsy of right axillary sentinel nodes/ Wide excision of mass of both breasts with wire guidance 3/22/2019  Hemodynamic instability  Cardiogenic shock   Hypovolemia  Lactic acidosis  Post op respiratory insufficiency  Acute blood loss anemia  Thrombocytopenia           Plan:   ***Neuro***  Addressed analgesic regimen to optimize function and sedated with Precedex for ventilatory synchrony. IV Fentanyl started to alleviate discomfort.    ***Cardiovascular***  IV Levophed and IV Vasopressin infusion for Cardiogenic shock. Lactate elevated to 10.1, continue trending to monitor for fluid resuscitation as indicated. Invasive hemodynamic monitoring with a central venous catheter & an A-line were required for the continuous central venous and MAP/BP monitoring to ensure adequate cardiovascular support. Temporary back up pacing wires in place at AAI 80. Patient was given Albumin today     ***Pulmonary***  Respiratory status required full ventilatory support, close monitoring of respiratory rate and breathing pattern, the following of ABG’s with A-line monitoring, continuous pulse oximetry monitoring    Mode: AC/ CMV (Assist Control/ Continuous Mandatory Ventilation)  RR (machine): 18  TV (machine): 420  FiO2: 50  PEEP: 8  ITime: 1  MAP: 13  PIP: 25               ***Heme***  Thrombocytopenia and Anemia due to acute blood loss, patient given 3 Packed red blood cells today. Continue to monitor hemoglobin and hematocrit levels            ***GI***  NPO after recent procedure, advance diet as tolerated. Protonix for stress ulcer prophylaxis.       ***Renal***  Optimize renal perfusion with adequate volume resuscitation and continued monitoring of urine output, fluid balance, electrolytes, and BUN/Creatinine.        ***ID***  Afebrile, white blood cells within normal limits  Continue trending white blood count and monitoring fever curve. Continue Cefuroxime for perioperative antibiotic coverage,          ***Endocrine***  Metabolic stability, history of diabetes mellitus required an IV regular Insulin drip while following serial glucose levels to help achieve and maintain euglycemia. Continue Synthroid for Hypothyroidism           Patient requires continuous monitoring with bedside rhythm monitoring, pulse oximetry monitoring, and continuous central venous and arterial pressure monitoring; and intermittent blood gas analysis. Care plan discussed with the ICU care team.   Patient remained critical, at risk for life threatening decompensation.    I have spent 30 minutes providing critical care management to this patient.    By signing my name below, I, Claus Novoa, attest that this documentation has been prepared under the direction and in the presence of Sahra Irving MD   Electronically signed: Mitesh Petty, 07-14-22 @ 12:01    I, Sahra Irving, personally performed the services described in this documentation. all medical record entries made by the [email or web address] were at my direction and in my presence. I have reviewed the chart and agree that the record reflects my personal performance and is accurate and complete  Electronically signed: Sahra Irving MD  HPI: 66 F with history of HTN, DM, hypothyroidism, chronic diastolic heart failure and hypertrophic cardiomyopathy, cognitive dysfunction/living in a long term care facility, admitted with acute type A dissection, s/p emergent repair. Intraop course complicated by decompensation requiring crash on bypass with CPR due to acute aortic rupture.      Patient seen and examined at the bedside.    Remained critically ill on continuous ICU monitoring.    OBJECTIVE:  Vital Signs Last 24 Hrs  T(C): 35.6 (14 Jul 2022 08:00), Max: 37.4 (13 Jul 2022 13:45)  T(F): 96.1 (14 Jul 2022 08:00), Max: 99.3 (13 Jul 2022 13:45)  HR: 60 (14 Jul 2022 11:45) (58 - 92)  BP: 71/47 (14 Jul 2022 11:15) (52/- - 89/53)  BP(mean): 55 (14 Jul 2022 11:15) (55 - 55)  RR: 18 (14 Jul 2022 11:45) (12 - 32)  SpO2: 100% (14 Jul 2022 11:45) (94% - 100%)    Parameters below as of 14 Jul 2022 08:00  Patient On (Oxygen Delivery Method): ventilator        Physical Exam:   General: Intubated, multiple lines gtt  Neurology: sedated   Eyes: bilateral pupils, constricted  ENT/Neck: +ETT midline, Neck supple, trachea midline, No JVD   Respiratory: Clear bilaterally   CV: S1S2, no murmurs        [x] Sternal dressing, [x] Mediastinal CT x2,         [x] sinus bradycardia  Abdominal: Soft, NT, ND +BS  Extremities: 1+ pedal edema noted, + peripheral pulses   Skin: No Rashes, Hematoma, Ecchymosis                           Assessment:  65F with PMH Type 2 DM, HTN,hypothyroid, cognitive impairment,recurrent falls,recurrent uti, right breast malignancy on chemo  sent from [facility] abd pain 10/07/2021    7/13 Emergent ascending aortic and hemiarch repair  Breast cancer s/p Biopsy of right axillary sentinel nodes/ Wide excision of mass of both breasts with wire guidance 3/22/2019  Hemodynamic instability  Cardiogenic shock   Hypovolemia  Lactic acidosis  Post op respiratory insufficiency  Acute blood loss anemia  Thrombocytopenia           Plan:   ***Neuro***  Patient with minimal response to painful stimuli (eye opening) and an IV Fentanyl infusion initiated to alleviate any possible discomfort once decision made to limit care based on medical futility.    ***Cardiovascular***  Patient required IV Levophed and IV Vasopressin infusions and volume infusion ordered initially for vasogenic shock, subsequently weaned to off. Lactate elevated to 10.1 with susbsequent discontinuation of lab draws.  Invasive hemodynamic monitoring with a central venous catheter & an A-line were required initially for continuous central venous and MAP/BP monitoring to ensure adequate cardiovascular support. Temporary epicardial pacing wires in place.    ***Pulmonary***  Respiratory status required full ventilatory support, close monitoring of respiratory rate and breathing pattern, the following of ABG’s with A-line monitoring, continuous pulse oximetry monitoring    Mode: AC/ CMV (Assist Control/ Continuous Mandatory Ventilation)  RR (machine): 18  TV (machine): 420  FiO2: 50  PEEP: 8  ITime: 1  MAP: 13  PIP: 25               ***Heme***  Thrombocytopenia and Anemia due to acute blood loss, patient given 3 Packed red blood cells today. Continue to monitor hemoglobin and hematocrit levels            ***GI***  NPO after recent procedure, advance diet as tolerated. Protonix for stress ulcer prophylaxis.       ***Renal***  Optimize renal perfusion with adequate volume resuscitation and continued monitoring of urine output, fluid balance, electrolytes, and BUN/Creatinine.        ***ID***  Afebrile, white blood cells within normal limits  Continue trending white blood count and monitoring fever curve. Continue Cefuroxime for perioperative antibiotic coverage,          ***Endocrine***  Metabolic stability, history of diabetes mellitus required an IV regular Insulin drip while following serial glucose levels to help achieve and maintain euglycemia. Continue Synthroid for Hypothyroidism           Patient requires continuous monitoring with bedside rhythm monitoring, pulse oximetry monitoring, and continuous central venous and arterial pressure monitoring; and intermittent blood gas analysis. Care plan discussed with the ICU care team.   Patient remained critical, at risk for life threatening decompensation.    I have spent 30 minutes providing critical care management to this patient.    By signing my name below, I, Claus Novoa, attest that this documentation has been prepared under the direction and in the presence of Sahra Irving MD   Electronically signed: Lori Petty, 07-14-22 @ 12:01    ISahra, personally performed the services described in this documentation. all medical record entries made by the lori were at my direction and in my presence. I have reviewed the chart and agree that the record reflects my personal performance and is accurate and complete  Electronically signed: Sahra Irving MD  HPI: 66 F with history of HTN, DM, hypothyroidism, chronic diastolic heart failure and hypertrophic cardiomyopathy, cognitive dysfunction/living in a long term care facility, admitted with acute type A dissection, s/p emergent repair. Intraop course complicated by decompensation requiring crash on bypass with CPR due to acute aortic rupture.      Patient seen and examined at the bedside.    Remained critically ill on continuous ICU monitoring.    OBJECTIVE:  Vital Signs Last 24 Hrs  T(C): 35.6 (14 Jul 2022 08:00), Max: 37.4 (13 Jul 2022 13:45)  T(F): 96.1 (14 Jul 2022 08:00), Max: 99.3 (13 Jul 2022 13:45)  HR: 60 (14 Jul 2022 11:45) (58 - 92)  BP: 71/47 (14 Jul 2022 11:15) (52/- - 89/53)  BP(mean): 55 (14 Jul 2022 11:15) (55 - 55)  RR: 18 (14 Jul 2022 11:45) (12 - 32)  SpO2: 100% (14 Jul 2022 11:45) (94% - 100%)    Parameters below as of 14 Jul 2022 08:00  Patient On (Oxygen Delivery Method): ventilator        Physical Exam:   General: Intubated, multiple lines gtt  Neurology: sedated   Eyes: bilateral pupils, constricted  ENT/Neck: +ETT midline, Neck supple, trachea midline, No JVD   Respiratory: Clear bilaterally   CV: S1S2, no murmurs        [x] Sternal dressing, [x] Mediastinal CT x2,         [x] sinus bradycardia  Abdominal: Soft, NT, ND +BS  Extremities: 1+ pedal edema noted, + peripheral pulses   Skin: No Rashes, Hematoma, Ecchymosis                           Assessment:  65F with PMH Type 2 DM, HTN,hypothyroid, cognitive impairment,recurrent falls,recurrent uti, right breast malignancy on chemo  sent from [facility] abd pain 10/07/2021    7/13 Emergent ascending aortic and hemiarch repair  Breast cancer s/p Biopsy of right axillary sentinel nodes/ Wide excision of mass of both breasts with wire guidance 3/22/2019  Hemodynamic instability  Cardiogenic shock   Hypovolemia  Lactic acidosis  Post op respiratory insufficiency  Acute blood loss anemia  Thrombocytopenia           Plan:   ***Neuro***  Patient with minimal response to painful stimuli (eye opening) and an IV Fentanyl infusion initiated to alleviate any possible discomfort once decision made to limit care based on medical futility.    ***Cardiovascular***  Patient developed aortic rupture in the OR requiring CPR and "crash" onto bypass, post operatively patient required IV Levophed and IV Vasopressin infusions and volume infusion ordered initially for vasogenic shock, subsequently weaned to off. Lactate elevated to 10.1 with susbsequent discontinuation of lab draws.  Suspect ischemic bowel. Invasive hemodynamic monitoring with a central venous catheter & an A-line were required initially for continuous central venous and MAP/BP monitoring to ensure adequate cardiovascular support. Temporary epicardial pacing wires in place.    ***Pulmonary***  Respiratory status required full ventilatory support, close monitoring of respiratory rate and breathing pattern, the following of ABG’s with A-line monitoring, continuous pulse oximetry monitoring    Mode: AC/ CMV (Assist Control/ Continuous Mandatory Ventilation)  RR (machine): 18  TV (machine): 420  FiO2: 50  PEEP: 8  ITime: 1  MAP: 13  PIP: 25               ***Heme***  Consumptive thrombocytopenia and anemia due to acute blood loss, patient given 4 Packed red blood cells, 2 bags of platelets and 3 units of plasma post op as well as massive transfusion in the OR after aortic rupture.        ***GI***  NPO after recent procedure. Ischemic hepatitis and GI bleed with suspicion of ischemic bowel.  Protonix for stress ulcer prophylaxis.       ***Renal***  Optimize renal perfusion with adequate volume resuscitation and continued monitoring of urine output, fluid balance, electrolytes, and BUN/Creatinine. Patient developed acute renal failure due to shock and ATN, it was decided not to initiate CVVH based on futility.       ***ID***  Afebrile, white blood cells within normal limits  Continue trending white blood count and monitoring fever curve. Continue Cefuroxime for perioperative antibiotic coverage,          ***Endocrine***  Metabolic stability, history of diabetes mellitus required following serial glucose levels to help achieve and maintain euglycemia. Insulin drip was not required. Continue Synthroid for Hypothyroidism           Patient required continuous monitoring with bedside rhythm monitoring, pulse oximetry monitoring, and continuous central venous and arterial pressure monitoring; and intermittent blood gas analysis. Care plan discussed with the ICU care team.   Patient remained critical, at risk for life threatening decompensation.    I have spent 30 minutes providing critical care management to this patient. Subsequently it was decided based on discussion with patient's brother, to continue care, but no to escalate or initiate dialysis. Fentanyl infusion ordered for comfort. Patient pronounced dead at 1400 due to refractory shock and end organ damage.    By signing my name below, I, Claus Novoa, attest that this documentation has been prepared under the direction and in the presence of Sahra Irving MD   Electronically signed: Mitesh Petty, 07-14-22 @ 12:01    I, Sahra Irving, personally performed the services described in this documentation. all medical record entries made by the scribe were at my direction and in my presence. I have reviewed the chart and agree that the record reflects my personal performance and is accurate and complete  Electronically signed: Sahra Irving MD

## 2022-07-14 NOTE — CONSULT NOTE ADULT - ASSESSMENT
66F PMHx of DM2, HTN, CAD s/p KAYLEIGH x1 2019, total thyroidectomy, breast ca s/p lumpectomy on aromatase inhibitors. Presented with acute type A aortic dissection s/p emergent sternotomy with hemiarch repair by cardiac surgery 7/13/22. Course c/b multifactorial shock, hypoxia, lactic acidosis, blood per rectum, and UGIB.   Surgery consulted for evaluation of possible mesenteric ischemia vs ischemic bowel    **INCOMPLETE PLAN**  Plan:  continue aggressive resuscitation  start on broad-spectrum IV abx  obtain ABX to evaluate for toxic megacolon     Plan discussed with and approve by attending on call, Dr. Maryanne Cintron MD PGY-2  [facility]   [id number]  66F PMHx of DM2, HTN, CAD s/p KAYLEIGH x1 2019, total thyroidectomy, breast ca s/p lumpectomy on aromatase inhibitors. Presented with acute type A aortic dissection s/p emergent sternotomy with hemiarch repair by cardiac surgery 7/13/22. Course c/b multifactorial shock, hypoxia, lactic acidosis, blood per rectum, and UGIB.   Surgery consulted for evaluation of possible mesenteric ischemia vs ischemic bowel      Plan:  continue aggressive resuscitation  start on broad-spectrum IV abx  obtain ABX to evaluate for toxic megacolon  Most likely non-occlusive ischemic bowel 2/2 patient course of multiple cardiac arrests over 24H and subsequent multiorgan failure  In setting of multi-organ failure with anuria, anoxic brain injury, and now most likely non-occlusive ischemic bowel 2/2 acute cardiovascular events in the past 24H, there is no acute indication for exploratory laparotomy or possible bowel resection as bowel is most likely grossly intact with only signs of mucosal necrosis.   Plan discussed with cardiac surgeon, Dr. Lehman, with agreement on comfort care measures pending family discussion.  Surgery will continue to follow. Please feel free to reach out with any further questions.     Plan discussed with and approve by attending on call, Dr. Maryanne Cintron MD PGY-2  [facility]   [id number]

## 2022-07-14 NOTE — CONSULT NOTE ADULT - SUBJECTIVE AND OBJECTIVE BOX
General Surgery Consult  Consulting surgical team: [facility]  Consulting attending: Dr. Maryanne Marcus    HPI: 66F PMHx of DM2, HTN, CAD s/p KAYLEIGH x1 2019, total thyroidectomy, breast ca s/p lumpectomy on aromatase inhibitors.   Presented to ED on 22 PM with acute fall found to have  type A aortic diseection with hemopericardium and cardiac tamponade transferred from OSH intubated on high dose pressors  s/p emergent sternotomy with finding of ruptured AA and hemiarch replacement with circ arrest.   Massive perioperative and post operative bleeding requiring multiple units of pRBCs and other blood products indlucing FEIBA.   Post-operative course complicated by multifactorial shock, caogulopathy, thrombocytopenia, and hypofibrinogenemia, hypoxia, severe lactic acidosis, and hyperglycemia.     Surgery consulted for evaluation for possible mesenteric ischemia vs ischemic bowel. Patient present with UGIB and bloody bowel movements.     Patient seen at bedside this AM. Intubated and currently not requiring pressors, is not sedated but is unresponsive.       PAST MEDICAL HISTORY:  HTN (hypertension)    HLD (hyperlipidemia)    Anxiety    HLD (hyperlipidemia)    Hypothyroid    Adrenal nodule    Breast CA    Poor historian    Cancer of thyroid    Coronary artery disease    Cognitive impairment    Osteoarthritis    Type 2 diabetes mellitus    Urinary frequency    History of adrenal adenoma    Diabetes        PAST SURGICAL HISTORY:  H/O total thyroidectomy    H/O bilateral breast biopsy    S/P angioplasty with stent    S/P dilation and curettage        MEDICATIONS:  cefuroxime  IVPB 1500 milliGRAM(s) IV Intermittent every 8 hours  chlorhexidine 0.12% Liquid 15 milliLiter(s) Oral Mucosa every 12 hours  chlorhexidine 2% Cloths 1 Application(s) Topical daily  dexMEDEtomidine Infusion 0.5 MICROgram(s)/kG/Hr IV Continuous <Continuous>  dextrose 50% Injectable 50 milliLiter(s) IV Push every 15 minutes  HYDROmorphone  Injectable 0.5 milliGRAM(s) IV Push every 6 hours PRN  insulin regular Infusion 3 Unit(s)/Hr IV Continuous <Continuous>  levothyroxine Injectable 75 MICROGram(s) IV Push at bedtime  norepinephrine Infusion 0.05 MICROgram(s)/kG/Min IV Continuous <Continuous>  oxyCODONE    IR 5 milliGRAM(s) Oral every 4 hours PRN  pantoprazole Infusion 8 mG/Hr IV Continuous <Continuous>  sodium chloride 0.9%. 1000 milliLiter(s) IV Continuous <Continuous>  vasopressin Infusion 0.1 Unit(s)/Min IV Continuous <Continuous>      ALLERGIES:  No Known Allergies      VITALS & I/Os:  Vital Signs Last 24 Hrs  T(C): 36.7 (2022 04:00), Max: 37.4 (2022 11:59)  T(F): 98.1 (2022 04:00), Max: 99.3 (2022 11:59)  HR: 66 (2022 07:15) (62 - 92)  BP: 89/53 (2022 17:03) (52/- - 127/55)  BP(mean): --  RR: 18 (2022 07:15) (12 - 32)  SpO2: 98% (2022 07:15) (94% - 100%)    Parameters below as of 2022 04:00  Patient On (Oxygen Delivery Method): ventilator    O2 Concentration (%): 40  Mode: AC/ CMV (Assist Control/ Continuous Mandatory Ventilation)  RR (machine): 18  TV (machine): 420  FiO2: 60  PEEP: 8  ITime: 1  MAP: 13  PIP: 25    I&O's Summary    2022 07:01  -  2022 07:00  --------------------------------------------------------  IN: 4390 mL / OUT: 1845 mL / NET: 2545 mL        PHYSICAL EXAM:  General: unresponsive to stimuli, pupils minimally reactive, +gag reflex  Respiratory: intubated on vent  Cardiovascular: RRR, S1S2  Chest: sternal dressing c/d/i, meds CT x2 with active bleeding around dressing and draining sanguinous fluid  Abdominal: Soft, nondistended, difficult assess TTP d/t patient unresponsiveness   Extremities: Warm, palpable DP, PT pulses b/l    LABS:                        8.7    7.30  )-----------( 103      ( 2022 03:43 )             26.3     07-14    154<H>  |  114<H>  |  32<H>  ----------------------------<  111<H>  5.7<H>   |  16<L>  |  1.25    Ca    8.3<L>      2022 03:43  Phos  7.3       Mg     2.9         TPro  3.8<L>  /  Alb  2.5<L>  /  TBili  1.4<H>  /  DBili  x   /  AST  1384<H>  /  ALT  1039<H>  /  AlkPhos  75      Lactate: Lactate, Blood: 2.6 mmol/L ( @ 09:32)    @ 02:53  8.6   @ 17:59  10.7   @ 17:34  11.0   @ 17:05  10.8   @ 16:30  9.4   @ 15:56  10.1   @ 15:36  9.5    PT/INR - ( 2022 03:43 )   PT: 17.7 sec;   INR: 1.52 ratio         PTT - ( 2022 03:43 )  PTT:35.6 sec  ABG - ( 2022 05:42 )  pH, Arterial: 7.26  pH, Blood: x     /  pCO2: 31    /  pO2: 110   / HCO3: 14    / Base Excess: -12.1 /  SaO2: 99.2              CARDIAC MARKERS ( 2022 03:43 )  x     / x     / 821 U/L / x     / x            Urinalysis Basic - ( 2022 12:16 )    Color: Yellow / Appearance: Clear / S.020 / pH: x  Gluc: x / Ketone: Negative  / Bili: Small / Urobili: Negative mg/dL   Blood: x / Protein: 500 mg/dL / Nitrite: Positive   Leuk Esterase: Trace / RBC: 0-2 /HPF / WBC 3-5   Sq Epi: x / Non Sq Epi: Occasional / Bacteria: Moderate        IMAGING:                                                                                               General Surgery Consult  Consulting surgical team: [facility]  Consulting attending: Dr. Maryanne Marcus    HPI: 66F PMHx of DM2, HTN, CAD s/p KAYLEIGH x1 2019, total thyroidectomy, breast ca s/p lumpectomy on aromatase inhibitors.   Presented to ED on 22 PM after cardiac arrest, transferred to [facility] ED and found to have  type A aortic diseection with hemopericardium and cardiac tamponade transferred from OSH intubated on high dose pressors  s/p emergent sternotomy with finding of ruptured AA and hemiarch replacement with circ arrest. Patient sustained cardiac arrest on OR table upon sternotomy and during CPB.   Massive perioperative and post operative bleeding requiring multiple units of pRBCs and other blood products indlucing FEIBA.   Post-operative course complicated by multifactorial shock, caogulopathy, thrombocytopenia, and hypofibrinogenemia, hypoxia, severe lactic acidosis, and hyperglycemia.     Surgery consulted for evaluation for possible mesenteric ischemia vs ischemic bowel. Patient present with UGIB and bloody bowel movements.     Patient seen at bedside this AM. Intubated and currently not requiring pressors, is not sedated but is unresponsive.       PAST MEDICAL HISTORY:  HTN (hypertension)    HLD (hyperlipidemia)    Anxiety    HLD (hyperlipidemia)    Hypothyroid    Adrenal nodule    Breast CA    Poor historian    Cancer of thyroid    Coronary artery disease    Cognitive impairment    Osteoarthritis    Type 2 diabetes mellitus    Urinary frequency    History of adrenal adenoma    Diabetes        PAST SURGICAL HISTORY:  H/O total thyroidectomy    H/O bilateral breast biopsy    S/P angioplasty with stent    S/P dilation and curettage        MEDICATIONS:  cefuroxime  IVPB 1500 milliGRAM(s) IV Intermittent every 8 hours  chlorhexidine 0.12% Liquid 15 milliLiter(s) Oral Mucosa every 12 hours  chlorhexidine 2% Cloths 1 Application(s) Topical daily  dexMEDEtomidine Infusion 0.5 MICROgram(s)/kG/Hr IV Continuous <Continuous>  dextrose 50% Injectable 50 milliLiter(s) IV Push every 15 minutes  HYDROmorphone  Injectable 0.5 milliGRAM(s) IV Push every 6 hours PRN  insulin regular Infusion 3 Unit(s)/Hr IV Continuous <Continuous>  levothyroxine Injectable 75 MICROGram(s) IV Push at bedtime  norepinephrine Infusion 0.05 MICROgram(s)/kG/Min IV Continuous <Continuous>  oxyCODONE    IR 5 milliGRAM(s) Oral every 4 hours PRN  pantoprazole Infusion 8 mG/Hr IV Continuous <Continuous>  sodium chloride 0.9%. 1000 milliLiter(s) IV Continuous <Continuous>  vasopressin Infusion 0.1 Unit(s)/Min IV Continuous <Continuous>      ALLERGIES:  No Known Allergies      VITALS & I/Os:  Vital Signs Last 24 Hrs  T(C): 36.7 (2022 04:00), Max: 37.4 (2022 11:59)  T(F): 98.1 (2022 04:00), Max: 99.3 (2022 11:59)  HR: 66 (2022 07:15) (62 - 92)  BP: 89/53 (2022 17:03) (52/- - 127/55)  BP(mean): --  RR: 18 (2022 07:15) (12 - 32)  SpO2: 98% (2022 07:15) (94% - 100%)    Parameters below as of 2022 04:00  Patient On (Oxygen Delivery Method): ventilator    O2 Concentration (%): 40  Mode: AC/ CMV (Assist Control/ Continuous Mandatory Ventilation)  RR (machine): 18  TV (machine): 420  FiO2: 60  PEEP: 8  ITime: 1  MAP: 13  PIP: 25    I&O's Summary    2022 07:01  -  2022 07:00  --------------------------------------------------------  IN: 4390 mL / OUT: 1845 mL / NET: 2545 mL        PHYSICAL EXAM:  General: unresponsive to stimuli, pupils minimally reactive, +gag reflex  Respiratory: intubated on vent  Cardiovascular: RRR, S1S2  Chest: sternal dressing c/d/i, meds CT x2 with active bleeding around dressing and draining sanguinous fluid  Abdominal: Soft, nondistended, difficult assess TTP d/t patient unresponsiveness   Extremities: Warm, palpable DP, PT pulses b/l    LABS:                        8.7    7.30  )-----------( 103      ( 2022 03:43 )             26.3     07-14    154<H>  |  114<H>  |  32<H>  ----------------------------<  111<H>  5.7<H>   |  16<L>  |  1.25    Ca    8.3<L>      2022 03:43  Phos  7.3       Mg     2.9         TPro  3.8<L>  /  Alb  2.5<L>  /  TBili  1.4<H>  /  DBili  x   /  AST  1384<H>  /  ALT  1039<H>  /  AlkPhos  75      Lactate: Lactate, Blood: 2.6 mmol/L ( @ 09:32)    @ 02:53  8.6   @ 17:59  10.7   @ 17:34  11.0   @ 17:05  10.8   @ 16:30  9.4   @ 15:56  10.1   @ 15:36  9.5    PT/INR - ( 2022 03:43 )   PT: 17.7 sec;   INR: 1.52 ratio         PTT - ( 2022 03:43 )  PTT:35.6 sec  ABG - ( 2022 05:42 )  pH, Arterial: 7.26  pH, Blood: x     /  pCO2: 31    /  pO2: 110   / HCO3: 14    / Base Excess: -12.1 /  SaO2: 99.2              CARDIAC MARKERS ( 2022 03:43 )  x     / x     / 821 U/L / x     / x            Urinalysis Basic - ( 2022 12:16 )    Color: Yellow / Appearance: Clear / S.020 / pH: x  Gluc: x / Ketone: Negative  / Bili: Small / Urobili: Negative mg/dL   Blood: x / Protein: 500 mg/dL / Nitrite: Positive   Leuk Esterase: Trace / RBC: 0-2 /HPF / WBC 3-5   Sq Epi: x / Non Sq Epi: Occasional / Bacteria: Moderate        IMAGING:

## 2022-07-14 NOTE — CONSULT NOTE ADULT - ASSESSMENT
66 yr F H/O DM2 / HTN / CAD S/P KAYLEIGH X1 2019 / S/P Total thyroidectomy / S/P R breast Ca lumpectomy on Aromatase inhibtors  Admitted with Acute Fall found to Type A-aortic dissection with hemopericardium & cardiac tamponade transferred from OSH intubated on high dose pressors initially now S/P Emergent Sternotomy with finding of ruptured Aorta Ascending & hemiarch replacement with circ arrest on 7/14/2022. pt has had Massive bleeding requiring multiple PRBC & Products including FEIBA/Multifactorial shock/Intra operative finding of HOCM / Severe MR. post op now. pt with JOEL hyperkalemia acidosis and decrease urination.      1- JOEL   2- hyperkalemia  3- acidosis   4- respiratory failure  5- acute liver injury ischemic in nature suspected  6- GI bleed       given hyperkalemia joel and now becoming anuric as well.   pt will need renal replacement therapy for hemodynamic support  d/w pt brother and hd consent obtained witnessed and placed in chart  CRRT to start  hemodynamic support with pressors as needed  prbc to keep Hb > 8   vent support   d/w CTU team at bedside

## 2022-07-14 NOTE — CHART NOTE - NSCHARTNOTEFT_GEN_A_CORE
Patient in multi system organ failure, s/p aortic dissection repair. Discussion had with brother/health care proxy, Fahad Mynor ([phone]), about patients current clinical status. Brother agrees to comfort care and not advancing IV medications or initiating any interventions. Will start fentanyl for comfort and make patient DNR as per brother's wishes.

## 2022-07-14 NOTE — DISCHARGE NOTE FOR THE EXPIRED PATIENT - HOSPITAL COURSE
66 yr F H/O DM2 / HTN / CAD S/P KAYLEIGH X1 2019 / S/P Total thyroidectomy / S/P R breast Ca lumpectomy on Aromatase inhibitors  Admitted with Acute Fall found to Type A-aortic dissection with hemopericardium & cardiac tamponade transferred from OSH intubated on high dose pressors   Post operative complications including continued bleeding, coagulopathy, concern for mesenteric ischemia, kidney failure, and no cognitive status off of sedation. Discussion had with brother/ health care proxy, Fahad Loew, given multi-system organ failure. Brother agreed with medical futility given patient's clinical status and decided to make the patient DNR and initiate comfort measures. Fentanyl gtt started and patient  at 1400

## 2022-07-14 NOTE — PROVIDER CONTACT NOTE (OTHER) - BACKGROUND
Pt admitted with aortic dissection Pt admitted with aortic dissection s/p repair of ascending aorta rupture

## 2022-07-19 LAB — SURGICAL PATHOLOGY STUDY: SIGNIFICANT CHANGE UP

## 2022-07-21 NOTE — ED ADULT NURSE NOTE - NSFALLRSKINDICATORS_ED_ALL_ED
MICU Transfer Note    Transfer from: MICU    Transfer to: ( x ) Medicine    (  ) Telemetry     (   ) RCU        (    ) Palliative         (   ) Stroke Unit          (   ) __________________    Accepting Physician:  Room #      HPI:  68 y/o F with pmhx of HTN, HLD, sarcoidosis, CHF, presented to the ED for new onset AMS. She was also found to have pleural effusion, elevated BNP and LE edema concerning for CHF exacerbation. Admitted to ICU initially for metabolic encephalopathy s/p intubation for airway protection (7/13-7/16). MICU course was c/b hypoglycemia with workup neg for adrenal insufficiency. Course c/b septic shock with no source (on meropenem since 7/13).     Was extubated on 7/16, was comfortable on RA with good mental status and was sent to the floors. On 7/17 AM pt was found to be agonally breathing and RRT was called. Pt went into PEA arrest, ROSC achieved after 4 mins, epix1 . Intubated by anesthesia. Was started on levo 0.1 and prop. Prior to leaving unit patient was waking up with high peak pressures on vent, she received 2mg of versed and became more synchronous with ventilator.  Upon arrival to CTI patient's blood pressures started to decrease despite increasing levo and started to become bradycardic. O2 sat remained at 100%. Went into PEA arrest again, epinephrine x1 given and ROSC was achieved at 4 mins. Likely from aspiration event?    MICU Course:  Patient slowly weaned off of sedation. Extubated to BIPAP and weaned to NC. Able to follow commands and answer questions. Completed meropenem. Of note, patient is hard of hearing and understands better if spoken to without a mask.    Assessment and Plan:  68 y/o F with PMH of HTN, HLD, sarcoidosis, CHF was intially admitted to MICU after being intubated for airway protection was extubated successfully . On 7/17 am, found in agonal breathing and subsequently pulseless. Code Blue called, ROSC achieved, pt re-intubated. Second PEA code called upon arrival to the CTICU, ROSC achieved.     Neuro:  #Metabolic Encephalopathy - resolving. Now extubated and following commands.     Resp:  # Nasal Cannula  - Wean as tolerated  - infectious workup as below   - f/u sputum culture    #Bilateral pleural effusions i/s/o HF exacerbation   - CT Chest on admission with new large right and small left pleural effusions with adjacent compressive atelectasis including atelectasis of the majority of the right lower lobe   - Still present on 7/21 echo  - Diuretics    CV:  #s/p PEA arrest x2  - Possible aspiration event    #HFpEF   - BNP on arrival: 4021 with evidence of fluid overload with pleural effusions, b/l pitting edema   - EKG: RBBB (present from prior EKG's). no acute ischemic findings;  - Echo (6/22) Mild diastolic dysfunction, EF 62%, normal RV size function  - Echo (7/15/22): moderate LV systolic dysfunction, moderate diastolic dysfunction, RV enlargement with decreased RV systolic dysfunction  - Repeat Echo 7/21/22: Much improved as above.  - Strict I&O  - 40 lasix PO once 7/21    #HTN   - Resume home anti-hypertensives. Hydralazine, amlodipine, carvedilol.    GI:   - Diet: Puree  - Abdominal x-ray with non-obstructive gas pattern 7/18.    Renal/:  #MANDY on CKD Stage 2 - pre-renal based on lytes.  Baseline Scr (6/22): 2.33  SCr at 2.53, downtrending  Monitor UO  - A little hypernatremia today but should resolve with resumption of diet.   - Murphy in place    ID:   #Shock, likely septic - no source found - resolved  - CT abd and pelvis: no infectious source   - Initial blood cx (7/13): NGTD   - Blood cx (7/17): NGTD  - f/u urine/sputum culture  - s/p meropenem (7/13 - 7/20)    Endo:     #T2DM - Controlled  - Will restart diet and re-evaluated insulin needs    #Hypoglycemia: adrenal insufficiency workup neg  - Endocrine following     Heme:   DVT Ppx: Heparin subQ    Ethics: FULL CODE    To follow:  [] Wean nasal cannula  [] PT/OT recs  [] Start standing diuretics as tolerated  [] Consider cardiology consult for medication optimization MICU Transfer Note    Transfer from: MICU    Transfer to: ( x ) Medicine    (  ) Telemetry     (   ) RCU        (    ) Palliative         (   ) Stroke Unit          (   ) __________________    Accepting Physician:  Room #      HPI:  68 y/o F with pmhx of HTN, HLD, sarcoidosis, CHF, presented to the ED for new onset AMS. She was also found to have pleural effusion, elevated BNP and LE edema concerning for CHF exacerbation. Admitted to ICU initially for metabolic encephalopathy s/p intubation for airway protection (7/13-7/16). MICU course was c/b hypoglycemia with workup neg for adrenal insufficiency. Course c/b septic shock with no source (started on meropenem).     Was extubated on 7/16, was comfortable on RA with good mental status and was sent to the floors. On 7/17 AM pt was found to be agonally breathing and RRT was called. Pt went into PEA arrest, ROSC achieved after 4 mins, epix1 . Intubated by anesthesia. Was started on levo 0.1 and prop. Prior to leaving unit patient was waking up with high peak pressures on vent, she received 2mg of versed and became more synchronous with ventilator.  Upon arrival to CTI patient's blood pressures started to decrease despite increasing levo and started to become bradycardic. O2 sat remained at 100%. Went into PEA arrest again, epinephrine x1 given and ROSC was achieved at 4 mins. Likely from aspiration event?    MICU Course:  Patient slowly weaned off of sedation. Extubated to BIPAP and weaned to NC. Able to follow commands and answer questions. Completed meropenem. Of note, patient is hard of hearing and understands better if spoken to without a mask.    Assessment and Plan:  68 y/o F with PMH of HTN, HLD, sarcoidosis, CHF was intially admitted to MICU after being intubated for airway protection was extubated successfully . On 7/17 am, found in agonal breathing and subsequently pulseless. Code Blue called, ROSC achieved, pt re-intubated. Second PEA code called upon arrival to the CTICU, ROSC achieved.     Neuro:  #Metabolic Encephalopathy - resolving. Now extubated and following commands.     Resp:  # Nasal Cannula  - Wean as tolerated  - infectious workup as below   - f/u sputum culture    #Bilateral pleural effusions i/s/o HF exacerbation   - CT Chest on admission with new large right and small left pleural effusions with adjacent compressive atelectasis including atelectasis of the majority of the right lower lobe   - Still present on 7/21 echo  - Diuretics    CV:  #s/p PEA arrest x2  - Possible aspiration event    #HFpEF   - BNP on arrival: 4021 with evidence of fluid overload with pleural effusions, b/l pitting edema   - EKG: RBBB (present from prior EKG's). no acute ischemic findings;  - Echo (6/22) Mild diastolic dysfunction, EF 62%, normal RV size function  - Echo (7/15/22): moderate LV systolic dysfunction, moderate diastolic dysfunction, RV enlargement with decreased RV systolic dysfunction  - Repeat Echo 7/21/22: Much improved as above.  - Strict I&O  - 40 lasix PO once 7/21    #HTN   - Resume home anti-hypertensives. Hydralazine, amlodipine, carvedilol.    GI:   - Diet: Puree  - Abdominal x-ray with non-obstructive gas pattern 7/18.    Renal/:  #MANDY on CKD Stage 2 - pre-renal based on lytes.  Baseline Scr (6/22): 2.33  SCr at 2.53, downtrending  Monitor UO  - A little hypernatremia today but should resolve with resumption of diet.   - Murphy in place    ID:   #Shock, likely septic - no source found - resolved  - CT abd and pelvis: no infectious source   - Initial blood cx (7/13): NGTD   - Blood cx (7/17): NGTD  - f/u urine/sputum culture  - s/p meropenem (7/13 - 7/20)    Endo:     #T2DM - Controlled  - Will restart diet and re-evaluated insulin needs    #Hypoglycemia: adrenal insufficiency workup neg  - Endocrine following     Heme:   DVT Ppx: Heparin subQ    Ethics: FULL CODE    To follow:  [] Wean nasal cannula  [] PT/OT recs  [] Start standing diuretics as tolerated  [] Consider cardiology consult for medication optimization MICU Transfer Note    Transfer from: MICU    Transfer to: ( x ) Medicine    (  ) Telemetry     (   ) RCU        (    ) Palliative         (   ) Stroke Unit          (   ) __________________    Accepting Physician: Dr. Mcknight  Room #      HPI:  66 y/o F with pmhx of HTN, HLD, sarcoidosis, CHF, presented to the ED for new onset AMS. She was also found to have pleural effusion, elevated BNP and LE edema concerning for CHF exacerbation. Admitted to ICU initially for metabolic encephalopathy s/p intubation for airway protection (7/13-7/16). MICU course was c/b hypoglycemia with workup neg for adrenal insufficiency. Course c/b septic shock with no source (started on meropenem).     Was extubated on 7/16, was comfortable on RA with good mental status and was sent to the floors. On 7/17 AM pt was found to be agonally breathing and RRT was called. Pt went into PEA arrest, ROSC achieved after 4 mins, epix1 . Intubated by anesthesia. Was started on levo 0.1 and prop. Prior to leaving unit patient was waking up with high peak pressures on vent, she received 2mg of versed and became more synchronous with ventilator.  Upon arrival to CTI patient's blood pressures started to decrease despite increasing levo and started to become bradycardic. O2 sat remained at 100%. Went into PEA arrest again, epinephrine x1 given and ROSC was achieved at 4 mins. Likely from aspiration event?    MICU Course:  Patient slowly weaned off of sedation. Extubated to BIPAP and weaned to NC. Able to follow commands and answer questions. Completed meropenem. Of note, patient is hard of hearing and understands better if spoken to without a mask.    Assessment and Plan:  66 y/o F with PMH of HTN, HLD, sarcoidosis, CHF was intially admitted to MICU after being intubated for airway protection was extubated successfully . On 7/17 am, found in agonal breathing and subsequently pulseless. Code Blue called, ROSC achieved, pt re-intubated. Second PEA code called upon arrival to the CTICU, ROSC achieved.     Neuro:  #Metabolic Encephalopathy - resolving. Now extubated and following commands.     Resp:  # Nasal Cannula  - Wean as tolerated  - infectious workup as below   - f/u sputum culture    #Bilateral pleural effusions i/s/o HF exacerbation   - CT Chest on admission with new large right and small left pleural effusions with adjacent compressive atelectasis including atelectasis of the majority of the right lower lobe   - Still present on 7/21 echo  - Diuretics    CV:  #s/p PEA arrest x2  - Possible aspiration event    #HFpEF   - BNP on arrival: 4021 with evidence of fluid overload with pleural effusions, b/l pitting edema   - EKG: RBBB (present from prior EKG's). no acute ischemic findings;  - Echo (6/22) Mild diastolic dysfunction, EF 62%, normal RV size function  - Echo (7/15/22): moderate LV systolic dysfunction, moderate diastolic dysfunction, RV enlargement with decreased RV systolic dysfunction  - Repeat Echo 7/21/22: Much improved as above.  - Strict I&O  - 40 lasix PO once 7/21    #HTN   - Resume home anti-hypertensives. Hydralazine, amlodipine, carvedilol.    GI:   - Diet: Puree  - Abdominal x-ray with non-obstructive gas pattern 7/18.    Renal/:  #MANDY on CKD Stage 2 - pre-renal based on lytes.  Baseline Scr (6/22): 2.33  SCr at 2.53, downtrending  Monitor UO  - A little hypernatremia today but should resolve with resumption of diet.   - Murphy in place    ID:   #Shock, likely septic - no source found - resolved  - CT abd and pelvis: no infectious source   - Initial blood cx (7/13): NGTD   - Blood cx (7/17): NGTD  - f/u urine/sputum culture  - s/p meropenem (7/13 - 7/20)    Endo:     #T2DM - Controlled  - Will restart diet and re-evaluated insulin needs    #Hypoglycemia: adrenal insufficiency workup neg  - Endocrine following     Heme:   DVT Ppx: Heparin subQ    Ethics: FULL CODE    To follow:  [] Wean nasal cannula  [] PT/OT recs  [] Start standing diuretics as tolerated  [] Consider cardiology consult for medication optimization yes

## 2022-10-11 NOTE — ED ADULT TRIAGE NOTE - CHIEF COMPLAINT QUOTE
sukh, right wrist pain, sent from [facility] Dutasteride Pregnancy And Lactation Text: This medication is absolutely contraindicated in women, especially during pregnancy and breast feeding. Feminization of male fetuses is possible if taking while pregnant.

## 2022-11-06 NOTE — ED ADULT TRIAGE NOTE - NS ED TRIAGE AVPU SCALE
Alert-The patient is alert, awake and responds to voice. The patient is oriented to time, place, and person. The triage nurse is able to obtain subjective information.
Patient

## 2022-11-30 NOTE — ED ADULT NURSE NOTE - READER
Quality 402: Tobacco Use And Help With Quitting Among Adolescents: Patient screened for tobacco and never smoked Detail Level: Detailed Quality 130: Documentation Of Current Medications In The Medical Record: Current Medications Documented Quality 110: Preventive Care And Screening: Influenza Immunization: Influenza Immunization Ordered or Recommended, but not Administered due to system reason Yes

## 2022-12-02 NOTE — ED ADULT TRIAGE NOTE - INTERNATIONAL TRAVEL
The microalbumin is a little high but you are maxed out on the lisinopril already.   The diabetes control is the best it has been in 2 years.  Keep it up.  Vit d looks good as does the cholesterol.  No changes.  Recheck the a1c in 3-6 months    Fermin No

## 2023-01-01 NOTE — ED ADULT NURSE NOTE - CHPI ED NUR SEVERITY2
Viable male delivered via . Taken to warmer for weights/measurements per mother request. Apgars 9/9.   PAIN SCALE 9 OF 10.

## 2023-01-20 NOTE — DISCHARGE NOTE PROVIDER - NSDCDCMDCOMP_GEN_ALL_CORE
This document is complete and the patient is ready for discharge. Mustarde Flap Text: The defect edges were debeveled with a #15 scalpel blade.  Given the size, depth and location of the defect and the proximity to free margins a Mustarde flap was deemed most appropriate.  Using a sterile surgical marker, an appropriate flap was drawn incorporating the defect. The area thus outlined was incised with a #15 scalpel blade.  The skin margins were undermined to an appropriate distance in all directions utilizing iris scissors.

## 2023-04-27 NOTE — H&P PST ADULT - MEDICATION HERBAL REMEDIES, PROFILE
Provided parent and patient with information on molluscum. I have advised the parent that although the rash is contagious, the patient is permitted to return to school/. We discussed alternatives to treatment to include cryotherapy, catharadin, duct tape, but given the number of lesions advised that watchful waiting is likely the best option for treatment. I have advised patient and parent that this rash can take 6 to 18 months to resolve, and is likely to oscillate in size and distribution during that time. Reassurance was provided that the rash is benign.      no

## 2023-09-01 NOTE — ED PROVIDER NOTE - CONDITION AT DISCHARGE:
Problem: Pain  Goal: Verbalizes/displays adequate comfort level or baseline comfort level  2023 by Marta Franklin RN  Outcome: Progressing  Flowsheets (Taken 2023 by Lord Micheal RN)  Verbalizes/displays adequate comfort level or baseline comfort level:   Encourage patient to monitor pain and request assistance   Assess pain using appropriate pain scale   Implement non-pharmacological measures as appropriate and evaluate response   Administer analgesics based on type and severity of pain and evaluate response   Notify Licensed Independent Practitioner if interventions unsuccessful or patient reports new pain     Problem: Vaginal Birth or  Section  Goal: Fetal and maternal status remain reassuring during the birth process  Description:  Birth OB-Pregnancy care plan goal which identifies if the fetal and maternal status remain reassuring during the birth process  2023 by Marta Franklin RN  Outcome: Progressing  Flowsheets (Taken 2023 by Lord Micheal RN)  Fetal and Maternal Status Remain Reassuring During the Birth Process:   Monitor vital signs   Monitor fetal heart rate   Monitor uterine activity     Problem: Discharge Planning  Goal: Discharge to home or other facility with appropriate resources  2023 by Marta Franklin RN  Outcome: Progressing  Flowsheets (Taken 2023 by Lord Micheal RN)  Discharge to home or other facility with appropriate resources:   Identify barriers to discharge with patient and caregiver   Arrange for needed discharge resources and transportation as appropriate   Refer to discharge planning if patient needs post-hospital services based on physician order or complex needs related to functional status, cognitive ability or social support system   Care plan reviewed with patient and partner Margrett Leyden. Patient and partner verbalize understanding of the plan of care and contribute to goal setting. Improved

## 2023-10-11 NOTE — ED ADULT NURSE NOTE - NS ED NURSE LEVEL OF CONSCIOUSNESS ORIENTATION
Oriented - self; Oriented - place; Oriented - time FAMILY HISTORY:  Family history of pacemaker, mother  FH: CVA (cerebrovascular accident), Mother  FH: myocardial infarction, father

## 2023-12-06 NOTE — ED ADULT NURSE NOTE - SKIN TEMPERATURE MOISTURE
12/06/23 Phone contact made with pt's caregiver today and we finished discussing care plan. She denies needing any more resources or education. No other issues identified at this time, contact information provided to caregiver if she has any needs in the future. I will discharge Ms. Elaine from [facility] at this time due to program graduation. caregiver agrees with this plan.    warm/cool

## 2024-01-02 NOTE — ED ADULT NURSE NOTE - MUSCULOSKELETAL ASSESSMENT
MTM referral from: [facility] visit (referral by provider)    MTM referral outreach attempt #1 on March 27, 2019 at 12:30 PM      Outcome: Patient is not interested at this time because they are a [facility] patient, will route to MTM Pharmacist/Provider as an FYI. Thank you for the referral.     Radha Mccarthy, MTM Coordinator       Hpi Title: Evaluation of Skin Lesions Additional History: Est pt presents for FBSE \\nLast FBSE 02/2023 w NG\\nHx of BCC R shin removed by BG 03/2023\\nPt has spot of concern on R cheek WDL

## 2024-01-11 NOTE — ED ADULT NURSE NOTE - NSSUHOSCREENINGYN_ED_ALL_ED
[FreeTextEntry6] : fever, coughing and a runny nose, several days Yes - the patient is able to be screened

## 2024-02-15 NOTE — H&P CARDIOLOGY - RESPIRATORY RATE (BREATHS/MIN)
Working on cutting back intake. Previously drinking 3 strong drinks per night.  I suspect feelings of jitteriness or secondary to cutting back alcohol intake.  She had 4 days without a drink.  She previously was drinking 21+ drinks per week.  Advised her not to go cold turkey.  She can have 1 drink per night and slowly taper from there. Not currently in alcohol withdrawal.   18

## 2024-03-01 NOTE — ED PROVIDER NOTE - ATTENDING CONTRIBUTION TO CARE
See HPI, all other systems negative pt bib ems for injuries s/p fall at assited living. pt was leaning over to  her phone, fell foward hitting face on ground. + epistaxis. no loc, d/n/v, cp, sob, abd pain, arm leg neck or back pain. unk last tetanus.  wd, wn female, nad, perrl, eomi, tender nasal bones, + 2cm superficial laceration right cheek,  s1s2 rrr, lungs cta, ribs nt, abd soft, nt, no cvat, neck and back nt, ext nt, full rom, neuro no motor or sensory deficits

## 2024-03-19 NOTE — ED ADULT NURSE NOTE - PAIN RATING/NUMBER SCALE (0-10): ACTIVITY
Problem: Prexisting or High Potential for Compromised Skin Integrity  Goal: Skin integrity is maintained or improved  Description: INTERVENTIONS:  - Identify patients at risk for skin breakdown  - Assess and monitor skin integrity  - Assess and monitor nutrition and hydration status  - Monitor labs   - Assess for incontinence   - Turn and reposition patient  - Assist with mobility/ambulation  - Relieve pressure over bony prominences  - Avoid friction and shearing  - Provide appropriate hygiene as needed including keeping skin clean and dry  - Evaluate need for skin moisturizer/barrier cream  - Collaborate with interdisciplinary team   - Patient/family teaching  - Consider wound care consult   Outcome: Not Progressing     Problem: SAFETY ADULT  Goal: Patient will remain free of falls  Description: INTERVENTIONS:  - Educate patient/family on patient safety including physical limitations  - Instruct patient to call for assistance with activity   - Consult OT/PT to assist with strengthening/mobility   - Keep Call bell within reach  - Keep bed low and locked with side rails adjusted as appropriate  - Keep care items and personal belongings within reach  - Initiate and maintain comfort rounds  - Make Fall Risk Sign visible to staff  - Offer Toileting every 2 Hours, in advance of need  - Initiate/Maintain bed alarm  - Obtain necessary fall risk management equipment:   - Apply yellow socks and bracelet for high fall risk patients  - Consider moving patient to room near nurses station  Outcome: Progressing  Goal: Maintain or return to baseline ADL function  Description: INTERVENTIONS:  -  Assess patient's ability to carry out ADLs; assess patient's baseline for ADL function and identify physical deficits which impact ability to perform ADLs (bathing, care of mouth/teeth, toileting, grooming, dressing, etc.)  - Assess/evaluate cause of self-care deficits   - Assess range of motion  - Assess patient's mobility; develop plan  if impaired  - Assess patient's need for assistive devices and provide as appropriate  - Encourage maximum independence but intervene and supervise when necessary  - Involve family in performance of ADLs  - Assess for home care needs following discharge   - Consider OT consult to assist with ADL evaluation and planning for discharge  - Provide patient education as appropriate  Outcome: Progressing  Goal: Maintains/Returns to pre admission functional level  Description: INTERVENTIONS:  - Perform AM-PAC 6 Click Basic Mobility/ Daily Activity assessment daily.  - Set and communicate daily mobility goal to care team and patient/family/caregiver.   - Collaborate with rehabilitation services on mobility goals if consulted    - Record patient progress and toleration of activity level   Outcome: Progressing     Problem: DISCHARGE PLANNING  Goal: Discharge to home or other facility with appropriate resources  Description: INTERVENTIONS:  - Identify barriers to discharge w/patient and caregiver  - Arrange for needed discharge resources and transportation as appropriate  - Identify discharge learning needs (meds, wound care, etc.)  - Arrange for interpretive services to assist at discharge as needed  - Refer to Case Management Department for coordinating discharge planning if the patient needs post-hospital services based on physician/advanced practitioner order or complex needs related to functional status, cognitive ability, or social support system  Outcome: Progressing     Problem: Knowledge Deficit  Goal: Patient/family/caregiver demonstrates understanding of disease process, treatment plan, medications, and discharge instructions  Description: Complete learning assessment and assess knowledge base.  Interventions:  - Provide teaching at level of understanding  - Provide teaching via preferred learning methods  Outcome: Progressing     Problem: INFECTION - ADULT  Goal: Absence or prevention of progression during  hospitalization  Description: INTERVENTIONS:  - Assess and monitor for signs and symptoms of infection  - Monitor lab/diagnostic results  - Monitor all insertion sites, i.e. indwelling lines, tubes, and drains  - Monitor endotracheal if appropriate and nasal secretions for changes in amount and color  - [place] appropriate cooling/warming therapies per order  - Administer medications as ordered  - Instruct and encourage patient and family to use good hand hygiene technique  - Identify and instruct in appropriate isolation precautions for identified infection/condition  Outcome: Progressing     Problem: CARDIOVASCULAR - ADULT  Goal: Maintains optimal cardiac output and hemodynamic stability  Description: INTERVENTIONS:  - Monitor I/O, vital signs and rhythm  - Monitor for S/S and trends of decreased cardiac output  - Administer and titrate ordered vasoactive medications to optimize hemodynamic stability  - Assess quality of pulses, skin color and temperature  - Assess for signs of decreased coronary artery perfusion  - Instruct patient to report change in severity of symptoms  Outcome: Progressing  Goal: Absence of cardiac dysrhythmias or at baseline rhythm  Description: INTERVENTIONS:  - Continuous cardiac monitoring, vital signs, obtain 12 lead EKG if ordered  - Administer antiarrhythmic and heart rate control medications as ordered  - Monitor electrolytes and administer replacement therapy as ordered  Outcome: Progressing     Problem: RESPIRATORY - ADULT  Goal: Achieves optimal ventilation and oxygenation  Description: INTERVENTIONS:  - Assess for changes in respiratory status  - Assess for changes in mentation and behavior  - Position to facilitate oxygenation and minimize respiratory effort  - Oxygen administered by appropriate delivery if ordered  - Initiate smoking cessation education as indicated  - Encourage broncho-pulmonary hygiene including cough, deep breathe, Incentive Spirometry  - Assess the need  for suctioning and aspirate as needed  - Assess and instruct to report SOB or any respiratory difficulty  - Respiratory Therapy support as indicated  Outcome: Progressing     Problem: SAFETY,RESTRAINT: NV/NON-SELF DESTRUCTIVE BEHAVIOR  Goal: Remains free of harm/injury (restraint for non violent/non self-detsructive behavior)  Description: INTERVENTIONS:  - Instruct patient/family regarding restraint use   - Assess and monitor physiologic and psychological status   - Provide interventions and comfort measures to meet assessed patient needs   - Identify and implement measures to help patient regain control  - Assess readiness for release of restraint   Outcome: Progressing  Goal: Returns to optimal restraint-free functioning  Description: INTERVENTIONS:  - Assess the patient's behavior and symptoms that indicate continued need for restraint  - Identify and implement measures to help patient regain control  - Assess readiness for release of restraint   Outcome: Progressing     Problem: COPING  Goal: Pt/Family able to verbalize concerns and demonstrate effective coping strategies  Description: INTERVENTIONS:  - Assist patient/family to identify coping skills, available support systems and cultural and spiritual values  - Provide emotional support, including active listening and acknowledgement of concerns of patient and caregivers  - Reduce environmental stimuli, as able  - Provide patient education  - Assess for spiritual pain/suffering and initiate spiritual care, including notification of Pastoral Care or jacinda based community as needed  - Assess effectiveness of coping strategies  Outcome: Progressing  Goal: Will report anxiety at manageable levels  Description: INTERVENTIONS:  - Administer medication as ordered  - Teach and encourage coping skills  - Provide emotional support  - Assess patient/family for anxiety and ability to cope  Outcome: Progressing      7

## 2024-06-11 NOTE — DISEASE MANAGEMENT
[Pathological] : TNM Stage: p [IIB] : IIB [TTNM] : 2 [Time Spent: ___ minutes] : I have spent [unfilled] minutes of time on the encounter. [NTNM] : 1a [MTNM] : 0 [de-identified] : 2000 [de-identified] : 3997 [de-identified] : Right breast/ right regional nodes

## 2024-08-16 NOTE — ED PROVIDER NOTE - CONSTITUTIONAL, MLM
Length Of Therapy: 1 year Detail Level: Zone Add High Risk Medication Management Associated Diagnosis?: Yes wd, wn, nad normal...

## 2024-08-22 NOTE — ED PROVIDER NOTE - WR ORDER STATUS 2
Spoke with pt, he will have daughter  samples.  Pt does not drive.I placed the PAP form for pt to sign and mail back to us in the same bag as the Eliquis.    Advised pt of above.  Verbally understood.   Performed

## 2024-09-11 NOTE — ED ADULT TRIAGE NOTE - CHIEF COMPLAINT QUOTE
"Weeks 32 to 34 of Your Pregnancy: Care Instructions    Decide whether you want to bank or donate your baby's umbilical cord blood. If you want to save this blood, you have to arrange for it ahead of time.   Decide about circumcision. Personal, Orthodoxy, or cultural beliefs may play a role in your decision. You get to decide what you want for your baby.         Learn how to ease hemorrhoids.   Get more liquids, fruits, vegetables, and fiber in your diet.  Avoid sitting for too long.  Clean yourself with moist toilet paper. Or try witch hazel pads.  Try ice packs or warm sitz baths for discomfort.  Use hydrocortisone cream for pain or itching.  Ask your doctor about stool softeners.        Consider the benefits of breastfeeding.   It reduces your baby's risk of sudden infant death Low Potassium 2.9 - called back by Dr Cortez [email or web address]  You have been provided the What I'd Wish I'd Known About Giving Birth document.    Additional copies can be found here:  [email or web address]                                                           If you have any questions regarding your visit, Please contact your care team.     [employer] Services: [phone]    To Schedule an Appointment 24/7  Call: [phone] TELEPH[facility]     Jose Carlos Olivas MD  Medical Director        Xi-SERINA Monroe-SERINA Barillas-Surgery Scheduler  Brisa-Surgery Scheduler               Tuesday-[place]  7:30 a.m-4:30 p.m    Thursday-[place]  7:30 a.m-4:30 p.m    Typical Surgery Days: Tuesday or Friday [facility]  [address] [place] [address]  PH: [phone]    Imaging Scheduling all locations  PH: [phone]     [facility]  [address] [place] [address]   [place] [address]  PH: [phone]    [facility]  [address] [place] [address]  PH: [phone] Fax      **Surgeries** Our Surgery Schedulers will contact you to schedule. If you do not receive a call within 3 business days, please call [phone].    Urgent Care locations:  [facility] Monday-Friday  10 am - 8 pm  Saturday and Sunday   9 am - 5 pm  Monday-Friday   10 am - 8 pm  Saturday and Sunday   9 am - 5 pm   [phone]   If you need a medication refill, please contact your pharmacy. Please allow 3 business days for your refill to be completed.  As always, Thank you for trusting us with your healthcare needs!    see additional instructions from your care team below

## 2024-11-22 NOTE — ED ADULT TRIAGE NOTE - MODE OF ARRIVAL
Comments:     Last Office Visit (last PCP visit):   8/26/2024    Next Visit Date:  Future Appointments   Date Time Provider Department Center   11/29/2024  9:00 AM Alla Fischer MD [facility] DEP   12/17/2024  8:30 AM Martina Hernandez MD [facility] DEP   12/18/2024  8:00 AM [place], [place], MD [facility]       **If hasn't been seen in over a year OR hasn't followed up according to last diabetes/ADHD visit, make appointment for patient before sending refill to provider.    Rx requested:  Requested Prescriptions     Pending Prescriptions Disp Refills    gabapentin (NEURONTIN) 100 MG capsule [Pharmacy Med Name: gabapentin 100 mg capsule] 60 capsule 2     Sig: Take 2 capsules by mouth nightly.                   
Walk in

## 2024-12-02 NOTE — ED ADULT NURSE NOTE - NS ED NURSE LEVEL OF CONSCIOUSNESS MENTAL STATUS
Problem: Chronic Conditions and Co-morbidities  Goal: Patient's chronic conditions and co-morbidity symptoms are monitored and maintained or improved  Outcome: Progressing  Flowsheets  Taken 12/1/2024 2000 by Omer Perry RN  Care Plan - Patient's Chronic Conditions and Co-Morbidity Symptoms are Monitored and Maintained or Improved: Monitor and assess patient's chronic conditions and comorbid symptoms for stability, deterioration, or improvement  Taken 12/1/2024 1610 by Yue Connelly RN  Care Plan - Patient's Chronic Conditions and Co-Morbidity Symptoms are Monitored and Maintained or Improved:   Monitor and assess patient's chronic conditions and comorbid symptoms for stability, deterioration, or improvement   Collaborate with multidisciplinary team to address chronic and comorbid conditions and prevent exacerbation or deterioration   Update acute care plan with appropriate goals if chronic or comorbid symptoms are exacerbated and prevent overall improvement and discharge      Awake/Alert

## 2025-01-28 NOTE — ED ADULT NURSE NOTE - CARDIO ASSESSMENT
"Patient Education     Anemia por deficiencia de cheyenne en adultos - Instrucciones para el milton   Conceptos Básicos   Redactado por los médicos y editores de UpToDate   ¿Qué son las instrucciones para el milton? -- Las instrucciones para el milton son información sobre cómo cuidarse después de recibir atención médica por un problema de pablo.  ¿Qué es la anemia? -- Anemia significa no tener suficiente “hemoglobina” en la darshana. La hemoglobina ayuda a los glóbulos rojos a llevar el oxígeno a todo el cuerpo. Si peter nivel de hemoglobina es bajo, es posible que peter cuerpo no reciba todo el oxígeno que necesita.  La anemia puede tener varias causas diferentes. Richard causa frecuente es no tener suficiente cheyenne. Laureles se llama \"anemia por deficiencia de cheyenne\" o, en ocasiones, \"nivel bajo de cheyenne\".  Es posible que tenga poco cheyenne debido a lo siguiente:   Perdió richard gran cantidad de darshana.   Peter cuerpo no puede absorber suficiente cheyenne de los alimentos.   No recibe suficiente cheyenne a través de los alimentos.  En la mayoría de los adultos, la deficiencia de cheyenne se debe a richard padecimiento que hace que peter cuerpo sea incapaz de absorber el cheyenne, a un sangrado en el tracto digestivo o a los periodos menstruales y embarazos. El sangrado en el tracto digestivo puede producirse lentamente a lo beata del tiempo, y es posible que no note darshana en zuly evacuaciones.  Es muy importante que conozca cuál es la causa de la anemia. Muchas de las causas son padecimientos graves que se pueden tratar. Es posible que necesite pruebas de darshana u otras pruebas para saber qué está causando peter bajo nivel de cheyenne.  ¿Cómo puedo cuidarme en casa? -- Pregúntele al médico o enfermero qué debe hacer cuando vuelva a peter casa. Asegúrese de comprender exactamente lo que tiene que hacer para cuidarse. Li preguntas si hay algo que no entiende.  También debe hacer lo siguiente:   [place] un suplemento de cheyenne solo si se lo indica el médico. Si el " médico le recetó píldoras de cheyenne, asegúrese de conocer peter concentración y con qué frecuencia tomarlas. La mejor manera de kev cheyenne es richard vez cada dos días, o el lunes, el miércoles y el viernes de cada semana. Lleve un registro de cuándo debe kev el cheyenne. Es posible que deba kev las píldoras malinda algunos meses. No tome más píldoras de cheyenne de las que le diga el médico. Las grandes cantidades de cheyenne pueden ser dañinas.   [place] el cheyenne con el estómago vacío. Algunos alimentos pueden disminuir el efecto de las píldoras de cheyenne. Entre ellos se cuentan los huevos, el pan integral, los cereales, los productos lácteos, el café y el té.   Guarde zuly píldoras de cheyenne de forma gipson. Asegúrese de que los niños no tengan acceso a ellas. Un tremayne puede envenenarse gravemente si kalen píldoras de cheyenne por accidente. Si un tremayne traga píldoras de cheyenne, llame a un centro de control de toxicología de inmediato.   Tenga en cuenta que pueden pasar algunas semanas hasta que empiece a sentirse mejor. Es posible que necesite descansar más a menudo hasta que zuly recuentos de glóbulos rojos aumenten. Los síntomas de anemia pueden incluir sensación de cansancio o de falta de aire. También podría tener otros síntomas, caesar tener muy poca energía, piernas inquietas y ansias de comer hielo.   Hable con peter médico si kalen medicinas para la acidez. Estas medicinas pueden limitar la cantidad de cheyenne que absorbe peter cuerpo.  Las píldoras de cheyenne suelen causar efectos secundarios tales caesar malestar estomacal y estreñimiento. Pruebe lo siguiente para ayudar a prevenir el estreñimiento que causan las píldoras de cheyenne:   Coma alimentos ricos en fibra. Estos incluyen granos integrales, frutas y verduras.   Netta mucha agua y otros líquidos todos los días. [place] ayuda a ablandar las evacuaciones.   Establezca un horario fijo para tratar de evacuar. No ignore las ansias de evacuar.   Tómese todo el tiempo necesario para  evacuar.   Li actividad física regularmente.  Si tiene efectos secundarios, o si no obtiene suficiente cheyenne de las píldoras, hay medidas que puede kev para reducir los efectos secundarios. Peter médico podría suspender las píldoras y darle cheyenne por vía intravenosa.  ¿Qué atención de seguimiento necesito? -- Peter médico o enfermero le dirá si necesita programar richard consulta de seguimiento. Si es así, asegúrese de saber cuándo y adónde ir. Es posible que necesite hacerse más pruebas de darshana para verificar que katherine niveles de cheyenne yocasta normales y que ya no tenga anemia.  ¿Cuándo veronica llamar al médico? -- Pida ayuda de emergencia de inmediato (en . [place], llame al 9-1-1) si:   Desarrolla dolor en el pecho o dificultad grave para respirar.  Llame a peter médico para pedir consejo si:   Vomita darshana o algo que parece café molido.   Katherine heces son negras o del color de la nadine.   Se siente débil o se desmaya.  Todos los artículos se actualizan a medida que se descubre nueva evidencia y culmina nuestro proceso de evaluación por homólogos   Jigna artículo se recuperó de UpToDate el: Mar 13, 2024.  Artículo [id number] Versión [id number].[id number]  Release: 32.2.4 - C32.71  © 2024 UpToDate, Inc. Todos los derechos reservados.  Exención de responsabilidad y uso de la información del consumidor   Descargo de responsabilidad: esta información generalizada es un resumen limitado de información sobre el diagnóstico, el tratamiento y/o los medicamentos. No pretende ser exhaustiva y se debe utilizar caesar herramienta para ayudar al usuario a comprender y/o evaluar las posibles opciones de diagnóstico y tratamiento. No incluye toda la información sobre afecciones, tratamientos, medicamentos, efectos secundarios o riesgos puedan ser aplicables a un paciente específico. No tiene el propósito de servir caesar recomendación médica ni de sustituir la recomendación médica, el diagnóstico o el tratamiento de un profesional de atención médica que  se base en el examen y la evaluación de antonio profesional de la pablo respecto a las circunstancias específicas y únicas del paciente. Los pacientes deben hablar con un profesional de atención médica para obtener información completa sobre pteer pablo, cuestiones médicas y opciones de tratamiento, incluidos los riesgos o los beneficios relacionados con el uso de medicamentos. Esta información no certifica que los tratamientos o medicamentos yocasta seguros, eficaces o estén aprobados para tratar a un paciente específico. [employer] y zuly afiliados renuncian a cualquier garantía o responsabilidad relacionada con esta información o el uso de la misma.El uso de esta información está sujeto a las Condiciones de uso, disponibles en https://www.Antriauwer.com/en/know/clinical-effectiveness-terms. 2024© [employer] y zuly afiliados y/o licenciantes. Todos los derechos reservados.  Copyright   © 2024 [employer] Todos los derechos reservados.     ---

## 2025-01-28 NOTE — ED ADULT TRIAGE NOTE - HEIGHT IN INCHES
HUB TO READ  CANCELLED APPT DUE TO PROVIDER BEING OUT OF THE OFFICE WILL REACH OUT TO PATIENT AS SOON AS WE HAVE A RESCHEDULE DATE.  
7
